# Patient Record
Sex: FEMALE | Race: BLACK OR AFRICAN AMERICAN | NOT HISPANIC OR LATINO | Employment: FULL TIME | URBAN - METROPOLITAN AREA
[De-identification: names, ages, dates, MRNs, and addresses within clinical notes are randomized per-mention and may not be internally consistent; named-entity substitution may affect disease eponyms.]

---

## 2017-09-21 ENCOUNTER — PATIENT MESSAGE (OUTPATIENT)
Dept: ADMINISTRATIVE | Facility: HOSPITAL | Age: 42
End: 2017-09-21

## 2017-10-01 DIAGNOSIS — I10 ESSENTIAL HYPERTENSION: ICD-10-CM

## 2017-10-02 RX ORDER — AMLODIPINE BESYLATE 10 MG/1
TABLET ORAL
Qty: 30 TABLET | Refills: 0 | Status: SHIPPED | OUTPATIENT
Start: 2017-10-02 | End: 2018-02-03 | Stop reason: SDUPTHER

## 2017-10-02 RX ORDER — LISINOPRIL 40 MG/1
TABLET ORAL
Qty: 30 TABLET | Refills: 0 | Status: SHIPPED | OUTPATIENT
Start: 2017-10-02 | End: 2018-02-03 | Stop reason: SDUPTHER

## 2017-10-13 DIAGNOSIS — J30.9 ALLERGIC RHINITIS: ICD-10-CM

## 2017-10-14 RX ORDER — FLUTICASONE PROPIONATE 50 MCG
SPRAY, SUSPENSION (ML) NASAL
Refills: 2 | OUTPATIENT
Start: 2017-10-14

## 2017-11-08 ENCOUNTER — PATIENT MESSAGE (OUTPATIENT)
Dept: INTERNAL MEDICINE | Facility: CLINIC | Age: 42
End: 2017-11-08

## 2017-11-08 DIAGNOSIS — I10 ESSENTIAL HYPERTENSION: ICD-10-CM

## 2017-11-08 RX ORDER — AMLODIPINE BESYLATE 10 MG/1
TABLET ORAL
Qty: 30 TABLET | Refills: 0 | OUTPATIENT
Start: 2017-11-08

## 2017-11-08 RX ORDER — LISINOPRIL 40 MG/1
TABLET ORAL
Qty: 30 TABLET | Refills: 0 | OUTPATIENT
Start: 2017-11-08

## 2018-01-24 ENCOUNTER — HOSPITAL ENCOUNTER (EMERGENCY)
Facility: HOSPITAL | Age: 43
Discharge: HOME | End: 2018-01-24
Attending: FAMILY MEDICINE
Payer: COMMERCIAL

## 2018-01-24 VITALS
SYSTOLIC BLOOD PRESSURE: 161 MMHG | RESPIRATION RATE: 18 BRPM | HEART RATE: 89 BPM | DIASTOLIC BLOOD PRESSURE: 95 MMHG | OXYGEN SATURATION: 98 % | BODY MASS INDEX: 31.39 KG/M2 | HEIGHT: 67 IN | TEMPERATURE: 99 F | WEIGHT: 200 LBS

## 2018-01-24 DIAGNOSIS — E87.6 HYPOKALEMIA: ICD-10-CM

## 2018-01-24 DIAGNOSIS — R10.84 GENERALIZED ABDOMINAL PAIN: ICD-10-CM

## 2018-01-24 DIAGNOSIS — R11.2 NON-INTRACTABLE VOMITING WITH NAUSEA, UNSPECIFIED VOMITING TYPE: Primary | ICD-10-CM

## 2018-01-24 LAB
ALBUMIN SERPL BCP-MCNC: 4.5 G/DL
ALP SERPL-CCNC: 92 U/L
ALT SERPL W/O P-5'-P-CCNC: 31 U/L
ANION GAP SERPL CALC-SCNC: 12 MMOL/L
AST SERPL-CCNC: 24 U/L
B-HCG UR QL: NEGATIVE
BACTERIA #/AREA URNS AUTO: NORMAL /HPF
BASOPHILS # BLD AUTO: 0.04 K/UL
BASOPHILS NFR BLD: 0.2 %
BILIRUB SERPL-MCNC: 0.6 MG/DL
BILIRUB UR QL STRIP: NEGATIVE
BUN SERPL-MCNC: 10 MG/DL
CALCIUM SERPL-MCNC: 11.6 MG/DL
CHLORIDE SERPL-SCNC: 98 MMOL/L
CLARITY UR REFRACT.AUTO: ABNORMAL
CO2 SERPL-SCNC: 28 MMOL/L
COLOR UR AUTO: YELLOW
CREAT SERPL-MCNC: 1 MG/DL
CTP QC/QA: YES
DIFFERENTIAL METHOD: ABNORMAL
EOSINOPHIL # BLD AUTO: 0 K/UL
EOSINOPHIL NFR BLD: 0 %
ERYTHROCYTE [DISTWIDTH] IN BLOOD BY AUTOMATED COUNT: 13.2 %
EST. GFR  (AFRICAN AMERICAN): >60 ML/MIN/1.73 M^2
EST. GFR  (NON AFRICAN AMERICAN): >60 ML/MIN/1.73 M^2
GLUCOSE SERPL-MCNC: 144 MG/DL
GLUCOSE UR QL STRIP: NEGATIVE
HCT VFR BLD AUTO: 47.1 %
HGB BLD-MCNC: 16.9 G/DL
HGB UR QL STRIP: NEGATIVE
HYALINE CASTS UR QL AUTO: 0 /LPF
IMM GRANULOCYTES # BLD AUTO: 0.13 K/UL
IMM GRANULOCYTES NFR BLD AUTO: 0.6 %
KETONES UR QL STRIP: ABNORMAL
LEUKOCYTE ESTERASE UR QL STRIP: NEGATIVE
LIPASE SERPL-CCNC: 16 U/L
LYMPHOCYTES # BLD AUTO: 1.9 K/UL
LYMPHOCYTES NFR BLD: 9.1 %
MCH RBC QN AUTO: 30.1 PG
MCHC RBC AUTO-ENTMCNC: 35.9 G/DL
MCV RBC AUTO: 84 FL
MICROSCOPIC COMMENT: NORMAL
MONOCYTES # BLD AUTO: 1.5 K/UL
MONOCYTES NFR BLD: 7.2 %
NEUTROPHILS # BLD AUTO: 17 K/UL
NEUTROPHILS NFR BLD: 82.9 %
NITRITE UR QL STRIP: NEGATIVE
NRBC BLD-RTO: 0 /100 WBC
PH UR STRIP: 7 [PH] (ref 5–8)
PLATELET # BLD AUTO: 298 K/UL
PMV BLD AUTO: 11.3 FL
POTASSIUM SERPL-SCNC: 3.1 MMOL/L
PROT SERPL-MCNC: 9.3 G/DL
PROT UR QL STRIP: ABNORMAL
RBC # BLD AUTO: 5.61 M/UL
RBC #/AREA URNS AUTO: 3 /HPF (ref 0–4)
SODIUM SERPL-SCNC: 138 MMOL/L
SP GR UR STRIP: 1.03 (ref 1–1.03)
SQUAMOUS #/AREA URNS AUTO: 16 /HPF
URN SPEC COLLECT METH UR: ABNORMAL
UROBILINOGEN UR STRIP-ACNC: NEGATIVE EU/DL
WBC # BLD AUTO: 20.5 K/UL
WBC #/AREA URNS AUTO: 2 /HPF (ref 0–5)

## 2018-01-24 PROCEDURE — 99284 EMERGENCY DEPT VISIT MOD MDM: CPT | Mod: 25

## 2018-01-24 PROCEDURE — 80053 COMPREHEN METABOLIC PANEL: CPT

## 2018-01-24 PROCEDURE — 96375 TX/PRO/DX INJ NEW DRUG ADDON: CPT

## 2018-01-24 PROCEDURE — 63600175 PHARM REV CODE 636 W HCPCS: Performed by: PHYSICIAN ASSISTANT

## 2018-01-24 PROCEDURE — 25500020 PHARM REV CODE 255: Performed by: FAMILY MEDICINE

## 2018-01-24 PROCEDURE — 96365 THER/PROPH/DIAG IV INF INIT: CPT

## 2018-01-24 PROCEDURE — 99284 EMERGENCY DEPT VISIT MOD MDM: CPT | Mod: ,,, | Performed by: PHYSICIAN ASSISTANT

## 2018-01-24 PROCEDURE — 85025 COMPLETE CBC W/AUTO DIFF WBC: CPT

## 2018-01-24 PROCEDURE — 81025 URINE PREGNANCY TEST: CPT | Performed by: FAMILY MEDICINE

## 2018-01-24 PROCEDURE — 81001 URINALYSIS AUTO W/SCOPE: CPT

## 2018-01-24 PROCEDURE — 96372 THER/PROPH/DIAG INJ SC/IM: CPT

## 2018-01-24 PROCEDURE — 96376 TX/PRO/DX INJ SAME DRUG ADON: CPT

## 2018-01-24 PROCEDURE — 96361 HYDRATE IV INFUSION ADD-ON: CPT

## 2018-01-24 PROCEDURE — 25000003 PHARM REV CODE 250: Performed by: PHYSICIAN ASSISTANT

## 2018-01-24 PROCEDURE — 83690 ASSAY OF LIPASE: CPT

## 2018-01-24 RX ORDER — ONDANSETRON 4 MG/1
4 TABLET, FILM COATED ORAL EVERY 6 HOURS
Qty: 12 TABLET | Refills: 0 | Status: SHIPPED | OUTPATIENT
Start: 2018-01-24 | End: 2018-01-26 | Stop reason: ALTCHOICE

## 2018-01-24 RX ORDER — CIPROFLOXACIN 250 MG/1
250 TABLET, FILM COATED ORAL
Status: COMPLETED | OUTPATIENT
Start: 2018-01-24 | End: 2018-01-24

## 2018-01-24 RX ORDER — ONDANSETRON 2 MG/ML
4 INJECTION INTRAMUSCULAR; INTRAVENOUS
Status: COMPLETED | OUTPATIENT
Start: 2018-01-24 | End: 2018-01-24

## 2018-01-24 RX ORDER — CIPROFLOXACIN 250 MG/1
250 TABLET, FILM COATED ORAL 2 TIMES DAILY
Qty: 5 TABLET | Refills: 0 | Status: SHIPPED | OUTPATIENT
Start: 2018-01-24 | End: 2018-01-27

## 2018-01-24 RX ORDER — DICYCLOMINE HYDROCHLORIDE 10 MG/ML
20 INJECTION INTRAMUSCULAR
Status: COMPLETED | OUTPATIENT
Start: 2018-01-24 | End: 2018-01-24

## 2018-01-24 RX ORDER — POTASSIUM CHLORIDE 20 MEQ/15ML
40 SOLUTION ORAL
Status: COMPLETED | OUTPATIENT
Start: 2018-01-24 | End: 2018-01-24

## 2018-01-24 RX ORDER — ACETAMINOPHEN 10 MG/ML
1000 INJECTION, SOLUTION INTRAVENOUS ONCE
Status: COMPLETED | OUTPATIENT
Start: 2018-01-24 | End: 2018-01-24

## 2018-01-24 RX ADMIN — IOHEXOL 100 ML: 350 INJECTION, SOLUTION INTRAVENOUS at 07:01

## 2018-01-24 RX ADMIN — CIPROFLOXACIN HYDROCHLORIDE 250 MG: 250 TABLET, FILM COATED ORAL at 08:01

## 2018-01-24 RX ADMIN — ONDANSETRON 4 MG: 2 INJECTION INTRAMUSCULAR; INTRAVENOUS at 05:01

## 2018-01-24 RX ADMIN — ONDANSETRON 4 MG: 2 INJECTION INTRAMUSCULAR; INTRAVENOUS at 06:01

## 2018-01-24 RX ADMIN — SODIUM CHLORIDE 1000 ML: 0.9 INJECTION, SOLUTION INTRAVENOUS at 05:01

## 2018-01-24 RX ADMIN — DICYCLOMINE HYDROCHLORIDE 20 MG: 20 INJECTION, SOLUTION INTRAMUSCULAR at 05:01

## 2018-01-24 RX ADMIN — ACETAMINOPHEN 1000 MG: 10 INJECTION, SOLUTION INTRAVENOUS at 08:01

## 2018-01-24 RX ADMIN — SODIUM CHLORIDE 1000 ML: 0.9 INJECTION, SOLUTION INTRAVENOUS at 06:01

## 2018-01-24 RX ADMIN — POTASSIUM CHLORIDE 40 MEQ: 20 SOLUTION ORAL at 08:01

## 2018-01-24 NOTE — ED PROVIDER NOTES
"Encounter Date: 1/24/2018       History     Chief Complaint   Patient presents with    Emesis     "can't hold anything down" since Monday.      Patient is a 42-year-old female with a history of hypertension presenting to the ER for evaluation of nausea and vomiting.  Patient states that since Monday she has not been able to keep down any food or fluids.  She is also had multiple episodes of diarrhea over the last 3 days.  Patient states that she has had multiple episodes of vomiting today.  No hematemesis.  She does have associated abdominal cramping associated with this.  She denies any blood in the stool or black tarry stool.  She denies any fevers recorded at home but does have some diffuse body aches.  Patient states that she recently came back from a cruise to [facility] on Saturday.  No other sick contacts. No congestion or uri symptoms.  She does have a remote history of diverticulitis.  No prior abdominal surgeries.      The history is provided by the patient.     Review of patient's allergies indicates:   Allergen Reactions    Ibuprofen Hives     Past Medical History:   Diagnosis Date    Diverticulosis     GERD (gastroesophageal reflux disease)     Hiatal hernia     Hypertension      Past Surgical History:   Procedure Laterality Date    CERVICAL BIOPSY  W/ LOOP ELECTRODE EXCISION  2/11/14     Family History   Problem Relation Age of Onset    Heart disease Mother 54     MI    Heart disease Maternal Grandmother 40     MI    Diabetes Other     Heart disease Brother 44     MI    Sickle cell trait Sister      Social History   Substance Use Topics    Smoking status: Current Every Day Smoker     Packs/day: 1.00     Years: 16.00     Types: Cigarettes    Smokeless tobacco: Never Used    Alcohol use Yes      Comment: social 1-2 x / month     Review of Systems   Constitutional: Negative for chills and fever.   HENT: Negative for congestion.    Respiratory: Negative for cough and shortness of breath.  "   Cardiovascular: Negative for chest pain.   Gastrointestinal: Positive for abdominal pain, diarrhea, nausea and vomiting. Negative for blood in stool.   Genitourinary: Negative for dysuria, flank pain and hematuria.   Musculoskeletal: Negative for back pain.   Skin: Negative for rash and wound.   Allergic/Immunologic: Negative for immunocompromised state.   Neurological: Positive for weakness. Negative for dizziness and headaches.   Hematological: Does not bruise/bleed easily.   Psychiatric/Behavioral: Negative for confusion.       Physical Exam     Initial Vitals [01/24/18 1444]   BP Pulse Resp Temp SpO2   (!) 176/88 82 18 98.2 °F (36.8 °C) 100 %      MAP       117.33         Physical Exam    Constitutional: She appears well-developed and well-nourished.   HENT:   Head: Normocephalic and atraumatic.   Eyes: Conjunctivae and EOM are normal.   Neck: Neck supple.   Cardiovascular: Normal rate, regular rhythm and normal heart sounds.   Pulmonary/Chest: Breath sounds normal.   Abdominal: Soft. Normal appearance and bowel sounds are normal. There is generalized tenderness. There is CVA tenderness.   Neurological: She is oriented to person, place, and time.   Skin: Skin is warm and dry.         ED Course   Procedures  Labs Reviewed   CBC W/ AUTO DIFFERENTIAL - Abnormal; Notable for the following:        Result Value    WBC 20.50 (*)     RBC 5.61 (*)     Hemoglobin 16.9 (*)     Immature Granulocytes 0.6 (*)     Gran # 17.0 (*)     Immature Grans (Abs) 0.13 (*)     Mono # 1.5 (*)     Gran% 82.9 (*)     Lymph% 9.1 (*)     All other components within normal limits   COMPREHENSIVE METABOLIC PANEL - Abnormal; Notable for the following:     Potassium 3.1 (*)     Glucose 144 (*)     Calcium 11.6 (*)     Total Protein 9.3 (*)     All other components within normal limits   URINALYSIS, REFLEX TO URINE CULTURE - Abnormal; Notable for the following:     Appearance, UA Hazy (*)     Protein, UA 2+ (*)     Ketones, UA 1+ (*)     All  other components within normal limits    Narrative:     Preferred Collection Type->Urine, Clean Catch   LIPASE   URINALYSIS MICROSCOPIC    Narrative:     Preferred Collection Type->Urine, Clean Catch   POCT URINE PREGNANCY                   APC / Resident Notes:   Patient was seen in the ER promptly upon arrival.  She is afebrile acute distress.  Mild diffuse tenderness on palpation over the abdomen.  Patient does appear to be slightly dry.  IV access established labs ordered fluids given.  Patient was given IV Tylenol and Zofran for nausea.  She states do show an elevated white count of 20.5.  Hemoglobin stable.  Potassium of 3.1, patient was given supplemental potassium in the ED.  This is likely secondary to patient's vomiting and diarrhea.  Urinalysis did show ketones, negative for infection or blood.  I did obtain CT of the abdomen and pelvis to rule out evidence of diverticulitis versus colitis or inflammatory infectious process.  CT does not show any acute findings.  There is diverticulosis without evidence of diverticulitis.  Upon reassessment patient is resting comfortably.  I am suspicious for traveler's diarrhea given her recent travel in a cruise to [place].  She was given ciprofloxacin in ED and will be covered for the next 3 days.  She will prescribed home on Zofran as well.  Did advise patient to stay hydrated with bland diet as well.  She is to follow-up with family doctor this week.  She stable for outpatient therapy at this time.    The care of this patient was overseen by attending physician who agrees with treatment, plan, and disposition.                ED Course      CT Abdomen Pelvis With Contrast   Final Result      1. No acute findings in the abdomen or pelvis.      2. Small to moderate volume intermediate density free fluid in the pelvis with low density lesion in the left adnexa, raising the question of small ruptured hemorrhagic ovarian cyst. This finding is similar in appearance to  05/20/2011.      3. Diverticulosis, without surrounding inflammation.               Electronically signed by: Vik Love   Date:     01/24/18   Time:    19:24         Clinical Impression:   The primary encounter diagnosis was Non-intractable vomiting with nausea, unspecified vomiting type. Diagnoses of Generalized abdominal pain and Hypokalemia were also pertinent to this visit.    Disposition:   Disposition: Discharged  Condition: Stable                        Padmini Caceres PA-C  01/24/18 7350

## 2018-01-24 NOTE — ED TRIAGE NOTES
Patient presents to ER with nausea, vomiting and diarrhea for several days.  States that she feels weak and is having generalized body aches.    Pt identifiers checked and correct  LOC: The patient is awake, alert, aware of environment with an appropriate affect. Oriented x3, speaking appropriately  APPEARANCE: Pt resting comfortably, in no acute distress, pt is clean and well groomed, clothing properly fastened  SKIN: Skin warm, dry and intact, normal skin turgor, moist mucus membranes  RESPIRATORY: Airway is open and patent, respirations are spontaneous, even and unlabored, normal effort and rate  MUSCULOSKELETAL: No obvious deformities.

## 2018-01-25 NOTE — ED NOTES
"Pt states "the potassium made me feel weird, like I need to throw up, my throat burns". PA< Morris made aware and would like to still d/c pt.   "

## 2018-01-26 ENCOUNTER — HOSPITAL ENCOUNTER (EMERGENCY)
Facility: HOSPITAL | Age: 43
Discharge: HOME | End: 2018-01-26
Attending: EMERGENCY MEDICINE
Payer: COMMERCIAL

## 2018-01-26 VITALS
RESPIRATION RATE: 20 BRPM | TEMPERATURE: 98 F | SYSTOLIC BLOOD PRESSURE: 181 MMHG | DIASTOLIC BLOOD PRESSURE: 110 MMHG | OXYGEN SATURATION: 98 % | HEART RATE: 83 BPM | HEIGHT: 67 IN | BODY MASS INDEX: 31.39 KG/M2 | WEIGHT: 200 LBS

## 2018-01-26 DIAGNOSIS — R10.84 ACUTE GENERALIZED ABDOMINAL PAIN: ICD-10-CM

## 2018-01-26 DIAGNOSIS — R11.2 NON-INTRACTABLE VOMITING WITH NAUSEA, UNSPECIFIED VOMITING TYPE: Primary | ICD-10-CM

## 2018-01-26 LAB
ALBUMIN SERPL BCP-MCNC: 4.6 G/DL
ALP SERPL-CCNC: 97 U/L
ALT SERPL W/O P-5'-P-CCNC: 64 U/L
AMORPH CRY URNS QL MICRO: ABNORMAL
ANION GAP SERPL CALC-SCNC: 14 MMOL/L
AST SERPL-CCNC: 56 U/L
B-HCG UR QL: NEGATIVE
BACTERIA #/AREA URNS HPF: ABNORMAL /HPF
BILIRUB SERPL-MCNC: 1.1 MG/DL
BILIRUB UR QL STRIP: ABNORMAL
BUN SERPL-MCNC: 12 MG/DL
CALCIUM SERPL-MCNC: 10.1 MG/DL
CHLORIDE SERPL-SCNC: 96 MMOL/L
CLARITY UR: ABNORMAL
CO2 SERPL-SCNC: 27 MMOL/L
COLOR UR: ABNORMAL
CREAT SERPL-MCNC: 1 MG/DL
CTP QC/QA: YES
EST. GFR  (AFRICAN AMERICAN): >60 ML/MIN/1.73 M^2
EST. GFR  (NON AFRICAN AMERICAN): >60 ML/MIN/1.73 M^2
GLUCOSE SERPL-MCNC: 125 MG/DL
GLUCOSE UR QL STRIP: NEGATIVE
HGB UR QL STRIP: ABNORMAL
HYALINE CASTS #/AREA URNS LPF: 6 /LPF
KETONES UR QL STRIP: ABNORMAL
LEUKOCYTE ESTERASE UR QL STRIP: NEGATIVE
LIPASE SERPL-CCNC: 22 U/L
MICROSCOPIC COMMENT: ABNORMAL
NITRITE UR QL STRIP: NEGATIVE
PH UR STRIP: 5 [PH] (ref 5–8)
POTASSIUM SERPL-SCNC: 3 MMOL/L
PROT SERPL-MCNC: 8.5 G/DL
PROT UR QL STRIP: ABNORMAL
RBC #/AREA URNS HPF: 1 /HPF (ref 0–4)
SODIUM SERPL-SCNC: 137 MMOL/L
SP GR UR STRIP: >1.03 (ref 1–1.03)
SQUAMOUS #/AREA URNS HPF: 15 /HPF
URN SPEC COLLECT METH UR: ABNORMAL
UROBILINOGEN UR STRIP-ACNC: ABNORMAL EU/DL
WBC #/AREA URNS HPF: 1 /HPF (ref 0–5)

## 2018-01-26 PROCEDURE — 63600175 PHARM REV CODE 636 W HCPCS: Performed by: EMERGENCY MEDICINE

## 2018-01-26 PROCEDURE — 81025 URINE PREGNANCY TEST: CPT | Performed by: EMERGENCY MEDICINE

## 2018-01-26 PROCEDURE — 83690 ASSAY OF LIPASE: CPT

## 2018-01-26 PROCEDURE — 96361 HYDRATE IV INFUSION ADD-ON: CPT

## 2018-01-26 PROCEDURE — 25000003 PHARM REV CODE 250: Performed by: EMERGENCY MEDICINE

## 2018-01-26 PROCEDURE — 96375 TX/PRO/DX INJ NEW DRUG ADDON: CPT

## 2018-01-26 PROCEDURE — 99284 EMERGENCY DEPT VISIT MOD MDM: CPT | Mod: 25

## 2018-01-26 PROCEDURE — 96374 THER/PROPH/DIAG INJ IV PUSH: CPT

## 2018-01-26 PROCEDURE — 81000 URINALYSIS NONAUTO W/SCOPE: CPT

## 2018-01-26 PROCEDURE — 80053 COMPREHEN METABOLIC PANEL: CPT

## 2018-01-26 PROCEDURE — 96372 THER/PROPH/DIAG INJ SC/IM: CPT

## 2018-01-26 RX ORDER — PANTOPRAZOLE SODIUM 40 MG/1
80 TABLET, DELAYED RELEASE ORAL
Status: COMPLETED | OUTPATIENT
Start: 2018-01-26 | End: 2018-01-26

## 2018-01-26 RX ORDER — PANTOPRAZOLE SODIUM 40 MG/1
80 TABLET, DELAYED RELEASE ORAL DAILY
Qty: 30 TABLET | Refills: 0 | Status: SHIPPED | OUTPATIENT
Start: 2018-01-26 | End: 2019-07-03

## 2018-01-26 RX ORDER — DICYCLOMINE HYDROCHLORIDE 20 MG/1
20 TABLET ORAL EVERY 6 HOURS PRN
Qty: 20 TABLET | Refills: 0 | Status: SHIPPED | OUTPATIENT
Start: 2018-01-26 | End: 2018-01-29 | Stop reason: ALTCHOICE

## 2018-01-26 RX ORDER — DIPHENHYDRAMINE HYDROCHLORIDE 50 MG/ML
50 INJECTION INTRAMUSCULAR; INTRAVENOUS
Status: COMPLETED | OUTPATIENT
Start: 2018-01-26 | End: 2018-01-26

## 2018-01-26 RX ORDER — PROCHLORPERAZINE EDISYLATE 5 MG/ML
5 INJECTION INTRAMUSCULAR; INTRAVENOUS ONCE
Status: COMPLETED | OUTPATIENT
Start: 2018-01-26 | End: 2018-01-26

## 2018-01-26 RX ORDER — ONDANSETRON 2 MG/ML
8 INJECTION INTRAMUSCULAR; INTRAVENOUS
Status: COMPLETED | OUTPATIENT
Start: 2018-01-26 | End: 2018-01-26

## 2018-01-26 RX ORDER — POTASSIUM CHLORIDE 20 MEQ/1
60 TABLET, EXTENDED RELEASE ORAL ONCE
Status: DISCONTINUED | OUTPATIENT
Start: 2018-01-26 | End: 2018-01-26

## 2018-01-26 RX ORDER — DICYCLOMINE HYDROCHLORIDE 10 MG/ML
20 INJECTION INTRAMUSCULAR
Status: COMPLETED | OUTPATIENT
Start: 2018-01-26 | End: 2018-01-26

## 2018-01-26 RX ORDER — POTASSIUM CHLORIDE 20 MEQ/1
60 TABLET, EXTENDED RELEASE ORAL
Status: COMPLETED | OUTPATIENT
Start: 2018-01-26 | End: 2018-01-26

## 2018-01-26 RX ORDER — PROMETHAZINE HYDROCHLORIDE 25 MG/1
25 SUPPOSITORY RECTAL EVERY 6 HOURS PRN
Qty: 12 SUPPOSITORY | Refills: 0 | Status: SHIPPED | OUTPATIENT
Start: 2018-01-26 | End: 2018-03-21 | Stop reason: ALTCHOICE

## 2018-01-26 RX ORDER — ONDANSETRON 4 MG/1
4 TABLET, FILM COATED ORAL EVERY 8 HOURS PRN
Qty: 12 TABLET | Refills: 0 | Status: SHIPPED | OUTPATIENT
Start: 2018-01-26 | End: 2018-03-21 | Stop reason: ALTCHOICE

## 2018-01-26 RX ADMIN — POTASSIUM CHLORIDE 60 MEQ: 1500 TABLET, EXTENDED RELEASE ORAL at 02:01

## 2018-01-26 RX ADMIN — PANTOPRAZOLE SODIUM 80 MG: 40 TABLET, DELAYED RELEASE ORAL at 12:01

## 2018-01-26 RX ADMIN — SODIUM CHLORIDE 1000 ML: 0.9 INJECTION, SOLUTION INTRAVENOUS at 12:01

## 2018-01-26 RX ADMIN — DIPHENHYDRAMINE HYDROCHLORIDE 50 MG: 50 INJECTION, SOLUTION INTRAMUSCULAR; INTRAVENOUS at 02:01

## 2018-01-26 RX ADMIN — DICYCLOMINE HYDROCHLORIDE 20 MG: 10 INJECTION INTRAMUSCULAR at 12:01

## 2018-01-26 RX ADMIN — PROCHLORPERAZINE EDISYLATE 5 MG: 5 INJECTION INTRAMUSCULAR; INTRAVENOUS at 02:01

## 2018-01-26 RX ADMIN — SODIUM CHLORIDE 1000 ML: 0.9 INJECTION, SOLUTION INTRAVENOUS at 04:01

## 2018-01-26 RX ADMIN — ONDANSETRON 8 MG: 2 INJECTION INTRAMUSCULAR; INTRAVENOUS at 12:01

## 2018-01-26 NOTE — ED TRIAGE NOTES
Patient came in PER transport with c/o nausea and emesis since Monday patient said that she went to [facility] and was given 3 bags of fluid, zofran and was given a prescription for zofran and cipro ( patient has not taken yet). Patient also c/o abdominal pain. Patient has a hx of  HTN and diverticulitis, hiatal hernia, GERD.

## 2018-01-26 NOTE — ED PROVIDER NOTES
"Encounter Date: 1/26/2018    SCRIBE #1 NOTE: I, Aimee Cortes, am scribing for, and in the presence of, Joseph Domínguez MD. Other sections scribed: HPI/ROS.       History     Chief Complaint   Patient presents with    Emesis     nausea and emesis since Monday; abdominal pain "from throwing up;" denies dysuria; sore throat; hx of HTN and diverticulitis, hiatal hernia, GERD     CC: Emesis    Pt is a 42 y.o. female smoker w/ diverticulosis, GERD, HTN, hiatal hernia presenting to the ED c/o constant nausea, vomiting, and weakness x 4 days (since Monday night, 1/22/18). Pt reports 2 episodes of "clear" diarrhea "in the beginning, but not now." Pt also reports sore throat and generalized abdominal "soreness." Pt reports about 20 episodes of emesis in the last 24 hours. Pt states she was in [place] last week, and returned on Saturday (1/20/18). Pt was seen on Wednesday (1/24/18) at the [facility] ED and was dx w/ Cipro and Zofran. Pt states she has not had a BM today.    Pt's LMP was 1/14/18. Pt denies fever, chills, HA, otalgia, dysuria, arthralgias/myalgias, or rash. Pt reports no further symptoms.        The history is provided by the patient.     Review of patient's allergies indicates:   Allergen Reactions    Ibuprofen Hives     Past Medical History:   Diagnosis Date    Diverticulosis     GERD (gastroesophageal reflux disease)     Hiatal hernia     Hypertension      Past Surgical History:   Procedure Laterality Date    CERVICAL BIOPSY  W/ LOOP ELECTRODE EXCISION  2/11/14     Family History   Problem Relation Age of Onset    Heart disease Mother 54     MI    Heart disease Maternal Grandmother 40     MI    Diabetes Other     Heart disease Brother 44     MI    Sickle cell trait Sister      Social History   Substance Use Topics    Smoking status: Current Every Day Smoker     Packs/day: 1.00     Years: 16.00     Types: Cigarettes    Smokeless tobacco: Never Used    Alcohol use Yes      Comment: social " 1-2 x / month     Review of Systems   Constitutional: Negative for chills and fever.   HENT: Positive for sore throat. Negative for ear pain.    Gastrointestinal: Positive for abdominal pain, nausea and vomiting. Negative for diarrhea.   Genitourinary: Negative for dysuria.   Musculoskeletal: Negative for arthralgias and myalgias.   Skin: Negative for rash.   Neurological: Negative for headaches.       Physical Exam     Initial Vitals [01/26/18 1101]   BP Pulse Resp Temp SpO2   (!) 158/100 100 20 98.2 °F (36.8 °C) 100 %      MAP       119.33         Physical Exam  The patient was examined specifically for the following:   General:No significant distress, Good color, Warm and dry. Head and neck:Scalp atraumatic, Neck supple. Neurological:Appropriate conversation, Gross motor deficits. Eyes:Conjugate gaze, Clear corneas. ENT: No epistaxis. Cardiac: Regular rate and rhythm, Grossly normal heart tones. Pulmonary: Wheezing, Rales. Gastrointestinal: Abdominal tenderness, Abdominal distention. Musculoskeletal: Extremity deformity, Apparent pain with range of motion of the joints. Skin: Rash.   The findings on examination were normal except for the following: The patient is mildly diffuse abdominal tenderness.  The lungs are clear.  The heart tones are normal.  There is no guarding or rebound.  ED Course   Procedures  Labs Reviewed   COMPREHENSIVE METABOLIC PANEL - Abnormal; Notable for the following:        Result Value    Potassium 3.0 (*)     Glucose 125 (*)     Total Protein 8.5 (*)     Total Bilirubin 1.1 (*)     AST 56 (*)     ALT 64 (*)     All other components within normal limits   URINALYSIS - Abnormal; Notable for the following:     Appearance, UA Cloudy (*)     Specific Gravity, UA >1.030 (*)     Protein, UA 2+ (*)     Ketones, UA 1+ (*)     Bilirubin (UA) 1+ (*)     Occult Blood UA 1+ (*)     Urobilinogen, UA 4.0-6.0 (*)     All other components within normal limits   URINALYSIS MICROSCOPIC - Abnormal; Notable  for the following:     Hyaline Casts, UA 6 (*)     All other components within normal limits   LIPASE   POCT URINE PREGNANCY          X-Rays:   Independently Interpreted Readings:   Other Readings:  CT of the abdomen fails to reveal significant abnormalities.    Medical decision making: This patient presents with continued vomiting.  The patient's been seen once already this week.  This may be a viral syndrome.  She did not respond particularly well to Zofran.  I will treat her outpatient with Phenergan and Zofran.  I will advise lots of clear liquids.  I will have her return if she gets worse or if new problems develop.  I will refer her to primary care.  There is no definite evidence of urinary tract infection.  Her potassium is low.               Scribe Attestation:   Scribe #1: I performed the above scribed service and the documentation accurately describes the services I performed. I attest to the accuracy of the note.    Attending Attestation:           Physician Attestation for Scribe:  Physician Attestation Statement for Scribe #1: I, Joseph Domínguez MD, reviewed documentation, as scribed by Aimee Cortes in my presence, and it is both accurate and complete.                 ED Course      Clinical Impression:   The primary encounter diagnosis was Non-intractable vomiting with nausea, unspecified vomiting type. A diagnosis of Acute generalized abdominal pain was also pertinent to this visit.                           Joseph Domínguez MD  01/26/18 [id number]

## 2018-01-26 NOTE — DISCHARGE INSTRUCTIONS
Lots of liquids.  Lots of clear liquids only.  Please return immediately if you get worse or if new problems develop.  Please make an appointment to see your primary care doctor this week.  Rest.

## 2018-01-26 NOTE — ED NOTES
Patient given ICE chips. MD aware. MD also notified of patient persistent nausea and pain. Will continue to monitor.

## 2018-01-29 ENCOUNTER — OFFICE VISIT (OUTPATIENT)
Dept: INTERNAL MEDICINE | Facility: CLINIC | Age: 43
End: 2018-01-29
Payer: COMMERCIAL

## 2018-01-29 ENCOUNTER — LAB VISIT (OUTPATIENT)
Dept: LAB | Facility: HOSPITAL | Age: 43
End: 2018-01-29
Attending: INTERNAL MEDICINE
Payer: COMMERCIAL

## 2018-01-29 VITALS
SYSTOLIC BLOOD PRESSURE: 128 MMHG | HEIGHT: 67 IN | HEART RATE: 100 BPM | WEIGHT: 195.31 LBS | DIASTOLIC BLOOD PRESSURE: 88 MMHG | TEMPERATURE: 98 F | BODY MASS INDEX: 30.65 KG/M2 | OXYGEN SATURATION: 99 %

## 2018-01-29 DIAGNOSIS — Z13.220 ENCOUNTER FOR LIPID SCREENING FOR CARDIOVASCULAR DISEASE: ICD-10-CM

## 2018-01-29 DIAGNOSIS — K52.9 GASTROENTERITIS, INFECTIOUS, PRESUMED: Primary | ICD-10-CM

## 2018-01-29 DIAGNOSIS — R74.01 TRANSAMINITIS: ICD-10-CM

## 2018-01-29 DIAGNOSIS — Z13.6 ENCOUNTER FOR LIPID SCREENING FOR CARDIOVASCULAR DISEASE: ICD-10-CM

## 2018-01-29 DIAGNOSIS — Z12.39 ENCOUNTER FOR BREAST CANCER SCREENING OTHER THAN MAMMOGRAM: ICD-10-CM

## 2018-01-29 DIAGNOSIS — K52.9 GASTROENTERITIS, INFECTIOUS, PRESUMED: ICD-10-CM

## 2018-01-29 DIAGNOSIS — R73.9 HYPERGLYCEMIA: ICD-10-CM

## 2018-01-29 LAB
ALBUMIN SERPL BCP-MCNC: 3.5 G/DL
ALP SERPL-CCNC: 89 U/L
ALT SERPL W/O P-5'-P-CCNC: 58 U/L
ANION GAP SERPL CALC-SCNC: 10 MMOL/L
AST SERPL-CCNC: 30 U/L
BASOPHILS # BLD AUTO: 0.05 K/UL
BASOPHILS NFR BLD: 0.5 %
BILIRUB SERPL-MCNC: 0.4 MG/DL
BUN SERPL-MCNC: 12 MG/DL
CALCIUM SERPL-MCNC: 9.3 MG/DL
CHLORIDE SERPL-SCNC: 102 MMOL/L
CHOLEST SERPL-MCNC: 160 MG/DL
CHOLEST/HDLC SERPL: 5.2 {RATIO}
CO2 SERPL-SCNC: 23 MMOL/L
CREAT SERPL-MCNC: 1.3 MG/DL
DIFFERENTIAL METHOD: NORMAL
EOSINOPHIL # BLD AUTO: 0.1 K/UL
EOSINOPHIL NFR BLD: 1.2 %
ERYTHROCYTE [DISTWIDTH] IN BLOOD BY AUTOMATED COUNT: 12.7 %
EST. GFR  (AFRICAN AMERICAN): 59.3 ML/MIN/1.73 M^2
EST. GFR  (NON AFRICAN AMERICAN): 51.4 ML/MIN/1.73 M^2
ESTIMATED AVG GLUCOSE: 100 MG/DL
GLUCOSE SERPL-MCNC: 165 MG/DL
HBA1C MFR BLD HPLC: 5.1 %
HCT VFR BLD AUTO: 45.4 %
HDLC SERPL-MCNC: 31 MG/DL
HDLC SERPL: 19.4 %
HGB BLD-MCNC: 15.4 G/DL
IMM GRANULOCYTES # BLD AUTO: 0.01 K/UL
IMM GRANULOCYTES NFR BLD AUTO: 0.1 %
LDLC SERPL CALC-MCNC: 95 MG/DL
LYMPHOCYTES # BLD AUTO: 2.7 K/UL
LYMPHOCYTES NFR BLD: 25.7 %
MCH RBC QN AUTO: 29.8 PG
MCHC RBC AUTO-ENTMCNC: 33.9 G/DL
MCV RBC AUTO: 88 FL
MONOCYTES # BLD AUTO: 0.7 K/UL
MONOCYTES NFR BLD: 7.1 %
NEUTROPHILS # BLD AUTO: 6.8 K/UL
NEUTROPHILS NFR BLD: 65.4 %
NONHDLC SERPL-MCNC: 129 MG/DL
NRBC BLD-RTO: 0 /100 WBC
PLATELET # BLD AUTO: 278 K/UL
PMV BLD AUTO: 11.3 FL
POTASSIUM SERPL-SCNC: 3.1 MMOL/L
PROT SERPL-MCNC: 7.3 G/DL
RBC # BLD AUTO: 5.16 M/UL
SODIUM SERPL-SCNC: 135 MMOL/L
TRIGL SERPL-MCNC: 170 MG/DL
WBC # BLD AUTO: 10.37 K/UL

## 2018-01-29 PROCEDURE — 99999 PR PBB SHADOW E&M-EST. PATIENT-LVL III: CPT | Mod: PBBFAC,,, | Performed by: INTERNAL MEDICINE

## 2018-01-29 PROCEDURE — 85025 COMPLETE CBC W/AUTO DIFF WBC: CPT

## 2018-01-29 PROCEDURE — 80074 ACUTE HEPATITIS PANEL: CPT

## 2018-01-29 PROCEDURE — 36415 COLL VENOUS BLD VENIPUNCTURE: CPT | Mod: PO

## 2018-01-29 PROCEDURE — 80061 LIPID PANEL: CPT

## 2018-01-29 PROCEDURE — 80053 COMPREHEN METABOLIC PANEL: CPT

## 2018-01-29 PROCEDURE — 3008F BODY MASS INDEX DOCD: CPT | Mod: S$GLB,,, | Performed by: INTERNAL MEDICINE

## 2018-01-29 PROCEDURE — 83036 HEMOGLOBIN GLYCOSYLATED A1C: CPT

## 2018-01-29 PROCEDURE — 99214 OFFICE O/P EST MOD 30 MIN: CPT | Mod: S$GLB,,, | Performed by: INTERNAL MEDICINE

## 2018-01-29 RX ORDER — ONDANSETRON 4 MG/1
1 TABLET, ORALLY DISINTEGRATING ORAL
COMMUNITY
Start: 2018-01-27 | End: 2018-01-29

## 2018-01-29 NOTE — PATIENT INSTRUCTIONS
Recommendations for today    Should you need additional medications for nausea contact the clinic for refills.    Should you have future episodes of diarrhea if should always contact a medical office if there is diarrhea with blood.  He should always contact the office if you have vomiting that is more than just one isolated event meaning that this lasts for the better part of a day.  Avoiding use or contact with marijuana can help to prevent cyclical vomiting.

## 2018-01-30 LAB
HAV IGM SERPL QL IA: NEGATIVE
HBV CORE IGM SERPL QL IA: NEGATIVE
HBV SURFACE AG SERPL QL IA: NEGATIVE
HCV AB SERPL QL IA: NEGATIVE

## 2018-02-03 ENCOUNTER — PATIENT MESSAGE (OUTPATIENT)
Dept: INTERNAL MEDICINE | Facility: CLINIC | Age: 43
End: 2018-02-03

## 2018-02-03 DIAGNOSIS — N17.9 AKI (ACUTE KIDNEY INJURY): ICD-10-CM

## 2018-02-03 DIAGNOSIS — I10 ESSENTIAL HYPERTENSION: ICD-10-CM

## 2018-02-03 DIAGNOSIS — R60.9 EDEMA, UNSPECIFIED TYPE: Primary | ICD-10-CM

## 2018-02-05 ENCOUNTER — HOSPITAL ENCOUNTER (OUTPATIENT)
Dept: RADIOLOGY | Facility: HOSPITAL | Age: 43
Discharge: HOME | End: 2018-02-05
Attending: INTERNAL MEDICINE
Payer: COMMERCIAL

## 2018-02-05 VITALS — HEIGHT: 67 IN | WEIGHT: 195 LBS | BODY MASS INDEX: 30.61 KG/M2

## 2018-02-05 DIAGNOSIS — Z12.39 BREAST CANCER SCREENING: ICD-10-CM

## 2018-02-05 DIAGNOSIS — Z12.39 ENCOUNTER FOR BREAST CANCER SCREENING OTHER THAN MAMMOGRAM: ICD-10-CM

## 2018-02-05 PROCEDURE — 77063 BREAST TOMOSYNTHESIS BI: CPT | Mod: 26,,, | Performed by: RADIOLOGY

## 2018-02-05 PROCEDURE — 77067 SCR MAMMO BI INCL CAD: CPT | Mod: TC,PO

## 2018-02-05 PROCEDURE — 77067 SCR MAMMO BI INCL CAD: CPT | Mod: 26,,, | Performed by: RADIOLOGY

## 2018-02-06 RX ORDER — LISINOPRIL 40 MG/1
40 TABLET ORAL DAILY
Qty: 90 TABLET | Refills: 1 | Status: SHIPPED | OUTPATIENT
Start: 2018-02-06 | End: 2018-09-19 | Stop reason: SDUPTHER

## 2018-02-06 RX ORDER — AMLODIPINE BESYLATE 10 MG/1
10 TABLET ORAL DAILY
Qty: 90 TABLET | Refills: 3 | Status: SHIPPED | OUTPATIENT
Start: 2018-02-06 | End: 2018-12-14

## 2018-02-07 ENCOUNTER — TELEPHONE (OUTPATIENT)
Dept: FAMILY MEDICINE | Facility: CLINIC | Age: 43
End: 2018-02-07

## 2018-02-07 NOTE — TELEPHONE ENCOUNTER
Spoke with patient and she reports edema has improved. Scheduled labs at pt request for Monday at [facility].

## 2018-02-07 NOTE — TELEPHONE ENCOUNTER
----- Message from Edi Jorge MD sent at 2/6/2018  6:57 PM CST -----  Patient concerned that she is retaining fluid significantly.  Please contact her to schedule kidney blood work as soon as possible.  We need reassurance that kidney function has recovered after dehydration.

## 2018-03-02 ENCOUNTER — TELEPHONE (OUTPATIENT)
Dept: ADMINISTRATIVE | Facility: HOSPITAL | Age: 43
End: 2018-03-02

## 2018-03-02 NOTE — TELEPHONE ENCOUNTER
Contacted pt to schedule hypertension follow up per Dr. oJrge, scheduled pt for 03/21/2018.   Pt verbalized understanding.

## 2018-03-19 ENCOUNTER — LAB VISIT (OUTPATIENT)
Dept: LAB | Facility: HOSPITAL | Age: 43
End: 2018-03-19
Attending: INTERNAL MEDICINE
Payer: COMMERCIAL

## 2018-03-19 DIAGNOSIS — R60.9 EDEMA, UNSPECIFIED TYPE: ICD-10-CM

## 2018-03-19 DIAGNOSIS — N17.9 AKI (ACUTE KIDNEY INJURY): ICD-10-CM

## 2018-03-19 LAB
ALBUMIN SERPL BCP-MCNC: 4 G/DL
ANION GAP SERPL CALC-SCNC: 8 MMOL/L
BNP SERPL-MCNC: 22 PG/ML
BUN SERPL-MCNC: 9 MG/DL
CALCIUM SERPL-MCNC: 9.5 MG/DL
CHLORIDE SERPL-SCNC: 108 MMOL/L
CO2 SERPL-SCNC: 23 MMOL/L
CREAT SERPL-MCNC: 1 MG/DL
EST. GFR  (AFRICAN AMERICAN): >60 ML/MIN/1.73 M^2
EST. GFR  (NON AFRICAN AMERICAN): >60 ML/MIN/1.73 M^2
GLUCOSE SERPL-MCNC: 107 MG/DL
PHOSPHATE SERPL-MCNC: 2.6 MG/DL
POTASSIUM SERPL-SCNC: 4.2 MMOL/L
SODIUM SERPL-SCNC: 139 MMOL/L

## 2018-03-19 PROCEDURE — 83880 ASSAY OF NATRIURETIC PEPTIDE: CPT

## 2018-03-19 PROCEDURE — 80069 RENAL FUNCTION PANEL: CPT

## 2018-03-19 PROCEDURE — 36415 COLL VENOUS BLD VENIPUNCTURE: CPT | Mod: PO

## 2018-03-21 ENCOUNTER — OFFICE VISIT (OUTPATIENT)
Dept: INTERNAL MEDICINE | Facility: CLINIC | Age: 43
End: 2018-03-21
Payer: COMMERCIAL

## 2018-03-21 VITALS
OXYGEN SATURATION: 99 % | WEIGHT: 205.69 LBS | BODY MASS INDEX: 32.28 KG/M2 | SYSTOLIC BLOOD PRESSURE: 114 MMHG | DIASTOLIC BLOOD PRESSURE: 78 MMHG | HEART RATE: 68 BPM | HEIGHT: 67 IN

## 2018-03-21 DIAGNOSIS — I10 ESSENTIAL HYPERTENSION: ICD-10-CM

## 2018-03-21 DIAGNOSIS — F17.210 SMOKES 1 PACK OF CIGARETTES PER DAY: Primary | ICD-10-CM

## 2018-03-21 DIAGNOSIS — F43.0 STRESS REACTION: ICD-10-CM

## 2018-03-21 PROCEDURE — 3074F SYST BP LT 130 MM HG: CPT | Mod: CPTII,S$GLB,, | Performed by: INTERNAL MEDICINE

## 2018-03-21 PROCEDURE — 3078F DIAST BP <80 MM HG: CPT | Mod: CPTII,S$GLB,, | Performed by: INTERNAL MEDICINE

## 2018-03-21 PROCEDURE — 99213 OFFICE O/P EST LOW 20 MIN: CPT | Mod: S$GLB,,, | Performed by: INTERNAL MEDICINE

## 2018-03-21 PROCEDURE — 99999 PR PBB SHADOW E&M-EST. PATIENT-LVL III: CPT | Mod: PBBFAC,,, | Performed by: INTERNAL MEDICINE

## 2018-03-21 NOTE — PROGRESS NOTES
Portions of this note are generated with voice recognition software. Typographical errors may exist.     SUBJECTIVE:    This is a/an 42 y.o. female here for primary care visit for  Chief Complaint   Patient presents with    Hypertension     follow up      Patient states that she routinely smokes 1 pack per day.  Barriers to quitting include concern about side effects for medication assisted smoking cessation.  She has had friends that have had significant changes in mood on medication and she is reluctant to pursue these medications.  Patient also has difficulties with depression and anxiety in the past and planning to quit smoking has caused the patient anxiety in the past resulting in ambivalence to quit.    Patient has never tried nicotine replacement therapy and because this has a limited potential for side effects the patient is more interested in this approach.    Patient states that she still has unresolved grief regarding  a failed pregnancy from 10 years ago.  Patient becomes visibly distressed just talking about the issue but minimizes the need for ongoing support and counseling regarding this difficult episode in her life.      Medications Reviewed and Updated    Past medical, family, and social histories were reviewed and updated.    Review of Systems negative unless otherwise noted in history of present illness-  ROS    General ROS: negative  Psychological ROS: negative  ENT ROS: negative  Endocrine ROS: Negative  Allergy and Immunology ROS: negative  Cardiovascular ROS: negative  Pulmonary ROS: Negative      Allergic:    Review of patient's allergies indicates:   Allergen Reactions    Ibuprofen Hives       OBJECTIVE:  BP: 114/78 Pulse: 68    Wt Readings from Last 3 Encounters:   03/21/18 93.3 kg (205 lb 11 oz)   02/05/18 88.5 kg (195 lb)   01/29/18 88.6 kg (195 lb 5.2 oz)    Body mass index is 32.22 kg/m².  Previous Blood Pressure Readings :   BP Readings from Last 3 Encounters:   03/21/18 114/78    01/29/18 128/88   01/26/18 (!) 181/110       Physical Exam    GEN: No apparent distress  HEENT: sclera non-icteric, conjunctiva clear  CV: no peripheral edema.  Frequent premature contractions.  Regular rate.  PULM: breathing non-labored  ABD: Obese, protuberant abdomen.  PSYCH: appropriate affect  MSK: able to rise from chair without assistance  SKIN: normal skin turgor    Pertinent Labs Reviewed       ASSESSMENT/PLAN:    Smokes 1 pack of cigarettes per day  -     Ambulatory referral to Smoking Cessation Program    Stress reaction    Essential hypertension          Future Appointments  Date Time Provider Department Center   5/21/2018 8:40 AM Edi Jorge MD [facility]       Edi Jorge  3/21/2018  9:14 AM

## 2018-03-21 NOTE — PATIENT INSTRUCTIONS
Recommendations were today    We highly recommend that you in the role and smoking program so that you can begin using nicotine products to curb consumption of regular cigarettes.  This is considered a safe alternative to smoking on the road to eventually quitting nicotine.  Recommend follow-up in clinic with me to address additional resources that may be necessary to help you quit smoking altogether.      Nicotine replacement therapy    Nicotine replacement therapy is a treatment to help people stop smoking. It uses products that supply low doses of nicotine. These products do not contain many of the toxins found in smoke. The goal of therapy is to cut down on cravings for nicotine and ease the symptoms of nicotine withdrawal.     Facts about using nicotine replacement therapy:    The more cigarettes you smoke, the higher the dose you may need to start.    Adding a counseling program will make you more likely to quit.    LIMIT smoking while using nicotine replacement as much as possible. Failing to do so can cause nicotine to build up to toxic levels.    Nicotine replacement helps prevent weight gain while you are using it. You may still gain weight when you stop all nicotine use.    The dose of nicotine should be slowly decreased when choosing to quit to reduce this possibility      TYPES OF NICOTINE REPLACEMENT THERAPY    Nicotine supplements come in many forms:    · Gum  · Inhalers  · Lozenges  · Nasal spray  · Skin patch    All of these work well if they are used correctly. People are more likely to use the gum and patches correctly than other forms.    Nicotine Patch    · All nicotine patches are placed and used in similar ways:  · A single patch is worn each day. It is replaced after 24 hours.  · Place the patch on different areas above the waist and below the neck each day.  · Put the patch on a hairless spot.  · People who wear the patches for 24 hours will have fewer withdrawal symptoms.  · If wearing the  "patch at night causes odd dreams, try sleeping without the patch.  · People who smoke fewer than 10 cigarettes per day should start with a lower dose patch (for example, 14 mg).    Nicotine Gum or Lozenge    You can buy nicotine gum or lozenges without a prescription. Some people prefer lozenges to the patch, because they can control the nicotine dose.    Tips for using the gum:    If you are just starting to quit, chew 1 to 2 pieces each hour. DO NOT chew more than 20 pieces a day.     Chew the gum slowly until it develops a peppery taste. Then, tuck it between the gum and cheek and store it there. This lets the nicotine be absorbed.    The goal is to stop using the gum by 6 months. Using nicotine gum longterm may be safer than smoking, but research is needed to confirm this.    Wait at least 15 minutes after drinking coffee, tea, soft drinks, and acidic beverages before chewing a piece of gum.    People who smoke more than 25 cigarettes per day have better results with the 4 mg dose than with the 2 mg dose.      Nicotine Inhaler    The nicotine inhaler looks like a plastic cigarette keating. It requires a prescription in the [place].     Insert nicotine cartridges into the inhaler and "puff" for about 20 minutes. Do this up to 16 times a day.     The inhaler is quickacting. It takes about the same time as the gum to act. It is faster than the 2 to 4 hours it takes for the patch to work.    The inhaler satisfies oral urges.     Most of the nicotine vapor does not go into the airways of the lung. Some people have mouth or throat irritation and cough with the inhaler.    It can help to use the inhaler and patch together when quitting.      Nicotine Nasal Spray    The nasal spray provides a quick dose of nicotine to satisfy a craving you are unable to ignore. Levels of nicotine peak within 5 to 10 minutes after using the spray.    It may be used along with the patch. The spray can irritate the nose, eyes, and " throat.     These side effects often go away in a few days.      SIDE EFFECTS AND RISKS    · All nicotine products may cause side effects. Symptoms are more likely when you use very high doses.   · Reducing the dose can prevent these symptoms. Side effects include:  · Headaches  · Nausea and other digestive problems  · Problems getting to sleep in the first few days, most often with the patch. This problem usually passes.      SPECIAL PRECAUTIONS    Nicotine patches are okay for use by most people with stable heart or blood circulation problems. But, the unhealthy cholesterol levels (lower HDL level) caused by smoking do not get better until the nicotine patch is stopped.    Nicotine replacement may not be completely safe in pregnant women. The unborn children of women who use the patch may have a faster heart rate.    Keep all nicotine products away from children.    The concern is greater for small children. Nicotine is a poison.    Call the doctor or a poison control center right away if a child has been exposed to a nicotine replacement product, even for a short time.

## 2018-04-17 NOTE — PROGRESS NOTES
Portions of this note are generated with voice recognition software. Typographical errors may exist.     SUBJECTIVE:    This is a/an 42 y.o. female here for primary care visit for  Chief Complaint   Patient presents with    Emesis     Patient states that nausea and anorexia have improved significantly in the last 48 hours.  Patient is using Phenergan suppositories and has discontinued Zofran in the last 48 hours.  She is able to progress to solid food.  Patient states that recent travel abroad to [place] included possible exposure to contaminated food.  Exposure to raw shellfish.  Patient traveled with cousins and sisters.  Unclear if there is any risk for exposure to STDs.  Patient states that she came home on a Saturday and gastroenteritis symptoms developed on a Monday.    Patient states that she is interested to come in for annual wellness examination.  She understands that she is overdue for follow-up.      Answers for HPI/ROS submitted by the patient on 1/27/2018   Hypertension  Chronicity: recurrent  Onset: more than 1 year ago  Progression since onset: unchanged  Condition status: resistant  anxiety: No  peripheral edema: No  sweats: No  Agents associated with hypertension: NSAIDs  CAD risks: family history, smoking/tobacco exposure  Compliance problems: diet      Medications Reviewed and Updated    Past medical, family, and social histories were reviewed and updated.    Review of Systems negative unless otherwise noted in history of present illness-  ROS    General ROS: negative  Psychological ROS: negative  Allergy and Immunology ROS: negative  Cardiovascular ROS: negative  Gastrointestinal ROS: negative  Genito-Urinary ROS: negative  Musculoskeletal ROS: negative        Allergic:    Review of patient's allergies indicates:   Allergen Reactions    Ibuprofen Hives       OBJECTIVE:  BP: 128/88 Pulse: 100 Temp: 97.9 °F (36.6 °C)  Wt Readings from Last 3 Encounters:   01/29/18 88.6 kg (195 lb 5.2 oz)    01/26/18 90.7 kg (200 lb)   01/24/18 90.7 kg (200 lb)    Body mass index is 30.59 kg/m².  Previous Blood Pressure Readings :   BP Readings from Last 3 Encounters:   01/29/18 128/88   01/26/18 (!) 181/110   01/24/18 (!) 161/95       Physical Exam    GEN: No apparent distress  HEENT: sclera non-icteric, conjunctiva clear  CV: no peripheral edema.  Regular rate and rhythm.  No murmurs.  PULM: breathing non-labored  ABD: Obese, protuberant abdomen.  Negative Burns sign.  Supple abdomen.  PSYCH: appropriate affect  MSK: able to rise from chair without assistance  SKIN: normal skin turgor    Pertinent Labs Reviewed       ASSESSMENT/PLAN:    Gastroenteritis, infectious, presumed.This is a New problem. The etiology is Likely acute bacterial or viral process.. The problem is improving. The risk of medical complications is low. Treatment recommendations are to continue with the same medical plan. The patient advised if symptoms change or intensify to seek medical care.   -     CBC auto differential; Future; Expected date: 01/29/2018    Encounter for breast cancer screening other than mammogram  -     Mammo Digital Screening Bilateral With CAD; Future; Expected date: 01/29/2018    Transaminitis.This is a New problem. The etiology is Possibly viral hepatitis.. The problem is improving. The risk of medical complications is moderate. Treatment recommendations are to modify the treatment plan as follows. The patient advised if symptoms change or intensify to seek medical care.   -     Hepatitis panel, acute; Future; Expected date: 01/29/2018  -     Comprehensive metabolic panel; Future; Expected date: 01/29/2018    Encounter for lipid screening for cardiovascular disease  -     Lipid panel; Future; Expected date: 01/29/2018          Future Appointments  Date Time Provider Department Center   1/29/2018 10:15 AM MITCH BARNETT  1/29/2018  10:05 AM     No

## 2018-06-05 ENCOUNTER — OFFICE VISIT (OUTPATIENT)
Dept: INTERNAL MEDICINE | Facility: CLINIC | Age: 43
End: 2018-06-05
Payer: COMMERCIAL

## 2018-06-05 VITALS
OXYGEN SATURATION: 99 % | DIASTOLIC BLOOD PRESSURE: 90 MMHG | BODY MASS INDEX: 31.49 KG/M2 | HEIGHT: 67 IN | WEIGHT: 200.63 LBS | SYSTOLIC BLOOD PRESSURE: 120 MMHG | HEART RATE: 64 BPM

## 2018-06-05 DIAGNOSIS — I10 ESSENTIAL HYPERTENSION: ICD-10-CM

## 2018-06-05 DIAGNOSIS — M75.81 ROTATOR CUFF TENDINITIS, RIGHT: Primary | ICD-10-CM

## 2018-06-05 DIAGNOSIS — B36.0 TINEA VERSICOLOR: ICD-10-CM

## 2018-06-05 DIAGNOSIS — M75.82 TENDINITIS OF LEFT ROTATOR CUFF: ICD-10-CM

## 2018-06-05 PROCEDURE — 3008F BODY MASS INDEX DOCD: CPT | Mod: CPTII,S$GLB,, | Performed by: INTERNAL MEDICINE

## 2018-06-05 PROCEDURE — 99999 PR PBB SHADOW E&M-EST. PATIENT-LVL III: CPT | Mod: PBBFAC,,, | Performed by: INTERNAL MEDICINE

## 2018-06-05 PROCEDURE — 99214 OFFICE O/P EST MOD 30 MIN: CPT | Mod: S$GLB,,, | Performed by: INTERNAL MEDICINE

## 2018-06-05 PROCEDURE — 3074F SYST BP LT 130 MM HG: CPT | Mod: CPTII,S$GLB,, | Performed by: INTERNAL MEDICINE

## 2018-06-05 PROCEDURE — 3080F DIAST BP >= 90 MM HG: CPT | Mod: CPTII,S$GLB,, | Performed by: INTERNAL MEDICINE

## 2018-06-05 RX ORDER — MELOXICAM 15 MG/1
15 TABLET ORAL DAILY PRN
Qty: 30 TABLET | Refills: 0 | Status: SHIPPED | OUTPATIENT
Start: 2018-06-05 | End: 2018-07-22 | Stop reason: SDUPTHER

## 2018-06-05 RX ORDER — KETOCONAZOLE 20 MG/G
CREAM TOPICAL 2 TIMES DAILY
Qty: 60 G | Refills: 12 | Status: CANCELLED | OUTPATIENT
Start: 2018-06-05 | End: 2019-06-05

## 2018-06-05 NOTE — PROGRESS NOTES
Portions of this note are generated with voice recognition software. Typographical errors may exist.     SUBJECTIVE:    This is a/an 42 y.o. female here for primary care visit for  Chief Complaint   Patient presents with    Hypertension     follow up     Shoulder Pain     x1 week      Patient states that she has been having recurring pain along the lateral and posterior aspects of both shoulders but right side worse than left side.  This has been going on for the last month on a recurring basis.  No high impact trauma associated with pain. 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  This is almost a daily occurrence and results in straining aching pain immediately after lifting car batteries and eccentric way.    The patient states that pain is 7 to 8/10 in maximum intensity.  Self care measures have been limited.  Patient has not been assigned to light duty.  She has not tried NSAID medication.  She has previously had significant problems with gastritis but states that presently symptoms are minimal and she has not needed to take PPI therapies recently.      Patient is struggling to remember blood pressure medications and believes this is why blood pressure is more elevated today than usual.    Patient continues to smoke cigarettes in is reluctant to pursue smoking cessation at this time the    Patient states that she has been having tinea versicolor rash along the neck.  Topical clotrimazole has provided modest improvement in symptoms    Medications Reviewed and Updated    Past medical, family, and social histories were reviewed and updated.    Review of Systems negative unless otherwise noted in history of present illness-  ROS    General ROS: negative  Psychological ROS: negative  Endocrine ROS: Negative  Allergy and Immunology ROS: negative  Cardiovascular ROS: negative  Pulmonary ROS: Negative  Gastrointestinal ROS: negative  Genito-Urinary ROS: negative  Musculoskeletal  ROS: negative      Allergic:    Review of patient's allergies indicates:   Allergen Reactions    Ibuprofen Hives       OBJECTIVE:  BP: (!) 120/90 Pulse: 64    Wt Readings from Last 3 Encounters:   06/05/18 91 kg (200 lb 9.9 oz)   03/21/18 93.3 kg (205 lb 11 oz)   02/05/18 88.5 kg (195 lb)    Body mass index is 31.42 kg/m².  Previous Blood Pressure Readings :   BP Readings from Last 3 Encounters:   06/05/18 (!) 120/90   03/21/18 114/78   01/29/18 128/88       Physical Exam    GEN: No apparent distress  HEENT: sclera non-icteric, conjunctiva clear  CV: no peripheral edema  PULM: breathing non-labored  ABD: Obese, protuberant abdomen.  PSYCH: appropriate affect  MSK: able to rise from chair without assistance.  Negative examination for rotator cuff instability.  Point tenderness along the posterior portion of the rotator cuff right side  SKIN: normal skin turgor    Pertinent Labs Reviewed       ASSESSMENT/PLAN:    Essential hypertension.Condition not optimally controlled. Detailed counseling on self care measures. Plan to monitor clinically in addition to plan below and on After Visit Summary.     Rotator cuff tendinitis, right.Condition not optimally controlled. Detailed counseling on self care measures. Plan to monitor clinically in addition to plan below and on After Visit Summary.   -     Ambulatory Referral to Physical/Occupational Therapy  -     meloxicam (MOBIC) 15 MG tablet; Take 1 tablet (15 mg total) by mouth daily as needed for Pain (stop use and call MD if itching/rash develop).  Dispense: 30 tablet; Refill: 0    Tendinitis of left rotator cuff.Condition not optimally controlled. Detailed counseling on self care measures. Plan to monitor clinically in addition to plan below and on After Visit Summary.   -     Ambulatory Referral to Physical/Occupational Therapy  -     meloxicam (MOBIC) 15 MG tablet; Take 1 tablet (15 mg total) by mouth daily as needed for Pain (stop use and call MD if itching/rash  develop).  Dispense: 30 tablet; Refill: 0    Tinea versicolor.Condition not optimally controlled. Detailed counseling on self care measures. Plan to monitor clinically in addition to plan below and on After Visit Summary.   -     Cancel: ketoconazole (NIZORAL) 2 % cream; Apply topically 2 (two) times daily.  Dispense: 60 g; Refill: 12          Future Appointments  Date Time Provider Department Center   7/9/2018 9:20 AM Edi Jorge MD [facility]       Edi Jorge  6/5/2018  9:35 AM

## 2018-07-22 DIAGNOSIS — M75.82 TENDINITIS OF LEFT ROTATOR CUFF: ICD-10-CM

## 2018-07-22 DIAGNOSIS — M75.81 ROTATOR CUFF TENDINITIS, RIGHT: ICD-10-CM

## 2018-07-23 RX ORDER — MELOXICAM 15 MG/1
TABLET ORAL
Qty: 30 TABLET | Refills: 0 | Status: SHIPPED | OUTPATIENT
Start: 2018-07-23 | End: 2018-12-14

## 2018-09-19 DIAGNOSIS — I10 ESSENTIAL HYPERTENSION: ICD-10-CM

## 2018-09-19 RX ORDER — LISINOPRIL 40 MG/1
40 TABLET ORAL DAILY
Qty: 90 TABLET | Refills: 1 | Status: SHIPPED | OUTPATIENT
Start: 2018-09-19 | End: 2019-06-06 | Stop reason: SDUPTHER

## 2018-12-14 ENCOUNTER — HOSPITAL ENCOUNTER (OUTPATIENT)
Facility: HOSPITAL | Age: 43
Discharge: HOME | End: 2018-12-17
Attending: EMERGENCY MEDICINE | Admitting: HOSPITALIST
Payer: COMMERCIAL

## 2018-12-14 DIAGNOSIS — R06.02 SHORTNESS OF BREATH: ICD-10-CM

## 2018-12-14 DIAGNOSIS — R11.10 VOMITING: ICD-10-CM

## 2018-12-14 DIAGNOSIS — F12.10 MARIJUANA ABUSE: ICD-10-CM

## 2018-12-14 DIAGNOSIS — R11.15 INTRACTABLE CYCLICAL VOMITING WITH NAUSEA: Primary | ICD-10-CM

## 2018-12-14 DIAGNOSIS — R94.31 PROLONGED Q-T INTERVAL ON ECG: ICD-10-CM

## 2018-12-14 LAB
ALBUMIN SERPL BCP-MCNC: 4.4 G/DL
ALP SERPL-CCNC: 102 U/L
ALT SERPL W/O P-5'-P-CCNC: 13 U/L
ANION GAP SERPL CALC-SCNC: 19 MMOL/L
AST SERPL-CCNC: 17 U/L
B-HCG UR QL: NEGATIVE
BACTERIA #/AREA URNS AUTO: ABNORMAL /HPF
BASOPHILS # BLD AUTO: 0.05 K/UL
BASOPHILS NFR BLD: 0.2 %
BILIRUB SERPL-MCNC: 0.9 MG/DL
BILIRUB UR QL STRIP: ABNORMAL
BUN SERPL-MCNC: 19 MG/DL
CALCIUM SERPL-MCNC: 10.5 MG/DL
CHLORIDE SERPL-SCNC: 90 MMOL/L
CLARITY UR REFRACT.AUTO: ABNORMAL
CO2 SERPL-SCNC: 28 MMOL/L
COLOR UR AUTO: ABNORMAL
CREAT SERPL-MCNC: 1.2 MG/DL
CTP QC/QA: YES
DIFFERENTIAL METHOD: ABNORMAL
EOSINOPHIL # BLD AUTO: 0 K/UL
EOSINOPHIL NFR BLD: 0.1 %
ERYTHROCYTE [DISTWIDTH] IN BLOOD BY AUTOMATED COUNT: 12.7 %
EST. GFR  (AFRICAN AMERICAN): >60 ML/MIN/1.73 M^2
EST. GFR  (NON AFRICAN AMERICAN): 56.3 ML/MIN/1.73 M^2
GLUCOSE SERPL-MCNC: 133 MG/DL
GLUCOSE UR QL STRIP: NEGATIVE
HCT VFR BLD AUTO: 50.2 %
HGB BLD-MCNC: 17.7 G/DL
HGB UR QL STRIP: ABNORMAL
HYALINE CASTS UR QL AUTO: 7 /LPF
IMM GRANULOCYTES # BLD AUTO: 0.14 K/UL
IMM GRANULOCYTES NFR BLD AUTO: 0.7 %
KETONES UR QL STRIP: ABNORMAL
LEUKOCYTE ESTERASE UR QL STRIP: NEGATIVE
LIPASE SERPL-CCNC: 26 U/L
LYMPHOCYTES # BLD AUTO: 2.4 K/UL
LYMPHOCYTES NFR BLD: 11.6 %
MAGNESIUM SERPL-MCNC: 2.1 MG/DL
MCH RBC QN AUTO: 29.3 PG
MCHC RBC AUTO-ENTMCNC: 35.3 G/DL
MCV RBC AUTO: 83 FL
MICROSCOPIC COMMENT: ABNORMAL
MONOCYTES # BLD AUTO: 1.6 K/UL
MONOCYTES NFR BLD: 7.6 %
NEUTROPHILS # BLD AUTO: 16.6 K/UL
NEUTROPHILS NFR BLD: 79.8 %
NITRITE UR QL STRIP: NEGATIVE
NRBC BLD-RTO: 0 /100 WBC
PH UR STRIP: 5 [PH] (ref 5–8)
PLATELET # BLD AUTO: 356 K/UL
PMV BLD AUTO: 11.1 FL
POTASSIUM SERPL-SCNC: 2.6 MMOL/L
PROT SERPL-MCNC: 9.1 G/DL
PROT UR QL STRIP: ABNORMAL
RBC # BLD AUTO: 6.05 M/UL
RBC #/AREA URNS AUTO: 8 /HPF (ref 0–4)
SODIUM SERPL-SCNC: 137 MMOL/L
SP GR UR STRIP: >=1.03 (ref 1–1.03)
SQUAMOUS #/AREA URNS AUTO: 37 /HPF
TROPONIN I SERPL DL<=0.01 NG/ML-MCNC: 0.02 NG/ML
URN SPEC COLLECT METH UR: ABNORMAL
WBC # BLD AUTO: 20.8 K/UL
WBC #/AREA URNS AUTO: 4 /HPF (ref 0–5)

## 2018-12-14 PROCEDURE — 63600175 PHARM REV CODE 636 W HCPCS: Performed by: EMERGENCY MEDICINE

## 2018-12-14 PROCEDURE — 25000003 PHARM REV CODE 250: Performed by: EMERGENCY MEDICINE

## 2018-12-14 PROCEDURE — 96365 THER/PROPH/DIAG IV INF INIT: CPT

## 2018-12-14 PROCEDURE — 81001 URINALYSIS AUTO W/SCOPE: CPT

## 2018-12-14 PROCEDURE — 63600175 PHARM REV CODE 636 W HCPCS: Performed by: HOSPITALIST

## 2018-12-14 PROCEDURE — 96376 TX/PRO/DX INJ SAME DRUG ADON: CPT

## 2018-12-14 PROCEDURE — 80053 COMPREHEN METABOLIC PANEL: CPT

## 2018-12-14 PROCEDURE — 83690 ASSAY OF LIPASE: CPT

## 2018-12-14 PROCEDURE — 83735 ASSAY OF MAGNESIUM: CPT

## 2018-12-14 PROCEDURE — 93010 ELECTROCARDIOGRAM REPORT: CPT | Mod: ,,, | Performed by: INTERNAL MEDICINE

## 2018-12-14 PROCEDURE — 99285 EMERGENCY DEPT VISIT HI MDM: CPT | Mod: 25

## 2018-12-14 PROCEDURE — G0378 HOSPITAL OBSERVATION PER HR: HCPCS

## 2018-12-14 PROCEDURE — 96361 HYDRATE IV INFUSION ADD-ON: CPT

## 2018-12-14 PROCEDURE — 84484 ASSAY OF TROPONIN QUANT: CPT

## 2018-12-14 PROCEDURE — 99220 PR INITIAL OBSERVATION CARE,LEVL III: CPT | Mod: ,,, | Performed by: HOSPITALIST

## 2018-12-14 PROCEDURE — 99285 EMERGENCY DEPT VISIT HI MDM: CPT | Mod: ,,, | Performed by: EMERGENCY MEDICINE

## 2018-12-14 PROCEDURE — 96375 TX/PRO/DX INJ NEW DRUG ADDON: CPT

## 2018-12-14 PROCEDURE — 81025 URINE PREGNANCY TEST: CPT | Performed by: EMERGENCY MEDICINE

## 2018-12-14 PROCEDURE — 93005 ELECTROCARDIOGRAM TRACING: CPT

## 2018-12-14 PROCEDURE — 85025 COMPLETE CBC W/AUTO DIFF WBC: CPT

## 2018-12-14 RX ORDER — LORAZEPAM 2 MG/ML
0.5 INJECTION INTRAMUSCULAR ONCE
Status: COMPLETED | OUTPATIENT
Start: 2018-12-14 | End: 2018-12-14

## 2018-12-14 RX ORDER — LISINOPRIL 20 MG/1
40 TABLET ORAL DAILY
Status: DISCONTINUED | OUTPATIENT
Start: 2018-12-15 | End: 2018-12-17 | Stop reason: HOSPADM

## 2018-12-14 RX ORDER — IBUPROFEN 200 MG
16 TABLET ORAL
Status: DISCONTINUED | OUTPATIENT
Start: 2018-12-14 | End: 2018-12-17 | Stop reason: HOSPADM

## 2018-12-14 RX ORDER — POTASSIUM CHLORIDE 7.45 MG/ML
10 INJECTION INTRAVENOUS
Status: COMPLETED | OUTPATIENT
Start: 2018-12-14 | End: 2018-12-14

## 2018-12-14 RX ORDER — FAMOTIDINE 10 MG/ML
20 INJECTION INTRAVENOUS 2 TIMES DAILY
Status: DISCONTINUED | OUTPATIENT
Start: 2018-12-14 | End: 2018-12-17 | Stop reason: HOSPADM

## 2018-12-14 RX ORDER — POTASSIUM CHLORIDE 7.45 MG/ML
10 INJECTION INTRAVENOUS
Status: COMPLETED | OUTPATIENT
Start: 2018-12-15 | End: 2018-12-15

## 2018-12-14 RX ORDER — SODIUM CHLORIDE 0.9 % (FLUSH) 0.9 %
5 SYRINGE (ML) INJECTION
Status: DISCONTINUED | OUTPATIENT
Start: 2018-12-14 | End: 2018-12-17 | Stop reason: HOSPADM

## 2018-12-14 RX ORDER — ACETAMINOPHEN 325 MG/1
650 TABLET ORAL EVERY 8 HOURS PRN
Status: DISCONTINUED | OUTPATIENT
Start: 2018-12-14 | End: 2018-12-17 | Stop reason: HOSPADM

## 2018-12-14 RX ORDER — SODIUM CHLORIDE, SODIUM LACTATE, POTASSIUM CHLORIDE, CALCIUM CHLORIDE 600; 310; 30; 20 MG/100ML; MG/100ML; MG/100ML; MG/100ML
INJECTION, SOLUTION INTRAVENOUS CONTINUOUS
Status: DISCONTINUED | OUTPATIENT
Start: 2018-12-14 | End: 2018-12-15

## 2018-12-14 RX ORDER — MAGNESIUM SULFATE HEPTAHYDRATE 40 MG/ML
2 INJECTION, SOLUTION INTRAVENOUS
Status: COMPLETED | OUTPATIENT
Start: 2018-12-14 | End: 2018-12-14

## 2018-12-14 RX ORDER — AMOXICILLIN 250 MG
1 CAPSULE ORAL 2 TIMES DAILY PRN
Status: DISCONTINUED | OUTPATIENT
Start: 2018-12-14 | End: 2018-12-17 | Stop reason: HOSPADM

## 2018-12-14 RX ORDER — ENOXAPARIN SODIUM 100 MG/ML
40 INJECTION SUBCUTANEOUS EVERY 24 HOURS
Status: DISCONTINUED | OUTPATIENT
Start: 2018-12-14 | End: 2018-12-17 | Stop reason: HOSPADM

## 2018-12-14 RX ORDER — IBUPROFEN 200 MG
24 TABLET ORAL
Status: DISCONTINUED | OUTPATIENT
Start: 2018-12-14 | End: 2018-12-17 | Stop reason: HOSPADM

## 2018-12-14 RX ORDER — LORAZEPAM 2 MG/ML
0.5 INJECTION INTRAMUSCULAR EVERY 4 HOURS PRN
Status: DISCONTINUED | OUTPATIENT
Start: 2018-12-14 | End: 2018-12-16

## 2018-12-14 RX ORDER — GLUCAGON 1 MG
1 KIT INJECTION
Status: DISCONTINUED | OUTPATIENT
Start: 2018-12-14 | End: 2018-12-17 | Stop reason: HOSPADM

## 2018-12-14 RX ORDER — POTASSIUM CHLORIDE 7.45 MG/ML
10 INJECTION INTRAVENOUS
Status: COMPLETED | OUTPATIENT
Start: 2018-12-14 | End: 2018-12-15

## 2018-12-14 RX ADMIN — SODIUM CHLORIDE 1000 ML: 0.9 INJECTION, SOLUTION INTRAVENOUS at 07:12

## 2018-12-14 RX ADMIN — POTASSIUM CHLORIDE 10 MEQ: 7.46 INJECTION, SOLUTION INTRAVENOUS at 10:12

## 2018-12-14 RX ADMIN — MAGNESIUM SULFATE IN WATER 2 G: 40 INJECTION, SOLUTION INTRAVENOUS at 07:12

## 2018-12-14 RX ADMIN — LORAZEPAM 0.5 MG: 2 INJECTION, SOLUTION INTRAMUSCULAR; INTRAVENOUS at 10:12

## 2018-12-14 RX ADMIN — POTASSIUM CHLORIDE 10 MEQ: 7.46 INJECTION, SOLUTION INTRAVENOUS at 09:12

## 2018-12-14 RX ADMIN — SODIUM CHLORIDE, SODIUM LACTATE, POTASSIUM CHLORIDE, AND CALCIUM CHLORIDE 1000 ML: .6; .31; .03; .02 INJECTION, SOLUTION INTRAVENOUS at 09:12

## 2018-12-15 LAB
ALBUMIN SERPL BCP-MCNC: 3.6 G/DL
ALP SERPL-CCNC: 88 U/L
ALT SERPL W/O P-5'-P-CCNC: 15 U/L
ANION GAP SERPL CALC-SCNC: 14 MMOL/L
AST SERPL-CCNC: 16 U/L
BACTERIA #/AREA URNS AUTO: NORMAL /HPF
BASOPHILS # BLD AUTO: 0.04 K/UL
BASOPHILS NFR BLD: 0.2 %
BILIRUB SERPL-MCNC: 0.9 MG/DL
BILIRUB UR QL STRIP: NEGATIVE
BUN SERPL-MCNC: 12 MG/DL
CALCIUM SERPL-MCNC: 9.2 MG/DL
CHLORIDE SERPL-SCNC: 95 MMOL/L
CLARITY UR REFRACT.AUTO: CLEAR
CO2 SERPL-SCNC: 27 MMOL/L
COLOR UR AUTO: ABNORMAL
CREAT SERPL-MCNC: 1.1 MG/DL
DIFFERENTIAL METHOD: ABNORMAL
EOSINOPHIL # BLD AUTO: 0.1 K/UL
EOSINOPHIL NFR BLD: 0.2 %
ERYTHROCYTE [DISTWIDTH] IN BLOOD BY AUTOMATED COUNT: 12.7 %
EST. GFR  (AFRICAN AMERICAN): >60 ML/MIN/1.73 M^2
EST. GFR  (NON AFRICAN AMERICAN): >60 ML/MIN/1.73 M^2
GLUCOSE SERPL-MCNC: 107 MG/DL
GLUCOSE UR QL STRIP: NEGATIVE
HCT VFR BLD AUTO: 47.4 %
HGB BLD-MCNC: 16.5 G/DL
HGB UR QL STRIP: ABNORMAL
IMM GRANULOCYTES # BLD AUTO: 0.12 K/UL
IMM GRANULOCYTES NFR BLD AUTO: 0.6 %
KETONES UR QL STRIP: NEGATIVE
LEUKOCYTE ESTERASE UR QL STRIP: NEGATIVE
LYMPHOCYTES # BLD AUTO: 3.4 K/UL
LYMPHOCYTES NFR BLD: 16.9 %
MAGNESIUM SERPL-MCNC: 2.3 MG/DL
MCH RBC QN AUTO: 29.9 PG
MCHC RBC AUTO-ENTMCNC: 34.8 G/DL
MCV RBC AUTO: 86 FL
MICROSCOPIC COMMENT: NORMAL
MONOCYTES # BLD AUTO: 1.7 K/UL
MONOCYTES NFR BLD: 8.4 %
NEUTROPHILS # BLD AUTO: 14.8 K/UL
NEUTROPHILS NFR BLD: 73.7 %
NITRITE UR QL STRIP: NEGATIVE
NRBC BLD-RTO: 0 /100 WBC
PH UR STRIP: 8 [PH] (ref 5–8)
PHOSPHATE SERPL-MCNC: 3 MG/DL
PLATELET # BLD AUTO: 319 K/UL
PMV BLD AUTO: 11.7 FL
POTASSIUM SERPL-SCNC: 3.1 MMOL/L
PROT SERPL-MCNC: 7.6 G/DL
PROT UR QL STRIP: NEGATIVE
RBC # BLD AUTO: 5.51 M/UL
RBC #/AREA URNS AUTO: 1 /HPF (ref 0–4)
SODIUM SERPL-SCNC: 136 MMOL/L
SP GR UR STRIP: 1 (ref 1–1.03)
SQUAMOUS #/AREA URNS AUTO: 5 /HPF
URN SPEC COLLECT METH UR: ABNORMAL
WBC # BLD AUTO: 20.07 K/UL
WBC #/AREA URNS AUTO: 1 /HPF (ref 0–5)

## 2018-12-15 PROCEDURE — 81001 URINALYSIS AUTO W/SCOPE: CPT

## 2018-12-15 PROCEDURE — 80053 COMPREHEN METABOLIC PANEL: CPT

## 2018-12-15 PROCEDURE — 63600175 PHARM REV CODE 636 W HCPCS: Performed by: EMERGENCY MEDICINE

## 2018-12-15 PROCEDURE — 63600175 PHARM REV CODE 636 W HCPCS: Performed by: PHYSICIAN ASSISTANT

## 2018-12-15 PROCEDURE — G0378 HOSPITAL OBSERVATION PER HR: HCPCS

## 2018-12-15 PROCEDURE — 99226 PR SUBSEQUENT OBSERVATION CARE,LEVEL III: CPT | Mod: ,,, | Performed by: PHYSICIAN ASSISTANT

## 2018-12-15 PROCEDURE — 36415 COLL VENOUS BLD VENIPUNCTURE: CPT

## 2018-12-15 PROCEDURE — 83735 ASSAY OF MAGNESIUM: CPT

## 2018-12-15 PROCEDURE — 63600175 PHARM REV CODE 636 W HCPCS: Performed by: HOSPITALIST

## 2018-12-15 PROCEDURE — 85025 COMPLETE CBC W/AUTO DIFF WBC: CPT

## 2018-12-15 PROCEDURE — 25000003 PHARM REV CODE 250: Performed by: HOSPITALIST

## 2018-12-15 PROCEDURE — 84100 ASSAY OF PHOSPHORUS: CPT

## 2018-12-15 PROCEDURE — S0028 INJECTION, FAMOTIDINE, 20 MG: HCPCS | Performed by: HOSPITALIST

## 2018-12-15 RX ORDER — POTASSIUM CHLORIDE 7.45 MG/ML
10 INJECTION INTRAVENOUS
Status: COMPLETED | OUTPATIENT
Start: 2018-12-15 | End: 2018-12-15

## 2018-12-15 RX ADMIN — FAMOTIDINE 20 MG: 10 INJECTION, SOLUTION INTRAVENOUS at 12:12

## 2018-12-15 RX ADMIN — POTASSIUM CHLORIDE 10 MEQ: 7.46 INJECTION, SOLUTION INTRAVENOUS at 03:12

## 2018-12-15 RX ADMIN — POTASSIUM CHLORIDE 10 MEQ: 7.46 INJECTION, SOLUTION INTRAVENOUS at 12:12

## 2018-12-15 RX ADMIN — POTASSIUM CHLORIDE 10 MEQ: 7.46 INJECTION, SOLUTION INTRAVENOUS at 02:12

## 2018-12-15 RX ADMIN — POTASSIUM CHLORIDE 10 MEQ: 7.46 INJECTION, SOLUTION INTRAVENOUS at 11:12

## 2018-12-15 RX ADMIN — ENOXAPARIN SODIUM 40 MG: 100 INJECTION SUBCUTANEOUS at 06:12

## 2018-12-15 RX ADMIN — SODIUM CHLORIDE, SODIUM LACTATE, POTASSIUM CHLORIDE, AND CALCIUM CHLORIDE: .6; .31; .03; .02 INJECTION, SOLUTION INTRAVENOUS at 12:12

## 2018-12-15 RX ADMIN — POTASSIUM CHLORIDE 10 MEQ: 7.46 INJECTION, SOLUTION INTRAVENOUS at 05:12

## 2018-12-15 RX ADMIN — FAMOTIDINE 20 MG: 10 INJECTION, SOLUTION INTRAVENOUS at 08:12

## 2018-12-15 RX ADMIN — FAMOTIDINE 20 MG: 10 INJECTION, SOLUTION INTRAVENOUS at 09:12

## 2018-12-15 RX ADMIN — POTASSIUM CHLORIDE 10 MEQ: 7.46 INJECTION, SOLUTION INTRAVENOUS at 01:12

## 2018-12-15 RX ADMIN — LORAZEPAM 0.5 MG: 2 INJECTION INTRAMUSCULAR; INTRAVENOUS at 07:12

## 2018-12-15 RX ADMIN — LISINOPRIL 40 MG: 20 TABLET ORAL at 09:12

## 2018-12-15 RX ADMIN — ENOXAPARIN SODIUM 40 MG: 100 INJECTION SUBCUTANEOUS at 12:12

## 2018-12-15 NOTE — NURSING
LORAINE Alfonso notified of occurrence of pvc's, couplets, and bigeminy on tele-monitoring. Stat EKG ordered. Patient has no complaints of pain, or discomfort. Vitals are WDL. Will continue to monitor until arrival of day shift.

## 2018-12-15 NOTE — ED PROVIDER NOTES
Encounter Date: 12/14/2018    SCRIBE #1 NOTE: I, Sloane Elias, am scribing for, and in the presence of,  Dr. Bedoya. I have scribed the entire note.       History     Chief Complaint   Patient presents with    Emesis     started since wed, hx hiatal hernia     Time patient was seen by the provider: 6:41 PM      The patient is a 43 y.o. female with co-morbidities including: HTN, hiatal hernia, and diverticulosis who presents to the ED with a complaint of nausea, emesis, and weakness for 3 days. The patient reports having more than 10 episodes of emesis daily. She reports history of similar episodes within the last year, stating she using to get admitted. She notes streaks of blood in her emesis yesterday. Patient reports abd pain that occurs when vomiting, otherwise has no abdominal pain. Endorses moderate paresthesias in her fingers and feet, but denies diarrhea, CP, SOB, dysuria, or vaginal discharge. She knows that she does use marijuana intermittently, last use Monday.        The history is provided by the patient and medical records.     Review of patient's allergies indicates:   Allergen Reactions    Ibuprofen Hives     Past Medical History:   Diagnosis Date    Diverticulosis     GERD (gastroesophageal reflux disease)     Hiatal hernia     Hypertension      Past Surgical History:   Procedure Laterality Date    CERVICAL BIOPSY  W/ LOOP ELECTRODE EXCISION  2/11/14    LEEP (LOOP ELECTROSURGICAL EXCISION PROCEDURE) N/A 2/11/2014    Performed by Hung Valero MD at [facility] OR     Family History   Problem Relation Age of Onset    Heart disease Mother 54        MI    Heart disease Maternal Grandmother 40        MI    Diabetes Other     Heart disease Brother 44        MI    Sickle cell trait Sister      Social History     Tobacco Use    Smoking status: Current Every Day Smoker     Packs/day: 1.00     Years: 16.00     Pack years: 16.00     Types: Cigarettes    Smokeless tobacco: Never Used   Substance Use  Topics    Alcohol use: Yes     Comment: social 1-2 x / month    Drug use: Yes     Frequency: 1.0 times per week     Types: Marijuana     Comment: social- last use 1 week ago     Review of Systems      Constitutional:  No Fever, No Chills.   Eyes: No Vision Changes  ENT/Mouth: No sore throat, No rhinorrhea  Cardiovascular:  No Chest Pain, No Palpitations  Respiratory:  No Cough, No SOB  Gastrointestinal: Nausea, Vomiting, No Diarrhea,  Genitourinary:  No  pain, No dysuria, No vaginal discharge  Musculoskeletal:  No Arthralgias, No Back Pain, No Neck Pain, No recent trauma.  Skin:  No skin Lesions  Neuro: Weakness. Paresthesias. No Dizziness, No Headache      Physical Exam     Initial Vitals [12/14/18 1826]   BP Pulse Resp Temp SpO2   (!) 145/101 88 18 98.2 °F (36.8 °C) 100 %      MAP       --         Physical Exam    Nursing note and vitals reviewed.       Physical Exam:  GENERAL APPEARANCE: Appears uncomfortable. Well developed, well nourished  HENT: Normocephalic, atraumatic    EYES: Sclerae anicteric. PERRL. EOMI. Left subconjunctival hemorrhage medially.   NECK: Supple, no thyroid enlargement  CARDIOVASCULAR: Regular rate and rhythm without any murmurs, gallops, rubs.  LUNGS: Speaking in full sentences. Breathing comfortably. Auscultation of the lungs revealed normal breath sounds b/l  ABDOMEN: Soft and nontender, no masses, no rebound or guarding   NEUROLOGIC: Alert, interacting normally. No facial droop.   MSK: Moving all four extremities.  Skin: Warm and dry. No visible rash on exposed areas of skin.    Psych: Mood and affect normal.         ED Course   Procedures  Labs Reviewed   CBC W/ AUTO DIFFERENTIAL - Abnormal; Notable for the following components:       Result Value    WBC 20.80 (*)     RBC 6.05 (*)     Hemoglobin 17.7 (*)     Hematocrit 50.2 (*)     Platelets 356 (*)     Immature Granulocytes 0.7 (*)     Gran # (ANC) 16.6 (*)     Immature Grans (Abs) 0.14 (*)     Mono # 1.6 (*)     Gran% 79.8 (*)      Lymph% 11.6 (*)     All other components within normal limits   COMPREHENSIVE METABOLIC PANEL - Abnormal; Notable for the following components:    Potassium 2.6 (*)     Chloride 90 (*)     Glucose 133 (*)     Total Protein 9.1 (*)     Anion Gap 19 (*)     eGFR if non  56.3 (*)     All other components within normal limits   URINALYSIS, REFLEX TO URINE CULTURE - Abnormal; Notable for the following components:    Appearance, UA Cloudy (*)     Specific Gravity, UA >=1.030 (*)     Protein, UA 3+ (*)     Ketones, UA 1+ (*)     Bilirubin (UA) 1+ (*)     Occult Blood UA 2+ (*)     All other components within normal limits    Narrative:     Preferred Collection Type->Urine, Clean Catch   URINALYSIS MICROSCOPIC - Abnormal; Notable for the following components:    RBC, UA 8 (*)     Bacteria, UA Moderate (*)     Hyaline Casts, UA 7 (*)     All other components within normal limits    Narrative:     Preferred Collection Type->Urine, Clean Catch   LIPASE   TROPONIN I   POCT URINE PREGNANCY     EKG Readings: (Independently Interpreted)   Rhythm: Normal Sinus Rhythm. Heart Rate: 98.   Prolonged QT at 550. ST depression inferiorly and anteriorly        Imaging Results    None          Medical Decision Making:   History:   Old Medical Records: I decided to obtain old medical records.  Initial Assessment:   43-year-old female with past medical history as noted coming in with vomiting. Abdomen is benign, nondistended and nonsurgical.  Chart review shows several ED visits for similar complaints. ?  Marijuana related, versus gastritis.   Differential Diagnosis:   Intractable vomiting from marijuana use,  Enteritis  Not consistent with obstruction at this time.  Not consistent with surgical pathology at this time  Independently Interpreted Test(s):   I have ordered and independently interpreted EKG Reading(s) - see prior notes  Clinical Tests:   Lab Tests: Ordered and Reviewed  Medical Tests: Ordered and Reviewed  ED  Management:  EKG done in noted to have prolonged QT to 550, as well as PVC.  An EKG changes with some flipped T-waves depression?.  Patient has no chest pain but will obtain cardiac screen to look for cardiac etiology although unlikely.   Troponin is negative.  Labs return with a very low potassium of 2.6, which can explain the long QT.  Not given antiemetics initially given for QT prolonging effects.  Labs noted.  Given 2 L of IV fluids, magnesium.  IV potassium repletion ordered.  Patient is still nauseous and spitting up not candidate for p.o. potassium at this time.  Repeat abdominal exam continues to be benign, this is not consistent with a surgical pathology, and at this time CT scanning of the abdomen risks of radiation outweigh benefits.      MDM Complexity Points:   Problem Points:  New problem, with additional work-up planned    Data Points:  Review or order clinical lab tests, Review or order medicine test (PFTs, EKG, cardiac echo or catheterization), Independent review of image, tracing, or specimen, Decision to obtain old records (in the EHR) and Review and summarization of old records    Risk:  High Risk              Scribe Attestation:   Scribe #1: I performed the above scribed service and the documentation accurately describes the services I performed. I attest to the accuracy of the note.               Clinical Impression:   The encounter diagnosis was Vomiting.      Disposition:   Disposition: Placed in Observation                        Speedy Bedoya MD  12/14/18 5325

## 2018-12-15 NOTE — H&P
Hospital Medicine  History and Physical      Patient Name: Cipriano Gamez  MRN:  [id number]  [facility] Medicine Team: [facility] MED E Abigail Watson MD  Date of Admission:  12/14/2018     Principal Problem:  Cyclic vomiting syndrome   Primary Care Physician: Edi Jorge MD       History of Present Illness:    Ms. Cipriano Gamez is a 43 y.o. female with hypertension and marijuana abuse who presents to the emergency department because of nausea, vomiting, and weakness.  The patient mentions that for the last 3 days (since Wednesday), she has been having nausea, vomiting, and inability to tolerate p.o. intake.  She mentions that her last meal was on Wednesday, which was also the last time that she smoked marijuana.  She attempted to smoke marijuana again today, but was unable to because of her nausea and vomiting.  She denies any fevers or chills, and only has abdominal pain from her dry heaving.  She denies any diarrhea or constipation.  She mentions that she has been taking some Zofran ODT without relief.    In the emergency department, labs were notable for a potassium of 2.6 and a magnesium of 2.1.  EKG was notable for prolonged QT at 556.  She was given IV fluids and IV magnesium with no relief.  She was not given antiemetics because of increased risk of further prolong her QT.  She was admitted to Hospital Medicine for further management.      Review of Systems:  Constitutional: Negative for chills, fatigue, fever.   HENT: Negative for sore throat, trouble swallowing.    Eyes: Negative for photophobia, visual disturbance.   Respiratory: Negative for cough, shortness of breath.    Cardiovascular: Negative for chest pain, palpitations, leg swelling.   Gastrointestinal: + abdominal pain, nausea, vomiting.   Endocrine: Negative for cold intolerance, heat intolerance.   Genitourinary: Negative for dysuria, frequency.   Musculoskeletal: Negative for arthralgias, myalgias.   Skin: Negative for rash, wound,  erythema   Neurological: + weakness  Psychiatric/Behavioral: Negative for confusion, hallucinations, anxiety  All other systems reviewed and are negative.      Past Medical History: Patient has a past medical history of Diverticulosis, GERD (gastroesophageal reflux disease), Hiatal hernia, and Hypertension.    Past Surgical History: Patient has a past surgical history that includes Cervical biopsy w/ loop electrode excision (2/11/14) and LEEP (LOOP ELECTROSURGICAL EXCISION PROCEDURE) (N/A, 2/11/2014).    Social History: Patient reports that she has been smoking cigarettes.  She has a 16.00 pack-year smoking history. she has never used smokeless tobacco. She reports that she drinks alcohol. She reports that she uses drugs. Drug: Marijuana. Frequency: 1.00 time per week.    Family History: Patient's family history includes Diabetes in her other; Heart disease (age of onset: 40) in her maternal grandmother; Heart disease (age of onset: 44) in her brother; Heart disease (age of onset: 54) in her mother; Sickle cell trait in her sister.    Medications: Scheduled Meds:   enoxaparin  40 mg Subcutaneous Daily    lactated ringers  1,000 mL Intravenous ED 1 Time    [START ON 12/15/2018] lisinopril  40 mg Oral Daily    lorazepam  0.5 mg Intravenous Once    potassium chloride in water  10 mEq Intravenous ED 1 Time    potassium chloride in water  10 mEq Intravenous ED 1 Time    potassium chloride in water  10 mEq Intravenous ED 1 Time    [START ON 12/15/2018] potassium chloride in water  10 mEq Intravenous ED 1 Time     Continuous Infusions:   lactated ringers       PRN Meds:.acetaminophen, dextrose 50%, dextrose 50%, glucagon (human recombinant), glucose, glucose, lorazepam, senna-docusate 8.6-50 mg, sodium chloride 0.9%    Allergies: Patient is allergic to ibuprofen.      Physical Exam:    Temp:  [98.2 °F (36.8 °C)]   Pulse:  [68-88]   Resp:  [16-18]   BP: (145-170)/()   SpO2:  [100 %]     Constitutional:  Appears well-developed and well-nourished.   Head: Normocephalic and atraumatic.   Mouth/Throat: Oropharynx is dry  Eyes: EOM are normal. Pupils are equal, round, and reactive to light. Injected conjunctiva.  Neck: Normal range of motion. Neck supple.   Cardiovascular: Normal heart rate.  Regular heart rhythm.  No murmur heard.  Pulmonary/Chest: Effort normal and breath sounds normal. No respiratory distress. No wheezes, rales, or rhonchi  Abdominal: Soft. Bowel sounds are normal.  No distension.  No tenderness  Musculoskeletal: Normal range of motion. No edema.   Neurological: Alert and oriented to person, place, and time.   Skin: Skin is warm and dry.   Psychiatric: Normal mood and affect. Behavior is normal.         Intake/Output Summary (Last 24 hours) at 12/14/2018 2226  Last data filed at 12/14/2018 2147  Gross per 24 hour   Intake 1050 ml   Output --   Net 1050 ml     Recent Labs   Lab 12/14/18  1859   WBC 20.80*   HGB 17.7*   HCT 50.2*   *     Recent Labs   Lab 12/14/18  1859      K 2.6*   CL 90*   CO2 28   BUN 19   CREATININE 1.2   *   CALCIUM 10.5   MG 2.1   LIPASE 26     Recent Labs   Lab 12/14/18  1859   ALKPHOS 102   ALT 13   AST 17   ALBUMIN 4.4   PROT 9.1*   BILITOT 0.9      No results for input(s): POCTGLUCOSE in the last 168 hours.  Recent Labs     12/14/18  1859   TROPONINI 0.020       No results for input(s): LACTATE in the last 72 hours.       Assessment and Plan:    Ms. Cipriano Gamez is a 43 y.o. female who presented to [facility] on 12/14/2018 with nausea and vomiting.    Nausea and Vomiting  Cyclic Vomiting Syndrome  · Reports last being able to tolerate PO intake 3 days prior to admission  · IVF for symptomatic relief  · Check UDS but likely cyclic vomiting syndrome  · Unable to use anti-emetics given increased risk of prolonging QT  · Trial of low dose Ativan 0.5mg q4h prn    Prolonged QT  · Likely 2/2 Zofran ODT at home  · EKG with   · S/p IV Mag in the ED.  Repeat  EKG in the morning  · Continue tele    Marijuana Abuse  · Check UDS  · Reports last use 3 days prior to admission  · Encourage cessation    Essential HTN  · Chronic and stable  · Continue Lisinopril 40mg PO daily    Hypokalemia  · K 2.6 on admit - likely 2/2 GI losses  · Replace IV given inability to tolerate PO  · Given delayed pharmacy administration, will continue IV K replacement and check K with morning labs - as she has gotten minimal replacement to check labs at midnight  · Continue tele    GERD  · IV Famotidine while not tolerating PO     Diet:  [place]  GI PPx:  Famotidine  DVT PPx:  Lovenox  Goals of Care:  Full    High Risk Conditions:   Patient has a condition that poses threat to life and bodily function: Prolonged QTc and Hypokalemia       Disposition:  Pending tolerating of PO intake, resolution of nausea and vomiting, and K  Discharge Needs:  TEODORO Watson MD  [facility] Medicine  Cell:  [phone]  Spectra:  [phone]  Pager:  [phone]

## 2018-12-15 NOTE — PROGRESS NOTES
Progress Note   Hospital Medicine       Team: [facility] HOSP MED E Yessenia Garcia PA-C  Admit Date: 12/14/2018  Length of Stay:  LOS: 0 days   RADU   Principal Problem:  Cyclic vomiting syndrome    Interval hx / overnight events: Patient continues to have emesis, but reports feeling a little better today. Tolerating juice, but had emesis this morning. Discussed etiologies of her N/V, concern for marijuana association.    Areas of concern/ handoff: Electrolyte abnormalities    Review of Systems   Constitutional: Negative for chills and fever.   Respiratory: Positive for shortness of breath.    Cardiovascular: Positive for palpitations. Negative for chest pain.   Gastrointestinal: Positive for abdominal pain, heartburn, nausea and vomiting.   Genitourinary: Negative for dysuria and urgency.       Temp:  [97.2 °F (36.2 °C)-98.9 °F (37.2 °C)]   Pulse:  [68-88]   Resp:  [14-18]   BP: (145-185)/()   SpO2:  [93 %-100 %]       Physical Exam   Constitutional: She is oriented to person, place, and time. She appears distressed.   Cardiovascular: Normal rate and regular rhythm.   Pulmonary/Chest: Effort normal and breath sounds normal. She has no wheezes.   Abdominal: She exhibits distension. There is tenderness. There is no rebound.   Musculoskeletal: She exhibits no edema or tenderness.   Neurological: She is alert and oriented to person, place, and time.   Psychiatric: Affect and judgment normal.     Recent Labs   Lab 12/14/18  1859 12/15/18  0433   WBC 20.80* 20.07*   HGB 17.7* 16.5*   HCT 50.2* 47.4   * 319     Recent Labs   Lab 12/14/18  1859 12/15/18  0433    136   K 2.6* 3.1*   CL 90* 95   CO2 28 27   BUN 19 12   CREATININE 1.2 1.1   * 107   CALCIUM 10.5 9.2   MG 2.1 2.3   PHOS  --  3.0     Recent Labs   Lab 12/14/18  1859 12/15/18  0433   ALKPHOS 102 88   ALT 13 15   AST 17 16   ALBUMIN 4.4 3.6   PROT 9.1* 7.6   BILITOT 0.9 0.9     No results for input(s): POCTGLUCOSE in the last 168  hours.    Other diagnostic tests      enoxaparin  40 mg Subcutaneous Daily    famotidine (PF)  20 mg Intravenous BID    lisinopril  40 mg Oral Daily    potassium chloride in water  10 mEq Intravenous Q1H       Assessment and Plan     Active Hospital Problems    Diagnosis  POA    *Cyclic vomiting syndrome [G43.A0]  Yes    Marijuana abuse [F12.10]  Yes    Prolonged Q-T interval on ECG [R94.31]  Yes    Nausea and vomiting [R11.2]  Yes    GERD (gastroesophageal reflux disease) [K21.9]  Yes    Hypertension [I10]  Yes      Resolved Hospital Problems   No resolved problems to display.       Overview:    Ms. Cipriano Gamez is a 43 y.o. female with hypertension and marijuana abuse who presents to the emergency department because of nausea, vomiting, and weakness.  The patient mentions that for the last 3 days (since Wednesday), she has been having nausea, vomiting, and inability to tolerate p.o. intake.  She mentions that her last meal was on Wednesday, which was also the last time that she smoked marijuana.  She attempted to smoke marijuana again today, but was unable to because of her nausea and vomiting.  She denies any fevers or chills, and only has abdominal pain from her dry heaving.  She denies any diarrhea or constipation.  She mentions that she has been taking some Zofran ODT without relief.     In the emergency department, labs were notable for a potassium of 2.6 and a magnesium of 2.1.  EKG was notable for prolonged QT at 556.  She was given IV fluids and IV magnesium with no relief.  She was not given antiemetics because of increased risk of further prolong her QT.  She was admitted to Hospital Medicine for further management.    Problems Addressed today:    Nausea and Vomiting  Cyclic Vomiting Syndrome  · Reports last being able to tolerate PO intake 3 days prior to admission  · IVF for symptomatic relief  · Check UDS but likely cyclic vomiting syndrome  · Unable to use anti-emetics given increased  risk of prolonging QT  · Trial of low dose Ativan 0.5mg q4h prn     Prolonged QT  · Likely 2/2 Zofran ODT at home  · EKG with   · S/p IV Mag in the ED.  Repeat EKG improved  · Continue tele     Marijuana Abuse  · UDS +  · Reports last use 3 days prior to admission  · Encourage cessation     Essential HTN  · Chronic and stable  · Continue Lisinopril 40mg PO daily     Hypokalemia  · K 2.6 on admit - likely 2/2 GI losses  · Replace IV given inability to tolerate PO  · Given delayed pharmacy administration, will continue IV K replacement and check K with morning labs - as she has gotten minimal replacement to check labs at midnight  · Continue tele     GERD  · IV Famotidine while not tolerating PO     Diet:  [place]  GI PPx:  Famotidine  DVT PPx:  Lovenox  Goals of Care:  Full     High Risk Conditions:   Patient has a condition that poses threat to life and bodily function: Prolonged QTc and Hypokalemia         Disposition:  Pending tolerating of PO intake, resolution of nausea and vomiting, and K  Discharge Needs:  TBD    Time spent in care of the patient (Greater than 1/2 spent in direct face-to-face contact) 36 minutes    IGNACIO ObregonC   [facility] Medicine

## 2018-12-16 LAB
ALBUMIN SERPL BCP-MCNC: 3.3 G/DL
ALP SERPL-CCNC: 74 U/L
ALT SERPL W/O P-5'-P-CCNC: 14 U/L
ANION GAP SERPL CALC-SCNC: 8 MMOL/L
AST SERPL-CCNC: 13 U/L
BASOPHILS # BLD AUTO: 0.07 K/UL
BASOPHILS NFR BLD: 0.6 %
BILIRUB SERPL-MCNC: 0.9 MG/DL
BUN SERPL-MCNC: 8 MG/DL
CALCIUM SERPL-MCNC: 8.6 MG/DL
CHLORIDE SERPL-SCNC: 101 MMOL/L
CO2 SERPL-SCNC: 29 MMOL/L
CREAT SERPL-MCNC: 0.9 MG/DL
DIFFERENTIAL METHOD: ABNORMAL
EOSINOPHIL # BLD AUTO: 0.2 K/UL
EOSINOPHIL NFR BLD: 1.3 %
ERYTHROCYTE [DISTWIDTH] IN BLOOD BY AUTOMATED COUNT: 12.7 %
EST. GFR  (AFRICAN AMERICAN): >60 ML/MIN/1.73 M^2
EST. GFR  (NON AFRICAN AMERICAN): >60 ML/MIN/1.73 M^2
GLUCOSE SERPL-MCNC: 89 MG/DL
HCT VFR BLD AUTO: 47.1 %
HGB BLD-MCNC: 15.7 G/DL
IMM GRANULOCYTES # BLD AUTO: 0.05 K/UL
IMM GRANULOCYTES NFR BLD AUTO: 0.4 %
LYMPHOCYTES # BLD AUTO: 3.1 K/UL
LYMPHOCYTES NFR BLD: 26.6 %
MAGNESIUM SERPL-MCNC: 2.3 MG/DL
MCH RBC QN AUTO: 30.2 PG
MCHC RBC AUTO-ENTMCNC: 33.3 G/DL
MCV RBC AUTO: 91 FL
MONOCYTES # BLD AUTO: 1.1 K/UL
MONOCYTES NFR BLD: 9 %
NEUTROPHILS # BLD AUTO: 7.3 K/UL
NEUTROPHILS NFR BLD: 62.1 %
NRBC BLD-RTO: 0 /100 WBC
PHOSPHATE SERPL-MCNC: 2.7 MG/DL
PLATELET # BLD AUTO: 263 K/UL
PMV BLD AUTO: 11.2 FL
POTASSIUM SERPL-SCNC: 3.3 MMOL/L
PROT SERPL-MCNC: 6.9 G/DL
RBC # BLD AUTO: 5.2 M/UL
SODIUM SERPL-SCNC: 138 MMOL/L
WBC # BLD AUTO: 11.75 K/UL

## 2018-12-16 PROCEDURE — 84100 ASSAY OF PHOSPHORUS: CPT

## 2018-12-16 PROCEDURE — S0028 INJECTION, FAMOTIDINE, 20 MG: HCPCS | Performed by: HOSPITALIST

## 2018-12-16 PROCEDURE — 25000003 PHARM REV CODE 250: Performed by: PHYSICIAN ASSISTANT

## 2018-12-16 PROCEDURE — 93005 ELECTROCARDIOGRAM TRACING: CPT

## 2018-12-16 PROCEDURE — 25000003 PHARM REV CODE 250: Performed by: HOSPITALIST

## 2018-12-16 PROCEDURE — 63600175 PHARM REV CODE 636 W HCPCS: Performed by: HOSPITALIST

## 2018-12-16 PROCEDURE — 93010 ELECTROCARDIOGRAM REPORT: CPT | Mod: ,,, | Performed by: INTERNAL MEDICINE

## 2018-12-16 PROCEDURE — 63600175 PHARM REV CODE 636 W HCPCS: Performed by: PHYSICIAN ASSISTANT

## 2018-12-16 PROCEDURE — 36415 COLL VENOUS BLD VENIPUNCTURE: CPT

## 2018-12-16 PROCEDURE — G0378 HOSPITAL OBSERVATION PER HR: HCPCS

## 2018-12-16 PROCEDURE — 99226 PR SUBSEQUENT OBSERVATION CARE,LEVEL III: CPT | Mod: ,,, | Performed by: PHYSICIAN ASSISTANT

## 2018-12-16 PROCEDURE — 80053 COMPREHEN METABOLIC PANEL: CPT

## 2018-12-16 PROCEDURE — 83735 ASSAY OF MAGNESIUM: CPT

## 2018-12-16 PROCEDURE — 85025 COMPLETE CBC W/AUTO DIFF WBC: CPT

## 2018-12-16 RX ORDER — LORAZEPAM 2 MG/ML
0.5 INJECTION INTRAMUSCULAR EVERY 4 HOURS PRN
Status: DISCONTINUED | OUTPATIENT
Start: 2018-12-16 | End: 2018-12-17 | Stop reason: HOSPADM

## 2018-12-16 RX ORDER — POTASSIUM CHLORIDE 20 MEQ/1
40 TABLET, EXTENDED RELEASE ORAL EVERY 4 HOURS
Status: COMPLETED | OUTPATIENT
Start: 2018-12-16 | End: 2018-12-16

## 2018-12-16 RX ORDER — ONDANSETRON 2 MG/ML
4 INJECTION INTRAMUSCULAR; INTRAVENOUS EVERY 8 HOURS PRN
Status: DISCONTINUED | OUTPATIENT
Start: 2018-12-16 | End: 2018-12-17 | Stop reason: HOSPADM

## 2018-12-16 RX ADMIN — LORAZEPAM 0.5 MG: 2 INJECTION INTRAMUSCULAR; INTRAVENOUS at 02:12

## 2018-12-16 RX ADMIN — ONDANSETRON 4 MG: 2 INJECTION INTRAMUSCULAR; INTRAVENOUS at 01:12

## 2018-12-16 RX ADMIN — POTASSIUM CHLORIDE 40 MEQ: 1500 TABLET, EXTENDED RELEASE ORAL at 05:12

## 2018-12-16 RX ADMIN — LISINOPRIL 40 MG: 20 TABLET ORAL at 08:12

## 2018-12-16 RX ADMIN — FAMOTIDINE 20 MG: 10 INJECTION, SOLUTION INTRAVENOUS at 08:12

## 2018-12-16 RX ADMIN — FAMOTIDINE 20 MG: 10 INJECTION, SOLUTION INTRAVENOUS at 09:12

## 2018-12-16 RX ADMIN — ENOXAPARIN SODIUM 40 MG: 100 INJECTION SUBCUTANEOUS at 05:12

## 2018-12-16 RX ADMIN — POTASSIUM CHLORIDE 40 MEQ: 1500 TABLET, EXTENDED RELEASE ORAL at 01:12

## 2018-12-16 NOTE — PLAN OF CARE
Problem: Adult Inpatient Plan of Care  Goal: Plan of Care Review  Outcome: Ongoing (interventions implemented as appropriate)  POC reviewed with Pt. Pt aao x 4. NSR noted on telemetry monitor. Pt c/o nausea after eating soup and jello, zofran given as ordered. Safety precautions maintained. Bed in low position wheels locked. Side rails up x 2. Call light within reach. Pt free of falls.

## 2018-12-16 NOTE — PLAN OF CARE
Problem: Adult Inpatient Plan of Care  Goal: Plan of Care Review  Outcome: Ongoing (interventions implemented as appropriate)  Plan of care reviewed with patient. Verbalized understanding. Monitor BP. Telemonitor in place, run NSR this shift. No c/o n/v/d. Dry heeves earlier this shift. NAD noted. Will continue to monitor.

## 2018-12-16 NOTE — PROGRESS NOTES
Progress Note   Hospital Medicine       Team: [facility] HOSP MED E Yessenia Garcia PA-C  Admit Date: 12/14/2018  Length of Stay:  LOS: 0 days   RADU 12/16/2018  Principal Problem:  Cyclic vomiting syndrome    Interval hx / overnight events: Patient tolerating diet, still with mild nausea. Likely discharge tomorrow.    Areas of concern/ handoff: Electrolyte abnormalities    Review of Systems   Constitutional: Negative for chills and fever.   Respiratory: Positive for shortness of breath.    Cardiovascular: Positive for palpitations. Negative for chest pain.   Gastrointestinal: Positive for abdominal pain, heartburn, nausea and vomiting.   Genitourinary: Negative for dysuria and urgency.       Temp:  [96.7 °F (35.9 °C)-99 °F (37.2 °C)]   Pulse:  [58-93]   Resp:  [14-18]   BP: (120-186)/(72-97)   SpO2:  [93 %-99 %]       Physical Exam   Constitutional: She is oriented to person, place, and time. She appears distressed.   Cardiovascular: Normal rate and regular rhythm.   Pulmonary/Chest: Effort normal and breath sounds normal. She has no wheezes.   Abdominal: She exhibits distension. There is tenderness. There is no rebound.   Musculoskeletal: She exhibits no edema or tenderness.   Neurological: She is alert and oriented to person, place, and time.   Psychiatric: Affect and judgment normal.     Recent Labs   Lab 12/14/18  1859 12/15/18  0433 12/16/18  0538   WBC 20.80* 20.07* 11.75   HGB 17.7* 16.5* 15.7   HCT 50.2* 47.4 47.1   * 319 263     Recent Labs   Lab 12/14/18  1859 12/15/18  0433 12/16/18  0538    136 138   K 2.6* 3.1* 3.3*   CL 90* 95 101   CO2 28 27 29   BUN 19 12 8   CREATININE 1.2 1.1 0.9   * 107 89   CALCIUM 10.5 9.2 8.6*   MG 2.1 2.3 2.3   PHOS  --  3.0 2.7     Recent Labs   Lab 12/14/18  1859 12/15/18  0433 12/16/18  0538   ALKPHOS 102 88 74   ALT 13 15 14   AST 17 16 13   ALBUMIN 4.4 3.6 3.3*   PROT 9.1* 7.6 6.9   BILITOT 0.9 0.9 0.9     No results for input(s): POCTGLUCOSE in the last  168 hours.    Other diagnostic tests      enoxaparin  40 mg Subcutaneous Daily    famotidine (PF)  20 mg Intravenous BID    lisinopril  40 mg Oral Daily    potassium chloride  40 mEq Oral Q4H       Assessment and Plan     Active Hospital Problems    Diagnosis  POA    *Cyclic vomiting syndrome [G43.A0]  Yes    Marijuana abuse [F12.10]  Yes    Prolonged Q-T interval on ECG [R94.31]  Yes    Nausea and vomiting [R11.2]  Yes    GERD (gastroesophageal reflux disease) [K21.9]  Yes    Hypertension [I10]  Yes      Resolved Hospital Problems   No resolved problems to display.       Overview:    Ms. Cipriano Gamez is a 43 y.o. female with hypertension and marijuana abuse who presents to the emergency department because of nausea, vomiting, and weakness.  The patient mentions that for the last 3 days (since Wednesday), she has been having nausea, vomiting, and inability to tolerate p.o. intake.  She mentions that her last meal was on Wednesday, which was also the last time that she smoked marijuana.  She attempted to smoke marijuana again today, but was unable to because of her nausea and vomiting.  She denies any fevers or chills, and only has abdominal pain from her dry heaving.  She denies any diarrhea or constipation.  She mentions that she has been taking some Zofran ODT without relief.     In the emergency department, labs were notable for a potassium of 2.6 and a magnesium of 2.1.  EKG was notable for prolonged QT at 556.  She was given IV fluids and IV magnesium with no relief.  She was not given antiemetics because of increased risk of further prolong her QT.  She was admitted to Hospital Medicine for further management.    Problems Addressed today:    Nausea and Vomiting  Cyclic Vomiting Syndrome  · Reports last being able to tolerate PO intake 3 days prior to admission  · IVF for symptomatic relief  · Check UDS but likely cyclic vomiting syndrome  · Unable to use anti-emetics given increased risk of  prolonging QT  · Trial of low dose Ativan 0.5mg q4h prn  · Resume Zofran 4mg IV given QTC now 469     Prolonged QT  · Likely 2/2 Zofran ODT at home  · EKG with , now 469  · Continue tele     Marijuana Abuse  · UDS +  · Reports last use 3 days prior to admission  · Encourage cessation     Essential HTN  · Chronic and stable  · Continue Lisinopril 40mg PO daily     Hypokalemia  · K 2.6 on admit - likely 2/2 GI losses  · Given multiple K riders with improvement to 3.3  · Start PO supplement as tolerating diet now  · Continue tele     GERD  · IV Famotidine     Diet:  [place]  GI PPx:  Famotidine  DVT PPx:  Lovenox  Goals of Care:  Full     High Risk Conditions:   Patient has a condition that poses threat to life and bodily function:  Hypokalemia         Disposition:  Likely discharge tomorrow  Discharge Needs:  TBD    Time spent in care of the patient (Greater than 1/2 spent in direct face-to-face contact) 36 minutes    IGNACIO ObregonC   [facility]

## 2018-12-17 ENCOUNTER — PATIENT MESSAGE (OUTPATIENT)
Dept: INTERNAL MEDICINE | Facility: CLINIC | Age: 43
End: 2018-12-17

## 2018-12-17 VITALS
BODY MASS INDEX: 31.49 KG/M2 | RESPIRATION RATE: 20 BRPM | SYSTOLIC BLOOD PRESSURE: 129 MMHG | OXYGEN SATURATION: 95 % | HEART RATE: 71 BPM | HEIGHT: 67 IN | WEIGHT: 200.63 LBS | TEMPERATURE: 99 F | DIASTOLIC BLOOD PRESSURE: 72 MMHG

## 2018-12-17 LAB
ALBUMIN SERPL BCP-MCNC: 3.1 G/DL
ALP SERPL-CCNC: 69 U/L
ALT SERPL W/O P-5'-P-CCNC: 16 U/L
ANION GAP SERPL CALC-SCNC: 7 MMOL/L
AST SERPL-CCNC: 13 U/L
BASOPHILS # BLD AUTO: 0.06 K/UL
BASOPHILS NFR BLD: 0.6 %
BILIRUB SERPL-MCNC: 0.4 MG/DL
BUN SERPL-MCNC: 14 MG/DL
CALCIUM SERPL-MCNC: 8.5 MG/DL
CHLORIDE SERPL-SCNC: 103 MMOL/L
CO2 SERPL-SCNC: 24 MMOL/L
CREAT SERPL-MCNC: 1 MG/DL
DIFFERENTIAL METHOD: NORMAL
EOSINOPHIL # BLD AUTO: 0.3 K/UL
EOSINOPHIL NFR BLD: 2.8 %
ERYTHROCYTE [DISTWIDTH] IN BLOOD BY AUTOMATED COUNT: 12.5 %
EST. GFR  (AFRICAN AMERICAN): >60 ML/MIN/1.73 M^2
EST. GFR  (NON AFRICAN AMERICAN): >60 ML/MIN/1.73 M^2
GLUCOSE SERPL-MCNC: 91 MG/DL
HCT VFR BLD AUTO: 42.3 %
HGB BLD-MCNC: 14.2 G/DL
IMM GRANULOCYTES # BLD AUTO: 0.03 K/UL
IMM GRANULOCYTES NFR BLD AUTO: 0.3 %
LYMPHOCYTES # BLD AUTO: 3.3 K/UL
LYMPHOCYTES NFR BLD: 31 %
MAGNESIUM SERPL-MCNC: 2.2 MG/DL
MCH RBC QN AUTO: 30.2 PG
MCHC RBC AUTO-ENTMCNC: 33.6 G/DL
MCV RBC AUTO: 90 FL
MONOCYTES # BLD AUTO: 0.9 K/UL
MONOCYTES NFR BLD: 8.3 %
NEUTROPHILS # BLD AUTO: 6 K/UL
NEUTROPHILS NFR BLD: 57 %
NRBC BLD-RTO: 0 /100 WBC
PHOSPHATE SERPL-MCNC: 3.2 MG/DL
PLATELET # BLD AUTO: 243 K/UL
PMV BLD AUTO: 10.8 FL
POTASSIUM SERPL-SCNC: 3.6 MMOL/L
PROT SERPL-MCNC: 6.2 G/DL
RBC # BLD AUTO: 4.7 M/UL
SODIUM SERPL-SCNC: 134 MMOL/L
WBC # BLD AUTO: 10.53 K/UL

## 2018-12-17 PROCEDURE — S0028 INJECTION, FAMOTIDINE, 20 MG: HCPCS | Performed by: HOSPITALIST

## 2018-12-17 PROCEDURE — 25000003 PHARM REV CODE 250: Performed by: HOSPITALIST

## 2018-12-17 PROCEDURE — 83735 ASSAY OF MAGNESIUM: CPT

## 2018-12-17 PROCEDURE — 80053 COMPREHEN METABOLIC PANEL: CPT

## 2018-12-17 PROCEDURE — 36415 COLL VENOUS BLD VENIPUNCTURE: CPT

## 2018-12-17 PROCEDURE — 99217 PR OBSERVATION CARE DISCHARGE: CPT | Mod: ,,, | Performed by: PHYSICIAN ASSISTANT

## 2018-12-17 PROCEDURE — 85025 COMPLETE CBC W/AUTO DIFF WBC: CPT

## 2018-12-17 PROCEDURE — G0378 HOSPITAL OBSERVATION PER HR: HCPCS

## 2018-12-17 PROCEDURE — 84100 ASSAY OF PHOSPHORUS: CPT

## 2018-12-17 RX ORDER — ONDANSETRON 4 MG/1
4 TABLET, ORALLY DISINTEGRATING ORAL EVERY 6 HOURS PRN
Qty: 15 TABLET | Refills: 0 | Status: SHIPPED | OUTPATIENT
Start: 2018-12-17 | End: 2019-07-03

## 2018-12-17 RX ADMIN — ACETAMINOPHEN 650 MG: 325 TABLET ORAL at 12:12

## 2018-12-17 RX ADMIN — LISINOPRIL 40 MG: 20 TABLET ORAL at 09:12

## 2018-12-17 NOTE — DISCHARGE SUMMARY
Discharge Summary  Hospital Medicine    Attending Provider on Discharge: Yessenia Garcia PA-C  [facility] Medicine Team: [facility] [place]  Date of Admission:  12/14/2018     Date of Discharge:  12/17/2018    Active Hospital Problems    Diagnosis  POA    *Cyclic vomiting syndrome [G43.A0]  Yes    Marijuana abuse [F12.10]  Yes    Prolonged Q-T interval on ECG [R94.31]  Yes    Nausea and vomiting [R11.2]  Yes    GERD (gastroesophageal reflux disease) [K21.9]  Yes    Hypertension [I10]  Yes      Resolved Hospital Problems   No resolved problems to display.       History of the Present Illness:      Ms. Cipriano Gamez is a 43 y.o. female with hypertension and marijuana abuse who presents to the emergency department because of nausea, vomiting, and weakness.  The patient mentions that for the last 3 days (since Wednesday), she has been having nausea, vomiting, and inability to tolerate p.o. intake.  She mentions that her last meal was on Wednesday, which was also the last time that she smoked marijuana.  She attempted to smoke marijuana again today, but was unable to because of her nausea and vomiting.  She denies any fevers or chills, and only has abdominal pain from her dry heaving.  She denies any diarrhea or constipation.  She mentions that she has been taking some Zofran ODT without relief.     In the emergency department, labs were notable for a potassium of 2.6 and a magnesium of 2.1.  EKG was notable for prolonged QT at 556.  She was given IV fluids and IV magnesium with no relief.  She was not given antiemetics because of increased risk of further prolong her QT.  She was admitted to Hospital Medicine for further management.      Hospital Course of Principle Problem Addressed:       Nausea and Vomiting  Cyclic Vomiting Syndrome  · Reports last being able to tolerate PO intake 3 days prior to admission  · IVF for symptomatic relief  · Check UDS but likely cyclic vomiting syndrome  · Unable to use  anti-emetics given increased risk of prolonging QT  · Trial of low dose Ativan 0.5mg q4h prn  · Resume Zofran 4mg IV given QTC now 469      Other Medical Problems Addressed in the Hospital:     Prolonged QT  · Likely 2/2 Zofran ODT at home  · EKG with , now 469  · Continue tele     Marijuana Abuse  · UDS +  · Reports last use 3 days prior to admission  · Encourage cessation     Essential HTN  · Chronic and stable  · Continue Lisinopril 40mg PO daily     Hypokalemia  · K 2.6 on admit - likely 2/2 GI losses  · Given multiple K riders with improvement to 3.3  · Start PO supplement as tolerating diet now  · Continue tele     GERD  · IV Famotidine    Significant diagnostic tests     Recent Labs   Lab 12/14/18  1859 12/15/18  0433 12/16/18  0538 12/17/18  0437   WBC 20.80* 20.07* 11.75 10.53   HGB 17.7* 16.5* 15.7 14.2   HCT 50.2* 47.4 47.1 42.3   * 319 263 243     Recent Labs   Lab 12/14/18  1859 12/15/18  0433 12/16/18  0538 12/17/18  0437    136 138 134*   K 2.6* 3.1* 3.3* 3.6   CL 90* 95 101 103   CO2 28 27 29 24   BUN 19 12 8 14   CREATININE 1.2 1.1 0.9 1.0   CALCIUM 10.5 9.2 8.6* 8.5*   MG 2.1 2.3 2.3 2.2   PHOS  --  3.0 2.7 3.2   PROT 9.1* 7.6 6.9 6.2   BILITOT 0.9 0.9 0.9 0.4   ALKPHOS 102 88 74 69   ALT 13 15 14 16   AST 17 16 13 13       Other diagnostic tests    Special Treatments/ Procedures:      none    Discharge Medications:        Discharge Medication List as of 12/17/2018 10:43 AM      START taking these medications    Details   ondansetron (ZOFRAN-ODT) 4 MG TbDL Take 1 tablet (4 mg total) by mouth every 6 (six) hours as needed., Starting Mon 12/17/2018, Normal         CONTINUE these medications which have NOT CHANGED    Details   diphenhydrAMINE (SOMINEX) 25 mg tablet Take 25 mg by mouth nightly as needed., Until Discontinued, Historical Med      lisinopril (PRINIVIL,ZESTRIL) 40 MG tablet TAKE 1 TABLET (40 MG TOTAL) BY MOUTH ONCE DAILY., Starting Wed 9/19/2018, Until Thu 9/19/2019,  Normal      pantoprazole (PROTONIX) 40 MG tablet Take 2 tablets (80 mg total) by mouth once daily., Starting Fri 1/26/2018, Until Tue 6/5/2018, Print         STOP taking these medications       amLODIPine (NORVASC) 10 MG tablet Comments:   Reason for Stopping:         meloxicam (MOBIC) 15 MG tablet Comments:   Reason for Stopping:               Discharge Diet:regular diet with Normal Fluid intake of 1500 - 2000 mL per day    Activity: activity as tolerated    Discharge Condition: Good    Disposition: Home or Self Care    Tests pending at the time of discharge: none      Time spent  on the discharge of the patient including review of hospital course with the patient. reviewing discharge medications and arranging follow-up care 35 minutes    Discharge examination Patient was seen and examined on the date of discharge and determined to be suitable for discharge.    No future appointments.    IGNACIO ObregonC   [facility] Medicine

## 2018-12-17 NOTE — PLAN OF CARE
Problem: Adult Inpatient Plan of Care  Goal: Plan of Care Review  Outcome: Outcome(s) achieved Date Met: 12/17/18  Pt order to be d/c'd awaiting pt transport. No IV upon assessment, pt refuses new IV due to d/c order, Pepcid not given. Pt is a/o x4 and follows commands, voids and is on RA unlabored with VSS as last charted. Belongings at bedside. Education provided regarding follow ups and medications pt verbalizes understanding. LORAINE Garcia to provided leave of absence for work excuse during current admission, states pt ok to resume normal activities. Pt doesn't believe she should resume activity immediately, reaffirmed with PA. Pt not currently demonstrating N/V at this time with recent K level of 3.6. Pt want to follow up with PCP. Will initiate d/c as ordered.

## 2018-12-17 NOTE — PLAN OF CARE
12/17/18 0944   Final Note   Assessment Type Final Discharge Note   Anticipated Discharge Disposition Home   What phone number can be called within the next 1-3 days to see how you are doing after discharge? [phone]   Hospital Follow Up  Appt(s) scheduled? Yes   Discharge plans and expectations educations in teach back method with documentation complete? Yes   Right Care Referral Info   Post Acute Recommendation No Care

## 2018-12-17 NOTE — PLAN OF CARE
Problem: Adult Inpatient Plan of Care  Goal: Plan of Care Review  Outcome: Ongoing (interventions implemented as appropriate)  PT has no s/s of distress, nausea, or pain at this time. Tylenol given earlier in the shift for headache. Will continue to monitor.

## 2018-12-17 NOTE — PLAN OF CARE
On Discharge planning assessment patient is in bed, resting quietly,  Introduced myself and CM role in patients care plan, patient verbalized understanding.     Pt lives in a single story home with her son and sister. Her sister will be able to pick her up at discharge.  Patient denies HD, H/H and coumadin.  Verified Pts Address, Emergency Contact, Pharmacy and PCP.     Pt denies any concerns for this visit, CM will continue to follow. CM name and number placed on patients white board and Health Packet given to the patient with a brief overview of the information provided patient verbalized understanding.        12/17/18 0905   Discharge Assessment   Assessment Type Discharge Planning Assessment   Confirmed/corrected address and phone number on facesheet? Yes   Assessment information obtained from? Patient   Expected Length of Stay (days) 3   Communicated expected length of stay with patient/caregiver yes   Prior to hospitilization cognitive status: Alert/Oriented   Prior to hospitalization functional status: Independent   Current cognitive status: Alert/Oriented   Current Functional Status: Independent   Lives With child(madisyn), dependent;sibling(s)   Able to Return to Prior Arrangements yes   Is patient able to care for self after discharge? Yes   Who are your caregiver(s) and their phone number(s)? Yudith Castaneda-[phone]   Patient's perception of discharge disposition home or selfcare   Readmission Within the Last 30 Days no previous admission in last 30 days   Patient currently being followed by outpatient case management? No   Patient currently receives any other outside agency services? No   Equipment Currently Used at Home none   Do you have any problems affording any of your prescribed medications? No   Is the patient taking medications as prescribed? yes   Does the patient have transportation home? Yes   Transportation Anticipated family or friend will provide   Does the patient receive services at  the [facility]madin Clinic? No   Discharge Plan A Home   Patient/Family in Agreement with Plan yes     Edi Jorge MD  [address] [facility] [place] [address] [phone]    Extended Emergency Contact Information  Primary Emergency Contact: Yudith Gamez  Address: [address] [place] [address] [address] [address] [facility]  Home Phone: [phone]  Relation: Sister      [id number] [place] - [address]  [address] [place] [address]  Phone: [phone] Fax: [phone]

## 2019-05-17 ENCOUNTER — HOSPITAL ENCOUNTER (INPATIENT)
Facility: HOSPITAL | Age: 44
LOS: 2 days | Discharge: HOME | DRG: 392 | End: 2019-05-20
Attending: EMERGENCY MEDICINE | Admitting: COLON & RECTAL SURGERY
Payer: COMMERCIAL

## 2019-05-17 DIAGNOSIS — K57.20 DIVERTICULITIS OF LARGE INTESTINE WITH PERFORATION WITHOUT BLEEDING: Primary | ICD-10-CM

## 2019-05-17 DIAGNOSIS — I10 ESSENTIAL HYPERTENSION: ICD-10-CM

## 2019-05-17 LAB
ALBUMIN SERPL BCP-MCNC: 4.3 G/DL (ref 3.5–5.2)
ALP SERPL-CCNC: 105 U/L (ref 55–135)
ALT SERPL W/O P-5'-P-CCNC: 13 U/L (ref 10–44)
ANION GAP SERPL CALC-SCNC: 15 MMOL/L (ref 8–16)
AST SERPL-CCNC: 23 U/L (ref 10–40)
B-HCG UR QL: NEGATIVE
BASOPHILS # BLD AUTO: 0.08 K/UL (ref 0–0.2)
BASOPHILS NFR BLD: 0.3 % (ref 0–1.9)
BILIRUB SERPL-MCNC: 0.9 MG/DL (ref 0.1–1)
BUN SERPL-MCNC: 12 MG/DL (ref 6–20)
CALCIUM SERPL-MCNC: 10.6 MG/DL (ref 8.7–10.5)
CHLORIDE SERPL-SCNC: 96 MMOL/L (ref 95–110)
CO2 SERPL-SCNC: 25 MMOL/L (ref 23–29)
CREAT SERPL-MCNC: 1 MG/DL (ref 0.5–1.4)
CTP QC/QA: YES
DIFFERENTIAL METHOD: ABNORMAL
EOSINOPHIL # BLD AUTO: 0 K/UL (ref 0–0.5)
EOSINOPHIL NFR BLD: 0 % (ref 0–8)
ERYTHROCYTE [DISTWIDTH] IN BLOOD BY AUTOMATED COUNT: 13.3 % (ref 11.5–14.5)
EST. GFR  (AFRICAN AMERICAN): >60 ML/MIN/1.73 M^2
EST. GFR  (NON AFRICAN AMERICAN): >60 ML/MIN/1.73 M^2
GLUCOSE SERPL-MCNC: 145 MG/DL (ref 70–110)
HCT VFR BLD AUTO: 49.5 % (ref 37–48.5)
HGB BLD-MCNC: 17.2 G/DL (ref 12–16)
IMM GRANULOCYTES # BLD AUTO: 0.22 K/UL (ref 0–0.04)
IMM GRANULOCYTES NFR BLD AUTO: 0.8 % (ref 0–0.5)
LIPASE SERPL-CCNC: 25 U/L (ref 4–60)
LYMPHOCYTES # BLD AUTO: 2.2 K/UL (ref 1–4.8)
LYMPHOCYTES NFR BLD: 8.2 % (ref 18–48)
MCH RBC QN AUTO: 29.7 PG (ref 27–31)
MCHC RBC AUTO-ENTMCNC: 34.7 G/DL (ref 32–36)
MCV RBC AUTO: 85 FL (ref 82–98)
MONOCYTES # BLD AUTO: 1.8 K/UL (ref 0.3–1)
MONOCYTES NFR BLD: 6.7 % (ref 4–15)
NEUTROPHILS # BLD AUTO: 22.9 K/UL (ref 1.8–7.7)
NEUTROPHILS NFR BLD: 84 % (ref 38–73)
NRBC BLD-RTO: 0 /100 WBC
PLATELET # BLD AUTO: 285 K/UL (ref 150–350)
PMV BLD AUTO: 11.6 FL (ref 9.2–12.9)
POTASSIUM SERPL-SCNC: 2.9 MMOL/L (ref 3.5–5.1)
PROT SERPL-MCNC: 9.5 G/DL (ref 6–8.4)
RBC # BLD AUTO: 5.8 M/UL (ref 4–5.4)
SODIUM SERPL-SCNC: 136 MMOL/L (ref 136–145)
WBC # BLD AUTO: 27.2 K/UL (ref 3.9–12.7)

## 2019-05-17 PROCEDURE — 25000003 PHARM REV CODE 250: Performed by: EMERGENCY MEDICINE

## 2019-05-17 PROCEDURE — 81025 URINE PREGNANCY TEST: CPT | Performed by: EMERGENCY MEDICINE

## 2019-05-17 PROCEDURE — 86140 C-REACTIVE PROTEIN: CPT

## 2019-05-17 PROCEDURE — 99285 PR EMERGENCY DEPT VISIT,LEVEL V: ICD-10-PCS | Mod: ,,, | Performed by: EMERGENCY MEDICINE

## 2019-05-17 PROCEDURE — 81001 URINALYSIS AUTO W/SCOPE: CPT

## 2019-05-17 PROCEDURE — 96365 THER/PROPH/DIAG IV INF INIT: CPT

## 2019-05-17 PROCEDURE — 83690 ASSAY OF LIPASE: CPT

## 2019-05-17 PROCEDURE — 63600175 PHARM REV CODE 636 W HCPCS: Performed by: EMERGENCY MEDICINE

## 2019-05-17 PROCEDURE — 99285 EMERGENCY DEPT VISIT HI MDM: CPT

## 2019-05-17 PROCEDURE — 99285 EMERGENCY DEPT VISIT HI MDM: CPT | Mod: ,,, | Performed by: EMERGENCY MEDICINE

## 2019-05-17 PROCEDURE — 80053 COMPREHEN METABOLIC PANEL: CPT

## 2019-05-17 PROCEDURE — 96361 HYDRATE IV INFUSION ADD-ON: CPT

## 2019-05-17 PROCEDURE — 85025 COMPLETE CBC W/AUTO DIFF WBC: CPT

## 2019-05-17 PROCEDURE — 96375 TX/PRO/DX INJ NEW DRUG ADDON: CPT

## 2019-05-17 RX ORDER — POTASSIUM CHLORIDE 7.45 MG/ML
10 INJECTION INTRAVENOUS
Status: COMPLETED | OUTPATIENT
Start: 2019-05-18 | End: 2019-05-18

## 2019-05-17 RX ORDER — PROCHLORPERAZINE EDISYLATE 5 MG/ML
10 INJECTION INTRAMUSCULAR; INTRAVENOUS
Status: COMPLETED | OUTPATIENT
Start: 2019-05-17 | End: 2019-05-17

## 2019-05-17 RX ORDER — POTASSIUM CHLORIDE 7.45 MG/ML
10 INJECTION INTRAVENOUS
Status: COMPLETED | OUTPATIENT
Start: 2019-05-17 | End: 2019-05-18

## 2019-05-17 RX ADMIN — SODIUM CHLORIDE 1000 ML: 0.9 INJECTION, SOLUTION INTRAVENOUS at 09:05

## 2019-05-17 RX ADMIN — IOHEXOL 100 ML: 350 INJECTION, SOLUTION INTRAVENOUS at 11:05

## 2019-05-17 RX ADMIN — POTASSIUM CHLORIDE 10 MEQ: 10 INJECTION, SOLUTION INTRAVENOUS at 11:05

## 2019-05-17 RX ADMIN — SODIUM CHLORIDE 1000 ML: 0.9 INJECTION, SOLUTION INTRAVENOUS at 11:05

## 2019-05-17 RX ADMIN — PROCHLORPERAZINE EDISYLATE 10 MG: 5 INJECTION INTRAMUSCULAR; INTRAVENOUS at 09:05

## 2019-05-18 PROBLEM — K57.20 DIVERTICULITIS OF LARGE INTESTINE WITH PERFORATION WITHOUT BLEEDING: Status: ACTIVE | Noted: 2019-05-18

## 2019-05-18 LAB
ALBUMIN SERPL BCP-MCNC: 3.5 G/DL (ref 3.5–5.2)
ALP SERPL-CCNC: 97 U/L (ref 55–135)
ALT SERPL W/O P-5'-P-CCNC: 12 U/L (ref 10–44)
ANION GAP SERPL CALC-SCNC: 10 MMOL/L (ref 8–16)
AST SERPL-CCNC: 18 U/L (ref 10–40)
BACTERIA #/AREA URNS AUTO: ABNORMAL /HPF
BASOPHILS # BLD AUTO: 0.05 K/UL (ref 0–0.2)
BASOPHILS NFR BLD: 0.2 % (ref 0–1.9)
BILIRUB SERPL-MCNC: 0.8 MG/DL (ref 0.1–1)
BILIRUB UR QL STRIP: NEGATIVE
BUN SERPL-MCNC: 8 MG/DL (ref 6–20)
CALCIUM SERPL-MCNC: 9.1 MG/DL (ref 8.7–10.5)
CHLORIDE SERPL-SCNC: 104 MMOL/L (ref 95–110)
CLARITY UR REFRACT.AUTO: ABNORMAL
CO2 SERPL-SCNC: 25 MMOL/L (ref 23–29)
COLOR UR AUTO: YELLOW
CREAT SERPL-MCNC: 0.8 MG/DL (ref 0.5–1.4)
CRP SERPL-MCNC: 47.5 MG/L (ref 0–8.2)
CRP SERPL-MCNC: 48.44 MG/L (ref 0–3.19)
DIFFERENTIAL METHOD: ABNORMAL
EOSINOPHIL # BLD AUTO: 0 K/UL (ref 0–0.5)
EOSINOPHIL NFR BLD: 0 % (ref 0–8)
ERYTHROCYTE [DISTWIDTH] IN BLOOD BY AUTOMATED COUNT: 13.3 % (ref 11.5–14.5)
EST. GFR  (AFRICAN AMERICAN): >60 ML/MIN/1.73 M^2
EST. GFR  (NON AFRICAN AMERICAN): >60 ML/MIN/1.73 M^2
GLUCOSE SERPL-MCNC: 136 MG/DL (ref 70–110)
GLUCOSE UR QL STRIP: NEGATIVE
HCT VFR BLD AUTO: 47.4 % (ref 37–48.5)
HGB BLD-MCNC: 16.3 G/DL (ref 12–16)
HGB UR QL STRIP: ABNORMAL
HYALINE CASTS UR QL AUTO: 0 /LPF
IMM GRANULOCYTES # BLD AUTO: 0.14 K/UL (ref 0–0.04)
IMM GRANULOCYTES NFR BLD AUTO: 0.7 % (ref 0–0.5)
KETONES UR QL STRIP: ABNORMAL
LEUKOCYTE ESTERASE UR QL STRIP: NEGATIVE
LYMPHOCYTES # BLD AUTO: 1.8 K/UL (ref 1–4.8)
LYMPHOCYTES NFR BLD: 8.3 % (ref 18–48)
MAGNESIUM SERPL-MCNC: 1.8 MG/DL (ref 1.6–2.6)
MCH RBC QN AUTO: 29.7 PG (ref 27–31)
MCHC RBC AUTO-ENTMCNC: 34.4 G/DL (ref 32–36)
MCV RBC AUTO: 86 FL (ref 82–98)
MICROSCOPIC COMMENT: ABNORMAL
MONOCYTES # BLD AUTO: 1.5 K/UL (ref 0.3–1)
MONOCYTES NFR BLD: 7.1 % (ref 4–15)
NEUTROPHILS # BLD AUTO: 17.8 K/UL (ref 1.8–7.7)
NEUTROPHILS NFR BLD: 83.7 % (ref 38–73)
NITRITE UR QL STRIP: NEGATIVE
NRBC BLD-RTO: 0 /100 WBC
PH UR STRIP: 6 [PH] (ref 5–8)
PHOSPHATE SERPL-MCNC: 1.8 MG/DL (ref 2.7–4.5)
PLATELET # BLD AUTO: 255 K/UL (ref 150–350)
PMV BLD AUTO: 11 FL (ref 9.2–12.9)
POTASSIUM SERPL-SCNC: 3.2 MMOL/L (ref 3.5–5.1)
PROT SERPL-MCNC: 7.8 G/DL (ref 6–8.4)
PROT UR QL STRIP: ABNORMAL
RBC # BLD AUTO: 5.49 M/UL (ref 4–5.4)
RBC #/AREA URNS AUTO: 3 /HPF (ref 0–4)
SODIUM SERPL-SCNC: 139 MMOL/L (ref 136–145)
SP GR UR STRIP: 1.02 (ref 1–1.03)
SQUAMOUS #/AREA URNS AUTO: 2 /HPF
URN SPEC COLLECT METH UR: ABNORMAL
WBC # BLD AUTO: 21.27 K/UL (ref 3.9–12.7)
WBC #/AREA URNS AUTO: 7 /HPF (ref 0–5)

## 2019-05-18 PROCEDURE — 63600175 PHARM REV CODE 636 W HCPCS: Performed by: STUDENT IN AN ORGANIZED HEALTH CARE EDUCATION/TRAINING PROGRAM

## 2019-05-18 PROCEDURE — 25000003 PHARM REV CODE 250: Performed by: STUDENT IN AN ORGANIZED HEALTH CARE EDUCATION/TRAINING PROGRAM

## 2019-05-18 PROCEDURE — 96368 THER/DIAG CONCURRENT INF: CPT

## 2019-05-18 PROCEDURE — 86141 C-REACTIVE PROTEIN HS: CPT

## 2019-05-18 PROCEDURE — S0030 INJECTION, METRONIDAZOLE: HCPCS | Performed by: EMERGENCY MEDICINE

## 2019-05-18 PROCEDURE — 80053 COMPREHEN METABOLIC PANEL: CPT

## 2019-05-18 PROCEDURE — 99223 1ST HOSP IP/OBS HIGH 75: CPT | Mod: ,,, | Performed by: COLON & RECTAL SURGERY

## 2019-05-18 PROCEDURE — 25500020 PHARM REV CODE 255: Performed by: EMERGENCY MEDICINE

## 2019-05-18 PROCEDURE — 85025 COMPLETE CBC W/AUTO DIFF WBC: CPT

## 2019-05-18 PROCEDURE — 20600001 HC STEP DOWN PRIVATE ROOM

## 2019-05-18 PROCEDURE — 87040 BLOOD CULTURE FOR BACTERIA: CPT

## 2019-05-18 PROCEDURE — 36415 COLL VENOUS BLD VENIPUNCTURE: CPT

## 2019-05-18 PROCEDURE — 96367 TX/PROPH/DG ADDL SEQ IV INF: CPT | Mod: 59

## 2019-05-18 PROCEDURE — 99223 PR INITIAL HOSPITAL CARE,LEVL III: ICD-10-PCS | Mod: ,,, | Performed by: COLON & RECTAL SURGERY

## 2019-05-18 PROCEDURE — 83735 ASSAY OF MAGNESIUM: CPT

## 2019-05-18 PROCEDURE — 25000003 PHARM REV CODE 250: Performed by: EMERGENCY MEDICINE

## 2019-05-18 PROCEDURE — 63600175 PHARM REV CODE 636 W HCPCS: Performed by: EMERGENCY MEDICINE

## 2019-05-18 PROCEDURE — 84100 ASSAY OF PHOSPHORUS: CPT

## 2019-05-18 RX ORDER — LIDOCAINE HYDROCHLORIDE 10 MG/ML
1 INJECTION, SOLUTION EPIDURAL; INFILTRATION; INTRACAUDAL; PERINEURAL ONCE
Status: DISCONTINUED | OUTPATIENT
Start: 2019-05-18 | End: 2019-05-20 | Stop reason: HOSPADM

## 2019-05-18 RX ORDER — LABETALOL HCL 20 MG/4 ML
5 SYRINGE (ML) INTRAVENOUS EVERY 6 HOURS PRN
Status: DISCONTINUED | OUTPATIENT
Start: 2019-05-18 | End: 2019-05-18

## 2019-05-18 RX ORDER — POTASSIUM CHLORIDE 20 MEQ/1
40 TABLET, EXTENDED RELEASE ORAL ONCE
Status: COMPLETED | OUTPATIENT
Start: 2019-05-18 | End: 2019-05-18

## 2019-05-18 RX ORDER — POTASSIUM CHLORIDE 7.45 MG/ML
10 INJECTION INTRAVENOUS
Status: DISPENSED | OUTPATIENT
Start: 2019-05-18 | End: 2019-05-18

## 2019-05-18 RX ORDER — ENOXAPARIN SODIUM 100 MG/ML
40 INJECTION SUBCUTANEOUS DAILY
Status: DISCONTINUED | OUTPATIENT
Start: 2019-05-18 | End: 2019-05-20 | Stop reason: HOSPADM

## 2019-05-18 RX ORDER — ONDANSETRON 2 MG/ML
4 INJECTION INTRAMUSCULAR; INTRAVENOUS EVERY 6 HOURS PRN
Status: DISCONTINUED | OUTPATIENT
Start: 2019-05-18 | End: 2019-05-20 | Stop reason: HOSPADM

## 2019-05-18 RX ORDER — SODIUM CHLORIDE 9 MG/ML
INJECTION, SOLUTION INTRAVENOUS CONTINUOUS
Status: DISCONTINUED | OUTPATIENT
Start: 2019-05-18 | End: 2019-05-19

## 2019-05-18 RX ORDER — SODIUM,POTASSIUM PHOSPHATES 280-250MG
2 POWDER IN PACKET (EA) ORAL
Status: COMPLETED | OUTPATIENT
Start: 2019-05-18 | End: 2019-05-19

## 2019-05-18 RX ORDER — NALOXONE HCL 0.4 MG/ML
0.02 VIAL (ML) INJECTION
Status: CANCELLED | OUTPATIENT
Start: 2019-05-18

## 2019-05-18 RX ORDER — SODIUM CHLORIDE 9 MG/ML
INJECTION, SOLUTION INTRAVENOUS CONTINUOUS
Status: CANCELLED | OUTPATIENT
Start: 2019-05-18

## 2019-05-18 RX ORDER — LABETALOL HCL 20 MG/4 ML
10 SYRINGE (ML) INTRAVENOUS EVERY 4 HOURS PRN
Status: DISCONTINUED | OUTPATIENT
Start: 2019-05-18 | End: 2019-05-19

## 2019-05-18 RX ORDER — HYDROMORPHONE HYDROCHLORIDE 1 MG/ML
0.5 INJECTION, SOLUTION INTRAMUSCULAR; INTRAVENOUS; SUBCUTANEOUS EVERY 6 HOURS PRN
Status: DISCONTINUED | OUTPATIENT
Start: 2019-05-18 | End: 2019-05-20 | Stop reason: HOSPADM

## 2019-05-18 RX ORDER — POTASSIUM CHLORIDE 7.45 MG/ML
10 INJECTION INTRAVENOUS
Status: COMPLETED | OUTPATIENT
Start: 2019-05-18 | End: 2019-05-18

## 2019-05-18 RX ORDER — SODIUM CHLORIDE 0.9 % (FLUSH) 0.9 %
10 SYRINGE (ML) INJECTION
Status: DISCONTINUED | OUTPATIENT
Start: 2019-05-18 | End: 2019-05-20 | Stop reason: HOSPADM

## 2019-05-18 RX ORDER — LABETALOL HCL 20 MG/4 ML
10 SYRINGE (ML) INTRAVENOUS EVERY 6 HOURS PRN
Status: DISCONTINUED | OUTPATIENT
Start: 2019-05-18 | End: 2019-05-18

## 2019-05-18 RX ORDER — HYDRALAZINE HYDROCHLORIDE 20 MG/ML
10 INJECTION INTRAMUSCULAR; INTRAVENOUS EVERY 4 HOURS PRN
Status: DISCONTINUED | OUTPATIENT
Start: 2019-05-18 | End: 2019-05-20 | Stop reason: HOSPADM

## 2019-05-18 RX ORDER — AMLODIPINE BESYLATE 5 MG/1
5 TABLET ORAL DAILY
Status: DISCONTINUED | OUTPATIENT
Start: 2019-05-18 | End: 2019-05-20 | Stop reason: HOSPADM

## 2019-05-18 RX ORDER — MUPIROCIN 20 MG/G
1 OINTMENT TOPICAL 2 TIMES DAILY
Status: CANCELLED | OUTPATIENT
Start: 2019-05-18 | End: 2019-05-23

## 2019-05-18 RX ORDER — HYDROMORPHONE HYDROCHLORIDE 1 MG/ML
0.5 INJECTION, SOLUTION INTRAMUSCULAR; INTRAVENOUS; SUBCUTANEOUS EVERY 4 HOURS PRN
Status: CANCELLED | OUTPATIENT
Start: 2019-05-18

## 2019-05-18 RX ORDER — LISINOPRIL 20 MG/1
40 TABLET ORAL DAILY
Status: DISCONTINUED | OUTPATIENT
Start: 2019-05-18 | End: 2019-05-20 | Stop reason: HOSPADM

## 2019-05-18 RX ORDER — HYDRALAZINE HYDROCHLORIDE 20 MG/ML
10 INJECTION INTRAMUSCULAR; INTRAVENOUS EVERY 6 HOURS PRN
Status: DISCONTINUED | OUTPATIENT
Start: 2019-05-18 | End: 2019-05-18

## 2019-05-18 RX ORDER — CIPROFLOXACIN 2 MG/ML
400 INJECTION, SOLUTION INTRAVENOUS
Status: COMPLETED | OUTPATIENT
Start: 2019-05-18 | End: 2019-05-18

## 2019-05-18 RX ORDER — ACETAMINOPHEN 325 MG/1
650 TABLET ORAL EVERY 4 HOURS PRN
Status: CANCELLED | OUTPATIENT
Start: 2019-05-18

## 2019-05-18 RX ORDER — PANTOPRAZOLE SODIUM 40 MG/1
80 TABLET, DELAYED RELEASE ORAL DAILY
Status: DISCONTINUED | OUTPATIENT
Start: 2019-05-18 | End: 2019-05-20 | Stop reason: HOSPADM

## 2019-05-18 RX ORDER — METRONIDAZOLE 500 MG/100ML
500 INJECTION, SOLUTION INTRAVENOUS
Status: COMPLETED | OUTPATIENT
Start: 2019-05-18 | End: 2019-05-18

## 2019-05-18 RX ORDER — HYDROCODONE BITARTRATE AND ACETAMINOPHEN 5; 325 MG/1; MG/1
1 TABLET ORAL EVERY 4 HOURS PRN
Status: CANCELLED | OUTPATIENT
Start: 2019-05-18

## 2019-05-18 RX ORDER — ONDANSETRON 2 MG/ML
4 INJECTION INTRAMUSCULAR; INTRAVENOUS EVERY 12 HOURS PRN
Status: CANCELLED | OUTPATIENT
Start: 2019-05-18

## 2019-05-18 RX ORDER — HYDRALAZINE HYDROCHLORIDE 20 MG/ML
10 INJECTION INTRAMUSCULAR; INTRAVENOUS
Status: COMPLETED | OUTPATIENT
Start: 2019-05-18 | End: 2019-05-18

## 2019-05-18 RX ADMIN — ENOXAPARIN SODIUM 40 MG: 100 INJECTION SUBCUTANEOUS at 08:05

## 2019-05-18 RX ADMIN — PIPERACILLIN AND TAZOBACTAM 4.5 G: 4; .5 INJECTION, POWDER, LYOPHILIZED, FOR SOLUTION INTRAVENOUS; PARENTERAL at 01:05

## 2019-05-18 RX ADMIN — POTASSIUM & SODIUM PHOSPHATES POWDER PACK 280-160-250 MG 2 PACKET: 280-160-250 PACK at 05:05

## 2019-05-18 RX ADMIN — ONDANSETRON 4 MG: 2 INJECTION INTRAMUSCULAR; INTRAVENOUS at 11:05

## 2019-05-18 RX ADMIN — POTASSIUM CHLORIDE 40 MEQ: 1500 TABLET, EXTENDED RELEASE ORAL at 05:05

## 2019-05-18 RX ADMIN — LABETALOL HCL IV SOLN PREFILLED SYRINGE 20 MG/4ML (5 MG/ML) 5 MG: 20/4 SOLUTION PREFILLED SYRINGE at 06:05

## 2019-05-18 RX ADMIN — POTASSIUM CHLORIDE 10 MEQ: 10 INJECTION, SOLUTION INTRAVENOUS at 01:05

## 2019-05-18 RX ADMIN — HYDROMORPHONE HYDROCHLORIDE 0.5 MG: 1 INJECTION, SOLUTION INTRAMUSCULAR; INTRAVENOUS; SUBCUTANEOUS at 10:05

## 2019-05-18 RX ADMIN — SODIUM CHLORIDE: 0.9 INJECTION, SOLUTION INTRAVENOUS at 09:05

## 2019-05-18 RX ADMIN — POTASSIUM CHLORIDE 10 MEQ: 10 INJECTION, SOLUTION INTRAVENOUS at 11:05

## 2019-05-18 RX ADMIN — POTASSIUM CHLORIDE 10 MEQ: 10 INJECTION, SOLUTION INTRAVENOUS at 12:05

## 2019-05-18 RX ADMIN — ONDANSETRON 4 MG: 2 INJECTION INTRAMUSCULAR; INTRAVENOUS at 09:05

## 2019-05-18 RX ADMIN — HYDRALAZINE HYDROCHLORIDE 10 MG: 20 INJECTION INTRAMUSCULAR; INTRAVENOUS at 02:05

## 2019-05-18 RX ADMIN — LISINOPRIL 40 MG: 20 TABLET ORAL at 08:05

## 2019-05-18 RX ADMIN — SODIUM CHLORIDE 1000 ML: 0.9 INJECTION, SOLUTION INTRAVENOUS at 01:05

## 2019-05-18 RX ADMIN — CIPROFLOXACIN 400 MG: 2 INJECTION, SOLUTION INTRAVENOUS at 02:05

## 2019-05-18 RX ADMIN — POTASSIUM & SODIUM PHOSPHATES POWDER PACK 280-160-250 MG 2 PACKET: 280-160-250 PACK at 09:05

## 2019-05-18 RX ADMIN — POTASSIUM CHLORIDE 10 MEQ: 10 INJECTION, SOLUTION INTRAVENOUS at 02:05

## 2019-05-18 RX ADMIN — PANTOPRAZOLE SODIUM 80 MG: 40 TABLET, DELAYED RELEASE ORAL at 08:05

## 2019-05-18 RX ADMIN — HYDRALAZINE HYDROCHLORIDE 10 MG: 20 INJECTION INTRAMUSCULAR; INTRAVENOUS at 09:05

## 2019-05-18 RX ADMIN — AMLODIPINE BESYLATE 5 MG: 5 TABLET ORAL at 01:05

## 2019-05-18 RX ADMIN — METRONIDAZOLE 500 MG: 500 SOLUTION INTRAVENOUS at 03:05

## 2019-05-18 RX ADMIN — PIPERACILLIN AND TAZOBACTAM 4.5 G: 4; .5 INJECTION, POWDER, LYOPHILIZED, FOR SOLUTION INTRAVENOUS; PARENTERAL at 06:05

## 2019-05-18 RX ADMIN — PIPERACILLIN AND TAZOBACTAM 4.5 G: 4; .5 INJECTION, POWDER, LYOPHILIZED, FOR SOLUTION INTRAVENOUS; PARENTERAL at 09:05

## 2019-05-18 RX ADMIN — POTASSIUM & SODIUM PHOSPHATES POWDER PACK 280-160-250 MG 2 PACKET: 280-160-250 PACK at 11:05

## 2019-05-18 RX ADMIN — SODIUM CHLORIDE: 0.9 INJECTION, SOLUTION INTRAVENOUS at 06:05

## 2019-05-18 NOTE — NURSING
Page on call. Sp with Dr Boothe regarding Pt's elevated BP despite administration of Hydralazine. /103 in the Left arm, 177/83 in the R arm. Also noted periods of tachycardia.  Dr reported that he would look through the chart before placing an order for an additional BP med.

## 2019-05-18 NOTE — ED NOTES
Telemetry Verification   Patient placed on Telemetry Box    Box # [id number]   Monitor Tech    Rate 95   Rhythm nsr

## 2019-05-18 NOTE — NURSING
PT arrived to  from ED, Oriented to RM, GISSU, and POC. Denies pain and nausea at this time, BP checked on arrival and 177/83 at this time, trending down after PRN meds in ER. On call paged for orders. Will cont to manage POC.

## 2019-05-18 NOTE — ED PROVIDER NOTES
Encounter Date: 5/17/2019    SCRIBE #1 NOTE: I, Leida An, am scribing for, and in the presence of,  Dr. Gutierrez. I have scribed the entire note.       History     Chief Complaint   Patient presents with    Vomiting     c/o n/v since wednesday. states can't tolerate anything PO.      Time patient was seen by the provider: 8:59 PM      The patient is a 43 y.o. female with co-morbidities including: GERD, hiatal hernia, Diverticulosis, HTN, who presents to the ED with a complaint of nausea and vomiting for 3 days. The patient reports multiple episodes of vomiting for the last 3 days. She last vomited here in the ED. Patient is unable to tolerate PO intake. Reports history of cyclical vomiting, but she is not being followed up by her PCP for this. She also complains of abdominal pain, mostly located on suprapubic area. Took Zofran and Pepcid, giving no relief. Patient admits to endorsing marijuana use, she last smoked marijuana a few days ago. No vaginal discharge, currently on menstrual cycle.     The history is provided by the patient and medical records.     Review of patient's allergies indicates:   Allergen Reactions    Ibuprofen Hives     Past Medical History:   Diagnosis Date    Diverticulosis     GERD (gastroesophageal reflux disease)     Hiatal hernia     Hypertension      Past Surgical History:   Procedure Laterality Date    CERVICAL BIOPSY  W/ LOOP ELECTRODE EXCISION  2/11/14    LEEP (LOOP ELECTROSURGICAL EXCISION PROCEDURE) N/A 2/11/2014    Performed by Hung Valero MD at [facility] OR     Family History   Problem Relation Age of Onset    Heart disease Mother 54        MI    Heart disease Maternal Grandmother 40        MI    Diabetes Other     Heart disease Brother 44        MI    Sickle cell trait Sister      Social History     Tobacco Use    Smoking status: Current Every Day Smoker     Packs/day: 1.00     Years: 16.00     Pack years: 16.00     Types: Cigarettes    Smokeless tobacco:  Never Used   Substance Use Topics    Alcohol use: Yes     Comment: social 1-2 x / month    Drug use: Yes     Frequency: 1.0 times per week     Types: Marijuana     Comment: social- last use 1 week ago     Review of Systems   Constitutional: Negative for fever.   HENT: Negative for sore throat.    Respiratory: Negative for shortness of breath.    Cardiovascular: Negative for chest pain.   Gastrointestinal: Positive for abdominal pain, nausea and vomiting.   Genitourinary: Negative for dysuria, vaginal bleeding and vaginal discharge (on menstruation ).   Musculoskeletal: Negative for back pain.   Skin: Negative for rash.   Neurological: Negative for weakness.   Hematological: Does not bruise/bleed easily.       Physical Exam     Initial Vitals [05/17/19 2004]   BP Pulse Resp Temp SpO2   (!) 187/104 80 20 98.3 °F (36.8 °C) 98 %      MAP       --         Physical Exam    Nursing note and vitals reviewed.  Constitutional: She appears well-developed and well-nourished.   HENT:   Head: Normocephalic and atraumatic.   Mouth/Throat: Mucous membranes are normal.   Eyes: Pupils are equal, round, and reactive to light.   Neck: Normal range of motion.   Cardiovascular: Normal rate and regular rhythm.   Pulmonary/Chest: Breath sounds normal.   Abdominal: Soft. There is tenderness in the suprapubic area.   Genitourinary: No vaginal discharge found.   Neurological: She is alert and oriented to person, place, and time.   Skin: Skin is warm and dry.         ED Course   Procedures  Labs Reviewed   CBC W/ AUTO DIFFERENTIAL - Abnormal; Notable for the following components:       Result Value    WBC 27.20 (*)     RBC 5.80 (*)     Hemoglobin 17.2 (*)     Hematocrit 49.5 (*)     Immature Granulocytes 0.8 (*)     Gran # (ANC) 22.9 (*)     Immature Grans (Abs) 0.22 (*)     Mono # 1.8 (*)     Gran% 84.0 (*)     Lymph% 8.2 (*)     All other components within normal limits   COMPREHENSIVE METABOLIC PANEL - Abnormal; Notable for the following  components:    Potassium 2.9 (*)     Glucose 145 (*)     Calcium 10.6 (*)     Total Protein 9.5 (*)     All other components within normal limits   URINALYSIS, REFLEX TO URINE CULTURE - Abnormal; Notable for the following components:    Appearance, UA Hazy (*)     Protein, UA 3+ (*)     Ketones, UA Trace (*)     Occult Blood UA 3+ (*)     All other components within normal limits    Narrative:     Preferred Collection Type->Urine, Clean Catch   CULTURE, BLOOD   CULTURE, BLOOD   LIPASE   URINALYSIS MICROSCOPIC   POCT URINE PREGNANCY          Imaging Results           CT Abdomen Pelvis With Contrast (Final result)  Result time 05/18/19 00:27:15    Final result by Vik Love MD (05/18/19 00:27:15)                 Impression:      Acute sigmoid diverticulitis with small associated contained perforation.  No drainable abscess.    This report was flagged in Epic as abnormal.    Electronically signed by resident: Angel Craig  Date:    05/18/2019  Time:    00:02    Electronically signed by: Vik Love MD  Date:    05/18/2019  Time:    00:27             Narrative:    EXAMINATION:  CT ABDOMEN PELVIS WITH CONTRAST    CLINICAL HISTORY:  Nausea, vomiting, diarrhea;    TECHNIQUE:  Low dose axial images, sagittal and coronal reformations were obtained from the lung bases to the pubic symphysis following the IV administration of 100 mL of Omnipaque 350 .  Oral contrast was not given. Postcontrast images were also obtained in the delayed phase.    COMPARISON:  CT abdomen pelvis 01/26/2018, 01/24/2018.    FINDINGS:  ABDOMEN:    - Heart: Normal in size without pericardial effusion.    - Lung bases: Well aerated without acute consolidation, pleural effusion, pneumothorax, or mass.    - Liver: Normal in size without focal abnormality.    - Gallbladder: No calcified gallstones.    - Bile Ducts: No evidence of intra or extra hepatic biliary ductal dilation.    - Spleen: Negative.    - Kidneys: Normal in size and location with  normal contrast enhancement and excretion patterns.  No hydroureteronephrosis or renal mass.    - Adrenals: Unremarkable.    - Pancreas: No mass or peripancreatic fat stranding.    - Retroperitoneum:  No significant adenopathy.    - Vascular: No abdominal aortic aneurysm.    - Abdominal wall:  No acute abnormalities.    PELVIS:    Uterus is unremarkable.  No pelvic mass or adenopathy.  Trace free fluid in the pelvis.    BOWEL/MESENTERY:    Widespread colonic diverticulosis with short segment of bowel wall thickening within the sigmoid colon suggestive for acute diverticulitis.  Associated small contained perforation present.  No drainable abscess.  No high-grade obstruction.  Fat deposition noted involving the walls of the colon, similar to prior, a finding which could be seen with prior episodes of colitis.    BONES:  No acute osseous abnormality and no suspicious lytic or blastic lesion.                                 Medical Decision Making:   History:   Old Medical Records: I decided to obtain old medical records.  Initial Assessment:   43 y.o female patient with history of cyclical vomiting syndrome, complains of nausea, vomiting and suprapubic pain without any urinary symptoms or vaginal complaints. She says this is her pattern for cyclical vomiting, she has not been recently followed up by her PCP. Smoker marijuana 3 days ago. Tried Zofran at home and did not work. Patient is very well appearing. Will get labs. Will analyze urine for infection. Disposition pending lab results and PO challenge.   Clinical Tests:   Lab Tests: Ordered and Reviewed  Radiological Study: Ordered and Reviewed  ED Management:  12:11 AM  Patient found to have a leukocytosis of 2700, hemoglobin of 17, hematocrit of 49, however has a potassium at 2.9. Will give potassium IV. Will give multiple IV fluids. Given elevated WBC and abdominal pain and vomiting, CT scan the abdomen/pelvis ordered, pending results.     1241am:  Patient found  to have diverticulitis with acute perforation.  Patient patent to Colorectal surgery.  Given IV antibiotics and blood cultures drawn.  Plan to consult and will admit.            Scribe Attestation:   Scribe #1: I performed the above scribed service and the documentation accurately describes the services I performed. I attest to the accuracy of the note.               Clinical Impression:       ICD-10-CM ICD-9-CM   1. Diverticulitis of large intestine with perforation without bleeding K57.20 562.11     569.83                                Kym Gutierrez MD  05/18/19 0041

## 2019-05-18 NOTE — ED TRIAGE NOTES
Patient reports to the ED with reports of a shooting lower abd pain with nausea , and vomiting no diarrhea, weakness, headache, sOB. She states her last bowel movement was Wednesday morning and she has been having issues urinating, states her flow is more of a tinkle, also states she has been having chills and sweats. AAO x4.    Patient Identifiers for Cipriano Gamez checked and correct  · LOC: The patient is awake, alert and aware of environment with an appropriate affect.  Alert to person, place, time and situation.    · APPEARANCE: Patient resting comfortably with no acute distress noted, patient is clean and well groomed, patient's clothing is properly fastened.  · SKIN: The skin is warm and dry, patient has normal skin turgor and moist mucus membranes,no rashes or lesions.  Skin is Intact , No Breakdown Noted  · Musculoskeletal:  Normal range of motion noted. Moves all extremeties well, No swelling or tenderness noted  · RESPIRATORY: Airway is open and patent, respirations are spontaneous, patient has a normal effort, rate, pulse ox 98% on RA, patient reports intermittent SOB.  · CARDIAC: Patient has a normal rate and rhythm, no periphreal edema noted, capillary refill < 3 seconds.   · ABDOMEN: Soft and tender per patient reports, non-distended.  · NEUROLOGIC: Pupils PERRL & Reactive to light.  .

## 2019-05-18 NOTE — H&P
Subjective:       Patient ID: Cipriano Gamez is a 43 y.o. female.    Chief Complaint: Vomiting (c/o n/v since wednesday. states can't tolerate anything PO. )    43yoF w/ h/o cyclical vomiting syndrome presents with 3 day history of worsening N/V and lower abd pain. Reports pain is suprapubic and LLQ in nature. Also reports constipation but still passing flatus. No fevers. States she has had diverticulitis at least 5 times in the past 10 yrs. Cscope in 2011 that showed diverticulosis. Last episodes she thinks was in Dec of 2018. States she was told before that she could have an operation but she would require an ostomy so elected not to have a resection. Do not see any notes of CRS in the chart.     Last colonoscopy - 2011  denies family hx of CRC or IBD.      Review of patient's allergies indicates:   Allergen Reactions    Ibuprofen Hives       Past Medical History:   Diagnosis Date    Diverticulosis     GERD (gastroesophageal reflux disease)     Hiatal hernia     Hypertension        Past Surgical History:   Procedure Laterality Date    CERVICAL BIOPSY  W/ LOOP ELECTRODE EXCISION  2/11/14    LEEP (LOOP ELECTROSURGICAL EXCISION PROCEDURE) N/A 2/11/2014    Performed by Hung Valero MD at [facility] OR       Current Facility-Administered Medications   Medication Dose Route Frequency Provider Last Rate Last Dose    ciprofloxacin (CIPRO)400mg/200ml D5W IVPB 400 mg  400 mg Intravenous ED 1 Time Kym Gutierrez MD        enoxaparin injection 40 mg  40 mg Subcutaneous Daily Jakob Valdovinos MD        metronidazole IVPB 500 mg  500 mg Intravenous ED 1 Time Kym Gutierrez MD        piperacillin-tazobactam 4.5 g in sodium chloride 0.9% 100 mL IVPB (ready to mix system)  4.5 g Intravenous Q8H Jakob Valdovinos MD        potassium chloride 10 mEq in 100 mL IVPB  10 mEq Intravenous ED 1 Time Kym Gutierrez MD        potassium chloride 10 mEq in 100 mL IVPB  10 mEq Intravenous ED 1 Time Kym REILLY  MD Brenda        potassium chloride 10 mEq in 100 mL IVPB  10 mEq Intravenous ED 1 Time Kym Gutierrez MD        sodium chloride 0.9% bolus 1,000 mL  1,000 mL Intravenous ED 1 Time Kym Gutierrez MD        sodium chloride 0.9% bolus 1,000 mL  1,000 mL Intravenous ED 1 Time Kym Gutierrez MD         Current Outpatient Medications   Medication Sig Dispense Refill    lisinopril (PRINIVIL,ZESTRIL) 40 MG tablet TAKE 1 TABLET (40 MG TOTAL) BY MOUTH ONCE DAILY. 90 tablet 1    ondansetron (ZOFRAN-ODT) 4 MG TbDL Take 1 tablet (4 mg total) by mouth every 6 (six) hours as needed. 15 tablet 0    diphenhydrAMINE (SOMINEX) 25 mg tablet Take 25 mg by mouth nightly as needed.      pantoprazole (PROTONIX) 40 MG tablet Take 2 tablets (80 mg total) by mouth once daily. 30 tablet 0       Family History   Problem Relation Age of Onset    Heart disease Mother 54        MI    Heart disease Maternal Grandmother 40        MI    Diabetes Other     Heart disease Brother 44        MI    Sickle cell trait Sister        Social History     Socioeconomic History    Marital status: Single     Spouse name: Not on file    Number of children: Not on file    Years of education: Not on file    Highest education level: Not on file   Occupational History     Employer: [employer]   Social Needs    Financial resource strain: Not on file    Food insecurity:     Worry: Not on file     Inability: Not on file    Transportation needs:     Medical: Not on file     Non-medical: Not on file   Tobacco Use    Smoking status: Current Every Day Smoker     Packs/day: 1.00     Years: 16.00     Pack years: 16.00     Types: Cigarettes    Smokeless tobacco: Never Used   Substance and Sexual Activity    Alcohol use: Yes     Comment: social 1-2 x / month    Drug use: Yes     Frequency: 1.0 times per week     Types: Marijuana     Comment: social- last use 1 week ago    Sexual activity: Not Currently     Partners: Male     Birth  control/protection: None   Lifestyle    Physical activity:     Days per week: Not on file     Minutes per session: Not on file    Stress: Not on file   Relationships    Social connections:     Talks on phone: Not on file     Gets together: Not on file     Attends Hindu service: Not on file     Active member of club or organization: Not on file     Attends meetings of clubs or organizations: Not on file     Relationship status: Not on file   Other Topics Concern    Not on file   Social History Narrative    Not on file       Review of Systems   Constitutional: Positive for appetite change and fatigue. Negative for fever.   HENT: Negative for congestion.    Eyes: Negative for pain.   Respiratory: Negative for cough and shortness of breath.    Cardiovascular: Negative for chest pain.   Gastrointestinal: Positive for abdominal pain, constipation, nausea and vomiting. Negative for abdominal distention, blood in stool and diarrhea.   Endocrine: Negative for polyuria.   Genitourinary: Negative for dysuria.   Musculoskeletal: Negative for back pain.   Skin: Negative for rash.   Neurological: Negative for seizures.   Hematological: Negative for adenopathy.   Psychiatric/Behavioral: Negative for agitation.       Objective:      Physical Exam   Constitutional: She is oriented to person, place, and time. No distress.   Eyes: EOM are normal.   Neck: Neck supple.   Cardiovascular: Normal rate and regular rhythm.   Pulmonary/Chest: Effort normal. No respiratory distress.   Abdominal: Soft.   TTP in LLQ, suprapubic area, no rebound   Musculoskeletal: Normal range of motion.   Neurological: She is alert and oriented to person, place, and time.   Skin: Skin is warm and dry.         Lab Results   Component Value Date    WBC 27.20 (H) 05/17/2019    HGB 17.2 (H) 05/17/2019    HCT 49.5 (H) 05/17/2019    MCV 85 05/17/2019     05/17/2019     BMP  Lab Results   Component Value Date     05/17/2019    K 2.9 (L) 05/17/2019     CL 96 05/17/2019    CO2 25 05/17/2019    BUN 12 05/17/2019    CREATININE 1.0 05/17/2019    CALCIUM 10.6 (H) 05/17/2019    ANIONGAP 15 05/17/2019    ESTGFRAFRICA >60.0 05/17/2019    EGFRNONAA >60.0 05/17/2019     CMP  Sodium   Date Value Ref Range Status   05/17/2019 136 136 - 145 mmol/L Final     Potassium   Date Value Ref Range Status   05/17/2019 2.9 (L) 3.5 - 5.1 mmol/L Final     Chloride   Date Value Ref Range Status   05/17/2019 96 95 - 110 mmol/L Final     CO2   Date Value Ref Range Status   05/17/2019 25 23 - 29 mmol/L Final     Glucose   Date Value Ref Range Status   05/17/2019 145 (H) 70 - 110 mg/dL Final     BUN, Bld   Date Value Ref Range Status   05/17/2019 12 6 - 20 mg/dL Final     Creatinine   Date Value Ref Range Status   05/17/2019 1.0 0.5 - 1.4 mg/dL Final     Calcium   Date Value Ref Range Status   05/17/2019 10.6 (H) 8.7 - 10.5 mg/dL Final     Total Protein   Date Value Ref Range Status   05/17/2019 9.5 (H) 6.0 - 8.4 g/dL Final     Albumin   Date Value Ref Range Status   05/17/2019 4.3 3.5 - 5.2 g/dL Final     Total Bilirubin   Date Value Ref Range Status   05/17/2019 0.9 0.1 - 1.0 mg/dL Final     Comment:     For infants and newborns, interpretation of results should be based  on gestational age, weight and in agreement with clinical  observations.  Premature Infant recommended reference ranges:  Up to 24 hours.............<8.0 mg/dL  Up to 48 hours............<12.0 mg/dL  3-5 days..................<15.0 mg/dL  6-29 days.................<15.0 mg/dL       Alkaline Phosphatase   Date Value Ref Range Status   05/17/2019 105 55 - 135 U/L Final     AST   Date Value Ref Range Status   05/17/2019 23 10 - 40 U/L Final     ALT   Date Value Ref Range Status   05/17/2019 13 10 - 44 U/L Final     Anion Gap   Date Value Ref Range Status   05/17/2019 15 8 - 16 mmol/L Final     eGFR if    Date Value Ref Range Status   05/17/2019 >60.0 >60 mL/min/1.73 m^2 Final     eGFR if non African American    Date Value Ref Range Status   05/17/2019 >60.0 >60 mL/min/1.73 m^2 Final     Comment:     Calculation used to obtain the estimated glomerular filtration  rate (eGFR) is the CKD-EPI equation.        No results found for: CEA        Assessment:       1. Diverticulitis with small contained abscess   Plan:       Plan to admit to CRS, Dr. Allison  NPO  IVFs  IV abx  Trend CRP, WBC  Prn nausea control

## 2019-05-19 LAB
ANION GAP SERPL CALC-SCNC: 10 MMOL/L (ref 8–16)
BASOPHILS # BLD AUTO: 0.05 K/UL (ref 0–0.2)
BASOPHILS NFR BLD: 0.3 % (ref 0–1.9)
BUN SERPL-MCNC: 7 MG/DL (ref 6–20)
CALCIUM SERPL-MCNC: 8.9 MG/DL (ref 8.7–10.5)
CHLORIDE SERPL-SCNC: 104 MMOL/L (ref 95–110)
CO2 SERPL-SCNC: 23 MMOL/L (ref 23–29)
CREAT SERPL-MCNC: 0.9 MG/DL (ref 0.5–1.4)
CRP SERPL-MCNC: 29.2 MG/L (ref 0–8.2)
DIFFERENTIAL METHOD: ABNORMAL
EOSINOPHIL # BLD AUTO: 0 K/UL (ref 0–0.5)
EOSINOPHIL NFR BLD: 0.1 % (ref 0–8)
ERYTHROCYTE [DISTWIDTH] IN BLOOD BY AUTOMATED COUNT: 13.6 % (ref 11.5–14.5)
EST. GFR  (AFRICAN AMERICAN): >60 ML/MIN/1.73 M^2
EST. GFR  (NON AFRICAN AMERICAN): >60 ML/MIN/1.73 M^2
GLUCOSE SERPL-MCNC: 167 MG/DL (ref 70–110)
HCT VFR BLD AUTO: 44.8 % (ref 37–48.5)
HGB BLD-MCNC: 15.2 G/DL (ref 12–16)
IMM GRANULOCYTES # BLD AUTO: 0.09 K/UL (ref 0–0.04)
IMM GRANULOCYTES NFR BLD AUTO: 0.6 % (ref 0–0.5)
LYMPHOCYTES # BLD AUTO: 2 K/UL (ref 1–4.8)
LYMPHOCYTES NFR BLD: 12.4 % (ref 18–48)
MAGNESIUM SERPL-MCNC: 1.9 MG/DL (ref 1.6–2.6)
MCH RBC QN AUTO: 29.7 PG (ref 27–31)
MCHC RBC AUTO-ENTMCNC: 33.9 G/DL (ref 32–36)
MCV RBC AUTO: 88 FL (ref 82–98)
MONOCYTES # BLD AUTO: 1.2 K/UL (ref 0.3–1)
MONOCYTES NFR BLD: 7.6 % (ref 4–15)
NEUTROPHILS # BLD AUTO: 12.8 K/UL (ref 1.8–7.7)
NEUTROPHILS NFR BLD: 79 % (ref 38–73)
NRBC BLD-RTO: 0 /100 WBC
PHOSPHATE SERPL-MCNC: 3 MG/DL (ref 2.7–4.5)
PLATELET # BLD AUTO: 246 K/UL (ref 150–350)
PMV BLD AUTO: 10.8 FL (ref 9.2–12.9)
POTASSIUM SERPL-SCNC: 3.1 MMOL/L (ref 3.5–5.1)
RBC # BLD AUTO: 5.11 M/UL (ref 4–5.4)
SODIUM SERPL-SCNC: 137 MMOL/L (ref 136–145)
WBC # BLD AUTO: 16.17 K/UL (ref 3.9–12.7)

## 2019-05-19 PROCEDURE — 25000003 PHARM REV CODE 250: Performed by: STUDENT IN AN ORGANIZED HEALTH CARE EDUCATION/TRAINING PROGRAM

## 2019-05-19 PROCEDURE — 63600175 PHARM REV CODE 636 W HCPCS: Performed by: STUDENT IN AN ORGANIZED HEALTH CARE EDUCATION/TRAINING PROGRAM

## 2019-05-19 PROCEDURE — 83735 ASSAY OF MAGNESIUM: CPT

## 2019-05-19 PROCEDURE — 20600001 HC STEP DOWN PRIVATE ROOM

## 2019-05-19 PROCEDURE — 80048 BASIC METABOLIC PNL TOTAL CA: CPT

## 2019-05-19 PROCEDURE — 36415 COLL VENOUS BLD VENIPUNCTURE: CPT

## 2019-05-19 PROCEDURE — 84100 ASSAY OF PHOSPHORUS: CPT

## 2019-05-19 PROCEDURE — 86140 C-REACTIVE PROTEIN: CPT

## 2019-05-19 PROCEDURE — 85025 COMPLETE CBC W/AUTO DIFF WBC: CPT

## 2019-05-19 RX ORDER — POTASSIUM CHLORIDE 7.45 MG/ML
10 INJECTION INTRAVENOUS
Status: COMPLETED | OUTPATIENT
Start: 2019-05-19 | End: 2019-05-19

## 2019-05-19 RX ORDER — POTASSIUM CHLORIDE 20 MEQ/1
40 TABLET, EXTENDED RELEASE ORAL ONCE
Status: DISCONTINUED | OUTPATIENT
Start: 2019-05-19 | End: 2019-05-20 | Stop reason: HOSPADM

## 2019-05-19 RX ADMIN — HYDRALAZINE HYDROCHLORIDE 10 MG: 20 INJECTION INTRAMUSCULAR; INTRAVENOUS at 10:05

## 2019-05-19 RX ADMIN — ONDANSETRON 4 MG: 2 INJECTION INTRAMUSCULAR; INTRAVENOUS at 01:05

## 2019-05-19 RX ADMIN — PANTOPRAZOLE SODIUM 80 MG: 40 TABLET, DELAYED RELEASE ORAL at 10:05

## 2019-05-19 RX ADMIN — PIPERACILLIN AND TAZOBACTAM 4.5 G: 4; .5 INJECTION, POWDER, LYOPHILIZED, FOR SOLUTION INTRAVENOUS; PARENTERAL at 10:05

## 2019-05-19 RX ADMIN — POTASSIUM & SODIUM PHOSPHATES POWDER PACK 280-160-250 MG 2 PACKET: 280-160-250 PACK at 07:05

## 2019-05-19 RX ADMIN — POTASSIUM CHLORIDE 10 MEQ: 10 INJECTION, SOLUTION INTRAVENOUS at 01:05

## 2019-05-19 RX ADMIN — ENOXAPARIN SODIUM 40 MG: 100 INJECTION SUBCUTANEOUS at 10:05

## 2019-05-19 RX ADMIN — POTASSIUM CHLORIDE 10 MEQ: 10 INJECTION, SOLUTION INTRAVENOUS at 02:05

## 2019-05-19 RX ADMIN — HYDROMORPHONE HYDROCHLORIDE 0.5 MG: 1 INJECTION, SOLUTION INTRAMUSCULAR; INTRAVENOUS; SUBCUTANEOUS at 04:05

## 2019-05-19 RX ADMIN — AMLODIPINE BESYLATE 5 MG: 5 TABLET ORAL at 10:05

## 2019-05-19 RX ADMIN — POTASSIUM CHLORIDE 10 MEQ: 10 INJECTION, SOLUTION INTRAVENOUS at 12:05

## 2019-05-19 RX ADMIN — SODIUM CHLORIDE: 0.9 INJECTION, SOLUTION INTRAVENOUS at 03:05

## 2019-05-19 RX ADMIN — PIPERACILLIN AND TAZOBACTAM 4.5 G: 4; .5 INJECTION, POWDER, LYOPHILIZED, FOR SOLUTION INTRAVENOUS; PARENTERAL at 06:05

## 2019-05-19 RX ADMIN — PIPERACILLIN AND TAZOBACTAM 4.5 G: 4; .5 INJECTION, POWDER, LYOPHILIZED, FOR SOLUTION INTRAVENOUS; PARENTERAL at 03:05

## 2019-05-19 RX ADMIN — HYDROMORPHONE HYDROCHLORIDE 0.5 MG: 1 INJECTION, SOLUTION INTRAMUSCULAR; INTRAVENOUS; SUBCUTANEOUS at 10:05

## 2019-05-19 RX ADMIN — POTASSIUM CHLORIDE 10 MEQ: 10 INJECTION, SOLUTION INTRAVENOUS at 03:05

## 2019-05-19 RX ADMIN — PROMETHAZINE HYDROCHLORIDE 6.25 MG: 25 INJECTION INTRAMUSCULAR; INTRAVENOUS at 03:05

## 2019-05-19 RX ADMIN — LISINOPRIL 40 MG: 20 TABLET ORAL at 10:05

## 2019-05-19 RX ADMIN — ONDANSETRON 4 MG: 2 INJECTION INTRAMUSCULAR; INTRAVENOUS at 07:05

## 2019-05-19 RX ADMIN — ONDANSETRON 4 MG: 2 INJECTION INTRAMUSCULAR; INTRAVENOUS at 10:05

## 2019-05-19 RX ADMIN — HYDRALAZINE HYDROCHLORIDE 10 MG: 20 INJECTION INTRAMUSCULAR; INTRAVENOUS at 04:05

## 2019-05-19 NOTE — PROGRESS NOTES
[facility]  General Surgery  Progress Note    Subjective:     History of Present Illness:  No notes on file    Post-Op Info:  * No surgery found *         Interval History: SBP running high. Now controlled. Tolerated diet. Pain controlled. No n/v.    Medications:  Continuous Infusions:   sodium chloride 0.9% 125 mL/hr at 05/19/19 0305     Scheduled Meds:   amLODIPine  5 mg Oral Daily    enoxaparin  40 mg Subcutaneous Daily    lidocaine (PF) 10 mg/ml (1%)  1 mL Other Once    lisinopril  40 mg Oral Daily    pantoprazole  80 mg Oral Daily    piperacillin-tazobactam (ZOSYN) IVPB  4.5 g Intravenous Q8H    potassium, sodium phosphates  2 packet Oral QID (WM & HS)     PRN Meds:hydrALAZINE, HYDROmorphone, labetalol, ondansetron, promethazine (PHENERGAN) IVPB, sodium chloride 0.9%     Review of patient's allergies indicates:   Allergen Reactions    Ibuprofen Hives     Objective:     Vital Signs (Most Recent):  Temp: 98.5 °F (36.9 °C) (05/19/19 0416)  Pulse: 109 (05/19/19 0615)  Resp: 18 (05/19/19 0416)  BP: 128/64 (05/19/19 0615)  SpO2: 99 % (05/19/19 0416) Vital Signs (24h Range):  Temp:  [98.1 °F (36.7 °C)-98.5 °F (36.9 °C)] 98.5 °F (36.9 °C)  Pulse:  [] 109  Resp:  [14-20] 18  SpO2:  [94 %-99 %] 99 %  BP: (128-192)/() 128/64     Weight: 95.2 kg (209 lb 14.4 oz)  Body mass index is 32.87 kg/m².    Intake/Output - Last 3 Shifts       05/17 0700 - 05/18 0659 05/18 0700 - 05/19 0659 05/19 0700 - 05/20 0659    P.O.  1680     I.V. (mL/kg)  2241.7 (23.5)     IV Piggyback 4300 400     Total Intake(mL/kg) 4300 (45.2) 4321.7 (45.4)     Urine (mL/kg/hr)  3100 (1.4)     Stool  0     Total Output  3100     Net +4300 +1221.7            Stool Occurrence  0 x           Physical Exam   Constitutional: She is oriented to person, place, and time. She appears well-developed. No distress.   HENT:   Head: Normocephalic and atraumatic.   Eyes: Conjunctivae and EOM are normal.   Cardiovascular:   Tachy    Pulmonary/Chest: Effort normal. No respiratory distress.   Abdominal: Soft. She exhibits no distension. There is tenderness (LLQ).   Neurological: She is alert and oriented to person, place, and time.   Skin: Skin is warm and dry.   Vitals reviewed.      Significant Labs:  CBC:   Recent Labs   Lab 05/19/19  0702   WBC 16.17*   RBC 5.11   HGB 15.2   HCT 44.8      MCV 88   MCH 29.7   MCHC 33.9     BMP:   Recent Labs   Lab 05/19/19  0702   *      K 3.1*      CO2 23   BUN 7   CREATININE 0.9   CALCIUM 8.9   MG 1.9       Significant Diagnostics:  I have reviewed all pertinent imaging results/findings within the past 24 hours.    Assessment/Plan:     No notes have been filed under this hospital service.  Service: General Surgery      Gt Boothe MD  General Surgery  [facility]

## 2019-05-19 NOTE — ASSESSMENT & PLAN NOTE
44 yo F with contained perforation of sigmoid diverticulitis. Managing nonoperatively.    Reg diet  D/c IVFs  IV zosyn  Daily labs, trend CRP WBC  Prn nausea control  Restart home BP meds, prns added  DVT ppx    Dispo: Advanced diet. Possible d/c tomorrow

## 2019-05-19 NOTE — NURSING
Plan of care discussed with pt. Pt verbalizes understanding. Pt tolerating clear liquid diet. Nausea managed with PRN zofran. Pain managed with PRN pain medications. Pt received IV and po K replacement. Pt ambulated in room. Pt positions independently. Pt remains free of falls and injury. Will continue to monitor.

## 2019-05-19 NOTE — SUBJECTIVE & OBJECTIVE
Interval History: SBP running high. Now controlled. Tolerated diet. Pain controlled. No n/v.    Medications:  Continuous Infusions:    Scheduled Meds:   amLODIPine  5 mg Oral Daily    enoxaparin  40 mg Subcutaneous Daily    lidocaine (PF) 10 mg/ml (1%)  1 mL Other Once    lisinopril  40 mg Oral Daily    pantoprazole  80 mg Oral Daily    piperacillin-tazobactam (ZOSYN) IVPB  4.5 g Intravenous Q8H     PRN Meds:hydrALAZINE, HYDROmorphone, labetalol, ondansetron, promethazine (PHENERGAN) IVPB, sodium chloride 0.9%     Review of patient's allergies indicates:   Allergen Reactions    Ibuprofen Hives     Objective:     Vital Signs (Most Recent):  Temp: 97.7 °F (36.5 °C) (05/19/19 0729)  Pulse: 95 (05/19/19 0729)  Resp: 14 (05/19/19 0729)  BP: 132/70 (05/19/19 0729)  SpO2: 97 % (05/19/19 0729) Vital Signs (24h Range):  Temp:  [97.7 °F (36.5 °C)-98.5 °F (36.9 °C)] 97.7 °F (36.5 °C)  Pulse:  [] 95  Resp:  [14-20] 14  SpO2:  [94 %-99 %] 97 %  BP: (128-192)/() 132/70     Weight: 95.2 kg (209 lb 14.4 oz)  Body mass index is 32.87 kg/m².    Intake/Output - Last 3 Shifts       05/17 0700 - 05/18 0659 05/18 0700 - 05/19 0659 05/19 0700 - 05/20 0659    P.O.  1680 240    I.V. (mL/kg)  2241.7 (23.5)     IV Piggyback 4300 400     Total Intake(mL/kg) 4300 (45.2) 4321.7 (45.4) 240 (2.5)    Urine (mL/kg/hr)  3100 (1.4) 100 (0.3)    Stool  0     Total Output  3100 100    Net +4300 +1221.7 +140           Stool Occurrence  0 x           Physical Exam   Constitutional: She is oriented to person, place, and time. She appears well-developed. No distress.   HENT:   Head: Normocephalic and atraumatic.   Eyes: Conjunctivae and EOM are normal.   Cardiovascular:   Tachy   Pulmonary/Chest: Effort normal. No respiratory distress.   Abdominal: Soft. She exhibits no distension. There is tenderness (LLQ).   Neurological: She is alert and oriented to person, place, and time.   Skin: Skin is warm and dry.   Vitals  reviewed.      Significant Labs:  CBC:   Recent Labs   Lab 05/19/19  0702   WBC 16.17*   RBC 5.11   HGB 15.2   HCT 44.8      MCV 88   MCH 29.7   MCHC 33.9     BMP:   Recent Labs   Lab 05/19/19  0702   *      K 3.1*      CO2 23   BUN 7   CREATININE 0.9   CALCIUM 8.9   MG 1.9       Significant Diagnostics:  I have reviewed all pertinent imaging results/findings within the past 24 hours.

## 2019-05-19 NOTE — PROGRESS NOTES
[facility]  Colorectal Surgery  Progress Note    Patient Name: Cipriano Gamez  MRN: [id number]  Admission Date: 5/17/2019  Hospital Length of Stay: 1 days  Attending Physician: Gt Allison MD  Interval History: SBP running high. Now controlled. Tolerated diet. Pain controlled. No n/v.    Medications:  Continuous Infusions:    Scheduled Meds:   amLODIPine  5 mg Oral Daily    enoxaparin  40 mg Subcutaneous Daily    lidocaine (PF) 10 mg/ml (1%)  1 mL Other Once    lisinopril  40 mg Oral Daily    pantoprazole  80 mg Oral Daily    piperacillin-tazobactam (ZOSYN) IVPB  4.5 g Intravenous Q8H     PRN Meds:hydrALAZINE, HYDROmorphone, labetalol, ondansetron, promethazine (PHENERGAN) IVPB, sodium chloride 0.9%     Review of patient's allergies indicates:   Allergen Reactions    Ibuprofen Hives     Objective:     Vital Signs (Most Recent):  Temp: 97.7 °F (36.5 °C) (05/19/19 0729)  Pulse: 95 (05/19/19 0729)  Resp: 14 (05/19/19 0729)  BP: 132/70 (05/19/19 0729)  SpO2: 97 % (05/19/19 0729) Vital Signs (24h Range):  Temp:  [97.7 °F (36.5 °C)-98.5 °F (36.9 °C)] 97.7 °F (36.5 °C)  Pulse:  [] 95  Resp:  [14-20] 14  SpO2:  [94 %-99 %] 97 %  BP: (128-192)/() 132/70     Weight: 95.2 kg (209 lb 14.4 oz)  Body mass index is 32.87 kg/m².    Intake/Output - Last 3 Shifts       05/17 0700 - 05/18 0659 05/18 0700 - 05/19 0659 05/19 0700 - 05/20 0659    P.O.  1680 240    I.V. (mL/kg)  2241.7 (23.5)     IV Piggyback 4300 400     Total Intake(mL/kg) 4300 (45.2) 4321.7 (45.4) 240 (2.5)    Urine (mL/kg/hr)  3100 (1.4) 100 (0.3)    Stool  0     Total Output  3100 100    Net +4300 +1221.7 +140           Stool Occurrence  0 x           Physical Exam   Constitutional: She is oriented to person, place, and time. She appears well-developed. No distress.   HENT:   Head: Normocephalic and atraumatic.   Eyes: Conjunctivae and EOM are normal.   Cardiovascular:   Tachy   Pulmonary/Chest: Effort normal. No  respiratory distress.   Abdominal: Soft. She exhibits no distension. There is tenderness (LLQ).   Neurological: She is alert and oriented to person, place, and time.   Skin: Skin is warm and dry.   Vitals reviewed.      Significant Labs:  CBC:   Recent Labs   Lab 05/19/19  0702   WBC 16.17*   RBC 5.11   HGB 15.2   HCT 44.8      MCV 88   MCH 29.7   MCHC 33.9     BMP:   Recent Labs   Lab 05/19/19  0702   *      K 3.1*      CO2 23   BUN 7   CREATININE 0.9   CALCIUM 8.9   MG 1.9       Significant Diagnostics:  I have reviewed all pertinent imaging results/findings within the past 24 hours.    Assessment/Plan:     * Diverticulitis of large intestine with perforation without bleeding  44 yo F with contained perforation of sigmoid diverticulitis. Managing nonoperatively.    Reg diet  D/c IVFs  IV zosyn  Daily labs, trend CRP WBC  Prn nausea control  Restart home BP meds, prns added  DVT ppx    Dispo: Advanced diet. Possible d/c tomorrow          Gt Boothe MD  Colorectal Surgery  [facility]

## 2019-05-19 NOTE — SUBJECTIVE & OBJECTIVE
Interval History: SBP running high. Now controlled. Tolerated diet. Pain controlled. No n/v.    Medications:  Continuous Infusions:   sodium chloride 0.9% 125 mL/hr at 05/19/19 0305     Scheduled Meds:   amLODIPine  5 mg Oral Daily    enoxaparin  40 mg Subcutaneous Daily    lidocaine (PF) 10 mg/ml (1%)  1 mL Other Once    lisinopril  40 mg Oral Daily    pantoprazole  80 mg Oral Daily    piperacillin-tazobactam (ZOSYN) IVPB  4.5 g Intravenous Q8H    potassium, sodium phosphates  2 packet Oral QID (WM & HS)     PRN Meds:hydrALAZINE, HYDROmorphone, labetalol, ondansetron, promethazine (PHENERGAN) IVPB, sodium chloride 0.9%     Review of patient's allergies indicates:   Allergen Reactions    Ibuprofen Hives     Objective:     Vital Signs (Most Recent):  Temp: 98.5 °F (36.9 °C) (05/19/19 0416)  Pulse: 109 (05/19/19 0615)  Resp: 18 (05/19/19 0416)  BP: 128/64 (05/19/19 0615)  SpO2: 99 % (05/19/19 0416) Vital Signs (24h Range):  Temp:  [98.1 °F (36.7 °C)-98.5 °F (36.9 °C)] 98.5 °F (36.9 °C)  Pulse:  [] 109  Resp:  [14-20] 18  SpO2:  [94 %-99 %] 99 %  BP: (128-192)/() 128/64     Weight: 95.2 kg (209 lb 14.4 oz)  Body mass index is 32.87 kg/m².    Intake/Output - Last 3 Shifts       05/17 0700 - 05/18 0659 05/18 0700 - 05/19 0659 05/19 0700 - 05/20 0659    P.O.  1680     I.V. (mL/kg)  2241.7 (23.5)     IV Piggyback 4300 400     Total Intake(mL/kg) 4300 (45.2) 4321.7 (45.4)     Urine (mL/kg/hr)  3100 (1.4)     Stool  0     Total Output  3100     Net +4300 +1221.7            Stool Occurrence  0 x           Physical Exam   Constitutional: She is oriented to person, place, and time. She appears well-developed. No distress.   HENT:   Head: Normocephalic and atraumatic.   Eyes: Conjunctivae and EOM are normal.   Cardiovascular:   Tachy   Pulmonary/Chest: Effort normal. No respiratory distress.   Abdominal: Soft. She exhibits no distension. There is tenderness (LLQ).   Neurological: She is alert and oriented to  person, place, and time.   Skin: Skin is warm and dry.   Vitals reviewed.      Significant Labs:  CBC:   Recent Labs   Lab 05/19/19  0702   WBC 16.17*   RBC 5.11   HGB 15.2   HCT 44.8      MCV 88   MCH 29.7   MCHC 33.9     BMP:   Recent Labs   Lab 05/19/19  0702   *      K 3.1*      CO2 23   BUN 7   CREATININE 0.9   CALCIUM 8.9   MG 1.9       Significant Diagnostics:  I have reviewed all pertinent imaging results/findings within the past 24 hours.

## 2019-05-19 NOTE — NURSING
Sp to Dr Boothe regarding Pt's cont elevated BP.  ordered 5ml of Labetalol and asked RN to report the BP and HR to him a/f administration of medication.  reported he did not feel the need for telemetry at this time.

## 2019-05-19 NOTE — NURSING
RN spto Dr Boothe regarding Pt's BP and HR after administration of Labetalol. Manual /100, and HR 84. RN reports periods of tachycardia while sedentary.    placed no new orders.  reported no need for telemetry at this time.

## 2019-05-19 NOTE — PLAN OF CARE
Problem: Fall Injury Risk  Goal: Absence of Fall and Fall-Related Injury  Outcome: Ongoing (interventions implemented as appropriate)  POC reviewed with pt, AAOx4, NDN, resp even and unlabored, ambulates IND, received PRN pain and nausea controls meds throughout the shift, BP was elevated SBP greater than 160mmHg at beginning of shift, received on dose of labetalol per AM nurse, follow up SBP was in the 180s administered hydralazine IV, recheck was in the 140s, MN bp was 130s/60s, 0400 vitals were in the 160s/100s administered another dose of hydralazine and BP recheck was 124/68.  tolerates clears well, bed low, call bell with in reach, no requests or concerns, will conitnue to monitor

## 2019-05-20 VITALS
DIASTOLIC BLOOD PRESSURE: 72 MMHG | HEIGHT: 67 IN | OXYGEN SATURATION: 97 % | BODY MASS INDEX: 32.94 KG/M2 | RESPIRATION RATE: 18 BRPM | HEART RATE: 112 BPM | WEIGHT: 209.88 LBS | SYSTOLIC BLOOD PRESSURE: 158 MMHG | TEMPERATURE: 99 F

## 2019-05-20 LAB
ANION GAP SERPL CALC-SCNC: 10 MMOL/L (ref 8–16)
BASOPHILS # BLD AUTO: 0.05 K/UL (ref 0–0.2)
BASOPHILS NFR BLD: 0.4 % (ref 0–1.9)
BUN SERPL-MCNC: 6 MG/DL (ref 6–20)
CALCIUM SERPL-MCNC: 9 MG/DL (ref 8.7–10.5)
CHLORIDE SERPL-SCNC: 104 MMOL/L (ref 95–110)
CO2 SERPL-SCNC: 24 MMOL/L (ref 23–29)
CREAT SERPL-MCNC: 0.8 MG/DL (ref 0.5–1.4)
CRP SERPL-MCNC: 12.4 MG/L (ref 0–8.2)
DIFFERENTIAL METHOD: ABNORMAL
EOSINOPHIL # BLD AUTO: 0.1 K/UL (ref 0–0.5)
EOSINOPHIL NFR BLD: 0.7 % (ref 0–8)
ERYTHROCYTE [DISTWIDTH] IN BLOOD BY AUTOMATED COUNT: 13.4 % (ref 11.5–14.5)
EST. GFR  (AFRICAN AMERICAN): >60 ML/MIN/1.73 M^2
EST. GFR  (NON AFRICAN AMERICAN): >60 ML/MIN/1.73 M^2
GLUCOSE SERPL-MCNC: 100 MG/DL (ref 70–110)
HCT VFR BLD AUTO: 46.1 % (ref 37–48.5)
HGB BLD-MCNC: 15.4 G/DL (ref 12–16)
IMM GRANULOCYTES # BLD AUTO: 0.04 K/UL (ref 0–0.04)
IMM GRANULOCYTES NFR BLD AUTO: 0.3 % (ref 0–0.5)
LYMPHOCYTES # BLD AUTO: 2.4 K/UL (ref 1–4.8)
LYMPHOCYTES NFR BLD: 17.7 % (ref 18–48)
MAGNESIUM SERPL-MCNC: 1.9 MG/DL (ref 1.6–2.6)
MCH RBC QN AUTO: 29.8 PG (ref 27–31)
MCHC RBC AUTO-ENTMCNC: 33.4 G/DL (ref 32–36)
MCV RBC AUTO: 89 FL (ref 82–98)
MONOCYTES # BLD AUTO: 1.1 K/UL (ref 0.3–1)
MONOCYTES NFR BLD: 8.5 % (ref 4–15)
NEUTROPHILS # BLD AUTO: 9.7 K/UL (ref 1.8–7.7)
NEUTROPHILS NFR BLD: 72.4 % (ref 38–73)
NRBC BLD-RTO: 0 /100 WBC
PHOSPHATE SERPL-MCNC: 2.9 MG/DL (ref 2.7–4.5)
PLATELET # BLD AUTO: 275 K/UL (ref 150–350)
PMV BLD AUTO: 10.8 FL (ref 9.2–12.9)
POTASSIUM SERPL-SCNC: 3.6 MMOL/L (ref 3.5–5.1)
RBC # BLD AUTO: 5.17 M/UL (ref 4–5.4)
SODIUM SERPL-SCNC: 138 MMOL/L (ref 136–145)
WBC # BLD AUTO: 13.41 K/UL (ref 3.9–12.7)

## 2019-05-20 PROCEDURE — 99238 HOSP IP/OBS DSCHRG MGMT 30/<: CPT | Mod: ,,, | Performed by: NURSE PRACTITIONER

## 2019-05-20 PROCEDURE — 25000003 PHARM REV CODE 250: Performed by: STUDENT IN AN ORGANIZED HEALTH CARE EDUCATION/TRAINING PROGRAM

## 2019-05-20 PROCEDURE — 83735 ASSAY OF MAGNESIUM: CPT

## 2019-05-20 PROCEDURE — 63600175 PHARM REV CODE 636 W HCPCS: Performed by: STUDENT IN AN ORGANIZED HEALTH CARE EDUCATION/TRAINING PROGRAM

## 2019-05-20 PROCEDURE — 85025 COMPLETE CBC W/AUTO DIFF WBC: CPT

## 2019-05-20 PROCEDURE — 86140 C-REACTIVE PROTEIN: CPT

## 2019-05-20 PROCEDURE — 25000003 PHARM REV CODE 250: Performed by: COLON & RECTAL SURGERY

## 2019-05-20 PROCEDURE — 84100 ASSAY OF PHOSPHORUS: CPT

## 2019-05-20 PROCEDURE — 36415 COLL VENOUS BLD VENIPUNCTURE: CPT

## 2019-05-20 PROCEDURE — 99238 PR HOSPITAL DISCHARGE DAY,<30 MIN: ICD-10-PCS | Mod: ,,, | Performed by: NURSE PRACTITIONER

## 2019-05-20 PROCEDURE — 80048 BASIC METABOLIC PNL TOTAL CA: CPT

## 2019-05-20 RX ORDER — AMOXICILLIN AND CLAVULANATE POTASSIUM 875; 125 MG/1; MG/1
1 TABLET, FILM COATED ORAL EVERY 12 HOURS
Qty: 22 TABLET | Refills: 0 | Status: SHIPPED | OUTPATIENT
Start: 2019-05-20 | End: 2019-05-31

## 2019-05-20 RX ORDER — AMOXICILLIN AND CLAVULANATE POTASSIUM 875; 125 MG/1; MG/1
1 TABLET, FILM COATED ORAL EVERY 12 HOURS
Status: DISCONTINUED | OUTPATIENT
Start: 2019-05-20 | End: 2019-05-20 | Stop reason: HOSPADM

## 2019-05-20 RX ADMIN — PIPERACILLIN AND TAZOBACTAM 4.5 G: 4; .5 INJECTION, POWDER, LYOPHILIZED, FOR SOLUTION INTRAVENOUS; PARENTERAL at 02:05

## 2019-05-20 RX ADMIN — HYDROMORPHONE HYDROCHLORIDE 0.5 MG: 1 INJECTION, SOLUTION INTRAMUSCULAR; INTRAVENOUS; SUBCUTANEOUS at 05:05

## 2019-05-20 RX ADMIN — AMOXICILLIN AND CLAVULANATE POTASSIUM 1 TABLET: 875; 125 TABLET, FILM COATED ORAL at 08:05

## 2019-05-20 RX ADMIN — LISINOPRIL 40 MG: 20 TABLET ORAL at 08:05

## 2019-05-20 RX ADMIN — AMLODIPINE BESYLATE 5 MG: 5 TABLET ORAL at 08:05

## 2019-05-20 RX ADMIN — ENOXAPARIN SODIUM 40 MG: 100 INJECTION SUBCUTANEOUS at 08:05

## 2019-05-20 RX ADMIN — PANTOPRAZOLE SODIUM 80 MG: 40 TABLET, DELAYED RELEASE ORAL at 08:05

## 2019-05-20 NOTE — PLAN OF CARE
Patient discharged home today with no needs.  Called CRS clinic to schedule follow up appointment in three weeks with Dr Allison and requested reminder letter be mailed to patient.       05/20/19 9841   Final Note   Assessment Type Final Discharge Note   Anticipated Discharge Disposition Home   Hospital Follow Up  Appt(s) scheduled? Yes   Right Care Referral Info   Post Acute Recommendation No Care

## 2019-05-20 NOTE — NURSING
Saline Lock taken out and discharge instructions discussed with patient. Prescription for antibiotic given to patient. Ambulatory to discharge home.

## 2019-05-20 NOTE — HOSPITAL COURSE
Pt was admitted from ER, keep bowel rest and ALIA.  Once pain improved diet was slowly advanced and antiiobics changed to oral.  On discharge day she is dave low fiber diet, positive for bm with flatus, abd soft with minimal pain, amb in [place] without diff, VS stable and AF, discharge home to fu 3 weeks with 11 days of augmentin, requested narcotics for pain control but explained if abd pain was that bad she should return to ER

## 2019-05-20 NOTE — HPI
43yoF w/ h/o cyclical vomiting syndrome presents with 3 day history of worsening N/V and lower abd pain. Reports pain is suprapubic and LLQ in nature. Also reports constipation but still passing flatus. No fevers. States she has had diverticulitis at least 5 times in the past 10 yrs. Cscope in 2011 that showed diverticulosis. Last episodes she thinks was in Dec of 2018. States she was told before that she could have an operation but she would require an ostomy so elected not to have a resection. Do not see any notes of CRS in the chart.      Last colonoscopy - 2011  denies family hx of CRC or IBD

## 2019-05-20 NOTE — PLAN OF CARE
Patient is a 43 year old female admitted from home through the ER with abdominal pain, N&V, Weakness 5/17/2019.  Patient discharged home today with no needs prior to admit assessment visit.    PCP  Edi Jorge MD  [address] [place] [id number] [phone]      [id number] - [place] - [address]  [address] [place] [address]  Phone: [phone] Fax: [phone]    Extended Emergency Contact Information  Primary Emergency Contact: Yudith Gamez  Address: [address] [place] [address] [address]            [place] [address] [facility]  Home Phone: [phone]  Relation: Sister       05/20/19 1245   Discharge Assessment   Assessment Type Discharge Planning Assessment   Assessment information obtained from? Medical Record   Expected Length of Stay (days) 2   Prior to hospitilization cognitive status: Alert/Oriented   Prior to hospitalization functional status: Independent   Current cognitive status: Alert/Oriented   Current Functional Status: Independent   Equipment Currently Used at Home none   Do you have any problems affording any of your prescribed medications? No   Is the patient taking medications as prescribed? yes   Does the patient have transportation home? Yes   Transportation Anticipated family or friend will provide   Discharge Plan A Home with family   DME Needed Upon Discharge  none

## 2019-05-20 NOTE — PLAN OF CARE
Problem: Fall Injury Risk  Goal: Absence of Fall and Fall-Related Injury  Outcome: Ongoing (interventions implemented as appropriate)  POC reviewed with pt, AAOx4, NDN, resp even and unlabored, ambulates IND, received PRN pain and nausea controls meds throughout the shift, BP was elevated SBP greater than 170mmHg at beginning of shift administered hydralazine IVP, recheck was in the 130s, MN bp was 137/73, 0400 vitals were 142/76.  tolerates regular diet well, initiated iv to right hand, 22 gauge, tolerated well, removed 20 gauge from left forearm due to leaking site, pressures bandage applied  bed low, call bell with in reach, no requests or concerns, will conitnue to monitor

## 2019-05-20 NOTE — DISCHARGE SUMMARY
[facility]  Colorectal Surgery  Discharge Summary      Patient Name: Cipriano Gamez  MRN: [id number]  Admission Date: 5/17/2019  Hospital Length of Stay: 2 days  Discharge Date and Time: 5/20/2019 11:45 AM  Attending Physician: No att. providers found   Discharging Provider: Kathi Johnson NP  Primary Care Provider: Edi Jorge MD     HPI:  43yoF w/ h/o cyclical vomiting syndrome presents with 3 day history of worsening N/V and lower abd pain. Reports pain is suprapubic and LLQ in nature. Also reports constipation but still passing flatus. No fevers. States she has had diverticulitis at least 5 times in the past 10 yrs. Cscope in 2011 that showed diverticulosis. Last episodes she thinks was in Dec of 2018. States she was told before that she could have an operation but she would require an ostomy so elected not to have a resection. Do not see any notes of CRS in the chart.      Last colonoscopy - 2011  denies family hx of CRC or IBD    * No surgery found *     Hospital Course:  Pt was admitted from ER, keep bowel rest and ALIA.  Once pain improved diet was slowly advanced and antiiobics changed to oral.  On discharge day she is dave low fiber diet, positive for bm with flatus, abd soft with minimal pain, amb in [place] without diff, VS stable and AF, discharge home to fu 3 weeks with 11 days of augmentin, requested narcotics for pain control but explained if abd pain was that bad she should return to ER    Consults (From admission, onward)        Status Ordering Provider     Inpatient consult to Midline team  Once     Provider:  (Not yet assigned)    Completed ANGY PARIKH          Significant Diagnostic Studies: Labs:   BMP:   Recent Labs   Lab 05/19/19  0702 05/20/19  0425   * 100    138   K 3.1* 3.6    104   CO2 23 24   BUN 7 6   CREATININE 0.9 0.8   CALCIUM 8.9 9.0   MG 1.9 1.9    and CBC   Recent Labs   Lab 05/19/19  0702 05/20/19  0425   WBC 16.17* 13.41*    HGB 15.2 15.4   HCT 44.8 46.1    275       Pending Diagnostic Studies:     None        Final Active Diagnoses:    Diagnosis Date Noted POA    PRINCIPAL PROBLEM:  Diverticulitis of large intestine with perforation without bleeding [K57.20] 05/18/2019 Yes    Hypertension [I10]  Yes      Problems Resolved During this Admission:      Discharged Condition: good    Disposition: Home or Self Care    Follow Up:  Follow-up Information     Gt Allison MD In 3 weeks.    Specialty:  Colon and Rectal Surgery  Contact information:  [address] [place] [id number]  [phone]                 Patient Instructions:      Notify your health care provider if you experience any of the following:  temperature >100.4     Notify your health care provider if you experience any of the following:  persistent nausea and vomiting or diarrhea     Notify your health care provider if you experience any of the following:  severe uncontrolled pain     Notify your health care provider if you experience any of the following:  redness, tenderness, or signs of infection (pain, swelling, redness, odor or green/yellow discharge around incision site)     Notify your health care provider if you experience any of the following:  difficulty breathing or increased cough     Notify your health care provider if you experience any of the following:  severe persistent headache     Notify your health care provider if you experience any of the following:  worsening rash     Notify your health care provider if you experience any of the following:  persistent dizziness, light-headedness, or visual disturbances     Notify your health care provider if you experience any of the following:  increased confusion or weakness     Notify your health care provider if you experience any of the following:     Activity as tolerated     Medications:  Reconciled Home Medications:      Medication List      START taking these medications     amoxicillin-clavulanate 875-125mg 875-125 mg per tablet  Commonly known as:  AUGMENTIN  Take 1 tablet by mouth every 12 (twelve) hours. for 11 days        CONTINUE taking these medications    diphenhydrAMINE 25 mg tablet  Commonly known as:  SOMINEX  Take 25 mg by mouth nightly as needed.     lisinopril 40 MG tablet  Commonly known as:  PRINIVIL,ZESTRIL  TAKE 1 TABLET (40 MG TOTAL) BY MOUTH ONCE DAILY.     ondansetron 4 MG Tbdl  Commonly known as:  ZOFRAN-ODT  Take 1 tablet (4 mg total) by mouth every 6 (six) hours as needed.     pantoprazole 40 MG tablet  Commonly known as:  PROTONIX  Take 2 tablets (80 mg total) by mouth once daily.            Kathi Johnson NP  Colorectal Surgery  [facility]

## 2019-05-20 NOTE — NURSING
Plan of care discussed with pt. Pt verbalizes understanding. Pt has little appetite d/t nausea. Pain managed with PRN pain medications. Pt ambulated in [place] once, tolerated well. Pt positions independently. VS stable. Pt remains free of falls and injury. No acute events this shift. Will continue to monitor.

## 2019-05-20 NOTE — NURSING
To Whom it may Concern:     Please excuse Franco Cota from work due to medical reasons.  She may return to work May 27, 2019

## 2019-05-22 ENCOUNTER — PATIENT OUTREACH (OUTPATIENT)
Dept: ADMINISTRATIVE | Facility: CLINIC | Age: 44
End: 2019-05-22

## 2019-05-22 ENCOUNTER — TELEPHONE (OUTPATIENT)
Dept: SURGERY | Facility: CLINIC | Age: 44
End: 2019-05-22

## 2019-05-22 NOTE — PATIENT INSTRUCTIONS
Discharge Instructions for Diverticulitis  You have been diagnosed with diverticulitis. This is a condition in which small pouches form in your colon (large intestine) and become inflamed or infected. Follow the guidelines below for home care.  As you recover  Tips for recovery include:  · Eat a low-fiber diet. Your healthcare provider may advise a liquid diet. This gives your bowel a chance to rest so that it can recover.  · Foods to include: flake cereal, mashed potatoes, pancakes, waffles, pasta, white bread, rice, applesauce, bananas, eggs, fish, poultry, tofu, and well-cooked vegetables  · Take your medicines as directed. Do not stop taking the medicines, even if you feel better.  · Monitor your temperature and report any rising temperature to your healthcare provider.  · Take antibiotics exactly as directed. Do not miss any and keep taking them even if you feel better.   · Drink 6 to 8 glasses of water every day, unless directed otherwise.  · Use a heating pad or hot water bottle to reduce abdominal cramping or pain.  Preventing diverticulitis in the future  Tips for prevention include:  · Eat a high-fiber diet. Fiber adds bulk to the stool so that it passes through the large intestine more easily.  · Keep drinking 6 to 8 glasses of water every day, unless directed otherwise.  · Begin an exercise program. Ask your healthcare provider how to get started. You can benefit from simple activities such as walking or gardening.  · Treat diarrhea with a bland diet. Start with liquids only, then slowly add fiber over time.  · Watch for changes in your bowel movements (constipation to diarrhea).  · Avoid constipation with fiber and add a stool softener if needed.   · Get plenty of rest and sleep.  Follow-up care  Make a follow-up appointment as directed by our staff.  When to call your healthcare provider  Call your healthcare provider immediately if you have any of the following:  · Fever of 100.4°F (38.0°C) or  higher, or as directed by your healthcare provider  · Chills  · Severe cramps in the belly, most commonly the lower left side  · Tenderness in the belly, most commonly the lower left side  · Nausea and vomiting  · Bleeding from your rectum   Date Last Reviewed: 7/1/2016  © 3136-9613 [employer]. [address] [place] [address], [place] [address]. All rights reserved. This information is not intended as a substitute for professional medical care. Always follow your healthcare professional's instructions.

## 2019-05-22 NOTE — TELEPHONE ENCOUNTER
Returned call. Patient already en route to ED . C/o of chills, severe abdominal pain with persistent vomiting.

## 2019-05-23 LAB
BACTERIA BLD CULT: NORMAL
BACTERIA BLD CULT: NORMAL

## 2019-06-06 DIAGNOSIS — I10 ESSENTIAL HYPERTENSION: ICD-10-CM

## 2019-06-06 RX ORDER — LISINOPRIL 40 MG/1
40 TABLET ORAL DAILY
Qty: 30 TABLET | Refills: 11 | Status: SHIPPED | OUTPATIENT
Start: 2019-06-06 | End: 2019-07-03 | Stop reason: SDUPTHER

## 2019-07-03 ENCOUNTER — OFFICE VISIT (OUTPATIENT)
Dept: INTERNAL MEDICINE | Facility: CLINIC | Age: 44
End: 2019-07-03
Payer: COMMERCIAL

## 2019-07-03 ENCOUNTER — LAB VISIT (OUTPATIENT)
Dept: LAB | Facility: HOSPITAL | Age: 44
End: 2019-07-03
Attending: INTERNAL MEDICINE
Payer: COMMERCIAL

## 2019-07-03 VITALS
HEIGHT: 67 IN | DIASTOLIC BLOOD PRESSURE: 90 MMHG | BODY MASS INDEX: 33.12 KG/M2 | WEIGHT: 211 LBS | OXYGEN SATURATION: 98 % | SYSTOLIC BLOOD PRESSURE: 136 MMHG | HEART RATE: 94 BPM

## 2019-07-03 DIAGNOSIS — Z12.4 CERVICAL CANCER SCREENING: ICD-10-CM

## 2019-07-03 DIAGNOSIS — B96.89 BACTERIAL VAGINOSIS: ICD-10-CM

## 2019-07-03 DIAGNOSIS — Z72.51 HIGH RISK SEXUAL BEHAVIOR, UNSPECIFIED TYPE: ICD-10-CM

## 2019-07-03 DIAGNOSIS — N76.0 BACTERIAL VAGINOSIS: ICD-10-CM

## 2019-07-03 DIAGNOSIS — I10 ESSENTIAL HYPERTENSION: ICD-10-CM

## 2019-07-03 DIAGNOSIS — Z00.00 ANNUAL PHYSICAL EXAM: Primary | ICD-10-CM

## 2019-07-03 DIAGNOSIS — K57.20 DIVERTICULITIS OF LARGE INTESTINE WITH PERFORATION WITHOUT BLEEDING: ICD-10-CM

## 2019-07-03 DIAGNOSIS — B37.31 CANDIDA VAGINITIS: ICD-10-CM

## 2019-07-03 DIAGNOSIS — Z11.4 ENCOUNTER FOR SCREENING FOR HIV: ICD-10-CM

## 2019-07-03 DIAGNOSIS — Z00.00 ANNUAL PHYSICAL EXAM: ICD-10-CM

## 2019-07-03 LAB
CHOLEST SERPL-MCNC: 215 MG/DL (ref 120–199)
CHOLEST/HDLC SERPL: 6 {RATIO} (ref 2–5)
HDLC SERPL-MCNC: 36 MG/DL (ref 40–75)
HDLC SERPL: 16.7 % (ref 20–50)
LDLC SERPL CALC-MCNC: 150.6 MG/DL (ref 63–159)
NONHDLC SERPL-MCNC: 179 MG/DL
TRIGL SERPL-MCNC: 142 MG/DL (ref 30–150)

## 2019-07-03 PROCEDURE — 36415 COLL VENOUS BLD VENIPUNCTURE: CPT | Mod: PO

## 2019-07-03 PROCEDURE — 99999 PR PBB SHADOW E&M-EST. PATIENT-LVL IV: CPT | Mod: PBBFAC,,, | Performed by: INTERNAL MEDICINE

## 2019-07-03 PROCEDURE — 99999 PR PBB SHADOW E&M-EST. PATIENT-LVL IV: ICD-10-PCS | Mod: PBBFAC,,, | Performed by: INTERNAL MEDICINE

## 2019-07-03 PROCEDURE — 88142 CYTOPATH C/V THIN LAYER: CPT | Performed by: PATHOLOGY

## 2019-07-03 PROCEDURE — 87340 HEPATITIS B SURFACE AG IA: CPT

## 2019-07-03 PROCEDURE — 99396 PREV VISIT EST AGE 40-64: CPT | Mod: S$GLB,,, | Performed by: INTERNAL MEDICINE

## 2019-07-03 PROCEDURE — 88141 CYTOPATH C/V INTERPRET: CPT | Mod: ,,, | Performed by: PATHOLOGY

## 2019-07-03 PROCEDURE — 87624 HPV HI-RISK TYP POOLED RSLT: CPT

## 2019-07-03 PROCEDURE — 87480 CANDIDA DNA DIR PROBE: CPT

## 2019-07-03 PROCEDURE — 88141 LIQUID-BASED PAP SMEAR, SCREENING: ICD-10-PCS | Mod: ,,, | Performed by: PATHOLOGY

## 2019-07-03 PROCEDURE — 87510 GARDNER VAG DNA DIR PROBE: CPT

## 2019-07-03 PROCEDURE — 80061 LIPID PANEL: CPT

## 2019-07-03 PROCEDURE — 99396 PR PREVENTIVE VISIT,EST,40-64: ICD-10-PCS | Mod: S$GLB,,, | Performed by: INTERNAL MEDICINE

## 2019-07-03 PROCEDURE — 3075F PR MOST RECENT SYSTOLIC BLOOD PRESS GE 130-139MM HG: ICD-10-PCS | Mod: CPTII,S$GLB,, | Performed by: INTERNAL MEDICINE

## 2019-07-03 PROCEDURE — 86803 HEPATITIS C AB TEST: CPT

## 2019-07-03 PROCEDURE — 3080F DIAST BP >= 90 MM HG: CPT | Mod: CPTII,S$GLB,, | Performed by: INTERNAL MEDICINE

## 2019-07-03 PROCEDURE — 3080F PR MOST RECENT DIASTOLIC BLOOD PRESSURE >= 90 MM HG: ICD-10-PCS | Mod: CPTII,S$GLB,, | Performed by: INTERNAL MEDICINE

## 2019-07-03 PROCEDURE — 3075F SYST BP GE 130 - 139MM HG: CPT | Mod: CPTII,S$GLB,, | Performed by: INTERNAL MEDICINE

## 2019-07-03 PROCEDURE — 86703 HIV-1/HIV-2 1 RESULT ANTBDY: CPT

## 2019-07-03 PROCEDURE — 87491 CHLMYD TRACH DNA AMP PROBE: CPT

## 2019-07-03 PROCEDURE — 86592 SYPHILIS TEST NON-TREP QUAL: CPT

## 2019-07-03 RX ORDER — LISINOPRIL 40 MG/1
40 TABLET ORAL DAILY
Qty: 90 TABLET | Refills: 0 | Status: ON HOLD | OUTPATIENT
Start: 2019-07-03 | End: 2020-01-10

## 2019-07-03 NOTE — PATIENT INSTRUCTIONS
"Recommendations for today    HIV prevention for women is much more than condoms. We recommend that you review information on HIV PrEP.  It is a safe and effective way to provide an additional layer of protection so that you do not have to place 100% trust on somebody else to help protect you from HIV. If you would like to get started on HIV PrEP please contact my office at any time to schedule a visit to start PrEP. You can also send me messages via [facility] regarding any questions or concerns regarding HIV PrEP.     Visit the Website "PrEP for Her" for more information  https://sexualbeing.org/get-involved/prep-for-her/    Even if you decide that PrEP is not right for you it is important to share this information with family and friends who you think might benefit from PrEP. You should also continue getting sexual health and wellness screening at least annually. Your partner should also do annual STD testing and share the results with you. This is the cornerstone of sexual health and maintaining trust.     "

## 2019-07-03 NOTE — PROGRESS NOTES
Portions of this note are generated with voice recognition software. Typographical errors may exist.     SUBJECTIVE:    This is a/an 43 y.o. female here for primary care visit for  Chief Complaint   Patient presents with    Annual Exam     Patient states that she has and it a 4 year relationship with a previous male partner because of infidelity.  States that she was told her previous partner was HIV negative but she has her doubts.  She states that she isn't having any unusual vaginal itching or burning.  States that she has not had any unusual genital lesions. She has about 2 weeks away from her next menstrual cycle.  She has not had a skipped cycle.  She feels that she might have a white discharge at this time.  She has not been sexually active since ending the previous relationship.  For future relationship she plans to use condoms consistently as her strategy to prevent STI.  She is knowledgeable about prep but is not interested to pursue this at this time.    Patient states she has been complying with lisinopril.  She does have a home blood pressure machine but is not actively checking her blood pressure.    Patient continues to smoke cigarettes.    Patient states that she was anxious about following up with Colorectal surgery and for this reason did not pursue the appointment.  States that she has had several episodes of diverticulitis in over the past 9 years has probably had more than 4 5 episodes.  Patient states that she did have resolution of her presenting symptoms since discharge with outpatient antibiotic.      Medications Reviewed and Updated    Past medical, family, and social histories were reviewed and updated.    Review of Systems negative unless otherwise noted in history of present illness-  ROS    General ROS: negative  Psychological ROS: negative  ENT ROS: negative  Endocrine ROS: Negative  Allergy and Immunology ROS: negative  Cardiovascular ROS: negative  Pulmonary ROS:  Negative  Gastrointestinal ROS: negative  Genito-Urinary ROS: negative  Musculoskeletal ROS: negative  Neurological ROS: negative  Dermatological ROS: negative        Allergic:    Review of patient's allergies indicates:   Allergen Reactions    Ibuprofen Hives       OBJECTIVE:  BP: (!) 136/90 Pulse: 94    Wt Readings from Last 3 Encounters:   07/03/19 95.7 kg (210 lb 15.7 oz)   05/18/19 95.2 kg (209 lb 14.4 oz)   12/15/18 91 kg (200 lb 9.9 oz)    Body mass index is 33.04 kg/m².  Previous Blood Pressure Readings :   BP Readings from Last 3 Encounters:   07/03/19 (!) 136/90   05/20/19 (!) 158/72   12/17/18 129/72       Physical Exam    GEN: No apparent distress  HEENT: sclera non-icteric, conjunctiva clear  CV: no peripheral edema regular rate and rhythm  PULM: breathing non-labored  ABD: non, protuberant abdomen.  PSYCH: appropriate affect  MSK: able to rise from chair without assistance  :  No cervical motion tenderness. Thin white discharge. No cervical lesions or erythema.  No inguinal lymphadenitis.  Vulvar exam normal.  SKIN: normal skin turgor    Pertinent Labs Reviewed       ASSESSMENT/PLAN:    Annual physical exam  -     HIV 1/2 Ag/Ab (4th Gen); Standing  -     RPR; Standing  -     Comprehensive metabolic panel; Standing  -     Lipid panel; Standing  -     Hepatitis C antibody; Standing  -     Hepatitis B surface antigen; Future; Expected date: 07/03/2019    Cervical cancer screening  -     Liquid-based pap smear, screening  -     HPV High Risk Genotypes, PCR    High risk sexual behavior, unspecified type.Condition not optimally controlled. Detailed counseling on self care measures. Plan to monitor clinically in addition to plan below or as listed on After Visit Summary.   -     C. trachomatis/N. gonorrhoeae by AMP DNA [facility]; Cervicovaginal  -     VAGINOSIS SCREEN BY DNA PROBE  -     HIV 1/2 Ag/Ab (4th Gen); Standing  -     RPR; Standing  -     Hepatitis C antibody; Standing  -     Hepatitis B surface  antigen; Future; Expected date: 07/03/2019    Essential hypertension.Condition not optimally controlled. Detailed counseling on self care measures. Plan to monitor clinically in addition to plan below or as listed on After Visit Summary.   -     lisinopril (PRINIVIL,ZESTRIL) 40 MG tablet; Take 1 tablet (40 mg total) by mouth once daily. Further refills require visit with Dr. Jorge  Dispense: 90 tablet; Refill: 0  -     Comprehensive metabolic panel; Standing  -     Lipid panel; Standing    Encounter for screening for HIV  -     HIV 1/2 Ag/Ab (4th Gen); Standing    Diverticulitis of large intestine with perforation without bleeding.Condition improving.  Counseling on self-care measures today.  Continue with current plan in addition to recommendations written on After Visit Summary.   -     Ambulatory referral to Colorectal Surgery          Future Appointments   Date Time Provider Department Center   7/8/2019  3:00 PM Sridevi Monteiro MD [facility] COLON [facility] Jorge  7/3/2019  10:42 AM    Answers for HPI/ROS submitted by the patient on 7/1/2019   activity change: No  unexpected weight change: No  rhinorrhea: Yes  trouble swallowing: No  visual disturbance: No  chest tightness: No  polyuria: No  difficulty urinating: No  menstrual problem: No  joint swelling: No  arthralgias: No  confusion: No  dysphoric mood: No

## 2019-07-04 LAB
C TRACH DNA SPEC QL NAA+PROBE: NOT DETECTED
N GONORRHOEA DNA SPEC QL NAA+PROBE: NOT DETECTED

## 2019-07-05 LAB
BACTERIAL VAGINOSIS DNA: POSITIVE
CANDIDA GLABRATA DNA: NEGATIVE
CANDIDA KRUSEI DNA: NEGATIVE
CANDIDA RRNA VAG QL PROBE: POSITIVE
HBV SURFACE AG SERPL QL IA: NEGATIVE
HCV AB SERPL QL IA: NEGATIVE
HIV 1+2 AB+HIV1 P24 AG SERPL QL IA: NEGATIVE
RPR SER QL: NORMAL
T VAGINALIS RRNA GENITAL QL PROBE: NEGATIVE

## 2019-07-06 ENCOUNTER — PATIENT MESSAGE (OUTPATIENT)
Dept: FAMILY MEDICINE | Facility: CLINIC | Age: 44
End: 2019-07-06

## 2019-07-06 RX ORDER — CLINDAMYCIN HYDROCHLORIDE 300 MG/1
300 CAPSULE ORAL 2 TIMES DAILY
Qty: 14 CAPSULE | Refills: 0 | Status: SHIPPED | OUTPATIENT
Start: 2019-07-06 | End: 2019-07-13

## 2019-07-06 RX ORDER — FLUCONAZOLE 150 MG/1
150 TABLET ORAL DAILY
Qty: 1 TABLET | Refills: 0 | Status: SHIPPED | OUTPATIENT
Start: 2019-07-06 | End: 2019-07-07

## 2019-07-09 LAB
HPV HR 12 DNA CVX QL NAA+PROBE: POSITIVE
HPV16 AG SPEC QL: NEGATIVE
HPV18 DNA SPEC QL NAA+PROBE: NEGATIVE

## 2019-07-11 ENCOUNTER — TELEPHONE (OUTPATIENT)
Dept: INTERNAL MEDICINE | Facility: CLINIC | Age: 44
End: 2019-07-11

## 2019-07-11 NOTE — TELEPHONE ENCOUNTER
----- Message from Edi Jorge MD sent at 7/11/2019 12:52 PM CDT -----  Please call patient letting her know that there is an important message on my [facility] that she has not reviewed.  She needs to review the message and follow the instructions

## 2019-07-11 NOTE — TELEPHONE ENCOUNTER
Spoke with pt to inform that Dr. Jorge sent a [facility] message for her to read. Understanding voiced.

## 2019-07-15 ENCOUNTER — TELEPHONE (OUTPATIENT)
Dept: INTERNAL MEDICINE | Facility: CLINIC | Age: 44
End: 2019-07-15

## 2019-07-15 NOTE — TELEPHONE ENCOUNTER
----- Message from Edi Jorge MD sent at 7/15/2019  8:10 AM CDT -----  Please contact patient regarding the important recommendation to see Gynecology to receive treatment regarding abnormal Pap test.  This needs to be done as soon as possible. If the patient is not able to make the appointment scheduled she should contact us as soon as possible to reschedule.

## 2019-07-15 NOTE — TELEPHONE ENCOUNTER
Left detailed message for pt to schedule with gyn as soon as possible and to call back with any questions.

## 2019-08-07 ENCOUNTER — TELEPHONE (OUTPATIENT)
Dept: INTERNAL MEDICINE | Facility: CLINIC | Age: 44
End: 2019-08-07

## 2019-08-07 DIAGNOSIS — R87.619 ABNORMAL CERVICAL PAPANICOLAOU SMEAR, UNSPECIFIED ABNORMAL PAP FINDING: Primary | ICD-10-CM

## 2019-08-07 NOTE — TELEPHONE ENCOUNTER
----- Message from Carmelita Nascimento MA sent at 8/5/2019 11:09 AM CDT -----  Please advise, pt will need a referral to obgyn due to the fact she hasn't been seen in 5 years by Dr. Valero  ----- Message -----  From: Edi Jorge MD  Sent: 8/3/2019   7:31 AM  To: Luisito GARCIA Staff    Patient needs to schedule Gyn visit for treatment of abnormal pap. This is very important to prevent development of cancer. If she has further questions she can send a message to me on my [facility].

## 2019-08-07 NOTE — TELEPHONE ENCOUNTER
----- Message from Edi Jorge MD sent at 8/7/2019  8:09 AM CDT -----  OB orders signed    ----- Message -----  From: Carmelita Nascimento MA  Sent: 8/5/2019  11:09 AM  To: Edi Jorge MD    Please advise, pt will need a referral to obgyn due to the fact she hasn't been seen in 5 years by Dr. Valero  ----- Message -----  From: Edi Jorge MD  Sent: 8/3/2019   7:31 AM  To: Luisito GARCIA Staff    Patient needs to schedule Gyn visit for treatment of abnormal pap. This is very important to prevent development of cancer. If she has further questions she can send a message to me on my [facility].

## 2019-08-08 ENCOUNTER — TELEPHONE (OUTPATIENT)
Dept: INTERNAL MEDICINE | Facility: CLINIC | Age: 44
End: 2019-08-08

## 2019-08-08 ENCOUNTER — TELEPHONE (OUTPATIENT)
Dept: ADMINISTRATIVE | Facility: OTHER | Age: 44
End: 2019-08-08

## 2020-01-07 ENCOUNTER — HOSPITAL ENCOUNTER (INPATIENT)
Facility: HOSPITAL | Age: 45
LOS: 3 days | Discharge: HOME | DRG: 872 | End: 2020-01-10
Attending: EMERGENCY MEDICINE | Admitting: EMERGENCY MEDICINE
Payer: COMMERCIAL

## 2020-01-07 DIAGNOSIS — R11.15 CYCLIC VOMITING SYNDROME: ICD-10-CM

## 2020-01-07 DIAGNOSIS — F17.210 SMOKES 1 PACK OF CIGARETTES PER DAY: ICD-10-CM

## 2020-01-07 DIAGNOSIS — K57.92 DIVERTICULITIS: ICD-10-CM

## 2020-01-07 DIAGNOSIS — N17.9 AKI (ACUTE KIDNEY INJURY): ICD-10-CM

## 2020-01-07 DIAGNOSIS — A41.9 SEPSIS WITHOUT ACUTE ORGAN DYSFUNCTION, DUE TO UNSPECIFIED ORGANISM: ICD-10-CM

## 2020-01-07 DIAGNOSIS — F12.10 MARIJUANA ABUSE: ICD-10-CM

## 2020-01-07 DIAGNOSIS — K57.32 DIVERTICULITIS OF LARGE INTESTINE WITHOUT PERFORATION OR ABSCESS WITHOUT BLEEDING: Primary | ICD-10-CM

## 2020-01-07 DIAGNOSIS — A41.50 SEPSIS DUE TO GRAM NEGATIVE BACTERIA: ICD-10-CM

## 2020-01-07 DIAGNOSIS — K92.0 HEMATEMESIS WITH NAUSEA: ICD-10-CM

## 2020-01-07 DIAGNOSIS — Z91.89 AT RISK FOR PROLONGED QT INTERVAL SYNDROME: ICD-10-CM

## 2020-01-07 DIAGNOSIS — R80.9 PROTEINURIA, UNSPECIFIED TYPE: ICD-10-CM

## 2020-01-07 DIAGNOSIS — K21.9 GASTROESOPHAGEAL REFLUX DISEASE, ESOPHAGITIS PRESENCE NOT SPECIFIED: ICD-10-CM

## 2020-01-07 DIAGNOSIS — I10 ESSENTIAL HYPERTENSION: ICD-10-CM

## 2020-01-07 DIAGNOSIS — D72.829 LEUKOCYTOSIS, UNSPECIFIED TYPE: ICD-10-CM

## 2020-01-07 LAB
ALBUMIN SERPL BCP-MCNC: 4.9 G/DL (ref 3.5–5.2)
ALP SERPL-CCNC: 113 U/L (ref 55–135)
ALT SERPL W/O P-5'-P-CCNC: 16 U/L (ref 10–44)
ANION GAP SERPL CALC-SCNC: 19 MMOL/L (ref 8–16)
AST SERPL-CCNC: 29 U/L (ref 10–40)
B-HCG UR QL: NEGATIVE
BACTERIA #/AREA URNS AUTO: ABNORMAL /HPF
BASOPHILS # BLD AUTO: 0.05 K/UL (ref 0–0.2)
BASOPHILS NFR BLD: 0.2 % (ref 0–1.9)
BILIRUB SERPL-MCNC: 1 MG/DL (ref 0.1–1)
BILIRUB UR QL STRIP: NEGATIVE
BUN SERPL-MCNC: 15 MG/DL (ref 6–20)
CALCIUM SERPL-MCNC: 11.1 MG/DL (ref 8.7–10.5)
CHLORIDE SERPL-SCNC: 95 MMOL/L (ref 95–110)
CLARITY UR REFRACT.AUTO: ABNORMAL
CO2 SERPL-SCNC: 24 MMOL/L (ref 23–29)
COLOR UR AUTO: ABNORMAL
CREAT SERPL-MCNC: 1.5 MG/DL (ref 0.5–1.4)
CTP QC/QA: YES
DIFFERENTIAL METHOD: ABNORMAL
EOSINOPHIL # BLD AUTO: 0 K/UL (ref 0–0.5)
EOSINOPHIL NFR BLD: 0 % (ref 0–8)
ERYTHROCYTE [DISTWIDTH] IN BLOOD BY AUTOMATED COUNT: 12.9 % (ref 11.5–14.5)
EST. GFR  (AFRICAN AMERICAN): 49.2 ML/MIN/1.73 M^2
EST. GFR  (NON AFRICAN AMERICAN): 42.7 ML/MIN/1.73 M^2
GLUCOSE SERPL-MCNC: 158 MG/DL (ref 70–110)
GLUCOSE UR QL STRIP: ABNORMAL
HCT VFR BLD AUTO: 54.7 % (ref 37–48.5)
HGB BLD-MCNC: 18.5 G/DL (ref 12–16)
HGB UR QL STRIP: ABNORMAL
HYALINE CASTS UR QL AUTO: 0 /LPF
IMM GRANULOCYTES # BLD AUTO: 0.23 K/UL (ref 0–0.04)
IMM GRANULOCYTES NFR BLD AUTO: 0.9 % (ref 0–0.5)
KETONES UR QL STRIP: ABNORMAL
LACTATE SERPL-SCNC: 2.4 MMOL/L (ref 0.5–2.2)
LEUKOCYTE ESTERASE UR QL STRIP: NEGATIVE
LIPASE SERPL-CCNC: 25 U/L (ref 4–60)
LYMPHOCYTES # BLD AUTO: 1.9 K/UL (ref 1–4.8)
LYMPHOCYTES NFR BLD: 6.9 % (ref 18–48)
MCH RBC QN AUTO: 29.6 PG (ref 27–31)
MCHC RBC AUTO-ENTMCNC: 33.8 G/DL (ref 32–36)
MCV RBC AUTO: 88 FL (ref 82–98)
MICROSCOPIC COMMENT: ABNORMAL
MONOCYTES # BLD AUTO: 1.9 K/UL (ref 0.3–1)
MONOCYTES NFR BLD: 7.1 % (ref 4–15)
NEUTROPHILS # BLD AUTO: 22.7 K/UL (ref 1.8–7.7)
NEUTROPHILS NFR BLD: 84.9 % (ref 38–73)
NITRITE UR QL STRIP: NEGATIVE
NRBC BLD-RTO: 0 /100 WBC
PH UR STRIP: 5 [PH] (ref 5–8)
PLATELET # BLD AUTO: 356 K/UL (ref 150–350)
PLATELET BLD QL SMEAR: ABNORMAL
PMV BLD AUTO: 11.1 FL (ref 9.2–12.9)
POTASSIUM SERPL-SCNC: 4.1 MMOL/L (ref 3.5–5.1)
PROT SERPL-MCNC: 10 G/DL (ref 6–8.4)
PROT UR QL STRIP: ABNORMAL
RBC # BLD AUTO: 6.25 M/UL (ref 4–5.4)
RBC #/AREA URNS AUTO: 8 /HPF (ref 0–4)
SODIUM SERPL-SCNC: 138 MMOL/L (ref 136–145)
SP GR UR STRIP: >=1.03 (ref 1–1.03)
SQUAMOUS #/AREA URNS AUTO: 7 /HPF
URN SPEC COLLECT METH UR: ABNORMAL
WBC # BLD AUTO: 26.68 K/UL (ref 3.9–12.7)
WBC #/AREA URNS AUTO: 5 /HPF (ref 0–5)

## 2020-01-07 PROCEDURE — 63600175 PHARM REV CODE 636 W HCPCS: Performed by: PHYSICIAN ASSISTANT

## 2020-01-07 PROCEDURE — S0030 INJECTION, METRONIDAZOLE: HCPCS | Performed by: HOSPITALIST

## 2020-01-07 PROCEDURE — 99285 PR EMERGENCY DEPT VISIT,LEVEL V: ICD-10-PCS | Mod: ,,, | Performed by: PHYSICIAN ASSISTANT

## 2020-01-07 PROCEDURE — 20600001 HC STEP DOWN PRIVATE ROOM

## 2020-01-07 PROCEDURE — 63600175 PHARM REV CODE 636 W HCPCS: Performed by: HOSPITALIST

## 2020-01-07 PROCEDURE — 81001 URINALYSIS AUTO W/SCOPE: CPT

## 2020-01-07 PROCEDURE — 25500020 PHARM REV CODE 255: Performed by: EMERGENCY MEDICINE

## 2020-01-07 PROCEDURE — 99285 EMERGENCY DEPT VISIT HI MDM: CPT | Mod: 25

## 2020-01-07 PROCEDURE — 83605 ASSAY OF LACTIC ACID: CPT

## 2020-01-07 PROCEDURE — 96374 THER/PROPH/DIAG INJ IV PUSH: CPT

## 2020-01-07 PROCEDURE — S4991 NICOTINE PATCH NONLEGEND: HCPCS | Performed by: HOSPITALIST

## 2020-01-07 PROCEDURE — 99223 PR INITIAL HOSPITAL CARE,LEVL III: ICD-10-PCS | Mod: ,,, | Performed by: HOSPITALIST

## 2020-01-07 PROCEDURE — 80053 COMPREHEN METABOLIC PANEL: CPT

## 2020-01-07 PROCEDURE — 81025 URINE PREGNANCY TEST: CPT | Performed by: PHYSICIAN ASSISTANT

## 2020-01-07 PROCEDURE — 85025 COMPLETE CBC W/AUTO DIFF WBC: CPT

## 2020-01-07 PROCEDURE — 83690 ASSAY OF LIPASE: CPT

## 2020-01-07 PROCEDURE — 96361 HYDRATE IV INFUSION ADD-ON: CPT

## 2020-01-07 PROCEDURE — 25000003 PHARM REV CODE 250: Performed by: HOSPITALIST

## 2020-01-07 PROCEDURE — 99285 EMERGENCY DEPT VISIT HI MDM: CPT | Mod: ,,, | Performed by: PHYSICIAN ASSISTANT

## 2020-01-07 PROCEDURE — 99223 1ST HOSP IP/OBS HIGH 75: CPT | Mod: ,,, | Performed by: HOSPITALIST

## 2020-01-07 PROCEDURE — 96372 THER/PROPH/DIAG INJ SC/IM: CPT | Mod: 59

## 2020-01-07 RX ORDER — DEXTROSE MONOHYDRATE 100 MG/ML
25 INJECTION, SOLUTION INTRAVENOUS
Status: DISCONTINUED | OUTPATIENT
Start: 2020-01-07 | End: 2020-01-10 | Stop reason: HOSPADM

## 2020-01-07 RX ORDER — ONDANSETRON 2 MG/ML
4 INJECTION INTRAMUSCULAR; INTRAVENOUS EVERY 6 HOURS PRN
Status: DISCONTINUED | OUTPATIENT
Start: 2020-01-07 | End: 2020-01-09

## 2020-01-07 RX ORDER — DICYCLOMINE HYDROCHLORIDE 10 MG/ML
20 INJECTION INTRAMUSCULAR
Status: COMPLETED | OUTPATIENT
Start: 2020-01-07 | End: 2020-01-07

## 2020-01-07 RX ORDER — ONDANSETRON 2 MG/ML
4 INJECTION INTRAMUSCULAR; INTRAVENOUS
Status: COMPLETED | OUTPATIENT
Start: 2020-01-07 | End: 2020-01-07

## 2020-01-07 RX ORDER — METRONIDAZOLE 500 MG/100ML
500 INJECTION, SOLUTION INTRAVENOUS
Status: DISCONTINUED | OUTPATIENT
Start: 2020-01-07 | End: 2020-01-07

## 2020-01-07 RX ORDER — SODIUM CHLORIDE 0.9 % (FLUSH) 0.9 %
10 SYRINGE (ML) INJECTION
Status: DISCONTINUED | OUTPATIENT
Start: 2020-01-07 | End: 2020-01-10

## 2020-01-07 RX ORDER — ENOXAPARIN SODIUM 100 MG/ML
40 INJECTION SUBCUTANEOUS EVERY 24 HOURS
Status: DISCONTINUED | OUTPATIENT
Start: 2020-01-07 | End: 2020-01-10 | Stop reason: HOSPADM

## 2020-01-07 RX ORDER — DICYCLOMINE HYDROCHLORIDE 10 MG/1
10 CAPSULE ORAL 3 TIMES DAILY
Status: DISCONTINUED | OUTPATIENT
Start: 2020-01-07 | End: 2020-01-07

## 2020-01-07 RX ORDER — DEXTROSE MONOHYDRATE 100 MG/ML
12.5 INJECTION, SOLUTION INTRAVENOUS
Status: DISCONTINUED | OUTPATIENT
Start: 2020-01-07 | End: 2020-01-10 | Stop reason: HOSPADM

## 2020-01-07 RX ORDER — CIPROFLOXACIN 2 MG/ML
400 INJECTION, SOLUTION INTRAVENOUS
Status: COMPLETED | OUTPATIENT
Start: 2020-01-07 | End: 2020-01-07

## 2020-01-07 RX ORDER — HYDRALAZINE HYDROCHLORIDE 25 MG/1
25 TABLET, FILM COATED ORAL ONCE
Status: COMPLETED | OUTPATIENT
Start: 2020-01-07 | End: 2020-01-07

## 2020-01-07 RX ORDER — AMLODIPINE BESYLATE 5 MG/1
5 TABLET ORAL DAILY
Status: DISCONTINUED | OUTPATIENT
Start: 2020-01-07 | End: 2020-01-10 | Stop reason: HOSPADM

## 2020-01-07 RX ORDER — CIPROFLOXACIN 2 MG/ML
400 INJECTION, SOLUTION INTRAVENOUS
Status: DISCONTINUED | OUTPATIENT
Start: 2020-01-07 | End: 2020-01-09

## 2020-01-07 RX ORDER — MORPHINE SULFATE 2 MG/ML
2 INJECTION, SOLUTION INTRAMUSCULAR; INTRAVENOUS EVERY 4 HOURS PRN
Status: DISCONTINUED | OUTPATIENT
Start: 2020-01-07 | End: 2020-01-09

## 2020-01-07 RX ORDER — POLYETHYLENE GLYCOL 3350 17 G/17G
17 POWDER, FOR SOLUTION ORAL DAILY
Status: DISCONTINUED | OUTPATIENT
Start: 2020-01-08 | End: 2020-01-10 | Stop reason: HOSPADM

## 2020-01-07 RX ORDER — POLYETHYLENE GLYCOL 3350 17 G/17G
17 POWDER, FOR SOLUTION ORAL DAILY
Status: DISCONTINUED | OUTPATIENT
Start: 2020-01-07 | End: 2020-01-07

## 2020-01-07 RX ORDER — IBUPROFEN 200 MG
1 TABLET ORAL DAILY
Status: DISCONTINUED | OUTPATIENT
Start: 2020-01-07 | End: 2020-01-10 | Stop reason: HOSPADM

## 2020-01-07 RX ORDER — AMOXICILLIN 250 MG
1 CAPSULE ORAL 2 TIMES DAILY PRN
Status: DISCONTINUED | OUTPATIENT
Start: 2020-01-07 | End: 2020-01-10 | Stop reason: HOSPADM

## 2020-01-07 RX ORDER — IBUPROFEN 200 MG
24 TABLET ORAL
Status: DISCONTINUED | OUTPATIENT
Start: 2020-01-07 | End: 2020-01-10 | Stop reason: HOSPADM

## 2020-01-07 RX ORDER — GLUCAGON 1 MG
1 KIT INJECTION
Status: DISCONTINUED | OUTPATIENT
Start: 2020-01-07 | End: 2020-01-10 | Stop reason: HOSPADM

## 2020-01-07 RX ORDER — METRONIDAZOLE 500 MG/100ML
500 INJECTION, SOLUTION INTRAVENOUS
Status: DISCONTINUED | OUTPATIENT
Start: 2020-01-07 | End: 2020-01-09

## 2020-01-07 RX ORDER — IBUPROFEN 200 MG
16 TABLET ORAL
Status: DISCONTINUED | OUTPATIENT
Start: 2020-01-07 | End: 2020-01-10 | Stop reason: HOSPADM

## 2020-01-07 RX ORDER — MORPHINE SULFATE 4 MG/ML
4 INJECTION, SOLUTION INTRAMUSCULAR; INTRAVENOUS EVERY 4 HOURS PRN
Status: DISCONTINUED | OUTPATIENT
Start: 2020-01-07 | End: 2020-01-09

## 2020-01-07 RX ADMIN — METRONIDAZOLE 500 MG: 500 INJECTION, SOLUTION INTRAVENOUS at 11:01

## 2020-01-07 RX ADMIN — DICYCLOMINE HYDROCHLORIDE 20 MG: 20 INJECTION, SOLUTION INTRAMUSCULAR at 07:01

## 2020-01-07 RX ADMIN — SODIUM CHLORIDE 1000 ML: 0.9 INJECTION, SOLUTION INTRAVENOUS at 07:01

## 2020-01-07 RX ADMIN — MORPHINE SULFATE 2 MG: 2 INJECTION, SOLUTION INTRAMUSCULAR; INTRAVENOUS at 04:01

## 2020-01-07 RX ADMIN — METRONIDAZOLE 500 MG: 500 INJECTION, SOLUTION INTRAVENOUS at 07:01

## 2020-01-07 RX ADMIN — SODIUM CHLORIDE, SODIUM LACTATE, POTASSIUM CHLORIDE, AND CALCIUM CHLORIDE 500 ML: .6; .31; .03; .02 INJECTION, SOLUTION INTRAVENOUS at 11:01

## 2020-01-07 RX ADMIN — ENOXAPARIN SODIUM 40 MG: 100 INJECTION SUBCUTANEOUS at 05:01

## 2020-01-07 RX ADMIN — IOHEXOL 75 ML: 350 INJECTION, SOLUTION INTRAVENOUS at 09:01

## 2020-01-07 RX ADMIN — NICOTINE 1 PATCH: 21 PATCH, EXTENDED RELEASE TRANSDERMAL at 11:01

## 2020-01-07 RX ADMIN — MORPHINE SULFATE 4 MG: 4 INJECTION INTRAVENOUS at 10:01

## 2020-01-07 RX ADMIN — HYDRALAZINE HYDROCHLORIDE 25 MG: 25 TABLET, FILM COATED ORAL at 02:01

## 2020-01-07 RX ADMIN — SODIUM CHLORIDE 1000 ML: 0.9 INJECTION, SOLUTION INTRAVENOUS at 09:01

## 2020-01-07 RX ADMIN — CIPROFLOXACIN 400 MG: 2 INJECTION, SOLUTION INTRAVENOUS at 10:01

## 2020-01-07 RX ADMIN — ONDANSETRON 4 MG: 2 INJECTION INTRAMUSCULAR; INTRAVENOUS at 10:01

## 2020-01-07 RX ADMIN — AMLODIPINE BESYLATE 5 MG: 5 TABLET ORAL at 03:01

## 2020-01-07 RX ADMIN — ONDANSETRON 4 MG: 2 INJECTION INTRAMUSCULAR; INTRAVENOUS at 07:01

## 2020-01-07 RX ADMIN — ONDANSETRON 4 MG: 2 INJECTION INTRAMUSCULAR; INTRAVENOUS at 04:01

## 2020-01-07 NOTE — ASSESSMENT & PLAN NOTE
- UA with 3+ protein; serum protein 10  - would benefit from more thorough workup as outpatient once outside of acute illness

## 2020-01-07 NOTE — ASSESSMENT & PLAN NOTE
- presents with sepsis due to diverticulitis  - CT a/p on admission with area of inflammation in mid descending colon suggestive of early inflammation/diverticulitis; no evidence of perforation or abscess  - fluid resuscitated in ED with 2L NS  - initiated on cipro 400mg IV and metronidazole 500mg IV in ED; will continue cipro 400mg IV q12 and flagyl 500mg IV q8 for now  - CLD for now  - pain control: morphine 2g q4 moderate and 4g q4 severe pain  - bowel regimen: senna, miralax  - anti-emetics: zofran 4mg IV q6 (1st choice) and phenergan 6.25mg IV q6 (2nd choice)  - will need outpatient colonoscopy

## 2020-01-07 NOTE — ASSESSMENT & PLAN NOTE
- continue antiemetics as above; currently not tolerating PO beyond ice chips  - may benefit from cessation of THC as outpatient, could also consider elavil

## 2020-01-07 NOTE — ASSESSMENT & PLAN NOTE
- hypertensive on admission with /117  - likely 2/2 pain/nausea given improvement with antiemetic/bentyl  - continue to monitor for now with pain/nausea control; would ascertain BPs are not checked while retching  - hold home lisinopril 40mg daily for today but can likely resume tomorrow if ORALIA improves  - may ultimately need multiple agents, but could be reassessed when stable in outpatient setting

## 2020-01-07 NOTE — ASSESSMENT & PLAN NOTE
- presents with 2/4 SIRS (WBC, tachycardia)  - given 2L NS in ED; will given additional 500cc LR bolus for total 30cc/kg resuscitation  - initiated on IV cipro/flagyl: will continue  - check lactic acid

## 2020-01-07 NOTE — ED NOTES
This RN spoke to MD Darlene concerning pt's high blood pressure, which resulted in pt's hospital bed rejection. MD stated he will order appropriate medicine to control pt's hypertension.

## 2020-01-07 NOTE — ED TRIAGE NOTES
44 year old female with history of cyclic vomiting syndrome presents to the ED c/o vomiting and weakness since Sunday

## 2020-01-07 NOTE — ASSESSMENT & PLAN NOTE
- 2/2 prerenal losses  - admission Cr 1.5, baseline is 0.8  - fluid resuscitation as above  - continue to monitor renal function with daily labs   - avoid nephrotoxins; holding lisinopril for today but could likely resume tomorrow  - renally dose all medications

## 2020-01-07 NOTE — ASSESSMENT & PLAN NOTE
- reports small streak of blood in emesis yesterday; no melena or other episodes  - Hb 18.5 on admission; suspect is hemoconcentrated; baseline is 15-16  - will continue to monitor for now with daily CBC; can increase frequency if further episodes or worsening symptoms

## 2020-01-07 NOTE — H&P
"[facility] Hospital Medicine  History & Physical    Patient Name: Cipriano Gamez  MRN: [id number]  Admission Date: 1/7/2020  Attending Physician: Alex Colon MD  Primary Care Provider: Edi Jorge MD    [facility] Medicine Team: [facility] MED A Alex Colon MD     Patient information was obtained from patient, past medical records and ER records.     Subjective:     Principal Problem:Diverticulitis of large intestine without perforation or abscess without bleeding    Chief Complaint:   Chief Complaint   Patient presents with    Weakness     reports cyclic vomitting syndrome, been vomitting since sunday which resulted in weakness.         HPI: Ms. Gamez is a 43yo woman with cyclic vomiting syndrome, HTN, GERD, and hx of diverticulitis who presents with 2d of LLQ abdominal pain, nausea, and vomiting. She states that she began experiencing sharp, intermittent bouts of severe 10/10 left lower quadrant abdominal pain two days ago. Episodes will come and go, lasting for "several minutes." No associated exacerbating or relieving factors. Episodes occur at night. She has not had a BM during this time and has only eaten ice chips. She states that nausea and vomiting began shortly after abdominal pain, which was initially non-bloody, non-bilious; yesterday she noted a streak of blood in her vomit. Reports a sore throat but denies chest pain, fevers, or diarrhea. Does note subjective shortness of breath with exertion starting yesterday but unable to clearly characterize. She felt that she had "shaking chills" as well.    She denies any past abdominal surgeries. She has had multiple episodes of diverticulitis in the past and was last admitted in 5/19 to CRS service for diverticulitis with contained sigmoid perforation which was managed conservatively. She has been offered colectomy in the past but refused. Last Cscope in 2011; she reports she had polyps at that time, but I have " been unable to verify in Epic.    In the ED, she was found to be afebrile with HR in low 100s. WBC 26.6. CT a/p with mid descending colon with diverticulitis without perforation or abscess. She was bolused 2L NS and started on cipro/flagyl.    Past Medical History:   Diagnosis Date    Diverticulosis     GERD (gastroesophageal reflux disease)     Hiatal hernia     Hypertension        Past Surgical History:   Procedure Laterality Date    CERVICAL BIOPSY  W/ LOOP ELECTRODE EXCISION  2/11/14       Review of patient's allergies indicates:   Allergen Reactions    Ibuprofen Hives       No current facility-administered medications on file prior to encounter.      Current Outpatient Medications on File Prior to Encounter   Medication Sig    diphenhydrAMINE (SOMINEX) 25 mg tablet Take 25 mg by mouth nightly as needed.    lisinopril (PRINIVIL,ZESTRIL) 40 MG tablet Take 1 tablet (40 mg total) by mouth once daily. Further refills require visit with Dr. Jorge     Family History     Problem Relation (Age of Onset)    Diabetes Other    Heart disease Mother (54), Maternal Grandmother (40), Brother (44)    Sickle cell trait Sister        Tobacco Use    Smoking status: Current Every Day Smoker     Packs/day: 1.00     Years: 16.00     Pack years: 16.00     Types: Cigarettes    Smokeless tobacco: Never Used   Substance and Sexual Activity    Alcohol use: Yes     Frequency: Monthly or less     Drinks per session: 1 or 2     Binge frequency: Never     Comment: social 1-2 x / month    Drug use: Yes     Frequency: 1.0 times per week     Types: Marijuana     Comment: social- last use 1 week ago    Sexual activity: Not Currently     Partners: Male     Birth control/protection: None     Review of Systems   Constitutional: Positive for chills. Negative for diaphoresis and fever.   HENT: Positive for sore throat. Negative for congestion.    Eyes: Negative for discharge and visual disturbance.   Respiratory: Positive for shortness  of breath. Negative for cough.    Cardiovascular: Negative for chest pain and leg swelling.   Gastrointestinal: Positive for abdominal pain (Left lower quadrant), constipation, nausea and vomiting. Negative for blood in stool and diarrhea.   Genitourinary: Negative for difficulty urinating.   Musculoskeletal: Negative for arthralgias and joint swelling.   Skin: Negative for rash and wound.   Allergic/Immunologic: Negative for immunocompromised state.   Neurological: Negative for light-headedness and headaches.   Psychiatric/Behavioral: Negative for agitation and confusion.     Objective:     Vital Signs (Most Recent):  Temp: 97.9 °F (36.6 °C) (01/07/20 1047)  Pulse: 83 (01/07/20 1047)  Resp: 18 (01/07/20 1047)  BP: (!) 165/117 (01/07/20 1028)  SpO2: 99 % (01/07/20 1047) Vital Signs (24h Range):  Temp:  [97.9 °F (36.6 °C)] 97.9 °F (36.6 °C)  Pulse:  [] 83  Resp:  [18-20] 18  SpO2:  [99 %-100 %] 99 %  BP: (165-188)/(117) 165/117     Weight: 90.7 kg (200 lb)  Body mass index is 31.32 kg/m².    Physical Exam   Constitutional: She is oriented to person, place, and time. She appears well-developed and well-nourished.   Uncomfortable appearing   HENT:   Head: Normocephalic and atraumatic.   Nose: Nose normal.   Eyes: Pupils are equal, round, and reactive to light. EOM are normal. No scleral icterus.   Neck: Normal range of motion. No JVD present. No tracheal deviation present.   Cardiovascular: Normal rate, regular rhythm and normal heart sounds.   Pulmonary/Chest: Effort normal and breath sounds normal. No respiratory distress.   Abdominal: Soft. She exhibits no distension and no mass. There is tenderness (mild LLQ). There is no rebound and no guarding.   Musculoskeletal: She exhibits no edema or deformity.   Neurological: She is alert and oriented to person, place, and time.   Skin: Skin is warm and dry. No rash noted. She is not diaphoretic.   Psychiatric: She has a normal mood and affect. Her behavior is normal.    Vitals reviewed.        CRANIAL NERVES     CN III, IV, VI   Pupils are equal, round, and reactive to light.  Extraocular motions are normal.        Significant Labs: All pertinent labs within the past 24 hours have been reviewed.    Significant Imaging: I have reviewed all pertinent imaging results/findings within the past 24 hours.    Assessment/Plan:     * Diverticulitis of large intestine without perforation or abscess without bleeding  - presents with sepsis due to diverticulitis  - CT a/p on admission with area of inflammation in mid descending colon suggestive of early inflammation/diverticulitis; no evidence of perforation or abscess  - fluid resuscitated in ED with 2L NS  - initiated on cipro 400mg IV and metronidazole 500mg IV in ED; will continue cipro 400mg IV q12 and flagyl 500mg IV q8 for now  - CLD for now  - pain control: morphine 2g q4 moderate and 4g q4 severe pain  - bowel regimen: senna, miralax  - anti-emetics: zofran 4mg IV q6 (1st choice) and phenergan 6.25mg IV q6 (2nd choice)  - will need outpatient colonoscopy      Sepsis without acute organ dysfunction  - presents with 2/4 SIRS (WBC, tachycardia)  - given 2L NS in ED; will given additional 500cc LR bolus for total 30cc/kg resuscitation  - initiated on IV cipro/flagyl: will continue  - check lactic acid      Cyclic vomiting syndrome  - continue antiemetics as above; currently not tolerating PO beyond ice chips  - may benefit from cessation of THC as outpatient, could also consider elavil      ORALIA (acute kidney injury)  - 2/2 prerenal losses  - admission Cr 1.5, baseline is 0.8  - fluid resuscitation as above  - continue to monitor renal function with daily labs   - avoid nephrotoxins; holding lisinopril for today but could likely resume tomorrow  - renally dose all medications      Essential hypertension  - hypertensive on admission with /117  - likely 2/2 pain/nausea given improvement with antiemetic/bentyl  - continue to monitor  for now with pain/nausea control; would ascertain BPs are not checked while retching  - hold home lisinopril 40mg daily for today but can likely resume tomorrow if ORALIA improves  - may ultimately need multiple agents, but could be reassessed when stable in outpatient setting    At risk for prolonged QT interval syndrome  - check EKG tomorrow am following administration of antiemetics      Hematemesis with nausea  - reports small streak of blood in emesis yesterday; no melena or other episodes  - Hb 18.5 on admission; suspect is hemoconcentrated; baseline is 15-16  - will continue to monitor for now with daily CBC; can increase frequency if further episodes or worsening symptoms      Marijuana abuse  - encourage cessation; may worsen cyclic vomiting      Smokes 1 pack of cigarettes per day  - encourage cessation  - nicotine patch      Proteinuria  - UA with 3+ protein; serum protein 10  - would benefit from more thorough workup as outpatient once outside of acute illness    GERD (gastroesophageal reflux disease)  - currently not on any meds  - stable        VTE Risk Mitigation (From admission, onward)         Ordered     enoxaparin injection 40 mg  Daily      01/07/20 1047     IP VTE HIGH RISK PATIENT  Once      01/07/20 1047                   Alex Colon MD  Department of Hospital Medicine   [facility]

## 2020-01-07 NOTE — SUBJECTIVE & OBJECTIVE
Past Medical History:   Diagnosis Date    Diverticulosis     GERD (gastroesophageal reflux disease)     Hiatal hernia     Hypertension        Past Surgical History:   Procedure Laterality Date    CERVICAL BIOPSY  W/ LOOP ELECTRODE EXCISION  2/11/14       Review of patient's allergies indicates:   Allergen Reactions    Ibuprofen Hives       No current facility-administered medications on file prior to encounter.      Current Outpatient Medications on File Prior to Encounter   Medication Sig    diphenhydrAMINE (SOMINEX) 25 mg tablet Take 25 mg by mouth nightly as needed.    lisinopril (PRINIVIL,ZESTRIL) 40 MG tablet Take 1 tablet (40 mg total) by mouth once daily. Further refills require visit with Dr. Jorge     Family History     Problem Relation (Age of Onset)    Diabetes Other    Heart disease Mother (54), Maternal Grandmother (40), Brother (44)    Sickle cell trait Sister        Tobacco Use    Smoking status: Current Every Day Smoker     Packs/day: 1.00     Years: 16.00     Pack years: 16.00     Types: Cigarettes    Smokeless tobacco: Never Used   Substance and Sexual Activity    Alcohol use: Yes     Frequency: Monthly or less     Drinks per session: 1 or 2     Binge frequency: Never     Comment: social 1-2 x / month    Drug use: Yes     Frequency: 1.0 times per week     Types: Marijuana     Comment: social- last use 1 week ago    Sexual activity: Not Currently     Partners: Male     Birth control/protection: None     Review of Systems   Constitutional: Positive for chills. Negative for diaphoresis and fever.   HENT: Positive for sore throat. Negative for congestion.    Eyes: Negative for discharge and visual disturbance.   Respiratory: Positive for shortness of breath. Negative for cough.    Cardiovascular: Negative for chest pain and leg swelling.   Gastrointestinal: Positive for abdominal pain (Left lower quadrant), constipation, nausea and vomiting. Negative for blood in stool and diarrhea.    Genitourinary: Negative for difficulty urinating.   Musculoskeletal: Negative for arthralgias and joint swelling.   Skin: Negative for rash and wound.   Allergic/Immunologic: Negative for immunocompromised state.   Neurological: Negative for light-headedness and headaches.   Psychiatric/Behavioral: Negative for agitation and confusion.     Objective:     Vital Signs (Most Recent):  Temp: 97.9 °F (36.6 °C) (01/07/20 1047)  Pulse: 83 (01/07/20 1047)  Resp: 18 (01/07/20 1047)  BP: (!) 165/117 (01/07/20 1028)  SpO2: 99 % (01/07/20 1047) Vital Signs (24h Range):  Temp:  [97.9 °F (36.6 °C)] 97.9 °F (36.6 °C)  Pulse:  [] 83  Resp:  [18-20] 18  SpO2:  [99 %-100 %] 99 %  BP: (165-188)/(117) 165/117     Weight: 90.7 kg (200 lb)  Body mass index is 31.32 kg/m².    Physical Exam   Constitutional: She is oriented to person, place, and time. She appears well-developed and well-nourished.   Uncomfortable appearing   HENT:   Head: Normocephalic and atraumatic.   Nose: Nose normal.   Eyes: Pupils are equal, round, and reactive to light. EOM are normal. No scleral icterus.   Neck: Normal range of motion. No JVD present. No tracheal deviation present.   Cardiovascular: Normal rate, regular rhythm and normal heart sounds.   Pulmonary/Chest: Effort normal and breath sounds normal. No respiratory distress.   Abdominal: Soft. She exhibits no distension and no mass. There is tenderness (mild LLQ). There is no rebound and no guarding.   Musculoskeletal: She exhibits no edema or deformity.   Neurological: She is alert and oriented to person, place, and time.   Skin: Skin is warm and dry. No rash noted. She is not diaphoretic.   Psychiatric: She has a normal mood and affect. Her behavior is normal.   Vitals reviewed.        CRANIAL NERVES     CN III, IV, VI   Pupils are equal, round, and reactive to light.  Extraocular motions are normal.        Significant Labs: All pertinent labs within the past 24 hours have been  reviewed.    Significant Imaging: I have reviewed all pertinent imaging results/findings within the past 24 hours.

## 2020-01-07 NOTE — ED PROVIDER NOTES
"Encounter Date: 1/7/2020       History     Chief Complaint   Patient presents with    Weakness     reports cyclic vomitting syndrome, been vomitting since sunday which resulted in weakness.      44-year-old  female with history of cyclical vomiting syndrome, diverticulitis presents to the ED complaining nausea, vomiting, abdominal pain since Sunday. She reports her abdominal pain is concentrated in the LLQ and is "sharp" 7-8/10. She has only been able to tolerate ice chips. She has not taken any medications at home for her symptoms. She reports associated lightheadedness, sore throat. She denies fever, chills, chest pain, diarrhea, dysuria. No abdominal trauma or PSHx of the abdomen.     The history is provided by the patient.     Review of patient's allergies indicates:   Allergen Reactions    Ibuprofen Hives     Past Medical History:   Diagnosis Date    Diverticulosis     GERD (gastroesophageal reflux disease)     Hiatal hernia     Hypertension      Past Surgical History:   Procedure Laterality Date    CERVICAL BIOPSY  W/ LOOP ELECTRODE EXCISION  2/11/14     Family History   Problem Relation Age of Onset    Heart disease Mother 54        MI    Heart disease Maternal Grandmother 40        MI    Diabetes Other     Heart disease Brother 44        MI    Sickle cell trait Sister     Crohn's disease Neg Hx     Colon cancer Neg Hx      Social History     Tobacco Use    Smoking status: Current Every Day Smoker     Packs/day: 1.00     Years: 16.00     Pack years: 16.00     Types: Cigarettes    Smokeless tobacco: Never Used   Substance Use Topics    Alcohol use: Yes     Frequency: Monthly or less     Drinks per session: 1 or 2     Binge frequency: Never     Comment: social 1-2 x / month    Drug use: Yes     Frequency: 1.0 times per week     Types: Marijuana     Comment: social- last use 1 week ago     Review of Systems   Constitutional: Negative for chills and fever.   HENT: Positive for " sore throat. Negative for congestion and rhinorrhea.    Eyes: Negative for photophobia and visual disturbance.   Respiratory: Negative for shortness of breath.    Cardiovascular: Negative for chest pain.   Gastrointestinal: Positive for abdominal pain, nausea and vomiting. Negative for constipation and diarrhea.   Genitourinary: Negative for dysuria, flank pain and hematuria.   Musculoskeletal: Negative for back pain and myalgias.   Skin: Negative for rash and wound.   Neurological: Positive for light-headedness. Negative for numbness and headaches.   Psychiatric/Behavioral: Negative for confusion.       Physical Exam     Initial Vitals [01/07/20 0644]   BP Pulse Resp Temp SpO2   (!) 188/117 105 20 97.9 °F (36.6 °C) 100 %      MAP       --         Physical Exam    Nursing note and vitals reviewed.  Constitutional: She appears well-developed and well-nourished. She is not diaphoretic. No distress.   HENT:   Head: Normocephalic and atraumatic.   Neck: Normal range of motion. Neck supple.   Cardiovascular: Regular rhythm and normal heart sounds. Tachycardia present.  Exam reveals no gallop and no friction rub.    No murmur heard.  Pulmonary/Chest: Breath sounds normal. She has no wheezes. She has no rhonchi. She has no rales.   Abdominal: Soft. Bowel sounds are normal. There is tenderness in the epigastric area and left lower quadrant. There is no rigidity, no rebound, no guarding, no CVA tenderness, no tenderness at McBurney's point and negative Burns's sign.   Musculoskeletal: Normal range of motion.   Neurological: She is alert and oriented to person, place, and time.   Skin: Skin is warm and dry. No rash noted. No erythema.   Psychiatric: She has a normal mood and affect.         ED Course   Procedures  Labs Reviewed   CBC W/ AUTO DIFFERENTIAL - Abnormal; Notable for the following components:       Result Value    WBC 26.68 (*)     RBC 6.25 (*)     Hemoglobin 18.5 (*)     Hematocrit 54.7 (*)     Platelets 356 (*)      Immature Granulocytes 0.9 (*)     Gran # (ANC) 22.7 (*)     Immature Grans (Abs) 0.23 (*)     Mono # 1.9 (*)     Gran% 84.9 (*)     Lymph% 6.9 (*)     Platelet Estimate Increased (*)     All other components within normal limits   COMPREHENSIVE METABOLIC PANEL - Abnormal; Notable for the following components:    Glucose 158 (*)     Creatinine 1.5 (*)     Calcium 11.1 (*)     Total Protein 10.0 (*)     Anion Gap 19 (*)     eGFR if  49.2 (*)     eGFR if non  42.7 (*)     All other components within normal limits   URINALYSIS, REFLEX TO URINE CULTURE - Abnormal; Notable for the following components:    Appearance, UA Cloudy (*)     Specific Gravity, UA >=1.030 (*)     Protein, UA 3+ (*)     Glucose, UA 1+ (*)     Ketones, UA 1+ (*)     Occult Blood UA 3+ (*)     All other components within normal limits    Narrative:     Preferred Collection Type->Urine, Clean Catch   URINALYSIS MICROSCOPIC - Abnormal; Notable for the following components:    RBC, UA 8 (*)     Bacteria Many (*)     All other components within normal limits    Narrative:     Preferred Collection Type->Urine, Clean Catch   LIPASE   LACTIC ACID, PLASMA   POCT URINE PREGNANCY          Imaging Results           CT Abdomen Pelvis With Contrast (Final result)  Result time 01/07/20 10:02:24    Final result by Jani Chester Jr., MD (01/07/20 10:02:24)                 Impression:      1. Subtle area of inflammation in the mid descending colon which may represent early inflammation/diverticulitis.  Recommend clinical correlation.  2. Additional incidental findings as above.  This report was flagged in Epic as abnormal.    Electronically signed by resident: Jus Avila  Date:    01/07/2020  Time:    09:31    Electronically signed by: Jani Chester MD  Date:    01/07/2020  Time:    10:02             Narrative:    EXAMINATION:  CT ABDOMEN PELVIS WITH CONTRAST    CLINICAL HISTORY:  LLQ pain, suspect  diverticulitis;    TECHNIQUE:  Low dose axial images, sagittal and coronal reformations were obtained from the lung bases to the pubic symphysis after the administration of 75 cc Omnipaque 350 intravenous contrast.  Oral contrast was not administered.    COMPARISON:  CT abdomen pelvis 05/17/2019, 01/26/2018    FINDINGS:  Heart: No cardiomegaly or pericardial effusion.    Lung Bases: Symmetrically expanded and clear, no pleural effusions or atelectasis noted..    Liver: Normal in size, subtle ill-defined hypodensity abutting falciform ligament likely focal fat, otherwise normal in attenuation without focal masses.    Gallbladder: Normal appearance without evidence for cholecystitis.    Bile Ducts: No intra or extrahepatic biliary ductal dilation.    Pancreas: No pancreatic mass lesion or peripancreatic inflammatory change.    Spleen: Normal.    Adrenals: Normal.    Genitourinary: Normal in size and location.Normal concentration and excretion of contrast.  No focal renal abnormality, phlebolith in the vicinity of the distal left ureter which does not appear to be within the ureter as no evidence of obstruction is visualized.  Bladder demonstrates smooth contours without bladder wall thickening.    Reproductive organs: Normal.    GI tract/Mesentery: Stomach is normal appearance.  Subtle area inflammation of the mid descending colon (coronal series 5, image 28 and axial series 2, image 46) which may represent early inflammation/diverticulitis.  The remaining visualized bowel is normal appearance without evidence of inflammation or obstruction.    Peritoneal Space: No abdominopelvic ascites or intraperitoneal free air.    Retroperitoneum: No significant adenopathy.    Abdominal wall: Normal.    Vasculature: Abdominal aorta is normal in caliber without significant calcific atherosclerosis.    Bones: Normal appearance without acute fracture or bony destructive process.                                 Medical Decision  Making:   History:   Old Medical Records: I decided to obtain old medical records.  Old Records Summarized: records from previous admission(s).       <> Summary of Records: Admitted 5/17-5/20 for diverticulitis with perforation  Clinical Tests:   Lab Tests: Ordered and Reviewed  Radiological Study: Ordered and Reviewed  Other:   I have discussed this case with another health care provider.       <> Summary of the Discussion: Hospital Medicine       APC / Resident Notes:   44-year-old  female with history of cyclical vomiting syndrome, diverticulitis presents to the ED complaining nausea, vomiting, abdominal pain since Sunday. Tachycardic. Regular rhythm. Lungs clear. Abdomen soft and tender in the epigastric region and LLQ. No guarding or rebound. States this feels similar to last admission for diverticulitis. DDx includes but is not limited to diverticulitis, dehydration, ORALIA, UTI, pregnancy, nephrolithiasis, cyclical vomiting. Will check labs, CT abdomen/pelvis.     UPT negative. Leukocytosis noted with WBC 26.68. ORALIA with Cr 1.5 (baseline 0.8). UA with no evidence of infection. Lipase normal.     CT abdomen/pelvis concerning for early diverticulitis. Due to leukocytosis will give IV abx. Started on cipro/flagyl.     Admitted to hospital medicine. Discussed with hospital medicine PA.                             Clinical Impression:       ICD-10-CM ICD-9-CM   1. ORALIA (acute kidney injury) N17.9 584.9   2. Diverticulitis K57.92 562.11   3. Leukocytosis, unspecified type D72.829 288.60   4. At risk for prolonged QT interval syndrome Z91.89 V15.89         Disposition:   Disposition: Admitted  Condition: Regina Huddleston PA-C  01/07/20 1252

## 2020-01-07 NOTE — HPI
"Ms. Gamez is a 43yo woman with cyclic vomiting syndrome, HTN, GERD, and hx of diverticulitis who presents with 2d of LLQ abdominal pain, nausea, and vomiting. She states that she began experiencing sharp, intermittent bouts of severe 10/10 left lower quadrant abdominal pain two days ago. Episodes will come and go, lasting for "several minutes." No associated exacerbating or relieving factors. Episodes occur at night. She has not had a BM during this time and has only eaten ice chips. She states that nausea and vomiting began shortly after abdominal pain, which was initially non-bloody, non-bilious; yesterday she noted a streak of blood in her vomit. Reports a sore throat but denies chest pain, fevers, or diarrhea. Does note subjective shortness of breath with exertion starting yesterday but unable to clearly characterize. She felt that she had "shaking chills" as well.    She denies any past abdominal surgeries. She has had multiple episodes of diverticulitis in the past and was last admitted in 5/19 to CRS service for diverticulitis with contained sigmoid perforation which was managed conservatively. She has been offered colectomy in the past but refused. Last Cscope in 2011; she reports she had polyps at that time, but I have been unable to verify in Epic.    In the ED, she was found to be afebrile with HR in low 100s. WBC 26.6. CT a/p with mid descending colon with diverticulitis without perforation or abscess. She was bolused 2L NS and started on cipro/flagyl.  "

## 2020-01-08 PROBLEM — A41.50 SEPSIS DUE TO GRAM NEGATIVE BACTERIA: Status: ACTIVE | Noted: 2020-01-08

## 2020-01-08 LAB
ANION GAP SERPL CALC-SCNC: 14 MMOL/L (ref 8–16)
BASOPHILS # BLD AUTO: 0.05 K/UL (ref 0–0.2)
BASOPHILS NFR BLD: 0.3 % (ref 0–1.9)
BUN SERPL-MCNC: 10 MG/DL (ref 6–20)
CALCIUM SERPL-MCNC: 9.7 MG/DL (ref 8.7–10.5)
CHLORIDE SERPL-SCNC: 97 MMOL/L (ref 95–110)
CO2 SERPL-SCNC: 27 MMOL/L (ref 23–29)
CREAT SERPL-MCNC: 1 MG/DL (ref 0.5–1.4)
DIFFERENTIAL METHOD: ABNORMAL
EOSINOPHIL # BLD AUTO: 0 K/UL (ref 0–0.5)
EOSINOPHIL NFR BLD: 0.1 % (ref 0–8)
ERYTHROCYTE [DISTWIDTH] IN BLOOD BY AUTOMATED COUNT: 13.1 % (ref 11.5–14.5)
EST. GFR  (AFRICAN AMERICAN): >60 ML/MIN/1.73 M^2
EST. GFR  (NON AFRICAN AMERICAN): >60 ML/MIN/1.73 M^2
GLUCOSE SERPL-MCNC: 123 MG/DL (ref 70–110)
HCT VFR BLD AUTO: 51.8 % (ref 37–48.5)
HGB BLD-MCNC: 17 G/DL (ref 12–16)
IMM GRANULOCYTES # BLD AUTO: 0.08 K/UL (ref 0–0.04)
IMM GRANULOCYTES NFR BLD AUTO: 0.4 % (ref 0–0.5)
LYMPHOCYTES # BLD AUTO: 2.8 K/UL (ref 1–4.8)
LYMPHOCYTES NFR BLD: 14.3 % (ref 18–48)
MCH RBC QN AUTO: 29.5 PG (ref 27–31)
MCHC RBC AUTO-ENTMCNC: 32.8 G/DL (ref 32–36)
MCV RBC AUTO: 90 FL (ref 82–98)
MONOCYTES # BLD AUTO: 1.3 K/UL (ref 0.3–1)
MONOCYTES NFR BLD: 6.5 % (ref 4–15)
NEUTROPHILS # BLD AUTO: 15.6 K/UL (ref 1.8–7.7)
NEUTROPHILS NFR BLD: 78.4 % (ref 38–73)
NRBC BLD-RTO: 0 /100 WBC
PLATELET # BLD AUTO: 277 K/UL (ref 150–350)
PMV BLD AUTO: 11.8 FL (ref 9.2–12.9)
POTASSIUM SERPL-SCNC: 2.8 MMOL/L (ref 3.5–5.1)
RBC # BLD AUTO: 5.77 M/UL (ref 4–5.4)
SODIUM SERPL-SCNC: 138 MMOL/L (ref 136–145)
WBC # BLD AUTO: 19.85 K/UL (ref 3.9–12.7)

## 2020-01-08 PROCEDURE — 99233 PR SUBSEQUENT HOSPITAL CARE,LEVL III: ICD-10-PCS | Mod: ,,, | Performed by: HOSPITALIST

## 2020-01-08 PROCEDURE — 93005 ELECTROCARDIOGRAM TRACING: CPT

## 2020-01-08 PROCEDURE — 25000003 PHARM REV CODE 250: Performed by: HOSPITALIST

## 2020-01-08 PROCEDURE — 63600175 PHARM REV CODE 636 W HCPCS: Performed by: HOSPITALIST

## 2020-01-08 PROCEDURE — 85025 COMPLETE CBC W/AUTO DIFF WBC: CPT

## 2020-01-08 PROCEDURE — 93010 EKG 12-LEAD: ICD-10-PCS | Mod: ,,, | Performed by: INTERNAL MEDICINE

## 2020-01-08 PROCEDURE — 93010 ELECTROCARDIOGRAM REPORT: CPT | Mod: ,,, | Performed by: INTERNAL MEDICINE

## 2020-01-08 PROCEDURE — S0030 INJECTION, METRONIDAZOLE: HCPCS | Performed by: HOSPITALIST

## 2020-01-08 PROCEDURE — 80048 BASIC METABOLIC PNL TOTAL CA: CPT

## 2020-01-08 PROCEDURE — 20600001 HC STEP DOWN PRIVATE ROOM

## 2020-01-08 PROCEDURE — S4991 NICOTINE PATCH NONLEGEND: HCPCS | Performed by: HOSPITALIST

## 2020-01-08 PROCEDURE — 36415 COLL VENOUS BLD VENIPUNCTURE: CPT

## 2020-01-08 PROCEDURE — 99233 SBSQ HOSP IP/OBS HIGH 50: CPT | Mod: ,,, | Performed by: HOSPITALIST

## 2020-01-08 PROCEDURE — 94761 N-INVAS EAR/PLS OXIMETRY MLT: CPT

## 2020-01-08 RX ORDER — POTASSIUM CHLORIDE 20 MEQ/1
40 TABLET, EXTENDED RELEASE ORAL EVERY 4 HOURS
Status: COMPLETED | OUTPATIENT
Start: 2020-01-08 | End: 2020-01-08

## 2020-01-08 RX ORDER — LISINOPRIL 20 MG/1
40 TABLET ORAL DAILY
Status: DISCONTINUED | OUTPATIENT
Start: 2020-01-08 | End: 2020-01-10 | Stop reason: HOSPADM

## 2020-01-08 RX ORDER — SODIUM CHLORIDE, SODIUM LACTATE, POTASSIUM CHLORIDE, CALCIUM CHLORIDE 600; 310; 30; 20 MG/100ML; MG/100ML; MG/100ML; MG/100ML
INJECTION, SOLUTION INTRAVENOUS CONTINUOUS
Status: ACTIVE | OUTPATIENT
Start: 2020-01-08 | End: 2020-01-08

## 2020-01-08 RX ORDER — ACETAMINOPHEN 500 MG
500 TABLET ORAL DAILY PRN
Status: ON HOLD | COMMUNITY
End: 2020-07-10 | Stop reason: HOSPADM

## 2020-01-08 RX ADMIN — METRONIDAZOLE 500 MG: 500 INJECTION, SOLUTION INTRAVENOUS at 12:01

## 2020-01-08 RX ADMIN — ENOXAPARIN SODIUM 40 MG: 100 INJECTION SUBCUTANEOUS at 05:01

## 2020-01-08 RX ADMIN — AMLODIPINE BESYLATE 5 MG: 5 TABLET ORAL at 09:01

## 2020-01-08 RX ADMIN — LISINOPRIL 40 MG: 20 TABLET ORAL at 10:01

## 2020-01-08 RX ADMIN — CIPROFLOXACIN 400 MG: 2 INJECTION, SOLUTION INTRAVENOUS at 10:01

## 2020-01-08 RX ADMIN — MORPHINE SULFATE 4 MG: 4 INJECTION INTRAVENOUS at 07:01

## 2020-01-08 RX ADMIN — NICOTINE 1 PATCH: 21 PATCH, EXTENDED RELEASE TRANSDERMAL at 09:01

## 2020-01-08 RX ADMIN — PROMETHAZINE HYDROCHLORIDE 6.25 MG: 25 INJECTION INTRAMUSCULAR; INTRAVENOUS at 06:01

## 2020-01-08 RX ADMIN — METRONIDAZOLE 500 MG: 500 INJECTION, SOLUTION INTRAVENOUS at 09:01

## 2020-01-08 RX ADMIN — SODIUM CHLORIDE, SODIUM LACTATE, POTASSIUM CHLORIDE, AND CALCIUM CHLORIDE: .6; .31; .03; .02 INJECTION, SOLUTION INTRAVENOUS at 12:01

## 2020-01-08 RX ADMIN — MORPHINE SULFATE 4 MG: 4 INJECTION INTRAVENOUS at 09:01

## 2020-01-08 RX ADMIN — PROMETHAZINE HYDROCHLORIDE 6.25 MG: 25 INJECTION INTRAMUSCULAR; INTRAVENOUS at 05:01

## 2020-01-08 RX ADMIN — POTASSIUM CHLORIDE 40 MEQ: 1500 TABLET, EXTENDED RELEASE ORAL at 10:01

## 2020-01-08 RX ADMIN — POTASSIUM CHLORIDE 40 MEQ: 1500 TABLET, EXTENDED RELEASE ORAL at 01:01

## 2020-01-08 RX ADMIN — ONDANSETRON 4 MG: 2 INJECTION INTRAMUSCULAR; INTRAVENOUS at 01:01

## 2020-01-08 RX ADMIN — METRONIDAZOLE 500 MG: 500 INJECTION, SOLUTION INTRAVENOUS at 05:01

## 2020-01-08 RX ADMIN — POLYETHYLENE GLYCOL 3350 17 G: 17 POWDER, FOR SOLUTION ORAL at 09:01

## 2020-01-08 NOTE — ED NOTES
Pt resting in bed. No distress noted. RR even and unlabored. Pt aware waiting on lab results at this time.

## 2020-01-08 NOTE — PLAN OF CARE
01/08/20 1439   Post-Acute Status   Post-Acute Authorization Other   Other Status No Post-Acute Service Needs   This SW in communication with  and medical team. SW will continue to follow for discharge needs and offer support as needed.    No SW needs identified at this time.    Mae Fagan LMSW  [facility]  [id number]

## 2020-01-08 NOTE — NURSING
POC reviewed. Pt AAO x 4 with VSS on RA. Elevated SBP in 180s with MD aware. Oral Lisinopril added to HTN management. Pt c/o frequent abdominal pain and nausea. IV Zofran and IV Phenergan administered PRN as ordered. IV Morphine available PRN. Tolerating Clear Liquid diet. Pt slept between care. LR IVFs infusing @ 125 ml/hr. Adequate UOP via toilet. No acute distress or needs at this time. WCTM

## 2020-01-08 NOTE — PROGRESS NOTES
RN notified MD Webster about patient's elevated BP in the 180's/90's. RN explained to MD Webster that patient was vomiting recently and RN did give her the prn Phenergan IV. MD ordered RN to give pain medication and check BP again after that. WCTM

## 2020-01-08 NOTE — PLAN OF CARE
Plan of care reviewed with patient. Patient verbalized understanding. Patient is oriented x4. Patient tolerated clear liquid diet for most of the shift. Patient ambulated adlib to bathroom to void in hat. Patient did not have a BM through this shift. Patient received scheduled ABX and prn pain medication per MD orders. Patient skin is intact. Patient rounding was done per floor protocol. Patient BP's were elevated throughout the shift; pain and nausea medication were given and BP did come down. MD is aware and did not order any prn BP medication at this time. Patient remained free of any falls or acute events. [facility]

## 2020-01-08 NOTE — PLAN OF CARE
Patient is a 44 year old female admitted from home through the ER with N&V, Abdominal Pain (Diverticulitis).  Patient is expected to discharge home with no needs +/- 1/10/2020.    Patient in room alone during visit.  Patient lives in a one story home with her sister, Yudith, who will obtain anything she needs after discharge.  [facility]let given to patient and/or family with understanding verbalized.  CM name and number written on white board in patient's room with request to call for any questions and concerns.  Will continue to follow for needs.    PCP  Edi Jorge MD  [address] [facility] [address] [place] [id number] [phone]      [id number] - [place] - [address]  [address] [place] [address]  Phone: [phone] Fax: [phone]      Extended Emergency Contact Information  Primary Emergency Contact: Yudith Gamez  Address: [address] [place] [address]           [place] [address] [facility]  Home Phone: [phone]  Relation: Sister       01/08/20 1457   Discharge Assessment   Assessment Type Discharge Planning Assessment   Confirmed/corrected address and phone number on facesheet? Yes   Assessment information obtained from? Patient   Expected Length of Stay (days) 3   Communicated expected length of stay with patient/caregiver yes   Prior to hospitilization cognitive status: Alert/Oriented   Prior to hospitalization functional status: Independent   Current cognitive status: Alert/Oriented   Current Functional Status: Independent   Lives With sibling(s)   Able to Return to Prior Arrangements yes   Is patient able to care for self after discharge? Yes   Who are your caregiver(s) and their phone number(s)? sister   Patient's perception of discharge disposition home or selfcare   Equipment Currently Used at Home none   Do you have any problems affording any of your prescribed medications? No   Is the patient taking medications as prescribed?  yes   Does the patient have transportation home? Yes   Transportation Anticipated family or friend will provide   Discharge Plan A Home with family   Discharge Plan B Home with family;Home Health   DME Needed Upon Discharge  none   Patient/Family in Agreement with Plan yes

## 2020-01-08 NOTE — PROGRESS NOTES
RN notified MD GUSTAVO Webster on call for IM-A about patient's elevated BP's in the 180's/80's since getting to this floor 2215 and has had elevated BP's in the ED that were higher than the one's she posted on the floor. RN explained that patient has had a bout of emesis and was given zofran as wells as 4mg of morphine for pain. Patient BP was then taken again after 20 mins and BP was still elevated. MD Webster did not give any new orders; MD requested RN take BP in an hr. WCTM

## 2020-01-09 LAB
ALBUMIN SERPL BCP-MCNC: 3.7 G/DL (ref 3.5–5.2)
ALP SERPL-CCNC: 104 U/L (ref 55–135)
ALT SERPL W/O P-5'-P-CCNC: 18 U/L (ref 10–44)
ANION GAP SERPL CALC-SCNC: 11 MMOL/L (ref 8–16)
AST SERPL-CCNC: 20 U/L (ref 10–40)
BASOPHILS # BLD AUTO: 0.06 K/UL (ref 0–0.2)
BASOPHILS NFR BLD: 0.4 % (ref 0–1.9)
BILIRUB SERPL-MCNC: 0.7 MG/DL (ref 0.1–1)
BUN SERPL-MCNC: 8 MG/DL (ref 6–20)
CALCIUM SERPL-MCNC: 9.7 MG/DL (ref 8.7–10.5)
CHLORIDE SERPL-SCNC: 99 MMOL/L (ref 95–110)
CO2 SERPL-SCNC: 25 MMOL/L (ref 23–29)
CREAT SERPL-MCNC: 0.9 MG/DL (ref 0.5–1.4)
DIFFERENTIAL METHOD: ABNORMAL
EOSINOPHIL # BLD AUTO: 0 K/UL (ref 0–0.5)
EOSINOPHIL NFR BLD: 0.2 % (ref 0–8)
ERYTHROCYTE [DISTWIDTH] IN BLOOD BY AUTOMATED COUNT: 12.8 % (ref 11.5–14.5)
EST. GFR  (AFRICAN AMERICAN): >60 ML/MIN/1.73 M^2
EST. GFR  (NON AFRICAN AMERICAN): >60 ML/MIN/1.73 M^2
GLUCOSE SERPL-MCNC: 105 MG/DL (ref 70–110)
HCT VFR BLD AUTO: 52.8 % (ref 37–48.5)
HGB BLD-MCNC: 17.2 G/DL (ref 12–16)
IMM GRANULOCYTES # BLD AUTO: 0.08 K/UL (ref 0–0.04)
IMM GRANULOCYTES NFR BLD AUTO: 0.5 % (ref 0–0.5)
LYMPHOCYTES # BLD AUTO: 3.4 K/UL (ref 1–4.8)
LYMPHOCYTES NFR BLD: 20.8 % (ref 18–48)
MCH RBC QN AUTO: 29.5 PG (ref 27–31)
MCHC RBC AUTO-ENTMCNC: 32.6 G/DL (ref 32–36)
MCV RBC AUTO: 90 FL (ref 82–98)
MONOCYTES # BLD AUTO: 1.4 K/UL (ref 0.3–1)
MONOCYTES NFR BLD: 8.7 % (ref 4–15)
NEUTROPHILS # BLD AUTO: 11.5 K/UL (ref 1.8–7.7)
NEUTROPHILS NFR BLD: 69.4 % (ref 38–73)
NRBC BLD-RTO: 0 /100 WBC
PLATELET # BLD AUTO: 294 K/UL (ref 150–350)
PMV BLD AUTO: 11.2 FL (ref 9.2–12.9)
POTASSIUM SERPL-SCNC: 3.4 MMOL/L (ref 3.5–5.1)
PROT SERPL-MCNC: 7.7 G/DL (ref 6–8.4)
RBC # BLD AUTO: 5.84 M/UL (ref 4–5.4)
SODIUM SERPL-SCNC: 135 MMOL/L (ref 136–145)
WBC # BLD AUTO: 16.51 K/UL (ref 3.9–12.7)

## 2020-01-09 PROCEDURE — 36415 COLL VENOUS BLD VENIPUNCTURE: CPT

## 2020-01-09 PROCEDURE — 80053 COMPREHEN METABOLIC PANEL: CPT

## 2020-01-09 PROCEDURE — 99232 SBSQ HOSP IP/OBS MODERATE 35: CPT | Mod: ,,, | Performed by: HOSPITALIST

## 2020-01-09 PROCEDURE — 94761 N-INVAS EAR/PLS OXIMETRY MLT: CPT

## 2020-01-09 PROCEDURE — 25000003 PHARM REV CODE 250: Performed by: HOSPITALIST

## 2020-01-09 PROCEDURE — S4991 NICOTINE PATCH NONLEGEND: HCPCS | Performed by: HOSPITALIST

## 2020-01-09 PROCEDURE — 99232 PR SUBSEQUENT HOSPITAL CARE,LEVL II: ICD-10-PCS | Mod: ,,, | Performed by: HOSPITALIST

## 2020-01-09 PROCEDURE — S0030 INJECTION, METRONIDAZOLE: HCPCS | Performed by: HOSPITALIST

## 2020-01-09 PROCEDURE — 63600175 PHARM REV CODE 636 W HCPCS: Performed by: HOSPITALIST

## 2020-01-09 PROCEDURE — 85025 COMPLETE CBC W/AUTO DIFF WBC: CPT

## 2020-01-09 PROCEDURE — 20600001 HC STEP DOWN PRIVATE ROOM

## 2020-01-09 RX ORDER — OXYCODONE HYDROCHLORIDE 10 MG/1
10 TABLET ORAL EVERY 6 HOURS PRN
Status: DISCONTINUED | OUTPATIENT
Start: 2020-01-09 | End: 2020-01-10 | Stop reason: HOSPADM

## 2020-01-09 RX ORDER — ACETAMINOPHEN 325 MG/1
325 TABLET ORAL EVERY 8 HOURS PRN
Status: DISCONTINUED | OUTPATIENT
Start: 2020-01-09 | End: 2020-01-10 | Stop reason: HOSPADM

## 2020-01-09 RX ORDER — METOCLOPRAMIDE HYDROCHLORIDE 5 MG/ML
10 INJECTION INTRAMUSCULAR; INTRAVENOUS EVERY 6 HOURS PRN
Status: DISCONTINUED | OUTPATIENT
Start: 2020-01-09 | End: 2020-01-10

## 2020-01-09 RX ORDER — DIPHENHYDRAMINE HCL 50 MG
50 CAPSULE ORAL ONCE
Status: COMPLETED | OUTPATIENT
Start: 2020-01-09 | End: 2020-01-09

## 2020-01-09 RX ORDER — AMOXICILLIN AND CLAVULANATE POTASSIUM 875; 125 MG/1; MG/1
1 TABLET, FILM COATED ORAL EVERY 12 HOURS
Status: DISCONTINUED | OUTPATIENT
Start: 2020-01-09 | End: 2020-01-10 | Stop reason: HOSPADM

## 2020-01-09 RX ORDER — MORPHINE SULFATE 2 MG/ML
2 INJECTION, SOLUTION INTRAMUSCULAR; INTRAVENOUS EVERY 6 HOURS PRN
Status: DISCONTINUED | OUTPATIENT
Start: 2020-01-09 | End: 2020-01-10 | Stop reason: HOSPADM

## 2020-01-09 RX ORDER — POTASSIUM CHLORIDE 20 MEQ/1
40 TABLET, EXTENDED RELEASE ORAL ONCE
Status: COMPLETED | OUTPATIENT
Start: 2020-01-09 | End: 2020-01-09

## 2020-01-09 RX ADMIN — MORPHINE SULFATE 2 MG: 2 INJECTION, SOLUTION INTRAMUSCULAR; INTRAVENOUS at 11:01

## 2020-01-09 RX ADMIN — AMOXICILLIN AND CLAVULANATE POTASSIUM 1 TABLET: 875; 125 TABLET, FILM COATED ORAL at 08:01

## 2020-01-09 RX ADMIN — MORPHINE SULFATE 2 MG: 2 INJECTION, SOLUTION INTRAMUSCULAR; INTRAVENOUS at 05:01

## 2020-01-09 RX ADMIN — POTASSIUM CHLORIDE 40 MEQ: 1500 TABLET, EXTENDED RELEASE ORAL at 08:01

## 2020-01-09 RX ADMIN — METOCLOPRAMIDE 10 MG: 5 INJECTION, SOLUTION INTRAMUSCULAR; INTRAVENOUS at 11:01

## 2020-01-09 RX ADMIN — ENOXAPARIN SODIUM 40 MG: 100 INJECTION SUBCUTANEOUS at 05:01

## 2020-01-09 RX ADMIN — CIPROFLOXACIN 400 MG: 2 INJECTION, SOLUTION INTRAVENOUS at 01:01

## 2020-01-09 RX ADMIN — LISINOPRIL 40 MG: 20 TABLET ORAL at 08:01

## 2020-01-09 RX ADMIN — DIPHENHYDRAMINE HYDROCHLORIDE 50 MG: 50 CAPSULE ORAL at 04:01

## 2020-01-09 RX ADMIN — CIPROFLOXACIN 400 MG: 2 INJECTION, SOLUTION INTRAVENOUS at 12:01

## 2020-01-09 RX ADMIN — METRONIDAZOLE 500 MG: 500 INJECTION, SOLUTION INTRAVENOUS at 11:01

## 2020-01-09 RX ADMIN — METOCLOPRAMIDE 10 MG: 5 INJECTION, SOLUTION INTRAMUSCULAR; INTRAVENOUS at 05:01

## 2020-01-09 RX ADMIN — POLYETHYLENE GLYCOL 3350 17 G: 17 POWDER, FOR SOLUTION ORAL at 08:01

## 2020-01-09 RX ADMIN — MORPHINE SULFATE 2 MG: 2 INJECTION, SOLUTION INTRAMUSCULAR; INTRAVENOUS at 03:01

## 2020-01-09 RX ADMIN — METRONIDAZOLE 500 MG: 500 INJECTION, SOLUTION INTRAVENOUS at 03:01

## 2020-01-09 RX ADMIN — AMLODIPINE BESYLATE 5 MG: 5 TABLET ORAL at 08:01

## 2020-01-09 RX ADMIN — NICOTINE 1 PATCH: 21 PATCH, EXTENDED RELEASE TRANSDERMAL at 08:01

## 2020-01-09 RX ADMIN — ONDANSETRON 4 MG: 2 INJECTION INTRAMUSCULAR; INTRAVENOUS at 03:01

## 2020-01-09 NOTE — PROGRESS NOTES
After hanging Cipro, patient's arm was slightly swollen and had small bumps by IV site. Notifed Dr Lemus and ordered 50mg of Benadryl. TM.

## 2020-01-09 NOTE — PROGRESS NOTES
Progress Note  Hospital Medicine  [facility]       Patient Name: Cipriano Gamez  MRN:  [id number]  Hospital Medicine Team: [facility] MED X Sara Lopez MD  Date of Admission:  1/7/2020     Length of Stay:  LOS: 1 day   Expected Discharge Date: 1/10/2020  Principal Problem:  Diverticulitis of large intestine without perforation or abscess without bleeding     Subjective:     Interval History/Overnight Events:    Patient seen on rounds today.  She continues to have abdominal pain and nausea as well as vomiting.  Attempting to tolerate clear liquid however nausea precluding this.  WBC count is decreasing from 26-19.  Continue IV antibiotics at this time.  Blood pressure medications optimized, Ryan I resolved.  More IV fluids ordered for today given hemo concentration, ongoing nausea vomiting/ ongoing GI losses, as well as sepsis     amLODIPine  5 mg Oral Daily    ciprofloxacin  400 mg Intravenous Q12H    enoxaparin  40 mg Subcutaneous Daily    lisinopril  40 mg Oral Daily    metronidazole  500 mg Intravenous Q8H    nicotine  1 patch Transdermal Daily    polyethylene glycol  17 g Oral Daily           dextrose 10 % in water (D10W), dextrose 10 % in water (D10W), glucagon (human recombinant), glucose, glucose, morphine, morphine, ondansetron, promethazine (PHENERGAN) IVPB, senna-docusate 8.6-50 mg, sodium chloride 0.9%, sodium chloride 0.9%    Review of Systems   Constitutional: Positive for chills. Negative for diaphoresis and fever.   HENT: Positive for sore throat. Negative for congestion.    Eyes: Negative for discharge and visual disturbance.   Respiratory: Positive for shortness of breath. Negative for cough.    Cardiovascular: Negative for chest pain and leg swelling.   Gastrointestinal: Positive for abdominal pain (Left lower quadrant), constipation, nausea and vomiting. Negative for blood in stool and diarrhea.   Genitourinary: Negative for difficulty urinating.    Musculoskeletal: Negative for arthralgias and joint swelling.   Skin: Negative for rash and wound.   Allergic/Immunologic: Negative for immunocompromised state.   Neurological: Negative for light-headedness and headaches.   Psychiatric/Behavioral: Negative for agitation and confusion.     Objective:     Temp:  [96.4 °F (35.8 °C)-99.2 °F (37.3 °C)]   Pulse:  []   Resp:  [14-18]   BP: (143-189)/()   SpO2:  [97 %-100 %]           Body mass index is 31.32 kg/m².       Physical Exam:  Constitutional: She is oriented to person, place, and time. She appears well-developed and well-nourished.   Uncomfortable appearing  vomiting   HENT:   Head: Normocephalic and atraumatic.   Nose: Nose normal.   Eyes: Pupils are equal, round, and reactive to light. EOM are normal. No scleral icterus.   Neck: Normal range of motion. No JVD present. No tracheal deviation present.   Cardiovascular: Normal rate, regular rhythm and normal heart sounds.   Pulmonary/Chest: Effort normal and breath sounds normal. No respiratory distress.   Abdominal: Soft. She exhibits no distension and no mass. There is tenderness (mild LLQ). There is no rebound and no guarding.   Musculoskeletal: She exhibits no edema or deformity.   Neurological: She is alert and oriented to person, place, and time.   Skin: Skin is warm and dry. No rash noted. She is not diaphoretic.   Psychiatric: She has a normal mood and affect. Her behavior is normal.     Labs:    Chemistries:   Recent Labs   Lab 01/07/20 0728 01/08/20 0528    138   K 4.1 2.8*   CL 95 97   CO2 24 27   BUN 15 10   CREATININE 1.5* 1.0   CALCIUM 11.1* 9.7   PROT 10.0*  --    BILITOT 1.0  --    ALKPHOS 113  --    ALT 16  --    AST 29  --         WBC:   Recent Labs   Lab 01/07/20 0728 01/08/20 0528   WBC 26.68* 19.85*     Bands:     CBC/Anemia Labs: Coags:    Recent Labs   Lab 01/07/20 0728 01/08/20 0528   WBC 26.68* 19.85*   HGB 18.5* 17.0*   HCT 54.7* 51.8*   * 277   MCV 88 90    RDW 12.9 13.1    No results for input(s): PT, INR, APTT in the last 168 hours.       Assessment and Plan     Hospital Course:    Ms. Cipriano Gamez was admitted to Hospital Medicine for management of  Diverticulitis of large intestine without perforation or abscess without bleeding     Active Hospital Problems    Diagnosis  POA    *Diverticulitis of large intestine without perforation or abscess without bleeding [K57.32]  Yes    ORALIA (acute kidney injury) [N17.9]  Yes    Sepsis without acute organ dysfunction [A41.9]  Yes    Hematemesis with nausea [K92.0]  Yes    At risk for prolonged QT interval syndrome [Z91.89]  Not Applicable    Cyclic vomiting syndrome [R11.15]  Yes    Marijuana abuse [F12.10]  Yes    Smokes 1 pack of cigarettes per day [F17.210]  Yes    Proteinuria [R80.9]  Yes    GERD (gastroesophageal reflux disease) [K21.9]  Yes    Essential hypertension [I10]  Yes      Resolved Hospital Problems   No resolved problems to display.       Diverticulitis of large intestine without perforation or abscess without bleeding  Sepsis due to Gram negative bacteria  - presents with sepsis due to diverticulitis. WBC 26 on admit   - CT a/p on admission with area of inflammation in mid descending colon suggestive of early inflammation/diverticulitis; no evidence of perforation or abscess  - fluid resuscitated in ED with 2L NS  - initiated on cipro 400mg IV and metronidazole 500mg IV in ED; will continue cipro 400mg IV q12 and flagyl 500mg IV q8 for now  - CLD for now  - pain control: morphine 2g q4 moderate and 4g q4 severe pain  - bowel regimen: senna, miralax  - anti-emetics: zofran 4mg IV q6 (1st choice) and phenergan 6.25mg IV q6 (2nd choice)  - will need outpatient colonoscopy   - cont IV abx 1/8, WBC decreasing from 26 to 19  - on IVF     Cyclic vomiting syndrome  - continue antiemetics as above; currently not tolerating PO beyond ice chips  - may benefit from cessation of THC as outpatient, could  also consider elavil        ORALIA (acute kidney injury)  - 2/2 prerenal losses  - admission Cr 1.5, baseline is 0.8  - fluid resuscitation as above  - continue to monitor renal function with daily labs   - avoid nephrotoxins; holding lisinopril for today but could likely resume tomorrow  - renally dose all medications   - RESOLVED on 1/8/20     Essential hypertension  - hypertensive on admission with /117  - likely 2/2 pain/nausea given improvement with antiemetic/bentyl  - continue to monitor for now with pain/nausea control; would ascertain BPs are not checked while retching  - held home lisinopril 40mg daily  On admit,  Restarted on 1/8/20     Hematemesis with nausea  - reports small streak of blood in emesis yesterday; no melena or other episodes  - Hb 18.5 on admission; suspect is hemoconcentrated; baseline is 15-16  - will continue to monitor for now with daily CBC; can increase frequency if further episodes or worsening symptoms        Marijuana abuse  - encourage cessation; may worsen cyclic vomiting        Smokes 1 pack of cigarettes per day  - encourage cessation  - nicotine patch        Proteinuria  - UA with 3+ protein; serum protein 10  - would benefit from more thorough workup as outpatient once outside of acute illness     GERD (gastroesophageal reflux disease)  - currently not on any meds  - stable       Diet: clear liquids   GI PPx:  PO PPI  DVT PPx:  lovenox  Goals of Care:  Full     High Risk Conditions:  Diverticulitis     Disposition:    tbd  Tentatively 1/10      Patient's note was created using MModal Dictation.  Any errors in syntax may not have been identified and edited on initial review prior to signing this note.    Signing Physician:     Sara Lopez MD  Department of Hospital Medicine   [facility]  Pager [phone]  Spectra [phone]  1/8/2020

## 2020-01-09 NOTE — PLAN OF CARE
POC reviewed with pt. Who verbalized understanding. AAOx 4. Remains free of falls and injuries. VSS. Unable to tolerate CL diet, complains of nausea and pain, controlled with PRN meds. Independent activity. No acute events. No distress noted. WCTM.

## 2020-01-09 NOTE — PHARMACY MED REC
"Admission Medication Reconciliation - Pharmacy Consult Note    The home medication history was taken by Amelie De La Rosa, Pharmacy Technician.    You may go to "Admission" then "Reconcile Home Medications" tabs to review and/or act upon these items.      No issues noted with the medication reconciliation.    Jessica Snider, PharmD, BCPS  [phone]                .    .          "

## 2020-01-10 VITALS
WEIGHT: 200 LBS | RESPIRATION RATE: 16 BRPM | DIASTOLIC BLOOD PRESSURE: 77 MMHG | HEIGHT: 67 IN | TEMPERATURE: 98 F | HEART RATE: 73 BPM | OXYGEN SATURATION: 100 % | BODY MASS INDEX: 31.39 KG/M2 | SYSTOLIC BLOOD PRESSURE: 124 MMHG

## 2020-01-10 LAB
ANION GAP SERPL CALC-SCNC: 12 MMOL/L (ref 8–16)
BASOPHILS # BLD AUTO: 0.06 K/UL (ref 0–0.2)
BASOPHILS NFR BLD: 0.4 % (ref 0–1.9)
BUN SERPL-MCNC: 10 MG/DL (ref 6–20)
CALCIUM SERPL-MCNC: 9.2 MG/DL (ref 8.7–10.5)
CHLORIDE SERPL-SCNC: 103 MMOL/L (ref 95–110)
CO2 SERPL-SCNC: 23 MMOL/L (ref 23–29)
CREAT SERPL-MCNC: 1 MG/DL (ref 0.5–1.4)
DIFFERENTIAL METHOD: ABNORMAL
EOSINOPHIL # BLD AUTO: 0.2 K/UL (ref 0–0.5)
EOSINOPHIL NFR BLD: 1.1 % (ref 0–8)
ERYTHROCYTE [DISTWIDTH] IN BLOOD BY AUTOMATED COUNT: 12.6 % (ref 11.5–14.5)
EST. GFR  (AFRICAN AMERICAN): >60 ML/MIN/1.73 M^2
EST. GFR  (NON AFRICAN AMERICAN): >60 ML/MIN/1.73 M^2
GLUCOSE SERPL-MCNC: 116 MG/DL (ref 70–110)
HCT VFR BLD AUTO: 50.4 % (ref 37–48.5)
HGB BLD-MCNC: 16.1 G/DL (ref 12–16)
IMM GRANULOCYTES # BLD AUTO: 0.06 K/UL (ref 0–0.04)
IMM GRANULOCYTES NFR BLD AUTO: 0.4 % (ref 0–0.5)
LYMPHOCYTES # BLD AUTO: 3.5 K/UL (ref 1–4.8)
LYMPHOCYTES NFR BLD: 24.9 % (ref 18–48)
MCH RBC QN AUTO: 29.5 PG (ref 27–31)
MCHC RBC AUTO-ENTMCNC: 31.9 G/DL (ref 32–36)
MCV RBC AUTO: 93 FL (ref 82–98)
MONOCYTES # BLD AUTO: 1 K/UL (ref 0.3–1)
MONOCYTES NFR BLD: 7.4 % (ref 4–15)
NEUTROPHILS # BLD AUTO: 9.2 K/UL (ref 1.8–7.7)
NEUTROPHILS NFR BLD: 65.8 % (ref 38–73)
NRBC BLD-RTO: 0 /100 WBC
PLATELET # BLD AUTO: 267 K/UL (ref 150–350)
PMV BLD AUTO: 11.4 FL (ref 9.2–12.9)
POTASSIUM SERPL-SCNC: 3 MMOL/L (ref 3.5–5.1)
RBC # BLD AUTO: 5.45 M/UL (ref 4–5.4)
SODIUM SERPL-SCNC: 138 MMOL/L (ref 136–145)
WBC # BLD AUTO: 13.96 K/UL (ref 3.9–12.7)

## 2020-01-10 PROCEDURE — S4991 NICOTINE PATCH NONLEGEND: HCPCS | Performed by: HOSPITALIST

## 2020-01-10 PROCEDURE — 85025 COMPLETE CBC W/AUTO DIFF WBC: CPT

## 2020-01-10 PROCEDURE — 25000003 PHARM REV CODE 250: Performed by: HOSPITALIST

## 2020-01-10 PROCEDURE — 99239 HOSP IP/OBS DSCHRG MGMT >30: CPT | Mod: ,,, | Performed by: HOSPITALIST

## 2020-01-10 PROCEDURE — 36415 COLL VENOUS BLD VENIPUNCTURE: CPT

## 2020-01-10 PROCEDURE — 80048 BASIC METABOLIC PNL TOTAL CA: CPT

## 2020-01-10 PROCEDURE — 99239 PR HOSPITAL DISCHARGE DAY,>30 MIN: ICD-10-PCS | Mod: ,,, | Performed by: HOSPITALIST

## 2020-01-10 PROCEDURE — 63600175 PHARM REV CODE 636 W HCPCS: Performed by: HOSPITALIST

## 2020-01-10 RX ORDER — AMOXICILLIN AND CLAVULANATE POTASSIUM 875; 125 MG/1; MG/1
1 TABLET, FILM COATED ORAL EVERY 12 HOURS
Qty: 7 TABLET | Refills: 0 | Status: SHIPPED | OUTPATIENT
Start: 2020-01-10 | End: 2020-01-14

## 2020-01-10 RX ORDER — METOCLOPRAMIDE 5 MG/1
10 TABLET ORAL EVERY 6 HOURS PRN
Status: DISCONTINUED | OUTPATIENT
Start: 2020-01-10 | End: 2020-01-10 | Stop reason: HOSPADM

## 2020-01-10 RX ORDER — ONDANSETRON 4 MG/1
4 TABLET, FILM COATED ORAL EVERY 8 HOURS PRN
Qty: 20 TABLET | Refills: 0 | Status: SHIPPED | OUTPATIENT
Start: 2020-01-10 | End: 2020-03-13 | Stop reason: ALTCHOICE

## 2020-01-10 RX ORDER — METOCLOPRAMIDE 10 MG/1
10 TABLET ORAL EVERY 8 HOURS PRN
Qty: 30 TABLET | Refills: 0 | Status: SHIPPED | OUTPATIENT
Start: 2020-01-10 | End: 2020-03-13 | Stop reason: ALTCHOICE

## 2020-01-10 RX ORDER — LISINOPRIL 40 MG/1
40 TABLET ORAL DAILY
Qty: 90 TABLET | Refills: 1 | Status: SHIPPED | OUTPATIENT
Start: 2020-01-10 | End: 2020-03-13 | Stop reason: ALTCHOICE

## 2020-01-10 RX ORDER — AMLODIPINE BESYLATE 5 MG/1
5 TABLET ORAL DAILY
Qty: 30 TABLET | Refills: 2 | Status: SHIPPED | OUTPATIENT
Start: 2020-01-11 | End: 2020-03-13 | Stop reason: SDUPTHER

## 2020-01-10 RX ORDER — OXYCODONE HYDROCHLORIDE 10 MG/1
10 TABLET ORAL EVERY 8 HOURS PRN
Qty: 12 TABLET | Refills: 0 | Status: SHIPPED | OUTPATIENT
Start: 2020-01-10 | End: 2020-03-13 | Stop reason: ALTCHOICE

## 2020-01-10 RX ORDER — POTASSIUM CHLORIDE 20 MEQ/1
40 TABLET, EXTENDED RELEASE ORAL EVERY 4 HOURS
Status: COMPLETED | OUTPATIENT
Start: 2020-01-10 | End: 2020-01-10

## 2020-01-10 RX ADMIN — OXYCODONE HYDROCHLORIDE 10 MG: 10 TABLET ORAL at 08:01

## 2020-01-10 RX ADMIN — METOCLOPRAMIDE 10 MG: 5 INJECTION, SOLUTION INTRAMUSCULAR; INTRAVENOUS at 08:01

## 2020-01-10 RX ADMIN — AMOXICILLIN AND CLAVULANATE POTASSIUM 1 TABLET: 875; 125 TABLET, FILM COATED ORAL at 08:01

## 2020-01-10 RX ADMIN — POTASSIUM CHLORIDE 40 MEQ: 1500 TABLET, EXTENDED RELEASE ORAL at 09:01

## 2020-01-10 RX ADMIN — NICOTINE 1 PATCH: 21 PATCH, EXTENDED RELEASE TRANSDERMAL at 08:01

## 2020-01-10 RX ADMIN — LISINOPRIL 40 MG: 20 TABLET ORAL at 08:01

## 2020-01-10 RX ADMIN — AMLODIPINE BESYLATE 5 MG: 5 TABLET ORAL at 08:01

## 2020-01-10 RX ADMIN — POTASSIUM CHLORIDE 40 MEQ: 1500 TABLET, EXTENDED RELEASE ORAL at 01:01

## 2020-01-10 NOTE — PLAN OF CARE
Patient discharged to home with follow up to GI suggested. Discharge instructions verbally given and hard copy provided to patient. Prescriptions delivered bedside. Removed LT FA 20g IV with cath tip in place. Patient tolerated IV removal well with bleeding controlled. Patient remains free of falls with no acute pain or distress noted. Patient awaiting transport.     Dr. Lopez gave ok to d/c patient.

## 2020-01-10 NOTE — PLAN OF CARE
Pt free of falls c/o of abd pain x1 on last night and nausea. Medicated with Morphine 2mg  and Odansteron. Medications effective.

## 2020-01-10 NOTE — PROGRESS NOTES
Progress Note  Hospital Medicine  [facility]       Patient Name: Cipriano Gamez  MRN:  [id number]  Hospital Medicine Team: [facility] [place] [facility] X Sara Lopez MD  Date of Admission:  1/7/2020     Length of Stay:  LOS: 2 days   Expected Discharge Date: 1/10/2020  Principal Problem:  Diverticulitis of large intestine without perforation or abscess without bleeding     Subjective:     Interval History/Overnight Events:    Patient doing very well today.  Abdominal pain improving.  Nausea improving.  Not able to tolerate clear liquids.  White count decreasing to 16.  Creatinine is normal.  Patient had a mild blister formation after her IVs ciprofloxacin infusion.  Unclear if this is IV Cipro reaction.  Antibiotics de-escalated to p.o. Augmentin.  IV pain medications de-escalated to p.o. oxycodone, and IV for breakthrough pain     amLODIPine  5 mg Oral Daily    amoxicillin-clavulanate 875-125mg  1 tablet Oral Q12H    enoxaparin  40 mg Subcutaneous Daily    lisinopril  40 mg Oral Daily    nicotine  1 patch Transdermal Daily    polyethylene glycol  17 g Oral Daily           acetaminophen, dextrose 10 % in water (D10W), dextrose 10 % in water (D10W), glucagon (human recombinant), glucose, glucose, metoclopramide HCl, morphine, oxyCODONE, promethazine (PHENERGAN) IVPB, senna-docusate 8.6-50 mg, sodium chloride 0.9%, sodium chloride 0.9%    Review of Systems   Constitutional: Positive for chills. Negative for diaphoresis and fever.   HENT: Positive for sore throat. Negative for congestion.    Eyes: Negative for discharge and visual disturbance.   Respiratory: Positive for shortness of breath. Negative for cough.    Cardiovascular: Negative for chest pain and leg swelling.   Gastrointestinal: Positive for abdominal pain (Left lower quadrant), constipation, nausea and vomiting. Negative for blood in stool and diarrhea.   Genitourinary: Negative for difficulty urinating.   Musculoskeletal:  Negative for arthralgias and joint swelling.   Skin: Negative for rash and wound.   Allergic/Immunologic: Negative for immunocompromised state.   Neurological: Negative for light-headedness and headaches.   Psychiatric/Behavioral: Negative for agitation and confusion.     Objective:     Temp:  [96.9 °F (36.1 °C)-98.9 °F (37.2 °C)]   Pulse:  [83-94]   Resp:  [12-18]   BP: (137-176)/()   SpO2:  [98 %-100 %]           Body mass index is 31.32 kg/m².       Physical Exam:  Constitutional: She is oriented to person, place, and time. She appears well-developed and well-nourished.  NAD  HENT:   Head: Normocephalic and atraumatic.   Nose: Nose normal.   Eyes: Pupils are equal, round, and reactive to light. EOM are normal. No scleral icterus.   Neck: Normal range of motion. No JVD present. No tracheal deviation present.   Cardiovascular: Normal rate, regular rhythm and normal heart sounds.   Pulmonary/Chest: Effort normal and breath sounds normal. No respiratory distress.   Abdominal: Soft. She exhibits no distension and no mass. There is tenderness (mild LLQ)- improved . There is no rebound and no guarding.   Musculoskeletal: She exhibits no edema or deformity.   Neurological: She is alert and oriented to person, place, and time.   Skin: Skin is warm and dry. No rash noted. She is not diaphoretic.   Psychiatric: She has a normal mood and affect. Her behavior is normal.     Labs:    Chemistries:   Recent Labs   Lab 01/07/20 0728 01/08/20 0528 01/09/20  0323    138 135*   K 4.1 2.8* 3.4*   CL 95 97 99   CO2 24 27 25   BUN 15 10 8   CREATININE 1.5* 1.0 0.9   CALCIUM 11.1* 9.7 9.7   PROT 10.0*  --  7.7   BILITOT 1.0  --  0.7   ALKPHOS 113  --  104   ALT 16  --  18   AST 29  --  20        WBC:   Recent Labs   Lab 01/07/20 0728 01/08/20 0528 01/09/20  0323   WBC 26.68* 19.85* 16.51*     Bands:     CBC/Anemia Labs: Coags:    Recent Labs   Lab 01/07/20  0728 01/08/20  0528 01/09/20  0323   WBC 26.68* 19.85* 16.51*    HGB 18.5* 17.0* 17.2*   HCT 54.7* 51.8* 52.8*   * 277 294   MCV 88 90 90   RDW 12.9 13.1 12.8    No results for input(s): PT, INR, APTT in the last 168 hours.       Assessment and Plan     Hospital Course:    Ms. Cipriano Gamez was admitted to Hospital Medicine for management of  Diverticulitis of large intestine without perforation or abscess without bleeding     Active Hospital Problems    Diagnosis  POA    *Diverticulitis of large intestine without perforation or abscess without bleeding [K57.32]  Yes    Sepsis due to Gram negative bacteria [A41.50]  Yes    ORALIA (acute kidney injury) [N17.9]  Yes    Sepsis without acute organ dysfunction [A41.9]  Yes    Hematemesis with nausea [K92.0]  Yes    At risk for prolonged QT interval syndrome [Z91.89]  Not Applicable    Cyclic vomiting syndrome [R11.15]  Yes    Marijuana abuse [F12.10]  Yes    Smokes 1 pack of cigarettes per day [F17.210]  Yes    Proteinuria [R80.9]  Yes    GERD (gastroesophageal reflux disease) [K21.9]  Yes    Essential hypertension [I10]  Yes      Resolved Hospital Problems   No resolved problems to display.       Diverticulitis of large intestine without perforation or abscess without bleeding  Sepsis due to Gram negative bacteria  - presents with sepsis due to diverticulitis. WBC 26 on admit   - CT a/p on admission with area of inflammation in mid descending colon suggestive of early inflammation/diverticulitis; no evidence of perforation or abscess  - fluid resuscitated in ED with 2L NS  - initiated on cipro 400mg IV and metronidazole 500mg IV in ED; will continue cipro 400mg IV q12 and flagyl 500mg IV q8 for now  - CLD for now  - pain control: morphine 2g q4 moderate and 4g q4 severe pain  - bowel regimen: senna, miralax  - anti-emetics: zofran 4mg IV q6 (1st choice) and phenergan 6.25mg IV q6 (2nd choice)  - will need outpatient colonoscopy   - cont IV abx 1/8, WBC decreasing from 26 to 19  - antibiotics de-escalated to p.o.  Augmentin from IV Cipro and Flagyl on 01/09/2020.  Pain meds de-escalated to p.o. tolerating clear liquid diet     Cyclic vomiting syndrome  - continue antiemetics as above; currently not tolerating PO beyond ice chips  - may benefit from cessation of THC as outpatient, could also consider elavil        ORALIA (acute kidney injury)  - 2/2 prerenal losses  - admission Cr 1.5, baseline is 0.8  - fluid resuscitation as above  - continue to monitor renal function with daily labs   - avoid nephrotoxins; holding lisinopril for today but could likely resume tomorrow  - renally dose all medications   - RESOLVED on 1/8/20     Essential hypertension  - hypertensive on admission with /117  - likely 2/2 pain/nausea given improvement with antiemetic/bentyl  - continue to monitor for now with pain/nausea control; would ascertain BPs are not checked while retching  - held home lisinopril 40mg daily  On admit,  Restarted on 1/8/20     Hematemesis with nausea  - reports small streak of blood in emesis yesterday; no melena or other episodes  - Hb 18.5 on admission; suspect is hemoconcentrated; baseline is 15-16  - will continue to monitor for now with daily CBC; can increase frequency if further episodes or worsening symptoms        Marijuana abuse  - encourage cessation; may worsen cyclic vomiting        Smokes 1 pack of cigarettes per day  - encourage cessation  - nicotine patch        Proteinuria  - UA with 3+ protein; serum protein 10  - would benefit from more thorough workup as outpatient once outside of acute illness     GERD (gastroesophageal reflux disease)  - currently not on any meds  - stable       Diet: clear liquids - full liquids   GI PPx:  PO PPI  DVT PPx:  lovenox  Goals of Care:  Full     High Risk Conditions:  Diverticulitis     Disposition:    tbd  Tentatively 1/10 -1/11      Patient's note was created using MModal Dictation.  Any errors in syntax may not have been identified and edited on initial review prior  to signing this note.    Signing Physician:     Sara Lopez MD  Department of Hospital Medicine   [facility]  Pager [phone] [place] [phone]  1/9/2020

## 2020-01-10 NOTE — PLAN OF CARE
01/10/20 1637   Final Note   Assessment Type Final Discharge Note   Anticipated Discharge Disposition Home   What phone number can be called within the next 1-3 days to see how you are doing after discharge? [phone]   Hospital Follow Up  Appt(s) scheduled? Yes   Discharge plans and expectations educations in teach back method with documentation complete? Yes     Future Appointments   Date Time Provider Department Center   1/23/2020  1:40 PM Edi Jorge MD [facility] [place]

## 2020-01-10 NOTE — NURSING
VSS. SBP elevated 160s with oral medications added today. On RA. Abdominal pain improving. IV Morphine d/c'd with oral Oxy available. Nausea improving. Tolerating clear liquid diet 100 % with one episode of nausea managed well with IV Reglan. Patient had a small  blister to her anterior FA above IV site possibly from IV ciprofloxacin infusion. Infusion stopped and MD notified. No acute distress or needs at this time. WCTM

## 2020-01-11 ENCOUNTER — PATIENT MESSAGE (OUTPATIENT)
Dept: INTERNAL MEDICINE | Facility: CLINIC | Age: 45
End: 2020-01-11

## 2020-01-13 ENCOUNTER — PATIENT MESSAGE (OUTPATIENT)
Dept: INTERNAL MEDICINE | Facility: CLINIC | Age: 45
End: 2020-01-13

## 2020-01-13 RX ORDER — MELOXICAM 15 MG/1
15 TABLET ORAL DAILY PRN
Qty: 30 TABLET | Refills: 0 | Status: SHIPPED | OUTPATIENT
Start: 2020-01-13 | End: 2020-01-23

## 2020-01-13 RX ORDER — OMEPRAZOLE 20 MG/1
20 CAPSULE, DELAYED RELEASE ORAL DAILY PRN
Qty: 30 CAPSULE | Refills: 0 | Status: SHIPPED | OUTPATIENT
Start: 2020-01-13 | End: 2020-01-23

## 2020-01-13 NOTE — DISCHARGE SUMMARY
"DISCHARGE SUMMARY  Hospital Medicine    Team: [facility]    Patient Name: Cipriano Gamez  YOB: 1975    Admit Date: 1/7/2020    Discharge Date: 01/10/2020    Discharge Attending Physician: ISABELLA Lopez MD    Chief Complaint: Diverticulitis of large intestine without perforation or abscess without bleeding    Princilpal Diagnoses:  Active Hospital Problems    Diagnosis  POA    *Diverticulitis of large intestine without perforation or abscess without bleeding [K57.32]  Yes    Sepsis due to Gram negative bacteria [A41.50]  Yes    ORALIA (acute kidney injury) [N17.9]  Yes    Sepsis without acute organ dysfunction [A41.9]  Yes    Hematemesis with nausea [K92.0]  Yes    At risk for prolonged QT interval syndrome [Z91.89]  Not Applicable    Cyclic vomiting syndrome [R11.15]  Yes    Marijuana abuse [F12.10]  Yes    Smokes 1 pack of cigarettes per day [F17.210]  Yes    Proteinuria [R80.9]  Yes    GERD (gastroesophageal reflux disease) [K21.9]  Yes    Essential hypertension [I10]  Yes      Resolved Hospital Problems   No resolved problems to display.       Discharged Condition: Admit problems have stabilized       HOSPITAL COURSE:      Initial Presentation:    As per admitting provider,     45yo woman with cyclic vomiting syndrome, HTN, GERD, and hx of diverticulitis who presents with 2d of LLQ abdominal pain, nausea, and vomiting. She states that she began experiencing sharp, intermittent bouts of severe 10/10 left lower quadrant abdominal pain two days ago. Episodes will come and go, lasting for "several minutes." No associated exacerbating or relieving factors. Episodes occur at night. She has not had a BM during this time and has only eaten ice chips. She states that nausea and vomiting began shortly after abdominal pain, which was initially non-bloody, non-bilious; yesterday she noted a streak of blood in her vomit. Reports a sore throat but denies chest pain, fevers, or diarrhea. Does note " "subjective shortness of breath with exertion starting yesterday but unable to clearly characterize. She felt that she had "shaking chills" as well.     She denies any past abdominal surgeries. She has had multiple episodes of diverticulitis in the past and was last admitted in 5/19 to CRS service for diverticulitis with contained sigmoid perforation which was managed conservatively. She has been offered colectomy in the past but refused. Last Cscope in 2011; she reports she had polyps at that time, but I have been unable to verify in Epic.     In the ED, she was found to be afebrile with HR in low 100s. WBC 26.6. CT a/p with mid descending colon with diverticulitis without perforation or abscess. She was bolused 2L NS and started on cipro/flagyl.    Course of Principle Problem for Admission:    Diverticulitis of large intestine without perforation or abscess without bleeding  Sepsis due to Gram negative bacteria  - presents with sepsis due to diverticulitis. WBC 26 on admit   - CT a/p on admission with area of inflammation in mid descending colon suggestive of early inflammation/diverticulitis; no evidence of perforation or abscess  - fluid resuscitated in ED with 2L NS  - initiated on cipro 400mg IV and metronidazole 500mg IV in ED; will continue cipro 400mg IV q12 and flagyl 500mg IV q8 for now  - CLD for now  - pain control: morphine 2g q4 moderate and 4g q4 severe pain  - bowel regimen: senna, miralax  - anti-emetics: zofran 4mg IV q6 (1st choice) and phenergan 6.25mg IV q6 (2nd choice)  - will need outpatient colonoscopy   - cont IV abx 1/8, WBC decreasing from 26 to 19  - antibiotics de-escalated to p.o. Augmentin from IV Cipro and Flagyl on 01/09/2020.  Pain meds de-escalated to p.o. tolerating clear liquid diet     Cyclic vomiting syndrome  - continue antiemetics as above; currently not tolerating PO beyond ice chips  - may benefit from cessation of THC as outpatient, could also consider elavil       On the " day of d/c, patient was doing well with no acute issues. Tolerated bland diet with no n/v.  D/c with PO abx and pain meds GI follow in clinic           Physical Exam:  Constitutional: She is oriented to person, place, and time. She appears well-developed and well-nourished.  NAD  HENT:   Head: Normocephalic and atraumatic.   Nose: Nose normal.   Eyes: Pupils are equal, round, and reactive to light. EOM are normal. No scleral icterus.   Neck: Normal range of motion. No JVD present. No tracheal deviation present.   Cardiovascular: Normal rate, regular rhythm and normal heart sounds.   Pulmonary/Chest: Effort normal and breath sounds normal. No respiratory distress.   Abdominal: Soft. She exhibits no distension and no mass. There is tenderness (mild LLQ)- improved . There is no rebound and no guarding.   Musculoskeletal: She exhibits no edema or deformity.   Neurological: She is alert and oriented to person, place, and time.   Skin: Skin is warm and dry. No rash noted. She is not diaphoretic.   Psychiatric: She has a normal mood and affect. Her behavior is normal.       Other Medical Problems Addressed in the Hospital:    ORALIA (acute kidney injury)  - 2/2 prerenal losses  - admission Cr 1.5, baseline is 0.8  - fluid resuscitation as above  - continue to monitor renal function with daily labs   - avoid nephrotoxins; holding lisinopril for today but could likely resume tomorrow  - renally dose all medications   - RESOLVED on 1/8/20     Essential hypertension  - hypertensive on admission with /117  - likely 2/2 pain/nausea given improvement with antiemetic/bentyl  - continue to monitor for now with pain/nausea control; would ascertain BPs are not checked while retching  - held home lisinopril 40mg daily  On admit,  Restarted on 1/8/20     Hematemesis with nausea  - reports small streak of blood in emesis yesterday; no melena or other episodes  - Hb 18.5 on admission; suspect is hemoconcentrated; baseline is 15-16  -  will continue to monitor for now with daily CBC; can increase frequency if further episodes or worsening symptoms        Marijuana abuse  - encourage cessation; may worsen cyclic vomiting    CONSULTS: none    PROCEDURES: none    Labs:    Chemistries:   Recent Labs   Lab 01/07/20  0728 01/08/20  0528 01/09/20  0323 01/10/20  0306    138 135* 138   K 4.1 2.8* 3.4* 3.0*   CL 95 97 99 103   CO2 24 27 25 23   BUN 15 10 8 10   CREATININE 1.5* 1.0 0.9 1.0   CALCIUM 11.1* 9.7 9.7 9.2   PROT 10.0*  --  7.7  --    BILITOT 1.0  --  0.7  --    ALKPHOS 113  --  104  --    ALT 16  --  18  --    AST 29  --  20  --         WBC:   Recent Labs   Lab 01/07/20  0728 01/08/20  0528 01/09/20  0323 01/10/20  0306   WBC 26.68* 19.85* 16.51* 13.96*     Bands:     CBC/Anemia Labs: Coags:    Recent Labs   Lab 01/08/20  0528 01/09/20  0323 01/10/20  0306   WBC 19.85* 16.51* 13.96*   HGB 17.0* 17.2* 16.1*   HCT 51.8* 52.8* 50.4*    294 267   MCV 90 90 93   RDW 13.1 12.8 12.6    No results for input(s): PT, INR, APTT in the last 168 hours.         Disposition:  Home       Future Scheduled Appointments:  Future Appointments   Date Time Provider Department Center   1/23/2020  1:40 PM Edi Jorge MD [facility]       Follow-up Plans from This Hospitalization:  PCP   GI    Discharge Medication List:       Cipriano Gamez   Home Medication Instructions GENARO:[id number]    Printed on:01/12/20 1811   Medication Information                      acetaminophen (TYLENOL EXTRA STRENGTH) 500 MG tablet  Take 500 mg by mouth daily as needed for Pain.             amLODIPine (NORVASC) 5 MG tablet  Take 1 tablet (5 mg total) by mouth once daily.             amoxicillin-clavulanate 875-125mg (AUGMENTIN) 875-125 mg per tablet  Take 1 tablet by mouth every 12 (twelve) hours. for 7 doses             diphenhydrAMINE (SOMINEX) 25 mg tablet  Take 25 mg by mouth nightly as needed for Insomnia.              lisinopril (PRINIVIL,ZESTRIL) 40  MG tablet  Take 1 tablet (40 mg total) by mouth once daily. Further refills require visit with Dr. Jorge             metoclopramide HCl (REGLAN) 10 MG tablet  Take 1 tablet (10 mg total) by mouth every 8 (eight) hours as needed (nausea).             ondansetron (ZOFRAN) 4 MG tablet  Take 1 tablet (4 mg total) by mouth every 8 (eight) hours as needed for Nausea.             oxyCODONE (ROXICODONE) 10 mg Tab immediate release tablet  Take 1 tablet (10 mg total) by mouth every 8 (eight) hours as needed (SEVERE PAIN).                   At the time of discharge patient was told to take all medications as prescribed, to keep all followup appointments, and to call their primary care physician or return to the emergency room if they have any worsening or concerning symptoms.    Time spent on the discharge of the patient including review of hospital course with the patient. reviewing discharge medications and arranging follow-up care 45 minutes.  Patient was seen and examined on the date of discharge and determined to be suitable for discharge.        Signing Physician:  Sara Lopez MD

## 2020-01-14 ENCOUNTER — TELEPHONE (OUTPATIENT)
Dept: INTERNAL MEDICINE | Facility: CLINIC | Age: 45
End: 2020-01-14

## 2020-01-14 ENCOUNTER — PATIENT MESSAGE (OUTPATIENT)
Dept: INTERNAL MEDICINE | Facility: CLINIC | Age: 45
End: 2020-01-14

## 2020-01-14 DIAGNOSIS — M54.41 CHRONIC RIGHT-SIDED LOW BACK PAIN WITH RIGHT-SIDED SCIATICA: ICD-10-CM

## 2020-01-14 DIAGNOSIS — G89.29 CHRONIC RIGHT-SIDED LOW BACK PAIN WITH RIGHT-SIDED SCIATICA: ICD-10-CM

## 2020-01-14 DIAGNOSIS — K57.92 DIVERTICULITIS: Primary | ICD-10-CM

## 2020-01-14 NOTE — TELEPHONE ENCOUNTER
----- Message from Edi Jorge MD sent at 1/14/2020  3:16 PM CST -----  Please contact patient to schedule the following post hospital blood work and X-ray.  To be done as soon as possible     CMP, CBC, XR-lumbar

## 2020-01-14 NOTE — TELEPHONE ENCOUNTER
Multiple attempts to contact the patient with primary contact number.  Phone number is no longer in service.

## 2020-01-14 NOTE — TELEPHONE ENCOUNTER
Patient states that the reason she has intolerable pain is unrelated to her abdominal symptoms from diverticulitis.  She states that this is getting better.  States that they issue for pain is been relapsing and remitting for the past several months.  She describes a pain that starts in the right gluteal region and radiates down the posterior and lateral aspect of her right lower extremity.  Prior to this episode she has not had similar symptoms.  No obvious traumatic inciting factor.  Patient finds that the pain is intolerable at night and she cannot sleep adequately because of recurrences in pain due to certain sleep positions which are unavoidable.    Patient states that oxycodone was attempted once on coming home.  She had itching.  She did not repeat the medication.  Patient has been on morphine while in the hospital and did not have problems with itching.  There is discussion in her hospital discharge summary that she was transition to oral medication before she left the hospital.  No itching on oral medication in the hospital.  Recommend patient try again the oxycodone and premedicate with Claritin.  If this does not adequately help she is to contact the clinic for further support

## 2020-01-16 ENCOUNTER — HOSPITAL ENCOUNTER (OUTPATIENT)
Dept: RADIOLOGY | Facility: HOSPITAL | Age: 45
Discharge: HOME | End: 2020-01-16
Attending: INTERNAL MEDICINE
Payer: COMMERCIAL

## 2020-01-16 DIAGNOSIS — G89.29 CHRONIC RIGHT-SIDED LOW BACK PAIN WITH RIGHT-SIDED SCIATICA: ICD-10-CM

## 2020-01-16 DIAGNOSIS — M54.41 CHRONIC RIGHT-SIDED LOW BACK PAIN WITH RIGHT-SIDED SCIATICA: ICD-10-CM

## 2020-01-16 PROCEDURE — 72100 X-RAY EXAM L-S SPINE 2/3 VWS: CPT | Mod: TC,FY,PO

## 2020-01-16 PROCEDURE — 72100 XR LUMBAR SPINE AP AND LATERAL: ICD-10-PCS | Mod: 26,,, | Performed by: RADIOLOGY

## 2020-01-16 PROCEDURE — 72100 X-RAY EXAM L-S SPINE 2/3 VWS: CPT | Mod: 26,,, | Performed by: RADIOLOGY

## 2020-01-23 ENCOUNTER — OFFICE VISIT (OUTPATIENT)
Dept: INTERNAL MEDICINE | Facility: CLINIC | Age: 45
End: 2020-01-23
Payer: COMMERCIAL

## 2020-01-23 VITALS
SYSTOLIC BLOOD PRESSURE: 134 MMHG | HEART RATE: 92 BPM | BODY MASS INDEX: 33.15 KG/M2 | DIASTOLIC BLOOD PRESSURE: 92 MMHG | WEIGHT: 211.19 LBS | OXYGEN SATURATION: 98 % | HEIGHT: 67 IN

## 2020-01-23 DIAGNOSIS — K52.3 INDETERMINATE COLITIS: ICD-10-CM

## 2020-01-23 DIAGNOSIS — G89.29 CHRONIC RIGHT-SIDED LOW BACK PAIN WITH RIGHT-SIDED SCIATICA: Primary | ICD-10-CM

## 2020-01-23 DIAGNOSIS — R87.613 HSIL (HIGH GRADE SQUAMOUS INTRAEPITHELIAL LESION) ON PAP SMEAR OF CERVIX: ICD-10-CM

## 2020-01-23 DIAGNOSIS — M54.41 CHRONIC RIGHT-SIDED LOW BACK PAIN WITH RIGHT-SIDED SCIATICA: Primary | ICD-10-CM

## 2020-01-23 PROCEDURE — 3008F BODY MASS INDEX DOCD: CPT | Mod: CPTII,S$GLB,, | Performed by: INTERNAL MEDICINE

## 2020-01-23 PROCEDURE — 3075F SYST BP GE 130 - 139MM HG: CPT | Mod: CPTII,S$GLB,, | Performed by: INTERNAL MEDICINE

## 2020-01-23 PROCEDURE — 3075F PR MOST RECENT SYSTOLIC BLOOD PRESS GE 130-139MM HG: ICD-10-PCS | Mod: CPTII,S$GLB,, | Performed by: INTERNAL MEDICINE

## 2020-01-23 PROCEDURE — 3080F PR MOST RECENT DIASTOLIC BLOOD PRESSURE >= 90 MM HG: ICD-10-PCS | Mod: CPTII,S$GLB,, | Performed by: INTERNAL MEDICINE

## 2020-01-23 PROCEDURE — 3080F DIAST BP >= 90 MM HG: CPT | Mod: CPTII,S$GLB,, | Performed by: INTERNAL MEDICINE

## 2020-01-23 PROCEDURE — 99999 PR PBB SHADOW E&M-EST. PATIENT-LVL V: ICD-10-PCS | Mod: PBBFAC,,, | Performed by: INTERNAL MEDICINE

## 2020-01-23 PROCEDURE — 3008F PR BODY MASS INDEX (BMI) DOCUMENTED: ICD-10-PCS | Mod: CPTII,S$GLB,, | Performed by: INTERNAL MEDICINE

## 2020-01-23 PROCEDURE — 99214 OFFICE O/P EST MOD 30 MIN: CPT | Mod: S$GLB,,, | Performed by: INTERNAL MEDICINE

## 2020-01-23 PROCEDURE — 99999 PR PBB SHADOW E&M-EST. PATIENT-LVL V: CPT | Mod: PBBFAC,,, | Performed by: INTERNAL MEDICINE

## 2020-01-23 PROCEDURE — 99214 PR OFFICE/OUTPT VISIT, EST, LEVL IV, 30-39 MIN: ICD-10-PCS | Mod: S$GLB,,, | Performed by: INTERNAL MEDICINE

## 2020-01-23 RX ORDER — CYCLOBENZAPRINE HCL 5 MG
TABLET ORAL
Qty: 30 TABLET | Refills: 0 | Status: SHIPPED | OUTPATIENT
Start: 2020-01-23 | End: 2020-02-05

## 2020-01-23 NOTE — PROGRESS NOTES
Portions of this note are generated with voice recognition software. Typographical errors may exist.     SUBJECTIVE:    This is a/an 44 y.o. female here for primary care visit for  Chief Complaint   Patient presents with    Hip Pain     Patient states for the better part of the past month she has been having nightly cramping sensation starting in the right posterior gluteal region and going down to the shin.  Patient did have significant hypokalemia in relation to gastroenteritis with diarrhea and nausea and vomiting.  Even after recovering from this patient still has having cramping.  Patient has been taking opiate medication from discharge for abdominal pain from diverticulitis but states that the diverticulitis pain has improved and resolved very quickly.  She continues to take and has completed the oxycodone for nightly cramping pain described above.  She has not tried an alternative medication.    Patient states that she neglected to review messages regarding abnormal Pap test.  States that she is very interested to reestablish with gynecology care.  She has a history of high-grade lesions in the past treated with LEEP.       Medications Reviewed and Updated    Past medical, family, and social histories were reviewed and updated.    Review of Systems negative unless otherwise noted in history of present illness-  ROS    General ROS: negative  Psychological ROS: negative  ENT ROS: negative  Endocrine ROS: Negative  Allergy and Immunology ROS: negative  Cardiovascular ROS: negative  Pulmonary ROS: Negative  Gastrointestinal ROS: negative  Genito-Urinary ROS: negative  Musculoskeletal ROS: negative  Neurological ROS: negative  Dermatological ROS: negative        Allergic:    Review of patient's allergies indicates:   Allergen Reactions    Ciprofloxacin Blisters and Swelling    Ibuprofen Hives    Meloxicam      rash       OBJECTIVE:  BP: (!) 134/92 Pulse: 92    Wt Readings from Last 3 Encounters:   01/23/20  95.8 kg (211 lb 3.2 oz)   01/07/20 90.7 kg (200 lb)   07/03/19 95.7 kg (210 lb 15.7 oz)    Body mass index is 33.08 kg/m².  Previous Blood Pressure Readings :   BP Readings from Last 3 Encounters:   01/23/20 (!) 134/92   01/10/20 124/77   07/03/19 (!) 136/90       Physical Exam    GEN: No apparent distress  HEENT: sclera non-icteric, conjunctiva clear  CV: no peripheral edema  PULM: breathing non-labored  ABD: non, protuberant abdomen.  PSYCH: appropriate affect  MSK: able to rise from chair without assistance  - no focal tenderness lumbosacral spine.  - internal and external rotation right hip normal without pain.  - negative straight leg raise test.  SKIN: normal skin turgor    Pertinent Labs Reviewed       ASSESSMENT/PLAN:    Chronic right-sided low back pain with right-sided sciatica.Condition not optimally controlled. Detailed counseling on self care measures. Plan to monitor clinically in addition to plan below or as listed on After Visit Summary.   -     cyclobenzaprine (FLEXERIL) 5 MG tablet; Take 1-2 tablets as necessary at nighttime for muscle cramps  Dispense: 30 tablet; Refill: 0  -     Ambulatory Referral to Physical/Occupational Therapy    HSIL (high grade squamous intraepithelial lesion) on Pap smear of cervix.Condition not optimally controlled. Detailed counseling on self care measures. Plan to monitor clinically in addition to plan below or as listed on After Visit Summary.   -     Ambulatory Referral to Obstetrics / Gynecology    Indeterminate colitis.Etiology unclear. Not controlled. Further evaluation warranted.  Recommendations as below or as written on After Visit Summary.   - colonoscopy warranted to rule out diagnosis such as inflammatory bowel disease   -     Ambulatory Referral to Gastroenterology        Future Appointments   Date Time Provider Department Center   2/3/2020 10:00 AM Alex Waller MD [facility]   3/23/2020  1:40 PM Edi Jorge MD [facility]    4/23/2020  9:00 AM Tisha Wright MD [facility]       Edi Jorge  1/24/2020  2:10 PM

## 2020-01-27 ENCOUNTER — PATIENT MESSAGE (OUTPATIENT)
Dept: INTERNAL MEDICINE | Facility: CLINIC | Age: 45
End: 2020-01-27

## 2020-01-30 ENCOUNTER — PATIENT MESSAGE (OUTPATIENT)
Dept: INTERNAL MEDICINE | Facility: CLINIC | Age: 45
End: 2020-01-30

## 2020-02-03 ENCOUNTER — TELEPHONE (OUTPATIENT)
Dept: INTERNAL MEDICINE | Facility: CLINIC | Age: 45
End: 2020-02-03

## 2020-02-03 ENCOUNTER — PATIENT MESSAGE (OUTPATIENT)
Dept: INTERNAL MEDICINE | Facility: CLINIC | Age: 45
End: 2020-02-03

## 2020-02-03 DIAGNOSIS — G89.29 CHRONIC RIGHT-SIDED LOW BACK PAIN WITH RIGHT-SIDED SCIATICA: Primary | ICD-10-CM

## 2020-02-03 DIAGNOSIS — M54.41 CHRONIC RIGHT-SIDED LOW BACK PAIN WITH RIGHT-SIDED SCIATICA: Primary | ICD-10-CM

## 2020-02-03 RX ORDER — GABAPENTIN 100 MG/1
CAPSULE ORAL
Qty: 42 CAPSULE | Refills: 0 | Status: SHIPPED | OUTPATIENT
Start: 2020-02-03 | End: 2020-03-13

## 2020-02-06 ENCOUNTER — TELEPHONE (OUTPATIENT)
Dept: INTERNAL MEDICINE | Facility: CLINIC | Age: 45
End: 2020-02-06

## 2020-02-06 ENCOUNTER — PATIENT MESSAGE (OUTPATIENT)
Dept: INTERNAL MEDICINE | Facility: CLINIC | Age: 45
End: 2020-02-06

## 2020-02-07 ENCOUNTER — PATIENT OUTREACH (OUTPATIENT)
Dept: ADMINISTRATIVE | Facility: OTHER | Age: 45
End: 2020-02-07

## 2020-02-07 DIAGNOSIS — Z12.39 BREAST CANCER SCREENING: Primary | ICD-10-CM

## 2020-02-07 NOTE — PROGRESS NOTES
Chart reviewed.   Requested updates from Care Everywhere.  Immunizations reconciled.    updated.    Mammogram orders placed.

## 2020-02-13 ENCOUNTER — TELEPHONE (OUTPATIENT)
Dept: SURGERY | Facility: CLINIC | Age: 45
End: 2020-02-13

## 2020-02-13 NOTE — TELEPHONE ENCOUNTER
Reached out to pt due to appt change with  pt did not answer. Pt will be mailed an appt letter to address on file.

## 2020-02-18 ENCOUNTER — OFFICE VISIT (OUTPATIENT)
Dept: OBSTETRICS AND GYNECOLOGY | Facility: CLINIC | Age: 45
End: 2020-02-18
Payer: COMMERCIAL

## 2020-02-18 VITALS
WEIGHT: 209 LBS | BODY MASS INDEX: 32.8 KG/M2 | DIASTOLIC BLOOD PRESSURE: 110 MMHG | SYSTOLIC BLOOD PRESSURE: 175 MMHG | HEIGHT: 67 IN

## 2020-02-18 DIAGNOSIS — Z98.890 S/P LEEP: ICD-10-CM

## 2020-02-18 DIAGNOSIS — R87.613 HGSIL ON CYTOLOGIC SMEAR OF CERVIX: Primary | ICD-10-CM

## 2020-02-18 DIAGNOSIS — Z87.410 HISTORY OF CERVICAL DYSPLASIA: ICD-10-CM

## 2020-02-18 PROCEDURE — 3077F PR MOST RECENT SYSTOLIC BLOOD PRESSURE >= 140 MM HG: ICD-10-PCS | Mod: CPTII,S$GLB,, | Performed by: OBSTETRICS & GYNECOLOGY

## 2020-02-18 PROCEDURE — 99999 PR PBB SHADOW E&M-EST. PATIENT-LVL III: CPT | Mod: PBBFAC,,, | Performed by: OBSTETRICS & GYNECOLOGY

## 2020-02-18 PROCEDURE — 99203 PR OFFICE/OUTPT VISIT, NEW, LEVL III, 30-44 MIN: ICD-10-PCS | Mod: S$GLB,,, | Performed by: OBSTETRICS & GYNECOLOGY

## 2020-02-18 PROCEDURE — 99203 OFFICE O/P NEW LOW 30 MIN: CPT | Mod: S$GLB,,, | Performed by: OBSTETRICS & GYNECOLOGY

## 2020-02-18 PROCEDURE — 3008F BODY MASS INDEX DOCD: CPT | Mod: CPTII,S$GLB,, | Performed by: OBSTETRICS & GYNECOLOGY

## 2020-02-18 PROCEDURE — 3080F PR MOST RECENT DIASTOLIC BLOOD PRESSURE >= 90 MM HG: ICD-10-PCS | Mod: CPTII,S$GLB,, | Performed by: OBSTETRICS & GYNECOLOGY

## 2020-02-18 PROCEDURE — 3008F PR BODY MASS INDEX (BMI) DOCUMENTED: ICD-10-PCS | Mod: CPTII,S$GLB,, | Performed by: OBSTETRICS & GYNECOLOGY

## 2020-02-18 PROCEDURE — 99999 PR PBB SHADOW E&M-EST. PATIENT-LVL III: ICD-10-PCS | Mod: PBBFAC,,, | Performed by: OBSTETRICS & GYNECOLOGY

## 2020-02-18 PROCEDURE — 3080F DIAST BP >= 90 MM HG: CPT | Mod: CPTII,S$GLB,, | Performed by: OBSTETRICS & GYNECOLOGY

## 2020-02-18 PROCEDURE — 3077F SYST BP >= 140 MM HG: CPT | Mod: CPTII,S$GLB,, | Performed by: OBSTETRICS & GYNECOLOGY

## 2020-02-18 RX ORDER — LISINOPRIL 40 MG/1
TABLET ORAL
COMMUNITY
Start: 2020-02-03 | End: 2020-03-13 | Stop reason: ALTCHOICE

## 2020-02-18 NOTE — LETTER
February 18, 2020      Edi Jorge MD  [address] [facility] [address]           [facility]  [address]  [place] [place] [address]  Phone: [phone]          Patient: Cipriano Gamez   MR Number: [id number]   YOB: 1975   Date of Visit: 2/18/2020       Dear Dr. Edi Jorge:    Thank you for referring Cipriano Gamez to me for evaluation. Attached you will find relevant portions of my assessment and plan of care.    If you have questions, please do not hesitate to call me. I look forward to following Cipriano Gamez along with you.    Sincerely,    Alex Waller MD    Enclosure  CC:  No Recipients    If you would like to receive this communication electronically, please contact [email or web address] or [phone] to request more information on [employer] Link access.    For providers and/or their staff who would like to refer a patient to [facility], please contact us through our one-stop-shop provider referral line, Clarisa Walls, at [phone].    If you feel you have received this communication in error or would no longer like to receive these types of communications, please e-mail [email or web address]

## 2020-02-18 NOTE — PROGRESS NOTES
"Cc:  HGSIL on Pap 2019    HPI:  44 yr  who had Pap 2019 with result of HGSIL presents to discuss colposcopy.  Pap was done by PCP.  Pt has had prior LEEP [facility] for SIMONE III on 2014.  Pt did not have adequate f/u.  Pt does not desire future fertility.    Vitals:    20 1124   BP: (!) 175/110   Weight: 94.8 kg (209 lb)   Height: 5' 7" (1.702 m)     Physical Exam   Constitutional: She is oriented to person, place, and time. She appears well-developed and well-nourished. No distress.   Neck: Normal range of motion.   Cardiovascular: Normal rate.   No murmur heard.  Pulmonary/Chest: Effort normal. No respiratory distress.   Abdominal: Soft. She exhibits no distension.   Musculoskeletal: Normal range of motion.   Neurological: She is alert and oriented to person, place, and time. No cranial nerve deficit. Coordination normal.   Skin: She is not diaphoretic.   Psychiatric: She has a normal mood and affect. Her behavior is normal. Judgment and thought content normal.   Vitals reviewed.    30 minute discussion  Discussed need for colp.  Pt also admits she is a smoker and I stressed need for smoking cessation because smoking can influence HPV recurrennce.  Discussed probable need for hysterectomy for treatment since pt no longer interested in future fertility.  Pt states she is reluctant at this time to have hysterectomy.    Assessment/Plan  1. HGSIL on cytologic smear of cervix  Colposcopy W/BIOPSY AND ECC- Future   2. History of cervical dysplasia  Colposcopy W/BIOPSY AND ECC- Future   3. S/P LEEP       Stressed smoking cessation, pt to call when available to schedule colposcopy.  Info on hysterectomy given.  "

## 2020-02-18 NOTE — PATIENT INSTRUCTIONS
Please call [phone] and speak to Homa Goldstein to schedule your colpscopy    Laparoscopic Hysterectomy: Your Experience  Talk to your healthcare provider about how to get ready for your surgery. Your healthcare providers will talk with you about what to expect as surgery draws near. Keep in mind that your experience may differ from that of other women you know.  Before the day of surgery  Your instructions may include the following:  · Stop taking certain medicines (including aspirin) for as many days before surgery as directed.  · If you smoke, stop as long as possible before surgery.  · Do not eat or drink anything after midnight the night before surgery. This includes chewing gum and mints.  · Arrange ahead of time for a ride home from the hospital or surgery center.  · If it is prescribed, take medicine to clean out your bowels the day before surgery. Your healthcare provider can give you more details about this.  On the day of surgery  Youll change into a gown. Youll then be prepped for your procedure:  · The anesthesiologist or nurse anesthetist will discuss anesthesia with you and answer any questions you have.  · Some pubic hair may be shaved.  · An IV (intravenous) line will be put into your arm or hand. This line supplies you with medicines and fluids before, during, and after surgery.  · You may be given medicine that helps you relax. You will then be given general anesthesia to make you sleep and keep you free from pain during surgery.  Risks and complications of laparoscopic hysterectomy  Once you understand these risks, you will be asked to sign a consent form. Risks and possible complications include:  · Side effects from anesthesia  · Infection  · Bleeding, with a possible need for a transfusion  · Blood clots  · Damage to the bladder, bowel, ureters, or nearby nerves or blood vessels  · Formation of scar tissue that may cause pain or bowel obstruction in the future (more common with abdominal  approach)  · Need for a second surgery   Date Last Reviewed: 3/1/2017  © 1018-3482 [employer]. [address] [place] [address], [place] [address]. All rights reserved. This information is not intended as a substitute for professional medical care. Always follow your healthcare professional's instructions.        Problems Laparoscopic Hysterectomy Can Treat  Some health problems can cause pain or bleeding in the uterus. These problems can sometimes be helped by medicine. Or surgery may be done that keeps the uterus intact. But sometimes, the best way to relieve pain and bleeding is to remove the uterus using laparoscopic hysterectomy. Its done using small incisions.       The following are conditions hysterectomy can treat:  · Fibroids. A fibroid is a lump of muscle tissue. It grows in or on the wall of the uterus. It is not cancer. A fibroid can cause pain and heavy bleeding. It may press on the bladder or rectum. This can lead to frequent urination, constipation, and other problems.  · Endometriosis. Endometriosis is the growth of the uterine lining outside of the uterus. This tissue can lead to scarring (adhesions). This scarring occurs inside the pelvic cavity. This can cause severe pain.  · Gynecological cancer. Ovarian, fallopian tube, uterine and cervical cancers can be treated. Many times additional tissues are removed when cancer is involved.  · Endometrial hyperplasia. This is when the endometrium has abnormal cell changes that can precede cancer of the endometrium.  · Cervical dysplasia. This is a change in the cervix that precedes certical cancer.  · Uterine prolapse. This is when the uterus drops from the normal location  Other Problems  Abnormal bleeding may be due to other causes. These include:  · Adenomyosis. This is the growth of the endometrium into the uterine muscle.  · Uterine polyps. These are fleshy growths of tissue from the uterine wall.  Date Last Reviewed: 3/1/2017  © 6319-4885 [employer]  [employer]. [address] [place] [address]. All rights reserved. This information is not intended as a substitute for professional medical care. Always follow your healthcare professional's instructions.      Understanding Laparoscopic Hysterectomy  Having your uterus (womb) removed is called a hysterectomy. Using a technique called laparoscopy has many benefits. You may spend less time in the hospital and recover faster.  What is hysterectomy?  Hysterectomy removes the uterus. Part or all of the uterus may be taken out. Certain other organs may be removed at the same time. Having your uterus removed means that you wont be able to get pregnant in the future.  What is laparoscopy?  Laparoscopy is a type of surgery. A long, lighted tube with a camera is used. This is called a laparoscope. The scope sends pictures of the inside of the body to a video screen. For the surgery, a few small incisions are made in the abdomen. The scope is inserted through one of the small incisions. Surgical tools are inserted through the other incisions to complete the procedure.  Benefits of laparoscopy  This procedure lets you avoid open surgery. Open surgery requires a larger incision in the abdomen. Compared to open surgery laparoscopy may:  · Require less time in the hospital or surgery center  · Offer a faster recovery  · Cause less internal scarring and smaller visible scars  · Cause less pain after surgery  · Have a lower risk of complications  Risks and possible complications of laparoscopic hysterectomy  · Side effects from anesthesia  · Infection  · Bleeding, with a possible need for a transfusion  · Blood clots  · Damage to the bladder, bowel, ureters, or nearby nerves  · Hernia  · Formation of scar tissue that can cause pain or bowel obstruction often times years later  · Need for a second surgery  Date Last Reviewed: 3/1/2017  © 0594-0490 [employer]. [address] [place] [address]  [address]. All rights reserved. This information is not intended as a substitute for professional medical care. Always follow your healthcare professional's instructions.

## 2020-02-21 ENCOUNTER — CLINICAL SUPPORT (OUTPATIENT)
Dept: REHABILITATION | Facility: HOSPITAL | Age: 45
End: 2020-02-21
Attending: INTERNAL MEDICINE
Payer: COMMERCIAL

## 2020-02-21 DIAGNOSIS — M54.41 CHRONIC RIGHT-SIDED LOW BACK PAIN WITH RIGHT-SIDED SCIATICA: ICD-10-CM

## 2020-02-21 DIAGNOSIS — R53.1 DECREASED STRENGTH: ICD-10-CM

## 2020-02-21 DIAGNOSIS — G89.29 CHRONIC RIGHT-SIDED LOW BACK PAIN WITH RIGHT-SIDED SCIATICA: ICD-10-CM

## 2020-02-21 DIAGNOSIS — R29.898 POOR BODY MECHANICS: ICD-10-CM

## 2020-02-21 PROCEDURE — 97110 THERAPEUTIC EXERCISES: CPT | Mod: PN

## 2020-02-21 PROCEDURE — 97161 PT EVAL LOW COMPLEX 20 MIN: CPT | Mod: PN

## 2020-02-21 NOTE — PLAN OF CARE
[facility]  Physical Therapy Initial Evaluation    Name: Cipriano Gamez  [facility] Number: [id number]    Therapy Diagnosis:   Encounter Diagnoses   Name Primary?    Chronic right-sided low back pain with right-sided sciatica     Decreased strength     Poor body mechanics      Physician: Edi Jorge*    Physician Orders: PT Eval and Treat   Medical Diagnosis from Referral: Chronic right-sided low back pain with right-sided sciatica  Evaluation Date: 2/21/2020  Authorization Period Expiration: 12/31/2020  Plan of Care Expiration: 5/21/2020  Visit # / Visits authorized: 1/ 25    Time In: 12:55p  Time Out: 1:50a  Total Billable Time: 55 minutes    Precautions: Standard    Subjective   Date of onset: November   History of current condition - Cipriano reports: she is having pain going down from her R side of her back to the thigh and to the shin on the outside area. Occasionally she will have the shin tingling on the L side but more so on the R side. This started in November and there was no initial injury or change in activity that made this start. She has never had this pain before. The pain is very intense and keeps her up at night and it is a sharp shooting pain. She already has insomnia due to another condition and she states that when she falls asleep the pain wakes her up. She is unable to tell what increases the pain, she can just be sitting there and it will increase. She has tried tylenol PM, muscle relaxer, gabapentin, oxycodone - the tylenol and oxy work but neither of the other two. Not taking the oxycodone anymore because it was for another condition. She would like to take some meds for the pain but unsure what type. She wants to be able to exercise again like she used to which involved walking and stretching.      Medical History:   Past Medical History:   Diagnosis Date    Abnormal Pap smear of cervix     Diverticulosis     GERD (gastroesophageal reflux disease)      Hiatal hernia     Hypertension      Surgical History:   Cipriano Gamez  has a past surgical history that includes Cervical biopsy w/ loop electrode excision (2/11/14).    Medications:   Cipriano has a current medication list which includes the following prescription(s): acetaminophen, amlodipine, diphenhydramine, gabapentin, lisinopril, lisinopril, metoclopramide hcl, ondansetron, and oxycodone.    Allergies:   Review of patient's allergies indicates:   Allergen Reactions    Ciprofloxacin Blisters and Swelling    Ibuprofen Hives    Meloxicam      rash    Cyclobenzaprine Rash     rasg      Imaging, x-ray: 1/14/2020: Minimal scoliosis with convexity to the right can be seen.  Vertebral bodies are intact.  Minimal narrowing of the L4-L5 disc space is seen.  No bony collapse or destruction is noted.    Red Flags:   B&B Changes: no   Sleep dysfunction: yes   Cough&sneezing pain: no   No pain change with positional movements: no   Prior Therapy: no   Social History: no stairs; lives with sister   Occupation: [employer]  - involves some lifting (<30 lbs), a lot of standing, up/down ladders - doesn't necessarily increase in pain at work   Prior Level of Function: independent   Current Level of Function: independent     Pain:  Current 6-7/10, worst 10/10, best 3/10   Location: right back , buttocks  and lateral side of upper thigh and medial/anterior portion of shin    Description: Sharp  Aggravating Factors: Sitting, Standing, Laying and Walking  Easing Factors: pain medication and tylenol     Pts goals: want the pain to decrease with typical activities     Objective     Observation: pt presents with normal disposition     Posture:  Increased lumbar lordosis     Lumbar Range of Motion:    Degrees Pain   Flexion 40   none        Extension 5   Increased motion in thoracic spine; lumbar lordosis in resting         Left Side Bending 20 none        Right Side Bending 20 none        Left  rotation   75% limited  none        Right Rotation   WFL none           Hip Range of Motion:  WFL in all directions     Lower Extremity Strength  Right LE  Left LE    Knee extension: 4/5 Knee extension: 4+/5   Knee flexion: 5/5 Knee flexion: 5/5   Hip flexion: 4+/5 Hip flexion: 4+/5   Hip extension:  4/5 Hip extension: 4/5   Hip abduction: 4-/5 (with reproduction of pain) Hip abduction: 4/5   Hip ER 4-/5 Hip ER 4-/5   Hip IR 4-/5 Hip IR 4-/5   Ankle dorsiflexion: 5/5 Ankle dorsiflexion: 5/5   Ankle plantarflexion: 4+/5 Ankle plantarflexion: 4+/5     Special Tests:  -Repeated Flexion: mild increase in hip pain   -Repeated Ext: no change in pain   -Piriformis Test: + for pain   -PAKO: -   -Bridge Test: + - increased pain over lateral thigh on R   -Squat: narrow CONSTANCE, increased lumbar flexion and decreased knee flexion and hip hinge   -Slump Test: -    Functional Tests:   -SLS: 5 seconds bilaterally; instability noted on L SI   -Bed mobility: independent   -Sit to stand: independent     DTR:   Right Left   Patellar (L3-4) 2+ 1+   Achilles (S1) 2+ 1+     Neuro Dynamic Testing:    Sciatic nerve:      SLR: R = common fibular nerve +     L = -       Femoral Nerve:    Femoral nerve test: -    Joint Mobility: no tenderness with joint mobility noted over lumbar/thoracic spinous processes     Palpation: increased L paraspinal tightness and restriction; ttp over R glute med and piriformis     Sensation: intact to light touch     Flexibility: decreased glute flexibility and piriformis flexibility R>L; decreased hamstring flexibility     CMS Impairment/Limitation/Restriction for FOTO Lumbar Survey    Therapist reviewed FOTO scores for Cipriano Gaemz on 2/21/2020.   FOTO documents entered into [employer] - see Media section.    Limitation Score: 58%  Category: Mobility    Current : CK = at least 40% but < 60% impaired, limited or restricted  Goal: CK = at least 40% but < 60% impaired, limited or restricted       TREATMENT   Treatment  Time In: 1:35p  Treatment Time Out: 1:50p  Total Treatment time separate from Evaluation: 15 minutes    Cipriano received therapeutic exercises to develop strength, endurance, ROM, flexibility, posture and core stabilization for 15 minutes including:    Glute stretch 3x30 sec   Piriformis stretch [id number]   Open books x10 ea   Child's pose 3 way x20 sec ea    Home Exercises and Patient Education Provided    Education provided:   - body mechanics for squatting   - HEP compliance   - Role of PT     Written Home Exercises Provided: yes.  Exercises were reviewed and Cipriano was able to demonstrate them prior to the end of the session.  Cipriano demonstrated good  understanding of the education provided.     See EMR under Patient Instructions for exercises provided 2/21/2020.    Assessment   Cipriano is a 44 y.o. female referred to outpatient Physical Therapy with a medical diagnosis of Chronic right-sided low back pain with right-sided sciatica. Pt presents with signs and symptoms consistent with medical diagnosis. She presents today with a 3 month history of R sided low back pain and lateral thigh and medial shin pain. She demonstrates decreased BLE strength, trunk strength, decreased lumbar ROM, decreased balance, soft tissue restrictions, and decreased tolerance to functional mobility. Pt demonstrates positive special tests including: piriformis test, repeated flexion test, and bridge test. She had poor body mechanics with squatting and lifting light object from ground. She had good response to all exercises provided today. Pt is appropriate for physical therapy and would benefit from improving lumbar mobility, reducing pain, increasing hip/trunk strength, improving body mechanics, and increasing tolerance to functional activities at work and home.      Pt prognosis is Good.   Pt will benefit from skilled outpatient Physical Therapy to address the deficits stated above and in the chart below, provide  pt/family education, and to maximize pt's level of independence.     Plan of care discussed with patient: Yes  Pt's spiritual, cultural and educational needs considered and patient is agreeable to the plan of care and goals as stated below:     Anticipated Barriers for therapy: none     Medical Necessity is demonstrated by the following  History  Co-morbidities and personal factors that may impact the plan of care Co-morbidities:   diverticulosis, GERD, HTN, hiatal hernia    Personal Factors:   lifestyle     low   Examination  Body Structures and Functions, activity limitations and participation restrictions that may impact the plan of care Body Regions:   back  lower extremities  trunk    Body Systems:    ROM  strength  gross coordinated movement  balance  gait  transfers  transitions  motor control  motor learning    Participation Restrictions:   Minimal to moderate     Activity limitations:   Learning and applying knowledge  no deficits    General Tasks and Commands  no deficits    Communication  no deficits    Mobility  walking    Self care  no deficits    Domestic Life  shopping  cooking  doing house work (cleaning house, washing dishes, laundry)    Interactions/Relationships  no deficits    Life Areas  employment    Community and Social Life  community life  recreation and leisure         moderate   Clinical Presentation stable and uncomplicated low   Decision Making/ Complexity Score: low     Goals:  Short Term Goals: 4 weeks      1. Pt will be independent with HEP in order to demonstrate self management.   2. Pt will report a decrease in pain at its worse to 7/10 in order to improve quality of life.   3. Pt will increase lumbar L rotation ROM by 25% degrees to decrease pain and increase functional mobility.   4. Pt will demonstrate independence with body mechanics during squatting and lifting activities to improve daily work tasks.   5. Pt will be able to walk one block with no difficulty in order to improve  community mobility and return to PLOF.      Long Term Goals: 8 weeks      1. Pt will be independent with updated HEP in order to demonstrate self management.   2. Pt will report a decrease in pain at its worse to 5/10 in order to improve quality of life.   3. Pt will increase lumbar L rotation ROM by 50% to improve gait and functional mobility.   4. Pt will increase BLE MMT to 4+/5 or greater to improve strength and endurance to perform daily activities.   5. Pt will have a FOTO limitation score of < or = to 40% to demonstrate improved functional mobility.   6. Pt will be able to perform her typical house work activities with no difficulty in order to improve independence and quality of life     Plan   Plan of care Certification: 2/21/2020 to 5/21/2020.    Outpatient Physical Therapy 1 times weekly for 12 weeks to include the following interventions: Cervical/Lumbar Traction, Manual Therapy, Moist Heat/ Ice, Neuromuscular Re-ed, Patient Education, Therapeutic Activites and Therapeutic Exercise.     Zayra Pagan, PT  02/21/2020    Thank you for your referral!     I have seen the patient, reviewed the therapist's plan of care, and I agree with the plan of care.      I certify the need for these services furnished under this plan of treatment and while under my care.      ___________________ ________ Physician/Referring Practitioner             ___________________________ Date of Signature

## 2020-02-27 ENCOUNTER — PATIENT MESSAGE (OUTPATIENT)
Dept: REHABILITATION | Facility: HOSPITAL | Age: 45
End: 2020-02-27

## 2020-03-05 ENCOUNTER — PATIENT MESSAGE (OUTPATIENT)
Dept: FAMILY MEDICINE | Facility: CLINIC | Age: 45
End: 2020-03-05

## 2020-03-06 ENCOUNTER — PATIENT MESSAGE (OUTPATIENT)
Dept: FAMILY MEDICINE | Facility: CLINIC | Age: 45
End: 2020-03-06

## 2020-03-06 RX ORDER — LABETALOL 100 MG/1
100 TABLET, FILM COATED ORAL 2 TIMES DAILY
Qty: 60 TABLET | Refills: 0 | Status: SHIPPED | OUTPATIENT
Start: 2020-03-06 | End: 2020-03-13 | Stop reason: SDUPTHER

## 2020-03-06 NOTE — TELEPHONE ENCOUNTER
Please advise   Pt BIBEMS with etoh intox, no trauma, no medical complaints, limited history due to intoxicated state. clinically sober with steady gait clear speech, tolerated PO. No medical complaints, stable for dc home.

## 2020-03-09 ENCOUNTER — PATIENT MESSAGE (OUTPATIENT)
Dept: REHABILITATION | Facility: HOSPITAL | Age: 45
End: 2020-03-09

## 2020-03-12 ENCOUNTER — TELEPHONE (OUTPATIENT)
Dept: FAMILY MEDICINE | Facility: CLINIC | Age: 45
End: 2020-03-12

## 2020-03-13 ENCOUNTER — TELEPHONE (OUTPATIENT)
Dept: FAMILY MEDICINE | Facility: CLINIC | Age: 45
End: 2020-03-13

## 2020-03-13 ENCOUNTER — OFFICE VISIT (OUTPATIENT)
Dept: FAMILY MEDICINE | Facility: CLINIC | Age: 45
End: 2020-03-13
Payer: COMMERCIAL

## 2020-03-13 VITALS
HEIGHT: 67 IN | OXYGEN SATURATION: 98 % | BODY MASS INDEX: 33.43 KG/M2 | DIASTOLIC BLOOD PRESSURE: 102 MMHG | WEIGHT: 213 LBS | TEMPERATURE: 99 F | SYSTOLIC BLOOD PRESSURE: 148 MMHG | HEART RATE: 83 BPM

## 2020-03-13 DIAGNOSIS — Z34.90 PREGNANCY, UNSPECIFIED GESTATIONAL AGE: Primary | ICD-10-CM

## 2020-03-13 DIAGNOSIS — N93.9 VAGINAL SPOTTING: ICD-10-CM

## 2020-03-13 DIAGNOSIS — K57.90 DIVERTICULOSIS: ICD-10-CM

## 2020-03-13 DIAGNOSIS — I10 ESSENTIAL HYPERTENSION: ICD-10-CM

## 2020-03-13 DIAGNOSIS — Z23 NEEDS FLU SHOT: ICD-10-CM

## 2020-03-13 PROBLEM — A41.9 SEPSIS WITHOUT ACUTE ORGAN DYSFUNCTION: Status: RESOLVED | Noted: 2020-01-07 | Resolved: 2020-03-13

## 2020-03-13 PROBLEM — A41.50 SEPSIS DUE TO GRAM NEGATIVE BACTERIA: Status: RESOLVED | Noted: 2020-01-08 | Resolved: 2020-03-13

## 2020-03-13 PROBLEM — N17.9 AKI (ACUTE KIDNEY INJURY): Status: RESOLVED | Noted: 2020-01-07 | Resolved: 2020-03-13

## 2020-03-13 PROBLEM — Z87.19 HISTORY OF DIVERTICULITIS: Status: ACTIVE | Noted: 2019-05-18

## 2020-03-13 LAB
B-HCG UR QL: POSITIVE
CTP QC/QA: YES

## 2020-03-13 PROCEDURE — 99999 PR PBB SHADOW E&M-EST. PATIENT-LVL III: CPT | Mod: PBBFAC,,, | Performed by: INTERNAL MEDICINE

## 2020-03-13 PROCEDURE — 99214 OFFICE O/P EST MOD 30 MIN: CPT | Mod: 25,S$GLB,, | Performed by: INTERNAL MEDICINE

## 2020-03-13 PROCEDURE — 3008F PR BODY MASS INDEX (BMI) DOCUMENTED: ICD-10-PCS | Mod: CPTII,S$GLB,, | Performed by: INTERNAL MEDICINE

## 2020-03-13 PROCEDURE — 90686 FLU VACCINE (QUAD) GREATER THAN OR EQUAL TO 3YO PRESERVATIVE FREE IM: ICD-10-PCS | Mod: S$GLB,,, | Performed by: INTERNAL MEDICINE

## 2020-03-13 PROCEDURE — 99999 PR PBB SHADOW E&M-EST. PATIENT-LVL III: ICD-10-PCS | Mod: PBBFAC,,, | Performed by: INTERNAL MEDICINE

## 2020-03-13 PROCEDURE — 90471 IMMUNIZATION ADMIN: CPT | Mod: S$GLB,,, | Performed by: INTERNAL MEDICINE

## 2020-03-13 PROCEDURE — 99214 PR OFFICE/OUTPT VISIT, EST, LEVL IV, 30-39 MIN: ICD-10-PCS | Mod: 25,S$GLB,, | Performed by: INTERNAL MEDICINE

## 2020-03-13 PROCEDURE — 81025 URINE PREGNANCY TEST: CPT | Mod: S$GLB,,, | Performed by: INTERNAL MEDICINE

## 2020-03-13 PROCEDURE — 90471 FLU VACCINE (QUAD) GREATER THAN OR EQUAL TO 3YO PRESERVATIVE FREE IM: ICD-10-PCS | Mod: S$GLB,,, | Performed by: INTERNAL MEDICINE

## 2020-03-13 PROCEDURE — 3008F BODY MASS INDEX DOCD: CPT | Mod: CPTII,S$GLB,, | Performed by: INTERNAL MEDICINE

## 2020-03-13 PROCEDURE — 81025 POCT URINE PREGNANCY: ICD-10-PCS | Mod: S$GLB,,, | Performed by: INTERNAL MEDICINE

## 2020-03-13 PROCEDURE — 90686 IIV4 VACC NO PRSV 0.5 ML IM: CPT | Mod: S$GLB,,, | Performed by: INTERNAL MEDICINE

## 2020-03-13 RX ORDER — LABETALOL 100 MG/1
100 TABLET, FILM COATED ORAL 2 TIMES DAILY
Qty: 60 TABLET | Refills: 5 | Status: SHIPPED | OUTPATIENT
Start: 2020-03-13 | End: 2020-06-23 | Stop reason: CLARIF

## 2020-03-13 RX ORDER — AMLODIPINE BESYLATE 5 MG/1
5 TABLET ORAL DAILY
Qty: 30 TABLET | Refills: 5 | Status: SHIPPED | OUTPATIENT
Start: 2020-03-13 | End: 2020-04-15

## 2020-03-13 NOTE — PROGRESS NOTES
This note was created by combination of typed  and M-Modal dictation.  Transcription errors may be present.  If there are any questions, please contact me.    Assessment / Plan:   Pregnancy, unspecified gestational age  Vaginal spotting  -UPT positive.  Her previous doctor had changed her blood pressure medicines and stop the ACE-inhibitor.  She has not been taking her amlodipine.  Stay on amlodipine and stay on labetalol.  Nurse visit 2 weeks for BP check.  For the vaginal spotting, she had seen gynecology back in February and I am going to refer her back to them for follow-up.  -     Influenza - Quadrivalent (6 months+) (PF)  -     Cancel: POCT urinalysis, dipstick or tablet reag  -     POCT Urine Pregnancy    Diverticulosis with multiple hospitalization for diverticulitis  -she has an upcoming follow-up with GI.  History of colonoscopy 2011 but multiple bouts of diverticulitis.    Needs flu shot  -     Influenza - Quadrivalent (6 months+) (PF)    Essential hypertension  -restart amlodipine.  Stay on labetalol.  Follow-up nurse visit 2 weeks.  -     amLODIPine (NORVASC) 5 MG tablet; Take 1 tablet (5 mg total) by mouth once daily.  Dispense: 30 tablet; Refill: 5  -     labetaloL (NORMODYNE) 100 MG tablet; Take 1 tablet (100 mg total) by mouth 2 (two) times daily.  Dispense: 60 tablet; Refill: 5    Sciatica symptoms, suspicious for L4 dermatome.  Pregnant.  Nothing stronger than Tylenol.  Hold on any imaging.  Physical therapy.    Medications Discontinued During This Encounter   Medication Reason    gabapentin (NEURONTIN) 100 MG capsule     lisinopril (PRINIVIL,ZESTRIL) 40 MG tablet Therapy completed    oxyCODONE (ROXICODONE) 10 mg Tab immediate release tablet Therapy completed    metoclopramide HCl (REGLAN) 10 MG tablet Therapy completed    ondansetron (ZOFRAN) 4 MG tablet Therapy completed    lisinopril (PRINIVIL,ZESTRIL) 40 MG tablet Alternate therapy    amLODIPine (NORVASC) 5 MG tablet Reorder     labetalol (NORMODYNE) 100 MG tablet Reorder       meds sent this encounter:  Medications Ordered This Encounter   Medications    amLODIPine (NORVASC) 5 MG tablet     Sig: Take 1 tablet (5 mg total) by mouth once daily.     Dispense:  30 tablet     Refill:  5    labetaloL (NORMODYNE) 100 MG tablet     Sig: Take 1 tablet (100 mg total) by mouth 2 (two) times daily.     Dispense:  60 tablet     Refill:  5     Discontinue Lisinopril       Follow Up: No follow-ups on file.  Nurse visit 2 weeks for BP check.  Follow-up with Obstetrics.      Subjective:     Chief Complaint   Patient presents with    [facility]       [facility]  Cipriano is a 44 y.o. female, last appointment with this clinic was Visit date not found.    No LMP recorded.    New to me  Hypertension  Abnormal Pap smear followed by gynecology.  1/2020 hospitalization from diverticulitis with subseqauent gram negative sepsis.  2011 colonoscopy diverticulosis.  Right buttock pain 1/2020, mostly nocturnal. Had SE of other treatments including muscle relaxer. Gabapentin with psych SE  Tobacco use  THC use    She had called c/o HTN and positive UPT. Her previous PCP messaged her and changed her lisinopril to labetalol. On amlodipine as well.    She wants to confirm her home urine pregnancy test.  Repeat point of care urine testing today is positive for pregnancy.    Hypertension.  Not taking amlodipine x weeks. 2 weeks.  She had stopped it when she had a positive home pregnancy test.  But she continue to take the lisinopril.  Is taking the labetalol. X 3/6  Took bp med today not the amlodipine however.  bp high.     Right sciatica.  PT. Not helping. X 11/2019. Possibly from heavy lifting.  No hx of sciatica. Down the lateral thigh and the anterior lower leg not into the foot.     Smoking. Hx of quitting. Planning to quit cold turkey. Smoking 1 PPD. Has started cutting down; currently 1/2 PPD.  We talked about nicotine replacement therapy but ideally she stops  this without medication.  She is committed to trying.    She did note some mild vaginal spotting today.  No abdominal pain no abdominal cramping.    Patient Care Team:  Brooks Bergeron MD as PCP - General (Internal Medicine)    Patient Active Problem List    Diagnosis Date Noted    Chronic right-sided low back pain with right-sided sciatica 02/21/2020    Decreased strength 02/21/2020    Poor body mechanics 02/21/2020    Hematemesis with nausea 01/07/2020    At risk for prolonged QT interval syndrome 01/07/2020    History of diverticulitis 1/2020 with gram neg sepsis 05/18/2019    Cyclic vomiting syndrome 12/14/2018    Marijuana abuse 12/14/2018    Prolonged Q-T interval on ECG 12/14/2018    Smokes 1 pack of cigarettes per day 03/21/2018    Proteinuria 05/04/2015    S/P LEEP 03/25/2014    ASCUS with positive high risk HPV 11/21/2013    General counseling and advice on female contraception 11/21/2013    Diverticulosis with multiple hospitalization for diverticulitis      2011 colonoscopy diverticulosis.      GERD (gastroesophageal reflux disease)     Essential hypertension        PAST MEDICAL HISTORY:  Past Medical History:   Diagnosis Date    Abnormal Pap smear of cervix     Diverticulosis     GERD (gastroesophageal reflux disease)     Hiatal hernia     Hypertension        PAST SURGICAL HISTORY:  Past Surgical History:   Procedure Laterality Date    CERVICAL BIOPSY  W/ LOOP ELECTRODE EXCISION  2/11/14       SOCIAL HISTORY:  Social History     Socioeconomic History    Marital status: Single     Spouse name: Not on file    Number of children: Not on file    Years of education: Not on file    Highest education level: Not on file   Occupational History    Occupation: [employer]     Employer: [employer]   Social Needs    Financial resource strain: Very hard    Food insecurity:     Worry: Often true     Inability: Often true    Transportation needs:     Medical: Yes     Non-medical:  Yes   Tobacco Use    Smoking status: Current Every Day Smoker     Packs/day: 1.00     Years: 16.00     Pack years: 16.00     Types: Cigarettes    Smokeless tobacco: Never Used   Substance and Sexual Activity    Alcohol use: Yes     Frequency: Monthly or less     Drinks per session: 1 or 2     Binge frequency: Never     Comment: social 1-2 x / month    Drug use: Yes     Frequency: 1.0 times per week     Types: Marijuana     Comment: social- last use 1 week ago    Sexual activity: Not Currently     Partners: Male     Birth control/protection: None   Lifestyle    Physical activity:     Days per week: 0 days     Minutes per session: 0 min    Stress: Not at all   Relationships    Social connections:     Talks on phone: More than three times a week     Gets together: Twice a week     Attends Congregational service: Not on file     Active member of club or organization: No     Attends meetings of clubs or organizations: Never     Relationship status: Not on file   Other Topics Concern    Not on file   Social History Narrative    Not on file        ALLERGIES AND MEDICATIONS: updated and reviewed.  Review of patient's allergies indicates:   Allergen Reactions    Ciprofloxacin Blisters and Swelling    Ibuprofen Hives    Meloxicam      rash    Cyclobenzaprine Rash     rasg     Current Outpatient Medications   Medication Sig Dispense Refill    acetaminophen (TYLENOL EXTRA STRENGTH) 500 MG tablet Take 500 mg by mouth daily as needed for Pain.      amLODIPine (NORVASC) 5 MG tablet Take 1 tablet (5 mg total) by mouth once daily. 30 tablet 2    diphenhydrAMINE (SOMINEX) 25 mg tablet Take 25 mg by mouth nightly as needed for Insomnia.       gabapentin (NEURONTIN) 100 MG capsule Take 1 capsule in the daytime and 2 capsules at nightime for 14 days. Call MD for refills (Patient not taking: Reported on 2/18/2020) 42 capsule 0    labetalol (NORMODYNE) 100 MG tablet Take 1 tablet (100 mg total) by mouth 2 (two) times daily.  ".Further refills require visit with new PCP 60 tablet 0    lisinopril (PRINIVIL,ZESTRIL) 40 MG tablet Take 1 tablet (40 mg total) by mouth once daily. Further refills require visit with Dr. Jorge 90 tablet 1    lisinopril (PRINIVIL,ZESTRIL) 40 MG tablet       metoclopramide HCl (REGLAN) 10 MG tablet Take 1 tablet (10 mg total) by mouth every 8 (eight) hours as needed (nausea). 30 tablet 0    ondansetron (ZOFRAN) 4 MG tablet Take 1 tablet (4 mg total) by mouth every 8 (eight) hours as needed for Nausea. 20 tablet 0    oxyCODONE (ROXICODONE) 10 mg Tab immediate release tablet Take 1 tablet (10 mg total) by mouth every 8 (eight) hours as needed (SEVERE PAIN). (Patient not taking: Reported on 2/18/2020) 12 tablet 0     No current facility-administered medications for this visit.        Review of Systems   All other systems reviewed and are negative.      Objective:   Physical Exam   Vitals:    03/13/20 1544   BP: (!) 148/102   BP Location: Right arm   Patient Position: Sitting   BP Method: Medium (Manual)   Pulse: 83   Temp: 98.6 °F (37 °C)   TempSrc: Oral   SpO2: 98%   Weight: 96.6 kg (213 lb)   Height: 5' 7" (1.702 m)    Body mass index is 33.36 kg/m².            Physical Exam   Constitutional: She is oriented to person, place, and time. She appears well-developed and well-nourished. No distress.   Eyes: EOM are normal.   Cardiovascular: Normal rate, regular rhythm and normal heart sounds.   No murmur heard.  Pulmonary/Chest: Effort normal and breath sounds normal.   Musculoskeletal: Normal range of motion.   Neurological: She is alert and oriented to person, place, and time. Coordination normal.   Skin: Skin is warm and dry.   Psychiatric: She has a normal mood and affect. Her behavior is normal. Thought content normal.     Point of care urine pregnancy test negative.  "

## 2020-03-26 PROBLEM — M54.41 CHRONIC RIGHT-SIDED LOW BACK PAIN WITH RIGHT-SIDED SCIATICA: Status: RESOLVED | Noted: 2020-02-21 | Resolved: 2020-03-26

## 2020-03-26 PROBLEM — G89.29 CHRONIC RIGHT-SIDED LOW BACK PAIN WITH RIGHT-SIDED SCIATICA: Status: RESOLVED | Noted: 2020-02-21 | Resolved: 2020-03-26

## 2020-03-26 PROBLEM — R53.1 DECREASED STRENGTH: Status: RESOLVED | Noted: 2020-02-21 | Resolved: 2020-03-26

## 2020-03-26 PROBLEM — R29.898 POOR BODY MECHANICS: Status: RESOLVED | Noted: 2020-02-21 | Resolved: 2020-03-26

## 2020-03-27 ENCOUNTER — LAB VISIT (OUTPATIENT)
Dept: LAB | Facility: HOSPITAL | Age: 45
End: 2020-03-27
Attending: OBSTETRICS & GYNECOLOGY
Payer: COMMERCIAL

## 2020-03-27 ENCOUNTER — INITIAL PRENATAL (OUTPATIENT)
Dept: OBSTETRICS AND GYNECOLOGY | Facility: CLINIC | Age: 45
End: 2020-03-27
Payer: COMMERCIAL

## 2020-03-27 VITALS — BODY MASS INDEX: 33.36 KG/M2 | WEIGHT: 213 LBS

## 2020-03-27 DIAGNOSIS — O34.41: ICD-10-CM

## 2020-03-27 DIAGNOSIS — O99.211 OBESITY AFFECTING PREGNANCY IN FIRST TRIMESTER: ICD-10-CM

## 2020-03-27 DIAGNOSIS — O99.321 DRUG DEPENDENCE AFFECTING PREGNANCY IN FIRST TRIMESTER: ICD-10-CM

## 2020-03-27 DIAGNOSIS — O09.521 AMA (ADVANCED MATERNAL AGE) MULTIGRAVIDA 35+, FIRST TRIMESTER: ICD-10-CM

## 2020-03-27 DIAGNOSIS — R87.619: ICD-10-CM

## 2020-03-27 DIAGNOSIS — F19.20 DRUG DEPENDENCE AFFECTING PREGNANCY IN FIRST TRIMESTER: ICD-10-CM

## 2020-03-27 DIAGNOSIS — Z3A.08 PREGNANCY WITH 8 COMPLETED WEEKS GESTATION: Primary | ICD-10-CM

## 2020-03-27 DIAGNOSIS — Z3A.08 PREGNANCY WITH 8 COMPLETED WEEKS GESTATION: ICD-10-CM

## 2020-03-27 DIAGNOSIS — O99.331 SMOKING (TOBACCO) COMPLICATING PREGNANCY, FIRST TRIMESTER: ICD-10-CM

## 2020-03-27 DIAGNOSIS — O10.919 CHRONIC HYPERTENSION AFFECTING PREGNANCY: ICD-10-CM

## 2020-03-27 LAB
ABO + RH BLD: NORMAL
ALBUMIN SERPL BCP-MCNC: 3.9 G/DL (ref 3.5–5.2)
ALP SERPL-CCNC: 73 U/L (ref 55–135)
ALT SERPL W/O P-5'-P-CCNC: 12 U/L (ref 10–44)
AMPHET+METHAMPHET UR QL: NEGATIVE
ANION GAP SERPL CALC-SCNC: 9 MMOL/L (ref 8–16)
AST SERPL-CCNC: 14 U/L (ref 10–40)
BARBITURATES UR QL SCN>200 NG/ML: NEGATIVE
BASOPHILS # BLD AUTO: 0.04 K/UL (ref 0–0.2)
BASOPHILS NFR BLD: 0.5 % (ref 0–1.9)
BENZODIAZ UR QL SCN>200 NG/ML: NEGATIVE
BILIRUB SERPL-MCNC: 0.3 MG/DL (ref 0.1–1)
BLD GP AB SCN CELLS X3 SERPL QL: NORMAL
BUN SERPL-MCNC: 11 MG/DL (ref 6–20)
BZE UR QL SCN: NEGATIVE
CALCIUM SERPL-MCNC: 8.8 MG/DL (ref 8.7–10.5)
CANNABINOIDS UR QL SCN: NORMAL
CHLORIDE SERPL-SCNC: 110 MMOL/L (ref 95–110)
CO2 SERPL-SCNC: 22 MMOL/L (ref 23–29)
CREAT SERPL-MCNC: 0.9 MG/DL (ref 0.5–1.4)
CREAT UR-MCNC: 193.7 MG/DL (ref 15–325)
CREAT UR-MCNC: 193.7 MG/DL (ref 15–325)
DIFFERENTIAL METHOD: NORMAL
EOSINOPHIL # BLD AUTO: 0.2 K/UL (ref 0–0.5)
EOSINOPHIL NFR BLD: 2.8 % (ref 0–8)
ERYTHROCYTE [DISTWIDTH] IN BLOOD BY AUTOMATED COUNT: 13.9 % (ref 11.5–14.5)
EST. GFR  (AFRICAN AMERICAN): >60 ML/MIN/1.73 M^2
EST. GFR  (NON AFRICAN AMERICAN): >60 ML/MIN/1.73 M^2
GLUCOSE SERPL-MCNC: 94 MG/DL (ref 70–110)
HCT VFR BLD AUTO: 41.7 % (ref 37–48.5)
HGB BLD-MCNC: 13.6 G/DL (ref 12–16)
HGB S BLD QL SOLY: NEGATIVE
IMM GRANULOCYTES # BLD AUTO: 0.02 K/UL (ref 0–0.04)
IMM GRANULOCYTES NFR BLD AUTO: 0.2 % (ref 0–0.5)
LYMPHOCYTES # BLD AUTO: 2.7 K/UL (ref 1–4.8)
LYMPHOCYTES NFR BLD: 31.2 % (ref 18–48)
MCH RBC QN AUTO: 29.9 PG (ref 27–31)
MCHC RBC AUTO-ENTMCNC: 32.6 G/DL (ref 32–36)
MCV RBC AUTO: 92 FL (ref 82–98)
METHADONE UR QL SCN>300 NG/ML: NEGATIVE
MONOCYTES # BLD AUTO: 0.6 K/UL (ref 0.3–1)
MONOCYTES NFR BLD: 6.9 % (ref 4–15)
NEUTROPHILS # BLD AUTO: 5.1 K/UL (ref 1.8–7.7)
NEUTROPHILS NFR BLD: 58.4 % (ref 38–73)
NRBC BLD-RTO: 0 /100 WBC
OPIATES UR QL SCN: NEGATIVE
PCP UR QL SCN>25 NG/ML: NEGATIVE
PLATELET # BLD AUTO: 238 K/UL (ref 150–350)
PMV BLD AUTO: 10.3 FL (ref 9.2–12.9)
POTASSIUM SERPL-SCNC: 3.5 MMOL/L (ref 3.5–5.1)
PROT SERPL-MCNC: 7.3 G/DL (ref 6–8.4)
PROT UR-MCNC: 10 MG/DL
PROT/CREAT UR: 0.05 MG/G{CREAT} (ref 0–0.2)
RBC # BLD AUTO: 4.55 M/UL (ref 4–5.4)
SODIUM SERPL-SCNC: 141 MMOL/L (ref 136–145)
TOXICOLOGY INFORMATION: NORMAL
WBC # BLD AUTO: 8.65 K/UL (ref 3.9–12.7)

## 2020-03-27 PROCEDURE — 87086 URINE CULTURE/COLONY COUNT: CPT | Mod: 59

## 2020-03-27 PROCEDURE — 84156 ASSAY OF PROTEIN URINE: CPT

## 2020-03-27 PROCEDURE — 87491 CHLMYD TRACH DNA AMP PROBE: CPT

## 2020-03-27 PROCEDURE — 36415 COLL VENOUS BLD VENIPUNCTURE: CPT

## 2020-03-27 PROCEDURE — 99999 PR PBB SHADOW E&M-EST. PATIENT-LVL III: ICD-10-PCS | Mod: PBBFAC,,, | Performed by: OBSTETRICS & GYNECOLOGY

## 2020-03-27 PROCEDURE — 86592 SYPHILIS TEST NON-TREP QUAL: CPT

## 2020-03-27 PROCEDURE — 83036 HEMOGLOBIN GLYCOSYLATED A1C: CPT

## 2020-03-27 PROCEDURE — 80053 COMPREHEN METABOLIC PANEL: CPT

## 2020-03-27 PROCEDURE — 0500F INITIAL PRENATAL CARE VISIT: CPT | Mod: S$GLB,,, | Performed by: OBSTETRICS & GYNECOLOGY

## 2020-03-27 PROCEDURE — 86762 RUBELLA ANTIBODY: CPT

## 2020-03-27 PROCEDURE — 86850 RBC ANTIBODY SCREEN: CPT

## 2020-03-27 PROCEDURE — 85025 COMPLETE CBC W/AUTO DIFF WBC: CPT

## 2020-03-27 PROCEDURE — 80307 DRUG TEST PRSMV CHEM ANLYZR: CPT

## 2020-03-27 PROCEDURE — 0500F PR INITIAL PRENATAL CARE VISIT: ICD-10-PCS | Mod: S$GLB,,, | Performed by: OBSTETRICS & GYNECOLOGY

## 2020-03-27 PROCEDURE — 85660 RBC SICKLE CELL TEST: CPT

## 2020-03-27 PROCEDURE — 86703 HIV-1/HIV-2 1 RESULT ANTBDY: CPT

## 2020-03-27 PROCEDURE — 99999 PR PBB SHADOW E&M-EST. PATIENT-LVL III: CPT | Mod: PBBFAC,,, | Performed by: OBSTETRICS & GYNECOLOGY

## 2020-03-27 PROCEDURE — 87340 HEPATITIS B SURFACE AG IA: CPT

## 2020-03-27 NOTE — PROGRESS NOTES
Pt denies any N/V, feels well, discussed continuing current BP meds.  Discussed need for dating sono and MFM consult.  Discussed PN and CHTN labs.    Pt denies any other health probs.

## 2020-03-28 LAB
ESTIMATED AVG GLUCOSE: 105 MG/DL (ref 68–131)
HBA1C MFR BLD HPLC: 5.3 % (ref 4–5.6)

## 2020-03-29 LAB — BACTERIA UR CULT: NORMAL

## 2020-03-30 LAB
C TRACH DNA SPEC QL NAA+PROBE: NOT DETECTED
HBV SURFACE AG SERPL QL IA: NEGATIVE
HIV 1+2 AB+HIV1 P24 AG SERPL QL IA: NEGATIVE
N GONORRHOEA DNA SPEC QL NAA+PROBE: NOT DETECTED
RPR SER QL: NORMAL
RUBV IGG SER-ACNC: 366 IU/ML
RUBV IGG SER-IMP: REACTIVE

## 2020-04-02 ENCOUNTER — PATIENT MESSAGE (OUTPATIENT)
Dept: OBSTETRICS AND GYNECOLOGY | Facility: CLINIC | Age: 45
End: 2020-04-02

## 2020-04-02 ENCOUNTER — LAB VISIT (OUTPATIENT)
Dept: LAB | Facility: HOSPITAL | Age: 45
End: 2020-04-02
Attending: OBSTETRICS & GYNECOLOGY
Payer: COMMERCIAL

## 2020-04-02 DIAGNOSIS — O09.521 AMA (ADVANCED MATERNAL AGE) MULTIGRAVIDA 35+, FIRST TRIMESTER: ICD-10-CM

## 2020-04-02 DIAGNOSIS — R87.619: ICD-10-CM

## 2020-04-02 DIAGNOSIS — O34.41: ICD-10-CM

## 2020-04-02 DIAGNOSIS — O99.331 SMOKING (TOBACCO) COMPLICATING PREGNANCY, FIRST TRIMESTER: ICD-10-CM

## 2020-04-02 DIAGNOSIS — O99.211 OBESITY AFFECTING PREGNANCY IN FIRST TRIMESTER: ICD-10-CM

## 2020-04-02 DIAGNOSIS — O10.919 CHRONIC HYPERTENSION AFFECTING PREGNANCY: ICD-10-CM

## 2020-04-02 DIAGNOSIS — F19.20 DRUG DEPENDENCE AFFECTING PREGNANCY IN FIRST TRIMESTER: ICD-10-CM

## 2020-04-02 DIAGNOSIS — O99.321 DRUG DEPENDENCE AFFECTING PREGNANCY IN FIRST TRIMESTER: ICD-10-CM

## 2020-04-02 DIAGNOSIS — Z3A.08 PREGNANCY WITH 8 COMPLETED WEEKS GESTATION: ICD-10-CM

## 2020-04-02 LAB
CREAT 24H UR-MRATE: 44.5 MG/HR (ref 40–75)
CREAT UR-MCNC: 267 MG/DL (ref 15–325)
CREATININE, URINE (MG/SPEC): 1068 MG/SPEC
PROT 24H UR-MRATE: 32 MG/SPEC (ref 0–100)
PROT UR-MCNC: 8 MG/DL (ref 0–15)
URINE COLLECTION DURATION: 24 HR
URINE COLLECTION DURATION: 24 HR
URINE VOLUME: 400 ML
URINE VOLUME: 400 ML

## 2020-04-02 PROCEDURE — 84156 ASSAY OF PROTEIN URINE: CPT

## 2020-04-02 PROCEDURE — 82570 ASSAY OF URINE CREATININE: CPT

## 2020-04-08 ENCOUNTER — PATIENT MESSAGE (OUTPATIENT)
Dept: OBSTETRICS AND GYNECOLOGY | Facility: CLINIC | Age: 45
End: 2020-04-08

## 2020-04-15 ENCOUNTER — PROCEDURE VISIT (OUTPATIENT)
Dept: MATERNAL FETAL MEDICINE | Facility: CLINIC | Age: 45
End: 2020-04-15
Payer: COMMERCIAL

## 2020-04-15 ENCOUNTER — INITIAL CONSULT (OUTPATIENT)
Dept: MATERNAL FETAL MEDICINE | Facility: CLINIC | Age: 45
End: 2020-04-15
Payer: COMMERCIAL

## 2020-04-15 DIAGNOSIS — O34.41: ICD-10-CM

## 2020-04-15 DIAGNOSIS — Z3A.08 PREGNANCY WITH 8 COMPLETED WEEKS GESTATION: ICD-10-CM

## 2020-04-15 DIAGNOSIS — O99.331 SMOKING (TOBACCO) COMPLICATING PREGNANCY, FIRST TRIMESTER: ICD-10-CM

## 2020-04-15 DIAGNOSIS — O09.521 AMA (ADVANCED MATERNAL AGE) MULTIGRAVIDA 35+, FIRST TRIMESTER: ICD-10-CM

## 2020-04-15 DIAGNOSIS — I10 ESSENTIAL HYPERTENSION: ICD-10-CM

## 2020-04-15 DIAGNOSIS — R87.619: ICD-10-CM

## 2020-04-15 DIAGNOSIS — O99.211 OBESITY AFFECTING PREGNANCY IN FIRST TRIMESTER: ICD-10-CM

## 2020-04-15 DIAGNOSIS — F19.20 DRUG DEPENDENCE AFFECTING PREGNANCY IN FIRST TRIMESTER: ICD-10-CM

## 2020-04-15 DIAGNOSIS — O99.321 DRUG DEPENDENCE AFFECTING PREGNANCY IN FIRST TRIMESTER: ICD-10-CM

## 2020-04-15 DIAGNOSIS — O10.919 CHRONIC HYPERTENSION AFFECTING PREGNANCY: ICD-10-CM

## 2020-04-15 PROCEDURE — 99999 PR PBB SHADOW E&M-EST. PATIENT-LVL I: CPT | Mod: PBBFAC,,, | Performed by: PEDIATRICS

## 2020-04-15 PROCEDURE — 76815 OB US LIMITED FETUS(S): CPT | Mod: S$GLB,,, | Performed by: PEDIATRICS

## 2020-04-15 PROCEDURE — 76817 TRANSVAGINAL US OBSTETRIC: CPT | Mod: S$GLB,,, | Performed by: PEDIATRICS

## 2020-04-15 PROCEDURE — 99214 OFFICE O/P EST MOD 30 MIN: CPT | Mod: 25,S$GLB,, | Performed by: PEDIATRICS

## 2020-04-15 PROCEDURE — 99214 PR OFFICE/OUTPT VISIT, EST, LEVL IV, 30-39 MIN: ICD-10-PCS | Mod: 25,S$GLB,, | Performed by: PEDIATRICS

## 2020-04-15 PROCEDURE — 99999 PR PBB SHADOW E&M-EST. PATIENT-LVL I: ICD-10-PCS | Mod: PBBFAC,,, | Performed by: PEDIATRICS

## 2020-04-15 PROCEDURE — 76815 PR  US,PREGNANT UTERUS,LIMITED, 1/> FETUSES: ICD-10-PCS | Mod: S$GLB,,, | Performed by: PEDIATRICS

## 2020-04-15 PROCEDURE — 76817 PR US, OB, TRANSVAG APPROACH: ICD-10-PCS | Mod: S$GLB,,, | Performed by: PEDIATRICS

## 2020-04-15 RX ORDER — AMLODIPINE BESYLATE 5 MG/1
10 TABLET ORAL DAILY
Qty: 180 TABLET | Refills: 5 | Status: SHIPPED | OUTPATIENT
Start: 2020-04-15 | End: 2020-06-23 | Stop reason: CLARIF

## 2020-04-15 NOTE — PROGRESS NOTES
See US Report in           CONSULT:      Ms. Gamez, a 45 yo  female at expected 11 weeks 3 days' gestation, presents for consultation and dating.   On US today, there is an empty uterus.  She has been bleeding for a few days 'passing large clots'.      Blood pressure today was initially 182/115.   Repeat was 154/86.      Patient was informed of US results.  This was an unexpected pregnancy.  She does not desire any further pregnancies.  We discussed her variable BP.  Prior to pregnancy, she was on Amlodipine 5 mg po daily and Lisinopril (dose unknown), which was changed to Labetalol 100 mg po bid.  I recommended and ordered an increase in the amlodipine to 10 mg po daily.  Her new Family Medicine physician is Dr. Brian Bergeron.  She will contact him for office visit and follow up of BP.  She will contact Dr. Waller for OB follow up.  I messaged Dr. Waller with information.            Medical History     Diagnosis Date Comment Source   Abnormal Pap smear of cervix   Provider   Diverticulosis   Provider   GERD (gastroesophageal reflux disease)   Provider   Hiatal hernia   Provider   Hypertension   Provider     Surgical History     Procedure Laterality Date Comment Source   CERVICAL BIOPSY  W/ LOOP ELECTRODE EXCISION  16  Provider     Family History     Relation Problem Comments   Maternal Grandmother Heart disease (Age:40) MI      Father (Alive)    Mother () Heart disease (Age:54) MI      Brother Heart disease (Age:44) MI      Sister Sickle cell trait       Other Diabetes       Neg Hx        Social History:     Tobacco History     Smoking Status  Current Every Day Smoker Smoking Frequency  1 pack/day for 16 years (16 pk yrs) Smoking Tobacco Type  Cigarettes   Smokeless Tobacco Use  Never Used     Alcohol History     Alcohol Use Status  Yes Comment  social 1-2 x / month     Drug Use     Drug Use Status  Yes Types  Marijuana Frequency   1 time/week Comment  social- last use 1 week ago     OB  History                    3 Current                 2 Term 07/24/01 40w0d  3.43 kg (7 lb 9 oz) M Vag-Spont Epidural  Living        1 SAB 1998                         ASSESSMENT:    1. AMA   2. SAB  3. Chronic Hypertension  4. GERD/Hiatal Hernia          RECOMMENDATIONS:    1. Increase Norvasc to 10 mg po daily  2. Follow up with Dr. Jerry for medical continuity of care  3. Follow up with Dr. Waller for OB care (has been messaged).  4. Consider BC (discussion with Dr. Waller encouraged)        I spent 30 minutes in patient care management and consultation with >50% face to face.

## 2020-04-15 NOTE — LETTER
April 15, 2020      Alex Waller MD  [address] [facility] [address]  [address]  [place] [address]           MATERNAL AND FETAL MEDICINE  [address],   [facility] [address]  Phone: [phone]  Fax: [phone]          Patient: Cipriano Gamez   MR Number: [id number]   YOB: 1975   Date of Visit: 4/15/2020       Dear Dr. Alex Waller:    Thank you for referring Cipriano Gamez to me for evaluation. Attached you will find relevant portions of my assessment and plan of care.    If you have questions, please do not hesitate to call me. I look forward to following Cipriano Gamez along with you.    Sincerely,    Dary Lares MD    Enclosure  CC:  No Recipients    If you would like to receive this communication electronically, please contact [email or web address] or [phone] to request more information on [facility] [employer] [facility] access.    For providers and/or their staff who would like to refer a patient to [facility], please contact us through our one-stop-shop provider referral line, [facility], at [phone].    If you feel you have received this communication in error or would no longer like to receive these types of communications, please e-mail [email or web address]

## 2020-04-23 ENCOUNTER — PATIENT OUTREACH (OUTPATIENT)
Dept: ADMINISTRATIVE | Facility: OTHER | Age: 45
End: 2020-04-23

## 2020-04-24 ENCOUNTER — ROUTINE PRENATAL (OUTPATIENT)
Dept: OBSTETRICS AND GYNECOLOGY | Facility: CLINIC | Age: 45
End: 2020-04-24
Payer: COMMERCIAL

## 2020-04-24 VITALS
BODY MASS INDEX: 34.08 KG/M2 | DIASTOLIC BLOOD PRESSURE: 72 MMHG | SYSTOLIC BLOOD PRESSURE: 120 MMHG | WEIGHT: 217.63 LBS

## 2020-04-24 DIAGNOSIS — Z32.02 URINE PREGNANCY TEST NEGATIVE: Primary | ICD-10-CM

## 2020-04-24 DIAGNOSIS — O03.9 COMPLETE ABORTION WITHOUT COMPLICATION: ICD-10-CM

## 2020-04-24 PROCEDURE — 3008F PR BODY MASS INDEX (BMI) DOCUMENTED: ICD-10-PCS | Mod: CPTII,S$GLB,, | Performed by: OBSTETRICS & GYNECOLOGY

## 2020-04-24 PROCEDURE — 99213 PR OFFICE/OUTPT VISIT, EST, LEVL III, 20-29 MIN: ICD-10-PCS | Mod: S$GLB,,, | Performed by: OBSTETRICS & GYNECOLOGY

## 2020-04-24 PROCEDURE — 99999 PR PBB SHADOW E&M-EST. PATIENT-LVL II: ICD-10-PCS | Mod: PBBFAC,,, | Performed by: OBSTETRICS & GYNECOLOGY

## 2020-04-24 PROCEDURE — 3008F BODY MASS INDEX DOCD: CPT | Mod: CPTII,S$GLB,, | Performed by: OBSTETRICS & GYNECOLOGY

## 2020-04-24 PROCEDURE — 99213 OFFICE O/P EST LOW 20 MIN: CPT | Mod: S$GLB,,, | Performed by: OBSTETRICS & GYNECOLOGY

## 2020-04-24 PROCEDURE — 99999 PR PBB SHADOW E&M-EST. PATIENT-LVL II: CPT | Mod: PBBFAC,,, | Performed by: OBSTETRICS & GYNECOLOGY

## 2020-04-25 NOTE — PROGRESS NOTES
Pt with complete spontaneous ab as seen on pelvic sono on 4/15/20.  Pt is doing well emotionally.  Pt is no longer bleeding or having pain.  Pt has been sexually active and desires preg test.  UPT is neg today.  Pt states she does not desire future fertility and deisres tubal.  Pt declines hormonal contraception at this time and will use condoms.  Stressed permanence of procedure with pt which she understands.    Assessment/Plan  Complete Ab without complication - pt will be contacted when we are allowed to schedule elective surgery.      15 minutes spent with pt in counseling

## 2020-05-01 ENCOUNTER — TELEPHONE (OUTPATIENT)
Dept: OBSTETRICS AND GYNECOLOGY | Facility: CLINIC | Age: 45
End: 2020-05-01

## 2020-05-01 DIAGNOSIS — Z30.2 ENCOUNTER FOR FEMALE STERILIZATION PROCEDURE: Primary | ICD-10-CM

## 2020-05-29 ENCOUNTER — PATIENT MESSAGE (OUTPATIENT)
Dept: ADMINISTRATIVE | Facility: HOSPITAL | Age: 45
End: 2020-05-29

## 2020-06-22 ENCOUNTER — PATIENT OUTREACH (OUTPATIENT)
Dept: ADMINISTRATIVE | Facility: OTHER | Age: 45
End: 2020-06-22

## 2020-06-23 ENCOUNTER — OFFICE VISIT (OUTPATIENT)
Dept: OBSTETRICS AND GYNECOLOGY | Facility: CLINIC | Age: 45
End: 2020-06-23
Payer: COMMERCIAL

## 2020-06-23 ENCOUNTER — HOSPITAL ENCOUNTER (OUTPATIENT)
Dept: PREADMISSION TESTING | Facility: HOSPITAL | Age: 45
Discharge: HOME | End: 2020-06-23
Attending: INTERNAL MEDICINE
Payer: COMMERCIAL

## 2020-06-23 VITALS
SYSTOLIC BLOOD PRESSURE: 179 MMHG | BODY MASS INDEX: 34.05 KG/M2 | DIASTOLIC BLOOD PRESSURE: 111 MMHG | HEIGHT: 67 IN | WEIGHT: 216.94 LBS

## 2020-06-23 VITALS
WEIGHT: 217 LBS | SYSTOLIC BLOOD PRESSURE: 188 MMHG | HEIGHT: 67 IN | DIASTOLIC BLOOD PRESSURE: 99 MMHG | BODY MASS INDEX: 34.06 KG/M2

## 2020-06-23 DIAGNOSIS — Z01.818 PRE-OP EXAMINATION: ICD-10-CM

## 2020-06-23 DIAGNOSIS — Z30.2 ENCOUNTER FOR FEMALE STERILIZATION PROCEDURE: Primary | ICD-10-CM

## 2020-06-23 DIAGNOSIS — Z30.2 ENCOUNTER FOR FEMALE STERILIZATION PROCEDURE: ICD-10-CM

## 2020-06-23 LAB
ALBUMIN SERPL BCP-MCNC: 3.8 G/DL (ref 3.5–5.2)
ALP SERPL-CCNC: 71 U/L (ref 55–135)
ALT SERPL W/O P-5'-P-CCNC: 11 U/L (ref 10–44)
ANION GAP SERPL CALC-SCNC: 6 MMOL/L (ref 8–16)
AST SERPL-CCNC: 12 U/L (ref 10–40)
BASOPHILS # BLD AUTO: 0.05 K/UL (ref 0–0.2)
BASOPHILS NFR BLD: 0.6 % (ref 0–1.9)
BILIRUB SERPL-MCNC: 0.2 MG/DL (ref 0.1–1)
BUN SERPL-MCNC: 11 MG/DL (ref 6–20)
CALCIUM SERPL-MCNC: 9.1 MG/DL (ref 8.7–10.5)
CHLORIDE SERPL-SCNC: 109 MMOL/L (ref 95–110)
CO2 SERPL-SCNC: 24 MMOL/L (ref 23–29)
CREAT SERPL-MCNC: 0.9 MG/DL (ref 0.5–1.4)
DIFFERENTIAL METHOD: NORMAL
EOSINOPHIL # BLD AUTO: 0.2 K/UL (ref 0–0.5)
EOSINOPHIL NFR BLD: 2.1 % (ref 0–8)
ERYTHROCYTE [DISTWIDTH] IN BLOOD BY AUTOMATED COUNT: 13.6 % (ref 11.5–14.5)
EST. GFR  (AFRICAN AMERICAN): >60 ML/MIN/1.73 M^2
EST. GFR  (NON AFRICAN AMERICAN): >60 ML/MIN/1.73 M^2
GLUCOSE SERPL-MCNC: 101 MG/DL (ref 70–110)
HCT VFR BLD AUTO: 44.3 % (ref 37–48.5)
HGB BLD-MCNC: 14.6 G/DL (ref 12–16)
IMM GRANULOCYTES # BLD AUTO: 0.02 K/UL (ref 0–0.04)
IMM GRANULOCYTES NFR BLD AUTO: 0.2 % (ref 0–0.5)
LYMPHOCYTES # BLD AUTO: 2.5 K/UL (ref 1–4.8)
LYMPHOCYTES NFR BLD: 29 % (ref 18–48)
MCH RBC QN AUTO: 29.6 PG (ref 27–31)
MCHC RBC AUTO-ENTMCNC: 33 G/DL (ref 32–36)
MCV RBC AUTO: 90 FL (ref 82–98)
MONOCYTES # BLD AUTO: 0.5 K/UL (ref 0.3–1)
MONOCYTES NFR BLD: 6.2 % (ref 4–15)
NEUTROPHILS # BLD AUTO: 5.4 K/UL (ref 1.8–7.7)
NEUTROPHILS NFR BLD: 61.9 % (ref 38–73)
NRBC BLD-RTO: 0 /100 WBC
PLATELET # BLD AUTO: 240 K/UL (ref 150–350)
PMV BLD AUTO: 10.8 FL (ref 9.2–12.9)
POTASSIUM SERPL-SCNC: 3.6 MMOL/L (ref 3.5–5.1)
PROT SERPL-MCNC: 7 G/DL (ref 6–8.4)
RBC # BLD AUTO: 4.93 M/UL (ref 4–5.4)
SODIUM SERPL-SCNC: 139 MMOL/L (ref 136–145)
WBC # BLD AUTO: 8.72 K/UL (ref 3.9–12.7)

## 2020-06-23 PROCEDURE — 99999 PR PBB SHADOW E&M-EST. PATIENT-LVL III: CPT | Mod: PBBFAC,,, | Performed by: OBSTETRICS & GYNECOLOGY

## 2020-06-23 PROCEDURE — 80053 COMPREHEN METABOLIC PANEL: CPT

## 2020-06-23 PROCEDURE — 85025 COMPLETE CBC W/AUTO DIFF WBC: CPT

## 2020-06-23 PROCEDURE — 99499 NO LOS: ICD-10-PCS | Mod: S$GLB,,, | Performed by: OBSTETRICS & GYNECOLOGY

## 2020-06-23 PROCEDURE — 93005 ELECTROCARDIOGRAM TRACING: CPT

## 2020-06-23 PROCEDURE — 83036 HEMOGLOBIN GLYCOSYLATED A1C: CPT

## 2020-06-23 PROCEDURE — 99499 UNLISTED E&M SERVICE: CPT | Mod: S$GLB,,, | Performed by: OBSTETRICS & GYNECOLOGY

## 2020-06-23 PROCEDURE — 93010 ELECTROCARDIOGRAM REPORT: CPT | Mod: ,,, | Performed by: INTERNAL MEDICINE

## 2020-06-23 PROCEDURE — 99999 PR PBB SHADOW E&M-EST. PATIENT-LVL III: ICD-10-PCS | Mod: PBBFAC,,, | Performed by: OBSTETRICS & GYNECOLOGY

## 2020-06-23 PROCEDURE — 93010 EKG 12-LEAD: ICD-10-PCS | Mod: ,,, | Performed by: INTERNAL MEDICINE

## 2020-06-23 PROCEDURE — 36415 COLL VENOUS BLD VENIPUNCTURE: CPT

## 2020-06-23 RX ORDER — SODIUM CHLORIDE, SODIUM LACTATE, POTASSIUM CHLORIDE, CALCIUM CHLORIDE 600; 310; 30; 20 MG/100ML; MG/100ML; MG/100ML; MG/100ML
INJECTION, SOLUTION INTRAVENOUS CONTINUOUS
Status: CANCELLED | OUTPATIENT
Start: 2020-06-23

## 2020-06-23 RX ORDER — AMLODIPINE BESYLATE 10 MG/1
10 TABLET ORAL DAILY
COMMUNITY
End: 2020-07-27 | Stop reason: SDUPTHER

## 2020-06-23 RX ORDER — MUPIROCIN 20 MG/G
OINTMENT TOPICAL
Status: CANCELLED | OUTPATIENT
Start: 2020-06-23

## 2020-06-23 NOTE — PRE-PROCEDURE INSTRUCTIONS
EKG reviewed by Dr Wu.  Patient instructed to take her BP at home and to make sure she takes her BP medications as ordered.  Instructed that if her BP is not maintained in normal range to see her PCP for assistance before surgery.

## 2020-06-23 NOTE — DISCHARGE INSTRUCTIONS
Your procedure  is scheduled for _7/10/2020_________.    Call [phone] between 2pm and 5pm on __7/9/2020_____to find out your arrival time for the day of surgery.    Report to Same Day Surgery Unit at ____ AM on the 2nd floor of the hospital.   Enter through the Emergency Room.    Important instructions:   Do not eat or drink after 12 midnight, including water.  It is okay to brush your teeth.  Do not have gum, candy or mints.     Take only these medications with a small swallow of water on the morning of your surgery _amlodipine___        Stop taking Aspirin, Ibuprofen, Motrin and Aleve , Fish oil, and Vitamin E for at least 7 days before your surgery. You may use Tylenol unless otherwise instructed by your doctor.          Return to the hospital lab on _7/8/2020 at 9:30 am for Covid test additional blood test.   Lab open Monday -Friday   7am-7pm                   Saturday and Sunday  8am to 12 noon                   Closed on holidays.       Please shower the night before and the morning of your surgery.        Use Hibiclens soap as instructed by your pre op nurse.   Please place clean linens on your bed the night before surgery. Please wear fresh clean clothing after each shower.     No shaving of procedural area at least 4-5 days before surgery due to increased risk of skin irritation and/or possible infection.      Female patients may be asked for a urine specimen on the morning of the surgery.  Please check with your nurse before using the restroom.     Do not wear make- up, including mascara.     You may wear deodorant only.      Do not wear powder, body lotion or perfume/cologne.     Do not wear any jewelry or have any metal on your body.     You will be asked to remove any dentures or partials for the procedure.     Please bring any documents given to you by your doctor.     If you are going home on the same day of surgery, you must arrange for a family member or a friend to drive you home.   Public transportation is prohibited.  You will not be able to drive home if you were given anesthesia or sedation.     Wear loose fitting clothes allowing for bandages.     Please leave money and valuables home.       You may bring your cell phone.     Call the doctor if fever or illness should occur before your surgery.    Call [phone] to contact us here if needed.

## 2020-06-23 NOTE — H&P (VIEW-ONLY)
Cc:  Pre-op for tubal sterilization    HPI:  44 yro  presents for pre-op exam for permanent sterilization.  Pt is adamant about lack of desire for future fertility and desires permanent sterilization.    Past Medical History:   Diagnosis Date    Abnormal Pap smear of cervix     Diverticulosis     GERD (gastroesophageal reflux disease)     Hiatal hernia     Hypertension      Past Surgical History:   Procedure Laterality Date    CERVICAL BIOPSY  W/ LOOP ELECTRODE EXCISION  14     Patient's Medications   New Prescriptions    No medications on file   Previous Medications    ACETAMINOPHEN (TYLENOL EXTRA STRENGTH) 500 MG TABLET    Take 500 mg by mouth daily as needed for Pain.    AMLODIPINE (NORVASC) 5 MG TABLET    Take 2 tablets (10 mg total) by mouth once daily.    DIPHENHYDRAMINE (SOMINEX) 25 MG TABLET    Take 25 mg by mouth nightly as needed for Insomnia.     LABETALOL (NORMODYNE) 100 MG TABLET    Take 1 tablet (100 mg total) by mouth 2 (two) times daily.    LISINOPRIL (PRINIVIL,ZESTRIL) 40 MG TABLET    TAKE 1 TABLET (40 MG TOTAL) BY MOUTH ONCE DAILY.   Modified Medications    No medications on file   Discontinued Medications    No medications on file     Review of patient's allergies indicates:   Allergen Reactions    Ciprofloxacin Blisters and Swelling    Ibuprofen Hives    Meloxicam      rash    Cyclobenzaprine Rash     rasg     Family History   Problem Relation Age of Onset    Heart disease Mother 54        MI    Heart disease Maternal Grandmother 40        MI    Diabetes Other     Heart disease Brother 44        MI    Sickle cell trait Sister     Crohn's disease Neg Hx     Colon cancer Neg Hx      Social History     Tobacco Use    Smoking status: Current Every Day Smoker     Packs/day: 1.00     Years: 16.00     Pack years: 16.00     Types: Cigarettes    Smokeless tobacco: Never Used   Substance Use Topics    Alcohol use: Yes     Frequency: Monthly or less     Drinks per session: 1  or 2     Binge frequency: Never     Comment: social 1-2 x / month    Drug use: Yes     Frequency: 1.0 times per week     Types: Marijuana     Comment: social- last use 1 week ago     Review of Systems   Constitutional: Negative.    HENT: Negative.    Eyes: Negative.    Respiratory: Negative.    Cardiovascular: Negative.    Gastrointestinal: Negative.    Genitourinary: Negative.    Musculoskeletal: Negative.    Neurological: Negative.    Psychiatric/Behavioral: Negative.      Physical Exam  Vitals signs reviewed.   Constitutional:       Appearance: Normal appearance. She is normal weight.   HENT:      Head: Normocephalic.   Neck:      Musculoskeletal: Normal range of motion.   Cardiovascular:      Rate and Rhythm: Normal rate.   Pulmonary:      Effort: Pulmonary effort is normal. No respiratory distress.   Abdominal:      General: Abdomen is flat. There is no distension.      Palpations: Abdomen is soft. There is no mass.      Tenderness: There is no abdominal tenderness. There is no guarding or rebound.      Hernia: No hernia is present.   Musculoskeletal: Normal range of motion.         General: No swelling, tenderness, deformity or signs of injury.   Skin:     General: Skin is warm.   Neurological:      General: No focal deficit present.      Mental Status: She is alert and oriented to person, place, and time.   Psychiatric:         Mood and Affect: Mood normal.         Behavior: Behavior normal.         Thought Content: Thought content normal.         Judgment: Judgment normal.     Assessment/Plan  1. Encounter for female sterilization procedure  Full code    Insert peripheral IV    Chlorhexidine (CHG) 2% Wipes    Notify Physician/Vital Signs Parameters Urine output less than 0.5mL/kg/hr (with indwelling catheter) or 30 mL/hr (without indwelling catheter) or blood glucose greater than 200 mg/dL    Notify physician     Notify Physician - Potential Need of Opioid Reversal    Diet NPO    IP VTE LOW RISK PATIENT     ceFAZolin (ANCEF) 2 g in dextrose 5 % 50 mL IVPB    Chlorohexidine Gluconate Bath    mupirocin 2 % ointment    Comprehensive metabolic panel    Pregnancy, urine rapid    Type And Screen Preop    Place in Outpatient    Bed rest with bathroom privileges    lactated ringers infusion    Place sequential compression device    Hemoglobin A1c    CBC auto differential    EKG 12-lead     Pt consented for laparoscopic tubal sterilization, all questions answered, consents signed

## 2020-06-24 LAB
ESTIMATED AVG GLUCOSE: 97 MG/DL (ref 68–131)
HBA1C MFR BLD HPLC: 5 % (ref 4–5.6)

## 2020-07-08 ENCOUNTER — HOSPITAL ENCOUNTER (OUTPATIENT)
Dept: PREADMISSION TESTING | Facility: HOSPITAL | Age: 45
Discharge: HOME | End: 2020-07-08
Attending: OBSTETRICS & GYNECOLOGY
Payer: COMMERCIAL

## 2020-07-08 DIAGNOSIS — Z01.818 PREOP TESTING: ICD-10-CM

## 2020-07-08 LAB
ABO + RH BLD: NORMAL
BLD GP AB SCN CELLS X3 SERPL QL: NORMAL
SARS-COV-2 RDRP RESP QL NAA+PROBE: NEGATIVE

## 2020-07-08 PROCEDURE — U0002 COVID-19 LAB TEST NON-CDC: HCPCS

## 2020-07-08 PROCEDURE — 86850 RBC ANTIBODY SCREEN: CPT

## 2020-07-08 PROCEDURE — 36415 COLL VENOUS BLD VENIPUNCTURE: CPT

## 2020-07-10 ENCOUNTER — ANESTHESIA (OUTPATIENT)
Dept: SURGERY | Facility: HOSPITAL | Age: 45
End: 2020-07-10
Payer: COMMERCIAL

## 2020-07-10 ENCOUNTER — ANESTHESIA EVENT (OUTPATIENT)
Dept: SURGERY | Facility: HOSPITAL | Age: 45
End: 2020-07-10
Payer: COMMERCIAL

## 2020-07-10 ENCOUNTER — HOSPITAL ENCOUNTER (OUTPATIENT)
Facility: HOSPITAL | Age: 45
Discharge: HOME | End: 2020-07-10
Attending: OBSTETRICS & GYNECOLOGY | Admitting: OBSTETRICS & GYNECOLOGY
Payer: COMMERCIAL

## 2020-07-10 VITALS
HEART RATE: 60 BPM | HEIGHT: 67 IN | DIASTOLIC BLOOD PRESSURE: 88 MMHG | WEIGHT: 216 LBS | OXYGEN SATURATION: 98 % | TEMPERATURE: 98 F | BODY MASS INDEX: 33.9 KG/M2 | SYSTOLIC BLOOD PRESSURE: 139 MMHG | RESPIRATION RATE: 20 BRPM

## 2020-07-10 DIAGNOSIS — Z01.818 PREOP TESTING: ICD-10-CM

## 2020-07-10 DIAGNOSIS — Z30.2 ENCOUNTER FOR FEMALE STERILIZATION PROCEDURE: Primary | ICD-10-CM

## 2020-07-10 LAB — B-HCG UR QL: NEGATIVE

## 2020-07-10 PROCEDURE — 63600175 PHARM REV CODE 636 W HCPCS: Performed by: ANESTHESIOLOGY

## 2020-07-10 PROCEDURE — 25000003 PHARM REV CODE 250: Performed by: REGISTERED NURSE

## 2020-07-10 PROCEDURE — 37000009 HC ANESTHESIA EA ADD 15 MINS: Performed by: OBSTETRICS & GYNECOLOGY

## 2020-07-10 PROCEDURE — 58671 LAPAROSCOPY TUBAL BLOCK: CPT | Mod: ,,, | Performed by: OBSTETRICS & GYNECOLOGY

## 2020-07-10 PROCEDURE — 63600175 PHARM REV CODE 636 W HCPCS: Performed by: REGISTERED NURSE

## 2020-07-10 PROCEDURE — 63600175 PHARM REV CODE 636 W HCPCS: Performed by: OBSTETRICS & GYNECOLOGY

## 2020-07-10 PROCEDURE — 71000039 HC RECOVERY, EACH ADD'L HOUR: Performed by: OBSTETRICS & GYNECOLOGY

## 2020-07-10 PROCEDURE — 71000033 HC RECOVERY, INTIAL HOUR: Performed by: OBSTETRICS & GYNECOLOGY

## 2020-07-10 PROCEDURE — 58671 PR LAP,TUBAL BLOCK BY DEVICE: ICD-10-PCS | Mod: ,,, | Performed by: OBSTETRICS & GYNECOLOGY

## 2020-07-10 PROCEDURE — 36000708 HC OR TIME LEV III 1ST 15 MIN: Performed by: OBSTETRICS & GYNECOLOGY

## 2020-07-10 PROCEDURE — D9220A PRA ANESTHESIA: ICD-10-PCS | Mod: CRNA,,, | Performed by: REGISTERED NURSE

## 2020-07-10 PROCEDURE — 27800903 OPTIME MED/SURG SUP & DEVICES OTHER IMPLANTS: Performed by: OBSTETRICS & GYNECOLOGY

## 2020-07-10 PROCEDURE — D9220A PRA ANESTHESIA: Mod: ANES,,, | Performed by: ANESTHESIOLOGY

## 2020-07-10 PROCEDURE — D9220A PRA ANESTHESIA: Mod: CRNA,,, | Performed by: REGISTERED NURSE

## 2020-07-10 PROCEDURE — 81025 URINE PREGNANCY TEST: CPT

## 2020-07-10 PROCEDURE — 36000709 HC OR TIME LEV III EA ADD 15 MIN: Performed by: OBSTETRICS & GYNECOLOGY

## 2020-07-10 PROCEDURE — 25000003 PHARM REV CODE 250: Performed by: OBSTETRICS & GYNECOLOGY

## 2020-07-10 PROCEDURE — 71000015 HC POSTOP RECOV 1ST HR: Performed by: OBSTETRICS & GYNECOLOGY

## 2020-07-10 PROCEDURE — D9220A PRA ANESTHESIA: ICD-10-PCS | Mod: ANES,,, | Performed by: ANESTHESIOLOGY

## 2020-07-10 PROCEDURE — 37000008 HC ANESTHESIA 1ST 15 MINUTES: Performed by: OBSTETRICS & GYNECOLOGY

## 2020-07-10 PROCEDURE — 71000016 HC POSTOP RECOV ADDL HR: Performed by: OBSTETRICS & GYNECOLOGY

## 2020-07-10 DEVICE — CLIP FILSHIE: Type: IMPLANTABLE DEVICE | Site: FALLOPIAN TUBE | Status: FUNCTIONAL

## 2020-07-10 RX ORDER — MUPIROCIN 20 MG/G
OINTMENT TOPICAL
Status: DISCONTINUED | OUTPATIENT
Start: 2020-07-10 | End: 2020-07-10 | Stop reason: CLARIF

## 2020-07-10 RX ORDER — DIPHENHYDRAMINE HYDROCHLORIDE 50 MG/ML
25 INJECTION INTRAMUSCULAR; INTRAVENOUS EVERY 4 HOURS PRN
Status: DISCONTINUED | OUTPATIENT
Start: 2020-07-10 | End: 2020-07-10 | Stop reason: HOSPADM

## 2020-07-10 RX ORDER — LIDOCAINE HYDROCHLORIDE 20 MG/ML
INJECTION INTRAVENOUS
Status: DISCONTINUED | OUTPATIENT
Start: 2020-07-10 | End: 2020-07-10

## 2020-07-10 RX ORDER — GLYCOPYRROLATE 0.2 MG/ML
INJECTION INTRAMUSCULAR; INTRAVENOUS
Status: DISCONTINUED | OUTPATIENT
Start: 2020-07-10 | End: 2020-07-10

## 2020-07-10 RX ORDER — TRAMADOL HYDROCHLORIDE 50 MG/1
50 TABLET ORAL EVERY 6 HOURS PRN
Qty: 20 TABLET | Refills: 0 | Status: SHIPPED | OUTPATIENT
Start: 2020-07-10 | End: 2020-08-04 | Stop reason: ALTCHOICE

## 2020-07-10 RX ORDER — NEOSTIGMINE METHYLSULFATE 1 MG/ML
INJECTION, SOLUTION INTRAVENOUS
Status: DISCONTINUED | OUTPATIENT
Start: 2020-07-10 | End: 2020-07-10

## 2020-07-10 RX ORDER — ROCURONIUM BROMIDE 10 MG/ML
INJECTION, SOLUTION INTRAVENOUS
Status: DISCONTINUED | OUTPATIENT
Start: 2020-07-10 | End: 2020-07-10

## 2020-07-10 RX ORDER — HYDROCODONE BITARTRATE AND ACETAMINOPHEN 5; 325 MG/1; MG/1
1 TABLET ORAL EVERY 4 HOURS PRN
Status: DISCONTINUED | OUTPATIENT
Start: 2020-07-10 | End: 2020-07-10 | Stop reason: HOSPADM

## 2020-07-10 RX ORDER — SODIUM CHLORIDE 0.9 % (FLUSH) 0.9 %
10 SYRINGE (ML) INJECTION
Status: DISCONTINUED | OUTPATIENT
Start: 2020-07-10 | End: 2020-07-10 | Stop reason: HOSPADM

## 2020-07-10 RX ORDER — ONDANSETRON 2 MG/ML
INJECTION INTRAMUSCULAR; INTRAVENOUS
Status: DISCONTINUED | OUTPATIENT
Start: 2020-07-10 | End: 2020-07-10

## 2020-07-10 RX ORDER — PROPOFOL 10 MG/ML
VIAL (ML) INTRAVENOUS
Status: DISCONTINUED | OUTPATIENT
Start: 2020-07-10 | End: 2020-07-10

## 2020-07-10 RX ORDER — ONDANSETRON 4 MG/1
8 TABLET, ORALLY DISINTEGRATING ORAL EVERY 8 HOURS PRN
Status: DISCONTINUED | OUTPATIENT
Start: 2020-07-10 | End: 2020-07-10 | Stop reason: HOSPADM

## 2020-07-10 RX ORDER — DIPHENHYDRAMINE HCL 25 MG
25 CAPSULE ORAL EVERY 4 HOURS PRN
Status: DISCONTINUED | OUTPATIENT
Start: 2020-07-10 | End: 2020-07-10 | Stop reason: HOSPADM

## 2020-07-10 RX ORDER — SODIUM CHLORIDE, SODIUM LACTATE, POTASSIUM CHLORIDE, CALCIUM CHLORIDE 600; 310; 30; 20 MG/100ML; MG/100ML; MG/100ML; MG/100ML
INJECTION, SOLUTION INTRAVENOUS CONTINUOUS
Status: DISCONTINUED | OUTPATIENT
Start: 2020-07-10 | End: 2020-07-10 | Stop reason: HOSPADM

## 2020-07-10 RX ORDER — SUCCINYLCHOLINE CHLORIDE 20 MG/ML
INJECTION INTRAMUSCULAR; INTRAVENOUS
Status: DISCONTINUED | OUTPATIENT
Start: 2020-07-10 | End: 2020-07-10

## 2020-07-10 RX ORDER — HYDROCODONE BITARTRATE AND ACETAMINOPHEN 5; 325 MG/1; MG/1
1 TABLET ORAL EVERY 4 HOURS PRN
Qty: 14 TABLET | Refills: 0 | Status: SHIPPED | OUTPATIENT
Start: 2020-07-10 | End: 2020-08-04 | Stop reason: ALTCHOICE

## 2020-07-10 RX ORDER — MIDAZOLAM HYDROCHLORIDE 1 MG/ML
INJECTION, SOLUTION INTRAMUSCULAR; INTRAVENOUS
Status: DISCONTINUED | OUTPATIENT
Start: 2020-07-10 | End: 2020-07-10

## 2020-07-10 RX ORDER — METOPROLOL TARTRATE 1 MG/ML
INJECTION, SOLUTION INTRAVENOUS
Status: DISCONTINUED | OUTPATIENT
Start: 2020-07-10 | End: 2020-07-10

## 2020-07-10 RX ORDER — FENTANYL CITRATE 50 UG/ML
INJECTION, SOLUTION INTRAMUSCULAR; INTRAVENOUS
Status: DISCONTINUED | OUTPATIENT
Start: 2020-07-10 | End: 2020-07-10

## 2020-07-10 RX ORDER — MUPIROCIN 20 MG/G
OINTMENT TOPICAL ONCE
Status: COMPLETED | OUTPATIENT
Start: 2020-07-10 | End: 2020-07-10

## 2020-07-10 RX ORDER — HYDROMORPHONE HYDROCHLORIDE 2 MG/ML
0.2 INJECTION, SOLUTION INTRAMUSCULAR; INTRAVENOUS; SUBCUTANEOUS EVERY 5 MIN PRN
Status: DISCONTINUED | OUTPATIENT
Start: 2020-07-10 | End: 2020-07-10 | Stop reason: HOSPADM

## 2020-07-10 RX ORDER — TRAMADOL HYDROCHLORIDE 50 MG/1
50 TABLET ORAL EVERY 4 HOURS PRN
Status: DISCONTINUED | OUTPATIENT
Start: 2020-07-10 | End: 2020-07-10 | Stop reason: HOSPADM

## 2020-07-10 RX ORDER — MUPIROCIN 20 MG/G
OINTMENT TOPICAL
Status: DISCONTINUED | OUTPATIENT
Start: 2020-07-10 | End: 2020-07-10 | Stop reason: DRUGHIGH

## 2020-07-10 RX ORDER — ACETAMINOPHEN 10 MG/ML
INJECTION, SOLUTION INTRAVENOUS
Status: DISPENSED
Start: 2020-07-10 | End: 2020-07-10

## 2020-07-10 RX ORDER — PHENYLEPHRINE HYDROCHLORIDE 10 MG/ML
INJECTION INTRAVENOUS
Status: DISCONTINUED | OUTPATIENT
Start: 2020-07-10 | End: 2020-07-10

## 2020-07-10 RX ORDER — SODIUM CHLORIDE, SODIUM LACTATE, POTASSIUM CHLORIDE, CALCIUM CHLORIDE 600; 310; 30; 20 MG/100ML; MG/100ML; MG/100ML; MG/100ML
INJECTION, SOLUTION INTRAVENOUS CONTINUOUS PRN
Status: DISCONTINUED | OUTPATIENT
Start: 2020-07-10 | End: 2020-07-10

## 2020-07-10 RX ORDER — ACETAMINOPHEN 10 MG/ML
1000 INJECTION, SOLUTION INTRAVENOUS ONCE
Status: COMPLETED | OUTPATIENT
Start: 2020-07-10 | End: 2020-07-10

## 2020-07-10 RX ORDER — CEFAZOLIN SODIUM 2 G/50ML
2 SOLUTION INTRAVENOUS
Status: DISCONTINUED | OUTPATIENT
Start: 2020-07-10 | End: 2020-07-10 | Stop reason: HOSPADM

## 2020-07-10 RX ADMIN — PHENYLEPHRINE HYDROCHLORIDE 200 MCG: 10 INJECTION INTRAVENOUS at 07:07

## 2020-07-10 RX ADMIN — MIDAZOLAM HYDROCHLORIDE 2 MG: 1 INJECTION, SOLUTION INTRAMUSCULAR; INTRAVENOUS at 07:07

## 2020-07-10 RX ADMIN — SODIUM CHLORIDE, SODIUM LACTATE, POTASSIUM CHLORIDE, AND CALCIUM CHLORIDE: .6; .31; .03; .02 INJECTION, SOLUTION INTRAVENOUS at 11:07

## 2020-07-10 RX ADMIN — METOPROLOL TARTRATE 1 MG: 1 INJECTION, SOLUTION INTRAVENOUS at 07:07

## 2020-07-10 RX ADMIN — NEOSTIGMINE METHYLSULFATE 4 MG: 1 INJECTION INTRAVENOUS at 07:07

## 2020-07-10 RX ADMIN — PROPOFOL 180 MG: 10 INJECTION, EMULSION INTRAVENOUS at 07:07

## 2020-07-10 RX ADMIN — ROCURONIUM BROMIDE 20 MG: 10 INJECTION, SOLUTION INTRAVENOUS at 07:07

## 2020-07-10 RX ADMIN — HYDROMORPHONE HYDROCHLORIDE 0.2 MG: 2 INJECTION, SOLUTION INTRAMUSCULAR; INTRAVENOUS; SUBCUTANEOUS at 08:07

## 2020-07-10 RX ADMIN — HYDROMORPHONE HYDROCHLORIDE 0.2 MG: 2 INJECTION, SOLUTION INTRAMUSCULAR; INTRAVENOUS; SUBCUTANEOUS at 09:07

## 2020-07-10 RX ADMIN — ONDANSETRON 8 MG: 4 TABLET, ORALLY DISINTEGRATING ORAL at 11:07

## 2020-07-10 RX ADMIN — CEFAZOLIN SODIUM 2 G: 2 SOLUTION INTRAVENOUS at 07:07

## 2020-07-10 RX ADMIN — GLYCOPYRROLATE 0.6 MG: 0.2 INJECTION, SOLUTION INTRAMUSCULAR; INTRAVENOUS at 07:07

## 2020-07-10 RX ADMIN — PHENYLEPHRINE HYDROCHLORIDE 100 MCG: 10 INJECTION INTRAVENOUS at 07:07

## 2020-07-10 RX ADMIN — FENTANYL CITRATE 50 MCG: 50 INJECTION INTRAMUSCULAR; INTRAVENOUS at 07:07

## 2020-07-10 RX ADMIN — SODIUM CHLORIDE, SODIUM LACTATE, POTASSIUM CHLORIDE, AND CALCIUM CHLORIDE: .6; .31; .03; .02 INJECTION, SOLUTION INTRAVENOUS at 06:07

## 2020-07-10 RX ADMIN — HYDROCODONE BITARTRATE AND ACETAMINOPHEN 1 TABLET: 5; 325 TABLET ORAL at 12:07

## 2020-07-10 RX ADMIN — ACETAMINOPHEN 1000 MG: 10 INJECTION, SOLUTION INTRAVENOUS at 08:07

## 2020-07-10 RX ADMIN — SUCCINYLCHOLINE CHLORIDE 140 MG: 20 INJECTION, SOLUTION INTRAMUSCULAR; INTRAVENOUS at 07:07

## 2020-07-10 RX ADMIN — Medication 100 MG: at 07:07

## 2020-07-10 RX ADMIN — ONDANSETRON 4 MG: 2 INJECTION, SOLUTION INTRAMUSCULAR; INTRAVENOUS at 07:07

## 2020-07-10 RX ADMIN — MUPIROCIN: 20 OINTMENT TOPICAL at 06:07

## 2020-07-10 NOTE — PLAN OF CARE
Pt is s/p fallopian tube occlusion. She easily arouses to voice and her vitals are WNL. Abd dressings are c/d/i. Pain is controlled and she denies nausea at this time. Recovery care is complete.

## 2020-07-10 NOTE — HOSPITAL COURSE
Pt admitted for scheduled laparoscopic tubal sterilization which was performed without difficulty.

## 2020-07-10 NOTE — TRANSFER OF CARE
"Anesthesia Transfer of Care Note    Patient: Cipriano Gamez    Procedure(s) Performed: Procedure(s) (LRB):  OCCLUSION, FALLOPIAN TUBE, LAPAROSCOPIC, USING DEVICE (Bilateral)    Patient location: PACU    Anesthesia Type: general    Transport from OR: Transported from OR on room air with adequate spontaneous ventilation    Post pain: adequate analgesia    Post assessment: tolerated procedure well and no apparent anesthetic complications    Post vital signs: stable    Level of consciousness: awake, alert and oriented    Nausea/Vomiting: no nausea/vomiting    Complications: none    Transfer of care protocol was followed      Last vitals:   Visit Vitals  /80 (BP Location: Left arm, Patient Position: Lying)   Pulse 65   Temp 36.6 °C (97.8 °F) (Oral)   Resp 20   Ht 5' 7" (1.702 m)   Wt 98 kg (216 lb)   LMP 07/02/2020   SpO2 100%   Breastfeeding No   BMI 33.83 kg/m²     "

## 2020-07-10 NOTE — INTERVAL H&P NOTE
The patient has been examined and the H&P has been reviewed:    I concur with the findings and no changes have occurred since H&P was written.    Anesthesia/Surgery risks, benefits and alternative options discussed and understood by patient/family.          Active Hospital Problems    Diagnosis  POA    Encounter for female sterilization procedure [Z30.2]  Not Applicable      Resolved Hospital Problems   No resolved problems to display.

## 2020-07-10 NOTE — BRIEF OP NOTE
[facility]  Brief Operative Note    Surgery Date: 7/10/2020     Surgeon(s) and Role:     * Alex Waller MD - Primary    Assisting Surgeon: None    Pre-op Diagnosis:  Encounter for female sterilization procedure [Z30.2]    Post-op Diagnosis:  Post-Op Diagnosis Codes:     * Encounter for female sterilization procedure [Z30.2]    Procedure(s) (LRB):  OCCLUSION, FALLOPIAN TUBE, LAPAROSCOPIC, USING DEVICE (Bilateral) - Filshie clip    Anesthesia: General    Description of the findings of the procedure(s): normal appearing female pelvis    Estimated Blood Loss: < 10 cc.         Specimens:   Specimen (12h ago, onward)    None            Discharge Note    OUTCOME: Patient tolerated treatment/procedure well without complication and is now ready for discharge.    DISPOSITION: Home or Self Care    FINAL DIAGNOSIS:  Encounter for female sterilization procedure    FOLLOWUP: None, not need unless having problems or due for annual gyn exam    DISCHARGE INSTRUCTIONS:    Discharge Procedure Orders   Diet general     Lifting restrictions     Call MD for:  temperature >100.4     Call MD for:  persistent nausea and vomiting     Call MD for:  severe uncontrolled pain     Call MD for:  difficulty breathing, headache or visual disturbances     Call MD for:  redness, tenderness, or signs of infection (pain, swelling, redness, odor or green/yellow discharge around incision site)     Call MD for:  persistent dizziness or light-headedness     Call MD for:  extreme fatigue     No dressing needed     Weight bearing restrictions (specify)

## 2020-07-10 NOTE — OP NOTE
Dictation #1  MRN:[id number]  [facility]:[id number]    Date of surgery July 10th year 2020    Preoperative diagnosis:  Undesired fertility    Postoperative diagnosis:  Same    Procedure is a laparoscopic Filshie clip tubal sterilization    Surgeon:  Alex donaldson MD    EBL:  Less than 10 cc    Complications:  None noted    Pathology:  No specimens    Procedure in detail  After informed consent was obtained, patient was taken to the operating room and administered general endotracheal anesthesia without difficulty.  Patient was prepped and draped in normal sterile fashion in dorsal lithotomy position.  Patient's bladder was emptied with a catheter in the heavy weighted speculum placed in the posterior fornix of the vagina.  The anterior lip of the cervix was grasped with an Allis clamp and a uterine sound placed through the cervical os into the uterine cavity.  This was taped to the Allis clamp in together uses uterine manipulator.  Attention was then turned to the abdomen, in which a infraumbilical skin incision was made approximately 5 mm in diameter.  The Veress needle was inserted through the same incision and in the abdomen insufflated 1st with low-flow then with high-flow CO2 gas.  Upon adequate insufflation, the Veress needle was removed and a 5 mm trocar placed using a laparoscopic for assistance.  Upon survey of the abdomen, a normal appearing female pelvis was noted.  A suprapubic incision was made in the midline approximately 8 mm in diameter.  An 8 mm trocar was placed through the same incision under direct visualization.  The uterus is and mobilized the right fallopian tube was then grasped with the Filshie clip applicator, and Filshie clip applied.  This was repeated on the left side.  All instruments were then removed from the abdomen in the abdomen deflated.  The suprapubic trocar site was closed with 2 simple interrupted 4-0 Rapide Vicryl sutures.  The infraumbilical skin incision was closed with a single simple  interrupted 4-0 Rapide Vicryl suture.  Sponge, lap, needle count were correct x2.  There were no noted intraoperative complications.  The patient was taken to the PACU for recovery.

## 2020-07-10 NOTE — ANESTHESIA PREPROCEDURE EVALUATION
"                                                                                                             07/10/2020  Cipriano Gamez is a 44 y.o., female.  Pre-operative evaluation for Procedure(s) (LRB):  OCCLUSION, FALLOPIAN TUBE, LAPAROSCOPIC, USING DEVICE (Bilateral)    NPO >8h  METS >4    Vitals:    06/23/20 1441 07/09/20 0736 07/10/20 0603   BP:   124/82   BP Location:   Right arm   Patient Position:   Lying   Pulse:   76   Temp:   36.6 °C (97.8 °F)   TempSrc:   Oral   SpO2:   99%   Weight: 98.4 kg (216 lb 14.9 oz) 98.4 kg (216 lb 14.9 oz) 98 kg (216 lb)   Height:   5' 7" (1.702 m)       Patient Active Problem List   Diagnosis    Diverticulosis with multiple hospitalization for diverticulitis    GERD (gastroesophageal reflux disease)    Essential hypertension    ASCUS with positive high risk HPV    General counseling and advice on female contraception    S/P LEEP    Proteinuria    Smokes 1 pack of cigarettes per day    Cyclic vomiting syndrome    Marijuana abuse    Prolonged Q-T interval on ECG    History of diverticulitis 1/2020 with gram neg sepsis    Hematemesis with nausea    At risk for prolonged QT interval syndrome    Smoking (tobacco) complicating pregnancy, first trimester    Chronic hypertension affecting pregnancy    Antepartum abnormal cervical Papanicolaou smear complicating pregnancy, first trimester    Obesity affecting pregnancy in first trimester    Drug dependence affecting pregnancy in first trimester    AMA (advanced maternal age) multigravida 35+, first trimester    Encounter for female sterilization procedure       Review of patient's allergies indicates:   Allergen Reactions    Ciprofloxacin Swelling and Blisters    Ibuprofen Hives    Meloxicam      rash    Cyclobenzaprine Rash     rasg       No current facility-administered medications on file prior to encounter.      Current Outpatient Medications on File Prior to Encounter   Medication Sig Dispense Refill "    acetaminophen (TYLENOL EXTRA STRENGTH) 500 MG tablet Take 500 mg by mouth daily as needed for Pain.      lisinopriL (PRINIVIL,ZESTRIL) 40 MG tablet TAKE 1 TABLET (40 MG TOTAL) BY MOUTH ONCE DAILY. 90 tablet 0    diphenhydrAMINE (SOMINEX) 25 mg tablet Take 25 mg by mouth nightly as needed for Insomnia.          Past Surgical History:   Procedure Laterality Date    CERVICAL BIOPSY  W/ LOOP ELECTRODE EXCISION  2/11/14       Social History     Socioeconomic History    Marital status: Single     Spouse name: Not on file    Number of children: Not on file    Years of education: Not on file    Highest education level: Not on file   Occupational History    Occupation: [employer] in [place]     Employer: [employer]   Social Needs    Financial resource strain: Very hard    Food insecurity     Worry: Often true     Inability: Often true    Transportation needs     Medical: Yes     Non-medical: Yes   Tobacco Use    Smoking status: Current Every Day Smoker     Packs/day: 1.00     Years: 16.00     Pack years: 16.00     Types: Cigarettes    Smokeless tobacco: Never Used   Substance and Sexual Activity    Alcohol use: Yes     Frequency: Monthly or less     Drinks per session: 1 or 2     Binge frequency: Never     Comment: social 1-2 x / month    Drug use: Yes     Frequency: 1.0 times per week     Types: Marijuana     Comment: social- last use 1 week ago    Sexual activity: Not Currently     Partners: Male     Birth control/protection: None   Lifestyle    Physical activity     Days per week: 0 days     Minutes per session: 0 min    Stress: Not at all   Relationships    Social connections     Talks on phone: More than three times a week     Gets together: Twice a week     Attends Congregational service: Not on file     Active member of club or organization: No     Attends meetings of clubs or organizations: Never     Relationship status: Not on file   Other Topics Concern    Not on file   Social History Narrative     Not on file         BMP  Lab Results   Component Value Date     06/23/2020    K 3.6 06/23/2020     06/23/2020    CO2 24 06/23/2020    BUN 11 06/23/2020    CREATININE 0.9 06/23/2020    CALCIUM 9.1 06/23/2020    ANIONGAP 6 (L) 06/23/2020    ESTGFRAFRICA >60 06/23/2020    EGFRNONAA >60 06/23/2020     Lab Results   Component Value Date    WBC 8.72 06/23/2020    HGB 14.6 06/23/2020    HCT 44.3 06/23/2020    MCV 90 06/23/2020     06/23/2020           Diagnostic Studies:  2013 stress test  CONCLUSIONS   No evidence of stress induced myocardial ischemia.     negative stress echo  EF 65%    EKG:  Vent. Rate : 067 BPM     Atrial Rate : 067 BPM      P-R Int : 140 ms          QRS Dur : 078 ms       QT Int : 388 ms       P-R-T Axes : 035 029 006 degrees      QTc Int : 409 ms     Normal sinus rhythm with sinus arrhythmia   Cannot rule out Anterior infarct ,age undetermined   Abnormal ECG   When compared with ECG of 08-JAN-2020 07:22,   Significant changes have occurred   Confirmed by Eddi Carbajal MD ([id number]) on 6/24/2020 10:33:10 AM    2D Echo:  No results found for this or any previous visit.      Anesthesia Evaluation    I have reviewed the Patient Summary Reports.   I have reviewed the NPO Status.   I have reviewed the Medications.     Review of Systems  Anesthesia Hx:  No problems with previous Anesthesia  History of prior surgery of interest to airway management or planning:  Denies Personal Hx of Anesthesia complications.   Social:  Smoker, Social Alcohol Use THC use   Cardiovascular:   Exercise tolerance: good Hypertension    Hepatic/GI:   Hiatal Hernia, GERD        Physical Exam  General:  Obesity    Airway/Jaw/Neck:  Airway Findings: Mouth Opening: Normal General Airway Assessment: Adult  Mallampati: III  TM Distance: 4 - 6 cm        Eyes/Ears/Nose:  EYES/EARS/NOSE FINDINGS: Normal   Dental:  Dental Findings: In tact    Chest/Lungs:  Chest/Lungs Clear    Heart/Vascular:  Heart Findings: Normal        Mental Status:  Mental Status Findings: Normal        Anesthesia Plan  Type of Anesthesia, risks & benefits discussed:  Anesthesia Type:  general  Patient's Preference:   Intra-op Monitoring Plan: standard ASA monitors  Intra-op Monitoring Plan Comments:   Post Op Pain Control Plan: multimodal analgesia  Post Op Pain Control Plan Comments:   Induction:   IV and rapid sequence  Beta Blocker:  Patient is not currently on a Beta-Blocker (No further documentation required).       Informed Consent: Patient understands risks and agrees with Anesthesia plan.  Questions answered. Anesthesia consent signed with patient.  ASA Score: 2     Day of Surgery Review of History & Physical:  There are no significant changes.          Ready For Surgery From Anesthesia Perspective.

## 2020-07-10 NOTE — ANESTHESIA POSTPROCEDURE EVALUATION
Anesthesia Post Evaluation    Patient: Cipriano Gamez    Procedure(s) Performed: Procedure(s) (LRB):  OCCLUSION, FALLOPIAN TUBE, LAPAROSCOPIC, USING DEVICE (Bilateral)    Final Anesthesia Type: general    Patient location during evaluation: PACU  Patient participation: Yes- Able to Participate  Level of consciousness: awake and alert  Post-procedure vital signs: reviewed and stable  Pain management: adequate  Airway patency: patent  TARA mitigation strategies: Multimodal analgesia  PONV status at discharge: No PONV  Anesthetic complications: no      Cardiovascular status: blood pressure returned to baseline  Respiratory status: unassisted and spontaneous ventilation  Hydration status: euvolemic  Follow-up not needed.          Vitals Value Taken Time   /77 07/10/20 0957   Temp 36.5 °C (97.7 °F) 07/10/20 0957   Pulse 63 07/10/20 0957   Resp 20 07/10/20 0957   SpO2 97 % 07/10/20 0957         Event Time   Out of Recovery 09:56:00         Pain/Mason Score: Pain Rating Prior to Med Admin: 6 (7/10/2020  9:15 AM)  Pain Rating Post Med Admin: 6 (7/10/2020  9:15 AM)  Mason Score: 10 (7/10/2020  9:57 AM)

## 2020-07-10 NOTE — DISCHARGE INSTRUCTIONS
BATHING:                   You may shower after your dressing is removed, but no tub baths, hot tubs, saunas or swimming until you see the doctor.    DRESSING:  ? Remove your bandage 24 hours_. If there are skin tapes over the incision, leave them in place. They will start to come off in 5-7 days.    ACTIVITY LEVEL: If you have received sedation or an anesthetic, you may feel sleepy for   several hours. Rest until you are more awake. Gradually resume your normal activities  ? No heavy lifting or straining, nothing over 10 lbs., like a gallon of milk.  ? Pelvic rest- no sex, tampons or douching until follow up or instructed by doctor.  DIET:  You may resume your home diet. If nausea is present, increase your diet gradually with fluids and bland foods.    Medications:  Pain medication should be taken only if needed and as directed. If antibiotics are prescribed, the medication should be taken until completed. You will be given an updated list of you medications.  Last dose of Hydrocodone 12:57 pm  ? No driving, alcoholic beverages or signing legal documents for next 24 hours or while taking pain medication    CALL THE DOCTOR:    For any obvious bleeding (some dried blood over the incision is normal).      Redness, swelling, foul smell around incision or fever over 101.   Shortness of breath, Coughing up Bloody Sputum or Pains or Swelling in your calves.   Persistent pain or nausea not relieved by medication.   If vaginal bleeding is in excess of a normal period.   Problems urinating    If any unusual problems or difficulties occur contact your doctor. If you cannot contact your doctor but feel your signs and symptoms warrant a physicians attention return to the emergency room.  Fall Prevention  Millions of people fall every year and injure themselves. You may have had anesthesia or sedation which may increase your risk of falling. You may have health issues that put you at an increased risk of  falling.     Here are ways to reduce your risk of falling.  ·   · Make your home safe by keeping walkways clear of objects you may trip over.  · Use non-slip pads under rugs. Do not use area rugs or small throw rugs.  · Use non-slip mats in bathtubs and showers.  · Install handrails and lights on staircases.  · Do not walk in poorly lit areas.  · Do not stand on chairs or wobbly ladders.  · Use caution when reaching overhead or looking upward. This position can cause a loss of balance.  · Be sure your shoes fit properly, have non-slip bottoms and are in good condition.   · Wear shoes both inside and out. Avoid going barefoot or wearing slippers.  · Be cautious when going up and down stairs, curbs, and when walking on uneven sidewalks.  · If your balance is poor, consider using a cane or walker.  · If your fall was related to alcohol use, stop or limit alcohol intake.   · If your fall was related to use of sleeping medicines, talk to your doctor about this. You may need to reduce your dosage at bedtime if you awaken during the night to go to the bathroom.    · To reduce the need for nighttime bathroom trips:  ¨ Avoid drinking fluids for several hours before going to bed  ¨ Empty your bladder before going to bed  ¨ Men can keep a urinal at the bedside  · Stay as active as you can. Balance, flexibility, strength, and endurance all come from exercise. They all play a role in preventing falls. Ask your healthcare provider which types of activity are right for you.  · Get your vision checked on a regular basis.  · If you have pets, know where they are before you stand up or walk so you don't trip over them.  · Use night lights.

## 2020-07-11 ENCOUNTER — HOSPITAL ENCOUNTER (EMERGENCY)
Facility: HOSPITAL | Age: 45
Discharge: HOME | End: 2020-07-11
Attending: EMERGENCY MEDICINE
Payer: COMMERCIAL

## 2020-07-11 VITALS
OXYGEN SATURATION: 99 % | HEIGHT: 67 IN | DIASTOLIC BLOOD PRESSURE: 82 MMHG | WEIGHT: 215 LBS | HEART RATE: 66 BPM | TEMPERATURE: 98 F | SYSTOLIC BLOOD PRESSURE: 137 MMHG | BODY MASS INDEX: 33.74 KG/M2 | RESPIRATION RATE: 17 BRPM

## 2020-07-11 DIAGNOSIS — K57.92 DIVERTICULITIS: ICD-10-CM

## 2020-07-11 DIAGNOSIS — R11.2 NON-INTRACTABLE VOMITING WITH NAUSEA, UNSPECIFIED VOMITING TYPE: Primary | ICD-10-CM

## 2020-07-11 LAB
ALBUMIN SERPL BCP-MCNC: 4.9 G/DL (ref 3.5–5.2)
ALP SERPL-CCNC: 90 U/L (ref 55–135)
ALT SERPL W/O P-5'-P-CCNC: 10 U/L (ref 10–44)
ANION GAP SERPL CALC-SCNC: 14 MMOL/L (ref 8–16)
AST SERPL-CCNC: 17 U/L (ref 10–40)
BACTERIA #/AREA URNS HPF: ABNORMAL /HPF
BASOPHILS # BLD AUTO: 0.04 K/UL (ref 0–0.2)
BASOPHILS NFR BLD: 0.3 % (ref 0–1.9)
BILIRUB SERPL-MCNC: 0.6 MG/DL (ref 0.1–1)
BILIRUB UR QL STRIP: NEGATIVE
BUN SERPL-MCNC: 11 MG/DL (ref 6–20)
CALCIUM SERPL-MCNC: 10.3 MG/DL (ref 8.7–10.5)
CHLORIDE SERPL-SCNC: 102 MMOL/L (ref 95–110)
CLARITY UR: CLEAR
CO2 SERPL-SCNC: 21 MMOL/L (ref 23–29)
COLOR UR: ABNORMAL
CREAT SERPL-MCNC: 1 MG/DL (ref 0.5–1.4)
DIFFERENTIAL METHOD: ABNORMAL
EOSINOPHIL # BLD AUTO: 0 K/UL (ref 0–0.5)
EOSINOPHIL NFR BLD: 0 % (ref 0–8)
ERYTHROCYTE [DISTWIDTH] IN BLOOD BY AUTOMATED COUNT: 13.2 % (ref 11.5–14.5)
EST. GFR  (AFRICAN AMERICAN): >60 ML/MIN/1.73 M^2
EST. GFR  (NON AFRICAN AMERICAN): >60 ML/MIN/1.73 M^2
GLUCOSE SERPL-MCNC: 178 MG/DL (ref 70–110)
GLUCOSE UR QL STRIP: ABNORMAL
HCT VFR BLD AUTO: 48.1 % (ref 37–48.5)
HGB BLD-MCNC: 16.3 G/DL (ref 12–16)
HGB UR QL STRIP: ABNORMAL
HYALINE CASTS #/AREA URNS LPF: 0 /LPF
IMM GRANULOCYTES # BLD AUTO: 0.05 K/UL (ref 0–0.04)
IMM GRANULOCYTES NFR BLD AUTO: 0.3 % (ref 0–0.5)
KETONES UR QL STRIP: ABNORMAL
LEUKOCYTE ESTERASE UR QL STRIP: NEGATIVE
LIPASE SERPL-CCNC: 28 U/L (ref 4–60)
LYMPHOCYTES # BLD AUTO: 1.1 K/UL (ref 1–4.8)
LYMPHOCYTES NFR BLD: 7.1 % (ref 18–48)
MCH RBC QN AUTO: 29.8 PG (ref 27–31)
MCHC RBC AUTO-ENTMCNC: 33.9 G/DL (ref 32–36)
MCV RBC AUTO: 88 FL (ref 82–98)
MICROSCOPIC COMMENT: ABNORMAL
MONOCYTES # BLD AUTO: 0.3 K/UL (ref 0.3–1)
MONOCYTES NFR BLD: 2.1 % (ref 4–15)
NEUTROPHILS # BLD AUTO: 13.4 K/UL (ref 1.8–7.7)
NEUTROPHILS NFR BLD: 90.2 % (ref 38–73)
NITRITE UR QL STRIP: NEGATIVE
NRBC BLD-RTO: 0 /100 WBC
PH UR STRIP: 7 [PH] (ref 5–8)
PLATELET # BLD AUTO: 299 K/UL (ref 150–350)
PMV BLD AUTO: 11 FL (ref 9.2–12.9)
POTASSIUM SERPL-SCNC: 3.6 MMOL/L (ref 3.5–5.1)
PROT SERPL-MCNC: 8.9 G/DL (ref 6–8.4)
PROT UR QL STRIP: ABNORMAL
RBC # BLD AUTO: 5.47 M/UL (ref 4–5.4)
RBC #/AREA URNS HPF: 5 /HPF (ref 0–4)
SODIUM SERPL-SCNC: 137 MMOL/L (ref 136–145)
SP GR UR STRIP: 1.01 (ref 1–1.03)
SQUAMOUS #/AREA URNS HPF: 3 /HPF
URN SPEC COLLECT METH UR: ABNORMAL
UROBILINOGEN UR STRIP-ACNC: NEGATIVE EU/DL
WBC # BLD AUTO: 14.89 K/UL (ref 3.9–12.7)
WBC #/AREA URNS HPF: 2 /HPF (ref 0–5)

## 2020-07-11 PROCEDURE — 96365 THER/PROPH/DIAG IV INF INIT: CPT | Mod: 59

## 2020-07-11 PROCEDURE — 96375 TX/PRO/DX INJ NEW DRUG ADDON: CPT

## 2020-07-11 PROCEDURE — 81000 URINALYSIS NONAUTO W/SCOPE: CPT

## 2020-07-11 PROCEDURE — 80053 COMPREHEN METABOLIC PANEL: CPT

## 2020-07-11 PROCEDURE — 99285 EMERGENCY DEPT VISIT HI MDM: CPT | Mod: 25

## 2020-07-11 PROCEDURE — 25000003 PHARM REV CODE 250: Performed by: EMERGENCY MEDICINE

## 2020-07-11 PROCEDURE — 96367 TX/PROPH/DG ADDL SEQ IV INF: CPT

## 2020-07-11 PROCEDURE — 85025 COMPLETE CBC W/AUTO DIFF WBC: CPT

## 2020-07-11 PROCEDURE — 83690 ASSAY OF LIPASE: CPT

## 2020-07-11 PROCEDURE — 25500020 PHARM REV CODE 255: Performed by: EMERGENCY MEDICINE

## 2020-07-11 PROCEDURE — 63600175 PHARM REV CODE 636 W HCPCS: Performed by: EMERGENCY MEDICINE

## 2020-07-11 PROCEDURE — 96361 HYDRATE IV INFUSION ADD-ON: CPT

## 2020-07-11 PROCEDURE — C9113 INJ PANTOPRAZOLE SODIUM, VIA: HCPCS | Performed by: EMERGENCY MEDICINE

## 2020-07-11 RX ORDER — PROMETHAZINE HYDROCHLORIDE 25 MG/1
25 TABLET ORAL EVERY 6 HOURS PRN
Qty: 15 TABLET | Refills: 0 | Status: ON HOLD | OUTPATIENT
Start: 2020-07-11 | End: 2020-07-16 | Stop reason: HOSPADM

## 2020-07-11 RX ORDER — ONDANSETRON 2 MG/ML
4 INJECTION INTRAMUSCULAR; INTRAVENOUS
Status: COMPLETED | OUTPATIENT
Start: 2020-07-11 | End: 2020-07-11

## 2020-07-11 RX ORDER — PANTOPRAZOLE SODIUM 40 MG/10ML
40 INJECTION, POWDER, LYOPHILIZED, FOR SOLUTION INTRAVENOUS
Status: COMPLETED | OUTPATIENT
Start: 2020-07-11 | End: 2020-07-11

## 2020-07-11 RX ORDER — ONDANSETRON 4 MG/1
8 TABLET, FILM COATED ORAL 2 TIMES DAILY
Qty: 15 TABLET | Refills: 0 | Status: ON HOLD | OUTPATIENT
Start: 2020-07-11 | End: 2020-08-23 | Stop reason: SDUPTHER

## 2020-07-11 RX ORDER — ONDANSETRON 4 MG/1
8 TABLET, FILM COATED ORAL 2 TIMES DAILY
COMMUNITY
End: 2020-07-11 | Stop reason: SDUPTHER

## 2020-07-11 RX ORDER — PROMETHAZINE HYDROCHLORIDE 25 MG/1
25 SUPPOSITORY RECTAL EVERY 6 HOURS PRN
Qty: 10 SUPPOSITORY | Refills: 0 | Status: ON HOLD | OUTPATIENT
Start: 2020-07-11 | End: 2020-08-23 | Stop reason: SDUPTHER

## 2020-07-11 RX ORDER — AMOXICILLIN AND CLAVULANATE POTASSIUM 875; 125 MG/1; MG/1
1 TABLET, FILM COATED ORAL EVERY 12 HOURS
Qty: 20 TABLET | Refills: 0 | Status: ON HOLD | OUTPATIENT
Start: 2020-07-11 | End: 2020-07-16 | Stop reason: HOSPADM

## 2020-07-11 RX ORDER — HYDROMORPHONE HYDROCHLORIDE 2 MG/ML
1 INJECTION, SOLUTION INTRAMUSCULAR; INTRAVENOUS; SUBCUTANEOUS
Status: COMPLETED | OUTPATIENT
Start: 2020-07-11 | End: 2020-07-11

## 2020-07-11 RX ADMIN — ONDANSETRON 4 MG: 2 INJECTION INTRAMUSCULAR; INTRAVENOUS at 01:07

## 2020-07-11 RX ADMIN — SODIUM CHLORIDE 1000 ML: 0.9 INJECTION, SOLUTION INTRAVENOUS at 01:07

## 2020-07-11 RX ADMIN — IOHEXOL 85 ML: 350 INJECTION, SOLUTION INTRAVENOUS at 03:07

## 2020-07-11 RX ADMIN — PANTOPRAZOLE SODIUM 40 MG: 40 INJECTION, POWDER, FOR SOLUTION INTRAVENOUS at 01:07

## 2020-07-11 RX ADMIN — HYDROMORPHONE HYDROCHLORIDE 1 MG: 2 INJECTION, SOLUTION INTRAMUSCULAR; INTRAVENOUS; SUBCUTANEOUS at 02:07

## 2020-07-11 RX ADMIN — PROMETHAZINE HYDROCHLORIDE 12.5 MG: 25 INJECTION INTRAMUSCULAR; INTRAVENOUS at 01:07

## 2020-07-11 RX ADMIN — PIPERACILLIN AND TAZOBACTAM 4.5 G: 4; .5 INJECTION, POWDER, FOR SOLUTION INTRAVENOUS at 04:07

## 2020-07-11 NOTE — ED PROVIDER NOTES
Encounter Date: 7/11/2020       History     Chief Complaint   Patient presents with    Abdominal Pain     pt c/o nausea, vomiting, generalized abdominal cramping, & generalized weakness; pt had bilateral tubaligation done today, was discharged at noon, and reports that she started feeling bad once she got home; pt actively vomiting in triage    Vomiting     Patient presents for evaluation persistent nausea and vomiting that started approximately 12 hr ago.  Patient has history of cyclic vomiting syndrome.  Last episode was in January.  She has been admitted for vomiting and diverticulitis with associated Gram-negative sepsis in the past.  Patient had a tubal ligation this morning.  She states that the surgery went well.  She felt well postoperatively.  Her nausea started after she arrived home.  Not relieved by Zofran.  She vomited multiple times before she developed diffuse cramping abdominal pain.  She denies diarrhea.  No rectal bleeding.  No hematemesis.  No urinary symptoms.  No vaginal bleeding.  No fever.  No back pain.  No headache.  No focal numbness or weakness.  Patient took her amlodipine this morning but she has not taken her lisinopril tonight.  Blood pressure was noted to be markedly elevated in triage.  She denies chest pain.  Admits to mild short of breath.  No cough or fever.        Review of patient's allergies indicates:   Allergen Reactions    Ciprofloxacin Swelling and Blisters    Ibuprofen Hives    Meloxicam      rash    Cyclobenzaprine Rash     rasg     Past Medical History:   Diagnosis Date    Abnormal Pap smear of cervix     Diverticulosis     GERD (gastroesophageal reflux disease)     Hiatal hernia     Hypertension      Past Surgical History:   Procedure Laterality Date    CERVICAL BIOPSY  W/ LOOP ELECTRODE EXCISION  2/11/14     Family History   Problem Relation Age of Onset    Heart disease Mother 54        MI    Heart disease Maternal Grandmother 40        MI    Diabetes  Other     Heart disease Brother 44        MI    Sickle cell trait Sister     Crohn's disease Neg Hx     Colon cancer Neg Hx      Social History     Tobacco Use    Smoking status: Current Every Day Smoker     Packs/day: 1.00     Years: 16.00     Pack years: 16.00     Types: Cigarettes    Smokeless tobacco: Never Used   Substance Use Topics    Alcohol use: Yes     Frequency: Monthly or less     Drinks per session: 1 or 2     Binge frequency: Never     Comment: social 1-2 x / month    Drug use: Yes     Frequency: 1.0 times per week     Types: Marijuana     Comment: social- last use 1 week ago     Review of Systems   Constitutional: Negative for chills, diaphoresis and fever.   HENT: Negative for congestion and sore throat.    Eyes: Negative for visual disturbance.   Respiratory: Positive for shortness of breath. Negative for cough.    Cardiovascular: Negative for chest pain.   Gastrointestinal: Positive for abdominal pain, nausea and vomiting. Negative for blood in stool and diarrhea.        No melena.   Genitourinary: Negative for dysuria, flank pain, hematuria and vaginal bleeding.   Musculoskeletal: Negative for back pain.   Neurological: Positive for weakness and light-headedness. Negative for dizziness, syncope, speech difficulty, numbness and headaches.   Psychiatric/Behavioral: Negative for confusion.   All other systems reviewed and are negative.      Physical Exam     Initial Vitals [07/11/20 0041]   BP Pulse Resp Temp SpO2   (!) 204/109 88 20 97.8 °F (36.6 °C) 100 %      MAP       --         Physical Exam    Nursing note and vitals reviewed.  Constitutional: She appears well-developed and well-nourished. She is not diaphoretic. No distress.   HENT:   Head: Normocephalic and atraumatic.   Right Ear: External ear normal.   Left Ear: External ear normal.   Nose: Nose normal.   Mouth/Throat: Oropharynx is clear and moist.   Eyes: Conjunctivae and EOM are normal. Pupils are equal, round, and reactive to  light. Right eye exhibits no discharge. Left eye exhibits no discharge. No scleral icterus.   Neck: Normal range of motion. Neck supple. No tracheal deviation present.   Cardiovascular: Normal rate, regular rhythm and normal heart sounds.   No murmur heard.  Pulmonary/Chest: Breath sounds normal. No stridor. No respiratory distress. She has no wheezes. She has no rhonchi. She has no rales.   Abdominal: Soft. She exhibits no distension. There is abdominal tenderness (Mild tenderness epigastric area.  Mild tenderness left lower quadrant.). There is no rebound and no guarding.   Surgical incision present over the periumbilical area.  No active bleeding.  Sutures intact.   Musculoskeletal: Normal range of motion. No tenderness or edema.   Neurological: She is alert and oriented to person, place, and time. She has normal strength. No cranial nerve deficit. GCS score is 15. GCS eye subscore is 4. GCS verbal subscore is 5. GCS motor subscore is 6.   Skin: Skin is warm and dry. No rash noted. No erythema.   Psychiatric: Her behavior is normal. Judgment and thought content normal.   Patient is anxious.         ED Course   Procedures  Labs Reviewed   CBC W/ AUTO DIFFERENTIAL   COMPREHENSIVE METABOLIC PANEL   LIPASE   URINALYSIS, REFLEX TO URINE CULTURE          Imaging Results    None          Medical Decision Making:   Initial Assessment:   Patient presents for evaluation of persistent vomiting and abdominal pain.  Patient has history of cyclic vomiting and diverticulitis.  Patient is postop day 0 from an uncomplicated tubal ligation.  Unable to control symptoms Zofran at home.  Will provide antiemetics and fluids.  Will check lab work and reassess.  Peritoneal signs on abdominal exam.  Patient is mildly tender left lower quadrant.  May require CT to rule out diverticulitis.  Differential Diagnosis:   Cyclic vomiting, diverticulitis, ileus, bowel injury, bowel obstruction  Clinical Tests:   Lab Tests: Ordered and  Reviewed  The following lab test(s) were unremarkable: CBC, CMP, Lipase, Urinalysis and UPT  Radiological Study: Ordered and Reviewed  ED Management:  0400:  Mild elevation white blood cell count.  Liver panel on renal panel unremarkable.  Lipase not elevated.  Urinalysis unremarkable.  CT of the abdomen pelvis shows possible early diverticulitis sigmoid colon.  No evidence complication from recent tubal ligation.  No evidence of obstruction or perforation.  Symptoms improved after medication.  Blood pressure improved after analgesics.  Patient stable for discharge.  Will treat as an outpatient for early diverticulitis.  Provide antiemetics for cyclic vomiting.                                 Clinical Impression:       ICD-10-CM ICD-9-CM   1. Non-intractable vomiting with nausea, unspecified vomiting type  R11.2 787.01   2. Diverticulitis  K57.92 562.11         Disposition:   Disposition: Discharged  Condition: Stable                        Preston Jarrell MD  07/11/20 0402

## 2020-07-11 NOTE — ED NOTES
Pt ambulated well to wheelchair, in no acute distress, verbalized understanding of d/c instructions.

## 2020-07-11 NOTE — ED TRIAGE NOTES
Pt presents to ED via personal transportation c/o abd pain with cramping , N/V, and gen weakness.  Pt stated she had a bilateral tubaligation done today. Pt stated she been vomiting since noon, tried taking Zofran with no relief. Denies CP, dizziness, SOB  Pain 8/10

## 2020-07-14 ENCOUNTER — HOSPITAL ENCOUNTER (INPATIENT)
Facility: HOSPITAL | Age: 45
LOS: 2 days | Discharge: HOME | DRG: 378 | End: 2020-07-16
Attending: EMERGENCY MEDICINE | Admitting: INTERNAL MEDICINE
Payer: COMMERCIAL

## 2020-07-14 DIAGNOSIS — K57.92 DIVERTICULITIS: Primary | ICD-10-CM

## 2020-07-14 DIAGNOSIS — R11.2 INTRACTABLE VOMITING WITH NAUSEA, UNSPECIFIED VOMITING TYPE: ICD-10-CM

## 2020-07-14 DIAGNOSIS — Z98.51 S/P TUBAL LIGATION: ICD-10-CM

## 2020-07-14 DIAGNOSIS — I10 HYPERTENSION, UNSPECIFIED TYPE: ICD-10-CM

## 2020-07-14 DIAGNOSIS — Z01.818 PREOP TESTING: ICD-10-CM

## 2020-07-14 DIAGNOSIS — R10.9 ABDOMINAL PAIN: ICD-10-CM

## 2020-07-14 PROBLEM — E86.0 DEHYDRATION: Status: ACTIVE | Noted: 2020-07-14

## 2020-07-14 PROBLEM — R73.9 HYPERGLYCEMIA: Status: ACTIVE | Noted: 2020-07-14

## 2020-07-14 PROBLEM — E87.6 HYPOKALEMIA: Status: ACTIVE | Noted: 2020-07-14

## 2020-07-14 LAB
ALBUMIN SERPL BCP-MCNC: 4.5 G/DL (ref 3.5–5.2)
ALP SERPL-CCNC: 81 U/L (ref 55–135)
ALT SERPL W/O P-5'-P-CCNC: 18 U/L (ref 10–44)
ANION GAP SERPL CALC-SCNC: 14 MMOL/L (ref 8–16)
AST SERPL-CCNC: 23 U/L (ref 10–40)
B-HCG UR QL: NEGATIVE
BACTERIA #/AREA URNS HPF: ABNORMAL /HPF
BASOPHILS # BLD AUTO: 0.06 K/UL (ref 0–0.2)
BASOPHILS NFR BLD: 0.4 % (ref 0–1.9)
BILIRUB SERPL-MCNC: 0.9 MG/DL (ref 0.1–1)
BILIRUB UR QL STRIP: ABNORMAL
BUN SERPL-MCNC: 22 MG/DL (ref 6–20)
CALCIUM SERPL-MCNC: 10.2 MG/DL (ref 8.7–10.5)
CHLORIDE SERPL-SCNC: 94 MMOL/L (ref 95–110)
CLARITY UR: ABNORMAL
CO2 SERPL-SCNC: 25 MMOL/L (ref 23–29)
COLOR UR: ABNORMAL
CREAT SERPL-MCNC: 1.2 MG/DL (ref 0.5–1.4)
CTP QC/QA: YES
DIFFERENTIAL METHOD: ABNORMAL
EOSINOPHIL # BLD AUTO: 0 K/UL (ref 0–0.5)
EOSINOPHIL NFR BLD: 0.1 % (ref 0–8)
ERYTHROCYTE [DISTWIDTH] IN BLOOD BY AUTOMATED COUNT: 12.8 % (ref 11.5–14.5)
EST. GFR  (AFRICAN AMERICAN): >60 ML/MIN/1.73 M^2
EST. GFR  (NON AFRICAN AMERICAN): 56 ML/MIN/1.73 M^2
GLUCOSE SERPL-MCNC: 126 MG/DL (ref 70–110)
GLUCOSE UR QL STRIP: NEGATIVE
HCT VFR BLD AUTO: 51.7 % (ref 37–48.5)
HGB BLD-MCNC: 17.4 G/DL (ref 12–16)
HGB UR QL STRIP: ABNORMAL
HYALINE CASTS #/AREA URNS LPF: 8 /LPF
IMM GRANULOCYTES # BLD AUTO: 0.08 K/UL (ref 0–0.04)
IMM GRANULOCYTES NFR BLD AUTO: 0.5 % (ref 0–0.5)
KETONES UR QL STRIP: ABNORMAL
LACTATE SERPL-SCNC: 0.9 MMOL/L (ref 0.5–2.2)
LEUKOCYTE ESTERASE UR QL STRIP: NEGATIVE
LIPASE SERPL-CCNC: 39 U/L (ref 4–60)
LYMPHOCYTES # BLD AUTO: 2.4 K/UL (ref 1–4.8)
LYMPHOCYTES NFR BLD: 14.4 % (ref 18–48)
MCH RBC QN AUTO: 29.3 PG (ref 27–31)
MCHC RBC AUTO-ENTMCNC: 33.7 G/DL (ref 32–36)
MCV RBC AUTO: 87 FL (ref 82–98)
MICROSCOPIC COMMENT: ABNORMAL
MONOCYTES # BLD AUTO: 1.5 K/UL (ref 0.3–1)
MONOCYTES NFR BLD: 8.8 % (ref 4–15)
NEUTROPHILS # BLD AUTO: 12.5 K/UL (ref 1.8–7.7)
NEUTROPHILS NFR BLD: 75.8 % (ref 38–73)
NITRITE UR QL STRIP: NEGATIVE
NRBC BLD-RTO: 0 /100 WBC
PH UR STRIP: 5 [PH] (ref 5–8)
PLATELET # BLD AUTO: 313 K/UL (ref 150–350)
PMV BLD AUTO: 11 FL (ref 9.2–12.9)
POTASSIUM SERPL-SCNC: 3.4 MMOL/L (ref 3.5–5.1)
PROT SERPL-MCNC: 8.7 G/DL (ref 6–8.4)
PROT UR QL STRIP: ABNORMAL
RBC # BLD AUTO: 5.93 M/UL (ref 4–5.4)
RBC #/AREA URNS HPF: 1 /HPF (ref 0–4)
SARS-COV-2 RDRP RESP QL NAA+PROBE: NEGATIVE
SODIUM SERPL-SCNC: 133 MMOL/L (ref 136–145)
SP GR UR STRIP: >1.03 (ref 1–1.03)
SQUAMOUS #/AREA URNS HPF: 15 /HPF
URN SPEC COLLECT METH UR: ABNORMAL
UROBILINOGEN UR STRIP-ACNC: ABNORMAL EU/DL
WBC # BLD AUTO: 16.52 K/UL (ref 3.9–12.7)
WBC #/AREA URNS HPF: 5 /HPF (ref 0–5)

## 2020-07-14 PROCEDURE — 25000003 PHARM REV CODE 250: Performed by: NURSE PRACTITIONER

## 2020-07-14 PROCEDURE — 83605 ASSAY OF LACTIC ACID: CPT

## 2020-07-14 PROCEDURE — 81025 URINE PREGNANCY TEST: CPT | Performed by: NURSE PRACTITIONER

## 2020-07-14 PROCEDURE — 21400001 HC TELEMETRY ROOM

## 2020-07-14 PROCEDURE — 96375 TX/PRO/DX INJ NEW DRUG ADDON: CPT

## 2020-07-14 PROCEDURE — 83690 ASSAY OF LIPASE: CPT

## 2020-07-14 PROCEDURE — 63600175 PHARM REV CODE 636 W HCPCS: Performed by: HOSPITALIST

## 2020-07-14 PROCEDURE — U0002 COVID-19 LAB TEST NON-CDC: HCPCS

## 2020-07-14 PROCEDURE — 81000 URINALYSIS NONAUTO W/SCOPE: CPT

## 2020-07-14 PROCEDURE — 80053 COMPREHEN METABOLIC PANEL: CPT

## 2020-07-14 PROCEDURE — 63600175 PHARM REV CODE 636 W HCPCS: Performed by: NURSE PRACTITIONER

## 2020-07-14 PROCEDURE — 87040 BLOOD CULTURE FOR BACTERIA: CPT | Mod: 59

## 2020-07-14 PROCEDURE — 85025 COMPLETE CBC W/AUTO DIFF WBC: CPT

## 2020-07-14 PROCEDURE — S0030 INJECTION, METRONIDAZOLE: HCPCS | Performed by: NURSE PRACTITIONER

## 2020-07-14 PROCEDURE — 96365 THER/PROPH/DIAG IV INF INIT: CPT

## 2020-07-14 PROCEDURE — 96374 THER/PROPH/DIAG INJ IV PUSH: CPT | Mod: 59

## 2020-07-14 PROCEDURE — 96361 HYDRATE IV INFUSION ADD-ON: CPT

## 2020-07-14 PROCEDURE — 99285 EMERGENCY DEPT VISIT HI MDM: CPT | Mod: 25

## 2020-07-14 RX ORDER — HYDRALAZINE HYDROCHLORIDE 20 MG/ML
10 INJECTION INTRAMUSCULAR; INTRAVENOUS
Status: COMPLETED | OUTPATIENT
Start: 2020-07-14 | End: 2020-07-14

## 2020-07-14 RX ORDER — AMOXICILLIN 250 MG
1 CAPSULE ORAL 2 TIMES DAILY
Status: DISCONTINUED | OUTPATIENT
Start: 2020-07-14 | End: 2020-07-16 | Stop reason: HOSPADM

## 2020-07-14 RX ORDER — ACETAMINOPHEN 325 MG/1
650 TABLET ORAL EVERY 8 HOURS PRN
Status: DISCONTINUED | OUTPATIENT
Start: 2020-07-14 | End: 2020-07-16 | Stop reason: HOSPADM

## 2020-07-14 RX ORDER — PANTOPRAZOLE SODIUM 40 MG/10ML
40 INJECTION, POWDER, LYOPHILIZED, FOR SOLUTION INTRAVENOUS DAILY
Status: DISCONTINUED | OUTPATIENT
Start: 2020-07-15 | End: 2020-07-16 | Stop reason: HOSPADM

## 2020-07-14 RX ORDER — HYDROMORPHONE HYDROCHLORIDE 2 MG/ML
0.5 INJECTION, SOLUTION INTRAMUSCULAR; INTRAVENOUS; SUBCUTANEOUS
Status: COMPLETED | OUTPATIENT
Start: 2020-07-14 | End: 2020-07-14

## 2020-07-14 RX ORDER — ONDANSETRON 2 MG/ML
4 INJECTION INTRAMUSCULAR; INTRAVENOUS EVERY 8 HOURS PRN
Status: DISCONTINUED | OUTPATIENT
Start: 2020-07-14 | End: 2020-07-16 | Stop reason: HOSPADM

## 2020-07-14 RX ORDER — LISINOPRIL 5 MG/1
10 TABLET ORAL
Status: COMPLETED | OUTPATIENT
Start: 2020-07-14 | End: 2020-07-14

## 2020-07-14 RX ORDER — SODIUM CHLORIDE 0.9 % (FLUSH) 0.9 %
10 SYRINGE (ML) INJECTION
Status: DISCONTINUED | OUTPATIENT
Start: 2020-07-14 | End: 2020-07-16 | Stop reason: HOSPADM

## 2020-07-14 RX ORDER — MORPHINE SULFATE 10 MG/ML
2 INJECTION INTRAMUSCULAR; INTRAVENOUS; SUBCUTANEOUS EVERY 4 HOURS PRN
Status: DISCONTINUED | OUTPATIENT
Start: 2020-07-14 | End: 2020-07-16 | Stop reason: HOSPADM

## 2020-07-14 RX ORDER — ONDANSETRON 2 MG/ML
4 INJECTION INTRAMUSCULAR; INTRAVENOUS
Status: COMPLETED | OUTPATIENT
Start: 2020-07-14 | End: 2020-07-14

## 2020-07-14 RX ORDER — SODIUM CHLORIDE 9 MG/ML
INJECTION, SOLUTION INTRAVENOUS CONTINUOUS
Status: DISCONTINUED | OUTPATIENT
Start: 2020-07-14 | End: 2020-07-16 | Stop reason: HOSPADM

## 2020-07-14 RX ORDER — METRONIDAZOLE 500 MG/100ML
500 INJECTION, SOLUTION INTRAVENOUS EVERY 8 HOURS
Status: DISCONTINUED | OUTPATIENT
Start: 2020-07-14 | End: 2020-07-16 | Stop reason: HOSPADM

## 2020-07-14 RX ADMIN — SODIUM CHLORIDE: 0.9 INJECTION, SOLUTION INTRAVENOUS at 09:07

## 2020-07-14 RX ADMIN — PIPERACILLIN AND TAZOBACTAM 4.5 G: 4; .5 INJECTION, POWDER, LYOPHILIZED, FOR SOLUTION INTRAVENOUS; PARENTERAL at 09:07

## 2020-07-14 RX ADMIN — LISINOPRIL 10 MG: 5 TABLET ORAL at 10:07

## 2020-07-14 RX ADMIN — HYDRALAZINE HYDROCHLORIDE 10 MG: 20 INJECTION INTRAMUSCULAR; INTRAVENOUS at 08:07

## 2020-07-14 RX ADMIN — SODIUM CHLORIDE 1000 ML: 0.9 INJECTION, SOLUTION INTRAVENOUS at 09:07

## 2020-07-14 RX ADMIN — MORPHINE SULFATE 2 MG: 10 INJECTION INTRAVENOUS at 06:07

## 2020-07-14 RX ADMIN — ONDANSETRON HYDROCHLORIDE 4 MG: 2 SOLUTION INTRAMUSCULAR; INTRAVENOUS at 07:07

## 2020-07-14 RX ADMIN — ONDANSETRON HYDROCHLORIDE 4 MG: 2 SOLUTION INTRAMUSCULAR; INTRAVENOUS at 09:07

## 2020-07-14 RX ADMIN — MORPHINE SULFATE 2 MG: 10 INJECTION INTRAVENOUS at 10:07

## 2020-07-14 RX ADMIN — DOCUSATE SODIUM 50 MG AND SENNOSIDES 8.6 MG 1 TABLET: 8.6; 5 TABLET, FILM COATED ORAL at 08:07

## 2020-07-14 RX ADMIN — HYDROMORPHONE HYDROCHLORIDE 0.5 MG: 2 INJECTION, SOLUTION INTRAMUSCULAR; INTRAVENOUS; SUBCUTANEOUS at 07:07

## 2020-07-14 RX ADMIN — SODIUM CHLORIDE 1000 ML: 0.9 INJECTION, SOLUTION INTRAVENOUS at 07:07

## 2020-07-14 RX ADMIN — METRONIDAZOLE 500 MG: 500 INJECTION, SOLUTION INTRAVENOUS at 08:07

## 2020-07-14 RX ADMIN — PROMETHAZINE HYDROCHLORIDE 12.5 MG: 25 INJECTION INTRAMUSCULAR; INTRAVENOUS at 11:07

## 2020-07-14 RX ADMIN — PIPERACILLIN AND TAZOBACTAM 4.5 G: 4; .5 INJECTION, POWDER, FOR SOLUTION INTRAVENOUS at 08:07

## 2020-07-14 NOTE — H&P
"[facility] Hospital Medicine  History & Physical    Patient Name: Cipriano Gamez  MRN: [id number]  Admission Date: 7/14/2020  Attending Physician: Speedy Berry MD   Primary Care Provider: Brooks Bergeron MD         Patient information was obtained from patient and ER records.     Subjective:     Principal Problem:Diverticulitis    Chief Complaint:   Chief Complaint   Patient presents with    Emesis     "I feel dehydrated". Reports vomiting since her procedure (tubal ligation 7/9/20). States has been unable to keep anything down. Reports left abdominal pain    Abdominal Pain        HPI: Mrs. Gamez is a 43 yo F who presents with a CC of abdominal pain. The patient is s/p tubal ligation on 7/9/20. She returned to the ED on 7/11 with a CC of L sided abdominal pain. The patient had a CT scan of abdomen at that time that showed some free air (though to be from surgery) as well as diverticulosis. She was started on meds for diverticulitis- as patient has had this in the past and it feels felt the same. She returns today with N/V abdominal pain and unable to keep down antibiotics.  X-ray of abdomen was benign. She was admitted to the hospital and started on IV fluids/IV Abx.     Past Medical History:   Diagnosis Date    Abnormal Pap smear of cervix     Diverticulosis     GERD (gastroesophageal reflux disease)     Hiatal hernia     Hypertension        Past Surgical History:   Procedure Laterality Date    CERVICAL BIOPSY  W/ LOOP ELECTRODE EXCISION  2/11/14    LAPAROSCOPIC OCCLUSION OF OVIDUCTS BY DEVICE Bilateral 7/10/2020    Procedure: OCCLUSION, FALLOPIAN TUBE, LAPAROSCOPIC, USING DEVICE;  Surgeon: Alex Waller MD;  Location: [facility];  Service: OB/GYN;  Laterality: Bilateral;  RN PREOP 6/23/2020   T/S--COVID NEGATIVE---UPT  CONSENTS INCOMPLETE       Review of patient's allergies indicates:   Allergen Reactions    Ciprofloxacin Swelling and Blisters    Ibuprofen Hives    Meloxicam     "  rash    Cyclobenzaprine Rash     rasg       No current facility-administered medications on file prior to encounter.      Current Outpatient Medications on File Prior to Encounter   Medication Sig    amLODIPine (NORVASC) 10 MG tablet Take 10 mg by mouth once daily.    diphenhydrAMINE (SOMINEX) 25 mg tablet Take 25 mg by mouth nightly as needed for Insomnia.     lisinopriL (PRINIVIL,ZESTRIL) 40 MG tablet TAKE 1 TABLET (40 MG TOTAL) BY MOUTH ONCE DAILY.    ondansetron (ZOFRAN) 4 MG tablet Take 2 tablets (8 mg total) by mouth 2 (two) times daily.    promethazine (PHENERGAN) 25 MG suppository Place 1 suppository (25 mg total) rectally every 6 (six) hours as needed for Nausea.    promethazine (PHENERGAN) 25 MG tablet Take 1 tablet (25 mg total) by mouth every 6 (six) hours as needed for Nausea.    traMADoL (ULTRAM) 50 mg tablet Take 1 tablet (50 mg total) by mouth every 6 (six) hours as needed for Pain.    amoxicillin-clavulanate 875-125mg (AUGMENTIN) 875-125 mg per tablet Take 1 tablet by mouth every 12 (twelve) hours. for 10 days    HYDROcodone-acetaminophen (NORCO) 5-325 mg per tablet Take 1 tablet by mouth every 4 (four) hours as needed for Pain.     Family History     Problem Relation (Age of Onset)    Diabetes Other    Heart disease Mother (54), Maternal Grandmother (40), Brother (44)    Sickle cell trait Sister        Tobacco Use    Smoking status: Current Every Day Smoker     Packs/day: 1.00     Years: 16.00     Pack years: 16.00     Types: Cigarettes    Smokeless tobacco: Never Used   Substance and Sexual Activity    Alcohol use: Yes     Frequency: Monthly or less     Drinks per session: 1 or 2     Binge frequency: Never     Comment: social 1-2 x / month    Drug use: Yes     Frequency: 1.0 times per week     Types: Marijuana     Comment: social- last use 1 week ago    Sexual activity: Not Currently     Partners: Male     Birth control/protection: None     Review of Systems   Constitutional:  Negative for activity change, appetite change, chills, diaphoresis and fever.   HENT: Negative for congestion and dental problem.    Respiratory: Negative for apnea, cough, choking, chest tightness, shortness of breath, wheezing and stridor.    Cardiovascular: Negative for chest pain, palpitations and leg swelling.   Gastrointestinal: Positive for abdominal pain, nausea and vomiting. Negative for abdominal distention, constipation and diarrhea.   Genitourinary: Negative for difficulty urinating, dyspareunia and dysuria.   Musculoskeletal: Negative for arthralgias.   Skin: Negative for color change.   Neurological: Negative for dizziness.   Psychiatric/Behavioral: Negative for agitation and behavioral problems.     Objective:     Vital Signs (Most Recent):  Temp: 97.5 °F (36.4 °C) (07/14/20 0413)  Pulse: 80 (07/14/20 1032)  Resp: 18 (07/14/20 0744)  BP: (!) 177/94 (07/14/20 1032)  SpO2: 100 % (07/14/20 1032) Vital Signs (24h Range):  Temp:  [97.5 °F (36.4 °C)] 97.5 °F (36.4 °C)  Pulse:  [] 80  Resp:  [18] 18  SpO2:  [99 %-100 %] 100 %  BP: (135-177)/() 177/94     Weight: 97.5 kg (215 lb)  Body mass index is 33.67 kg/m².    Physical Exam  Vitals signs and nursing note reviewed.   Constitutional:       General: She is not in acute distress.     Appearance: Normal appearance. She is obese. She is ill-appearing. She is not toxic-appearing or diaphoretic.   HENT:      Head: Normocephalic and atraumatic.   Cardiovascular:      Rate and Rhythm: Normal rate and regular rhythm.   Pulmonary:      Effort: Pulmonary effort is normal.      Breath sounds: Normal breath sounds. No wheezing.   Abdominal:      General: There is no distension.      Tenderness: There is abdominal tenderness. There is guarding. There is no rebound.   Skin:     General: Skin is warm and dry.   Neurological:      Mental Status: She is oriented to person, place, and time.   Psychiatric:         Mood and Affect: Mood normal.         Behavior:  Behavior normal.             Significant Labs:   BMP:   Recent Labs   Lab 07/14/20  0722   *   *   K 3.4*   CL 94*   CO2 25   BUN 22*   CREATININE 1.2   CALCIUM 10.2     CBC:   Recent Labs   Lab 07/14/20  0722   WBC 16.52*   HGB 17.4*   HCT 51.7*          Significant Imaging:    Assessment/Plan:     * Diverticulitis  Failed out patient treatment. X-ray no free air. Clinical diagnosis as patient states this feels just like her previous diverticulitis.  IV fluids with IV Zosyn/Flagyl. (cipro allergy). No sepsis.        Dehydration  IV fluids. As evidenced by hemoconcentration       Hyperglycemia  Will check an A1c      Hypokalemia  Will replace.       Drug dependence affecting pregnancy in first trimester  Will check urine drug screen      Obesity affecting pregnancy in first trimester  Body mass index is 33.67 kg/m².  Weight loss as out patient       Hematemesis with nausea  From #1.      Smokes 1 pack of cigarettes per day  Smoking cessation education > 3 minutes       Essential hypertension  Will resume home meds       GERD (gastroesophageal reflux disease)  Resume home meds         VTE Risk Mitigation (From admission, onward)    None        Clinical diagnosis of diverticulitis. With neg. X-ray.  If any worsening in clinical situation- low threshold to re-scan.       Gilmer Carranza MD  Department of Hospital Medicine   [facility]

## 2020-07-14 NOTE — PLAN OF CARE
Discharge planning assessment completed with patient's assistance.  Patient from home with sister (Yudith Gamez) and son (Prince Castano).  Patient was independent and has no DME or OP services.  Patient has been in hospital for Outpatient Surgery but no inpatient overnight stays.  Patient's son, Prince, will be available to help at home if needed.  Patient prefers morning appointments and her preferred pharmacy is [facility] [address] in [place].  No discharge needs identified.  Case managment will continue to assess and assist with discharge planning.       07/14/20 1232   Discharge Assessment   Assessment Type Discharge Planning Assessment   Confirmed/corrected address and phone number on facesheet? Yes   Assessment information obtained from? Patient   Expected Length of Stay (days) 2   Communicated expected length of stay with patient/caregiver no   Prior to hospitilization cognitive status: Alert/Oriented   Prior to hospitalization functional status: Independent   Current cognitive status: Alert/Oriented   Current Functional Status: Independent   Facility Arrived From: home   Lives With child(madisyn), adult;sibling(s)  (Son: Prince Castano; [phone] and sister; Yudith Gamez; [phone])   Able to Return to Prior Arrangements yes   Is patient able to care for self after discharge? Yes   Who are your caregiver(s) and their phone number(s)? Yudith Gamez; [phone] (sister)   Patient's perception of discharge disposition home or selfcare   Readmission Within the Last 30 Days no previous admission in last 30 days   Patient currently being followed by outpatient case management? No   Patient currently receives any other outside agency services? No   Equipment Currently Used at Home none   Part D Coverage n/a   Do you have any problems affording any of your prescribed medications? No   Is the patient taking medications as prescribed? yes   Does the patient have transportation home? Yes   Transportation Anticipated family  or friend will provide   Dialysis Name and Scheduled days n/a   Does the patient receive services at the [facility]mad[facility] Clinic? No   Discharge Plan A Home   Discharge Plan B Home   DME Needed Upon Discharge  none   Patient/Family in Agreement with Plan yes   Caridad Pham LMSW, CCM  7/14/20

## 2020-07-14 NOTE — HPI
Mrs. Gamez is a 45 yo F who presents with a CC of abdominal pain. The patient is s/p tubal ligation on 7/9/20. She returned to the ED on 7/11 with a CC of L sided abdominal pain. The patient had a CT scan of abdomen at that time that showed some free air (though to be from surgery) as well as diverticulosis. She was started on meds for diverticulitis- as patient has had this in the past and it feels felt the same. She returns today with N/V abdominal pain and unable to keep down antibiotics.  X-ray of abdomen was benign. She was admitted to the hospital and started on IV fluids/IV Abx.

## 2020-07-14 NOTE — ED PROVIDER NOTES
"Encounter Date: 7/14/2020    SCRIBE #1 NOTE: I, Jani Pan, am scribing for, and in the presence of,  Keily Wallace NP. I have scribed the entire note. Other sections scribed: HPI, ROS, PE.       History     Chief Complaint   Patient presents with    Emesis     "I feel dehydrated". Reports vomiting since her procedure (tubal ligation 7/9/20). States has been unable to keep anything down. Reports left abdominal pain    Abdominal Pain     This is a 44 year old female with history of a diverticulosis, GERD, hypertension, and recent tubal ligation who presents to the ED with complaints of emesis and right-sided abdominal pain for one week.  The patient reports having a tubal ligation one week ago (7/10/2020).  She claims to "not be able to keep anything down" and immediately vomits anything she ingests since the tubal ligation. Patient presented to the ED earlier this week for the same complaint but her pain has not improved.  Her pain is described as cramping on the right side, as if she was dehydrated. Denies any hematemesis.  States she has not had a bowel movement since the surgery. She has taken a promethazine suppository approximately nine hours ago for her nausea and vomiting without relief. The patient has a social history of occasional alcohol and marijuana use. Last alcohol and marijuana use was last month.     The history is provided by the patient.     Review of patient's allergies indicates:   Allergen Reactions    Ciprofloxacin Swelling and Blisters    Ibuprofen Hives    Meloxicam      rash    Cyclobenzaprine Rash     rasg     Past Medical History:   Diagnosis Date    Abnormal Pap smear of cervix     Diverticulosis     GERD (gastroesophageal reflux disease)     Hiatal hernia     Hypertension      Past Surgical History:   Procedure Laterality Date    CERVICAL BIOPSY  W/ LOOP ELECTRODE EXCISION  2/11/14    LAPAROSCOPIC OCCLUSION OF OVIDUCTS BY DEVICE Bilateral 7/10/2020    Procedure: " OCCLUSION, FALLOPIAN TUBE, LAPAROSCOPIC, USING DEVICE;  Surgeon: Alex Waller MD;  Location: [facility] OR;  Service: OB/GYN;  Laterality: Bilateral;  RN PREOP 6/23/2020   T/S--COVID NEGATIVE---UPT  CONSENTS INCOMPLETE     Family History   Problem Relation Age of Onset    Heart disease Mother 54        MI    Heart disease Maternal Grandmother 40        MI    Diabetes Other     Heart disease Brother 44        MI    Sickle cell trait Sister     Crohn's disease Neg Hx     Colon cancer Neg Hx      Social History     Tobacco Use    Smoking status: Current Every Day Smoker     Packs/day: 1.00     Years: 16.00     Pack years: 16.00     Types: Cigarettes    Smokeless tobacco: Never Used   Substance Use Topics    Alcohol use: Yes     Frequency: Monthly or less     Drinks per session: 1 or 2     Binge frequency: Never     Comment: social 1-2 x / month    Drug use: Yes     Frequency: 1.0 times per week     Types: Marijuana     Comment: social- last use 1 week ago     Review of Systems   Constitutional: Negative for fever.   HENT: Negative for sore throat.    Respiratory: Negative for shortness of breath.    Cardiovascular: Negative for chest pain.   Gastrointestinal: Positive for abdominal pain (Right-sided) and vomiting.   Genitourinary: Negative for dysuria.        Negative for hematemesis.    Musculoskeletal: Negative for back pain.   Skin: Negative for rash.   Neurological: Negative for weakness.   Hematological: Does not bruise/bleed easily.       Physical Exam     Initial Vitals [07/14/20 0413]   BP Pulse Resp Temp SpO2   (!) 151/87 93 18 97.5 °F (36.4 °C) 99 %      MAP       --         Physical Exam    Nursing note and vitals reviewed.  Constitutional: She appears well-developed and well-nourished. She is not diaphoretic. No distress.   HENT:   Head: Normocephalic and atraumatic.   Eyes: EOM are normal. Pupils are equal, round, and reactive to light.   Neck: Normal range of motion. Neck supple. No thyromegaly  present. No JVD present.   Cardiovascular: Normal rate, regular rhythm, normal heart sounds and intact distal pulses. Exam reveals no gallop and no friction rub.    No murmur heard.  Pulmonary/Chest: Breath sounds normal. No respiratory distress.   Abdominal: Soft. Bowel sounds are normal. There is abdominal tenderness in the left lower quadrant. There is no rigidity, no guarding and no CVA tenderness.   Umbilical laparoscopic incision clean and intact without any evidence of infection.  No drainage or discharge.  Abdomen is soft and nontender.   Genitourinary:    Rectum normal.   Rectum:      No rectal mass, anal fissure or tenderness.      Genitourinary Comments: GALDINO performed without any abnormality.  No stool palpated.  Chaperoned by RN.     Musculoskeletal: Normal range of motion. No tenderness or edema.   Neurological: She is alert and oriented to person, place, and time. She has normal strength. GCS score is 15. GCS eye subscore is 4. GCS verbal subscore is 5. GCS motor subscore is 6.   Skin: Skin is warm and dry. Capillary refill takes less than 2 seconds.   Psychiatric: She has a normal mood and affect. Her behavior is normal.         ED Course   Procedures  Labs Reviewed   CBC W/ AUTO DIFFERENTIAL - Abnormal; Notable for the following components:       Result Value    WBC 16.52 (*)     RBC 5.93 (*)     Hemoglobin 17.4 (*)     Hematocrit 51.7 (*)     Gran # (ANC) 12.5 (*)     Immature Grans (Abs) 0.08 (*)     Mono # 1.5 (*)     Gran% 75.8 (*)     Lymph% 14.4 (*)     All other components within normal limits   COMPREHENSIVE METABOLIC PANEL - Abnormal; Notable for the following components:    Sodium 133 (*)     Potassium 3.4 (*)     Chloride 94 (*)     Glucose 126 (*)     BUN, Bld 22 (*)     Total Protein 8.7 (*)     eGFR if non  56 (*)     All other components within normal limits   URINALYSIS, REFLEX TO URINE CULTURE - Abnormal; Notable for the following components:    Appearance, UA Hazy  (*)     Specific Gravity, UA >1.030 (*)     Protein, UA 3+ (*)     Ketones, UA 1+ (*)     Bilirubin (UA) 1+ (*)     Occult Blood UA 1+ (*)     Urobilinogen, UA 2.0-3.0 (*)     All other components within normal limits    Narrative:     Specimen Source->Urine   URINALYSIS MICROSCOPIC - Abnormal; Notable for the following components:    Bacteria Few (*)     Hyaline Casts, UA 8 (*)     All other components within normal limits    Narrative:     Specimen Source->Urine   CULTURE, BLOOD   CULTURE, BLOOD   LIPASE   LACTIC ACID, PLASMA   POCT URINE PREGNANCY          Imaging Results          X-Ray Abdomen Flat And Erect (Final result)  Result time 07/14/20 09:42:40    Final result by Bret Armendariz MD (07/14/20 09:42:40)                 Impression:      No evidence of bowel obstruction or perforation.      Electronically signed by: Bret Armendariz MD  Date:    07/14/2020  Time:    09:42             Narrative:    EXAMINATION:  XR ABDOMEN FLAT AND ERECT    CLINICAL HISTORY:  Unspecified abdominal pain    TECHNIQUE:  Flat and erect AP views of the abdomen were preformed.    COMPARISON:  None    FINDINGS:  Clips within the pelvis.The bowel gas pattern is non-obstructive.  No findings to suggest free air.No abnormal soft tissue masses noted.    No airspace consolidation at the lung bases.    No acute bony abnormality.                                 Medical Decision Making:   Clinical Tests:   Lab Tests: Ordered and Reviewed  ED Management:  44-year-old female with history of diverticulitis presenting to the ED for abdominal pain with constipation and nausea and vomiting.  Status post laparoscopic tubal ligation 7/10.  Patient was evaluated in the ED on 7/11 for similar symptoms where she had a CT scan which showed scattered small volume of free intraperitoneal air likely secondary to recent laparoscopic procedure.  Extensive colonic diverticulosis.  Questionable area of change which may be related to early acute  diverticulitis.  She also had a white blood cell count of 14.89.  Symptoms improved and she was discharge.  Today, patient is afebrile.  Abdomen is soft.  There is tenderness in the left lower quadrant.  Bowel sounds are active.  No stool impaction on GALDINO.  Leukocytosis has slightly worsened, white blood cell count 16.52.  However, lactic is 0.9 and she is afebrile.  Blood cultures are pending.  Patient was started on IV Zosyn for acute possible intra-abdominal process.  KUB with no evidence of bowel obstruction or perforation.  Patient has history of diverticulitis and reports symptoms are similar to this episode.  I doubt postoperative complication from tubal ligation as a cause of her symptoms.  However, case was discussed with OBGYN Dr. Waller. She is not tolerating oral intake and is vomiting the antibiotic she was prescribed on last ED visit.  Will admit the patient to Hospital Medicine for IV antibiotics (Zosyn and Flagyl), pain management, antiemetics.  Patient is agreeable to the plan of care.    This case was discussed my attending physician who examined the patient and agrees with my plan of care.            Scribe Attestation:   Scribe #1: I performed the above scribed service and the documentation accurately describes the services I performed. I attest to the accuracy of the note.                          Clinical Impression:       ICD-10-CM ICD-9-CM   1. Diverticulitis  K57.92 562.11   2. Abdominal pain  R10.9 789.00   3. Intractable vomiting with nausea, unspecified vomiting type  R11.2 536.2   4. Hypertension, unspecified type  I10 401.9   5. S/P tubal ligation  Z98.51 V26.51         Disposition:   Disposition: Admitted  Condition: Stable     ED Disposition Condition    Admit            I, M> Rodrigo, personally performed the services described in this documentation. All medical record entries made by the scribe were at my direction and in my presence. I have reviewed the chart and agree that the  record reflects my personal performance and is accurate and complete.                 Keily Wallace, MARIANNE  07/14/20 3054

## 2020-07-14 NOTE — SUBJECTIVE & OBJECTIVE
Past Medical History:   Diagnosis Date    Abnormal Pap smear of cervix     Diverticulosis     GERD (gastroesophageal reflux disease)     Hiatal hernia     Hypertension        Past Surgical History:   Procedure Laterality Date    CERVICAL BIOPSY  W/ LOOP ELECTRODE EXCISION  2/11/14    LAPAROSCOPIC OCCLUSION OF OVIDUCTS BY DEVICE Bilateral 7/10/2020    Procedure: OCCLUSION, FALLOPIAN TUBE, LAPAROSCOPIC, USING DEVICE;  Surgeon: Alex Waller MD;  Location: [facility] OR;  Service: OB/GYN;  Laterality: Bilateral;  RN PREOP 6/23/2020   T/S--COVID NEGATIVE---UPT  CONSENTS INCOMPLETE       Review of patient's allergies indicates:   Allergen Reactions    Ciprofloxacin Swelling and Blisters    Ibuprofen Hives    Meloxicam      rash    Cyclobenzaprine Rash     rasg       No current facility-administered medications on file prior to encounter.      Current Outpatient Medications on File Prior to Encounter   Medication Sig    amLODIPine (NORVASC) 10 MG tablet Take 10 mg by mouth once daily.    diphenhydrAMINE (SOMINEX) 25 mg tablet Take 25 mg by mouth nightly as needed for Insomnia.     lisinopriL (PRINIVIL,ZESTRIL) 40 MG tablet TAKE 1 TABLET (40 MG TOTAL) BY MOUTH ONCE DAILY.    ondansetron (ZOFRAN) 4 MG tablet Take 2 tablets (8 mg total) by mouth 2 (two) times daily.    promethazine (PHENERGAN) 25 MG suppository Place 1 suppository (25 mg total) rectally every 6 (six) hours as needed for Nausea.    promethazine (PHENERGAN) 25 MG tablet Take 1 tablet (25 mg total) by mouth every 6 (six) hours as needed for Nausea.    traMADoL (ULTRAM) 50 mg tablet Take 1 tablet (50 mg total) by mouth every 6 (six) hours as needed for Pain.    amoxicillin-clavulanate 875-125mg (AUGMENTIN) 875-125 mg per tablet Take 1 tablet by mouth every 12 (twelve) hours. for 10 days    HYDROcodone-acetaminophen (NORCO) 5-325 mg per tablet Take 1 tablet by mouth every 4 (four) hours as needed for Pain.     Family History     Problem Relation  (Age of Onset)    Diabetes Other    Heart disease Mother (54), Maternal Grandmother (40), Brother (44)    Sickle cell trait Sister        Tobacco Use    Smoking status: Current Every Day Smoker     Packs/day: 1.00     Years: 16.00     Pack years: 16.00     Types: Cigarettes    Smokeless tobacco: Never Used   Substance and Sexual Activity    Alcohol use: Yes     Frequency: Monthly or less     Drinks per session: 1 or 2     Binge frequency: Never     Comment: social 1-2 x / month    Drug use: Yes     Frequency: 1.0 times per week     Types: Marijuana     Comment: social- last use 1 week ago    Sexual activity: Not Currently     Partners: Male     Birth control/protection: None     Review of Systems   Constitutional: Negative for activity change, appetite change, chills, diaphoresis and fever.   HENT: Negative for congestion and dental problem.    Respiratory: Negative for apnea, cough, choking, chest tightness, shortness of breath, wheezing and stridor.    Cardiovascular: Negative for chest pain, palpitations and leg swelling.   Gastrointestinal: Positive for abdominal pain, nausea and vomiting. Negative for abdominal distention, constipation and diarrhea.   Genitourinary: Negative for difficulty urinating, dyspareunia and dysuria.   Musculoskeletal: Negative for arthralgias.   Skin: Negative for color change.   Neurological: Negative for dizziness.   Psychiatric/Behavioral: Negative for agitation and behavioral problems.     Objective:     Vital Signs (Most Recent):  Temp: 97.5 °F (36.4 °C) (07/14/20 0413)  Pulse: 80 (07/14/20 1032)  Resp: 18 (07/14/20 0744)  BP: (!) 177/94 (07/14/20 1032)  SpO2: 100 % (07/14/20 1032) Vital Signs (24h Range):  Temp:  [97.5 °F (36.4 °C)] 97.5 °F (36.4 °C)  Pulse:  [] 80  Resp:  [18] 18  SpO2:  [99 %-100 %] 100 %  BP: (135-177)/() 177/94     Weight: 97.5 kg (215 lb)  Body mass index is 33.67 kg/m².    Physical Exam  Vitals signs and nursing note reviewed.    Constitutional:       General: She is not in acute distress.     Appearance: Normal appearance. She is obese. She is ill-appearing. She is not toxic-appearing or diaphoretic.   HENT:      Head: Normocephalic and atraumatic.   Cardiovascular:      Rate and Rhythm: Normal rate and regular rhythm.   Pulmonary:      Effort: Pulmonary effort is normal.      Breath sounds: Normal breath sounds. No wheezing.   Abdominal:      General: There is no distension.      Tenderness: There is abdominal tenderness. There is guarding. There is no rebound.   Skin:     General: Skin is warm and dry.   Neurological:      Mental Status: She is oriented to person, place, and time.   Psychiatric:         Mood and Affect: Mood normal.         Behavior: Behavior normal.             Significant Labs:   BMP:   Recent Labs   Lab 07/14/20  0722   *   *   K 3.4*   CL 94*   CO2 25   BUN 22*   CREATININE 1.2   CALCIUM 10.2     CBC:   Recent Labs   Lab 07/14/20  0722   WBC 16.52*   HGB 17.4*   HCT 51.7*          Significant Imaging:

## 2020-07-14 NOTE — HOSPITAL COURSE
Mrs. Gamez is a 45 yo F who presents with a CC of abdominal pain. The patient is s/p tubal ligation on 7/9/20. She returned to the ED on 7/11 with a CC of L sided abdominal pain. The patient had a CT scan of abdomen at that time that showed some free air (though to be from surgery) as well as diverticulosis. She was started on meds for diverticulitis- as patient has had this in the past and it feels felt the same. She returns today with N/V abdominal pain and unable to keep down antibiotics.  X-ray of abdomen was benign. She was admitted to the hospital and started on IV fluids/IV Abx.  The patient clinically improved over 48 hours. On 7/16, she was requesting discharge to home. All symptoms resolved. Will send home on 12 days of Bactrim (cipro allergy)/Flagyl. Follow up with PCP in one week. Activity as tolerated. Low NA diet

## 2020-07-14 NOTE — ASSESSMENT & PLAN NOTE
Failed out patient treatment. X-ray no free air. Clinical diagnosis as patient states this feels just like her previous diverticulitis.  IV fluids with IV Zosyn/Flagyl. (cipro allergy). No sepsis.

## 2020-07-15 LAB
ALBUMIN SERPL BCP-MCNC: 3.6 G/DL (ref 3.5–5.2)
ALP SERPL-CCNC: 65 U/L (ref 55–135)
ALT SERPL W/O P-5'-P-CCNC: 20 U/L (ref 10–44)
ANION GAP SERPL CALC-SCNC: 10 MMOL/L (ref 8–16)
AST SERPL-CCNC: 22 U/L (ref 10–40)
BASOPHILS # BLD AUTO: 0.07 K/UL (ref 0–0.2)
BASOPHILS NFR BLD: 0.6 % (ref 0–1.9)
BILIRUB SERPL-MCNC: 0.9 MG/DL (ref 0.1–1)
BUN SERPL-MCNC: 12 MG/DL (ref 6–20)
CALCIUM SERPL-MCNC: 8.4 MG/DL (ref 8.7–10.5)
CHLORIDE SERPL-SCNC: 102 MMOL/L (ref 95–110)
CO2 SERPL-SCNC: 24 MMOL/L (ref 23–29)
CREAT SERPL-MCNC: 1 MG/DL (ref 0.5–1.4)
DIFFERENTIAL METHOD: ABNORMAL
EOSINOPHIL # BLD AUTO: 0.1 K/UL (ref 0–0.5)
EOSINOPHIL NFR BLD: 0.5 % (ref 0–8)
ERYTHROCYTE [DISTWIDTH] IN BLOOD BY AUTOMATED COUNT: 12.7 % (ref 11.5–14.5)
EST. GFR  (AFRICAN AMERICAN): >60 ML/MIN/1.73 M^2
EST. GFR  (NON AFRICAN AMERICAN): >60 ML/MIN/1.73 M^2
GLUCOSE SERPL-MCNC: 100 MG/DL (ref 70–110)
HCT VFR BLD AUTO: 46.5 % (ref 37–48.5)
HGB BLD-MCNC: 15.9 G/DL (ref 12–16)
IMM GRANULOCYTES # BLD AUTO: 0.04 K/UL (ref 0–0.04)
IMM GRANULOCYTES NFR BLD AUTO: 0.3 % (ref 0–0.5)
LYMPHOCYTES # BLD AUTO: 2.3 K/UL (ref 1–4.8)
LYMPHOCYTES NFR BLD: 18.9 % (ref 18–48)
MCH RBC QN AUTO: 30.6 PG (ref 27–31)
MCHC RBC AUTO-ENTMCNC: 34.2 G/DL (ref 32–36)
MCV RBC AUTO: 90 FL (ref 82–98)
MONOCYTES # BLD AUTO: 1.1 K/UL (ref 0.3–1)
MONOCYTES NFR BLD: 9.3 % (ref 4–15)
NEUTROPHILS # BLD AUTO: 8.4 K/UL (ref 1.8–7.7)
NEUTROPHILS NFR BLD: 70.4 % (ref 38–73)
NRBC BLD-RTO: 0 /100 WBC
PLATELET # BLD AUTO: 265 K/UL (ref 150–350)
PMV BLD AUTO: 10.9 FL (ref 9.2–12.9)
POTASSIUM SERPL-SCNC: 3 MMOL/L (ref 3.5–5.1)
PROT SERPL-MCNC: 6.8 G/DL (ref 6–8.4)
RBC # BLD AUTO: 5.19 M/UL (ref 4–5.4)
SODIUM SERPL-SCNC: 136 MMOL/L (ref 136–145)
WBC # BLD AUTO: 11.98 K/UL (ref 3.9–12.7)

## 2020-07-15 PROCEDURE — C9113 INJ PANTOPRAZOLE SODIUM, VIA: HCPCS | Performed by: NURSE PRACTITIONER

## 2020-07-15 PROCEDURE — 85025 COMPLETE CBC W/AUTO DIFF WBC: CPT

## 2020-07-15 PROCEDURE — 36415 COLL VENOUS BLD VENIPUNCTURE: CPT

## 2020-07-15 PROCEDURE — 63600175 PHARM REV CODE 636 W HCPCS: Performed by: INTERNAL MEDICINE

## 2020-07-15 PROCEDURE — 21400001 HC TELEMETRY ROOM

## 2020-07-15 PROCEDURE — 63600175 PHARM REV CODE 636 W HCPCS: Performed by: NURSE PRACTITIONER

## 2020-07-15 PROCEDURE — 80053 COMPREHEN METABOLIC PANEL: CPT

## 2020-07-15 PROCEDURE — 25000003 PHARM REV CODE 250: Performed by: INTERNAL MEDICINE

## 2020-07-15 PROCEDURE — S0030 INJECTION, METRONIDAZOLE: HCPCS | Performed by: NURSE PRACTITIONER

## 2020-07-15 PROCEDURE — 83036 HEMOGLOBIN GLYCOSYLATED A1C: CPT

## 2020-07-15 PROCEDURE — 25000003 PHARM REV CODE 250: Performed by: NURSE PRACTITIONER

## 2020-07-15 RX ORDER — POTASSIUM CHLORIDE 7.45 MG/ML
10 INJECTION INTRAVENOUS
Status: DISPENSED | OUTPATIENT
Start: 2020-07-15 | End: 2020-07-15

## 2020-07-15 RX ORDER — POTASSIUM CHLORIDE 20 MEQ/1
60 TABLET, EXTENDED RELEASE ORAL ONCE
Status: COMPLETED | OUTPATIENT
Start: 2020-07-15 | End: 2020-07-15

## 2020-07-15 RX ADMIN — METRONIDAZOLE 500 MG: 500 INJECTION, SOLUTION INTRAVENOUS at 09:07

## 2020-07-15 RX ADMIN — POTASSIUM CHLORIDE 60 MEQ: 1500 TABLET, EXTENDED RELEASE ORAL at 03:07

## 2020-07-15 RX ADMIN — MORPHINE SULFATE 2 MG: 10 INJECTION INTRAVENOUS at 06:07

## 2020-07-15 RX ADMIN — MORPHINE SULFATE 2 MG: 10 INJECTION INTRAVENOUS at 01:07

## 2020-07-15 RX ADMIN — METRONIDAZOLE 500 MG: 500 INJECTION, SOLUTION INTRAVENOUS at 05:07

## 2020-07-15 RX ADMIN — METRONIDAZOLE 500 MG: 500 INJECTION, SOLUTION INTRAVENOUS at 03:07

## 2020-07-15 RX ADMIN — ONDANSETRON HYDROCHLORIDE 4 MG: 2 SOLUTION INTRAMUSCULAR; INTRAVENOUS at 08:07

## 2020-07-15 RX ADMIN — DOCUSATE SODIUM 50 MG AND SENNOSIDES 8.6 MG 1 TABLET: 8.6; 5 TABLET, FILM COATED ORAL at 10:07

## 2020-07-15 RX ADMIN — PIPERACILLIN AND TAZOBACTAM 4.5 G: 4; .5 INJECTION, POWDER, FOR SOLUTION INTRAVENOUS at 06:07

## 2020-07-15 RX ADMIN — SODIUM CHLORIDE: 0.9 INJECTION, SOLUTION INTRAVENOUS at 05:07

## 2020-07-15 RX ADMIN — POTASSIUM CHLORIDE 10 MEQ: 7.46 INJECTION, SOLUTION INTRAVENOUS at 10:07

## 2020-07-15 RX ADMIN — MORPHINE SULFATE 2 MG: 10 INJECTION INTRAVENOUS at 10:07

## 2020-07-15 RX ADMIN — DOCUSATE SODIUM 50 MG AND SENNOSIDES 8.6 MG 1 TABLET: 8.6; 5 TABLET, FILM COATED ORAL at 09:07

## 2020-07-15 RX ADMIN — SODIUM CHLORIDE: 0.9 INJECTION, SOLUTION INTRAVENOUS at 09:07

## 2020-07-15 RX ADMIN — POTASSIUM CHLORIDE 10 MEQ: 7.46 INJECTION, SOLUTION INTRAVENOUS at 01:07

## 2020-07-15 RX ADMIN — PIPERACILLIN AND TAZOBACTAM 4.5 G: 4; .5 INJECTION, POWDER, FOR SOLUTION INTRAVENOUS at 11:07

## 2020-07-15 RX ADMIN — MORPHINE SULFATE 2 MG: 10 INJECTION INTRAVENOUS at 08:07

## 2020-07-15 RX ADMIN — MORPHINE SULFATE 2 MG: 10 INJECTION INTRAVENOUS at 02:07

## 2020-07-15 RX ADMIN — PANTOPRAZOLE SODIUM 40 MG: 40 INJECTION, POWDER, FOR SOLUTION INTRAVENOUS at 10:07

## 2020-07-15 RX ADMIN — PIPERACILLIN AND TAZOBACTAM 4.5 G: 4; .5 INJECTION, POWDER, FOR SOLUTION INTRAVENOUS at 02:07

## 2020-07-15 NOTE — ASSESSMENT & PLAN NOTE
Failed out patient treatment. X-ray no free air. Clinical diagnosis as patient states this feels just like her previous diverticulitis.  IV fluids with IV Zosyn/Flagyl. (cipro allergy). No sepsis.      WBC count resolved. Clinically improved. Blood cultures are NG

## 2020-07-15 NOTE — SUBJECTIVE & OBJECTIVE
Interval History: feels better.       Review of Systems   Constitutional: Negative for activity change, appetite change, chills, diaphoresis and fever.   HENT: Negative for congestion and dental problem.    Respiratory: Negative for apnea, cough, choking, chest tightness, shortness of breath, wheezing and stridor.    Cardiovascular: Negative for chest pain, palpitations and leg swelling.   Gastrointestinal: Positive for abdominal pain. Negative for abdominal distention, constipation, diarrhea, nausea and vomiting.   Genitourinary: Negative for difficulty urinating, dyspareunia and dysuria.   Musculoskeletal: Negative for arthralgias.   Skin: Negative for color change.   Neurological: Negative for dizziness.   Psychiatric/Behavioral: Negative for agitation and behavioral problems.     Objective:     Vital Signs (Most Recent):  Temp: 98.3 °F (36.8 °C) (07/15/20 0341)  Pulse: 80 (07/15/20 0341)  Resp: 16 (07/15/20 0341)  BP: (!) 143/76 (07/15/20 0341)  SpO2: 100 % (07/15/20 0341) Vital Signs (24h Range):  Temp:  [98.3 °F (36.8 °C)-99 °F (37.2 °C)] 98.3 °F (36.8 °C)  Pulse:  [] 80  Resp:  [16-18] 16  SpO2:  [98 %-100 %] 100 %  BP: (130-197)/() 143/76     Weight: 93.9 kg (207 lb 0.2 oz)  Body mass index is 32.42 kg/m².    Intake/Output Summary (Last 24 hours) at 7/15/2020 0659  Last data filed at 7/15/2020 0600  Gross per 24 hour   Intake 3589.58 ml   Output --   Net 3589.58 ml      Physical Exam  Vitals signs and nursing note reviewed.   Constitutional:       General: She is not in acute distress.     Appearance: Normal appearance. She is obese. She is ill-appearing. She is not toxic-appearing or diaphoretic.   HENT:      Head: Normocephalic and atraumatic.   Cardiovascular:      Rate and Rhythm: Normal rate and regular rhythm.   Pulmonary:      Effort: Pulmonary effort is normal.      Breath sounds: Normal breath sounds. No wheezing.   Abdominal:      General: There is no distension.      Tenderness: There  is abdominal tenderness. There is guarding. There is no rebound.   Skin:     General: Skin is warm and dry.   Neurological:      Mental Status: She is oriented to person, place, and time.   Psychiatric:         Mood and Affect: Mood normal.         Behavior: Behavior normal.         Significant Labs:   BMP:   Recent Labs   Lab 07/14/20  0722   *   *   K 3.4*   CL 94*   CO2 25   BUN 22*   CREATININE 1.2   CALCIUM 10.2     CBC:   Recent Labs   Lab 07/14/20  0722 07/15/20  0528   WBC 16.52* 11.98   HGB 17.4* 15.9   HCT 51.7* 46.5    265       Significant Imaging:

## 2020-07-15 NOTE — PLAN OF CARE
Problem: Adult Inpatient Plan of Care  Goal: Plan of Care Review  Outcome: Ongoing, Progressing     Problem: Fall Injury Risk  Goal: Absence of Fall and Fall-Related Injury  Outcome: Ongoing, Progressing     Problem: Nausea and Vomiting  Goal: Fluid and Electrolyte Balance  Outcome: Ongoing, Progressing    Pt remained free of falls during current shift. Plan of care and fall precautions reviewed with patient.   Verbalized understanding. Bed locked and in lowest position. SR up x 2, call light in reach. Pain managed with IV morphine. N/V managed with IV zofran. IV fluids infusing, patient NPO.

## 2020-07-15 NOTE — PROGRESS NOTES
[facility] Hospital Medicine  Progress Note    Patient Name: Cipriano Gamez  MRN: [id number]  Patient Class: IP- Inpatient   Admission Date: 7/14/2020  Length of Stay: 1 days  Attending Physician: Gilmer Bullock MD  Primary Care Provider: Brooks Bergeron MD        Subjective:     Principal Problem:Diverticulitis        HPI:  Mrs. Gamez is a 45 yo F who presents with a CC of abdominal pain. The patient is s/p tubal ligation on 7/9/20. She returned to the ED on 7/11 with a CC of L sided abdominal pain. The patient had a CT scan of abdomen at that time that showed some free air (though to be from surgery) as well as diverticulosis. She was started on meds for diverticulitis- as patient has had this in the past and it feels felt the same. She returns today with N/V abdominal pain and unable to keep down antibiotics.  X-ray of abdomen was benign. She was admitted to the hospital and started on IV fluids/IV Abx.     Overview/Hospital Course:  Mrs. Gamez is a 45 yo F who presents with a CC of abdominal pain. The patient is s/p tubal ligation on 7/9/20. She returned to the ED on 7/11 with a CC of L sided abdominal pain. The patient had a CT scan of abdomen at that time that showed some free air (though to be from surgery) as well as diverticulosis. She was started on meds for diverticulitis- as patient has had this in the past and it feels felt the same. She returns today with N/V abdominal pain and unable to keep down antibiotics.  X-ray of abdomen was benign. She was admitted to the hospital and started on IV fluids/IV Abx.     Interval History: feels better.       Review of Systems   Constitutional: Negative for activity change, appetite change, chills, diaphoresis and fever.   HENT: Negative for congestion and dental problem.    Respiratory: Negative for apnea, cough, choking, chest tightness, shortness of breath, wheezing and stridor.    Cardiovascular: Negative for chest pain, palpitations and leg  swelling.   Gastrointestinal: Positive for abdominal pain. Negative for abdominal distention, constipation, diarrhea, nausea and vomiting.   Genitourinary: Negative for difficulty urinating, dyspareunia and dysuria.   Musculoskeletal: Negative for arthralgias.   Skin: Negative for color change.   Neurological: Negative for dizziness.   Psychiatric/Behavioral: Negative for agitation and behavioral problems.     Objective:     Vital Signs (Most Recent):  Temp: 98.3 °F (36.8 °C) (07/15/20 0341)  Pulse: 80 (07/15/20 0341)  Resp: 16 (07/15/20 0341)  BP: (!) 143/76 (07/15/20 0341)  SpO2: 100 % (07/15/20 0341) Vital Signs (24h Range):  Temp:  [98.3 °F (36.8 °C)-99 °F (37.2 °C)] 98.3 °F (36.8 °C)  Pulse:  [] 80  Resp:  [16-18] 16  SpO2:  [98 %-100 %] 100 %  BP: (130-197)/() 143/76     Weight: 93.9 kg (207 lb 0.2 oz)  Body mass index is 32.42 kg/m².    Intake/Output Summary (Last 24 hours) at 7/15/2020 0659  Last data filed at 7/15/2020 0600  Gross per 24 hour   Intake 3589.58 ml   Output --   Net 3589.58 ml      Physical Exam  Vitals signs and nursing note reviewed.   Constitutional:       General: She is not in acute distress.     Appearance: Normal appearance. She is obese. She is ill-appearing. She is not toxic-appearing or diaphoretic.   HENT:      Head: Normocephalic and atraumatic.   Cardiovascular:      Rate and Rhythm: Normal rate and regular rhythm.   Pulmonary:      Effort: Pulmonary effort is normal.      Breath sounds: Normal breath sounds. No wheezing.   Abdominal:      General: There is no distension.      Tenderness: There is abdominal tenderness. There is guarding. There is no rebound.   Skin:     General: Skin is warm and dry.   Neurological:      Mental Status: She is oriented to person, place, and time.   Psychiatric:         Mood and Affect: Mood normal.         Behavior: Behavior normal.         Significant Labs:   BMP:   Recent Labs   Lab 07/14/20  0722   *   *   K 3.4*   CL  94*   CO2 25   BUN 22*   CREATININE 1.2   CALCIUM 10.2     CBC:   Recent Labs   Lab 07/14/20  0722 07/15/20  0528   WBC 16.52* 11.98   HGB 17.4* 15.9   HCT 51.7* 46.5    265       Significant Imaging:      Assessment/Plan:      * Diverticulitis  Failed out patient treatment. X-ray no free air. Clinical diagnosis as patient states this feels just like her previous diverticulitis.  IV fluids with IV Zosyn/Flagyl. (cipro allergy). No sepsis.      WBC count resolved. Clinically improved. Blood cultures are NG       Dehydration  IV fluids. As evidenced by hemoconcentration       Hyperglycemia  Will check an A1c      Hypokalemia  Will replace.       Drug dependence affecting pregnancy in first trimester  Will check urine drug screen      Obesity affecting pregnancy in first trimester  Body mass index is 33.67 kg/m².  Weight loss as out patient       Hematemesis with nausea  From #1.      Smokes 1 pack of cigarettes per day  Smoking cessation education > 3 minutes       Essential hypertension  Will resume home meds       GERD (gastroesophageal reflux disease)  Resume home meds       Hypokalemia- will replace.   VTE Risk Mitigation (From admission, onward)         Ordered     IP VTE HIGH RISK PATIENT  Once      07/14/20 2123     Place LOLITA hose  Until discontinued      07/14/20 2123              Continue Abx/supportive care. Stable patient here for a couple more days.          Gilmer Carranza MD  Department of Hospital Medicine   [facility]

## 2020-07-16 VITALS
TEMPERATURE: 98 F | HEIGHT: 67 IN | OXYGEN SATURATION: 98 % | RESPIRATION RATE: 18 BRPM | BODY MASS INDEX: 33.39 KG/M2 | WEIGHT: 212.75 LBS | SYSTOLIC BLOOD PRESSURE: 113 MMHG | HEART RATE: 78 BPM | DIASTOLIC BLOOD PRESSURE: 61 MMHG

## 2020-07-16 PROBLEM — K92.0 HEMATEMESIS WITH NAUSEA: Status: RESOLVED | Noted: 2020-01-07 | Resolved: 2020-07-16

## 2020-07-16 PROBLEM — E86.0 DEHYDRATION: Status: RESOLVED | Noted: 2020-07-14 | Resolved: 2020-07-16

## 2020-07-16 PROBLEM — K57.92 DIVERTICULITIS: Status: RESOLVED | Noted: 2020-07-14 | Resolved: 2020-07-16

## 2020-07-16 PROBLEM — R73.9 HYPERGLYCEMIA: Status: RESOLVED | Noted: 2020-07-14 | Resolved: 2020-07-16

## 2020-07-16 PROBLEM — E87.6 HYPOKALEMIA: Status: RESOLVED | Noted: 2020-07-14 | Resolved: 2020-07-16

## 2020-07-16 LAB
ALBUMIN SERPL BCP-MCNC: 3.1 G/DL (ref 3.5–5.2)
ALP SERPL-CCNC: 61 U/L (ref 55–135)
ALT SERPL W/O P-5'-P-CCNC: 16 U/L (ref 10–44)
ANION GAP SERPL CALC-SCNC: 4 MMOL/L (ref 8–16)
AST SERPL-CCNC: 17 U/L (ref 10–40)
BASOPHILS # BLD AUTO: 0.05 K/UL (ref 0–0.2)
BASOPHILS NFR BLD: 0.6 % (ref 0–1.9)
BILIRUB SERPL-MCNC: 0.4 MG/DL (ref 0.1–1)
BUN SERPL-MCNC: 11 MG/DL (ref 6–20)
CALCIUM SERPL-MCNC: 8.2 MG/DL (ref 8.7–10.5)
CHLORIDE SERPL-SCNC: 107 MMOL/L (ref 95–110)
CO2 SERPL-SCNC: 26 MMOL/L (ref 23–29)
CREAT SERPL-MCNC: 1 MG/DL (ref 0.5–1.4)
DIFFERENTIAL METHOD: NORMAL
EOSINOPHIL # BLD AUTO: 0.2 K/UL (ref 0–0.5)
EOSINOPHIL NFR BLD: 2.1 % (ref 0–8)
ERYTHROCYTE [DISTWIDTH] IN BLOOD BY AUTOMATED COUNT: 12.7 % (ref 11.5–14.5)
EST. GFR  (AFRICAN AMERICAN): >60 ML/MIN/1.73 M^2
EST. GFR  (NON AFRICAN AMERICAN): >60 ML/MIN/1.73 M^2
ESTIMATED AVG GLUCOSE: 103 MG/DL (ref 68–131)
GLUCOSE SERPL-MCNC: 106 MG/DL (ref 70–110)
HBA1C MFR BLD HPLC: 5.2 % (ref 4–5.6)
HCT VFR BLD AUTO: 41.4 % (ref 37–48.5)
HGB BLD-MCNC: 13.4 G/DL (ref 12–16)
IMM GRANULOCYTES # BLD AUTO: 0.03 K/UL (ref 0–0.04)
IMM GRANULOCYTES NFR BLD AUTO: 0.3 % (ref 0–0.5)
LYMPHOCYTES # BLD AUTO: 3 K/UL (ref 1–4.8)
LYMPHOCYTES NFR BLD: 33.1 % (ref 18–48)
MCH RBC QN AUTO: 29.3 PG (ref 27–31)
MCHC RBC AUTO-ENTMCNC: 32.4 G/DL (ref 32–36)
MCV RBC AUTO: 91 FL (ref 82–98)
MONOCYTES # BLD AUTO: 0.9 K/UL (ref 0.3–1)
MONOCYTES NFR BLD: 9.7 % (ref 4–15)
NEUTROPHILS # BLD AUTO: 4.9 K/UL (ref 1.8–7.7)
NEUTROPHILS NFR BLD: 54.2 % (ref 38–73)
NRBC BLD-RTO: 0 /100 WBC
PLATELET # BLD AUTO: 222 K/UL (ref 150–350)
PMV BLD AUTO: 11 FL (ref 9.2–12.9)
POTASSIUM SERPL-SCNC: 3.5 MMOL/L (ref 3.5–5.1)
PROT SERPL-MCNC: 5.8 G/DL (ref 6–8.4)
RBC # BLD AUTO: 4.57 M/UL (ref 4–5.4)
SODIUM SERPL-SCNC: 137 MMOL/L (ref 136–145)
WBC # BLD AUTO: 8.95 K/UL (ref 3.9–12.7)

## 2020-07-16 PROCEDURE — 25000003 PHARM REV CODE 250: Performed by: NURSE PRACTITIONER

## 2020-07-16 PROCEDURE — 36415 COLL VENOUS BLD VENIPUNCTURE: CPT

## 2020-07-16 PROCEDURE — 85025 COMPLETE CBC W/AUTO DIFF WBC: CPT

## 2020-07-16 PROCEDURE — 63600175 PHARM REV CODE 636 W HCPCS: Performed by: NURSE PRACTITIONER

## 2020-07-16 PROCEDURE — C9113 INJ PANTOPRAZOLE SODIUM, VIA: HCPCS | Performed by: NURSE PRACTITIONER

## 2020-07-16 PROCEDURE — S0030 INJECTION, METRONIDAZOLE: HCPCS | Performed by: NURSE PRACTITIONER

## 2020-07-16 PROCEDURE — 80053 COMPREHEN METABOLIC PANEL: CPT

## 2020-07-16 RX ORDER — SULFAMETHOXAZOLE AND TRIMETHOPRIM 800; 160 MG/1; MG/1
1 TABLET ORAL 2 TIMES DAILY
Qty: 24 TABLET | Refills: 0 | Status: SHIPPED | OUTPATIENT
Start: 2020-07-16 | End: 2020-07-28

## 2020-07-16 RX ORDER — METRONIDAZOLE 500 MG/1
500 TABLET ORAL EVERY 8 HOURS
Qty: 36 TABLET | Refills: 0 | Status: SHIPPED | OUTPATIENT
Start: 2020-07-16 | End: 2020-07-28

## 2020-07-16 RX ADMIN — MORPHINE SULFATE 2 MG: 10 INJECTION INTRAVENOUS at 02:07

## 2020-07-16 RX ADMIN — MORPHINE SULFATE 2 MG: 10 INJECTION INTRAVENOUS at 07:07

## 2020-07-16 RX ADMIN — PIPERACILLIN AND TAZOBACTAM 4.5 G: 4; .5 INJECTION, POWDER, FOR SOLUTION INTRAVENOUS at 09:07

## 2020-07-16 RX ADMIN — METRONIDAZOLE 500 MG: 500 INJECTION, SOLUTION INTRAVENOUS at 06:07

## 2020-07-16 RX ADMIN — PANTOPRAZOLE SODIUM 40 MG: 40 INJECTION, POWDER, FOR SOLUTION INTRAVENOUS at 09:07

## 2020-07-16 RX ADMIN — PIPERACILLIN AND TAZOBACTAM 4.5 G: 4; .5 INJECTION, POWDER, FOR SOLUTION INTRAVENOUS at 02:07

## 2020-07-16 RX ADMIN — SODIUM CHLORIDE: 0.9 INJECTION, SOLUTION INTRAVENOUS at 06:07

## 2020-07-16 NOTE — DISCHARGE INSTRUCTIONS
WRITTEN HEALTHCARE DISCHARGE INFORMATION      Things that YOU are responsible for to Manage Your Care At Home:  1. Getting your prescriptions filled.  2. Taking you medications as directed. DO NOT MISS ANY DOSES!  3. Going to your follow-up doctor appointments. This is important because it allows the doctor to monitor your progress and to determine if any changes need to be made to your treatment plan.     If you are unable to make your follow up appointments, please call the number listed and reschedule this appointment.      After discharge, if you need assistance, you can call [facility] for 24/7 assistance at [phone]     If you are experience any signs or symptoms, Call your doctor or Call [phone] and come to your nearest Emergency Room.     Thank you for choosing [facility] and allowing us to care for you.        You should receive a call from [facility] within 48-72 hours to help manage your care after discharge. Please try to make sure that you answer your phone for this important phone call.

## 2020-07-16 NOTE — PLAN OF CARE
Problem: Adult Inpatient Plan of Care  Goal: Plan of Care Review  Outcome: Ongoing, Progressing     Problem: Adult Inpatient Plan of Care  Goal: Patient-Specific Goal (Individualization)  Outcome: Ongoing, Progressing     Problem: Adult Inpatient Plan of Care  Goal: Optimal Comfort and Wellbeing  Outcome: Ongoing, Progressing     Problem: Adult Inpatient Plan of Care  Goal: Readiness for Transition of Care  Outcome: Ongoing, Progressing

## 2020-07-16 NOTE — PLAN OF CARE
EDUCATION:  Patient discharge instructions updated to include educational information on Diverticulosis/Diverticulitis that will be printed on patient's AVS to review upon discharge; Information reviewed with patient and include  signs and symptoms to look for and call the doctor if experiencing, and symptoms that may indicate a medical emergency: CALL 911.    All questions answered.  Teach back method used.        Reviewed with patient things that she can do to better manage his care at home to include taking all medications as precscribed and make sure patient attend all follow up visits;     F/U appointments with PCP were reviewed;  verbalized understanding     NurseBetzaida notified that all discharge planning needs have been addressed and patient can be discharged from  standpoint         07/16/20 1045   Final Note   Assessment Type Final Discharge Note   Anticipated Discharge Disposition Home   What phone number can be called within the next 1-3 days to see how you are doing after discharge? [phone]   Hospital Follow Up  Appt(s) scheduled? Yes   Discharge plans and expectations educations in teach back method with documentation complete? Yes   Right Care Referral Info   Post Acute Recommendation No Care   Post-Acute Status   Post-Acute Authorization Other   Other Status No Post-Acute Service Needs   Discharge Delays None known at this time

## 2020-07-16 NOTE — PROGRESS NOTES
[facility] Hospital Medicine  Progress Note    Patient Name: Cipriano Gamez  MRN: [id number]  Patient Class: IP- Inpatient   Admission Date: 7/14/2020  Length of Stay: 2 days  Attending Physician: Gilmer Bullock MD  Primary Care Provider: Brooks Bergeron MD        Subjective:     Principal Problem:Diverticulitis        HPI:  Mrs. Gamez is a 43 yo F who presents with a CC of abdominal pain. The patient is s/p tubal ligation on 7/9/20. She returned to the ED on 7/11 with a CC of L sided abdominal pain. The patient had a CT scan of abdomen at that time that showed some free air (though to be from surgery) as well as diverticulosis. She was started on meds for diverticulitis- as patient has had this in the past and it feels felt the same. She returns today with N/V abdominal pain and unable to keep down antibiotics.  X-ray of abdomen was benign. She was admitted to the hospital and started on IV fluids/IV Abx.     Overview/Hospital Course:  Mrs. Gamez is a 43 yo F who presents with a CC of abdominal pain. The patient is s/p tubal ligation on 7/9/20. She returned to the ED on 7/11 with a CC of L sided abdominal pain. The patient had a CT scan of abdomen at that time that showed some free air (though to be from surgery) as well as diverticulosis. She was started on meds for diverticulitis- as patient has had this in the past and it feels felt the same. She returns today with N/V abdominal pain and unable to keep down antibiotics.  X-ray of abdomen was benign. She was admitted to the hospital and started on IV fluids/IV Abx.  The patient clinically improved over 48 hours. On 7/16, she was requesting discharge to home. All symptoms resolved. Will send home on 12 days of Cipro/Flagyl. Follow up with PCP in one week. Activity as tolerated. Regular diet.       Interval History: doing great. Wants to go home.       Review of Systems   Constitutional: Negative for activity change, appetite change, chills,  diaphoresis and fever.   HENT: Negative for congestion and dental problem.    Respiratory: Negative for apnea, cough, choking, chest tightness, shortness of breath, wheezing and stridor.    Cardiovascular: Negative for chest pain, palpitations and leg swelling.   Gastrointestinal: Negative for abdominal distention, abdominal pain, constipation, diarrhea, nausea and vomiting.   Genitourinary: Negative for difficulty urinating, dyspareunia and dysuria.   Musculoskeletal: Negative for arthralgias.   Skin: Negative for color change.   Neurological: Negative for dizziness.   Psychiatric/Behavioral: Negative for agitation and behavioral problems.     Objective:     Vital Signs (Most Recent):  Temp: 98.1 °F (36.7 °C) (07/16/20 0743)  Pulse: 69 (07/16/20 0743)  Resp: 18 (07/16/20 0743)  BP: 132/80 (07/16/20 0743)  SpO2: 99 % (07/16/20 0743) Vital Signs (24h Range):  Temp:  [97.9 °F (36.6 °C)-99.1 °F (37.3 °C)] 98.1 °F (36.7 °C)  Pulse:  [64-96] 69  Resp:  [16-18] 18  SpO2:  [97 %-99 %] 99 %  BP: (118-148)/(67-98) 132/80     Weight: 96.5 kg (212 lb 11.9 oz)  Body mass index is 33.32 kg/m².    Intake/Output Summary (Last 24 hours) at 7/16/2020 0904  Last data filed at 7/16/2020 0500  Gross per 24 hour   Intake 2295 ml   Output --   Net 2295 ml      Physical Exam  Vitals signs and nursing note reviewed.   Constitutional:       General: She is not in acute distress.     Appearance: Normal appearance. She is obese. She is ill-appearing. She is not toxic-appearing or diaphoretic.   HENT:      Head: Normocephalic and atraumatic.   Cardiovascular:      Rate and Rhythm: Normal rate and regular rhythm.   Pulmonary:      Effort: Pulmonary effort is normal.      Breath sounds: Normal breath sounds. No wheezing.   Abdominal:      General: There is no distension.      Tenderness: There is no abdominal tenderness. There is no guarding or rebound.   Skin:     General: Skin is warm and dry.   Neurological:      Mental Status: She is  oriented to person, place, and time.   Psychiatric:         Mood and Affect: Mood normal.         Behavior: Behavior normal.         Significant Labs:   BMP:   Recent Labs   Lab 07/16/20  0434         K 3.5      CO2 26   BUN 11   CREATININE 1.0   CALCIUM 8.2*     CBC:   Recent Labs   Lab 07/15/20  0528 07/16/20  0434   WBC 11.98 8.95   HGB 15.9 13.4   HCT 46.5 41.4    222       Significant Imaging:      Assessment/Plan:      * Diverticulitis  Failed out patient treatment. X-ray no free air. Clinical diagnosis as patient states this feels just like her previous diverticulitis.  IV fluids with IV Zosyn/Flagyl. (cipro allergy). No sepsis.      WBC count resolved. Clinically improved. Blood cultures are NG     Resolved. Cipro/flagyl for 12 days       Dehydration  IV fluids. As evidenced by hemoconcentration       Hyperglycemia  Will check an A1c      Hypokalemia  Will replace.       Drug dependence affecting pregnancy in first trimester  Will check urine drug screen      Obesity affecting pregnancy in first trimester  Body mass index is 33.67 kg/m².  Weight loss as out patient       Hematemesis with nausea  From #1.      Smokes 1 pack of cigarettes per day  Smoking cessation education > 3 minutes       Essential hypertension  Will resume home meds       GERD (gastroesophageal reflux disease)  Resume home meds         VTE Risk Mitigation (From admission, onward)         Ordered     IP VTE HIGH RISK PATIENT  Once      07/14/20 2123     Place LOLITA hose  Until discontinued      07/14/20 2123                D/c to home       Gilmer Carranza MD  Department of [facility] Medicine   [facility]

## 2020-07-16 NOTE — SUBJECTIVE & OBJECTIVE
Interval History: doing great. Wants to go home.       Review of Systems   Constitutional: Negative for activity change, appetite change, chills, diaphoresis and fever.   HENT: Negative for congestion and dental problem.    Respiratory: Negative for apnea, cough, choking, chest tightness, shortness of breath, wheezing and stridor.    Cardiovascular: Negative for chest pain, palpitations and leg swelling.   Gastrointestinal: Negative for abdominal distention, abdominal pain, constipation, diarrhea, nausea and vomiting.   Genitourinary: Negative for difficulty urinating, dyspareunia and dysuria.   Musculoskeletal: Negative for arthralgias.   Skin: Negative for color change.   Neurological: Negative for dizziness.   Psychiatric/Behavioral: Negative for agitation and behavioral problems.     Objective:     Vital Signs (Most Recent):  Temp: 98.1 °F (36.7 °C) (07/16/20 0743)  Pulse: 69 (07/16/20 0743)  Resp: 18 (07/16/20 0743)  BP: 132/80 (07/16/20 0743)  SpO2: 99 % (07/16/20 0743) Vital Signs (24h Range):  Temp:  [97.9 °F (36.6 °C)-99.1 °F (37.3 °C)] 98.1 °F (36.7 °C)  Pulse:  [64-96] 69  Resp:  [16-18] 18  SpO2:  [97 %-99 %] 99 %  BP: (118-148)/(67-98) 132/80     Weight: 96.5 kg (212 lb 11.9 oz)  Body mass index is 33.32 kg/m².    Intake/Output Summary (Last 24 hours) at 7/16/2020 0904  Last data filed at 7/16/2020 0500  Gross per 24 hour   Intake 2295 ml   Output --   Net 2295 ml      Physical Exam  Vitals signs and nursing note reviewed.   Constitutional:       General: She is not in acute distress.     Appearance: Normal appearance. She is obese. She is ill-appearing. She is not toxic-appearing or diaphoretic.   HENT:      Head: Normocephalic and atraumatic.   Cardiovascular:      Rate and Rhythm: Normal rate and regular rhythm.   Pulmonary:      Effort: Pulmonary effort is normal.      Breath sounds: Normal breath sounds. No wheezing.   Abdominal:      General: There is no distension.      Tenderness: There is no  abdominal tenderness. There is no guarding or rebound.   Skin:     General: Skin is warm and dry.   Neurological:      Mental Status: She is oriented to person, place, and time.   Psychiatric:         Mood and Affect: Mood normal.         Behavior: Behavior normal.         Significant Labs:   BMP:   Recent Labs   Lab 07/16/20  0434         K 3.5      CO2 26   BUN 11   CREATININE 1.0   CALCIUM 8.2*     CBC:   Recent Labs   Lab 07/15/20  0528 07/16/20  0434   WBC 11.98 8.95   HGB 15.9 13.4   HCT 46.5 41.4    222       Significant Imaging:

## 2020-07-16 NOTE — DISCHARGE SUMMARY
[facility] Hospital Medicine  Discharge Summary      Patient Name: Cipriano Gamez  MRN: [id number]  Admission Date: 7/14/2020  Hospital Length of Stay: 2 days  Discharge Date and Time:  07/16/2020 9:09 AM  Attending Physician: Gilmer Bullock MD   Discharging Provider: Gilmer Bullock MD  Primary Care Provider: Brooks Bergeron MD      HPI:   Mrs. Gamez is a 43 yo F who presents with a CC of abdominal pain. The patient is s/p tubal ligation on 7/9/20. She returned to the ED on 7/11 with a CC of L sided abdominal pain. The patient had a CT scan of abdomen at that time that showed some free air (though to be from surgery) as well as diverticulosis. She was started on meds for diverticulitis- as patient has had this in the past and it feels felt the same. She returns today with N/V abdominal pain and unable to keep down antibiotics.  X-ray of abdomen was benign. She was admitted to the hospital and started on IV fluids/IV Abx.     * No surgery found *      Hospital Course:   Mrs. Gamez is a 43 yo F who presents with a CC of abdominal pain. The patient is s/p tubal ligation on 7/9/20. She returned to the ED on 7/11 with a CC of L sided abdominal pain. The patient had a CT scan of abdomen at that time that showed some free air (though to be from surgery) as well as diverticulosis. She was started on meds for diverticulitis- as patient has had this in the past and it feels felt the same. She returns today with N/V abdominal pain and unable to keep down antibiotics.  X-ray of abdomen was benign. She was admitted to the hospital and started on IV fluids/IV Abx.  The patient clinically improved over 48 hours. On 7/16, she was requesting discharge to home. All symptoms resolved. Will send home on 12 days of Bactrim (cipro allergy)/Flagyl. Follow up with PCP in one week. Activity as tolerated. Low NA diet        Consults:     No new Assessment & Plan notes have been filed under this hospital service  since the last note was generated.  Service: Hospital Medicine    Final Active Diagnoses:    Diagnosis Date Noted POA    Drug dependence affecting pregnancy in first trimester [O99.321] 03/27/2020 Yes    Obesity affecting pregnancy in first trimester [O99.211] 03/27/2020 Yes    Smokes 1 pack of cigarettes per day [F17.210] 03/21/2018 Yes    Essential hypertension [I10]  Yes    GERD (gastroesophageal reflux disease) [K21.9]  Yes      Problems Resolved During this Admission:    Diagnosis Date Noted Date Resolved POA    PRINCIPAL PROBLEM:  Diverticulitis [K57.92] 07/14/2020 07/16/2020 Yes    Hypokalemia [E87.6] 07/14/2020 07/16/2020 Yes    Hyperglycemia [R73.9] 07/14/2020 07/16/2020 Yes    Dehydration [E86.0] 07/14/2020 07/16/2020 Yes    Hematemesis with nausea [K92.0] 01/07/2020 07/16/2020 Yes       Discharged Condition: good    Disposition: Home or Self Care    Follow Up:  Follow-up Information     Brooks Bergeron MD.    Specialty: Internal Medicine  Why: Hospital discharge follow up appointment scheduled August 4th @ 0900; office may call if sooner appointment is available  Contact information:  [address] [facility] [place] [address] [phone]             Brooks Bergeron MD In 1 week.    Specialty: Internal Medicine  Contact information:  [address] [place] [address] [phone]                 Patient Instructions:   No discharge procedures on file.    Significant Diagnostic Studies:     Pending Diagnostic Studies:     None         Medications:  Reconciled Home Medications:      Medication List      START taking these medications    metroNIDAZOLE 500 MG tablet  Commonly known as: FLAGYL  Take 1 tablet (500 mg total) by mouth every 8 (eight) hours. for 12 days     sulfamethoxazole-trimethoprim 800-160mg 800-160 mg Tab  Commonly known as: BACTRIM DS  Take 1 tablet by mouth 2 (two) times daily. for 12 days        CHANGE how you take these medications    promethazine 25 MG suppository  Commonly known  as: PHENERGAN  Place 1 suppository (25 mg total) rectally every 6 (six) hours as needed for Nausea.  What changed: Another medication with the same name was removed. Continue taking this medication, and follow the directions you see here.        CONTINUE taking these medications    amLODIPine 10 MG tablet  Commonly known as: NORVASC  Take 10 mg by mouth once daily.     diphenhydrAMINE 25 mg tablet  Commonly known as: SOMINEX  Take 25 mg by mouth nightly as needed for Insomnia.     HYDROcodone-acetaminophen 5-325 mg per tablet  Commonly known as: NORCO  Take 1 tablet by mouth every 4 (four) hours as needed for Pain.     lisinopriL 40 MG tablet  Commonly known as: PRINIVIL,ZESTRIL  TAKE 1 TABLET (40 MG TOTAL) BY MOUTH ONCE DAILY.     ondansetron 4 MG tablet  Commonly known as: ZOFRAN  Take 2 tablets (8 mg total) by mouth 2 (two) times daily.     traMADoL 50 mg tablet  Commonly known as: ULTRAM  Take 1 tablet (50 mg total) by mouth every 6 (six) hours as needed for Pain.        STOP taking these medications    amoxicillin-clavulanate 875-125mg 875-125 mg per tablet  Commonly known as: AUGMENTIN            Indwelling Lines/Drains at time of discharge:   Lines/Drains/Airways     None                 Time spent on the discharge of patient:  < 30 minutes  Patient was seen and examined on the date of discharge and determined to be suitable for discharge.         Gilmer Carranza MD  Department of Hospital Medicine  [facility]

## 2020-07-16 NOTE — PROGRESS NOTES
TN taught Symptoms and Problems for DIVERTICULITIS home care with pt and   with teach back:  1. BLEEDING FROM THE RECTUM, 2.NAUSE AND VOMITING, 3. BELLY PAIN. TN placed education sheet in naaya Packet..     Help at home will be from SISTER, LAYA, assisting in pt's recovery.    Preference for [facility] in [place]     TN taught patient about things SHE is responsible for when discharged TO HELP WITH HIS RECOVERY:  Particularly on how to Manage HER Care At Home:  1. Getting her prescriptions filled.  2. Taking her medications as directed. DO NOT MISS ANY DOSES!  3. Going to her follow-up doctor appointments.   .  Keily Garcia RN, BSN, STN CCM

## 2020-07-16 NOTE — NURSING
Discharge instructions and script given to patient prior to discharge. Patient states understanding of information provided. IV and tele monitor discontinued. Tolerated well. All patient belongings with her at the time of discharge.

## 2020-07-16 NOTE — PROGRESS NOTES
WRITTEN HEALTHCARE DISCHARGE INFORMATION      Things that YOU are responsible for to Manage Your Care At Home:  1. Getting your prescriptions filled.  2. Taking you medications as directed. DO NOT MISS ANY DOSES!  3. Going to your follow-up doctor appointments. This is important because it allows the doctor to monitor your progress and to determine if any changes need to be made to your treatment plan.     If you are unable to make your follow up appointments, please call the number listed and reschedule this appointment.      After discharge, if you need assistance, you can call [facility] On Call Nurse [facility] Line for 24/7 assistance at [phone]     If you are experience any signs or symptoms, Call your doctor or Call 911 and come to your nearest Emergency Room.     Thank you for choosing [facility] and allowing us to care for you.        You should receive a call from [facility] Discharge Department within 48-72 hours to help manage your care after discharge. Please try to make sure that you answer your phone for this important phone call.     Follow-up Information     Brooks Bergeron MD.    Specialty: Internal Medicine  Why: Hospital discharge follow up appointment scheduled August 4th @ 0900; office may call if sooner appointment is available  Contact information:  [address] [place] [address] [phone]

## 2020-07-17 ENCOUNTER — PATIENT OUTREACH (OUTPATIENT)
Dept: ADMINISTRATIVE | Facility: CLINIC | Age: 45
End: 2020-07-17

## 2020-07-17 NOTE — PHYSICIAN QUERY
PT Name: Cipriano Gamez  MR #: [id number]     Documentation Clarification      CDS: Gaston LEE,RN        Contact information:[email or web address]    This form is a permanent document in the medical record.     Query Date: July 17, 2020    By submitting this query, we are merely seeking further clarification of documentation. Please utilize your independent clinical judgment when addressing the question(s) below.    The Medical Record reflects the following:    Supporting Clinical Findings Location in Medical Record   44-year-old female with history of diverticulitis presenting to the ED for abdominal pain with constipation and nausea and vomiting.  Status post laparoscopic tubal ligation 7/10.  Patient was evaluated in the ED on 7/11 for similar symptoms where she had a CT scan which showed scattered small volume of free intraperitoneal air likely secondary to recent laparoscopic procedure.  Extensive colonic diverticulosis.            The patient had a CT scan of abdomen at that time that showed some free air (though to be from surgery) as well as diverticulosis. She was started on meds for diverticulitis- as patient has had this in the past and it feels felt the same.  Diverticulitis  Failed out patient treatment. X-ray no free air. Clinical diagnosis as patient states this feels just like her previous diverticulitis.                7/14/20 ED Provider Notes                            7/14/20  Hospital Medicine H&P                                                                                                 Doctor, please further specify the location of the diverticulitis associated with above clinical findings.    [   ]             Diverticulitis of small intestine without perforation or abscess without bleeding   [ x  ] Diverticulitis of large intestine without perforation or abscess without bleeding   [   ] Other (please specify): ____________   [  ] Clinically undetermined

## 2020-07-17 NOTE — PATIENT INSTRUCTIONS
Discharge Instructions for Diverticulitis  You have been diagnosed with diverticulitis. This is a condition in which small pouches form in your colon (large intestine) and become inflamed or infected. Follow the guidelines below for home care.  As you recover  Tips for recovery include:  · Eat a low-fiber diet. Your healthcare provider may advise a liquid diet. This gives your bowel a chance to rest so that it can recover.  · Foods to include: flake cereal, mashed potatoes, pancakes, waffles, pasta, white bread, rice, applesauce, bananas, eggs, fish, poultry, tofu, and well-cooked vegetables  · Take your medicines as directed. Do not stop taking the medicines, even if you feel better.  · Monitor your temperature and report any rising temperature to your healthcare provider.  · Take antibiotics exactly as directed. Do not miss any and keep taking them even if you feel better.   · Drink 6 to 8 glasses of water every day, unless directed otherwise.  · Use a heating pad or hot water bottle to reduce abdominal cramping or pain.  Preventing diverticulitis in the future  Tips for prevention include:  · Eat a high-fiber diet. Fiber adds bulk to the stool so that it passes through the large intestine more easily.  · Keep drinking 6 to 8 glasses of water every day, unless directed otherwise.  · Begin an exercise program. Ask your healthcare provider how to get started. You can benefit from simple activities such as walking or gardening.  · Treat diarrhea with a bland diet. Start with liquids only, then slowly add fiber over time.  · Watch for changes in your bowel movements (constipation to diarrhea).  · Avoid constipation with fiber and add a stool softener if needed.   · Get plenty of rest and sleep.  Follow-up care  Make a follow-up appointment as directed by our staff.  When to call your healthcare provider  Call your healthcare provider immediately if you have any of the following:  · Fever of 100.4°F (38.0°C) or  higher, or as directed by your healthcare provider  · Chills  · Severe cramps in the belly, most commonly the lower left side  · Tenderness in the belly, most commonly the lower left side  · Nausea and vomiting  · Bleeding from your rectum   Date Last Reviewed: 7/1/2016  © 8341-7364 [employer]. [address] [place] [address], [place] [address]. All rights reserved. This information is not intended as a substitute for professional medical care. Always follow your healthcare professional's instructions.

## 2020-07-19 LAB
BACTERIA BLD CULT: NORMAL
BACTERIA BLD CULT: NORMAL

## 2020-07-24 DIAGNOSIS — I10 ESSENTIAL HYPERTENSION: Primary | ICD-10-CM

## 2020-07-24 RX ORDER — LISINOPRIL 40 MG/1
TABLET ORAL
Qty: 90 TABLET | Refills: 0 | Status: SHIPPED | OUTPATIENT
Start: 2020-07-24 | End: 2020-12-22 | Stop reason: SDUPTHER

## 2020-07-27 DIAGNOSIS — I10 ESSENTIAL HYPERTENSION: Primary | ICD-10-CM

## 2020-07-27 RX ORDER — AMLODIPINE BESYLATE 10 MG/1
10 TABLET ORAL DAILY
Qty: 90 TABLET | Refills: 0 | Status: SHIPPED | OUTPATIENT
Start: 2020-07-27 | End: 2020-10-26

## 2020-07-27 RX ORDER — AMLODIPINE BESYLATE 10 MG/1
10 TABLET ORAL DAILY
Qty: 30 TABLET | Refills: 11 | OUTPATIENT
Start: 2020-07-27 | End: 2021-07-27

## 2020-08-03 NOTE — PROGRESS NOTES
This note was created by combination of typed  and M-Modal dictation.  Transcription errors may be present.  If there are any questions, please contact me.    Assessment and Plan:   Diverticulosis with multiple hospitalization for diverticulitis  History of diverticulitis 1/2020 with gram neg sepsis; 7/11/20 CT suspicious for diverticulitis  -has had repeated episodes of diverticulitis.  She recalls having discuss with surgery about possible resection and scenarios seem to involve probable need for colostomy.  Last colonoscopy has been almost 10 years  Referral back to GI for evaluations and recommendations, likely will need repeat colonoscopy  In the interim, talked about need for high fiber intake, continue fruit and vegetable intake, and smoking cessation  Some residual pain.  Found oxycodone to be the most helpful.  Hydrocodone had side effects.  Tramadol not as effective.  Small prescription for oxycodone for p.r.n. use.  Advised regarding high risk nature of this medication.  -     Ambulatory referral/consult to Gastroenterology; Future; Expected date: 08/11/2020  -     oxyCODONE (ROXICODONE) 10 mg Tab immediate release tablet; Take 1 tablet (10 mg total) by mouth every 24 hours as needed for Pain.  Dispense: 7 tablet; Refill: 0    Encounter for female sterilization procedure  -she has residual pain from her tubal ligation.  Oxycodone as above.    Smokes 1 pack of cigarettes per day  -had previously tried nicotine replacement but at 2 lobe strength.  After discussion she is agreeable for referral to smoking cessation.  Because of her work schedule she would prefer telephonic visit if possible.  -     Ambulatory referral/consult to Smoking Cessation Program; Future; Expected date: 08/11/2020    Essential hypertension  -not quite to goal.  Still having some residual discomfort after procedure.  For now no change, on max dose amlodipine and lisinopril.  Follow-up 3 months.  If still elevated may need  additional medication.  Remote history of HCTZ which was stopped because of volume depletion with hypokalemia.  But may rechallenge.  -     Comprehensive metabolic panel; Future; Expected date: 11/04/2020  -     Lipid Panel; Future; Expected date: 11/04/2020    Vaginal candidiasis  -after antibiotics for diverticulitis.  Diflucan x1  -     fluconazole (DIFLUCAN) 150 MG Tab; Take 1 tablet (150 mg total) by mouth once. for 1 dose  Dispense: 1 tablet; Refill: 0    Encouraged to stop THC use completely.  She notes episodic nausea and we discussed possibility that THC could be contributor to this.    Medications Discontinued During This Encounter   Medication Reason    HYDROcodone-acetaminophen (NORCO) 5-325 mg per tablet Therapy completed    traMADoL (ULTRAM) 50 mg tablet Therapy completed       meds sent this encounter:  Medications Ordered This Encounter   Medications    fluconazole (DIFLUCAN) 150 MG Tab     Sig: Take 1 tablet (150 mg total) by mouth once. for 1 dose     Dispense:  1 tablet     Refill:  0    oxyCODONE (ROXICODONE) 10 mg Tab immediate release tablet     Sig: Take 1 tablet (10 mg total) by mouth every 24 hours as needed for Pain.     Dispense:  7 tablet     Refill:  0     rx < 7 days       Follow Up: No follow-ups on file.  Follow-up in about 6 months, recall entered pre visit labs ordered      Subjective:     Chief Complaint   Patient presents with    Hospital Follow Up     abdominal pain       AMIE Cota is a 44 y.o. female, last appointment with this clinic was 3/13/2020.    Patient's last menstrual period was 07/03/2020 (approximate).    Family and/or Caretaker present at visit?  No.  Diagnostic tests reviewed/disposition: No diagnosic tests pending after this hospitalization.  Disease/illness education: none  Home health/community services discussion/referrals: Patient does not have home health established from hospital visit.  They do not need home health.  If needed, we will set up home  health for the patient.   Establishment or re-establishment of referral orders for community resources: No other necessary community resources.   Discussion with other health care providers: No discussion with other health care providers necessary.     History of colonoscopy  but multiple bouts of diverticulitis.  HTN restarted on amlodipine; on labetalol.    Hospital Course:   Mrs. Gamez is a 43 yo F who presents with a CC of abdominal pain. The patient is s/p tubal ligation on 20. She returned to the ED on  with a CC of L sided abdominal pain. The patient had a CT scan of abdomen at that time that showed some free air (though to be from surgery) as well as diverticulosis. She was started on meds for diverticulitis- as patient has had this in the past and it feels felt the same. She returns today with N/V abdominal pain and unable to keep down antibiotics.  X-ray of abdomen was benign. She was admitted to the hospital and started on IV fluids/IV Abx.  The patient clinically improved over 48 hours. On , she was requesting discharge to home. All symptoms resolved. Will send home on 12 days of Bactrim (cipro allergy)/Flagyl. Follow up with PCP in one week. Activity as tolerated. Low NA diet     After her visit with me - spontaneous .   Had BTL  And then thereafter had diverticulitis  Has been ongoing x .  Has been increasing in frequency. Usually same spot though in January was a different location.  Previous CTs question whether there might be colitis.    Used to see [facility] for GI, but not for years.     Diet - adequate fruit and vegetables.  Needs more fiber.    Still smoking. 1 PPD. Has tried nicotine patches. Still with craving. Thinks it was the lowest dose at the time.  OTC. Never participated in smoking cessation classes.    Work schedule is hectic. That might be a barrier to smoking cessation classes. I believe they are also conducted by telephone which might be more convenient to  the patient.    Is still having pain.  Requesting RF on medication. Tramadol was better than hydrocodone; hydrocodone difficulty with sleeping. Oxycodone worked the best for her.  rx'd during her episode 1/2020.    THC 1-2 times a week  Notes nausea can be periodic though she thinks unrelated in timing to THC intake. But did discuss possibility that this could cause or worsen nausea in some people.     Still with some suprapubic area pain she attributes to residual pain from BTL.    She finished the antibiotic.  Thinks she is getting a yeast infection from antibiotic.    Patient Care Team:  Brooks Bergeron MD as PCP - General (Internal Medicine)    Patient Active Problem List    Diagnosis Date Noted    Encounter for female sterilization procedure 07/10/2020    Preop testing 07/10/2020    Smoking (tobacco) complicating pregnancy, first trimester 03/27/2020    Chronic hypertension affecting pregnancy 03/27/2020    Antepartum abnormal cervical Papanicolaou smear complicating pregnancy, first trimester 03/27/2020    Obesity affecting pregnancy in first trimester 03/27/2020    Drug dependence affecting pregnancy in first trimester 03/27/2020     THC      AMA (advanced maternal age) multigravida 35+, first trimester 03/27/2020    At risk for prolonged QT interval syndrome 01/07/2020    History of diverticulitis 1/2020 with gram neg sepsis; 7/11/20 CT suspicious for diverticulitis 05/18/2019    Cyclic vomiting syndrome 12/14/2018    Marijuana abuse 12/14/2018    Prolonged Q-T interval on ECG 12/14/2018    Smokes 1 pack of cigarettes per day 03/21/2018    Proteinuria 05/04/2015    S/P LEEP 03/25/2014    ASCUS with positive high risk HPV 11/21/2013    General counseling and advice on female contraception 11/21/2013    Diverticulosis with multiple hospitalization for diverticulitis      2011 colonoscopy diverticulosis.      GERD (gastroesophageal reflux disease)     Essential hypertension        PAST  MEDICAL HISTORY:  Past Medical History:   Diagnosis Date    Abnormal Pap smear of cervix     Diverticulosis     GERD (gastroesophageal reflux disease)     Hiatal hernia     Hypertension        PAST SURGICAL HISTORY:  Past Surgical History:   Procedure Laterality Date    CERVICAL BIOPSY  W/ LOOP ELECTRODE EXCISION  2/11/14    LAPAROSCOPIC OCCLUSION OF OVIDUCTS BY DEVICE Bilateral 7/10/2020    Procedure: OCCLUSION, FALLOPIAN TUBE, LAPAROSCOPIC, USING DEVICE;  Surgeon: Alex Waller MD;  Location: [facility] OR;  Service: OB/GYN;  Laterality: Bilateral;  RN PREOP 6/23/2020   T/S--COVID NEGATIVE---UPT  CONSENTS INCOMPLETE       SOCIAL HISTORY:  Social History     Socioeconomic History    Marital status: Single     Spouse name: Not on file    Number of children: Not on file    Years of education: Not on file    Highest education level: Not on file   Occupational History    Occupation: [employer] in [place]     Employer: [employer]   Social Needs    Financial resource strain: Very hard    Food insecurity     Worry: Often true     Inability: Often true    Transportation needs     Medical: Yes     Non-medical: Yes   Tobacco Use    Smoking status: Current Every Day Smoker     Packs/day: 1.00     Years: 16.00     Pack years: 16.00     Types: Cigarettes    Smokeless tobacco: Never Used   Substance and Sexual Activity    Alcohol use: Yes     Frequency: Monthly or less     Drinks per session: 1 or 2     Binge frequency: Never     Comment: social 1-2 x / month    Drug use: Yes     Frequency: 1.0 times per week     Types: Marijuana     Comment: social- last use 1 week ago    Sexual activity: Not Currently     Partners: Male     Birth control/protection: None   Lifestyle    Physical activity     Days per week: 0 days     Minutes per session: 0 min    Stress: Not at all   Relationships    Social connections     Talks on phone: More than three times a week     Gets together: Twice a week     Attends Mu-ism service:  "Not on file     Active member of club or organization: No     Attends meetings of clubs or organizations: Never     Relationship status: Not on file   Other Topics Concern    Not on file   Social History Narrative    Not on file        ALLERGIES AND MEDICATIONS: updated and reviewed.  Review of patient's allergies indicates:   Allergen Reactions    Ciprofloxacin Swelling and Blisters    Ibuprofen Hives    Meloxicam      rash    Cyclobenzaprine Rash     rasg       Medication List with Changes/Refills   Current Medications    AMLODIPINE (NORVASC) 10 MG TABLET    Take 1 tablet (10 mg total) by mouth once daily.    DIPHENHYDRAMINE (SOMINEX) 25 MG TABLET    Take 25 mg by mouth nightly as needed for Insomnia.     HYDROCODONE-ACETAMINOPHEN (NORCO) 5-325 MG PER TABLET    Take 1 tablet by mouth every 4 (four) hours as needed for Pain.    LISINOPRIL (PRINIVIL,ZESTRIL) 40 MG TABLET    TAKE 1 TABLET BY MOUTH EVERY DAY    ONDANSETRON (ZOFRAN) 4 MG TABLET    Take 2 tablets (8 mg total) by mouth 2 (two) times daily.    PROMETHAZINE (PHENERGAN) 25 MG SUPPOSITORY    Place 1 suppository (25 mg total) rectally every 6 (six) hours as needed for Nausea.    TRAMADOL (ULTRAM) 50 MG TABLET    Take 1 tablet (50 mg total) by mouth every 6 (six) hours as needed for Pain.       Review of Systems   Constitutional: Negative for chills and fever.   Respiratory: Negative for cough.    Cardiovascular: Negative for chest pain and palpitations.   Gastrointestinal: Positive for abdominal pain. Negative for blood in stool.   Genitourinary: Negative for dysuria.       Objective:   Physical Exam   Vitals:    08/04/20 0900   BP: (!) 130/90   BP Location: Right arm   Patient Position: Sitting   BP Method: Large (Manual)   Pulse: 82   Temp: 97.3 °F (36.3 °C)   TempSrc: Temporal   SpO2: 97%   Weight: 96.3 kg (212 lb 6.6 oz)   Height: 5' 7" (1.702 m)    Body mass index is 33.27 kg/m².  Weight: 96.3 kg (212 lb 6.6 oz)   Height: 5' 7" (170.2 cm) "     Physical Exam  Constitutional:       General: She is not in acute distress.     Appearance: She is well-developed.   Cardiovascular:      Rate and Rhythm: Normal rate and regular rhythm.      Heart sounds: Normal heart sounds. No murmur.   Pulmonary:      Effort: Pulmonary effort is normal.      Breath sounds: Normal breath sounds.   Musculoskeletal: Normal range of motion.   Skin:     General: Skin is warm and dry.   Neurological:      Mental Status: She is alert and oriented to person, place, and time.      Coordination: Coordination normal.   Psychiatric:         Behavior: Behavior normal.         Thought Content: Thought content normal.

## 2020-08-04 ENCOUNTER — OFFICE VISIT (OUTPATIENT)
Dept: FAMILY MEDICINE | Facility: CLINIC | Age: 45
End: 2020-08-04
Payer: COMMERCIAL

## 2020-08-04 VITALS
DIASTOLIC BLOOD PRESSURE: 90 MMHG | HEART RATE: 82 BPM | WEIGHT: 212.44 LBS | TEMPERATURE: 97 F | HEIGHT: 67 IN | BODY MASS INDEX: 33.34 KG/M2 | OXYGEN SATURATION: 97 % | SYSTOLIC BLOOD PRESSURE: 130 MMHG

## 2020-08-04 DIAGNOSIS — Z30.2 ENCOUNTER FOR FEMALE STERILIZATION PROCEDURE: ICD-10-CM

## 2020-08-04 DIAGNOSIS — K57.90 DIVERTICULOSIS: Primary | ICD-10-CM

## 2020-08-04 DIAGNOSIS — F17.210 SMOKES 1 PACK OF CIGARETTES PER DAY: ICD-10-CM

## 2020-08-04 DIAGNOSIS — Z87.19 HISTORY OF DIVERTICULITIS: ICD-10-CM

## 2020-08-04 DIAGNOSIS — B37.31 VAGINAL CANDIDIASIS: ICD-10-CM

## 2020-08-04 DIAGNOSIS — I10 ESSENTIAL HYPERTENSION: ICD-10-CM

## 2020-08-04 PROCEDURE — 99999 PR PBB SHADOW E&M-EST. PATIENT-LVL V: CPT | Mod: PBBFAC,,, | Performed by: INTERNAL MEDICINE

## 2020-08-04 PROCEDURE — 99214 PR OFFICE/OUTPT VISIT, EST, LEVL IV, 30-39 MIN: ICD-10-PCS | Mod: S$GLB,,, | Performed by: INTERNAL MEDICINE

## 2020-08-04 PROCEDURE — 99999 PR PBB SHADOW E&M-EST. PATIENT-LVL V: ICD-10-PCS | Mod: PBBFAC,,, | Performed by: INTERNAL MEDICINE

## 2020-08-04 PROCEDURE — 99214 OFFICE O/P EST MOD 30 MIN: CPT | Mod: S$GLB,,, | Performed by: INTERNAL MEDICINE

## 2020-08-04 RX ORDER — HYDROCODONE BITARTRATE AND ACETAMINOPHEN 5; 325 MG/1; MG/1
1 TABLET ORAL EVERY 4 HOURS PRN
Qty: 14 TABLET | Refills: 0 | Status: CANCELLED | OUTPATIENT
Start: 2020-08-04

## 2020-08-04 RX ORDER — OXYCODONE HYDROCHLORIDE 10 MG/1
10 TABLET ORAL
Qty: 7 TABLET | Refills: 0 | Status: SHIPPED | OUTPATIENT
Start: 2020-08-04 | End: 2020-08-11

## 2020-08-04 RX ORDER — TRAMADOL HYDROCHLORIDE 50 MG/1
50 TABLET ORAL EVERY 6 HOURS PRN
Qty: 20 TABLET | Refills: 0 | Status: CANCELLED | OUTPATIENT
Start: 2020-08-04

## 2020-08-04 RX ORDER — FLUCONAZOLE 150 MG/1
150 TABLET ORAL ONCE
Qty: 1 TABLET | Refills: 0 | Status: SHIPPED | OUTPATIENT
Start: 2020-08-04 | End: 2020-08-04

## 2020-08-05 ENCOUNTER — TELEPHONE (OUTPATIENT)
Dept: FAMILY MEDICINE | Facility: CLINIC | Age: 45
End: 2020-08-05

## 2020-08-07 ENCOUNTER — TELEPHONE (OUTPATIENT)
Dept: FAMILY MEDICINE | Facility: CLINIC | Age: 45
End: 2020-08-07

## 2020-08-19 ENCOUNTER — HOSPITAL ENCOUNTER (INPATIENT)
Facility: HOSPITAL | Age: 45
LOS: 3 days | Discharge: HOME | DRG: 872 | End: 2020-08-23
Attending: EMERGENCY MEDICINE | Admitting: EMERGENCY MEDICINE
Payer: COMMERCIAL

## 2020-08-19 DIAGNOSIS — K57.92 DIVERTICULITIS: ICD-10-CM

## 2020-08-19 DIAGNOSIS — R11.10 VOMITING: ICD-10-CM

## 2020-08-19 DIAGNOSIS — E87.6 HYPOKALEMIA: ICD-10-CM

## 2020-08-19 DIAGNOSIS — K57.92 ACUTE DIVERTICULITIS: Primary | ICD-10-CM

## 2020-08-19 DIAGNOSIS — N17.9 AKI (ACUTE KIDNEY INJURY): ICD-10-CM

## 2020-08-19 LAB
B-HCG UR QL: NEGATIVE
BACTERIA #/AREA URNS HPF: NORMAL /HPF
BASOPHILS # BLD AUTO: 0.05 K/UL (ref 0–0.2)
BASOPHILS NFR BLD: 0.2 % (ref 0–1.9)
BILIRUB UR QL STRIP: ABNORMAL
CLARITY UR: ABNORMAL
COLOR UR: ABNORMAL
CTP QC/QA: YES
DIFFERENTIAL METHOD: ABNORMAL
EOSINOPHIL # BLD AUTO: 0 K/UL (ref 0–0.5)
EOSINOPHIL NFR BLD: 0.2 % (ref 0–8)
ERYTHROCYTE [DISTWIDTH] IN BLOOD BY AUTOMATED COUNT: 13 % (ref 11.5–14.5)
GLUCOSE UR QL STRIP: ABNORMAL
HCT VFR BLD AUTO: 51.4 % (ref 37–48.5)
HGB BLD-MCNC: 17.8 G/DL (ref 12–16)
HGB UR QL STRIP: ABNORMAL
HYALINE CASTS #/AREA URNS LPF: 0 /LPF
IMM GRANULOCYTES # BLD AUTO: 0.16 K/UL (ref 0–0.04)
IMM GRANULOCYTES NFR BLD AUTO: 0.6 % (ref 0–0.5)
KETONES UR QL STRIP: ABNORMAL
LEUKOCYTE ESTERASE UR QL STRIP: NEGATIVE
LYMPHOCYTES # BLD AUTO: 1.4 K/UL (ref 1–4.8)
LYMPHOCYTES NFR BLD: 5.5 % (ref 18–48)
MCH RBC QN AUTO: 29.3 PG (ref 27–31)
MCHC RBC AUTO-ENTMCNC: 34.6 G/DL (ref 32–36)
MCV RBC AUTO: 85 FL (ref 82–98)
MICROSCOPIC COMMENT: NORMAL
MONOCYTES # BLD AUTO: 1.7 K/UL (ref 0.3–1)
MONOCYTES NFR BLD: 6.6 % (ref 4–15)
NEUTROPHILS # BLD AUTO: 22.2 K/UL (ref 1.8–7.7)
NEUTROPHILS NFR BLD: 86.9 % (ref 38–73)
NITRITE UR QL STRIP: NEGATIVE
NRBC BLD-RTO: 0 /100 WBC
PH UR STRIP: 5 [PH] (ref 5–8)
PLATELET # BLD AUTO: 375 K/UL (ref 150–350)
PMV BLD AUTO: 11.1 FL (ref 9.2–12.9)
PROT UR QL STRIP: ABNORMAL
RBC # BLD AUTO: 6.08 M/UL (ref 4–5.4)
RBC #/AREA URNS HPF: 3 /HPF (ref 0–4)
SP GR UR STRIP: >1.03 (ref 1–1.03)
SQUAMOUS #/AREA URNS HPF: 10 /HPF
URN SPEC COLLECT METH UR: ABNORMAL
UROBILINOGEN UR STRIP-ACNC: ABNORMAL EU/DL
WBC # BLD AUTO: 25.57 K/UL (ref 3.9–12.7)
WBC #/AREA URNS HPF: 3 /HPF (ref 0–5)

## 2020-08-19 PROCEDURE — 83735 ASSAY OF MAGNESIUM: CPT

## 2020-08-19 PROCEDURE — 25000003 PHARM REV CODE 250: Performed by: PHYSICIAN ASSISTANT

## 2020-08-19 PROCEDURE — 85025 COMPLETE CBC W/AUTO DIFF WBC: CPT

## 2020-08-19 PROCEDURE — 80053 COMPREHEN METABOLIC PANEL: CPT

## 2020-08-19 PROCEDURE — 81025 URINE PREGNANCY TEST: CPT | Performed by: PHYSICIAN ASSISTANT

## 2020-08-19 PROCEDURE — 83690 ASSAY OF LIPASE: CPT

## 2020-08-19 PROCEDURE — 63600175 PHARM REV CODE 636 W HCPCS: Performed by: EMERGENCY MEDICINE

## 2020-08-19 PROCEDURE — 96375 TX/PRO/DX INJ NEW DRUG ADDON: CPT

## 2020-08-19 PROCEDURE — 81000 URINALYSIS NONAUTO W/SCOPE: CPT

## 2020-08-19 PROCEDURE — 96361 HYDRATE IV INFUSION ADD-ON: CPT

## 2020-08-19 PROCEDURE — 99285 EMERGENCY DEPT VISIT HI MDM: CPT | Mod: 25

## 2020-08-19 PROCEDURE — 96374 THER/PROPH/DIAG INJ IV PUSH: CPT

## 2020-08-19 PROCEDURE — 63600175 PHARM REV CODE 636 W HCPCS: Performed by: STUDENT IN AN ORGANIZED HEALTH CARE EDUCATION/TRAINING PROGRAM

## 2020-08-19 RX ORDER — MORPHINE SULFATE 10 MG/ML
4 INJECTION INTRAMUSCULAR; INTRAVENOUS; SUBCUTANEOUS
Status: COMPLETED | OUTPATIENT
Start: 2020-08-19 | End: 2020-08-19

## 2020-08-19 RX ORDER — ONDANSETRON 2 MG/ML
8 INJECTION INTRAMUSCULAR; INTRAVENOUS
Status: COMPLETED | OUTPATIENT
Start: 2020-08-19 | End: 2020-08-19

## 2020-08-19 RX ORDER — ONDANSETRON 2 MG/ML
4 INJECTION INTRAMUSCULAR; INTRAVENOUS
Status: DISCONTINUED | OUTPATIENT
Start: 2020-08-19 | End: 2020-08-19

## 2020-08-19 RX ADMIN — SODIUM CHLORIDE 1000 ML: 0.9 INJECTION, SOLUTION INTRAVENOUS at 10:08

## 2020-08-19 RX ADMIN — ONDANSETRON 8 MG: 2 INJECTION INTRAMUSCULAR; INTRAVENOUS at 10:08

## 2020-08-19 RX ADMIN — MORPHINE SULFATE 4 MG: 10 INJECTION, SOLUTION INTRAMUSCULAR; INTRAVENOUS at 11:08

## 2020-08-20 PROBLEM — D72.828 NEUTROPHILIC LEUKOCYTOSIS: Status: ACTIVE | Noted: 2020-08-20

## 2020-08-20 PROBLEM — D72.9 NEUTROPHILIC LEUKOCYTOSIS: Status: ACTIVE | Noted: 2020-08-20

## 2020-08-20 PROBLEM — K57.92 DIVERTICULITIS: Status: ACTIVE | Noted: 2020-08-20

## 2020-08-20 PROBLEM — R11.2 INTRACTABLE NAUSEA AND VOMITING: Status: ACTIVE | Noted: 2020-08-20

## 2020-08-20 PROBLEM — F17.200 TOBACCO USE DISORDER: Status: ACTIVE | Noted: 2018-03-21

## 2020-08-20 PROBLEM — R65.20 SEVERE SEPSIS: Status: ACTIVE | Noted: 2020-01-07

## 2020-08-20 LAB
ALBUMIN SERPL BCP-MCNC: 4.8 G/DL (ref 3.5–5.2)
ALP SERPL-CCNC: 99 U/L (ref 55–135)
ALT SERPL W/O P-5'-P-CCNC: 14 U/L (ref 10–44)
ANION GAP SERPL CALC-SCNC: 10 MMOL/L (ref 8–16)
ANION GAP SERPL CALC-SCNC: 19 MMOL/L (ref 8–16)
AST SERPL-CCNC: 18 U/L (ref 10–40)
BASOPHILS # BLD AUTO: 0.04 K/UL (ref 0–0.2)
BASOPHILS NFR BLD: 0.2 % (ref 0–1.9)
BILIRUB SERPL-MCNC: 0.7 MG/DL (ref 0.1–1)
BUN SERPL-MCNC: 17 MG/DL (ref 6–20)
BUN SERPL-MCNC: 20 MG/DL (ref 6–20)
CALCIUM SERPL-MCNC: 10.3 MG/DL (ref 8.7–10.5)
CALCIUM SERPL-MCNC: 8.9 MG/DL (ref 8.7–10.5)
CHLORIDE SERPL-SCNC: 90 MMOL/L (ref 95–110)
CHLORIDE SERPL-SCNC: 96 MMOL/L (ref 95–110)
CO2 SERPL-SCNC: 31 MMOL/L (ref 23–29)
CO2 SERPL-SCNC: 35 MMOL/L (ref 23–29)
CREAT SERPL-MCNC: 1.4 MG/DL (ref 0.5–1.4)
CREAT SERPL-MCNC: 1.7 MG/DL (ref 0.5–1.4)
DIFFERENTIAL METHOD: ABNORMAL
EOSINOPHIL # BLD AUTO: 0 K/UL (ref 0–0.5)
EOSINOPHIL NFR BLD: 0 % (ref 0–8)
ERYTHROCYTE [DISTWIDTH] IN BLOOD BY AUTOMATED COUNT: 13.3 % (ref 11.5–14.5)
EST. GFR  (AFRICAN AMERICAN): 42 ML/MIN/1.73 M^2
EST. GFR  (AFRICAN AMERICAN): 53 ML/MIN/1.73 M^2
EST. GFR  (NON AFRICAN AMERICAN): 37 ML/MIN/1.73 M^2
EST. GFR  (NON AFRICAN AMERICAN): 46 ML/MIN/1.73 M^2
GLUCOSE SERPL-MCNC: 134 MG/DL (ref 70–110)
GLUCOSE SERPL-MCNC: 165 MG/DL (ref 70–110)
HCT VFR BLD AUTO: 47.5 % (ref 37–48.5)
HGB BLD-MCNC: 15.7 G/DL (ref 12–16)
IMM GRANULOCYTES # BLD AUTO: 0.1 K/UL (ref 0–0.04)
IMM GRANULOCYTES NFR BLD AUTO: 0.4 % (ref 0–0.5)
LIPASE SERPL-CCNC: 30 U/L (ref 4–60)
LYMPHOCYTES # BLD AUTO: 2.6 K/UL (ref 1–4.8)
LYMPHOCYTES NFR BLD: 11.5 % (ref 18–48)
MAGNESIUM SERPL-MCNC: 1.9 MG/DL (ref 1.6–2.6)
MAGNESIUM SERPL-MCNC: 2 MG/DL (ref 1.6–2.6)
MCH RBC QN AUTO: 29.3 PG (ref 27–31)
MCHC RBC AUTO-ENTMCNC: 33.1 G/DL (ref 32–36)
MCV RBC AUTO: 89 FL (ref 82–98)
MONOCYTES # BLD AUTO: 1.9 K/UL (ref 0.3–1)
MONOCYTES NFR BLD: 8.4 % (ref 4–15)
NEUTROPHILS # BLD AUTO: 17.8 K/UL (ref 1.8–7.7)
NEUTROPHILS NFR BLD: 79.5 % (ref 38–73)
NRBC BLD-RTO: 0 /100 WBC
PHOSPHATE SERPL-MCNC: 3.6 MG/DL (ref 2.7–4.5)
PLATELET # BLD AUTO: 309 K/UL (ref 150–350)
PMV BLD AUTO: 11.8 FL (ref 9.2–12.9)
POTASSIUM SERPL-SCNC: 2.7 MMOL/L (ref 3.5–5.1)
POTASSIUM SERPL-SCNC: 4.1 MMOL/L (ref 3.5–5.1)
PROT SERPL-MCNC: 9.4 G/DL (ref 6–8.4)
RBC # BLD AUTO: 5.36 M/UL (ref 4–5.4)
SARS-COV-2 RDRP RESP QL NAA+PROBE: NEGATIVE
SODIUM SERPL-SCNC: 140 MMOL/L (ref 136–145)
SODIUM SERPL-SCNC: 141 MMOL/L (ref 136–145)
WBC # BLD AUTO: 22.4 K/UL (ref 3.9–12.7)

## 2020-08-20 PROCEDURE — 96375 TX/PRO/DX INJ NEW DRUG ADDON: CPT

## 2020-08-20 PROCEDURE — 25000003 PHARM REV CODE 250: Performed by: HOSPITALIST

## 2020-08-20 PROCEDURE — 83735 ASSAY OF MAGNESIUM: CPT

## 2020-08-20 PROCEDURE — 80048 BASIC METABOLIC PNL TOTAL CA: CPT

## 2020-08-20 PROCEDURE — 63600175 PHARM REV CODE 636 W HCPCS: Performed by: STUDENT IN AN ORGANIZED HEALTH CARE EDUCATION/TRAINING PROGRAM

## 2020-08-20 PROCEDURE — 63600175 PHARM REV CODE 636 W HCPCS: Performed by: HOSPITALIST

## 2020-08-20 PROCEDURE — 36415 COLL VENOUS BLD VENIPUNCTURE: CPT

## 2020-08-20 PROCEDURE — U0002 COVID-19 LAB TEST NON-CDC: HCPCS

## 2020-08-20 PROCEDURE — 96361 HYDRATE IV INFUSION ADD-ON: CPT

## 2020-08-20 PROCEDURE — 85025 COMPLETE CBC W/AUTO DIFF WBC: CPT

## 2020-08-20 PROCEDURE — 96376 TX/PRO/DX INJ SAME DRUG ADON: CPT

## 2020-08-20 PROCEDURE — 25500020 PHARM REV CODE 255: Performed by: EMERGENCY MEDICINE

## 2020-08-20 PROCEDURE — 25000003 PHARM REV CODE 250: Performed by: STUDENT IN AN ORGANIZED HEALTH CARE EDUCATION/TRAINING PROGRAM

## 2020-08-20 PROCEDURE — S0030 INJECTION, METRONIDAZOLE: HCPCS | Performed by: HOSPITALIST

## 2020-08-20 PROCEDURE — 63600175 PHARM REV CODE 636 W HCPCS: Performed by: PHYSICIAN ASSISTANT

## 2020-08-20 PROCEDURE — 84100 ASSAY OF PHOSPHORUS: CPT

## 2020-08-20 PROCEDURE — 21400001 HC TELEMETRY ROOM

## 2020-08-20 PROCEDURE — S0030 INJECTION, METRONIDAZOLE: HCPCS | Performed by: STUDENT IN AN ORGANIZED HEALTH CARE EDUCATION/TRAINING PROGRAM

## 2020-08-20 PROCEDURE — A4216 STERILE WATER/SALINE, 10 ML: HCPCS | Performed by: HOSPITALIST

## 2020-08-20 RX ORDER — MORPHINE SULFATE 10 MG/ML
2 INJECTION INTRAMUSCULAR; INTRAVENOUS; SUBCUTANEOUS EVERY 4 HOURS PRN
Status: DISCONTINUED | OUTPATIENT
Start: 2020-08-20 | End: 2020-08-21

## 2020-08-20 RX ORDER — ACETAMINOPHEN 325 MG/1
650 TABLET ORAL EVERY 8 HOURS PRN
Status: DISCONTINUED | OUTPATIENT
Start: 2020-08-20 | End: 2020-08-23 | Stop reason: HOSPADM

## 2020-08-20 RX ORDER — POTASSIUM CHLORIDE 1.5 G/1.58G
40 POWDER, FOR SOLUTION ORAL ONCE
Status: COMPLETED | OUTPATIENT
Start: 2020-08-20 | End: 2020-08-20

## 2020-08-20 RX ORDER — SODIUM CHLORIDE, SODIUM LACTATE, POTASSIUM CHLORIDE, CALCIUM CHLORIDE 600; 310; 30; 20 MG/100ML; MG/100ML; MG/100ML; MG/100ML
INJECTION, SOLUTION INTRAVENOUS CONTINUOUS
Status: DISCONTINUED | OUTPATIENT
Start: 2020-08-20 | End: 2020-08-21

## 2020-08-20 RX ORDER — METRONIDAZOLE 500 MG/100ML
500 INJECTION, SOLUTION INTRAVENOUS
Status: DISCONTINUED | OUTPATIENT
Start: 2020-08-20 | End: 2020-08-23 | Stop reason: HOSPADM

## 2020-08-20 RX ORDER — POTASSIUM CHLORIDE 20 MEQ/1
40 TABLET, EXTENDED RELEASE ORAL
Status: COMPLETED | OUTPATIENT
Start: 2020-08-20 | End: 2020-08-20

## 2020-08-20 RX ORDER — METRONIDAZOLE 500 MG/100ML
500 INJECTION, SOLUTION INTRAVENOUS
Status: COMPLETED | OUTPATIENT
Start: 2020-08-20 | End: 2020-08-20

## 2020-08-20 RX ORDER — AMOXICILLIN 250 MG
1 CAPSULE ORAL DAILY
Status: DISCONTINUED | OUTPATIENT
Start: 2020-08-20 | End: 2020-08-20

## 2020-08-20 RX ORDER — PROMETHAZINE HYDROCHLORIDE 25 MG/1
25 SUPPOSITORY RECTAL EVERY 6 HOURS PRN
Status: DISCONTINUED | OUTPATIENT
Start: 2020-08-20 | End: 2020-08-23 | Stop reason: HOSPADM

## 2020-08-20 RX ORDER — SODIUM CHLORIDE 9 MG/ML
1000 INJECTION, SOLUTION INTRAVENOUS
Status: COMPLETED | OUTPATIENT
Start: 2020-08-20 | End: 2020-08-20

## 2020-08-20 RX ORDER — AMLODIPINE BESYLATE 5 MG/1
10 TABLET ORAL DAILY
Status: DISCONTINUED | OUTPATIENT
Start: 2020-08-20 | End: 2020-08-23 | Stop reason: HOSPADM

## 2020-08-20 RX ORDER — POLYETHYLENE GLYCOL 3350 17 G/17G
17 POWDER, FOR SOLUTION ORAL DAILY PRN
Status: DISCONTINUED | OUTPATIENT
Start: 2020-08-20 | End: 2020-08-23 | Stop reason: HOSPADM

## 2020-08-20 RX ORDER — MORPHINE SULFATE 10 MG/ML
4 INJECTION INTRAMUSCULAR; INTRAVENOUS; SUBCUTANEOUS
Status: COMPLETED | OUTPATIENT
Start: 2020-08-20 | End: 2020-08-20

## 2020-08-20 RX ORDER — MORPHINE SULFATE 10 MG/ML
5 INJECTION INTRAMUSCULAR; INTRAVENOUS; SUBCUTANEOUS EVERY 4 HOURS PRN
Status: DISCONTINUED | OUTPATIENT
Start: 2020-08-20 | End: 2020-08-20

## 2020-08-20 RX ORDER — SODIUM CHLORIDE 0.9 % (FLUSH) 0.9 %
3 SYRINGE (ML) INJECTION EVERY 8 HOURS
Status: DISCONTINUED | OUTPATIENT
Start: 2020-08-20 | End: 2020-08-23 | Stop reason: HOSPADM

## 2020-08-20 RX ORDER — TALC
9 POWDER (GRAM) TOPICAL NIGHTLY PRN
Status: DISCONTINUED | OUTPATIENT
Start: 2020-08-20 | End: 2020-08-23 | Stop reason: HOSPADM

## 2020-08-20 RX ORDER — ONDANSETRON 2 MG/ML
8 INJECTION INTRAMUSCULAR; INTRAVENOUS EVERY 8 HOURS PRN
Status: DISCONTINUED | OUTPATIENT
Start: 2020-08-20 | End: 2020-08-21

## 2020-08-20 RX ORDER — HYDROCODONE BITARTRATE AND ACETAMINOPHEN 5; 325 MG/1; MG/1
1 TABLET ORAL EVERY 4 HOURS PRN
Status: DISCONTINUED | OUTPATIENT
Start: 2020-08-20 | End: 2020-08-23 | Stop reason: HOSPADM

## 2020-08-20 RX ORDER — SODIUM CHLORIDE 9 MG/ML
1000 INJECTION, SOLUTION INTRAVENOUS CONTINUOUS
Status: DISCONTINUED | OUTPATIENT
Start: 2020-08-20 | End: 2020-08-20

## 2020-08-20 RX ORDER — DEXTROMETHORPHAN POLISTIREX 30 MG/5 ML
1 SUSPENSION, EXTENDED RELEASE 12 HR ORAL DAILY PRN
Status: DISCONTINUED | OUTPATIENT
Start: 2020-08-20 | End: 2020-08-23 | Stop reason: HOSPADM

## 2020-08-20 RX ORDER — BISACODYL 10 MG
10 SUPPOSITORY, RECTAL RECTAL DAILY PRN
Status: DISCONTINUED | OUTPATIENT
Start: 2020-08-20 | End: 2020-08-23 | Stop reason: HOSPADM

## 2020-08-20 RX ORDER — SODIUM CHLORIDE 0.9 % (FLUSH) 0.9 %
10 SYRINGE (ML) INJECTION
Status: DISCONTINUED | OUTPATIENT
Start: 2020-08-20 | End: 2020-08-23 | Stop reason: HOSPADM

## 2020-08-20 RX ORDER — HYDRALAZINE HYDROCHLORIDE 20 MG/ML
10 INJECTION INTRAMUSCULAR; INTRAVENOUS EVERY 8 HOURS PRN
Status: DISCONTINUED | OUTPATIENT
Start: 2020-08-20 | End: 2020-08-23 | Stop reason: HOSPADM

## 2020-08-20 RX ADMIN — MORPHINE SULFATE 2 MG: 10 INJECTION, SOLUTION INTRAMUSCULAR; INTRAVENOUS at 07:08

## 2020-08-20 RX ADMIN — MORPHINE SULFATE 2 MG: 10 INJECTION, SOLUTION INTRAMUSCULAR; INTRAVENOUS at 02:08

## 2020-08-20 RX ADMIN — MORPHINE SULFATE 2 MG: 10 INJECTION, SOLUTION INTRAMUSCULAR; INTRAVENOUS at 09:08

## 2020-08-20 RX ADMIN — ONDANSETRON 8 MG: 2 INJECTION INTRAMUSCULAR; INTRAVENOUS at 07:08

## 2020-08-20 RX ADMIN — METRONIDAZOLE 500 MG: 500 INJECTION, SOLUTION INTRAVENOUS at 08:08

## 2020-08-20 RX ADMIN — MORPHINE SULFATE 4 MG: 10 INJECTION, SOLUTION INTRAMUSCULAR; INTRAVENOUS at 01:08

## 2020-08-20 RX ADMIN — ONDANSETRON 8 MG: 2 INJECTION INTRAMUSCULAR; INTRAVENOUS at 09:08

## 2020-08-20 RX ADMIN — SODIUM CHLORIDE 1000 ML: 0.9 INJECTION, SOLUTION INTRAVENOUS at 01:08

## 2020-08-20 RX ADMIN — POTASSIUM CHLORIDE 40 MEQ: 1.5 POWDER, FOR SOLUTION ORAL at 05:08

## 2020-08-20 RX ADMIN — HYDRALAZINE HYDROCHLORIDE 10 MG: 20 INJECTION INTRAMUSCULAR; INTRAVENOUS at 09:08

## 2020-08-20 RX ADMIN — SODIUM CHLORIDE 1000 ML: 0.9 INJECTION, SOLUTION INTRAVENOUS at 04:08

## 2020-08-20 RX ADMIN — Medication 3 ML: at 03:08

## 2020-08-20 RX ADMIN — SODIUM CHLORIDE, SODIUM LACTATE, POTASSIUM CHLORIDE, AND CALCIUM CHLORIDE: .6; .31; .03; .02 INJECTION, SOLUTION INTRAVENOUS at 05:08

## 2020-08-20 RX ADMIN — METRONIDAZOLE 500 MG: 500 INJECTION, SOLUTION INTRAVENOUS at 05:08

## 2020-08-20 RX ADMIN — IOHEXOL 75 ML: 350 INJECTION, SOLUTION INTRAVENOUS at 12:08

## 2020-08-20 RX ADMIN — PROMETHAZINE HYDROCHLORIDE 25 MG: 25 SUPPOSITORY RECTAL at 01:08

## 2020-08-20 RX ADMIN — POTASSIUM CHLORIDE 40 MEQ: 1500 TABLET, EXTENDED RELEASE ORAL at 12:08

## 2020-08-20 RX ADMIN — CEFTRIAXONE 1 G: 1 INJECTION, SOLUTION INTRAVENOUS at 01:08

## 2020-08-20 RX ADMIN — METRONIDAZOLE 500 MG: 500 INJECTION, SOLUTION INTRAVENOUS at 01:08

## 2020-08-20 NOTE — ED PROVIDER NOTES
Encounter Date: 8/19/2020       History     Chief Complaint   Patient presents with    Emesis     Pt complains of vomiting and feeling weakness that started yesterday. Pt states that she was at work when it started and has taken OTC meds to help but had no relief. Pt reports vomiting appx 8xs since yesterday.      Ms. Gamez is a 44 y.o. female with hx of GERD, hiatal hernia, HTN, and diverticulitis who presents with 1-day hx of vomiting. The vomiting has occurred 10 times, non-bloody, non-bilious, persistent, and associated with nausea, diffuse abdominal pain, loss of appetite, and weakness. Patient denies chest pain, SOB, dizziness, headache, diarrhea, constipation, dysuria, hematuria, and blood in stool. Pt states she was recently hospitalized one month ago with similar symptoms during which they gave her IV fluids and said her presentation was likely due to diverticulitis. She was dicharged with bactrim and flagyl. Reports tubal ligation in 07/2020, no other surgery.     The history is provided by the patient.     Review of patient's allergies indicates:   Allergen Reactions    Ciprofloxacin Swelling and Blisters    Ibuprofen Hives    Meloxicam      rash    Cyclobenzaprine Rash     rasg     Past Medical History:   Diagnosis Date    Abnormal Pap smear of cervix     Diverticulosis     GERD (gastroesophageal reflux disease)     Hiatal hernia     Hypertension      Past Surgical History:   Procedure Laterality Date    CERVICAL BIOPSY  W/ LOOP ELECTRODE EXCISION  2/11/14    LAPAROSCOPIC OCCLUSION OF OVIDUCTS BY DEVICE Bilateral 7/10/2020    Procedure: OCCLUSION, FALLOPIAN TUBE, LAPAROSCOPIC, USING DEVICE;  Surgeon: Alex Waller MD;  Location: [facility] OR;  Service: OB/GYN;  Laterality: Bilateral;  RN PREOP 6/23/2020   T/S--COVID NEGATIVE---UPT  CONSENTS INCOMPLETE     Family History   Problem Relation Age of Onset    Heart disease Mother 54        MI    Heart disease Maternal Grandmother 40        MI     Diabetes Other     Heart disease Brother 44        MI    Sickle cell trait Sister     Crohn's disease Neg Hx     Colon cancer Neg Hx      Social History     Tobacco Use    Smoking status: Current Every Day Smoker     Packs/day: 1.00     Years: 16.00     Pack years: 16.00     Types: Cigarettes    Smokeless tobacco: Never Used   Substance Use Topics    Alcohol use: Yes     Frequency: Monthly or less     Drinks per session: 1 or 2     Binge frequency: Never     Comment: social 1-2 x / month    Drug use: Yes     Frequency: 1.0 times per week     Types: Marijuana     Comment: social- last use 1 week ago     Review of Systems   Constitutional: Negative for chills and fever.   HENT: Negative for congestion and sore throat.    Eyes: Negative for pain and visual disturbance.   Respiratory: Negative for cough and shortness of breath.    Cardiovascular: Negative for chest pain and palpitations.   Gastrointestinal: Positive for abdominal pain, nausea and vomiting. Negative for blood in stool, constipation and diarrhea.   Endocrine: Negative for polydipsia and polyuria.   Genitourinary: Negative for dysuria and hematuria.   Musculoskeletal: Negative for arthralgias and back pain.   Skin: Negative for color change and rash.   Neurological: Positive for weakness. Negative for dizziness, syncope and headaches.       Physical Exam     Initial Vitals [08/19/20 2103]   BP Pulse Resp Temp SpO2   (!) 153/103 (!) 116 18 97.3 °F (36.3 °C) 99 %      MAP       --         Physical Exam    Vitals reviewed.  Constitutional: She appears well-developed and well-nourished. She is not diaphoretic.   Appears uncomfortable, wretches a few times during interview with production of mucous   HENT:   Head: Normocephalic and atraumatic.   Lips appear dry   Eyes: EOM are normal. Pupils are equal, round, and reactive to light.   Neck: Normal range of motion. Neck supple.   Cardiovascular: Normal heart sounds.   No murmur heard.  Tachycardic, distal  pulses intact bilaterally in upper and lower extremities   Pulmonary/Chest: Breath sounds normal. No respiratory distress. She has no wheezes.   Abdominal: Soft. She exhibits no distension. There is abdominal tenderness.   No tenderness to light palpation. Slight diffuse tenderness to deep palpation   Musculoskeletal: No tenderness or edema.   Neurological: She is alert and oriented to person, place, and time.   Skin: Skin is warm and dry. There is pallor.         ED Course   Procedures  Labs Reviewed   CBC W/ AUTO DIFFERENTIAL - Abnormal; Notable for the following components:       Result Value    WBC 25.57 (*)     RBC 6.08 (*)     Hemoglobin 17.8 (*)     Hematocrit 51.4 (*)     Platelets 375 (*)     Immature Granulocytes 0.6 (*)     Gran # (ANC) 22.2 (*)     Immature Grans (Abs) 0.16 (*)     Mono # 1.7 (*)     Gran% 86.9 (*)     Lymph% 5.5 (*)     All other components within normal limits   COMPREHENSIVE METABOLIC PANEL - Abnormal; Notable for the following components:    Potassium 2.7 (*)     Chloride 90 (*)     CO2 31 (*)     Glucose 165 (*)     Creatinine 1.7 (*)     Total Protein 9.4 (*)     Anion Gap 19 (*)     eGFR if  42 (*)     eGFR if non  37 (*)     All other components within normal limits    Narrative:     POTASSIUM  critical result(s) called and verbal readback obtained   from RUDDY ENCISO by [facility] 08/20/2020 00:09  Recoll. [id number] by LMMark at 08/19/2020 22:44, reason:   HEMOLYZED/DISCARDED/AARON   URINALYSIS, REFLEX TO URINE CULTURE - Abnormal; Notable for the following components:    Appearance, UA Cloudy (*)     Specific Gravity, UA >1.030 (*)     Protein, UA 3+ (*)     Glucose, UA 1+ (*)     Ketones, UA Trace (*)     Bilirubin (UA) 2+ (*)     Occult Blood UA 1+ (*)     Urobilinogen, UA 2.0-3.0 (*)     All other components within normal limits    Narrative:     Specimen Source->Urine   POCT URINE PREGNANCY - Normal   LIPASE    Narrative:     Recoll. [id number] by  [email or web address] at 08/19/2020 22:44, reason:   HEMOLYZED/DISCARDED/AARON   URINALYSIS MICROSCOPIC    Narrative:     Specimen Source->Urine   SARS-COV-2 RNA AMPLIFICATION, QUAL   MAGNESIUM   MAGNESIUM    Narrative:     Recoll. [id number] by [email or web address] at 08/19/2020 22:44, reason:   HEMOLYZED/DISCARDED/AARON          Imaging Results           CT Abdomen Pelvis With Contrast (Final result)  Result time 08/20/20 01:07:47    Final result by Kei Dyson MD (08/20/20 01:07:47)                 Impression:      1.  Extensive colonic diverticulosis with focal segment of sigmoid/descending colon demonstrating abnormal wall thickening and mild pericolonic inflammatory change suggestive of acute uncomplicated diverticulitis.  Following appropriate therapy and resolution of symptoms, endoscopic follow-up is advised to exclude underlying mass.    2.  Bilateral tubal ligation clips.  Small 2.2 cm left adnexal cyst.    3.  Additional findings as above.    This report was flagged in Epic as abnormal.      Electronically signed by: Kei Dyson MD  Date:    08/20/2020  Time:    01:07             Narrative:    EXAMINATION:  CT ABDOMEN PELVIS WITH CONTRAST    CLINICAL HISTORY:  LLQ abdominal pain, diverticulitis suspected;    TECHNIQUE:  Low dose axial images, sagittal and coronal reformations were obtained from the lung bases to the pubic symphysis following the IV administration of 75 mL of Omnipaque 350 .  Oral contrast was not given.    COMPARISON:  CT 07/11/2020, 01/07/2020    FINDINGS:  The lung bases are unremarkable.  There is no pleural fluid present.  The visualized portions of the heart appear normal.    The liver is normal in size and attenuation with no focal hepatic abnormality.  No radiopaque calculi appreciated within the gallbladder lumen.  There is no intra-or extrahepatic biliary ductal dilatation.    The stomach, spleen, pancreas, and adrenal glands are unremarkable.    The kidneys are normal in size and location and enhance  symmetrically.  There is no evidence of hydronephrosis. The urinary bladder is unremarkable. The uterus is unremarkable.  There are bilateral tubal ligation clips present.  There is a small 2.2 cm cyst within the left adnexa.  No significant free fluid within the pelvis.    The abdominal aorta is normal in course and caliber without significant atherosclerotic calcifications.    There is no evidence of small-bowel obstruction or inflammation.  There are numerous colonic diverticula present.  There is a short segment of sigmoid/descending colon demonstrating mild wall thickening and pericolonic inflammatory change concerning for acute uncomplicated diverticulitis.  No evidence of free intraperitoneal air or discrete drainable fluid collection.  There is stable appearing colonic wall fat deposition.  The appendix appears within normal limits.  There is no portal venous gas or free intraperitoneal air.    Osseous structures demonstrate stable patchy sclerosis of the bilateral sacroiliac joints.  No acute osseous injury identified.  The extraperitoneal soft tissues are unremarkable.                                 Medical Decision Making:   History:   Old Medical Records: I decided to obtain old medical records.  Old Records Summarized: records from previous admission(s).       <> Summary of Records: Mrs. Gamez is a 43 yo F who presents with a CC of abdominal pain. The patient is s/p tubal ligation on 7/9/20. She returned to the ED on 7/11 with a CC of L sided abdominal pain. The patient had a CT scan of abdomen at that time that showed some free air (though to be from surgery) as well as diverticulosis. She was started on meds for diverticulitis- as patient has had this in the past and it feels felt the same. She returns today with N/V abdominal pain and unable to keep down antibiotics.  X-ray of abdomen was benign. She was admitted to the hospital and started on IV fluids/IV Abx.     Hospital Course:   Mrs. Gamez is a  45 yo F who presents with a CC of abdominal pain. The patient is s/p tubal ligation on 7/9/20. She returned to the ED on 7/11 with a CC of L sided abdominal pain. The patient had a CT scan of abdomen at that time that showed some free air (though to be from surgery) as well as diverticulosis. She was started on meds for diverticulitis- as patient has had this in the past and it feels felt the same. She returns today with N/V abdominal pain and unable to keep down antibiotics.  X-ray of abdomen was benign. She was admitted to the hospital and started on IV fluids/IV Abx.  The patient clinically improved over 48 hours. On 7/16, she was requesting discharge to home. All symptoms resolved. Will send home on 12 days of Bactrim (cipro allergy)/Flagyl. Follow up with PCP in one week. Activity as tolerated. Low NA diet      Initial Assessment:   44 y.o. female presents with 1-day hx of vomiting x10, nausea, abdominal pain. Tubal ligation last month, recent admission for similar symptoms treated with IV fluids and abx for diverticulitis.   Differential Diagnosis:   Diverticulitis, cyclic vomiting syndrome, cholecystitis, colitis, gastroenteritis  Clinical Tests:   Lab Tests: Ordered  Radiological Study: Ordered  ED Management:  Patient started on 1L NS and given zofran 8mg. Due to elevated WBC of 12, we ordered a CT Abdomen/Pelvis for suspected diverticulitis recurrence. CMP showed K of 2.7, CO2 31, Cr 1.7, and GFR 42. WBC elevated to 25. Patient given KCl PO. CT showed acute uncomplicated diverticulitis. Zofran improved patient's vomiting but not nausea. Patient started on maintenance fluids at 125ml/hr NS, given additional morphine 4mg for intractable abdominal pain, and started on flagyl and rocephin (allergic to cipro). Patient will be admitted to inpatient for SIRS criteria.               Attending Attestation:   Physician Attestation Statement for Resident:  As the supervising MD   Physician Attestation Statement: I  "have personally seen and examined this patient.   -: 43 yo female with history of diverticulitis presents with abdominal pain, nausea, and vomiting.     Ddx includes recurrent diverticulitis, diverticulitis with perforation, chronic abdominal pain, dehydration, ORALIA, other.    Vitals: Tachycardic. Afebrile. Not hypotensive.     On my exam, this is an uncomfortable-appearing adult female who is awake/alert.  Abdomen diffusely TTP but not peritoneal.    Labs: UPT negative. WBC 25. K 2.7. Hgb 17.8 (hemoconcentrated).     CT: "Extensive colonic diverticulosis with focal segment of sigmoid/descending colon demonstrating abnormal wall thickening and mild pericolonic inflammatory change suggestive of acute uncomplicated diverticulitis."    Patient requires admit due to diverticulitis with SIRS criteria (tachycardic, elevated WBCs). She had IV fluids, nausea medication, pain control, and PO potassium repletion in ED. She had rocephin and flagyl IV for abx coverage.    Resident Dr. Dhruv Jackson discussed patient with hospitalist Dr. Eddi Nicole for admission and wrote courtesy orders.     Genny Oliveira I have reviewed the following: old records at this facility.                                  Clinical Impression:       ICD-10-CM ICD-9-CM   1. Diverticulitis  K57.92 562.11   2. Vomiting  R11.10 787.03   3. ORALIA (acute kidney injury)  N17.9 584.9   4. Hypokalemia  E87.6 276.8             ED Disposition Condition    Admit                           Dhruv Jackson MD  Resident  08/20/20 0152       Dhruv Jackson MD  Resident  08/20/20 0411       Genny Oliveira MD  08/20/20 0605    "

## 2020-08-20 NOTE — CONSULTS
.[facility]  Gastroenterology  Consult Note    Patient Name: Cipriano Gamez  MRN: [id number]  Admission Date: 8/19/2020  Hospital Length of Stay: 0 days  Code Status: Full Code   Primary Care Physician: Brooks Bergeron MD  Principal Problem:Acute diverticulitis    Consults  Subjective:     Chief complaint: Abdominal pain.    HPI: The patient is a 44 year old female with a history of HTN and recurrent diverticulitis presenting with acute diverticulitis.  She reports acute, constant, mild-moderate, diffuse abdominal pain (worse in LLQ) that began one week ago.  She also reports associated nausea and vomiting with pain.  No obvious aggravating or alleviating factors.  She denies fever or chills.  No recent sick contacts.  She was recently admitted for uncomplicated diverticulitis last month and was discharged on flagyl and bactrim (has allergy to cipro).  She reports at least three episodes of diverticulitis within the last year but has had issues with these for 10 years.  Her last colonoscopy was reportedly 7 years ago and showed diverticulosis.  She has not seen a gastroenterologist since that time.  She denies changes in her bowel pattern, melena or hematochezia.  No weight loss.  She denies a family history of colorectal cancer.    Past medical history:  Hypertension.  Diverticulitis.    Past surgical history:  Tubal ligation.    Social history:  Tobacco use: 1 ppd.  Alcohol use: denies.  Illicit drug use: denies.    Family history:  No family history of liver or colon cancer.    Medications:  Medications Prior to Admission   Medication Sig Dispense Refill Last Dose    amLODIPine (NORVASC) 10 MG tablet Take 1 tablet (10 mg total) by mouth once daily. 90 tablet 0     diphenhydrAMINE (SOMINEX) 25 mg tablet Take 25 mg by mouth nightly as needed for Insomnia.        lisinopriL (PRINIVIL,ZESTRIL) 40 MG tablet TAKE 1 TABLET BY MOUTH EVERY DAY 90 tablet 0     ondansetron (ZOFRAN) 4 MG tablet Take 2  tablets (8 mg total) by mouth 2 (two) times daily. 15 tablet 0     promethazine (PHENERGAN) 25 MG suppository Place 1 suppository (25 mg total) rectally every 6 (six) hours as needed for Nausea. 10 suppository 0        Allergies:  Ciprofloxacin.  Ibuprofen.  Meloxicam.  Cyclobenzaprine.    Review of systems:  CONSTITUTIONAL: Negative for fever, chills, weakness, weight loss, weight gain.  HEENT: Negative for blurred vision, hearing loss, nasal congestion, dry mouth, sore throat.  CARDIOVASCULAR: Negative for chest pain or palpitations.  RESPIRATORY: Negative for SOB or cough.  GASTROINTESTINAL: See HPI  GENITOURINARY: Negative for dysuria or hematuria.  MUSCULOSKELETAL: Negative for osteoarthritis or muscle pain.  SKIN: Negative for rashes/lesions.  NEUROLOGIC: Negative for headaches, numbness/tingling.  ENDOCRINE: Negative for diabetes or thyroid abnormalities.  HEMATOLOGIC: Negative for anemia or blood dyscrasias.    Objective:     Vital Signs (Most Recent):  Temp: 98.7 °F (37.1 °C) (08/20/20 0752)  Pulse: 91 (08/20/20 0752)  Resp: 17 (08/20/20 0752)  BP: 131/78 (08/20/20 0752)  SpO2: 99 % (08/20/20 0752) Vital Signs (24h Range):  Temp:  [97.3 °F (36.3 °C)-98.7 °F (37.1 °C)] 98.7 °F (37.1 °C)  Pulse:  [] 91  Resp:  [17-24] 17  SpO2:  [95 %-100 %] 99 %  BP: (131-172)/() 131/78     Physical examination:  General: Obese AAF in no acute distress.  HENT: NCAT, atraumatic, hearing grossly intact, no visible or palpable thyroid mass  Eyes: PERRL, EOMI, anicteric sclera  Cardiovascular: Regular rate and rhythm. No peripheral edema.   Lungs: Non-labored respirations. Breath sounds equal.   Abdomen: soft, mild LLQ TTP, nondistended, normoactive BS  Extremities: No C/C, 2+ dorsalis pedis pulses bilaterally  Neuro: AA&O x 3, no asterixes or tremors  Psych: Appropriate mood and affect. No SI.  Skin: No jaundice, rashes or lesions  Musculoskeletal: 5/5 strength bilaterally    CBC:   Recent Labs   Lab 08/19/20  1553  08/20/20  0647   WBC 25.57* 22.40*   HGB 17.8* 15.7   HCT 51.4* 47.5   * 309     CMP:   Recent Labs   Lab 08/19/20  2305 08/20/20  0647   * 134*   CALCIUM 10.3 8.9   ALBUMIN 4.8  --    PROT 9.4*  --     141   K 2.7* 4.1   CO2 31* 35*   CL 90* 96   BUN 20 17   CREATININE 1.7* 1.4   ALKPHOS 99  --    ALT 14  --    AST 18  --    BILITOT 0.7  --        Imaging:  CT abd/pelvis with contrast (8/19/20):  Impression:  1.  Extensive colonic diverticulosis with focal segment of sigmoid/descending colon demonstrating abnormal wall thickening and mild pericolonic inflammatory change suggestive of acute uncomplicated diverticulitis.  Following appropriate therapy and resolution of symptoms, endoscopic follow-up is advised to exclude underlying mass.  2.  Bilateral tubal ligation clips.  Small 2.2 cm left adnexal cyst.  3.  Additional findings as above.    Assessment:   44 year old female with a history of HTN and recurrent diverticulitis presenting with acute uncomplicated diverticulitis and acute kidney injury.  No abscess or perforation on CT, though fairly significant leukocytosis.  No fever.  AGAP nl and exam benign.    Plan:   1.  IVF, IV abx, analgesics / anti-emetics prn.  2.  Clear liquids only for now.  3.  Colonoscopy in 6 weeks.  4.  Will consult Surgery as she would likely benefit from elective sigmoidectomy after colonoscopy.    Thank you for your consult.     Sabi Watts PA-C  Gastroenterology  [facility]

## 2020-08-20 NOTE — ASSESSMENT & PLAN NOTE
Cr 1.7 on admit from baseline 1  Prerenal due to nausea, vomiting  Improving with IVF- continue IVF

## 2020-08-20 NOTE — H&P
[facility] Medicine  History & Physical    Patient Name: Cipriano Gamez  MRN: [id number]  Admission Date: 08/20/2020  Attending Physician: Eddi Nicole MD, MPH      PCP:     Brooks Bergeron MD    CC:     Chief Complaint   Patient presents with    Emesis     Pt complains of vomiting and feeling weakness that started yesterday. Pt states that she was at work when it started and has taken OTC meds to help but had no relief. Pt reports vomiting appx 8xs since yesterday.        HISTORY OF PRESENT ILLNESS:     Cipriano Gamez is a 44 y.o. female that (in part)  has a past medical history of Abnormal Pap smear of cervix, Diverticulosis, GERD (gastroesophageal reflux disease), Hiatal hernia, and Hypertension.  has a past surgical history that includes Cervical biopsy w/ loop electrode excision (2/11/14) and Laparoscopic occlusion of oviducts by device (Bilateral, 7/10/2020). Presents to [facility] Emergency Department complaining of intractable nausea vomiting.  Ten episodes of vomiting since yesterday.  Diffuse abdominal pain.  Recently admitted and treated for diverticulitis.  This is been a recurrent problem for her. s/p tubal ligation on 7/9/20.  Was admitted after that for diverticulitis and discharged with Bactrim and Flagyl.  Patient is allergic to Cipro.  Is a diffuse abdominal pain.  Denies bloody stool.  No hematemesis.  Decreased urine output.  Denies hematuria, dysuria, stones, or incontinence.    In the emergency department routine laboratory studies, urinalysis, and CT abdomen pelvis was performed.  Showed evidence of diverticulitis with resolution of free air seen on previous CT thought to be secondary to surgery for tubal ligation.  Intravenous antibiotics given.  Patient was hypokalemic and potassium replacement was ordered.  IV fluids given.    Hospital medicine has been asked to admit to inpatient for further evaluation and treatment.  GI is  consulted.      REVIEW OF SYSTEMS:     -- Constitutional: No fever or chills.  -- Eyes: No visual changes, diplopia, pain, tearing, blind spots, or discharge.   -- Ears, nose, mouth, throat, and face: No congestion, sore throat, epistaxis, d/c, bleeding gums, neck stiffness masses, or dental issues.  -- Respiratory: No cough, shortness of breath, hemoptysis, stridor, wheezing, or night sweats.  -- Cardiovascular: No chest pain, LAN, syncope, PND, edema, cyanosis, or palpitations.   -- Gastrointestinal:  As above in HPI.  -- Genitourinary: No hematuria, dysuria, frequency, urgency, nocturia, polyuria, stones, or incontinence.  -- Integument/breast: No rash, pruritis, pigmentation changes, dryness, or changes in hair.  -- Hematologic/lymphatic: No easy bruising or lymphadenopathy.   -- Musculoskeletal: No acute arthralgias, acute myalgias, joint swelling, acute limitations of ROM, or acute muscular weakness.  -- Neurological: No seizures, headaches, incoordination, paraesthesias, ataxia, vertigo, or tremors.  -- Behavioral/Psych: No auditory or visual hallucinations, depression, or suicidal/homicidal ideations.  -- Endocrine: No heat or cold intolerance, polydipsia, or unintentional weight gain / loss.  -- Allergy/Immunologic:  Recurrent diverticulitis.  Otherwise denies adverse reaction to food, insects, or difficulty breathing.      PAST MEDICAL / SURGICAL HISTORY:     Past Medical History:   Diagnosis Date    Abnormal Pap smear of cervix     Diverticulosis     GERD (gastroesophageal reflux disease)     Hiatal hernia     Hypertension      Past Surgical History:   Procedure Laterality Date    CERVICAL BIOPSY  W/ LOOP ELECTRODE EXCISION  2/11/14    LAPAROSCOPIC OCCLUSION OF OVIDUCTS BY DEVICE Bilateral 7/10/2020    Procedure: OCCLUSION, FALLOPIAN TUBE, LAPAROSCOPIC, USING DEVICE;  Surgeon: Alex Waller MD;  Location: [facility] OR;  Service: OB/GYN;  Laterality: Bilateral;  RN PREOP 6/23/2020   T/S--COVID  NEGATIVE---UPT  CONSENTS INCOMPLETE         FAMILY HISTORY:     Family History   Problem Relation Age of Onset    Heart disease Mother 54        MI    Heart disease Maternal Grandmother 40        MI    Diabetes Other     Heart disease Brother 44        MI    Sickle cell trait Sister     Crohn's disease Neg Hx     Colon cancer Neg Hx          SOCIAL HISTORY:     Social History     Socioeconomic History    Marital status: Single     Spouse name: Not on file    Number of children: Not on file    Years of education: Not on file    Highest education level: Not on file   Occupational History    Occupation: [employer] in [employer]     Employer: [employer]   Social Needs    Financial resource strain: Very hard    Food insecurity     Worry: Often true     Inability: Often true    Transportation needs     Medical: Yes     Non-medical: Yes   Tobacco Use    Smoking status: Current Every Day Smoker     Packs/day: 1.00     Years: 16.00     Pack years: 16.00     Types: Cigarettes    Smokeless tobacco: Never Used   Substance and Sexual Activity    Alcohol use: Yes     Frequency: Monthly or less     Drinks per session: 1 or 2     Binge frequency: Never     Comment: social 1-2 x / month    Drug use: Yes     Frequency: 1.0 times per week     Types: Marijuana     Comment: social- last use 1 week ago    Sexual activity: Not Currently     Partners: Male     Birth control/protection: None   Lifestyle    Physical activity     Days per week: 0 days     Minutes per session: 0 min    Stress: Not at all   Relationships    Social connections     Talks on phone: More than three times a week     Gets together: Twice a week     Attends Denominational service: Not on file     Active member of club or organization: No     Attends meetings of clubs or organizations: Never     Relationship status: Not on file   Other Topics Concern    Not on file   Social History Narrative    Not on file         ALLERGIES:       Review of patient's  allergies indicates:   Allergen Reactions    Ciprofloxacin Swelling and Blisters    Ibuprofen Hives    Meloxicam      rash    Cyclobenzaprine Rash     rasg           HOME MEDICATIONS:     Prior to Admission medications    Medication Sig Start Date End Date Taking? Authorizing Provider   amLODIPine (NORVASC) 10 MG tablet Take 1 tablet (10 mg total) by mouth once daily. 7/27/20   Brooks Bergeron MD   diphenhydrAMINE (SOMINEX) 25 mg tablet Take 25 mg by mouth nightly as needed for Insomnia.     Historical Provider, MD   lisinopriL (PRINIVIL,ZESTRIL) 40 MG tablet TAKE 1 TABLET BY MOUTH EVERY DAY 7/24/20   Brooks Bergeron MD   ondansetron (ZOFRAN) 4 MG tablet Take 2 tablets (8 mg total) by mouth 2 (two) times daily. 7/11/20   Preston Jarrell MD   promethazine (PHENERGAN) 25 MG suppository Place 1 suppository (25 mg total) rectally every 6 (six) hours as needed for Nausea. 7/11/20   Preston Jarrell MD          HOSPITAL MEDICATIONS:     Scheduled Meds:    cefTRIAXone (ROCEPHIN) IVPB  1 g Intravenous Q24H    metronidazole  500 mg Intravenous Q8H    potassium chloride 10%  40 mEq Oral Once     Continuous Infusions:    sodium chloride 0.9%       PRN Meds: ondansetron, promethazine, sodium chloride 0.9%      PHYSICAL EXAM:     Wt Readings from Last 1 Encounters:   08/19/20 2103 95.3 kg (210 lb)     Body mass index is 32.89 kg/m².  Vitals:    08/20/20 0116 08/20/20 0134 08/20/20 0300 08/20/20 0353   BP: 138/89  (!) 158/72 (!) 148/78   Pulse: (!) 111  98 87   Resp: (!) 24 18 18 18   Temp:   98.1 °F (36.7 °C) 98.1 °F (36.7 °C)   TempSrc:   Oral Oral   SpO2: 100%  100% 100%   Weight:       Height:              -- General appearance: well developed. appears stated age   -- Head: normocephalic, atraumatic   -- Eyes: conjunctivae clear. Extraocular muscles intact  -- Nose: Nares normal. Septum midline.   -- Mouth/Throat:  Dry oral mucous membranes. no throat erythema.   -- Neck: NoJVD,  Supple, symmetrical, trachea  midline, thyroid not grossly enlarged, appears symmetric  -- Lungs: clear to auscultation bilaterally. normal respiratory effort. No use of accessory muscles.   -- Chest wall: no tenderness. equal bilateral chest rise   -- Heart:  Rapid in the rate and regular rhythm. S1, S2 normal.  no click, rub or gallop   -- Abdomen:  Diffuse abdominal tenderness with mild rebound and voluntary guarding.  Nondistended and onto manic.  Slightly decreased bowel sounds heard.  Healed abdominal surgical scar.    -- Extremities: no cyanosis, clubbing or edema.   -- Pulses: 2+ and symmetric   -- Skin:  Turgor normal. Color normal. Texture normal. No rashes or lesions.   -- Neurologic: Normal strength and tone. No focal numbness or weakness. CNII-XII intact. [place] coma scale: eyes open spontaneously-4, oriented & converses-5, obeys commands-6.      LABORATORY STUDIES:     Recent Results (from the past 36 hour(s))   CBC auto differential    Collection Time: 08/19/20  9:58 PM   Result Value Ref Range    WBC 25.57 (H) 3.90 - 12.70 K/uL    RBC 6.08 (H) 4.00 - 5.40 M/uL    Hemoglobin 17.8 (H) 12.0 - 16.0 g/dL    Hematocrit 51.4 (H) 37.0 - 48.5 %    Mean Corpuscular Volume 85 82 - 98 fL    Mean Corpuscular Hemoglobin 29.3 27.0 - 31.0 pg    Mean Corpuscular Hemoglobin Conc 34.6 32.0 - 36.0 g/dL    RDW 13.0 11.5 - 14.5 %    Platelets 375 (H) 150 - 350 K/uL    MPV 11.1 9.2 - 12.9 fL    Immature Granulocytes 0.6 (H) 0.0 - 0.5 %    Gran # (ANC) 22.2 (H) 1.8 - 7.7 K/uL    Immature Grans (Abs) 0.16 (H) 0.00 - 0.04 K/uL    Lymph # 1.4 1.0 - 4.8 K/uL    Mono # 1.7 (H) 0.3 - 1.0 K/uL    Eos # 0.0 0.0 - 0.5 K/uL    Baso # 0.05 0.00 - 0.20 K/uL    nRBC 0 0 /100 WBC    Gran% 86.9 (H) 38.0 - 73.0 %    Lymph% 5.5 (L) 18.0 - 48.0 %    Mono% 6.6 4.0 - 15.0 %    Eosinophil% 0.2 0.0 - 8.0 %    Basophil% 0.2 0.0 - 1.9 %    Differential Method Automated    Urinalysis, Reflex to Urine Culture Urine, Clean Catch    Collection Time: 08/19/20 10:59 PM    Specimen:  Urine   Result Value Ref Range    Specimen UA Urine, Clean Catch     Color, UA Nora Yellow, Straw, Nora    Appearance, UA Cloudy (A) Clear    pH, UA 5.0 5.0 - 8.0    Specific Gravity, UA >1.030 (A) 1.005 - 1.030    Protein, UA 3+ (A) Negative    Glucose, UA 1+ (A) Negative    Ketones, UA Trace (A) Negative    Bilirubin (UA) 2+ (A) Negative    Occult Blood UA 1+ (A) Negative    Nitrite, UA Negative Negative    Urobilinogen, UA 2.0-3.0 (A) <2.0 EU/dL    Leukocytes, UA Negative Negative   Urinalysis Microscopic    Collection Time: 08/19/20 10:59 PM   Result Value Ref Range    RBC, UA 3 0 - 4 /hpf    WBC, UA 3 0 - 5 /hpf    Bacteria None None-Occ /hpf    Squam Epithel, UA 10 /hpf    Hyaline Casts, UA 0 0-1/lpf /lpf    Microscopic Comment SEE COMMENT    POCT urine pregnancy    Collection Time: 08/19/20 11:03 PM   Result Value Ref Range    POC Preg Test, Ur Negative Negative     Acceptable Yes    Comprehensive metabolic panel    Collection Time: 08/19/20 11:05 PM   Result Value Ref Range    Sodium 140 136 - 145 mmol/L    Potassium 2.7 (LL) 3.5 - 5.1 mmol/L    Chloride 90 (L) 95 - 110 mmol/L    CO2 31 (H) 23 - 29 mmol/L    Glucose 165 (H) 70 - 110 mg/dL    BUN, Bld 20 6 - 20 mg/dL    Creatinine 1.7 (H) 0.5 - 1.4 mg/dL    Calcium 10.3 8.7 - 10.5 mg/dL    Total Protein 9.4 (H) 6.0 - 8.4 g/dL    Albumin 4.8 3.5 - 5.2 g/dL    Total Bilirubin 0.7 0.1 - 1.0 mg/dL    Alkaline Phosphatase 99 55 - 135 U/L    AST 18 10 - 40 U/L    ALT 14 10 - 44 U/L    Anion Gap 19 (H) 8 - 16 mmol/L    eGFR if African American 42 (A) >60 mL/min/1.73 m^2    eGFR if non African American 37 (A) >60 mL/min/1.73 m^2   Lipase    Collection Time: 08/19/20 11:05 PM   Result Value Ref Range    Lipase 30 4 - 60 U/L   Magnesium    Collection Time: 08/19/20 11:05 PM   Result Value Ref Range    Magnesium 1.9 1.6 - 2.6 mg/dL   COVID-19 Rapid Screening    Collection Time: 08/20/20  1:21 AM   Result Value Ref Range    SARS-CoV-2 RNA, Amplification,  Qual Negative Negative       No results found for: INR, PROTIME  Lab Results   Component Value Date    HGBA1C 5.2 07/15/2020     No results for input(s): POCTGLUCOSE in the last 72 hours.        IMAGING:     Imaging Results           CT Abdomen Pelvis With Contrast (Final result)  Result time 08/20/20 01:07:47    Final result by Kei Dyson MD (08/20/20 01:07:47)                 Impression:      1.  Extensive colonic diverticulosis with focal segment of sigmoid/descending colon demonstrating abnormal wall thickening and mild pericolonic inflammatory change suggestive of acute uncomplicated diverticulitis.  Following appropriate therapy and resolution of symptoms, endoscopic follow-up is advised to exclude underlying mass.    2.  Bilateral tubal ligation clips.  Small 2.2 cm left adnexal cyst.    3.  Additional findings as above.    This report was flagged in Epic as abnormal.      Electronically signed by: Kei Dyson MD  Date:    08/20/2020  Time:    01:07             Narrative:    EXAMINATION:  CT ABDOMEN PELVIS WITH CONTRAST    CLINICAL HISTORY:  LLQ abdominal pain, diverticulitis suspected;    TECHNIQUE:  Low dose axial images, sagittal and coronal reformations were obtained from the lung bases to the pubic symphysis following the IV administration of 75 mL of Omnipaque 350 .  Oral contrast was not given.    COMPARISON:  CT 07/11/2020, 01/07/2020    FINDINGS:  The lung bases are unremarkable.  There is no pleural fluid present.  The visualized portions of the heart appear normal.    The liver is normal in size and attenuation with no focal hepatic abnormality.  No radiopaque calculi appreciated within the gallbladder lumen.  There is no intra-or extrahepatic biliary ductal dilatation.    The stomach, spleen, pancreas, and adrenal glands are unremarkable.    The kidneys are normal in size and location and enhance symmetrically.  There is no evidence of hydronephrosis. The urinary bladder is  unremarkable. The uterus is unremarkable.  There are bilateral tubal ligation clips present.  There is a small 2.2 cm cyst within the left adnexa.  No significant free fluid within the pelvis.    The abdominal aorta is normal in course and caliber without significant atherosclerotic calcifications.    There is no evidence of small-bowel obstruction or inflammation.  There are numerous colonic diverticula present.  There is a short segment of sigmoid/descending colon demonstrating mild wall thickening and pericolonic inflammatory change concerning for acute uncomplicated diverticulitis.  No evidence of free intraperitoneal air or discrete drainable fluid collection.  There is stable appearing colonic wall fat deposition.  The appendix appears within normal limits.  There is no portal venous gas or free intraperitoneal air.    Osseous structures demonstrate stable patchy sclerosis of the bilateral sacroiliac joints.  No acute osseous injury identified.  The extraperitoneal soft tissues are unremarkable.                                  CONSULTS:     IP CONSULT TO GI       ASSESSMENT & PLAN:     Primary Diagnosis:  Acute diverticulitis    Active Hospital Problems    Diagnosis  POA    *Acute diverticulitis [K57.92]  Yes     Priority: 1 - High    ORALIA (acute kidney injury) [N17.9]  Yes     Priority: 2     Neutrophilic leukocytosis [D72.9]  Yes     Priority: 3     Hypokalemia [E87.6]  Yes     Priority: 3     Intractable nausea and vomiting [R11.2]  Yes    Marijuana abuse [F12.10]  Yes    Smokes 1 pack of cigarettes per day [F17.210]  Yes    Diverticulosis with multiple hospitalization for diverticulitis [K57.90]  Yes     2011 colonoscopy diverticulosis.        Resolved Hospital Problems   No resolved problems to display.       Acute on chronic Diverticulitis of colon  · Intractable nausea vomiting, neutrophilic leukocytosis, and diffuse abdominal pain.  · As evidence by history, physical exam, and CT imaging  "("Extensive colonic diverticulosis with focal segment of sigmoid/descending colon demonstrating abnormal wall thickening and mild pericolonic inflammatory change suggestive of acute uncomplicated diverticulitis.  Following appropriate therapy and resolution of symptoms, endoscopic follow-up is advised to exclude underlying mass.")  · Counseling given regarding high fiber diet and good bowel regimen  · N.p.o. for bowel rest now and advance to clear fluids as tolerated  · IV fluids  · Allergic to Cipro.  Ceftriaxone an and Flagyl IV initiated.  Would not recommend Bactrim due to ORALIA  · GI consult for further evaluation and recommendations    Acute nontraumatic kidney injury  · As evidenced by decrease in GFR.  Baseline creatinine = 1.0  · Likely secondary to dehydration associated with intractable nausea vomiting due to acute diverticulitis as noted above.  Also recent course of Bactrim  · Monitor with serial Cr / GFR levels closely with serial labs  · Avoid nephrotoxic medications such as NSAIDs, IV contrast, or RAAS blockade  · Nephrology consult if GFR does not continue to improve.    Hypokalemia  · Due to GI losses or poor oral intake  · Check magnesium  · Replace potassium orally if possible, if not then IV  · Monitor with serial labs    Tobacco and marijuana abuse  · Chronic tobacco use  · Tobacco cessation counseling  · Nicotine patch offered; consider Wellbutrin or Chantix through PCP as an outpatient (will require closer monitoring)  · I discussed with the patient regarding the hazardous effects of smoking on increasing risk of heart attack and stroke, worsening lung functions, and increasing cancer risk.   Patient was urged to stop smoking now.  I also offered nicotine taper (such as nicotine patch and gum) to help ease the craving to smoke.        VTE Risk Mitigation (From admission, onward)         Ordered     IP VTE HIGH RISK PATIENT  Once      08/20/20 0149     Place sequential compression device  Until " discontinued      08/20/20 0149                  Adult PRN medications available   DVT prophylaxis given       DISPOSITION:     Will admit to the Hospital Medicine service for further evaluation and treatment.    Chart reviewed and updated where applicable.    High Risk Conditions:  Patient has a condition that poses threat to life and bodily function:  Acute diverticulitis, acute kidney injury, dehydration, intractable nausea vomiting      ===============================================================    Eddi Nicole MD, MPH  Department of Hospital Medicine   [facility]  [phone] pg  (7pm - 6am)          This note is dictated using "[employer] voice recognition software.  There are word recognition mistakes that are occasionally missed on review.

## 2020-08-20 NOTE — PROGRESS NOTES
[facility] Hospital Medicine  Progress Note    Patient Name: Cipriano Gamez  MRN: [id number]  Patient Class: IP- Inpatient   Admission Date: 8/19/2020  Length of Stay: 0 days  Attending Physician: Cintia Segovia MD  Primary Care Provider: Brooks Bergeron MD        Subjective:     Principal Problem:Acute diverticulitis        HPI:  Cipriano Gamez is a 44 y.o. female with diverticulosis and HTN who presented to [facility] Emergency Department complaining of intractable nausea vomiting.  Ten episodes of vomiting since yesterday.  Diffuse abdominal pain.  Recently admitted and treated for diverticulitis.  This is been a recurrent problem for her. s/p tubal ligation on 7/9/20.  Was admitted after that for diverticulitis and discharged with Bactrim and Flagyl.  Patient is allergic to Cipro.  Is a diffuse abdominal pain.  Denies bloody stool.  No hematemesis.  Decreased urine output.  Denies hematuria, dysuria, stones, or incontinence.    In the emergency department routine laboratory studies, urinalysis, and CT abdomen pelvis was performed.  Showed evidence of diverticulitis with resolution of free air seen on previous CT thought to be secondary to surgery for tubal ligation.  Intravenous antibiotics given.  Patient was hypokalemic and potassium replacement was ordered.  IV fluids given.    Hospital medicine has been asked to admit to inpatient for further evaluation and treatment.  GI is consulted.    Overview/Hospital Course:  Admitted with severe sepsis due to diverticulitis. Also with hypokalemia and ORALIA due to nausea and vomiting. Started IVF, CTX + flagyl, KCl replacement. Abdominal pain, nausea, vomiting improving. Tolerating liquids. ORALIA improving.    Interval History: Still has abdominal pain. Nausea, vomiting improving. Tolerated liquids.     Review of Systems   Constitutional: Negative for chills and fever.   Respiratory: Negative for cough and shortness of breath.     Cardiovascular: Negative for chest pain, palpitations and leg swelling.   Gastrointestinal: Positive for abdominal pain, constipation (last BM 2 days ago) and nausea. Negative for diarrhea and vomiting.   Genitourinary: Negative for difficulty urinating.   Neurological: Negative for headaches.     Objective:     Vital Signs (Most Recent):  Temp: 97.8 °F (36.6 °C) (08/20/20 1133)  Pulse: 98 (08/20/20 1134)  Resp: 17 (08/20/20 1133)  BP: (!) 140/90 (08/20/20 1200)  SpO2: 100 % (08/20/20 1134) Vital Signs (24h Range):  Temp:  [97.3 °F (36.3 °C)-98.7 °F (37.1 °C)] 97.8 °F (36.6 °C)  Pulse:  [] 98  Resp:  [17-24] 17  SpO2:  [95 %-100 %] 100 %  BP: (131-172)/() 140/90     Weight: 86.5 kg (190 lb 11.2 oz)  Body mass index is 29.87 kg/m².    Intake/Output Summary (Last 24 hours) at 8/20/2020 1223  Last data filed at 8/20/2020 1134  Gross per 24 hour   Intake --   Output 200 ml   Net -200 ml      Physical Exam  Vitals signs and nursing note reviewed.   Constitutional:       General: She is not in acute distress.     Appearance: She is normal weight. She is ill-appearing. She is not toxic-appearing or diaphoretic.   HENT:      Head: Normocephalic and atraumatic.   Cardiovascular:      Rate and Rhythm: Regular rhythm. Tachycardia present.      Pulses: Normal pulses.      Heart sounds: Normal heart sounds.   Pulmonary:      Effort: Pulmonary effort is normal. No respiratory distress.      Breath sounds: Normal breath sounds. No wheezing or rales.      Comments: Room air  Abdominal:      General: Bowel sounds are normal. There is no distension.      Palpations: Abdomen is soft. There is no mass.      Tenderness: There is abdominal tenderness (diffusely). There is no guarding or rebound.   Musculoskeletal:         General: No swelling.   Skin:     General: Skin is warm and dry.   Neurological:      General: No focal deficit present.      Mental Status: She is alert. Mental status is at baseline.         Significant  Labs: All pertinent labs within the past 24 hours have been reviewed.    Significant Imaging: I have reviewed and interpreted all pertinent imaging results/findings within the past 24 hours.      Assessment/Plan:      * Acute diverticulitis  CT showing descending colon/sigmoid colon diverticulitis  On CTX + flagyl  Abdominal exam improving  Continue IVF  Continue liquids  PRN antiemetics and pain Rx      Intractable nausea and vomiting  Due to diverticulitis  Improving       Neutrophilic leukocytosis  Due to diverticulitis      Hypokalemia  Replace and monitor        Severe sepsis  WBC 25 + HR 110s on admit with source diverticulitis and ORALIA  Improving with IVF and antibiotics- continue        ORALIA (acute kidney injury)  Cr 1.7 on admit from baseline 1  Prerenal due to nausea, vomiting  Improving with IVF- continue IVF      Marijuana abuse  Encourage cessation      Tobacco use disorder  Will discuss cessation with patient       Diverticulosis with multiple hospitalization for diverticulitis  3 prior hospitalizations this year  CT with descending colon/sigmoid diverticulitis  On IVF, CTX + flagyl  Abdominal exam improving- monitor  GI and Gen Surg following         VTE Risk Mitigation (From admission, onward)         Ordered     IP VTE HIGH RISK PATIENT  Once      08/20/20 0543     Place LOLITA hose  Until discontinued      08/20/20 0543     Place sequential compression device  Until discontinued      08/20/20 0543                Discharge Planning   RADU:      Code Status: Full Code   Is the patient medically ready for discharge?:     Reason for patient still in hospital (select all that apply): Patient trending condition                     Cintia Segovia MD  Department of Hospital Medicine   [facility]

## 2020-08-20 NOTE — NURSING
Received report from COLLIN Ang. Patient lying in  Bed resting, NAD noted. Safety precautions maintained, Will monitor.

## 2020-08-20 NOTE — FIRST PROVIDER EVALUATION
Emergency Department TeleTRIAGE Encounter Note      CHIEF COMPLAINT    Chief Complaint   Patient presents with    Emesis     Pt complains of vomiting and feeling weakness that started yesterday. Pt states that she was at work when it started and has taken OTC meds to help but had no relief. Pt reports vomiting appx 8xs since yesterday.        VITAL SIGNS   Initial Vitals [08/19/20 2103]   BP Pulse Resp Temp SpO2   (!) 153/103 (!) 116 18 97.3 °F (36.3 °C) 99 %      MAP       --            ALLERGIES    Review of patient's allergies indicates:   Allergen Reactions    Ciprofloxacin Swelling and Blisters    Ibuprofen Hives    Meloxicam      rash    Cyclobenzaprine Rash     rasg       PROVIDER TRIAGE NOTE  Patient is a 44 y.o. female presenting to the ED for abdominal pain, nausea, vomiting. Reports estimated 10 episodes of emesis. No diarrhea. No fever. Labs ordered.       ORDERS  Labs Reviewed   CBC W/ AUTO DIFFERENTIAL   COMPREHENSIVE METABOLIC PANEL   LIPASE   URINALYSIS, REFLEX TO URINE CULTURE   POCT URINE PREGNANCY       ED Orders (720h ago, onward)    Start Ordered     Status Ordering Provider    08/19/20 2115 08/19/20 2109  ondansetron injection 4 mg  ED 1 Time      Ordered FREDDY SMITH    08/19/20 2115 08/19/20 2109  sodium chloride 0.9% bolus 1,000 mL  ED 1 Time      Ordered FREDDY SMITH    08/19/20 2110 08/19/20 2109  CBC auto differential  STAT  Collect    Ordered FREDDY SMITH    08/19/20 2110 08/19/20 2109  Comprehensive metabolic panel  STAT  Collect    Ordered DIPAK SMITHOLE    08/19/20 2110 08/19/20 2109  Lipase  STAT  Collect    Ordered DIPAK SMITHOLE    08/19/20 2110 08/19/20 2109  Urinalysis, Reflex to Urine Culture Urine, Clean Catch  STAT      Ordered DIPAK SMITHOLE    08/19/20 2110 08/19/20 2109  POCT urine pregnancy  Once      Ordered FREDDY SMITH    08/19/20 2110 08/19/20 2109  Saline lock IV  Once      Ordered FREDDY SMITH            Virtual Visit Note: The provider triage portion  of this emergency department evaluation and documentation was performed via [employer], a HIPAA-compliant telemedicine application, in concert with a tele-presenter in the room. A face to face patient evaluation with one of my colleagues will occur once the patient is placed in an emergency department room.      DISCLAIMER: This note was prepared with [employer] voice recognition transcription software. Garbled syntax, mangled pronouns, and other bizarre constructions may be attributed to that software system.

## 2020-08-20 NOTE — SUBJECTIVE & OBJECTIVE
Interval History: Still has abdominal pain. Nausea, vomiting improving. Tolerated liquids.     Review of Systems   Constitutional: Negative for chills and fever.   Respiratory: Negative for cough and shortness of breath.    Cardiovascular: Negative for chest pain, palpitations and leg swelling.   Gastrointestinal: Positive for abdominal pain, constipation (last BM 2 days ago) and nausea. Negative for diarrhea and vomiting.   Genitourinary: Negative for difficulty urinating.   Neurological: Negative for headaches.     Objective:     Vital Signs (Most Recent):  Temp: 97.8 °F (36.6 °C) (08/20/20 1133)  Pulse: 98 (08/20/20 1134)  Resp: 17 (08/20/20 1133)  BP: (!) 140/90 (08/20/20 1200)  SpO2: 100 % (08/20/20 1134) Vital Signs (24h Range):  Temp:  [97.3 °F (36.3 °C)-98.7 °F (37.1 °C)] 97.8 °F (36.6 °C)  Pulse:  [] 98  Resp:  [17-24] 17  SpO2:  [95 %-100 %] 100 %  BP: (131-172)/() 140/90     Weight: 86.5 kg (190 lb 11.2 oz)  Body mass index is 29.87 kg/m².    Intake/Output Summary (Last 24 hours) at 8/20/2020 1223  Last data filed at 8/20/2020 1134  Gross per 24 hour   Intake --   Output 200 ml   Net -200 ml      Physical Exam  Vitals signs and nursing note reviewed.   Constitutional:       General: She is not in acute distress.     Appearance: She is normal weight. She is ill-appearing. She is not toxic-appearing or diaphoretic.   HENT:      Head: Normocephalic and atraumatic.   Cardiovascular:      Rate and Rhythm: Regular rhythm. Tachycardia present.      Pulses: Normal pulses.      Heart sounds: Normal heart sounds.   Pulmonary:      Effort: Pulmonary effort is normal. No respiratory distress.      Breath sounds: Normal breath sounds. No wheezing or rales.      Comments: Room air  Abdominal:      General: Bowel sounds are normal. There is no distension.      Palpations: Abdomen is soft. There is no mass.      Tenderness: There is abdominal tenderness (diffusely). There is no guarding or rebound.    Musculoskeletal:         General: No swelling.   Skin:     General: Skin is warm and dry.   Neurological:      General: No focal deficit present.      Mental Status: She is alert. Mental status is at baseline.         Significant Labs: All pertinent labs within the past 24 hours have been reviewed.    Significant Imaging: I have reviewed and interpreted all pertinent imaging results/findings within the past 24 hours.

## 2020-08-20 NOTE — ED NOTES
In POV. A/O X4. No S/S of distress. In to room with wheelchair. Able to ambulate.     C/O abd pain with N/V for 2 days. Denies constipation/diarhea. Last episode similar to this was 1 month ago. Pain worse with ambulation/palpation.     Hooked to monitor. VSS with HTn noted. Will continue to closely monitor.

## 2020-08-20 NOTE — PLAN OF CARE
"SW met with patient to complete discharge planning assessment. Prior to beginning the discharge planning assessment, patient verified name and date of birth. SW educated patient on the discharge process and explained that discharge planning begins at admission. SW reviewed contents of the "[facility] Packet" emphasizing, "Help at home", "Managing your health", and "Your preferences". Patient stated that she lives with her sister Yudith and 19 year old son, Carlo. ESTEPHANIE wrote name and phone number on white communication board.    Brooks Bergeron MD      [id number] - [place] - [address]  [address] [facility] [address] [facility] [address]  Phone: [phone] Fax: [phone]    [id number] - [place] - [address]  [address] [place] [address]  Phone: [phone] Fax: [phone]    Extended Emergency Contact Information  Primary Emergency Contact: Yudith Gamez  Address: [address] [place] [address] [address]            [place] [address] [address] [facility]  Home Phone: [phone]  Relation: Sister  Secondary Emergency Contact: Prince Castano  Mobile Phone: [phone]  Relation: Son       08/20/20 1428   Discharge Assessment   Assessment Type Discharge Planning Assessment   Confirmed/corrected address and phone number on facesheet? Yes   Assessment information obtained from? Patient   Communicated expected length of stay with patient/caregiver yes   Prior to hospitilization cognitive status: Alert/Oriented   Prior to hospitalization functional status: Independent   Current cognitive status: Alert/Oriented   Current Functional Status: Independent   Facility Arrived From: Home   Lives With sibling(s);child(madisyn), adult   Able to Return to Prior Arrangements no   Is patient able to care for self after discharge? Yes   Patient's perception of discharge disposition home or selfcare   Readmission Within the Last 30 Days no previous admission in last 30 days   Patient currently being " followed by outpatient case management? No   Patient currently receives any other outside agency services? No   Equipment Currently Used at Home none   Do you have any problems affording any of your prescribed medications? No   Is the patient taking medications as prescribed? yes   Does the patient have transportation home? Yes   Transportation Anticipated family or friend will provide   Does the patient receive services at the [facility]? No   Discharge Plan A Home   Discharge Plan B Home   DME Needed Upon Discharge  none   Patient/Family in Agreement with Plan yes

## 2020-08-20 NOTE — CONSULTS
gen surg consult    44-year-old female treated for diverticulitis of the descending colon last month which occurred after a laparoscopic procedure.  She had resolution of the symptoms with antibiotics.  Starting yesterday she had the acute onset of nausea and left-sided abdominal pain similar to the symptoms.  She has not had a bowel movement in the last 2 days and normally has a brown when each day.  She denies hematochezia.  She denies fever or chills.  She denies fecal urea or pneumaturia.  She states she has had 3 episodes of diverticulitis this year and has been having repeated episodes over the last 10 years originally occurring every 3 years but more frequently every year most of which requiring hospitalization for intravenous antibiotics.  Upon review of the imaging she has had areas of diverticulitis involving different areas of the sigmoid and distal descending colon.  Past medical history hypertension  Past surgical history tubal ligation  Social history she smokes about 1 pack of cigarettes per day and does not consume alcoholic beverages  Allergies ciprofloxacin ibuprofen Mobic cyclobenzaprine  Home medications Norvasc Sominex Prinivil Zofran Phenergan  Physical exam  Blood pressure 131/78, respiratory rate 17, pulse 91, temperature 98.7°  General alert and ordered x4  HEENT no cervical or supraclavicular masses  Lungs equal breath sounds bilaterally  Cardiovascular regular rate and rhythm  Axilla no masses bilaterally  Extremities palpable radial pulse bilaterally   no inguinal hernia bilaterally  Abdomen obese, soft, positive bowel sounds, nondistended, no masses, no hernia, mild to moderate left lateral and left lower quadrant abdominal tenderness with minimal guarding, no peritoneal signs  White blood cell count today is 22 which is down from 25 yesterday, hematocrit 47, platelet count 309, BUN and creatinine normal  CT scan of the abdomen and pelvis revealed extensive colonic diverticulosis and a  segment of sigmoid/descending colon diverticulitis    Assessment/plan recurrent left-sided diverticulitis-agree with admission, bowel rest and intravenous antibiotics-she is on Rocephin and Flagyl, if fails to continue to improve would try Zosyn or Invanz, ultimately if fails to resolve this hospital stay could end up requiring operative intervention with likely temporary ostomy, hopefully and likely can convert to elective resection in the near future after colonoscopy ideally, situation discussed with patient at length, she is aware that without surgical intervention that these episodes will likely to continue to recur; thank you for consult, will follow

## 2020-08-20 NOTE — ED NOTES
Spoke with Austin Waggoner. Rosaline will update pt bed assignment. Pt will be going to 3rd floor instead.

## 2020-08-20 NOTE — ASSESSMENT & PLAN NOTE
WBC 25 + HR 110s on admit with source diverticulitis and ORALIA  Improving with IVF and antibiotics- continue

## 2020-08-20 NOTE — ASSESSMENT & PLAN NOTE
3 prior hospitalizations this year  CT with descending colon/sigmoid diverticulitis  On IVF, CTX + flagyl  Abdominal exam improving- monitor  GI and Gen Surg following

## 2020-08-20 NOTE — ASSESSMENT & PLAN NOTE
CT showing descending colon/sigmoid colon diverticulitis  On CTX + flagyl  Abdominal exam improving  Continue IVF  Continue liquids  PRN antiemetics and pain Rx

## 2020-08-20 NOTE — HPI
Cipriano Gamez is a 44 y.o. female with diverticulosis and HTN who presented to [facility] Emergency Department complaining of intractable nausea vomiting.  Ten episodes of vomiting since yesterday.  Diffuse abdominal pain.  Recently admitted and treated for diverticulitis.  This is been a recurrent problem for her. s/p tubal ligation on 7/9/20.  Was admitted after that for diverticulitis and discharged with Bactrim and Flagyl.  Patient is allergic to Cipro.  Is a diffuse abdominal pain.  Denies bloody stool.  No hematemesis.  Decreased urine output.  Denies hematuria, dysuria, stones, or incontinence.    In the emergency department routine laboratory studies, urinalysis, and CT abdomen pelvis was performed.  Showed evidence of diverticulitis with resolution of free air seen on previous CT thought to be secondary to surgery for tubal ligation.  Intravenous antibiotics given.  Patient was hypokalemic and potassium replacement was ordered.  IV fluids given.    Hospital medicine has been asked to admit to inpatient for further evaluation and treatment.  GI is consulted.

## 2020-08-20 NOTE — HOSPITAL COURSE
Admitted with severe sepsis due to diverticulitis. Also with hypokalemia and ORALIA due to nausea and vomiting. Started IVF, CTX + flagyl, KCl replacement. Abdominal pain, nausea, vomiting improving. Tolerating regular diet. ORALIA resolved. Abdominal pain now rated at 4/10. She says she feels well and is ready for discharge. Discharged to home with augmentin to complete 10 day course for acute diverticulitis (due to cipro allergy). PRN zofran and phenergan prescribed. She says she often gets yeast infections with antibiotics- PRN fluconazole prescribed. Follow up with GI for colonoscopy and Surgery for discussion about resection given her recurrent bouts of diverticulitis (this is hospitalization #4 this year).

## 2020-08-21 LAB
ALBUMIN SERPL BCP-MCNC: 3.9 G/DL (ref 3.5–5.2)
ALP SERPL-CCNC: 89 U/L (ref 55–135)
ALT SERPL W/O P-5'-P-CCNC: 11 U/L (ref 10–44)
ANION GAP SERPL CALC-SCNC: 14 MMOL/L (ref 8–16)
AST SERPL-CCNC: 16 U/L (ref 10–40)
BASOPHILS # BLD AUTO: 0.03 K/UL (ref 0–0.2)
BASOPHILS NFR BLD: 0.2 % (ref 0–1.9)
BILIRUB SERPL-MCNC: 0.4 MG/DL (ref 0.1–1)
BUN SERPL-MCNC: 8 MG/DL (ref 6–20)
CALCIUM SERPL-MCNC: 8.9 MG/DL (ref 8.7–10.5)
CHLORIDE SERPL-SCNC: 96 MMOL/L (ref 95–110)
CO2 SERPL-SCNC: 26 MMOL/L (ref 23–29)
CREAT SERPL-MCNC: 0.8 MG/DL (ref 0.5–1.4)
DIFFERENTIAL METHOD: ABNORMAL
EOSINOPHIL # BLD AUTO: 0 K/UL (ref 0–0.5)
EOSINOPHIL NFR BLD: 0.2 % (ref 0–8)
ERYTHROCYTE [DISTWIDTH] IN BLOOD BY AUTOMATED COUNT: 12.7 % (ref 11.5–14.5)
EST. GFR  (AFRICAN AMERICAN): >60 ML/MIN/1.73 M^2
EST. GFR  (NON AFRICAN AMERICAN): >60 ML/MIN/1.73 M^2
GLUCOSE SERPL-MCNC: 98 MG/DL (ref 70–110)
HCT VFR BLD AUTO: 47.3 % (ref 37–48.5)
HGB BLD-MCNC: 15.6 G/DL (ref 12–16)
IMM GRANULOCYTES # BLD AUTO: 0.05 K/UL (ref 0–0.04)
IMM GRANULOCYTES NFR BLD AUTO: 0.4 % (ref 0–0.5)
LYMPHOCYTES # BLD AUTO: 2.4 K/UL (ref 1–4.8)
LYMPHOCYTES NFR BLD: 18.1 % (ref 18–48)
MAGNESIUM SERPL-MCNC: 1.8 MG/DL (ref 1.6–2.6)
MCH RBC QN AUTO: 29.2 PG (ref 27–31)
MCHC RBC AUTO-ENTMCNC: 33 G/DL (ref 32–36)
MCV RBC AUTO: 89 FL (ref 82–98)
MONOCYTES # BLD AUTO: 1.2 K/UL (ref 0.3–1)
MONOCYTES NFR BLD: 8.6 % (ref 4–15)
NEUTROPHILS # BLD AUTO: 9.7 K/UL (ref 1.8–7.7)
NEUTROPHILS NFR BLD: 72.5 % (ref 38–73)
NRBC BLD-RTO: 0 /100 WBC
PHOSPHATE SERPL-MCNC: 2.8 MG/DL (ref 2.7–4.5)
PLATELET # BLD AUTO: 281 K/UL (ref 150–350)
PMV BLD AUTO: 11.2 FL (ref 9.2–12.9)
POTASSIUM SERPL-SCNC: 3 MMOL/L (ref 3.5–5.1)
PROT SERPL-MCNC: 7.4 G/DL (ref 6–8.4)
RBC # BLD AUTO: 5.34 M/UL (ref 4–5.4)
SODIUM SERPL-SCNC: 136 MMOL/L (ref 136–145)
WBC # BLD AUTO: 13.42 K/UL (ref 3.9–12.7)

## 2020-08-21 PROCEDURE — 63600175 PHARM REV CODE 636 W HCPCS: Performed by: HOSPITALIST

## 2020-08-21 PROCEDURE — 25000003 PHARM REV CODE 250: Performed by: HOSPITALIST

## 2020-08-21 PROCEDURE — 85025 COMPLETE CBC W/AUTO DIFF WBC: CPT

## 2020-08-21 PROCEDURE — 63600175 PHARM REV CODE 636 W HCPCS: Performed by: PHYSICIAN ASSISTANT

## 2020-08-21 PROCEDURE — 94761 N-INVAS EAR/PLS OXIMETRY MLT: CPT

## 2020-08-21 PROCEDURE — 21400001 HC TELEMETRY ROOM

## 2020-08-21 PROCEDURE — S0030 INJECTION, METRONIDAZOLE: HCPCS | Performed by: HOSPITALIST

## 2020-08-21 PROCEDURE — 36415 COLL VENOUS BLD VENIPUNCTURE: CPT

## 2020-08-21 PROCEDURE — A4216 STERILE WATER/SALINE, 10 ML: HCPCS | Performed by: HOSPITALIST

## 2020-08-21 PROCEDURE — 80053 COMPREHEN METABOLIC PANEL: CPT

## 2020-08-21 PROCEDURE — 84100 ASSAY OF PHOSPHORUS: CPT

## 2020-08-21 PROCEDURE — 83735 ASSAY OF MAGNESIUM: CPT

## 2020-08-21 RX ORDER — POTASSIUM CHLORIDE 7.45 MG/ML
10 INJECTION INTRAVENOUS
Status: DISCONTINUED | OUTPATIENT
Start: 2020-08-21 | End: 2020-08-21

## 2020-08-21 RX ORDER — HYDROCODONE BITARTRATE AND ACETAMINOPHEN 500; 5 MG/1; MG/1
TABLET ORAL
Status: CANCELLED | OUTPATIENT
Start: 2020-08-21

## 2020-08-21 RX ORDER — LISINOPRIL 20 MG/1
40 TABLET ORAL DAILY
Status: DISCONTINUED | OUTPATIENT
Start: 2020-08-21 | End: 2020-08-23 | Stop reason: HOSPADM

## 2020-08-21 RX ORDER — ONDANSETRON 2 MG/ML
8 INJECTION INTRAMUSCULAR; INTRAVENOUS EVERY 8 HOURS
Status: DISCONTINUED | OUTPATIENT
Start: 2020-08-21 | End: 2020-08-23

## 2020-08-21 RX ORDER — AMOXICILLIN 250 MG
1 CAPSULE ORAL 2 TIMES DAILY
Status: DISCONTINUED | OUTPATIENT
Start: 2020-08-21 | End: 2020-08-22

## 2020-08-21 RX ORDER — POTASSIUM CHLORIDE 1.5 G/1.58G
40 POWDER, FOR SOLUTION ORAL EVERY 4 HOURS
Status: COMPLETED | OUTPATIENT
Start: 2020-08-21 | End: 2020-08-21

## 2020-08-21 RX ORDER — MORPHINE SULFATE 10 MG/ML
2 INJECTION INTRAMUSCULAR; INTRAVENOUS; SUBCUTANEOUS EVERY 6 HOURS PRN
Status: DISCONTINUED | OUTPATIENT
Start: 2020-08-21 | End: 2020-08-22

## 2020-08-21 RX ADMIN — METRONIDAZOLE 500 MG: 500 INJECTION, SOLUTION INTRAVENOUS at 01:08

## 2020-08-21 RX ADMIN — POTASSIUM CHLORIDE 40 MEQ: 1.5 POWDER, FOR SOLUTION ORAL at 02:08

## 2020-08-21 RX ADMIN — PROMETHAZINE HYDROCHLORIDE 25 MG: 25 SUPPOSITORY RECTAL at 07:08

## 2020-08-21 RX ADMIN — HYDROCODONE BITARTRATE AND ACETAMINOPHEN 1 TABLET: 5; 325 TABLET ORAL at 03:08

## 2020-08-21 RX ADMIN — METRONIDAZOLE 500 MG: 500 INJECTION, SOLUTION INTRAVENOUS at 06:08

## 2020-08-21 RX ADMIN — Medication 3 ML: at 03:08

## 2020-08-21 RX ADMIN — CEFTRIAXONE 1 G: 1 INJECTION, SOLUTION INTRAVENOUS at 01:08

## 2020-08-21 RX ADMIN — Medication 3 ML: at 12:08

## 2020-08-21 RX ADMIN — AMLODIPINE BESYLATE 10 MG: 5 TABLET ORAL at 09:08

## 2020-08-21 RX ADMIN — ONDANSETRON 8 MG: 2 INJECTION INTRAMUSCULAR; INTRAVENOUS at 02:08

## 2020-08-21 RX ADMIN — PROMETHAZINE HYDROCHLORIDE 25 MG: 25 SUPPOSITORY RECTAL at 12:08

## 2020-08-21 RX ADMIN — MORPHINE SULFATE 2 MG: 10 INJECTION, SOLUTION INTRAMUSCULAR; INTRAVENOUS at 08:08

## 2020-08-21 RX ADMIN — ONDANSETRON 8 MG: 2 INJECTION INTRAMUSCULAR; INTRAVENOUS at 05:08

## 2020-08-21 RX ADMIN — METRONIDAZOLE 500 MG: 500 INJECTION, SOLUTION INTRAVENOUS at 09:08

## 2020-08-21 RX ADMIN — HYDRALAZINE HYDROCHLORIDE 10 MG: 20 INJECTION INTRAMUSCULAR; INTRAVENOUS at 12:08

## 2020-08-21 RX ADMIN — MORPHINE SULFATE 2 MG: 10 INJECTION, SOLUTION INTRAMUSCULAR; INTRAVENOUS at 12:08

## 2020-08-21 RX ADMIN — ONDANSETRON 8 MG: 2 INJECTION INTRAMUSCULAR; INTRAVENOUS at 09:08

## 2020-08-21 RX ADMIN — SODIUM CHLORIDE, SODIUM LACTATE, POTASSIUM CHLORIDE, AND CALCIUM CHLORIDE: .6; .31; .03; .02 INJECTION, SOLUTION INTRAVENOUS at 05:08

## 2020-08-21 RX ADMIN — DOCUSATE SODIUM - SENNOSIDES 1 TABLET: 50; 8.6 TABLET, FILM COATED ORAL at 09:08

## 2020-08-21 RX ADMIN — Medication 3 ML: at 05:08

## 2020-08-21 RX ADMIN — LISINOPRIL 40 MG: 20 TABLET ORAL at 02:08

## 2020-08-21 RX ADMIN — MORPHINE SULFATE 2 MG: 10 INJECTION, SOLUTION INTRAMUSCULAR; INTRAVENOUS at 10:08

## 2020-08-21 RX ADMIN — POTASSIUM CHLORIDE 40 MEQ: 1.5 POWDER, FOR SOLUTION ORAL at 10:08

## 2020-08-21 NOTE — NURSING
Reported off to oncoming nurse, patient resting in bed, aaox4. Patient can make needs known to staff, minimal assist required for adls and transfers, no acute distress noted, safety precautions maintained.       Chart check completed.

## 2020-08-21 NOTE — PROGRESS NOTES
[facility] Hospital Medicine  Progress Note    Patient Name: Cipriano Gamez  MRN: [id number]  Patient Class: IP- Inpatient   Admission Date: 8/19/2020  Length of Stay: 1 days  Attending Physician: Cintia Segovia MD  Primary Care Provider: Brooks Bergeron MD        Subjective:     Principal Problem:Acute diverticulitis        HPI:  Cipriano Gamez is a 44 y.o. female with diverticulosis and HTN who presented to [facility] Emergency Department complaining of intractable nausea vomiting.  Ten episodes of vomiting since yesterday.  Diffuse abdominal pain.  Recently admitted and treated for diverticulitis.  This is been a recurrent problem for her. s/p tubal ligation on 7/9/20.  Was admitted after that for diverticulitis and discharged with Bactrim and Flagyl.  Patient is allergic to Cipro.  Is a diffuse abdominal pain.  Denies bloody stool.  No hematemesis.  Decreased urine output.  Denies hematuria, dysuria, stones, or incontinence.    In the emergency department routine laboratory studies, urinalysis, and CT abdomen pelvis was performed.  Showed evidence of diverticulitis with resolution of free air seen on previous CT thought to be secondary to surgery for tubal ligation.  Intravenous antibiotics given.  Patient was hypokalemic and potassium replacement was ordered.  IV fluids given.    Hospital medicine has been asked to admit to inpatient for further evaluation and treatment.  GI is consulted.    Overview/Hospital Course:  Admitted with severe sepsis due to diverticulitis. Also with hypokalemia and ORALIA due to nausea and vomiting. Started IVF, CTX + flagyl, KCl replacement. Abdominal pain, nausea, vomiting improving. Tolerating liquids but still has nausea and intermittent vomiting. ORALIA resolved.    Interval History: Abdominal pain improving. Still has nausea and vomited earlier today    Review of Systems   Constitutional: Negative for chills and fever.   Respiratory:  Negative for cough and shortness of breath.    Cardiovascular: Negative for chest pain, palpitations and leg swelling.   Gastrointestinal: Positive for abdominal pain, constipation and nausea. Negative for diarrhea and vomiting.   Genitourinary: Negative for difficulty urinating.   Neurological: Negative for headaches.     Objective:     Vital Signs (Most Recent):  Temp: 98.6 °F (37 °C) (08/21/20 0849)  Pulse: 84 (08/21/20 0849)  Resp: 16 (08/21/20 0849)  BP: (!) 177/110 (08/21/20 0849)  SpO2: 98 % (08/21/20 0907) Vital Signs (24h Range):  Temp:  [97.7 °F (36.5 °C)-99.1 °F (37.3 °C)] 98.6 °F (37 °C)  Pulse:  [84-99] 84  Resp:  [16-18] 16  SpO2:  [94 %-100 %] 98 %  BP: (140-193)/() 177/110     Weight: 86.5 kg (190 lb 11.2 oz)  Body mass index is 29.87 kg/m².    Intake/Output Summary (Last 24 hours) at 8/21/2020 0934  Last data filed at 8/21/2020 0332  Gross per 24 hour   Intake --   Output 2200 ml   Net -2200 ml      Physical Exam  Vitals signs and nursing note reviewed.   Constitutional:       General: She is not in acute distress.     Appearance: She is normal weight. She is ill-appearing. She is not toxic-appearing or diaphoretic.   HENT:      Head: Normocephalic and atraumatic.   Cardiovascular:      Rate and Rhythm: Regular rhythm. Tachycardia present.      Pulses: Normal pulses.      Heart sounds: Normal heart sounds.   Pulmonary:      Effort: Pulmonary effort is normal. No respiratory distress.      Breath sounds: Normal breath sounds. No wheezing or rales.      Comments: Room air  Abdominal:      General: Bowel sounds are normal. There is no distension.      Palpations: Abdomen is soft. There is no mass.      Tenderness: There is abdominal tenderness (LLQ). There is no guarding or rebound.   Musculoskeletal:         General: No swelling.   Skin:     General: Skin is warm and dry.   Neurological:      General: No focal deficit present.      Mental Status: She is alert. Mental status is at baseline.          Significant Labs: All pertinent labs within the past 24 hours have been reviewed.    Significant Imaging: I have reviewed and interpreted all pertinent imaging results/findings within the past 24 hours.      Assessment/Plan:      * Acute diverticulitis  CT showing descending colon/sigmoid colon diverticulitis  On CTX + flagyl  Abdominal exam improving but still has nausea/vomiting  Continue IVF  Continue liquids  PRN antiemetics and pain Rx      Intractable nausea and vomiting  Due to diverticulitis  Improving       Neutrophilic leukocytosis  Due to diverticulitis      Hypokalemia  Replace and monitor        Severe sepsis  WBC 25 + HR 110s on admit with source diverticulitis and ORALIA  Improving with IVF and antibiotics- continue        ORALIA (acute kidney injury)  Cr 1.7 on admit from baseline 1  Prerenal due to nausea, vomiting  Resolved on 8/21  Continue IVF      Marijuana abuse  Encourage cessation      Tobacco use disorder  Encouraged cessation    Diverticulosis with multiple hospitalization for diverticulitis  3 prior hospitalizations this year  CT with descending colon/sigmoid diverticulitis  On IVF, CTX + flagyl  Abdominal exam improving- monitor  GI and Gen Surg following         VTE Risk Mitigation (From admission, onward)         Ordered     IP VTE HIGH RISK PATIENT  Once      08/20/20 0543     Place LOLITA hose  Until discontinued      08/20/20 0543     Place sequential compression device  Until discontinued      08/20/20 0543                Discharge Planning   RADU:      Code Status: Full Code   Is the patient medically ready for discharge?:     Reason for patient still in hospital (select all that apply): Patient trending condition  Discharge Plan A: Home                  Cintia Segovia MD  Department of Hospital Medicine   [facility]

## 2020-08-21 NOTE — ASSESSMENT & PLAN NOTE
CT showing descending colon/sigmoid colon diverticulitis  On CTX + flagyl  Abdominal exam improving but still has nausea/vomiting  Continue IVF  Continue liquids  PRN antiemetics and pain Rx

## 2020-08-21 NOTE — ASSESSMENT & PLAN NOTE
Cr 1.7 on admit from baseline 1  Prerenal due to nausea, vomiting  Resolved on 8/21  Continue IVF

## 2020-08-21 NOTE — PROGRESS NOTES
"gen surg   Less L sided abd pain, still w nausea  Blood pressure (!) 164/107, pulse 96, temperature 98.3 °F (36.8 °C), temperature source Oral, resp. rate 18, height 5' 7" (1.702 m), weight 86.5 kg (190 lb 11.2 oz), SpO2 98 %, not currently breastfeeding.  abd soft, bs present, nd, no masses, mild L sided trnderness  A/p recurrent L diverticulitis- improving pain, still w nausea, consider change to zosyn of invanz, hopefully convert to elective resection    "

## 2020-08-21 NOTE — PROGRESS NOTES
[facility]  Gastroenterology  Progress Note    Patient Name: Cipriano Gamez  MRN: [id number]  Admission Date: 8/19/2020  Hospital Length of Stay: 1 days  Code Status: Full Code   Attending Provider: Cintia Segovia MD  Primary Care Physician: Brooks Bergeron MD  Principal Problem: Acute diverticulitis    Subjective:     CC= abd pain    Interval History:     No acute events overnight.  Abd pain stable; reports 4 out of 5.  Still nauseated but tolerated some liquids.  No flatus or BMs yet.    Review of systems:  General: Negative for fevers, chills.  Cardiovascular:  Negative for chest pain, shortness of breath     Objective:     Vital Signs (Most Recent):  Temp: 98.6 °F (37 °C) (08/21/20 0849)  Pulse: 84 (08/21/20 0849)  Resp: 16 (08/21/20 0849)  BP: (!) 177/110 (08/21/20 0849)  SpO2: 98 % (08/21/20 0849) Vital Signs (24h Range):  Temp:  [97.7 °F (36.5 °C)-99.1 °F (37.3 °C)] 98.6 °F (37 °C)  Pulse:  [84-99] 84  Resp:  [16-18] 16  SpO2:  [94 %-100 %] 98 %  BP: (140-193)/() 177/110     Physical examination:  GEN: Well developed AAF in no acute distress.  HENT: Normocephalic, anicteric sclera   Cardiovascular: Regular rate and rhythm. No murmurs appreciated.   Chest: Non-labored respirations. Breath sounds equal   Abdomen: Soft, mild LLQ TTP, nondistended, normoactive BS  Psych: Appropriate mood and affect.   Extermities: No C/C/E. 2+ dorsalis pedis pulses bilaterally  Skin: No new visible or palpable lesions.     CBC:   Recent Labs   Lab 08/19/20  2158 08/20/20  0647 08/21/20  0450   WBC 25.57* 22.40* 13.42*   HGB 17.8* 15.7 15.6   HCT 51.4* 47.5 47.3   * 309 281       Imaging:  CT abd/pelvis with contrast (8/19/20):  Impression:  1.  Extensive colonic diverticulosis with focal segment of sigmoid/descending colon demonstrating abnormal wall thickening and mild pericolonic inflammatory change suggestive of acute uncomplicated diverticulitis.  Following appropriate therapy and resolution of  symptoms, endoscopic follow-up is advised to exclude underlying mass.  2.  Bilateral tubal ligation clips.  Small 2.2 cm left adnexal cyst.  3.  Additional findings as above.    Assessment:   44 year old female with a history of HTN and recurrent diverticulitis presenting with acute uncomplicated diverticulitis and acute kidney injury.  No abscess or perforation on CT, though fairly significant leukocytosis.  No fever.  AGAP nl and exam benign.    Plan:   1.  Continue IVF, IV abx (consider switching to zosyn), analgesics prn.  2.  Will switch to scheduled zofran given persistent nausea.  3.  Clear liquids only for now.  4.  Colonoscopy in 6 weeks if continues to improve.  5.  Surgery on board in case medical mgmt fails, though hopefully can proceed with elective resection.      Sabi Watts PA-C  Gastroenterology  [facility]

## 2020-08-21 NOTE — SUBJECTIVE & OBJECTIVE
Interval History: Abdominal pain improving. Still has nausea and vomited earlier today    Review of Systems   Constitutional: Negative for chills and fever.   Respiratory: Negative for cough and shortness of breath.    Cardiovascular: Negative for chest pain, palpitations and leg swelling.   Gastrointestinal: Positive for abdominal pain, constipation and nausea. Negative for diarrhea and vomiting.   Genitourinary: Negative for difficulty urinating.   Neurological: Negative for headaches.     Objective:     Vital Signs (Most Recent):  Temp: 98.6 °F (37 °C) (08/21/20 0849)  Pulse: 84 (08/21/20 0849)  Resp: 16 (08/21/20 0849)  BP: (!) 177/110 (08/21/20 0849)  SpO2: 98 % (08/21/20 0907) Vital Signs (24h Range):  Temp:  [97.7 °F (36.5 °C)-99.1 °F (37.3 °C)] 98.6 °F (37 °C)  Pulse:  [84-99] 84  Resp:  [16-18] 16  SpO2:  [94 %-100 %] 98 %  BP: (140-193)/() 177/110     Weight: 86.5 kg (190 lb 11.2 oz)  Body mass index is 29.87 kg/m².    Intake/Output Summary (Last 24 hours) at 8/21/2020 0934  Last data filed at 8/21/2020 0332  Gross per 24 hour   Intake --   Output 2200 ml   Net -2200 ml      Physical Exam  Vitals signs and nursing note reviewed.   Constitutional:       General: She is not in acute distress.     Appearance: She is normal weight. She is ill-appearing. She is not toxic-appearing or diaphoretic.   HENT:      Head: Normocephalic and atraumatic.   Cardiovascular:      Rate and Rhythm: Regular rhythm. Tachycardia present.      Pulses: Normal pulses.      Heart sounds: Normal heart sounds.   Pulmonary:      Effort: Pulmonary effort is normal. No respiratory distress.      Breath sounds: Normal breath sounds. No wheezing or rales.      Comments: Room air  Abdominal:      General: Bowel sounds are normal. There is no distension.      Palpations: Abdomen is soft. There is no mass.      Tenderness: There is abdominal tenderness (LLQ). There is no guarding or rebound.   Musculoskeletal:         General: No  swelling.   Skin:     General: Skin is warm and dry.   Neurological:      General: No focal deficit present.      Mental Status: She is alert. Mental status is at baseline.         Significant Labs: All pertinent labs within the past 24 hours have been reviewed.    Significant Imaging: I have reviewed and interpreted all pertinent imaging results/findings within the past 24 hours.

## 2020-08-22 LAB
ANION GAP SERPL CALC-SCNC: 11 MMOL/L (ref 8–16)
BASOPHILS # BLD AUTO: 0.06 K/UL (ref 0–0.2)
BASOPHILS NFR BLD: 0.5 % (ref 0–1.9)
BUN SERPL-MCNC: 7 MG/DL (ref 6–20)
CALCIUM SERPL-MCNC: 9.5 MG/DL (ref 8.7–10.5)
CHLORIDE SERPL-SCNC: 96 MMOL/L (ref 95–110)
CO2 SERPL-SCNC: 26 MMOL/L (ref 23–29)
CREAT SERPL-MCNC: 1.1 MG/DL (ref 0.5–1.4)
DIFFERENTIAL METHOD: ABNORMAL
EOSINOPHIL # BLD AUTO: 0 K/UL (ref 0–0.5)
EOSINOPHIL NFR BLD: 0.3 % (ref 0–8)
ERYTHROCYTE [DISTWIDTH] IN BLOOD BY AUTOMATED COUNT: 12.6 % (ref 11.5–14.5)
EST. GFR  (AFRICAN AMERICAN): >60 ML/MIN/1.73 M^2
EST. GFR  (NON AFRICAN AMERICAN): >60 ML/MIN/1.73 M^2
GLUCOSE SERPL-MCNC: 154 MG/DL (ref 70–110)
HCT VFR BLD AUTO: 47 % (ref 37–48.5)
HGB BLD-MCNC: 15.6 G/DL (ref 12–16)
IMM GRANULOCYTES # BLD AUTO: 0.04 K/UL (ref 0–0.04)
IMM GRANULOCYTES NFR BLD AUTO: 0.3 % (ref 0–0.5)
LYMPHOCYTES # BLD AUTO: 2.1 K/UL (ref 1–4.8)
LYMPHOCYTES NFR BLD: 17.4 % (ref 18–48)
MAGNESIUM SERPL-MCNC: 2.1 MG/DL (ref 1.6–2.6)
MCH RBC QN AUTO: 29.8 PG (ref 27–31)
MCHC RBC AUTO-ENTMCNC: 33.2 G/DL (ref 32–36)
MCV RBC AUTO: 90 FL (ref 82–98)
MONOCYTES # BLD AUTO: 1 K/UL (ref 0.3–1)
MONOCYTES NFR BLD: 8.2 % (ref 4–15)
NEUTROPHILS # BLD AUTO: 8.8 K/UL (ref 1.8–7.7)
NEUTROPHILS NFR BLD: 73.3 % (ref 38–73)
NRBC BLD-RTO: 0 /100 WBC
PHOSPHATE SERPL-MCNC: 2.9 MG/DL (ref 2.7–4.5)
PLATELET # BLD AUTO: 261 K/UL (ref 150–350)
PMV BLD AUTO: 10.8 FL (ref 9.2–12.9)
POTASSIUM SERPL-SCNC: 3.4 MMOL/L (ref 3.5–5.1)
RBC # BLD AUTO: 5.23 M/UL (ref 4–5.4)
SODIUM SERPL-SCNC: 133 MMOL/L (ref 136–145)
WBC # BLD AUTO: 11.98 K/UL (ref 3.9–12.7)

## 2020-08-22 PROCEDURE — A4216 STERILE WATER/SALINE, 10 ML: HCPCS | Performed by: HOSPITALIST

## 2020-08-22 PROCEDURE — 25000003 PHARM REV CODE 250: Performed by: HOSPITALIST

## 2020-08-22 PROCEDURE — 94761 N-INVAS EAR/PLS OXIMETRY MLT: CPT

## 2020-08-22 PROCEDURE — S0030 INJECTION, METRONIDAZOLE: HCPCS | Performed by: HOSPITALIST

## 2020-08-22 PROCEDURE — 36415 COLL VENOUS BLD VENIPUNCTURE: CPT

## 2020-08-22 PROCEDURE — 63600175 PHARM REV CODE 636 W HCPCS: Performed by: PHYSICIAN ASSISTANT

## 2020-08-22 PROCEDURE — 63600175 PHARM REV CODE 636 W HCPCS: Performed by: HOSPITALIST

## 2020-08-22 PROCEDURE — 85025 COMPLETE CBC W/AUTO DIFF WBC: CPT

## 2020-08-22 PROCEDURE — 84100 ASSAY OF PHOSPHORUS: CPT

## 2020-08-22 PROCEDURE — 83735 ASSAY OF MAGNESIUM: CPT

## 2020-08-22 PROCEDURE — 21400001 HC TELEMETRY ROOM

## 2020-08-22 PROCEDURE — 80048 BASIC METABOLIC PNL TOTAL CA: CPT

## 2020-08-22 RX ORDER — POTASSIUM CHLORIDE 1.5 G/1.58G
40 POWDER, FOR SOLUTION ORAL ONCE
Status: COMPLETED | OUTPATIENT
Start: 2020-08-22 | End: 2020-08-22

## 2020-08-22 RX ORDER — AMOXICILLIN 250 MG
2 CAPSULE ORAL 2 TIMES DAILY
Status: DISCONTINUED | OUTPATIENT
Start: 2020-08-22 | End: 2020-08-23 | Stop reason: HOSPADM

## 2020-08-22 RX ORDER — ENOXAPARIN SODIUM 100 MG/ML
40 INJECTION SUBCUTANEOUS EVERY 24 HOURS
Status: DISCONTINUED | OUTPATIENT
Start: 2020-08-22 | End: 2020-08-23 | Stop reason: HOSPADM

## 2020-08-22 RX ADMIN — ONDANSETRON 8 MG: 2 INJECTION INTRAMUSCULAR; INTRAVENOUS at 02:08

## 2020-08-22 RX ADMIN — METRONIDAZOLE 500 MG: 500 INJECTION, SOLUTION INTRAVENOUS at 05:08

## 2020-08-22 RX ADMIN — MORPHINE SULFATE 2 MG: 10 INJECTION, SOLUTION INTRAMUSCULAR; INTRAVENOUS at 09:08

## 2020-08-22 RX ADMIN — ONDANSETRON 8 MG: 2 INJECTION INTRAMUSCULAR; INTRAVENOUS at 05:08

## 2020-08-22 RX ADMIN — DOCUSATE SODIUM - SENNOSIDES 1 TABLET: 50; 8.6 TABLET, FILM COATED ORAL at 09:08

## 2020-08-22 RX ADMIN — POTASSIUM CHLORIDE 40 MEQ: 1.5 POWDER, FOR SOLUTION ORAL at 09:08

## 2020-08-22 RX ADMIN — HYDROCODONE BITARTRATE AND ACETAMINOPHEN 1 TABLET: 5; 325 TABLET ORAL at 10:08

## 2020-08-22 RX ADMIN — ONDANSETRON 8 MG: 2 INJECTION INTRAMUSCULAR; INTRAVENOUS at 09:08

## 2020-08-22 RX ADMIN — MORPHINE SULFATE 2 MG: 10 INJECTION, SOLUTION INTRAMUSCULAR; INTRAVENOUS at 02:08

## 2020-08-22 RX ADMIN — CEFTRIAXONE 1 G: 1 INJECTION, SOLUTION INTRAVENOUS at 01:08

## 2020-08-22 RX ADMIN — Medication 3 ML: at 05:08

## 2020-08-22 RX ADMIN — Medication 2.5 ML: at 01:08

## 2020-08-22 RX ADMIN — METRONIDAZOLE 500 MG: 500 INJECTION, SOLUTION INTRAVENOUS at 09:08

## 2020-08-22 RX ADMIN — ENOXAPARIN SODIUM 40 MG: 40 INJECTION SUBCUTANEOUS at 05:08

## 2020-08-22 RX ADMIN — HYDROCODONE BITARTRATE AND ACETAMINOPHEN 1 TABLET: 5; 325 TABLET ORAL at 05:08

## 2020-08-22 RX ADMIN — METRONIDAZOLE 500 MG: 500 INJECTION, SOLUTION INTRAVENOUS at 01:08

## 2020-08-22 RX ADMIN — Medication 3 ML: at 09:08

## 2020-08-22 RX ADMIN — LISINOPRIL 40 MG: 20 TABLET ORAL at 09:08

## 2020-08-22 RX ADMIN — Medication 3 ML: at 02:08

## 2020-08-22 RX ADMIN — AMLODIPINE BESYLATE 10 MG: 5 TABLET ORAL at 09:08

## 2020-08-22 NOTE — SUBJECTIVE & OBJECTIVE
Interval History: Abdominal pain improving- now 5/10 in LLQ. Still has nausea, no more vomiting. Passing flatus, no BM yet.     Review of Systems   Constitutional: Negative for chills and fever.   Respiratory: Negative for cough and shortness of breath.    Cardiovascular: Negative for chest pain, palpitations and leg swelling.   Gastrointestinal: Positive for abdominal pain, constipation and nausea. Negative for diarrhea and vomiting.   Genitourinary: Negative for difficulty urinating.   Neurological: Negative for headaches.     Objective:     Vital Signs (Most Recent):  Temp: 97.6 °F (36.4 °C) (08/22/20 1123)  Pulse: 92 (08/22/20 1123)  Resp: 18 (08/22/20 1123)  BP: (!) 142/92 (08/22/20 1123)  SpO2: 99 % (08/22/20 1123) Vital Signs (24h Range):  Temp:  [97.3 °F (36.3 °C)-98.9 °F (37.2 °C)] 97.6 °F (36.4 °C)  Pulse:  [] 92  Resp:  [18-22] 18  SpO2:  [97 %-100 %] 99 %  BP: (109-142)/() 142/92     Weight: 84.5 kg (186 lb 4.6 oz)  Body mass index is 29.18 kg/m².    Intake/Output Summary (Last 24 hours) at 8/22/2020 1355  Last data filed at 8/22/2020 0600  Gross per 24 hour   Intake 580 ml   Output 354 ml   Net 226 ml      Physical Exam  Vitals signs and nursing note reviewed.   Constitutional:       General: She is not in acute distress.     Appearance: She is normal weight. She is not ill-appearing, toxic-appearing or diaphoretic.   HENT:      Head: Normocephalic and atraumatic.   Cardiovascular:      Rate and Rhythm: Normal rate and regular rhythm.      Pulses: Normal pulses.      Heart sounds: Normal heart sounds.   Pulmonary:      Effort: Pulmonary effort is normal. No respiratory distress.      Breath sounds: Normal breath sounds. No wheezing or rales.      Comments: Room air  Abdominal:      General: Bowel sounds are normal. There is no distension.      Palpations: Abdomen is soft. There is no mass.      Tenderness: There is abdominal tenderness (LLQ with deep palpation). There is no guarding or  rebound.   Musculoskeletal:         General: No swelling.   Skin:     General: Skin is warm and dry.   Neurological:      General: No focal deficit present.      Mental Status: She is alert. Mental status is at baseline.         Significant Labs: All pertinent labs within the past 24 hours have been reviewed.    Significant Imaging: I have reviewed and interpreted all pertinent imaging results/findings within the past 24 hours.

## 2020-08-22 NOTE — PLAN OF CARE
Problem: Adult Inpatient Plan of Care  Goal: Plan of Care Review  Outcome: Ongoing, Progressing     Problem: Electrolyte Imbalance (Acute Kidney Injury/Impairment)  Goal: Serum Electrolyte Balance  Outcome: Ongoing, Progressing     Problem: Infection (Sepsis/Septic Shock)  Goal: Absence of Infection Signs/Symptoms  Outcome: Ongoing, Progressing     Problem: Fall Injury Risk  Goal: Absence of Fall and Fall-Related Injury  Outcome: Ongoing, Progressing

## 2020-08-22 NOTE — ASSESSMENT & PLAN NOTE
3 prior hospitalizations this year  CT with descending colon/sigmoid diverticulitis  Previously received IVF. Now tolerating PO  Continue CTX + flagyl  Abdominal exam improving- monitor. DC IV pain meds today  GI and Gen Surg following - outpatient follow up needed

## 2020-08-22 NOTE — ASSESSMENT & PLAN NOTE
CT showing descending colon/sigmoid colon diverticulitis  On CTX + flagyl- continue  Abdominal exam improving but still has nausea  Continue diet  PRN antiemetics and pain Rx. DC IV pain meds today  GI and Surgery following-- needs outpatient follow up

## 2020-08-22 NOTE — PROGRESS NOTES
[facility] Hospital Medicine  Progress Note    Patient Name: Cipriano Gamze  MRN: [id number]  Patient Class: IP- Inpatient   Admission Date: 8/19/2020  Length of Stay: 2 days  Attending Physician: Cintia Segovia MD  Primary Care Provider: Brooks Bergeron MD        Subjective:     Principal Problem:Acute diverticulitis        HPI:  Cipriano Gamez is a 44 y.o. female with diverticulosis and HTN who presented to [facility] Emergency Department complaining of intractable nausea vomiting.  Ten episodes of vomiting since yesterday.  Diffuse abdominal pain.  Recently admitted and treated for diverticulitis.  This is been a recurrent problem for her. s/p tubal ligation on 7/9/20.  Was admitted after that for diverticulitis and discharged with Bactrim and Flagyl.  Patient is allergic to Cipro.  Is a diffuse abdominal pain.  Denies bloody stool.  No hematemesis.  Decreased urine output.  Denies hematuria, dysuria, stones, or incontinence.    In the emergency department routine laboratory studies, urinalysis, and CT abdomen pelvis was performed.  Showed evidence of diverticulitis with resolution of free air seen on previous CT thought to be secondary to surgery for tubal ligation.  Intravenous antibiotics given.  Patient was hypokalemic and potassium replacement was ordered.  IV fluids given.    Hospital medicine has been asked to admit to inpatient for further evaluation and treatment.  GI is consulted.    Overview/Hospital Course:  Admitted with severe sepsis due to diverticulitis. Also with hypokalemia and ORALIA due to nausea and vomiting. Started IVF, CTX + flagyl, KCl replacement. Abdominal pain, nausea, vomiting improving. Tolerating liquids. ORALIA resolved.    Interval History: Abdominal pain improving- now 5/10 in LLQ. Still has nausea, no more vomiting. Passing flatus, no BM yet.     Review of Systems   Constitutional: Negative for chills and fever.   Respiratory: Negative for  cough and shortness of breath.    Cardiovascular: Negative for chest pain, palpitations and leg swelling.   Gastrointestinal: Positive for abdominal pain, constipation and nausea. Negative for diarrhea and vomiting.   Genitourinary: Negative for difficulty urinating.   Neurological: Negative for headaches.     Objective:     Vital Signs (Most Recent):  Temp: 97.6 °F (36.4 °C) (08/22/20 1123)  Pulse: 92 (08/22/20 1123)  Resp: 18 (08/22/20 1123)  BP: (!) 142/92 (08/22/20 1123)  SpO2: 99 % (08/22/20 1123) Vital Signs (24h Range):  Temp:  [97.3 °F (36.3 °C)-98.9 °F (37.2 °C)] 97.6 °F (36.4 °C)  Pulse:  [] 92  Resp:  [18-22] 18  SpO2:  [97 %-100 %] 99 %  BP: (109-142)/() 142/92     Weight: 84.5 kg (186 lb 4.6 oz)  Body mass index is 29.18 kg/m².    Intake/Output Summary (Last 24 hours) at 8/22/2020 1355  Last data filed at 8/22/2020 0600  Gross per 24 hour   Intake 580 ml   Output 354 ml   Net 226 ml      Physical Exam  Vitals signs and nursing note reviewed.   Constitutional:       General: She is not in acute distress.     Appearance: She is normal weight. She is not ill-appearing, toxic-appearing or diaphoretic.   HENT:      Head: Normocephalic and atraumatic.   Cardiovascular:      Rate and Rhythm: Normal rate and regular rhythm.      Pulses: Normal pulses.      Heart sounds: Normal heart sounds.   Pulmonary:      Effort: Pulmonary effort is normal. No respiratory distress.      Breath sounds: Normal breath sounds. No wheezing or rales.      Comments: Room air  Abdominal:      General: Bowel sounds are normal. There is no distension.      Palpations: Abdomen is soft. There is no mass.      Tenderness: There is abdominal tenderness (LLQ with deep palpation). There is no guarding or rebound.   Musculoskeletal:         General: No swelling.   Skin:     General: Skin is warm and dry.   Neurological:      General: No focal deficit present.      Mental Status: She is alert. Mental status is at baseline.          Significant Labs: All pertinent labs within the past 24 hours have been reviewed.    Significant Imaging: I have reviewed and interpreted all pertinent imaging results/findings within the past 24 hours.      Assessment/Plan:      * Acute diverticulitis  CT showing descending colon/sigmoid colon diverticulitis  On CTX + flagyl- continue  Abdominal exam improving but still has nausea  Continue diet  PRN antiemetics and pain Rx. DC IV pain meds today  GI and Surgery following-- needs outpatient follow up    Intractable nausea and vomiting  Due to diverticulitis  Resolved.       Neutrophilic leukocytosis  Due to diverticulitis  Resolved.     Hypokalemia  Replace and monitor        Severe sepsis  WBC 25 + HR 110s on admit with source diverticulitis and ORALIA  Improving with IVF and antibiotics- continue antibiotics        ORALIA (acute kidney injury)  Cr 1.7 on admit from baseline 1  Prerenal due to nausea, vomiting  Resolved on 8/21      Marijuana abuse  Encourage cessation      Tobacco use disorder  Encouraged cessation    Diverticulosis with multiple hospitalization for diverticulitis  3 prior hospitalizations this year  CT with descending colon/sigmoid diverticulitis  Previously received IVF. Now tolerating PO  Continue CTX + flagyl  Abdominal exam improving- monitor. DC IV pain meds today  GI and Gen Surg following - outpatient follow up needed        VTE Risk Mitigation (From admission, onward)         Ordered     IP VTE HIGH RISK PATIENT  Once      08/20/20 0543     Place LOLITA hose  Until discontinued      08/20/20 0543     Place sequential compression device  Until discontinued      08/20/20 0543                Discharge Planning   RADU:      Code Status: Full Code   Is the patient medically ready for discharge?:     Reason for patient still in hospital (select all that apply): Patient trending condition  Discharge Plan A: Home                  Cintia Segovia MD  Department of Hospital Medicine   [facility]

## 2020-08-22 NOTE — ASSESSMENT & PLAN NOTE
WBC 25 + HR 110s on admit with source diverticulitis and ORALIA  Improving with IVF and antibiotics- continue antibiotics

## 2020-08-22 NOTE — PROGRESS NOTES
Surgery Progress Note    Primary Problem: Acute diverticulitis    Other problems:   Active Hospital Problems    Diagnosis  POA    *Acute diverticulitis [K57.92]  Yes    Neutrophilic leukocytosis [D72.9]  Yes    Intractable nausea and vomiting [R11.2]  Yes    Hypokalemia [E87.6]  Yes    ORALIA (acute kidney injury) [N17.9]  Yes    Severe sepsis [A41.9, R65.20]  Yes    Marijuana abuse [F12.10]  Yes    Tobacco use disorder [F17.200]  Yes    Diverticulosis with multiple hospitalization for diverticulitis [K57.90]  Yes     2011 colonoscopy diverticulosis.        Resolved Hospital Problems   No resolved problems to display.       HD #  LOS: 2 days   POD #     Overnight events:  No acute events overnight tolerating clears without any issues.  Wishes to advance to regular diet  Pain continues to improve     ROS:  No chest pain  No shortness of breath    Diet - Diet full liquid     I/O last 3 completed shifts:  In: 1460 [P.O.:840; Other:420; IV Piggyback:200]  Out: 1454 [Urine:1454]    Intake/Output Summary (Last 24 hours) at 8/22/2020 1237  Last data filed at 8/22/2020 0600  Gross per 24 hour   Intake 580 ml   Output 354 ml   Net 226 ml       Temp:  [97.3 °F (36.3 °C)-98.9 °F (37.2 °C)] 97.6 °F (36.4 °C)  Pulse:  [] 92  Resp:  [18-22] 18  SpO2:  [97 %-100 %] 99 %  BP: (109-142)/() 142/92    Gen: alert, well appearing, and in no distress,    Chest: Normal respiratory effort    CVS exam: normal rate and regular rhythm.    Abdomen:  Soft mild tender to palpation left lower quadrant.  No rebound or guarding    Recent Labs   Lab 08/19/20  2158 08/19/20  2305 08/20/20  0647 08/21/20  0450 08/22/20  0511   WBC 25.57*  --  22.40* 13.42* 11.98   HGB 17.8*  --  15.7 15.6 15.6   HCT 51.4*  --  47.5 47.3 47.0   *  --  309 281 261   NA  --  140 141 136 133*   K  --  2.7* 4.1 3.0* 3.4*   CL  --  90* 96 96 96   BUN  --  20 17 8 7   GLU  --  165* 134* 98 154*   PROT  --  9.4*  --  7.4  --    ALBUMIN  --  4.8  --   3.9  --    BILITOT  --  0.7  --  0.4  --    AST  --  18  --  16  --    ALKPHOS  --  99  --  89  --    ALT  --  14  --  11  --         Assessment:  Diverticulitis    Plan:  Advance diet  Continue antibiotics.  WBC continues to improve  Hopefully will be able to compare to elective procedure    Gt De La Vega MD

## 2020-08-22 NOTE — NURSING
Report received from COLLIN Soto. Visualized patient and assessed patient's overall condition and appearance. No acute distress noted. Will continue to monitor

## 2020-08-23 ENCOUNTER — NURSE TRIAGE (OUTPATIENT)
Dept: ADMINISTRATIVE | Facility: CLINIC | Age: 45
End: 2020-08-23

## 2020-08-23 VITALS
RESPIRATION RATE: 16 BRPM | SYSTOLIC BLOOD PRESSURE: 136 MMHG | OXYGEN SATURATION: 98 % | BODY MASS INDEX: 29.24 KG/M2 | HEART RATE: 77 BPM | TEMPERATURE: 98 F | WEIGHT: 186.31 LBS | DIASTOLIC BLOOD PRESSURE: 91 MMHG | HEIGHT: 67 IN

## 2020-08-23 LAB
ANION GAP SERPL CALC-SCNC: 11 MMOL/L (ref 8–16)
BUN SERPL-MCNC: 10 MG/DL (ref 6–20)
CALCIUM SERPL-MCNC: 9 MG/DL (ref 8.7–10.5)
CHLORIDE SERPL-SCNC: 98 MMOL/L (ref 95–110)
CO2 SERPL-SCNC: 26 MMOL/L (ref 23–29)
CREAT SERPL-MCNC: 1.2 MG/DL (ref 0.5–1.4)
EST. GFR  (AFRICAN AMERICAN): >60 ML/MIN/1.73 M^2
EST. GFR  (NON AFRICAN AMERICAN): 56 ML/MIN/1.73 M^2
GLUCOSE SERPL-MCNC: 104 MG/DL (ref 70–110)
POTASSIUM SERPL-SCNC: 3.6 MMOL/L (ref 3.5–5.1)
SODIUM SERPL-SCNC: 135 MMOL/L (ref 136–145)

## 2020-08-23 PROCEDURE — 25000003 PHARM REV CODE 250: Performed by: HOSPITALIST

## 2020-08-23 PROCEDURE — 36415 COLL VENOUS BLD VENIPUNCTURE: CPT

## 2020-08-23 PROCEDURE — 63600175 PHARM REV CODE 636 W HCPCS: Performed by: HOSPITALIST

## 2020-08-23 PROCEDURE — 94761 N-INVAS EAR/PLS OXIMETRY MLT: CPT

## 2020-08-23 PROCEDURE — 80048 BASIC METABOLIC PNL TOTAL CA: CPT

## 2020-08-23 PROCEDURE — S0030 INJECTION, METRONIDAZOLE: HCPCS | Performed by: HOSPITALIST

## 2020-08-23 PROCEDURE — A4216 STERILE WATER/SALINE, 10 ML: HCPCS | Performed by: HOSPITALIST

## 2020-08-23 PROCEDURE — 63600175 PHARM REV CODE 636 W HCPCS: Performed by: PHYSICIAN ASSISTANT

## 2020-08-23 RX ORDER — FLUCONAZOLE 150 MG/1
150 TABLET ORAL ONCE AS NEEDED
Qty: 1 TABLET | Refills: 0 | Status: SHIPPED | OUTPATIENT
Start: 2020-08-23 | End: 2020-08-23

## 2020-08-23 RX ORDER — AMOXICILLIN AND CLAVULANATE POTASSIUM 875; 125 MG/1; MG/1
1 TABLET, FILM COATED ORAL EVERY 12 HOURS
Qty: 12 TABLET | Refills: 0 | Status: SHIPPED | OUTPATIENT
Start: 2020-08-23 | End: 2020-08-29

## 2020-08-23 RX ORDER — HYDROCODONE BITARTRATE AND ACETAMINOPHEN 5; 325 MG/1; MG/1
1 TABLET ORAL EVERY 8 HOURS PRN
Qty: 5 TABLET | Refills: 0 | Status: ON HOLD | OUTPATIENT
Start: 2020-08-23 | End: 2021-07-06 | Stop reason: SDUPTHER

## 2020-08-23 RX ORDER — ONDANSETRON HYDROCHLORIDE 8 MG/1
8 TABLET, FILM COATED ORAL EVERY 6 HOURS PRN
Qty: 30 TABLET | Refills: 0 | Status: SHIPPED | OUTPATIENT
Start: 2020-08-23 | End: 2020-09-22

## 2020-08-23 RX ORDER — PROMETHAZINE HYDROCHLORIDE 25 MG/1
25 SUPPOSITORY RECTAL EVERY 6 HOURS PRN
Qty: 10 SUPPOSITORY | Refills: 0 | Status: ON HOLD | OUTPATIENT
Start: 2020-08-23 | End: 2021-07-06 | Stop reason: HOSPADM

## 2020-08-23 RX ORDER — POTASSIUM CHLORIDE 1.5 G/1.58G
40 POWDER, FOR SOLUTION ORAL ONCE
Status: COMPLETED | OUTPATIENT
Start: 2020-08-23 | End: 2020-08-23

## 2020-08-23 RX ADMIN — METRONIDAZOLE 500 MG: 500 INJECTION, SOLUTION INTRAVENOUS at 08:08

## 2020-08-23 RX ADMIN — Medication 3 ML: at 05:08

## 2020-08-23 RX ADMIN — LISINOPRIL 40 MG: 20 TABLET ORAL at 08:08

## 2020-08-23 RX ADMIN — POTASSIUM CHLORIDE 40 MEQ: 1.5 POWDER, FOR SOLUTION ORAL at 11:08

## 2020-08-23 RX ADMIN — CEFTRIAXONE 1 G: 1 INJECTION, SOLUTION INTRAVENOUS at 01:08

## 2020-08-23 RX ADMIN — DOCUSATE SODIUM - SENNOSIDES 2 TABLET: 50; 8.6 TABLET, FILM COATED ORAL at 08:08

## 2020-08-23 RX ADMIN — METRONIDAZOLE 500 MG: 500 INJECTION, SOLUTION INTRAVENOUS at 02:08

## 2020-08-23 RX ADMIN — ONDANSETRON 8 MG: 2 INJECTION INTRAMUSCULAR; INTRAVENOUS at 05:08

## 2020-08-23 RX ADMIN — AMLODIPINE BESYLATE 10 MG: 5 TABLET ORAL at 08:08

## 2020-08-23 RX ADMIN — HYDROCODONE BITARTRATE AND ACETAMINOPHEN 1 TABLET: 5; 325 TABLET ORAL at 05:08

## 2020-08-23 NOTE — PLAN OF CARE
WRITTEN HEALTHCARE DISCHARGE INFORMATION      Things that YOU are responsible for to Manage Your Care At Home:  1. Getting your prescriptions filled.  2. Taking you medications as directed. DO NOT MISS ANY DOSES!  3. Going to your follow-up doctor appointments. This is important because it allows the doctor to monitor your progress and to determine if any changes need to be made to your treatment plan.     If you are unable to make your follow up appointments, please call the number listed and reschedule this appointment.      After discharge, if you need assistance, you can call [facility] Line for 24/7 assistance at [phone]     If you are experience any signs or symptoms, Call your doctor or Call 911 and come to your nearest Emergency Room.     Thank you for choosing [facility] and allowing us to care for you.        You should receive a call from [facility] within 48-72 hours to help manage your care after discharge. Please try to make sure that you answer your phone for this important phone call.   Follow up appointments:  It is critical that you make your follow-up appointment(s). If you are discharged on the weekend or after business hours, or if we are unable to schedule these appointments for you for any reason, you or a family member need to call during the next business day to schedule your appointment(s).    Follow-up Information     Brooks Bergeron MD On 9/15/2020.    Specialty: Internal Medicine  Why: Hospital discharge follow up, appointment scheduled September 15th @ 0900  Contact information:  [address] [facility]  [place] [address]  [phone]             Silas Apodaca MD.    Specialty: Gastroenterology  Why: Please contact office on Monday to arrange a hospital discharge follow up appoinntment to discuss having a colonoscopy  Contact information:  [address] [place] [address]  [address]  [facility]  [place] [address]  [phone]              Heri Chua MD.    Specialty: General Surgery  Why: please call office on Monday to arrange a hospital discharge follow up visit   Contact information:  [address] [place] [address]  [id number]  [facility] [id number] [phone]                   Note from MD: Follow up with GI for colonoscopy and Surgery for discussion about resection given her recurrent bouts of diverticulitis (this is hospitalization #4 this year).

## 2020-08-23 NOTE — PLAN OF CARE
Problem: Adult Inpatient Plan of Care  Goal: Plan of Care Review  Outcome: Ongoing, Progressing  Goal: Patient-Specific Goal (Individualization)  Outcome: Ongoing, Progressing  Goal: Absence of Hospital-Acquired Illness or Injury  Outcome: Ongoing, Progressing  Goal: Optimal Comfort and Wellbeing  Outcome: Ongoing, Progressing  Goal: Readiness for Transition of Care  Outcome: Ongoing, Progressing  Goal: Rounds/Family Conference  Outcome: Ongoing, Progressing     Problem: Electrolyte Imbalance (Acute Kidney Injury/Impairment)  Goal: Serum Electrolyte Balance  Outcome: Ongoing, Progressing     Problem: Fluid Imbalance (Acute Kidney Injury/Impairment)  Goal: Optimal Fluid Balance  Outcome: Ongoing, Progressing     Problem: Hematologic Alteration (Acute Kidney Injury/Impairment)  Goal: Hemoglobin, Hematocrit and Platelets Within Normal Range  Outcome: Ongoing, Progressing     Problem: Oral Intake Inadequate (Acute Kidney Injury/Impairment)  Goal: Optimal Nutrition Intake  Outcome: Ongoing, Progressing     Problem: Renal Function Impairment (Acute Kidney Injury/Impairment)  Goal: Effective Renal Function  Outcome: Ongoing, Progressing     Problem: Adjustment to Illness (Sepsis/Septic Shock)  Goal: Optimal Coping  Outcome: Ongoing, Progressing     Problem: Bleeding (Sepsis/Septic Shock)  Goal: Absence of Bleeding  Outcome: Ongoing, Progressing     Problem: Glycemic Control Impaired (Sepsis/Septic Shock)  Goal: Blood Glucose Level Within Desired Range  Outcome: Ongoing, Progressing     Problem: Hemodynamic Instability (Sepsis/Septic Shock)  Goal: Effective Tissue Perfusion  Outcome: Ongoing, Progressing     Problem: Infection (Sepsis/Septic Shock)  Goal: Absence of Infection Signs/Symptoms  Outcome: Ongoing, Progressing     Problem: Nutrition Impaired (Sepsis/Septic Shock)  Goal: Optimal Nutrition Intake  Outcome: Ongoing, Progressing     Problem: Respiratory Compromise (Sepsis/Septic Shock)  Goal: Effective Oxygenation  and Ventilation  Outcome: Ongoing, Progressing     Problem: Fall Injury Risk  Goal: Absence of Fall and Fall-Related Injury  Outcome: Ongoing, Progressing

## 2020-08-23 NOTE — NURSING
Pt discharged home.  Pt teaching and dc instructions given.  Pt verbalized understanding.  IV removed tip intact.  Pt tolerated well.  Pt awaiting transport off of unit.  Tele previously removed.

## 2020-08-23 NOTE — PLAN OF CARE
EDUCATION:  Patient discharge instructions updated to include educational information on Diverticulitis/ORALIA that will be printed on patient's AVS to review upon discharge; Information reviewed with patient and include  signs and symptoms to look for and call the doctor if experiencing, and symptoms that may indicate a medical emergency: CALL 911.    All questions answered.  Teach back method used.    Repeated that she will call MD for blood in stools and N/V    Reviewed with patient things that she can do to better manage her care at home to include taking all medications as precscribed and make sure patient attend all follow up visits;     F/U appointments with PCP/Surgeon/Gastroenterology were reviewed;  verbalized understanding     NurseSania notified that all discharge planning needs have been addressed and patient can be discharged from  standpoint        08/23/20 1152   Final Note   Assessment Type Final Discharge Note   Anticipated Discharge Disposition Home   What phone number can be called within the next 1-3 days to see how you are doing after discharge? [phone]   Hospital Follow Up  Appt(s) scheduled? Yes   Discharge plans and expectations educations in teach back method with documentation complete? Yes   Right Care Referral Info   Post Acute Recommendation No Care   Post-Acute Status   Post-Acute Authorization Other   Other Status No Post-Acute Service Needs   Discharge Delays None known at this time

## 2020-08-23 NOTE — NURSING
Bedside Report given to COLLIN Alanis. Walking rounds completed. Visualized and assessed patient NAD noted. Safety precautions maintained and call light within reach.     Chart check completed.

## 2020-08-23 NOTE — TELEPHONE ENCOUNTER
"  Reason for Disposition   [1] Caller has URGENT medication question about med that PCP or specialist prescribed AND [2] triager unable to answer question    Additional Information   Negative: Drug overdose and triager unable to answer question   Negative: Caller requesting information unrelated to medicine   Negative: Caller requesting a prescription for Strep throat and has a positive culture result   Negative: Rash while taking a medication or within 3 days of stopping it   Negative: Immunization reaction suspected   Negative: [1] Asthma and [2] having symptoms of asthma (cough, wheezing, etc.)   Negative: [1] Influenza symptoms AND [2] anti-viral med prescription request, such as Tamiflu   Negative: [1] Symptom of illness (e.g., headache, abdominal pain, earache, vomiting) AND [2] more than mild   Negative: MORE THAN A DOUBLE DOSE of a prescription or over-the-counter (OTC) drug   Negative: [1] DOUBLE DOSE (an extra dose or lesser amount) of over-the-counter (OTC) drug AND [2] any symptoms (e.g., dizziness, nausea, pain, sleepiness)   Negative: [1] DOUBLE DOSE (an extra dose or lesser amount) of prescription drug AND [2] any symptoms (e.g., dizziness, nausea, pain, sleepiness)   Negative: Took another person's prescription drug   Negative: [1] Pharmacy calling with prescription questions AND [2] triager unable to answer question   Negative: [1] Prescription not at pharmacy AND [2] was prescribed by PCP recently   Negative: [1] Request for URGENT new prescription or refill of "essential" medication (i.e., likelihood of harm to patient if not taken) AND [2] triager unable to fill per unit policy   Negative: Diabetes drug error or overdose (e.g., took wrong type of insulin or took extra dose)   Negative: [1] DOUBLE DOSE (an extra dose or lesser amount) of prescription drug AND [2] NO symptoms (Exception: a double dose of antibiotics)    Protocols [facility]  Pt called re " d/cd today. meds sent to [facility] near home. Pain med never called in.  D/C'd from room 339 form [facility]. Spoke with Sania. rx for anders RN still has it in folder. Jasper send rx. Pt transferred to speak with nurse.

## 2020-08-23 NOTE — DISCHARGE SUMMARY
[facility] Hospital Medicine  Discharge Summary      Patient Name: Cipriano Gamez  MRN: [id number]  Admission Date: 8/19/2020  Hospital Length of Stay: 3 days  Discharge Date and Time:  08/23/2020 9:44 AM  Attending Physician: Cintia Segovia MD   Discharging Provider: Cintia Segovia MD  Primary Care Provider: Brooks Bergeron MD      HPI:   Cipriano Gamez is a 44 y.o. female with diverticulosis and HTN who presented to [facility] Emergency Department complaining of intractable nausea vomiting.  Ten episodes of vomiting since yesterday.  Diffuse abdominal pain.  Recently admitted and treated for diverticulitis.  This is been a recurrent problem for her. s/p tubal ligation on 7/9/20.  Was admitted after that for diverticulitis and discharged with Bactrim and Flagyl.  Patient is allergic to Cipro.  Is a diffuse abdominal pain.  Denies bloody stool.  No hematemesis.  Decreased urine output.  Denies hematuria, dysuria, stones, or incontinence.    In the emergency department routine laboratory studies, urinalysis, and CT abdomen pelvis was performed.  Showed evidence of diverticulitis with resolution of free air seen on previous CT thought to be secondary to surgery for tubal ligation.  Intravenous antibiotics given.  Patient was hypokalemic and potassium replacement was ordered.  IV fluids given.    Hospital medicine has been asked to admit to inpatient for further evaluation and treatment.  GI is consulted.    * No surgery found *      Hospital Course:   Admitted with severe sepsis due to diverticulitis. Also with hypokalemia and ORALIA due to nausea and vomiting. Started IVF, CTX + flagyl, KCl replacement. Abdominal pain, nausea, vomiting improving. Tolerating regular diet. ORALIA resolved. Abdominal pain now rated at 4/10. She says she feels well and is ready for discharge. Discharged to home with augmentin to complete 10 day course for acute diverticulitis (due to cipro  allergy). PRN zofran and phenergan prescribed. She says she often gets yeast infections with antibiotics- PRN fluconazole prescribed. Follow up with GI for colonoscopy and Surgery for discussion about resection given her recurrent bouts of diverticulitis (this is hospitalization #4 this year).      Consults:   Consults (From admission, onward)        Status Ordering Provider     Inpatient consult to Gastroenterology  Once     Provider:  Silas Apodaca MD    Completed GOSIA LOPEZ     Inpatient consult to General Surgery  Once     Provider:  Heri Chua MD    Completed JADE AGUIRRE          No new Assessment & Plan notes have been filed under this hospital service since the last note was generated.  Service: Hospital Medicine    Final Active Diagnoses:    Diagnosis Date Noted POA    PRINCIPAL PROBLEM:  Acute diverticulitis [K57.92] 08/20/2020 Yes    Neutrophilic leukocytosis [D72.9] 08/20/2020 Yes    Intractable nausea and vomiting [R11.2] 08/20/2020 Yes    Hypokalemia [E87.6] 07/14/2020 Yes    ORALIA (acute kidney injury) [N17.9] 01/07/2020 Yes    Severe sepsis [A41.9, R65.20] 01/07/2020 Yes    Marijuana abuse [F12.10] 12/14/2018 Yes    Tobacco use disorder [F17.200] 03/21/2018 Yes    Diverticulosis with multiple hospitalization for diverticulitis [K57.90]  Yes      Problems Resolved During this Admission:       Discharged Condition: good    Disposition: Home or Self Care    Follow Up: GI and Gen Surgery    Patient Instructions:      Diet Adult Regular     Notify your health care provider if you experience any of the following:  temperature >100.4     Notify your health care provider if you experience any of the following:  persistent nausea and vomiting or diarrhea     Notify your health care provider if you experience any of the following:  severe uncontrolled pain     Notify your health care provider if you experience any of the following:  redness, tenderness, or signs of infection  (pain, swelling, redness, odor or green/yellow discharge around incision site)     Notify your health care provider if you experience any of the following:  difficulty breathing or increased cough     Notify your health care provider if you experience any of the following:  severe persistent headache     Notify your health care provider if you experience any of the following:  worsening rash     Notify your health care provider if you experience any of the following:  persistent dizziness, light-headedness, or visual disturbances     Notify your health care provider if you experience any of the following:  increased confusion or weakness     Activity as tolerated       Significant Diagnostic Studies: Labs: All labs within the past 24 hours have been reviewed    Pending Diagnostic Studies:     None         Medications:  Reconciled Home Medications:      Medication List      START taking these medications    amoxicillin-clavulanate 875-125mg 875-125 mg per tablet  Commonly known as: AUGMENTIN  Take 1 tablet by mouth every 12 (twelve) hours. for 6 days     fluconazole 150 MG Tab  Commonly known as: DIFLUCAN  Take 1 tablet (150 mg total) by mouth once as needed (yeast infection).     HYDROcodone-acetaminophen 5-325 mg per tablet  Commonly known as: NORCO  Take 1 tablet by mouth every 8 (eight) hours as needed for Pain.        CHANGE how you take these medications    ondansetron 8 MG tablet  Commonly known as: ZOFRAN  Take 1 tablet (8 mg total) by mouth every 6 (six) hours as needed for Nausea.  What changed:   · medication strength  · when to take this  · reasons to take this        CONTINUE taking these medications    amLODIPine 10 MG tablet  Commonly known as: NORVASC  Take 1 tablet (10 mg total) by mouth once daily.     diphenhydrAMINE 25 mg tablet  Commonly known as: SOMINEX  Take 25 mg by mouth nightly as needed for Insomnia.     lisinopriL 40 MG tablet  Commonly known as: PRINIVIL,ZESTRIL  TAKE 1 TABLET BY MOUTH  EVERY DAY     promethazine 25 MG suppository  Commonly known as: PHENERGAN  Place 1 suppository (25 mg total) rectally every 6 (six) hours as needed for Nausea.            Indwelling Lines/Drains at time of discharge:   Lines/Drains/Airways     None                 Time spent on the discharge of patient: 35 minutes  Patient was seen and examined on the date of discharge and determined to be suitable for discharge.         Cintia Segovia MD  Department of Hospital Medicine  [facility]

## 2020-08-23 NOTE — DISCHARGE INSTRUCTIONS
WRITTEN HEALTHCARE DISCHARGE INFORMATION      Things that YOU are responsible for to Manage Your Care At Home:  1. Getting your prescriptions filled.  2. Taking you medications as directed. DO NOT MISS ANY DOSES!  3. Going to your follow-up doctor appointments. This is important because it allows the doctor to monitor your progress and to determine if any changes need to be made to your treatment plan.     If you are unable to make your follow up appointments, please call the number listed and reschedule this appointment.      After discharge, if you need assistance, you can call [facility] for 24/7 assistance at [phone]     If you are experience any signs or symptoms, Call your doctor or Call 911 and come to your nearest Emergency Room.     Thank you for choosing [facility] and allowing us to care for you.        You should receive a call from [facility] within 48-72 hours to help manage your care after discharge. Please try to make sure that you answer your phone for this important phone call.     Follow up appointments:  It is critical that you make your follow-up appointment(s). If you are discharged on the weekend or after business hours, or if we are unable to schedule these appointments for you for any reason, you or a family member need to call during the next business day to schedule your appointment(s).

## 2020-08-31 ENCOUNTER — TELEPHONE (OUTPATIENT)
Dept: SURGERY | Facility: CLINIC | Age: 45
End: 2020-08-31

## 2020-08-31 ENCOUNTER — PATIENT OUTREACH (OUTPATIENT)
Dept: ADMINISTRATIVE | Facility: OTHER | Age: 45
End: 2020-08-31

## 2020-08-31 NOTE — TELEPHONE ENCOUNTER
Left message for patient to return call regarding her appointment with Dr. Smith on Tuesday 9/1 @ 11:00.  Dr. Smith is presently unable to do virtual visits.  It will have to be an office visit if she would still like to come in.

## 2020-08-31 NOTE — PROGRESS NOTES
LINKS immunization registry updated  Care Everywhere updated  Health Maintenance updated  DIS/Chart reviewed for overdue Proactive [facility] Encounters ([facility]) health maintenance testing (CRS, Breast Ca, Diabetic Eye Exam)   Orders entered:N/A  Portal message sent to patient with scheduling link for mammogram

## 2020-09-28 DIAGNOSIS — I10 ESSENTIAL HYPERTENSION: ICD-10-CM

## 2020-09-29 RX ORDER — LABETALOL 100 MG/1
TABLET, FILM COATED ORAL
Qty: 180 TABLET | Refills: 1 | OUTPATIENT
Start: 2020-09-29

## 2020-10-05 ENCOUNTER — PATIENT MESSAGE (OUTPATIENT)
Dept: ADMINISTRATIVE | Facility: HOSPITAL | Age: 45
End: 2020-10-05

## 2020-12-22 DIAGNOSIS — I10 ESSENTIAL HYPERTENSION: ICD-10-CM

## 2020-12-23 RX ORDER — LISINOPRIL 40 MG/1
40 TABLET ORAL DAILY
Qty: 90 TABLET | Refills: 1 | Status: SHIPPED | OUTPATIENT
Start: 2020-12-23 | End: 2021-06-27

## 2020-12-23 RX ORDER — AMLODIPINE BESYLATE 10 MG/1
10 TABLET ORAL DAILY
Qty: 90 TABLET | Refills: 1 | Status: ON HOLD | OUTPATIENT
Start: 2020-12-23 | End: 2021-07-28 | Stop reason: HOSPADM

## 2021-01-04 ENCOUNTER — PATIENT MESSAGE (OUTPATIENT)
Dept: ADMINISTRATIVE | Facility: HOSPITAL | Age: 46
End: 2021-01-04

## 2021-04-06 ENCOUNTER — PATIENT MESSAGE (OUTPATIENT)
Dept: ADMINISTRATIVE | Facility: HOSPITAL | Age: 46
End: 2021-04-06

## 2021-04-07 DIAGNOSIS — Z12.31 OTHER SCREENING MAMMOGRAM: ICD-10-CM

## 2021-04-12 ENCOUNTER — PATIENT MESSAGE (OUTPATIENT)
Dept: RESEARCH | Facility: HOSPITAL | Age: 46
End: 2021-04-12

## 2021-04-16 ENCOUNTER — PATIENT MESSAGE (OUTPATIENT)
Dept: RESEARCH | Facility: HOSPITAL | Age: 46
End: 2021-04-16

## 2021-07-03 ENCOUNTER — HOSPITAL ENCOUNTER (INPATIENT)
Facility: HOSPITAL | Age: 46
LOS: 3 days | Discharge: HOME | DRG: 392 | End: 2021-07-06
Attending: EMERGENCY MEDICINE | Admitting: INTERNAL MEDICINE
Payer: COMMERCIAL

## 2021-07-03 DIAGNOSIS — K57.92 DIVERTICULITIS: ICD-10-CM

## 2021-07-03 DIAGNOSIS — R94.31 QT PROLONGATION: ICD-10-CM

## 2021-07-03 PROBLEM — O99.211 OBESITY AFFECTING PREGNANCY IN FIRST TRIMESTER: Status: RESOLVED | Noted: 2020-03-27 | Resolved: 2021-07-03

## 2021-07-03 PROBLEM — A41.9 SEVERE SEPSIS: Status: RESOLVED | Noted: 2020-01-07 | Resolved: 2021-07-03

## 2021-07-03 PROBLEM — O99.321 DRUG DEPENDENCE AFFECTING PREGNANCY IN FIRST TRIMESTER: Status: RESOLVED | Noted: 2020-03-27 | Resolved: 2021-07-03

## 2021-07-03 PROBLEM — F19.20 DRUG DEPENDENCE AFFECTING PREGNANCY IN FIRST TRIMESTER: Status: RESOLVED | Noted: 2020-03-27 | Resolved: 2021-07-03

## 2021-07-03 PROBLEM — Z01.818 PREOP TESTING: Status: RESOLVED | Noted: 2020-07-10 | Resolved: 2021-07-03

## 2021-07-03 PROBLEM — D72.9 NEUTROPHILIC LEUKOCYTOSIS: Status: RESOLVED | Noted: 2020-08-20 | Resolved: 2021-07-03

## 2021-07-03 PROBLEM — Z30.2 ENCOUNTER FOR FEMALE STERILIZATION PROCEDURE: Status: RESOLVED | Noted: 2020-07-10 | Resolved: 2021-07-03

## 2021-07-03 PROBLEM — D72.828 NEUTROPHILIC LEUKOCYTOSIS: Status: RESOLVED | Noted: 2020-08-20 | Resolved: 2021-07-03

## 2021-07-03 PROBLEM — R65.20 SEVERE SEPSIS: Status: RESOLVED | Noted: 2020-01-07 | Resolved: 2021-07-03

## 2021-07-03 PROBLEM — Z91.89 AT RISK FOR PROLONGED QT INTERVAL SYNDROME: Status: RESOLVED | Noted: 2020-01-07 | Resolved: 2021-07-03

## 2021-07-03 PROBLEM — N17.9 AKI (ACUTE KIDNEY INJURY): Status: RESOLVED | Noted: 2020-01-07 | Resolved: 2021-07-03

## 2021-07-03 LAB
ALBUMIN SERPL BCP-MCNC: 4.6 G/DL (ref 3.5–5.2)
ALP SERPL-CCNC: 105 U/L (ref 55–135)
ALT SERPL W/O P-5'-P-CCNC: 15 U/L (ref 10–44)
ANION GAP SERPL CALC-SCNC: 13 MMOL/L (ref 8–16)
ANION GAP SERPL CALC-SCNC: 23 MMOL/L (ref 8–16)
AST SERPL-CCNC: 23 U/L (ref 10–40)
B-HCG UR QL: NEGATIVE
BACTERIA #/AREA URNS HPF: ABNORMAL /HPF
BASOPHILS # BLD AUTO: 0.09 K/UL (ref 0–0.2)
BASOPHILS NFR BLD: 0.4 % (ref 0–1.9)
BILIRUB SERPL-MCNC: 1 MG/DL (ref 0.1–1)
BILIRUB UR QL STRIP: ABNORMAL
BUN SERPL-MCNC: 16 MG/DL (ref 6–20)
BUN SERPL-MCNC: 20 MG/DL (ref 6–30)
CALCIUM SERPL-MCNC: 10.4 MG/DL (ref 8.7–10.5)
CHLORIDE SERPL-SCNC: 90 MMOL/L (ref 95–110)
CHLORIDE SERPL-SCNC: 94 MMOL/L (ref 95–110)
CLARITY UR: ABNORMAL
CO2 SERPL-SCNC: 29 MMOL/L (ref 23–29)
COLOR UR: YELLOW
CREAT SERPL-MCNC: 1.1 MG/DL (ref 0.5–1.4)
CREAT SERPL-MCNC: 1.2 MG/DL (ref 0.5–1.4)
CTP QC/QA: YES
CTP QC/QA: YES
DIFFERENTIAL METHOD: ABNORMAL
EOSINOPHIL # BLD AUTO: 0 K/UL (ref 0–0.5)
EOSINOPHIL NFR BLD: 0 % (ref 0–8)
ERYTHROCYTE [DISTWIDTH] IN BLOOD BY AUTOMATED COUNT: 13.6 % (ref 11.5–14.5)
EST. GFR  (AFRICAN AMERICAN): >60 ML/MIN/1.73 M^2
EST. GFR  (NON AFRICAN AMERICAN): 55 ML/MIN/1.73 M^2
GLUCOSE SERPL-MCNC: 136 MG/DL (ref 70–110)
GLUCOSE SERPL-MCNC: 147 MG/DL (ref 70–110)
GLUCOSE UR QL STRIP: ABNORMAL
HCT VFR BLD AUTO: 51.8 % (ref 37–48.5)
HCT VFR BLD CALC: 58 %PCV (ref 36–54)
HGB BLD-MCNC: 18.3 G/DL (ref 12–16)
HGB UR QL STRIP: ABNORMAL
HYALINE CASTS #/AREA URNS LPF: 0 /LPF
IMM GRANULOCYTES # BLD AUTO: 0.12 K/UL (ref 0–0.04)
IMM GRANULOCYTES NFR BLD AUTO: 0.6 % (ref 0–0.5)
KETONES UR QL STRIP: ABNORMAL
LACTATE SERPL-SCNC: 1.3 MMOL/L (ref 0.5–2.2)
LEUKOCYTE ESTERASE UR QL STRIP: ABNORMAL
LIPASE SERPL-CCNC: 26 U/L (ref 4–60)
LYMPHOCYTES # BLD AUTO: 2 K/UL (ref 1–4.8)
LYMPHOCYTES NFR BLD: 9.6 % (ref 18–48)
MAGNESIUM SERPL-MCNC: 1.9 MG/DL (ref 1.6–2.6)
MCH RBC QN AUTO: 30.1 PG (ref 27–31)
MCHC RBC AUTO-ENTMCNC: 35.3 G/DL (ref 32–36)
MCV RBC AUTO: 85 FL (ref 82–98)
MICROSCOPIC COMMENT: ABNORMAL
MONOCYTES # BLD AUTO: 1.2 K/UL (ref 0.3–1)
MONOCYTES NFR BLD: 5.9 % (ref 4–15)
NEUTROPHILS # BLD AUTO: 17.2 K/UL (ref 1.8–7.7)
NEUTROPHILS NFR BLD: 83.5 % (ref 38–73)
NITRITE UR QL STRIP: NEGATIVE
NRBC BLD-RTO: 0 /100 WBC
PH UR STRIP: 6 [PH] (ref 5–8)
PLATELET # BLD AUTO: 268 K/UL (ref 150–450)
PMV BLD AUTO: 11.1 FL (ref 9.2–12.9)
POC IONIZED CALCIUM: 1.18 MMOL/L (ref 1.06–1.42)
POC TCO2 (MEASURED): 29 MMOL/L (ref 23–29)
POCT GLUCOSE: 124 MG/DL (ref 70–110)
POTASSIUM BLD-SCNC: 2.8 MMOL/L (ref 3.5–5.1)
POTASSIUM SERPL-SCNC: 3.1 MMOL/L (ref 3.5–5.1)
PROT SERPL-MCNC: 9.4 G/DL (ref 6–8.4)
PROT UR QL STRIP: ABNORMAL
RBC # BLD AUTO: 6.07 M/UL (ref 4–5.4)
RBC #/AREA URNS HPF: 19 /HPF (ref 0–4)
SAMPLE: ABNORMAL
SARS-COV-2 RDRP RESP QL NAA+PROBE: NEGATIVE
SODIUM BLD-SCNC: 138 MMOL/L (ref 136–145)
SODIUM SERPL-SCNC: 136 MMOL/L (ref 136–145)
SP GR UR STRIP: >1.03 (ref 1–1.03)
SQUAMOUS #/AREA URNS HPF: 10 /HPF
UNIDENT CRYS URNS QL MICRO: ABNORMAL
URN SPEC COLLECT METH UR: ABNORMAL
UROBILINOGEN UR STRIP-ACNC: ABNORMAL EU/DL
WBC # BLD AUTO: 20.64 K/UL (ref 3.9–12.7)
WBC #/AREA URNS HPF: 24 /HPF (ref 0–5)
YEAST URNS QL MICRO: ABNORMAL

## 2021-07-03 PROCEDURE — 84132 ASSAY OF SERUM POTASSIUM: CPT

## 2021-07-03 PROCEDURE — 87040 BLOOD CULTURE FOR BACTERIA: CPT | Performed by: INTERNAL MEDICINE

## 2021-07-03 PROCEDURE — 83690 ASSAY OF LIPASE: CPT | Performed by: EMERGENCY MEDICINE

## 2021-07-03 PROCEDURE — 63600175 PHARM REV CODE 636 W HCPCS: Performed by: INTERNAL MEDICINE

## 2021-07-03 PROCEDURE — 81000 URINALYSIS NONAUTO W/SCOPE: CPT | Performed by: EMERGENCY MEDICINE

## 2021-07-03 PROCEDURE — 85025 COMPLETE CBC W/AUTO DIFF WBC: CPT | Performed by: EMERGENCY MEDICINE

## 2021-07-03 PROCEDURE — 99285 EMERGENCY DEPT VISIT HI MDM: CPT | Mod: 25

## 2021-07-03 PROCEDURE — 83735 ASSAY OF MAGNESIUM: CPT | Performed by: EMERGENCY MEDICINE

## 2021-07-03 PROCEDURE — U0002 COVID-19 LAB TEST NON-CDC: HCPCS | Performed by: EMERGENCY MEDICINE

## 2021-07-03 PROCEDURE — 80053 COMPREHEN METABOLIC PANEL: CPT | Performed by: EMERGENCY MEDICINE

## 2021-07-03 PROCEDURE — 63600175 PHARM REV CODE 636 W HCPCS: Performed by: EMERGENCY MEDICINE

## 2021-07-03 PROCEDURE — 82330 ASSAY OF CALCIUM: CPT

## 2021-07-03 PROCEDURE — 82962 GLUCOSE BLOOD TEST: CPT

## 2021-07-03 PROCEDURE — 25000003 PHARM REV CODE 250: Performed by: EMERGENCY MEDICINE

## 2021-07-03 PROCEDURE — 82565 ASSAY OF CREATININE: CPT

## 2021-07-03 PROCEDURE — 25000003 PHARM REV CODE 250: Performed by: INTERNAL MEDICINE

## 2021-07-03 PROCEDURE — 87088 URINE BACTERIA CULTURE: CPT | Performed by: EMERGENCY MEDICINE

## 2021-07-03 PROCEDURE — 85014 HEMATOCRIT: CPT

## 2021-07-03 PROCEDURE — 25500020 PHARM REV CODE 255: Performed by: EMERGENCY MEDICINE

## 2021-07-03 PROCEDURE — 83605 ASSAY OF LACTIC ACID: CPT | Performed by: EMERGENCY MEDICINE

## 2021-07-03 PROCEDURE — 99900035 HC TECH TIME PER 15 MIN (STAT)

## 2021-07-03 PROCEDURE — 96375 TX/PRO/DX INJ NEW DRUG ADDON: CPT

## 2021-07-03 PROCEDURE — 81025 URINE PREGNANCY TEST: CPT | Performed by: EMERGENCY MEDICINE

## 2021-07-03 PROCEDURE — 87077 CULTURE AEROBIC IDENTIFY: CPT | Performed by: EMERGENCY MEDICINE

## 2021-07-03 PROCEDURE — 87086 URINE CULTURE/COLONY COUNT: CPT | Performed by: EMERGENCY MEDICINE

## 2021-07-03 PROCEDURE — 11000001 HC ACUTE MED/SURG PRIVATE ROOM

## 2021-07-03 PROCEDURE — 96366 THER/PROPH/DIAG IV INF ADDON: CPT

## 2021-07-03 PROCEDURE — 96365 THER/PROPH/DIAG IV INF INIT: CPT

## 2021-07-03 PROCEDURE — 84295 ASSAY OF SERUM SODIUM: CPT

## 2021-07-03 PROCEDURE — 96361 HYDRATE IV INFUSION ADD-ON: CPT

## 2021-07-03 PROCEDURE — 87186 SC STD MICRODIL/AGAR DIL: CPT | Performed by: EMERGENCY MEDICINE

## 2021-07-03 RX ORDER — MORPHINE SULFATE 4 MG/ML
4 INJECTION, SOLUTION INTRAMUSCULAR; INTRAVENOUS EVERY 4 HOURS PRN
Status: DISCONTINUED | OUTPATIENT
Start: 2021-07-04 | End: 2021-07-04

## 2021-07-03 RX ORDER — ONDANSETRON HYDROCHLORIDE 4 MG/5ML
4 SOLUTION ORAL EVERY 6 HOURS PRN
Status: DISCONTINUED | OUTPATIENT
Start: 2021-07-04 | End: 2021-07-06 | Stop reason: HOSPADM

## 2021-07-03 RX ORDER — ENOXAPARIN SODIUM 100 MG/ML
40 INJECTION SUBCUTANEOUS EVERY 24 HOURS
Status: DISCONTINUED | OUTPATIENT
Start: 2021-07-03 | End: 2021-07-06 | Stop reason: HOSPADM

## 2021-07-03 RX ORDER — ONDANSETRON 2 MG/ML
4 INJECTION INTRAMUSCULAR; INTRAVENOUS
Status: COMPLETED | OUTPATIENT
Start: 2021-07-03 | End: 2021-07-03

## 2021-07-03 RX ORDER — ONDANSETRON 2 MG/ML
4 INJECTION INTRAMUSCULAR; INTRAVENOUS EVERY 6 HOURS
Status: DISCONTINUED | OUTPATIENT
Start: 2021-07-03 | End: 2021-07-06 | Stop reason: HOSPADM

## 2021-07-03 RX ORDER — SODIUM CHLORIDE 0.9 % (FLUSH) 0.9 %
10 SYRINGE (ML) INJECTION
Status: DISCONTINUED | OUTPATIENT
Start: 2021-07-03 | End: 2021-07-06 | Stop reason: HOSPADM

## 2021-07-03 RX ORDER — LISINOPRIL 20 MG/1
40 TABLET ORAL DAILY
Status: DISCONTINUED | OUTPATIENT
Start: 2021-07-03 | End: 2021-07-06 | Stop reason: HOSPADM

## 2021-07-03 RX ORDER — ACETAMINOPHEN 325 MG/1
650 TABLET ORAL EVERY 4 HOURS PRN
Status: DISCONTINUED | OUTPATIENT
Start: 2021-07-03 | End: 2021-07-06 | Stop reason: HOSPADM

## 2021-07-03 RX ORDER — TALC
6 POWDER (GRAM) TOPICAL NIGHTLY PRN
Status: DISCONTINUED | OUTPATIENT
Start: 2021-07-04 | End: 2021-07-06 | Stop reason: HOSPADM

## 2021-07-03 RX ORDER — IBUPROFEN 200 MG
24 TABLET ORAL
Status: DISCONTINUED | OUTPATIENT
Start: 2021-07-03 | End: 2021-07-06 | Stop reason: HOSPADM

## 2021-07-03 RX ORDER — PROCHLORPERAZINE EDISYLATE 5 MG/ML
2.5 INJECTION INTRAMUSCULAR; INTRAVENOUS EVERY 6 HOURS PRN
Status: DISCONTINUED | OUTPATIENT
Start: 2021-07-04 | End: 2021-07-06 | Stop reason: HOSPADM

## 2021-07-03 RX ORDER — GLUCAGON 1 MG
1 KIT INJECTION
Status: DISCONTINUED | OUTPATIENT
Start: 2021-07-03 | End: 2021-07-06 | Stop reason: HOSPADM

## 2021-07-03 RX ORDER — AMLODIPINE BESYLATE 5 MG/1
10 TABLET ORAL DAILY
Status: DISCONTINUED | OUTPATIENT
Start: 2021-07-04 | End: 2021-07-06 | Stop reason: HOSPADM

## 2021-07-03 RX ORDER — IBUPROFEN 200 MG
16 TABLET ORAL
Status: DISCONTINUED | OUTPATIENT
Start: 2021-07-03 | End: 2021-07-06 | Stop reason: HOSPADM

## 2021-07-03 RX ORDER — LISINOPRIL 20 MG/1
40 TABLET ORAL DAILY
Status: DISCONTINUED | OUTPATIENT
Start: 2021-07-04 | End: 2021-07-03

## 2021-07-03 RX ORDER — POTASSIUM CHLORIDE 7.45 MG/ML
10 INJECTION INTRAVENOUS ONCE
Status: DISCONTINUED | OUTPATIENT
Start: 2021-07-03 | End: 2021-07-03

## 2021-07-03 RX ORDER — MORPHINE SULFATE 4 MG/ML
4 INJECTION, SOLUTION INTRAMUSCULAR; INTRAVENOUS
Status: COMPLETED | OUTPATIENT
Start: 2021-07-03 | End: 2021-07-03

## 2021-07-03 RX ADMIN — ONDANSETRON 4 MG: 2 INJECTION INTRAMUSCULAR; INTRAVENOUS at 08:07

## 2021-07-03 RX ADMIN — POTASSIUM CHLORIDE: 2 INJECTION, SOLUTION, CONCENTRATE INTRAVENOUS at 09:07

## 2021-07-03 RX ADMIN — PIPERACILLIN AND TAZOBACTAM 4.5 G: 4; .5 INJECTION, POWDER, LYOPHILIZED, FOR SOLUTION INTRAVENOUS; PARENTERAL at 03:07

## 2021-07-03 RX ADMIN — ENOXAPARIN SODIUM 40 MG: 40 INJECTION SUBCUTANEOUS at 08:07

## 2021-07-03 RX ADMIN — LISINOPRIL 40 MG: 20 TABLET ORAL at 08:07

## 2021-07-03 RX ADMIN — PIPERACILLIN AND TAZOBACTAM 4.5 G: 4; .5 INJECTION, POWDER, LYOPHILIZED, FOR SOLUTION INTRAVENOUS; PARENTERAL at 11:07

## 2021-07-03 RX ADMIN — IOHEXOL 100 ML: 350 INJECTION, SOLUTION INTRAVENOUS at 04:07

## 2021-07-03 RX ADMIN — MORPHINE SULFATE 4 MG: 4 INJECTION INTRAVENOUS at 03:07

## 2021-07-03 RX ADMIN — SODIUM CHLORIDE 1000 ML: 0.9 INJECTION, SOLUTION INTRAVENOUS at 03:07

## 2021-07-03 RX ADMIN — ONDANSETRON 4 MG: 2 INJECTION INTRAMUSCULAR; INTRAVENOUS at 03:07

## 2021-07-04 PROBLEM — E66.01 SEVERE OBESITY (BMI >= 40): Status: ACTIVE | Noted: 2021-07-04

## 2021-07-04 PROBLEM — E66.811 CLASS 1 OBESITY IN ADULT: Status: ACTIVE | Noted: 2021-07-04

## 2021-07-04 PROBLEM — E66.9 CLASS 1 OBESITY IN ADULT: Status: ACTIVE | Noted: 2021-07-04

## 2021-07-04 LAB
ALBUMIN SERPL BCP-MCNC: 3.7 G/DL (ref 3.5–5.2)
ALP SERPL-CCNC: 94 U/L (ref 55–135)
ALT SERPL W/O P-5'-P-CCNC: 13 U/L (ref 10–44)
ANION GAP SERPL CALC-SCNC: 11 MMOL/L (ref 8–16)
AST SERPL-CCNC: 19 U/L (ref 10–40)
BASOPHILS # BLD AUTO: 0.05 K/UL (ref 0–0.2)
BASOPHILS NFR BLD: 0.3 % (ref 0–1.9)
BILIRUB SERPL-MCNC: 0.9 MG/DL (ref 0.1–1)
BUN SERPL-MCNC: 16 MG/DL (ref 6–20)
CALCIUM SERPL-MCNC: 8.9 MG/DL (ref 8.7–10.5)
CHLORIDE SERPL-SCNC: 102 MMOL/L (ref 95–110)
CO2 SERPL-SCNC: 25 MMOL/L (ref 23–29)
CREAT SERPL-MCNC: 1.2 MG/DL (ref 0.5–1.4)
DIFFERENTIAL METHOD: ABNORMAL
EOSINOPHIL # BLD AUTO: 0 K/UL (ref 0–0.5)
EOSINOPHIL NFR BLD: 0.2 % (ref 0–8)
ERYTHROCYTE [DISTWIDTH] IN BLOOD BY AUTOMATED COUNT: 13.7 % (ref 11.5–14.5)
EST. GFR  (AFRICAN AMERICAN): >60 ML/MIN/1.73 M^2
EST. GFR  (NON AFRICAN AMERICAN): 55 ML/MIN/1.73 M^2
GLUCOSE SERPL-MCNC: 99 MG/DL (ref 70–110)
HCT VFR BLD AUTO: 49.1 % (ref 37–48.5)
HGB BLD-MCNC: 16.8 G/DL (ref 12–16)
IMM GRANULOCYTES # BLD AUTO: 0.06 K/UL (ref 0–0.04)
IMM GRANULOCYTES NFR BLD AUTO: 0.4 % (ref 0–0.5)
LYMPHOCYTES # BLD AUTO: 3 K/UL (ref 1–4.8)
LYMPHOCYTES NFR BLD: 18.4 % (ref 18–48)
MAGNESIUM SERPL-MCNC: 2 MG/DL (ref 1.6–2.6)
MCH RBC QN AUTO: 29.9 PG (ref 27–31)
MCHC RBC AUTO-ENTMCNC: 34.2 G/DL (ref 32–36)
MCV RBC AUTO: 87 FL (ref 82–98)
MONOCYTES # BLD AUTO: 1.4 K/UL (ref 0.3–1)
MONOCYTES NFR BLD: 8.6 % (ref 4–15)
NEUTROPHILS # BLD AUTO: 11.7 K/UL (ref 1.8–7.7)
NEUTROPHILS NFR BLD: 72.1 % (ref 38–73)
NRBC BLD-RTO: 0 /100 WBC
PHOSPHATE SERPL-MCNC: 3.7 MG/DL (ref 2.7–4.5)
PLATELET # BLD AUTO: 245 K/UL (ref 150–450)
PMV BLD AUTO: 11.6 FL (ref 9.2–12.9)
POTASSIUM SERPL-SCNC: 3.2 MMOL/L (ref 3.5–5.1)
PROT SERPL-MCNC: 7.4 G/DL (ref 6–8.4)
RBC # BLD AUTO: 5.62 M/UL (ref 4–5.4)
SODIUM SERPL-SCNC: 138 MMOL/L (ref 136–145)
WBC # BLD AUTO: 16.22 K/UL (ref 3.9–12.7)

## 2021-07-04 PROCEDURE — 63600175 PHARM REV CODE 636 W HCPCS: Performed by: EMERGENCY MEDICINE

## 2021-07-04 PROCEDURE — 25000003 PHARM REV CODE 250: Performed by: EMERGENCY MEDICINE

## 2021-07-04 PROCEDURE — 36415 COLL VENOUS BLD VENIPUNCTURE: CPT | Performed by: INTERNAL MEDICINE

## 2021-07-04 PROCEDURE — 83735 ASSAY OF MAGNESIUM: CPT | Performed by: INTERNAL MEDICINE

## 2021-07-04 PROCEDURE — 63600175 PHARM REV CODE 636 W HCPCS: Performed by: INTERNAL MEDICINE

## 2021-07-04 PROCEDURE — 25000003 PHARM REV CODE 250: Performed by: INTERNAL MEDICINE

## 2021-07-04 PROCEDURE — S4991 NICOTINE PATCH NONLEGEND: HCPCS | Performed by: HOSPITALIST

## 2021-07-04 PROCEDURE — 84100 ASSAY OF PHOSPHORUS: CPT | Performed by: INTERNAL MEDICINE

## 2021-07-04 PROCEDURE — 80053 COMPREHEN METABOLIC PANEL: CPT | Performed by: INTERNAL MEDICINE

## 2021-07-04 PROCEDURE — 85025 COMPLETE CBC W/AUTO DIFF WBC: CPT | Performed by: INTERNAL MEDICINE

## 2021-07-04 PROCEDURE — 25000003 PHARM REV CODE 250: Performed by: HOSPITALIST

## 2021-07-04 PROCEDURE — 11000001 HC ACUTE MED/SURG PRIVATE ROOM

## 2021-07-04 PROCEDURE — 63600175 PHARM REV CODE 636 W HCPCS: Performed by: HOSPITALIST

## 2021-07-04 RX ORDER — MORPHINE SULFATE 4 MG/ML
1 INJECTION, SOLUTION INTRAMUSCULAR; INTRAVENOUS EVERY 4 HOURS PRN
Status: DISCONTINUED | OUTPATIENT
Start: 2021-07-04 | End: 2021-07-05

## 2021-07-04 RX ORDER — NICOTINE 7MG/24HR
1 PATCH, TRANSDERMAL 24 HOURS TRANSDERMAL DAILY
Status: DISCONTINUED | OUTPATIENT
Start: 2021-07-04 | End: 2021-07-06 | Stop reason: HOSPADM

## 2021-07-04 RX ORDER — MORPHINE SULFATE 4 MG/ML
2 INJECTION, SOLUTION INTRAMUSCULAR; INTRAVENOUS EVERY 4 HOURS PRN
Status: DISCONTINUED | OUTPATIENT
Start: 2021-07-04 | End: 2021-07-04

## 2021-07-04 RX ORDER — POTASSIUM CHLORIDE 20 MEQ/1
40 TABLET, EXTENDED RELEASE ORAL ONCE
Status: COMPLETED | OUTPATIENT
Start: 2021-07-04 | End: 2021-07-04

## 2021-07-04 RX ORDER — HYDRALAZINE HYDROCHLORIDE 20 MG/ML
15 INJECTION INTRAMUSCULAR; INTRAVENOUS EVERY 4 HOURS PRN
Status: DISCONTINUED | OUTPATIENT
Start: 2021-07-04 | End: 2021-07-06 | Stop reason: HOSPADM

## 2021-07-04 RX ADMIN — MORPHINE SULFATE 4 MG: 4 INJECTION INTRAVENOUS at 08:07

## 2021-07-04 RX ADMIN — AMLODIPINE BESYLATE 10 MG: 5 TABLET ORAL at 08:07

## 2021-07-04 RX ADMIN — PROCHLORPERAZINE EDISYLATE 2.5 MG: 5 INJECTION INTRAMUSCULAR; INTRAVENOUS at 12:07

## 2021-07-04 RX ADMIN — LISINOPRIL 40 MG: 20 TABLET ORAL at 08:07

## 2021-07-04 RX ADMIN — POTASSIUM CHLORIDE 40 MEQ: 1500 TABLET, EXTENDED RELEASE ORAL at 09:07

## 2021-07-04 RX ADMIN — MORPHINE SULFATE 1 MG: 4 INJECTION INTRAVENOUS at 04:07

## 2021-07-04 RX ADMIN — MORPHINE SULFATE 1 MG: 4 INJECTION INTRAVENOUS at 10:07

## 2021-07-04 RX ADMIN — ONDANSETRON 4 MG: 2 INJECTION INTRAMUSCULAR; INTRAVENOUS at 12:07

## 2021-07-04 RX ADMIN — MORPHINE SULFATE 4 MG: 4 INJECTION INTRAVENOUS at 12:07

## 2021-07-04 RX ADMIN — NICOTINE 1 PATCH: 7 PATCH TRANSDERMAL at 09:07

## 2021-07-04 RX ADMIN — ONDANSETRON 4 MG: 2 INJECTION INTRAMUSCULAR; INTRAVENOUS at 05:07

## 2021-07-04 RX ADMIN — PIPERACILLIN AND TAZOBACTAM 4.5 G: 4; .5 INJECTION, POWDER, LYOPHILIZED, FOR SOLUTION INTRAVENOUS; PARENTERAL at 08:07

## 2021-07-04 RX ADMIN — ENOXAPARIN SODIUM 40 MG: 40 INJECTION SUBCUTANEOUS at 06:07

## 2021-07-04 RX ADMIN — ONDANSETRON 4 MG: 2 INJECTION INTRAMUSCULAR; INTRAVENOUS at 11:07

## 2021-07-04 RX ADMIN — ONDANSETRON 4 MG: 2 INJECTION INTRAMUSCULAR; INTRAVENOUS at 06:07

## 2021-07-04 RX ADMIN — PIPERACILLIN AND TAZOBACTAM 4.5 G: 4; .5 INJECTION, POWDER, LYOPHILIZED, FOR SOLUTION INTRAVENOUS; PARENTERAL at 05:07

## 2021-07-05 LAB
ANION GAP SERPL CALC-SCNC: 9 MMOL/L (ref 8–16)
BACTERIA UR CULT: ABNORMAL
BASOPHILS # BLD AUTO: 0.07 K/UL (ref 0–0.2)
BASOPHILS NFR BLD: 0.5 % (ref 0–1.9)
BUN SERPL-MCNC: 12 MG/DL (ref 6–20)
CALCIUM SERPL-MCNC: 8.7 MG/DL (ref 8.7–10.5)
CHLORIDE SERPL-SCNC: 102 MMOL/L (ref 95–110)
CO2 SERPL-SCNC: 25 MMOL/L (ref 23–29)
CREAT SERPL-MCNC: 1.2 MG/DL (ref 0.5–1.4)
DIFFERENTIAL METHOD: ABNORMAL
EOSINOPHIL # BLD AUTO: 0.2 K/UL (ref 0–0.5)
EOSINOPHIL NFR BLD: 1.2 % (ref 0–8)
ERYTHROCYTE [DISTWIDTH] IN BLOOD BY AUTOMATED COUNT: 13.3 % (ref 11.5–14.5)
EST. GFR  (AFRICAN AMERICAN): >60 ML/MIN/1.73 M^2
EST. GFR  (NON AFRICAN AMERICAN): 55 ML/MIN/1.73 M^2
GLUCOSE SERPL-MCNC: 97 MG/DL (ref 70–110)
HCT VFR BLD AUTO: 47 % (ref 37–48.5)
HGB BLD-MCNC: 15.4 G/DL (ref 12–16)
IMM GRANULOCYTES # BLD AUTO: 0.06 K/UL (ref 0–0.04)
IMM GRANULOCYTES NFR BLD AUTO: 0.4 % (ref 0–0.5)
LYMPHOCYTES # BLD AUTO: 4.3 K/UL (ref 1–4.8)
LYMPHOCYTES NFR BLD: 31.6 % (ref 18–48)
MAGNESIUM SERPL-MCNC: 2 MG/DL (ref 1.6–2.6)
MCH RBC QN AUTO: 29.6 PG (ref 27–31)
MCHC RBC AUTO-ENTMCNC: 32.8 G/DL (ref 32–36)
MCV RBC AUTO: 90 FL (ref 82–98)
MONOCYTES # BLD AUTO: 1 K/UL (ref 0.3–1)
MONOCYTES NFR BLD: 7.2 % (ref 4–15)
NEUTROPHILS # BLD AUTO: 8.1 K/UL (ref 1.8–7.7)
NEUTROPHILS NFR BLD: 59.1 % (ref 38–73)
NRBC BLD-RTO: 0 /100 WBC
PLATELET # BLD AUTO: 226 K/UL (ref 150–450)
PMV BLD AUTO: 11.7 FL (ref 9.2–12.9)
POTASSIUM SERPL-SCNC: 3.3 MMOL/L (ref 3.5–5.1)
RBC # BLD AUTO: 5.2 M/UL (ref 4–5.4)
SODIUM SERPL-SCNC: 136 MMOL/L (ref 136–145)
WBC # BLD AUTO: 13.69 K/UL (ref 3.9–12.7)

## 2021-07-05 PROCEDURE — 63600175 PHARM REV CODE 636 W HCPCS: Performed by: EMERGENCY MEDICINE

## 2021-07-05 PROCEDURE — 11000001 HC ACUTE MED/SURG PRIVATE ROOM

## 2021-07-05 PROCEDURE — S4991 NICOTINE PATCH NONLEGEND: HCPCS | Performed by: HOSPITALIST

## 2021-07-05 PROCEDURE — 25000003 PHARM REV CODE 250: Performed by: INTERNAL MEDICINE

## 2021-07-05 PROCEDURE — 80048 BASIC METABOLIC PNL TOTAL CA: CPT | Performed by: HOSPITALIST

## 2021-07-05 PROCEDURE — 25000003 PHARM REV CODE 250: Performed by: HOSPITALIST

## 2021-07-05 PROCEDURE — 83735 ASSAY OF MAGNESIUM: CPT | Performed by: INTERNAL MEDICINE

## 2021-07-05 PROCEDURE — 63600175 PHARM REV CODE 636 W HCPCS: Performed by: HOSPITALIST

## 2021-07-05 PROCEDURE — 25000003 PHARM REV CODE 250: Performed by: EMERGENCY MEDICINE

## 2021-07-05 PROCEDURE — 85025 COMPLETE CBC W/AUTO DIFF WBC: CPT | Performed by: HOSPITALIST

## 2021-07-05 PROCEDURE — 36415 COLL VENOUS BLD VENIPUNCTURE: CPT | Performed by: INTERNAL MEDICINE

## 2021-07-05 PROCEDURE — 63600175 PHARM REV CODE 636 W HCPCS: Performed by: INTERNAL MEDICINE

## 2021-07-05 RX ORDER — MORPHINE SULFATE 4 MG/ML
1 INJECTION, SOLUTION INTRAMUSCULAR; INTRAVENOUS EVERY 6 HOURS PRN
Status: DISCONTINUED | OUTPATIENT
Start: 2021-07-05 | End: 2021-07-06 | Stop reason: HOSPADM

## 2021-07-05 RX ORDER — POTASSIUM CHLORIDE 20 MEQ/1
40 TABLET, EXTENDED RELEASE ORAL ONCE
Status: COMPLETED | OUTPATIENT
Start: 2021-07-05 | End: 2021-07-05

## 2021-07-05 RX ORDER — OXYCODONE HYDROCHLORIDE 5 MG/1
5 TABLET ORAL EVERY 4 HOURS PRN
Status: DISCONTINUED | OUTPATIENT
Start: 2021-07-05 | End: 2021-07-06 | Stop reason: HOSPADM

## 2021-07-05 RX ADMIN — AMLODIPINE BESYLATE 10 MG: 5 TABLET ORAL at 08:07

## 2021-07-05 RX ADMIN — OXYCODONE HYDROCHLORIDE 5 MG: 5 TABLET ORAL at 05:07

## 2021-07-05 RX ADMIN — OXYCODONE HYDROCHLORIDE 5 MG: 5 TABLET ORAL at 09:07

## 2021-07-05 RX ADMIN — PIPERACILLIN AND TAZOBACTAM 4.5 G: 4; .5 INJECTION, POWDER, LYOPHILIZED, FOR SOLUTION INTRAVENOUS; PARENTERAL at 12:07

## 2021-07-05 RX ADMIN — PIPERACILLIN AND TAZOBACTAM 4.5 G: 4; .5 INJECTION, POWDER, LYOPHILIZED, FOR SOLUTION INTRAVENOUS; PARENTERAL at 04:07

## 2021-07-05 RX ADMIN — MORPHINE SULFATE 1 MG: 4 INJECTION INTRAVENOUS at 06:07

## 2021-07-05 RX ADMIN — NICOTINE 1 PATCH: 7 PATCH TRANSDERMAL at 08:07

## 2021-07-05 RX ADMIN — PIPERACILLIN AND TAZOBACTAM 4.5 G: 4; .5 INJECTION, POWDER, LYOPHILIZED, FOR SOLUTION INTRAVENOUS; PARENTERAL at 08:07

## 2021-07-05 RX ADMIN — ONDANSETRON 4 MG: 2 INJECTION INTRAMUSCULAR; INTRAVENOUS at 05:07

## 2021-07-05 RX ADMIN — LISINOPRIL 40 MG: 20 TABLET ORAL at 08:07

## 2021-07-05 RX ADMIN — ONDANSETRON 4 MG: 2 INJECTION INTRAMUSCULAR; INTRAVENOUS at 01:07

## 2021-07-05 RX ADMIN — MORPHINE SULFATE 1 MG: 4 INJECTION INTRAVENOUS at 01:07

## 2021-07-05 RX ADMIN — POTASSIUM CHLORIDE 40 MEQ: 1500 TABLET, EXTENDED RELEASE ORAL at 08:07

## 2021-07-05 RX ADMIN — ENOXAPARIN SODIUM 40 MG: 40 INJECTION SUBCUTANEOUS at 04:07

## 2021-07-06 ENCOUNTER — PATIENT MESSAGE (OUTPATIENT)
Dept: FAMILY MEDICINE | Facility: CLINIC | Age: 46
End: 2021-07-06

## 2021-07-06 ENCOUNTER — PATIENT MESSAGE (OUTPATIENT)
Dept: ADMINISTRATIVE | Facility: HOSPITAL | Age: 46
End: 2021-07-06

## 2021-07-06 VITALS
WEIGHT: 207.69 LBS | HEIGHT: 68 IN | RESPIRATION RATE: 19 BRPM | TEMPERATURE: 98 F | OXYGEN SATURATION: 96 % | BODY MASS INDEX: 31.48 KG/M2 | SYSTOLIC BLOOD PRESSURE: 117 MMHG | HEART RATE: 63 BPM | DIASTOLIC BLOOD PRESSURE: 85 MMHG

## 2021-07-06 PROBLEM — R11.2 INTRACTABLE NAUSEA AND VOMITING: Status: RESOLVED | Noted: 2020-08-20 | Resolved: 2021-07-06

## 2021-07-06 PROBLEM — E87.6 HYPOKALEMIA: Status: RESOLVED | Noted: 2020-07-14 | Resolved: 2021-07-06

## 2021-07-06 LAB
ANION GAP SERPL CALC-SCNC: 10 MMOL/L (ref 8–16)
BASOPHILS # BLD AUTO: 0.08 K/UL (ref 0–0.2)
BASOPHILS NFR BLD: 0.7 % (ref 0–1.9)
BUN SERPL-MCNC: 11 MG/DL (ref 6–20)
CALCIUM SERPL-MCNC: 8.6 MG/DL (ref 8.7–10.5)
CHLORIDE SERPL-SCNC: 104 MMOL/L (ref 95–110)
CO2 SERPL-SCNC: 24 MMOL/L (ref 23–29)
CREAT SERPL-MCNC: 1.3 MG/DL (ref 0.5–1.4)
DIFFERENTIAL METHOD: NORMAL
EOSINOPHIL # BLD AUTO: 0.3 K/UL (ref 0–0.5)
EOSINOPHIL NFR BLD: 2.1 % (ref 0–8)
ERYTHROCYTE [DISTWIDTH] IN BLOOD BY AUTOMATED COUNT: 13 % (ref 11.5–14.5)
EST. GFR  (AFRICAN AMERICAN): 58 ML/MIN/1.73 M^2
EST. GFR  (NON AFRICAN AMERICAN): 50 ML/MIN/1.73 M^2
GLUCOSE SERPL-MCNC: 105 MG/DL (ref 70–110)
HCT VFR BLD AUTO: 43.1 % (ref 37–48.5)
HGB BLD-MCNC: 14.4 G/DL (ref 12–16)
IMM GRANULOCYTES # BLD AUTO: 0.04 K/UL (ref 0–0.04)
IMM GRANULOCYTES NFR BLD AUTO: 0.3 % (ref 0–0.5)
LYMPHOCYTES # BLD AUTO: 3.5 K/UL (ref 1–4.8)
LYMPHOCYTES NFR BLD: 29.8 % (ref 18–48)
MAGNESIUM SERPL-MCNC: 2.4 MG/DL (ref 1.6–2.6)
MCH RBC QN AUTO: 29.9 PG (ref 27–31)
MCHC RBC AUTO-ENTMCNC: 33.4 G/DL (ref 32–36)
MCV RBC AUTO: 89 FL (ref 82–98)
MONOCYTES # BLD AUTO: 0.9 K/UL (ref 0.3–1)
MONOCYTES NFR BLD: 7.5 % (ref 4–15)
NEUTROPHILS # BLD AUTO: 6.9 K/UL (ref 1.8–7.7)
NEUTROPHILS NFR BLD: 59.6 % (ref 38–73)
NRBC BLD-RTO: 0 /100 WBC
PLATELET # BLD AUTO: 194 K/UL (ref 150–450)
PMV BLD AUTO: 11.6 FL (ref 9.2–12.9)
POTASSIUM SERPL-SCNC: 3.4 MMOL/L (ref 3.5–5.1)
RBC # BLD AUTO: 4.82 M/UL (ref 4–5.4)
SODIUM SERPL-SCNC: 138 MMOL/L (ref 136–145)
WBC # BLD AUTO: 11.64 K/UL (ref 3.9–12.7)

## 2021-07-06 PROCEDURE — S4991 NICOTINE PATCH NONLEGEND: HCPCS | Performed by: HOSPITALIST

## 2021-07-06 PROCEDURE — 25000003 PHARM REV CODE 250: Performed by: EMERGENCY MEDICINE

## 2021-07-06 PROCEDURE — 85025 COMPLETE CBC W/AUTO DIFF WBC: CPT | Performed by: HOSPITALIST

## 2021-07-06 PROCEDURE — 25000003 PHARM REV CODE 250: Performed by: HOSPITALIST

## 2021-07-06 PROCEDURE — 80048 BASIC METABOLIC PNL TOTAL CA: CPT | Performed by: HOSPITALIST

## 2021-07-06 PROCEDURE — 36415 COLL VENOUS BLD VENIPUNCTURE: CPT | Performed by: INTERNAL MEDICINE

## 2021-07-06 PROCEDURE — 63600175 PHARM REV CODE 636 W HCPCS: Performed by: INTERNAL MEDICINE

## 2021-07-06 PROCEDURE — 25000003 PHARM REV CODE 250: Performed by: INTERNAL MEDICINE

## 2021-07-06 PROCEDURE — 63600175 PHARM REV CODE 636 W HCPCS: Performed by: EMERGENCY MEDICINE

## 2021-07-06 PROCEDURE — 83735 ASSAY OF MAGNESIUM: CPT | Performed by: INTERNAL MEDICINE

## 2021-07-06 RX ORDER — ONDANSETRON 8 MG/1
8 TABLET, ORALLY DISINTEGRATING ORAL EVERY 6 HOURS PRN
Qty: 20 TABLET | Refills: 0 | Status: ON HOLD | OUTPATIENT
Start: 2021-07-06 | End: 2021-07-15 | Stop reason: HOSPADM

## 2021-07-06 RX ORDER — HYDROCODONE BITARTRATE AND ACETAMINOPHEN 5; 325 MG/1; MG/1
1 TABLET ORAL EVERY 8 HOURS PRN
Qty: 15 TABLET | Refills: 0 | Status: ON HOLD | OUTPATIENT
Start: 2021-07-06 | End: 2021-07-15 | Stop reason: HOSPADM

## 2021-07-06 RX ORDER — AMOXICILLIN AND CLAVULANATE POTASSIUM 875; 125 MG/1; MG/1
1 TABLET, FILM COATED ORAL EVERY 12 HOURS
Qty: 14 TABLET | Refills: 0 | Status: SHIPPED | OUTPATIENT
Start: 2021-07-06 | End: 2021-07-12

## 2021-07-06 RX ADMIN — OXYCODONE HYDROCHLORIDE 5 MG: 5 TABLET ORAL at 09:07

## 2021-07-06 RX ADMIN — ONDANSETRON 4 MG: 4 SOLUTION ORAL at 12:07

## 2021-07-06 RX ADMIN — NICOTINE 1 PATCH: 7 PATCH TRANSDERMAL at 08:07

## 2021-07-06 RX ADMIN — OXYCODONE HYDROCHLORIDE 5 MG: 5 TABLET ORAL at 01:07

## 2021-07-06 RX ADMIN — PIPERACILLIN AND TAZOBACTAM 4.5 G: 4; .5 INJECTION, POWDER, LYOPHILIZED, FOR SOLUTION INTRAVENOUS; PARENTERAL at 08:07

## 2021-07-06 RX ADMIN — OXYCODONE HYDROCHLORIDE 5 MG: 5 TABLET ORAL at 05:07

## 2021-07-06 RX ADMIN — ONDANSETRON 4 MG: 4 SOLUTION ORAL at 08:07

## 2021-07-06 RX ADMIN — PIPERACILLIN AND TAZOBACTAM 4.5 G: 4; .5 INJECTION, POWDER, LYOPHILIZED, FOR SOLUTION INTRAVENOUS; PARENTERAL at 12:07

## 2021-07-06 RX ADMIN — ONDANSETRON 4 MG: 2 INJECTION INTRAMUSCULAR; INTRAVENOUS at 11:07

## 2021-07-06 RX ADMIN — AMLODIPINE BESYLATE 10 MG: 5 TABLET ORAL at 08:07

## 2021-07-06 RX ADMIN — LISINOPRIL 40 MG: 20 TABLET ORAL at 08:07

## 2021-07-07 ENCOUNTER — PATIENT MESSAGE (OUTPATIENT)
Dept: FAMILY MEDICINE | Facility: CLINIC | Age: 46
End: 2021-07-07

## 2021-07-08 ENCOUNTER — PATIENT OUTREACH (OUTPATIENT)
Dept: ADMINISTRATIVE | Facility: CLINIC | Age: 46
End: 2021-07-08

## 2021-07-08 LAB
BACTERIA BLD CULT: NORMAL
BACTERIA BLD CULT: NORMAL

## 2021-07-12 ENCOUNTER — HOSPITAL ENCOUNTER (INPATIENT)
Facility: HOSPITAL | Age: 46
LOS: 3 days | Discharge: HOME | DRG: 872 | End: 2021-07-15
Attending: EMERGENCY MEDICINE | Admitting: EMERGENCY MEDICINE
Payer: COMMERCIAL

## 2021-07-12 DIAGNOSIS — R07.9 CHEST PAIN: ICD-10-CM

## 2021-07-12 DIAGNOSIS — K57.92 DIVERTICULITIS: Primary | ICD-10-CM

## 2021-07-12 DIAGNOSIS — I10 HTN (HYPERTENSION): ICD-10-CM

## 2021-07-12 LAB
ALBUMIN SERPL BCP-MCNC: 4 G/DL (ref 3.5–5.2)
ALP SERPL-CCNC: 112 U/L (ref 55–135)
ALT SERPL W/O P-5'-P-CCNC: 20 U/L (ref 10–44)
ANION GAP SERPL CALC-SCNC: 9 MMOL/L (ref 8–16)
APTT BLDCRRT: 27.8 SEC (ref 21–32)
AST SERPL-CCNC: 28 U/L (ref 10–40)
B-HCG UR QL: NEGATIVE
BACTERIA #/AREA URNS HPF: NORMAL /HPF
BASOPHILS # BLD AUTO: 0.04 K/UL (ref 0–0.2)
BASOPHILS NFR BLD: 0.3 % (ref 0–1.9)
BILIRUB SERPL-MCNC: 0.4 MG/DL (ref 0.1–1)
BILIRUB UR QL STRIP: NEGATIVE
BUN SERPL-MCNC: 5 MG/DL (ref 6–20)
CALCIUM SERPL-MCNC: 10.1 MG/DL (ref 8.7–10.5)
CHLORIDE SERPL-SCNC: 105 MMOL/L (ref 95–110)
CLARITY UR: CLEAR
CO2 SERPL-SCNC: 24 MMOL/L (ref 23–29)
COLOR UR: YELLOW
CREAT SERPL-MCNC: 1 MG/DL (ref 0.5–1.4)
CTP QC/QA: YES
CTP QC/QA: YES
DIFFERENTIAL METHOD: ABNORMAL
EOSINOPHIL # BLD AUTO: 0.7 K/UL (ref 0–0.5)
EOSINOPHIL NFR BLD: 4.4 % (ref 0–8)
ERYTHROCYTE [DISTWIDTH] IN BLOOD BY AUTOMATED COUNT: 13.4 % (ref 11.5–14.5)
EST. GFR  (AFRICAN AMERICAN): >60 ML/MIN/1.73 M^2
EST. GFR  (NON AFRICAN AMERICAN): >60 ML/MIN/1.73 M^2
GLUCOSE SERPL-MCNC: 131 MG/DL (ref 70–110)
GLUCOSE UR QL STRIP: NEGATIVE
HCT VFR BLD AUTO: 44.3 % (ref 37–48.5)
HGB BLD-MCNC: 14.9 G/DL (ref 12–16)
HGB UR QL STRIP: ABNORMAL
HYALINE CASTS #/AREA URNS LPF: 0 /LPF
IMM GRANULOCYTES # BLD AUTO: 0.07 K/UL (ref 0–0.04)
IMM GRANULOCYTES NFR BLD AUTO: 0.5 % (ref 0–0.5)
INR PPP: 1 (ref 0.8–1.2)
KETONES UR QL STRIP: ABNORMAL
LACTATE SERPL-SCNC: 1 MMOL/L (ref 0.5–2.2)
LEUKOCYTE ESTERASE UR QL STRIP: NEGATIVE
LIPASE SERPL-CCNC: 31 U/L (ref 4–60)
LYMPHOCYTES # BLD AUTO: 3.1 K/UL (ref 1–4.8)
LYMPHOCYTES NFR BLD: 21.1 % (ref 18–48)
MCH RBC QN AUTO: 29.9 PG (ref 27–31)
MCHC RBC AUTO-ENTMCNC: 33.6 G/DL (ref 32–36)
MCV RBC AUTO: 89 FL (ref 82–98)
MICROSCOPIC COMMENT: NORMAL
MONOCYTES # BLD AUTO: 0.9 K/UL (ref 0.3–1)
MONOCYTES NFR BLD: 6 % (ref 4–15)
NEUTROPHILS # BLD AUTO: 10.1 K/UL (ref 1.8–7.7)
NEUTROPHILS NFR BLD: 67.7 % (ref 38–73)
NITRITE UR QL STRIP: NEGATIVE
NRBC BLD-RTO: 0 /100 WBC
PH UR STRIP: 6 [PH] (ref 5–8)
PLATELET # BLD AUTO: 284 K/UL (ref 150–450)
PMV BLD AUTO: 11 FL (ref 9.2–12.9)
POTASSIUM SERPL-SCNC: 4 MMOL/L (ref 3.5–5.1)
PROT SERPL-MCNC: 8.5 G/DL (ref 6–8.4)
PROT UR QL STRIP: ABNORMAL
PROTHROMBIN TIME: 10.7 SEC (ref 9–12.5)
RBC # BLD AUTO: 4.98 M/UL (ref 4–5.4)
RBC #/AREA URNS HPF: 3 /HPF (ref 0–4)
SARS-COV-2 RDRP RESP QL NAA+PROBE: NEGATIVE
SODIUM SERPL-SCNC: 138 MMOL/L (ref 136–145)
SP GR UR STRIP: 1.02 (ref 1–1.03)
SQUAMOUS #/AREA URNS HPF: 10 /HPF
URN SPEC COLLECT METH UR: ABNORMAL
UROBILINOGEN UR STRIP-ACNC: ABNORMAL EU/DL
WBC # BLD AUTO: 14.9 K/UL (ref 3.9–12.7)
WBC #/AREA URNS HPF: 3 /HPF (ref 0–5)

## 2021-07-12 PROCEDURE — 63600175 PHARM REV CODE 636 W HCPCS: Performed by: EMERGENCY MEDICINE

## 2021-07-12 PROCEDURE — 81000 URINALYSIS NONAUTO W/SCOPE: CPT | Performed by: EMERGENCY MEDICINE

## 2021-07-12 PROCEDURE — 87040 BLOOD CULTURE FOR BACTERIA: CPT | Mod: 59 | Performed by: EMERGENCY MEDICINE

## 2021-07-12 PROCEDURE — 93010 EKG 12-LEAD: ICD-10-PCS | Mod: ,,, | Performed by: INTERNAL MEDICINE

## 2021-07-12 PROCEDURE — 63600175 PHARM REV CODE 636 W HCPCS: Performed by: INTERNAL MEDICINE

## 2021-07-12 PROCEDURE — 83690 ASSAY OF LIPASE: CPT | Performed by: EMERGENCY MEDICINE

## 2021-07-12 PROCEDURE — 80053 COMPREHEN METABOLIC PANEL: CPT | Performed by: EMERGENCY MEDICINE

## 2021-07-12 PROCEDURE — 81025 URINE PREGNANCY TEST: CPT | Performed by: EMERGENCY MEDICINE

## 2021-07-12 PROCEDURE — 85610 PROTHROMBIN TIME: CPT | Performed by: EMERGENCY MEDICINE

## 2021-07-12 PROCEDURE — 96374 THER/PROPH/DIAG INJ IV PUSH: CPT

## 2021-07-12 PROCEDURE — 63600175 PHARM REV CODE 636 W HCPCS: Performed by: HOSPITALIST

## 2021-07-12 PROCEDURE — 25000003 PHARM REV CODE 250: Performed by: EMERGENCY MEDICINE

## 2021-07-12 PROCEDURE — 93005 ELECTROCARDIOGRAM TRACING: CPT

## 2021-07-12 PROCEDURE — 85025 COMPLETE CBC W/AUTO DIFF WBC: CPT | Performed by: EMERGENCY MEDICINE

## 2021-07-12 PROCEDURE — 96375 TX/PRO/DX INJ NEW DRUG ADDON: CPT

## 2021-07-12 PROCEDURE — 25000003 PHARM REV CODE 250: Performed by: HOSPITALIST

## 2021-07-12 PROCEDURE — U0002 COVID-19 LAB TEST NON-CDC: HCPCS | Performed by: EMERGENCY MEDICINE

## 2021-07-12 PROCEDURE — 85730 THROMBOPLASTIN TIME PARTIAL: CPT | Performed by: EMERGENCY MEDICINE

## 2021-07-12 PROCEDURE — 99285 EMERGENCY DEPT VISIT HI MDM: CPT | Mod: 25

## 2021-07-12 PROCEDURE — 93010 ELECTROCARDIOGRAM REPORT: CPT | Mod: ,,, | Performed by: INTERNAL MEDICINE

## 2021-07-12 PROCEDURE — 83605 ASSAY OF LACTIC ACID: CPT | Performed by: EMERGENCY MEDICINE

## 2021-07-12 PROCEDURE — 25500020 PHARM REV CODE 255: Performed by: EMERGENCY MEDICINE

## 2021-07-12 PROCEDURE — 11000001 HC ACUTE MED/SURG PRIVATE ROOM

## 2021-07-12 RX ORDER — ONDANSETRON 2 MG/ML
8 INJECTION INTRAMUSCULAR; INTRAVENOUS EVERY 6 HOURS PRN
Status: DISCONTINUED | OUTPATIENT
Start: 2021-07-12 | End: 2021-07-15 | Stop reason: HOSPADM

## 2021-07-12 RX ORDER — TALC
6 POWDER (GRAM) TOPICAL NIGHTLY PRN
Status: DISCONTINUED | OUTPATIENT
Start: 2021-07-12 | End: 2021-07-15 | Stop reason: HOSPADM

## 2021-07-12 RX ORDER — LISINOPRIL 20 MG/1
40 TABLET ORAL
Status: COMPLETED | OUTPATIENT
Start: 2021-07-12 | End: 2021-07-12

## 2021-07-12 RX ORDER — IBUPROFEN 200 MG
24 TABLET ORAL
Status: DISCONTINUED | OUTPATIENT
Start: 2021-07-12 | End: 2021-07-15 | Stop reason: HOSPADM

## 2021-07-12 RX ORDER — AMLODIPINE BESYLATE 5 MG/1
10 TABLET ORAL DAILY
Status: DISCONTINUED | OUTPATIENT
Start: 2021-07-13 | End: 2021-07-15 | Stop reason: HOSPADM

## 2021-07-12 RX ORDER — HYDROCODONE BITARTRATE AND ACETAMINOPHEN 10; 325 MG/1; MG/1
1 TABLET ORAL EVERY 6 HOURS PRN
Status: DISCONTINUED | OUTPATIENT
Start: 2021-07-12 | End: 2021-07-14

## 2021-07-12 RX ORDER — LANOLIN ALCOHOL/MO/W.PET/CERES
800 CREAM (GRAM) TOPICAL
Status: DISCONTINUED | OUTPATIENT
Start: 2021-07-12 | End: 2021-07-15 | Stop reason: HOSPADM

## 2021-07-12 RX ORDER — ONDANSETRON 2 MG/ML
4 INJECTION INTRAMUSCULAR; INTRAVENOUS
Status: COMPLETED | OUTPATIENT
Start: 2021-07-12 | End: 2021-07-12

## 2021-07-12 RX ORDER — SODIUM CHLORIDE 0.9 % (FLUSH) 0.9 %
10 SYRINGE (ML) INJECTION
Status: DISCONTINUED | OUTPATIENT
Start: 2021-07-12 | End: 2021-07-15 | Stop reason: HOSPADM

## 2021-07-12 RX ORDER — LISINOPRIL 20 MG/1
40 TABLET ORAL DAILY
Status: DISCONTINUED | OUTPATIENT
Start: 2021-07-13 | End: 2021-07-15 | Stop reason: HOSPADM

## 2021-07-12 RX ORDER — MORPHINE SULFATE 4 MG/ML
4 INJECTION, SOLUTION INTRAMUSCULAR; INTRAVENOUS
Status: COMPLETED | OUTPATIENT
Start: 2021-07-12 | End: 2021-07-12

## 2021-07-12 RX ORDER — SODIUM CHLORIDE 0.9 % (FLUSH) 0.9 %
3 SYRINGE (ML) INJECTION
Status: DISCONTINUED | OUTPATIENT
Start: 2021-07-12 | End: 2021-07-15 | Stop reason: HOSPADM

## 2021-07-12 RX ORDER — ENOXAPARIN SODIUM 100 MG/ML
40 INJECTION SUBCUTANEOUS EVERY 24 HOURS
Status: DISCONTINUED | OUTPATIENT
Start: 2021-07-12 | End: 2021-07-15 | Stop reason: HOSPADM

## 2021-07-12 RX ORDER — SODIUM CHLORIDE 9 MG/ML
INJECTION, SOLUTION INTRAVENOUS
Status: DISCONTINUED | OUTPATIENT
Start: 2021-07-12 | End: 2021-07-15 | Stop reason: HOSPADM

## 2021-07-12 RX ORDER — ACETAMINOPHEN 325 MG/1
650 TABLET ORAL EVERY 4 HOURS PRN
Status: DISCONTINUED | OUTPATIENT
Start: 2021-07-12 | End: 2021-07-15 | Stop reason: HOSPADM

## 2021-07-12 RX ORDER — SODIUM CHLORIDE 9 MG/ML
INJECTION, SOLUTION INTRAVENOUS
Status: COMPLETED | OUTPATIENT
Start: 2021-07-12 | End: 2021-07-12

## 2021-07-12 RX ORDER — MORPHINE SULFATE 4 MG/ML
4 INJECTION, SOLUTION INTRAMUSCULAR; INTRAVENOUS EVERY 6 HOURS PRN
Status: DISCONTINUED | OUTPATIENT
Start: 2021-07-12 | End: 2021-07-14

## 2021-07-12 RX ORDER — HYDROCODONE BITARTRATE AND ACETAMINOPHEN 5; 325 MG/1; MG/1
1 TABLET ORAL EVERY 6 HOURS PRN
Status: DISCONTINUED | OUTPATIENT
Start: 2021-07-12 | End: 2021-07-14

## 2021-07-12 RX ORDER — AMLODIPINE BESYLATE 5 MG/1
10 TABLET ORAL
Status: COMPLETED | OUTPATIENT
Start: 2021-07-12 | End: 2021-07-12

## 2021-07-12 RX ORDER — SODIUM CHLORIDE 0.9 % (FLUSH) 0.9 %
10 SYRINGE (ML) INJECTION EVERY 8 HOURS
Status: DISCONTINUED | OUTPATIENT
Start: 2021-07-12 | End: 2021-07-12

## 2021-07-12 RX ORDER — IBUPROFEN 200 MG
16 TABLET ORAL
Status: DISCONTINUED | OUTPATIENT
Start: 2021-07-12 | End: 2021-07-15 | Stop reason: HOSPADM

## 2021-07-12 RX ORDER — GLUCAGON 1 MG
1 KIT INJECTION
Status: DISCONTINUED | OUTPATIENT
Start: 2021-07-12 | End: 2021-07-15 | Stop reason: HOSPADM

## 2021-07-12 RX ORDER — PROCHLORPERAZINE EDISYLATE 5 MG/ML
5 INJECTION INTRAMUSCULAR; INTRAVENOUS EVERY 6 HOURS PRN
Status: DISCONTINUED | OUTPATIENT
Start: 2021-07-12 | End: 2021-07-15 | Stop reason: HOSPADM

## 2021-07-12 RX ADMIN — ACETAMINOPHEN 650 MG: 325 TABLET ORAL at 09:07

## 2021-07-12 RX ADMIN — AMLODIPINE BESYLATE 10 MG: 5 TABLET ORAL at 12:07

## 2021-07-12 RX ADMIN — SODIUM CHLORIDE: 0.9 INJECTION, SOLUTION INTRAVENOUS at 05:07

## 2021-07-12 RX ADMIN — LISINOPRIL 40 MG: 20 TABLET ORAL at 11:07

## 2021-07-12 RX ADMIN — IOHEXOL 100 ML: 350 INJECTION, SOLUTION INTRAVENOUS at 12:07

## 2021-07-12 RX ADMIN — ONDANSETRON 4 MG: 2 INJECTION INTRAMUSCULAR; INTRAVENOUS at 11:07

## 2021-07-12 RX ADMIN — HYDROCODONE BITARTRATE AND ACETAMINOPHEN 1 TABLET: 5; 325 TABLET ORAL at 06:07

## 2021-07-12 RX ADMIN — MORPHINE SULFATE 4 MG: 4 INJECTION INTRAVENOUS at 10:07

## 2021-07-12 RX ADMIN — MORPHINE SULFATE 4 MG: 4 INJECTION INTRAVENOUS at 12:07

## 2021-07-12 RX ADMIN — PIPERACILLIN AND TAZOBACTAM 4.5 G: 4; .5 INJECTION, POWDER, LYOPHILIZED, FOR SOLUTION INTRAVENOUS; PARENTERAL at 02:07

## 2021-07-12 RX ADMIN — PIPERACILLIN SODIUM AND TAZOBACTAM SODIUM 4.5 G: 4; .5 INJECTION, POWDER, FOR SOLUTION INTRAVENOUS at 09:07

## 2021-07-12 RX ADMIN — SODIUM CHLORIDE: 0.9 INJECTION, SOLUTION INTRAVENOUS at 02:07

## 2021-07-13 ENCOUNTER — TELEPHONE (OUTPATIENT)
Dept: SURGERY | Facility: CLINIC | Age: 46
End: 2021-07-13

## 2021-07-13 LAB
ALBUMIN SERPL BCP-MCNC: 3.3 G/DL (ref 3.5–5.2)
ALP SERPL-CCNC: 73 U/L (ref 55–135)
ALT SERPL W/O P-5'-P-CCNC: 27 U/L (ref 10–44)
ANION GAP SERPL CALC-SCNC: 8 MMOL/L (ref 8–16)
AST SERPL-CCNC: 28 U/L (ref 10–40)
BASOPHILS # BLD AUTO: 0.05 K/UL (ref 0–0.2)
BASOPHILS NFR BLD: 0.4 % (ref 0–1.9)
BILIRUB SERPL-MCNC: 0.3 MG/DL (ref 0.1–1)
BUN SERPL-MCNC: 5 MG/DL (ref 6–20)
CALCIUM SERPL-MCNC: 9.1 MG/DL (ref 8.7–10.5)
CHLORIDE SERPL-SCNC: 104 MMOL/L (ref 95–110)
CO2 SERPL-SCNC: 25 MMOL/L (ref 23–29)
CREAT SERPL-MCNC: 1 MG/DL (ref 0.5–1.4)
DIFFERENTIAL METHOD: ABNORMAL
EOSINOPHIL # BLD AUTO: 0.6 K/UL (ref 0–0.5)
EOSINOPHIL NFR BLD: 5.1 % (ref 0–8)
ERYTHROCYTE [DISTWIDTH] IN BLOOD BY AUTOMATED COUNT: 13.5 % (ref 11.5–14.5)
EST. GFR  (AFRICAN AMERICAN): >60 ML/MIN/1.73 M^2
EST. GFR  (NON AFRICAN AMERICAN): >60 ML/MIN/1.73 M^2
GLUCOSE SERPL-MCNC: 112 MG/DL (ref 70–110)
HCT VFR BLD AUTO: 41.3 % (ref 37–48.5)
HGB BLD-MCNC: 13.8 G/DL (ref 12–16)
IMM GRANULOCYTES # BLD AUTO: 0.04 K/UL (ref 0–0.04)
IMM GRANULOCYTES NFR BLD AUTO: 0.3 % (ref 0–0.5)
LYMPHOCYTES # BLD AUTO: 3.4 K/UL (ref 1–4.8)
LYMPHOCYTES NFR BLD: 27.8 % (ref 18–48)
MAGNESIUM SERPL-MCNC: 1.8 MG/DL (ref 1.6–2.6)
MCH RBC QN AUTO: 29.9 PG (ref 27–31)
MCHC RBC AUTO-ENTMCNC: 33.4 G/DL (ref 32–36)
MCV RBC AUTO: 90 FL (ref 82–98)
MONOCYTES # BLD AUTO: 0.8 K/UL (ref 0.3–1)
MONOCYTES NFR BLD: 7 % (ref 4–15)
NEUTROPHILS # BLD AUTO: 7.2 K/UL (ref 1.8–7.7)
NEUTROPHILS NFR BLD: 59.4 % (ref 38–73)
NRBC BLD-RTO: 0 /100 WBC
PHOSPHATE SERPL-MCNC: 3.4 MG/DL (ref 2.7–4.5)
PLATELET # BLD AUTO: 259 K/UL (ref 150–450)
PMV BLD AUTO: 10.7 FL (ref 9.2–12.9)
POTASSIUM SERPL-SCNC: 3.8 MMOL/L (ref 3.5–5.1)
PROT SERPL-MCNC: 7 G/DL (ref 6–8.4)
RBC # BLD AUTO: 4.61 M/UL (ref 4–5.4)
SODIUM SERPL-SCNC: 137 MMOL/L (ref 136–145)
WBC # BLD AUTO: 12.06 K/UL (ref 3.9–12.7)

## 2021-07-13 PROCEDURE — 25000003 PHARM REV CODE 250: Performed by: HOSPITALIST

## 2021-07-13 PROCEDURE — 63600175 PHARM REV CODE 636 W HCPCS: Performed by: INTERNAL MEDICINE

## 2021-07-13 PROCEDURE — 84100 ASSAY OF PHOSPHORUS: CPT | Performed by: HOSPITALIST

## 2021-07-13 PROCEDURE — 63600175 PHARM REV CODE 636 W HCPCS: Performed by: HOSPITALIST

## 2021-07-13 PROCEDURE — 80053 COMPREHEN METABOLIC PANEL: CPT | Performed by: HOSPITALIST

## 2021-07-13 PROCEDURE — 83735 ASSAY OF MAGNESIUM: CPT | Performed by: HOSPITALIST

## 2021-07-13 PROCEDURE — 85025 COMPLETE CBC W/AUTO DIFF WBC: CPT | Performed by: HOSPITALIST

## 2021-07-13 PROCEDURE — 11000001 HC ACUTE MED/SURG PRIVATE ROOM

## 2021-07-13 PROCEDURE — 36415 COLL VENOUS BLD VENIPUNCTURE: CPT | Performed by: HOSPITALIST

## 2021-07-13 RX ORDER — NICOTINE 7MG/24HR
1 PATCH, TRANSDERMAL 24 HOURS TRANSDERMAL DAILY
Status: DISCONTINUED | OUTPATIENT
Start: 2021-07-14 | End: 2021-07-15 | Stop reason: HOSPADM

## 2021-07-13 RX ADMIN — PIPERACILLIN SODIUM AND TAZOBACTAM SODIUM 4.5 G: 4; .5 INJECTION, POWDER, FOR SOLUTION INTRAVENOUS at 05:07

## 2021-07-13 RX ADMIN — MORPHINE SULFATE 4 MG: 4 INJECTION INTRAVENOUS at 11:07

## 2021-07-13 RX ADMIN — MORPHINE SULFATE 4 MG: 4 INJECTION INTRAVENOUS at 05:07

## 2021-07-13 RX ADMIN — ENOXAPARIN SODIUM 40 MG: 40 INJECTION SUBCUTANEOUS at 04:07

## 2021-07-13 RX ADMIN — AMLODIPINE BESYLATE 10 MG: 5 TABLET ORAL at 09:07

## 2021-07-13 RX ADMIN — SODIUM CHLORIDE: 0.9 INJECTION, SOLUTION INTRAVENOUS at 05:07

## 2021-07-13 RX ADMIN — ONDANSETRON 8 MG: 2 INJECTION INTRAMUSCULAR; INTRAVENOUS at 05:07

## 2021-07-13 RX ADMIN — PIPERACILLIN SODIUM AND TAZOBACTAM SODIUM 4.5 G: 4; .5 INJECTION, POWDER, FOR SOLUTION INTRAVENOUS at 09:07

## 2021-07-13 RX ADMIN — LISINOPRIL 40 MG: 20 TABLET ORAL at 09:07

## 2021-07-13 RX ADMIN — PIPERACILLIN SODIUM AND TAZOBACTAM SODIUM 4.5 G: 4; .5 INJECTION, POWDER, FOR SOLUTION INTRAVENOUS at 02:07

## 2021-07-14 LAB
ALBUMIN SERPL BCP-MCNC: 3.2 G/DL (ref 3.5–5.2)
ALP SERPL-CCNC: 69 U/L (ref 55–135)
ALT SERPL W/O P-5'-P-CCNC: 21 U/L (ref 10–44)
ANION GAP SERPL CALC-SCNC: 6 MMOL/L (ref 8–16)
AST SERPL-CCNC: 16 U/L (ref 10–40)
BASOPHILS # BLD AUTO: 0.04 K/UL (ref 0–0.2)
BASOPHILS NFR BLD: 0.4 % (ref 0–1.9)
BILIRUB SERPL-MCNC: 0.4 MG/DL (ref 0.1–1)
BUN SERPL-MCNC: 6 MG/DL (ref 6–20)
CALCIUM SERPL-MCNC: 8.8 MG/DL (ref 8.7–10.5)
CHLORIDE SERPL-SCNC: 107 MMOL/L (ref 95–110)
CO2 SERPL-SCNC: 23 MMOL/L (ref 23–29)
CREAT SERPL-MCNC: 0.9 MG/DL (ref 0.5–1.4)
DIFFERENTIAL METHOD: NORMAL
EOSINOPHIL # BLD AUTO: 0.5 K/UL (ref 0–0.5)
EOSINOPHIL NFR BLD: 4.5 % (ref 0–8)
ERYTHROCYTE [DISTWIDTH] IN BLOOD BY AUTOMATED COUNT: 13.2 % (ref 11.5–14.5)
EST. GFR  (AFRICAN AMERICAN): >60 ML/MIN/1.73 M^2
EST. GFR  (NON AFRICAN AMERICAN): >60 ML/MIN/1.73 M^2
GLUCOSE SERPL-MCNC: 122 MG/DL (ref 70–110)
HCT VFR BLD AUTO: 39.5 % (ref 37–48.5)
HGB BLD-MCNC: 13.2 G/DL (ref 12–16)
IMM GRANULOCYTES # BLD AUTO: 0.04 K/UL (ref 0–0.04)
IMM GRANULOCYTES NFR BLD AUTO: 0.4 % (ref 0–0.5)
LYMPHOCYTES # BLD AUTO: 3.4 K/UL (ref 1–4.8)
LYMPHOCYTES NFR BLD: 30.7 % (ref 18–48)
MAGNESIUM SERPL-MCNC: 1.9 MG/DL (ref 1.6–2.6)
MCH RBC QN AUTO: 29.8 PG (ref 27–31)
MCHC RBC AUTO-ENTMCNC: 33.4 G/DL (ref 32–36)
MCV RBC AUTO: 89 FL (ref 82–98)
MONOCYTES # BLD AUTO: 0.8 K/UL (ref 0.3–1)
MONOCYTES NFR BLD: 7 % (ref 4–15)
NEUTROPHILS # BLD AUTO: 6.3 K/UL (ref 1.8–7.7)
NEUTROPHILS NFR BLD: 57 % (ref 38–73)
NRBC BLD-RTO: 0 /100 WBC
PHOSPHATE SERPL-MCNC: 3 MG/DL (ref 2.7–4.5)
PLATELET # BLD AUTO: 260 K/UL (ref 150–450)
PMV BLD AUTO: 10.8 FL (ref 9.2–12.9)
POTASSIUM SERPL-SCNC: 3.6 MMOL/L (ref 3.5–5.1)
PROT SERPL-MCNC: 6.8 G/DL (ref 6–8.4)
RBC # BLD AUTO: 4.43 M/UL (ref 4–5.4)
SODIUM SERPL-SCNC: 136 MMOL/L (ref 136–145)
WBC # BLD AUTO: 11.02 K/UL (ref 3.9–12.7)

## 2021-07-14 PROCEDURE — 36415 COLL VENOUS BLD VENIPUNCTURE: CPT | Performed by: HOSPITALIST

## 2021-07-14 PROCEDURE — 63700000 PHARM REV CODE 250 ALT 637 W/O HCPCS: Performed by: HOSPITALIST

## 2021-07-14 PROCEDURE — S4991 NICOTINE PATCH NONLEGEND: HCPCS | Performed by: HOSPITALIST

## 2021-07-14 PROCEDURE — 85025 COMPLETE CBC W/AUTO DIFF WBC: CPT | Performed by: HOSPITALIST

## 2021-07-14 PROCEDURE — 80053 COMPREHEN METABOLIC PANEL: CPT | Performed by: HOSPITALIST

## 2021-07-14 PROCEDURE — 83735 ASSAY OF MAGNESIUM: CPT | Performed by: HOSPITALIST

## 2021-07-14 PROCEDURE — 11000001 HC ACUTE MED/SURG PRIVATE ROOM

## 2021-07-14 PROCEDURE — 84100 ASSAY OF PHOSPHORUS: CPT | Performed by: HOSPITALIST

## 2021-07-14 PROCEDURE — 63600175 PHARM REV CODE 636 W HCPCS: Performed by: HOSPITALIST

## 2021-07-14 PROCEDURE — 25000003 PHARM REV CODE 250: Performed by: HOSPITALIST

## 2021-07-14 PROCEDURE — 63600175 PHARM REV CODE 636 W HCPCS: Performed by: INTERNAL MEDICINE

## 2021-07-14 RX ORDER — OXYCODONE HYDROCHLORIDE 5 MG/1
10 TABLET ORAL EVERY 4 HOURS PRN
Status: DISCONTINUED | OUTPATIENT
Start: 2021-07-14 | End: 2021-07-15 | Stop reason: HOSPADM

## 2021-07-14 RX ORDER — AMOXICILLIN 250 MG
2 CAPSULE ORAL 2 TIMES DAILY
Status: DISCONTINUED | OUTPATIENT
Start: 2021-07-14 | End: 2021-07-15 | Stop reason: HOSPADM

## 2021-07-14 RX ORDER — HYDROMORPHONE HYDROCHLORIDE 2 MG/ML
1 INJECTION, SOLUTION INTRAMUSCULAR; INTRAVENOUS; SUBCUTANEOUS EVERY 4 HOURS PRN
Status: DISCONTINUED | OUTPATIENT
Start: 2021-07-14 | End: 2021-07-15 | Stop reason: HOSPADM

## 2021-07-14 RX ORDER — SYRING-NEEDL,DISP,INSUL,0.3 ML 29 G X1/2"
296 SYRINGE, EMPTY DISPOSABLE MISCELLANEOUS ONCE
Status: COMPLETED | OUTPATIENT
Start: 2021-07-14 | End: 2021-07-14

## 2021-07-14 RX ORDER — FLUCONAZOLE 100 MG/1
200 TABLET ORAL DAILY
Status: COMPLETED | OUTPATIENT
Start: 2021-07-14 | End: 2021-07-15

## 2021-07-14 RX ADMIN — ENOXAPARIN SODIUM 40 MG: 40 INJECTION SUBCUTANEOUS at 05:07

## 2021-07-14 RX ADMIN — MORPHINE SULFATE 4 MG: 4 INJECTION INTRAVENOUS at 06:07

## 2021-07-14 RX ADMIN — PIPERACILLIN SODIUM AND TAZOBACTAM SODIUM 4.5 G: 4; .5 INJECTION, POWDER, FOR SOLUTION INTRAVENOUS at 01:07

## 2021-07-14 RX ADMIN — DOCUSATE SODIUM 50 MG AND SENNOSIDES 8.6 MG 2 TABLET: 8.6; 5 TABLET, FILM COATED ORAL at 09:07

## 2021-07-14 RX ADMIN — PIPERACILLIN SODIUM AND TAZOBACTAM SODIUM 4.5 G: 4; .5 INJECTION, POWDER, FOR SOLUTION INTRAVENOUS at 06:07

## 2021-07-14 RX ADMIN — SODIUM CHLORIDE: 0.9 INJECTION, SOLUTION INTRAVENOUS at 12:07

## 2021-07-14 RX ADMIN — SODIUM CHLORIDE: 0.9 INJECTION, SOLUTION INTRAVENOUS at 06:07

## 2021-07-14 RX ADMIN — AMLODIPINE BESYLATE 10 MG: 5 TABLET ORAL at 08:07

## 2021-07-14 RX ADMIN — MAGNESIUM CITRATE 296 ML: 1.75 LIQUID ORAL at 01:07

## 2021-07-14 RX ADMIN — DOCUSATE SODIUM 50 MG AND SENNOSIDES 8.6 MG 2 TABLET: 8.6; 5 TABLET, FILM COATED ORAL at 01:07

## 2021-07-14 RX ADMIN — ONDANSETRON 8 MG: 2 INJECTION INTRAMUSCULAR; INTRAVENOUS at 04:07

## 2021-07-14 RX ADMIN — NICOTINE 1 PATCH: 7 PATCH TRANSDERMAL at 08:07

## 2021-07-14 RX ADMIN — LISINOPRIL 40 MG: 20 TABLET ORAL at 08:07

## 2021-07-14 RX ADMIN — PIPERACILLIN SODIUM AND TAZOBACTAM SODIUM 4.5 G: 4; .5 INJECTION, POWDER, FOR SOLUTION INTRAVENOUS at 09:07

## 2021-07-14 RX ADMIN — FLUCONAZOLE 200 MG: 100 TABLET ORAL at 01:07

## 2021-07-14 RX ADMIN — OXYCODONE HYDROCHLORIDE 10 MG: 5 TABLET ORAL at 05:07

## 2021-07-14 RX ADMIN — MORPHINE SULFATE 4 MG: 4 INJECTION INTRAVENOUS at 12:07

## 2021-07-14 RX ADMIN — OXYCODONE HYDROCHLORIDE 10 MG: 5 TABLET ORAL at 10:07

## 2021-07-15 LAB
ALBUMIN SERPL BCP-MCNC: 3.3 G/DL (ref 3.5–5.2)
ALP SERPL-CCNC: 69 U/L (ref 55–135)
ALT SERPL W/O P-5'-P-CCNC: 18 U/L (ref 10–44)
ANION GAP SERPL CALC-SCNC: 8 MMOL/L (ref 8–16)
AST SERPL-CCNC: 15 U/L (ref 10–40)
BASOPHILS # BLD AUTO: 0.06 K/UL (ref 0–0.2)
BASOPHILS NFR BLD: 0.6 % (ref 0–1.9)
BILIRUB SERPL-MCNC: 0.4 MG/DL (ref 0.1–1)
BUN SERPL-MCNC: 5 MG/DL (ref 6–20)
CALCIUM SERPL-MCNC: 9.1 MG/DL (ref 8.7–10.5)
CHLORIDE SERPL-SCNC: 109 MMOL/L (ref 95–110)
CO2 SERPL-SCNC: 20 MMOL/L (ref 23–29)
CREAT SERPL-MCNC: 1 MG/DL (ref 0.5–1.4)
DIFFERENTIAL METHOD: NORMAL
EOSINOPHIL # BLD AUTO: 0.4 K/UL (ref 0–0.5)
EOSINOPHIL NFR BLD: 3.7 % (ref 0–8)
ERYTHROCYTE [DISTWIDTH] IN BLOOD BY AUTOMATED COUNT: 13.2 % (ref 11.5–14.5)
EST. GFR  (AFRICAN AMERICAN): >60 ML/MIN/1.73 M^2
EST. GFR  (NON AFRICAN AMERICAN): >60 ML/MIN/1.73 M^2
GLUCOSE SERPL-MCNC: 92 MG/DL (ref 70–110)
HCT VFR BLD AUTO: 42.4 % (ref 37–48.5)
HGB BLD-MCNC: 13.9 G/DL (ref 12–16)
IMM GRANULOCYTES # BLD AUTO: 0.02 K/UL (ref 0–0.04)
IMM GRANULOCYTES NFR BLD AUTO: 0.2 % (ref 0–0.5)
LYMPHOCYTES # BLD AUTO: 3.3 K/UL (ref 1–4.8)
LYMPHOCYTES NFR BLD: 31.9 % (ref 18–48)
MAGNESIUM SERPL-MCNC: 2 MG/DL (ref 1.6–2.6)
MCH RBC QN AUTO: 30.2 PG (ref 27–31)
MCHC RBC AUTO-ENTMCNC: 32.8 G/DL (ref 32–36)
MCV RBC AUTO: 92 FL (ref 82–98)
MONOCYTES # BLD AUTO: 0.8 K/UL (ref 0.3–1)
MONOCYTES NFR BLD: 7.6 % (ref 4–15)
NEUTROPHILS # BLD AUTO: 5.8 K/UL (ref 1.8–7.7)
NEUTROPHILS NFR BLD: 56 % (ref 38–73)
NRBC BLD-RTO: 0 /100 WBC
PHOSPHATE SERPL-MCNC: 3.1 MG/DL (ref 2.7–4.5)
PLATELET # BLD AUTO: NORMAL K/UL (ref 150–450)
PMV BLD AUTO: NORMAL FL (ref 9.2–12.9)
POTASSIUM SERPL-SCNC: 4.4 MMOL/L (ref 3.5–5.1)
PROT SERPL-MCNC: 7 G/DL (ref 6–8.4)
RBC # BLD AUTO: 4.61 M/UL (ref 4–5.4)
SODIUM SERPL-SCNC: 137 MMOL/L (ref 136–145)
WBC # BLD AUTO: 10.37 K/UL (ref 3.9–12.7)

## 2021-07-15 PROCEDURE — 63600175 PHARM REV CODE 636 W HCPCS: Performed by: HOSPITALIST

## 2021-07-15 PROCEDURE — S4991 NICOTINE PATCH NONLEGEND: HCPCS | Performed by: HOSPITALIST

## 2021-07-15 PROCEDURE — 25000003 PHARM REV CODE 250: Performed by: HOSPITALIST

## 2021-07-15 PROCEDURE — 83735 ASSAY OF MAGNESIUM: CPT | Performed by: HOSPITALIST

## 2021-07-15 PROCEDURE — 63700000 PHARM REV CODE 250 ALT 637 W/O HCPCS: Performed by: HOSPITALIST

## 2021-07-15 PROCEDURE — 36415 COLL VENOUS BLD VENIPUNCTURE: CPT | Performed by: HOSPITALIST

## 2021-07-15 PROCEDURE — 85025 COMPLETE CBC W/AUTO DIFF WBC: CPT | Performed by: HOSPITALIST

## 2021-07-15 PROCEDURE — 84100 ASSAY OF PHOSPHORUS: CPT | Performed by: HOSPITALIST

## 2021-07-15 PROCEDURE — 80053 COMPREHEN METABOLIC PANEL: CPT | Performed by: HOSPITALIST

## 2021-07-15 RX ORDER — OXYCODONE HYDROCHLORIDE 5 MG/1
5 CAPSULE ORAL EVERY 4 HOURS PRN
Qty: 20 CAPSULE | Refills: 0 | Status: SHIPPED | OUTPATIENT
Start: 2021-07-15 | End: 2021-07-15 | Stop reason: HOSPADM

## 2021-07-15 RX ORDER — AMOXICILLIN AND CLAVULANATE POTASSIUM 875; 125 MG/1; MG/1
1 TABLET, FILM COATED ORAL EVERY 12 HOURS
Qty: 22 TABLET | Refills: 0 | Status: ON HOLD | OUTPATIENT
Start: 2021-07-15 | End: 2021-07-28 | Stop reason: HOSPADM

## 2021-07-15 RX ORDER — FLUCONAZOLE 150 MG/1
150 TABLET ORAL DAILY
Qty: 1 TABLET | Refills: 0 | Status: SHIPPED | OUTPATIENT
Start: 2021-07-15 | End: 2021-07-16

## 2021-07-15 RX ORDER — OXYCODONE HYDROCHLORIDE 5 MG/1
5 TABLET ORAL EVERY 6 HOURS PRN
Qty: 20 TABLET | Refills: 0 | Status: ON HOLD | OUTPATIENT
Start: 2021-07-15 | End: 2021-07-28 | Stop reason: HOSPADM

## 2021-07-15 RX ORDER — ONDANSETRON 4 MG/1
4 TABLET, FILM COATED ORAL EVERY 6 HOURS PRN
Qty: 30 TABLET | Refills: 0 | Status: ON HOLD | OUTPATIENT
Start: 2021-07-15 | End: 2021-07-28 | Stop reason: HOSPADM

## 2021-07-15 RX ADMIN — PROCHLORPERAZINE EDISYLATE 5 MG: 5 INJECTION INTRAMUSCULAR; INTRAVENOUS at 01:07

## 2021-07-15 RX ADMIN — DOCUSATE SODIUM 50 MG AND SENNOSIDES 8.6 MG 2 TABLET: 8.6; 5 TABLET, FILM COATED ORAL at 08:07

## 2021-07-15 RX ADMIN — AMLODIPINE BESYLATE 10 MG: 5 TABLET ORAL at 08:07

## 2021-07-15 RX ADMIN — OXYCODONE HYDROCHLORIDE 10 MG: 5 TABLET ORAL at 05:07

## 2021-07-15 RX ADMIN — SODIUM CHLORIDE: 0.9 INJECTION, SOLUTION INTRAVENOUS at 05:07

## 2021-07-15 RX ADMIN — FLUCONAZOLE 200 MG: 100 TABLET ORAL at 08:07

## 2021-07-15 RX ADMIN — PIPERACILLIN SODIUM AND TAZOBACTAM SODIUM 4.5 G: 4; .5 INJECTION, POWDER, FOR SOLUTION INTRAVENOUS at 05:07

## 2021-07-15 RX ADMIN — NICOTINE 1 PATCH: 7 PATCH TRANSDERMAL at 08:07

## 2021-07-15 RX ADMIN — LISINOPRIL 40 MG: 20 TABLET ORAL at 08:07

## 2021-07-16 PROBLEM — K57.92 ACUTE DIVERTICULITIS: Status: RESOLVED | Noted: 2020-08-20 | Resolved: 2021-07-16

## 2021-07-16 PROBLEM — K57.92 DIVERTICULITIS: Status: RESOLVED | Noted: 2021-07-12 | Resolved: 2021-07-16

## 2021-07-17 LAB
BACTERIA BLD CULT: NORMAL
BACTERIA BLD CULT: NORMAL

## 2021-07-22 ENCOUNTER — PATIENT OUTREACH (OUTPATIENT)
Dept: ADMINISTRATIVE | Facility: HOSPITAL | Age: 46
End: 2021-07-22

## 2021-07-23 ENCOUNTER — HOSPITAL ENCOUNTER (INPATIENT)
Facility: HOSPITAL | Age: 46
LOS: 4 days | Discharge: HOME HEALTH | DRG: 392 | End: 2021-07-28
Attending: STUDENT IN AN ORGANIZED HEALTH CARE EDUCATION/TRAINING PROGRAM | Admitting: HOSPITALIST
Payer: COMMERCIAL

## 2021-07-23 DIAGNOSIS — K57.32 DIVERTICULITIS OF SIGMOID COLON: ICD-10-CM

## 2021-07-23 DIAGNOSIS — K57.92 DIVERTICULITIS: ICD-10-CM

## 2021-07-23 DIAGNOSIS — R53.1 WEAKNESS: ICD-10-CM

## 2021-07-23 LAB
ALBUMIN SERPL BCP-MCNC: 4.3 G/DL (ref 3.5–5.2)
ALP SERPL-CCNC: 97 U/L (ref 55–135)
ALT SERPL W/O P-5'-P-CCNC: 7 U/L (ref 10–44)
ANION GAP SERPL CALC-SCNC: 15 MMOL/L (ref 8–16)
AST SERPL-CCNC: 12 U/L (ref 10–40)
B-HCG UR QL: NEGATIVE
BACTERIA #/AREA URNS HPF: ABNORMAL /HPF
BASOPHILS NFR BLD: 0 % (ref 0–1.9)
BILIRUB SERPL-MCNC: 0.4 MG/DL (ref 0.1–1)
BILIRUB UR QL STRIP: NEGATIVE
BUN SERPL-MCNC: 6 MG/DL (ref 6–20)
CALCIUM SERPL-MCNC: 10.5 MG/DL (ref 8.7–10.5)
CHLORIDE SERPL-SCNC: 109 MMOL/L (ref 95–110)
CLARITY UR: CLEAR
CO2 SERPL-SCNC: 22 MMOL/L (ref 23–29)
COLOR UR: YELLOW
CREAT SERPL-MCNC: 1 MG/DL (ref 0.5–1.4)
CTP QC/QA: YES
CTP QC/QA: YES
DIFFERENTIAL METHOD: ABNORMAL
EOSINOPHIL NFR BLD: 0 % (ref 0–8)
ERYTHROCYTE [DISTWIDTH] IN BLOOD BY AUTOMATED COUNT: 12.7 % (ref 11.5–14.5)
EST. GFR  (AFRICAN AMERICAN): >60 ML/MIN/1.73 M^2
EST. GFR  (NON AFRICAN AMERICAN): >60 ML/MIN/1.73 M^2
GLUCOSE SERPL-MCNC: 151 MG/DL (ref 70–110)
GLUCOSE UR QL STRIP: ABNORMAL
HCT VFR BLD AUTO: 47.1 % (ref 37–48.5)
HGB BLD-MCNC: 15.9 G/DL (ref 12–16)
HGB UR QL STRIP: ABNORMAL
HYALINE CASTS #/AREA URNS LPF: 0 /LPF
IMM GRANULOCYTES # BLD AUTO: ABNORMAL K/UL (ref 0–0.04)
IMM GRANULOCYTES NFR BLD AUTO: ABNORMAL % (ref 0–0.5)
KETONES UR QL STRIP: ABNORMAL
LEUKOCYTE ESTERASE UR QL STRIP: ABNORMAL
LYMPHOCYTES NFR BLD: 9 % (ref 18–48)
MAGNESIUM SERPL-MCNC: 1.5 MG/DL (ref 1.6–2.6)
MCH RBC QN AUTO: 30.2 PG (ref 27–31)
MCHC RBC AUTO-ENTMCNC: 33.8 G/DL (ref 32–36)
MCV RBC AUTO: 89 FL (ref 82–98)
MICROSCOPIC COMMENT: ABNORMAL
MONOCYTES NFR BLD: 4 % (ref 4–15)
NEUTROPHILS NFR BLD: 87 % (ref 38–73)
NITRITE UR QL STRIP: NEGATIVE
NRBC BLD-RTO: 0 /100 WBC
PH UR STRIP: 7 [PH] (ref 5–8)
PHOSPHATE SERPL-MCNC: 2.5 MG/DL (ref 2.7–4.5)
PLATELET # BLD AUTO: 348 K/UL (ref 150–450)
PLATELET BLD QL SMEAR: ABNORMAL
PMV BLD AUTO: 11 FL (ref 9.2–12.9)
POTASSIUM SERPL-SCNC: 3.6 MMOL/L (ref 3.5–5.1)
PROT SERPL-MCNC: 8.9 G/DL (ref 6–8.4)
PROT UR QL STRIP: ABNORMAL
RBC # BLD AUTO: 5.27 M/UL (ref 4–5.4)
RBC #/AREA URNS HPF: >100 /HPF (ref 0–4)
SARS-COV-2 RDRP RESP QL NAA+PROBE: NEGATIVE
SODIUM SERPL-SCNC: 146 MMOL/L (ref 136–145)
SP GR UR STRIP: 1.01 (ref 1–1.03)
SQUAMOUS #/AREA URNS HPF: 2 /HPF
TROPONIN I SERPL DL<=0.01 NG/ML-MCNC: <0.006 NG/ML (ref 0–0.03)
URN SPEC COLLECT METH UR: ABNORMAL
UROBILINOGEN UR STRIP-ACNC: NEGATIVE EU/DL
WBC # BLD AUTO: 13.67 K/UL (ref 3.9–12.7)
WBC #/AREA URNS HPF: 6 /HPF (ref 0–5)
WBC CLUMPS URNS QL MICRO: ABNORMAL

## 2021-07-23 PROCEDURE — 85007 BL SMEAR W/DIFF WBC COUNT: CPT | Performed by: STUDENT IN AN ORGANIZED HEALTH CARE EDUCATION/TRAINING PROGRAM

## 2021-07-23 PROCEDURE — 85027 COMPLETE CBC AUTOMATED: CPT | Performed by: STUDENT IN AN ORGANIZED HEALTH CARE EDUCATION/TRAINING PROGRAM

## 2021-07-23 PROCEDURE — 84484 ASSAY OF TROPONIN QUANT: CPT | Performed by: STUDENT IN AN ORGANIZED HEALTH CARE EDUCATION/TRAINING PROGRAM

## 2021-07-23 PROCEDURE — 80053 COMPREHEN METABOLIC PANEL: CPT | Performed by: STUDENT IN AN ORGANIZED HEALTH CARE EDUCATION/TRAINING PROGRAM

## 2021-07-23 PROCEDURE — 99285 EMERGENCY DEPT VISIT HI MDM: CPT | Mod: 25

## 2021-07-23 PROCEDURE — 93010 EKG 12-LEAD: ICD-10-PCS | Mod: ,,, | Performed by: INTERNAL MEDICINE

## 2021-07-23 PROCEDURE — 93005 ELECTROCARDIOGRAM TRACING: CPT

## 2021-07-23 PROCEDURE — 96361 HYDRATE IV INFUSION ADD-ON: CPT

## 2021-07-23 PROCEDURE — 84100 ASSAY OF PHOSPHORUS: CPT | Performed by: STUDENT IN AN ORGANIZED HEALTH CARE EDUCATION/TRAINING PROGRAM

## 2021-07-23 PROCEDURE — 83735 ASSAY OF MAGNESIUM: CPT | Performed by: STUDENT IN AN ORGANIZED HEALTH CARE EDUCATION/TRAINING PROGRAM

## 2021-07-23 PROCEDURE — 81025 URINE PREGNANCY TEST: CPT | Performed by: STUDENT IN AN ORGANIZED HEALTH CARE EDUCATION/TRAINING PROGRAM

## 2021-07-23 PROCEDURE — 96375 TX/PRO/DX INJ NEW DRUG ADDON: CPT

## 2021-07-23 PROCEDURE — 93010 ELECTROCARDIOGRAM REPORT: CPT | Mod: ,,, | Performed by: INTERNAL MEDICINE

## 2021-07-23 PROCEDURE — U0002 COVID-19 LAB TEST NON-CDC: HCPCS | Performed by: STUDENT IN AN ORGANIZED HEALTH CARE EDUCATION/TRAINING PROGRAM

## 2021-07-23 PROCEDURE — 25000003 PHARM REV CODE 250: Performed by: STUDENT IN AN ORGANIZED HEALTH CARE EDUCATION/TRAINING PROGRAM

## 2021-07-23 PROCEDURE — 63600175 PHARM REV CODE 636 W HCPCS: Performed by: STUDENT IN AN ORGANIZED HEALTH CARE EDUCATION/TRAINING PROGRAM

## 2021-07-23 PROCEDURE — 81000 URINALYSIS NONAUTO W/SCOPE: CPT | Performed by: STUDENT IN AN ORGANIZED HEALTH CARE EDUCATION/TRAINING PROGRAM

## 2021-07-23 RX ORDER — HALOPERIDOL 5 MG/ML
5 INJECTION INTRAMUSCULAR
Status: COMPLETED | OUTPATIENT
Start: 2021-07-23 | End: 2021-07-23

## 2021-07-23 RX ORDER — ONDANSETRON 2 MG/ML
4 INJECTION INTRAMUSCULAR; INTRAVENOUS
Status: COMPLETED | OUTPATIENT
Start: 2021-07-23 | End: 2021-07-23

## 2021-07-23 RX ADMIN — HALOPERIDOL LACTATE 5 MG: 5 INJECTION, SOLUTION INTRAMUSCULAR at 10:07

## 2021-07-23 RX ADMIN — IOHEXOL 100 ML: 350 INJECTION, SOLUTION INTRAVENOUS at 11:07

## 2021-07-23 RX ADMIN — SODIUM CHLORIDE 1000 ML: 9 INJECTION, SOLUTION INTRAVENOUS at 09:07

## 2021-07-23 RX ADMIN — ONDANSETRON 4 MG: 2 INJECTION INTRAMUSCULAR; INTRAVENOUS at 10:07

## 2021-07-24 PROBLEM — R93.5 ABNORMAL CT OF THE ABDOMEN: Status: ACTIVE | Noted: 2021-07-24

## 2021-07-24 PROBLEM — K57.32 DIVERTICULITIS OF SIGMOID COLON: Status: ACTIVE | Noted: 2021-07-24

## 2021-07-24 PROBLEM — K57.92 DIVERTICULITIS: Status: ACTIVE | Noted: 2021-07-24

## 2021-07-24 PROBLEM — R10.84 GENERALIZED ABDOMINAL PAIN: Status: ACTIVE | Noted: 2021-07-24

## 2021-07-24 LAB
ABO + RH BLD: NORMAL
ALBUMIN SERPL BCP-MCNC: 4.2 G/DL (ref 3.5–5.2)
ALP SERPL-CCNC: 90 U/L (ref 55–135)
ALT SERPL W/O P-5'-P-CCNC: 9 U/L (ref 10–44)
AMPHET+METHAMPHET UR QL: NEGATIVE
ANION GAP SERPL CALC-SCNC: 11 MMOL/L (ref 8–16)
AST SERPL-CCNC: 10 U/L (ref 10–40)
BARBITURATES UR QL SCN>200 NG/ML: NEGATIVE
BENZODIAZ UR QL SCN>200 NG/ML: NEGATIVE
BILIRUB SERPL-MCNC: 0.4 MG/DL (ref 0.1–1)
BLD GP AB SCN CELLS X3 SERPL QL: NORMAL
BUN SERPL-MCNC: 5 MG/DL (ref 6–20)
BZE UR QL SCN: NEGATIVE
CALCIUM SERPL-MCNC: 10.1 MG/DL (ref 8.7–10.5)
CANNABINOIDS UR QL SCN: ABNORMAL
CHLORIDE SERPL-SCNC: 104 MMOL/L (ref 95–110)
CO2 SERPL-SCNC: 24 MMOL/L (ref 23–29)
CREAT SERPL-MCNC: 0.9 MG/DL (ref 0.5–1.4)
CREAT UR-MCNC: 72.9 MG/DL (ref 15–325)
EST. GFR  (AFRICAN AMERICAN): >60 ML/MIN/1.73 M^2
EST. GFR  (NON AFRICAN AMERICAN): >60 ML/MIN/1.73 M^2
GLUCOSE SERPL-MCNC: 157 MG/DL (ref 70–110)
INR PPP: 1 (ref 0.8–1.2)
LACTATE SERPL-SCNC: 1.4 MMOL/L (ref 0.5–2.2)
METHADONE UR QL SCN>300 NG/ML: NEGATIVE
OPIATES UR QL SCN: NEGATIVE
PCP UR QL SCN>25 NG/ML: NEGATIVE
POTASSIUM SERPL-SCNC: 3.4 MMOL/L (ref 3.5–5.1)
PROT SERPL-MCNC: 8.9 G/DL (ref 6–8.4)
PROTHROMBIN TIME: 10.9 SEC (ref 9–12.5)
SODIUM SERPL-SCNC: 139 MMOL/L (ref 136–145)
TOXICOLOGY INFORMATION: ABNORMAL

## 2021-07-24 PROCEDURE — 86900 BLOOD TYPING SEROLOGIC ABO: CPT | Performed by: NURSE PRACTITIONER

## 2021-07-24 PROCEDURE — 99222 PR INITIAL HOSPITAL CARE,LEVL II: ICD-10-PCS | Mod: ,,, | Performed by: INTERNAL MEDICINE

## 2021-07-24 PROCEDURE — 85610 PROTHROMBIN TIME: CPT | Performed by: NURSE PRACTITIONER

## 2021-07-24 PROCEDURE — 83605 ASSAY OF LACTIC ACID: CPT | Performed by: STUDENT IN AN ORGANIZED HEALTH CARE EDUCATION/TRAINING PROGRAM

## 2021-07-24 PROCEDURE — C9113 INJ PANTOPRAZOLE SODIUM, VIA: HCPCS | Performed by: NURSE PRACTITIONER

## 2021-07-24 PROCEDURE — 96365 THER/PROPH/DIAG IV INF INIT: CPT

## 2021-07-24 PROCEDURE — 87040 BLOOD CULTURE FOR BACTERIA: CPT | Performed by: STUDENT IN AN ORGANIZED HEALTH CARE EDUCATION/TRAINING PROGRAM

## 2021-07-24 PROCEDURE — 25000003 PHARM REV CODE 250: Performed by: NURSE PRACTITIONER

## 2021-07-24 PROCEDURE — 99222 1ST HOSP IP/OBS MODERATE 55: CPT | Mod: ,,, | Performed by: INTERNAL MEDICINE

## 2021-07-24 PROCEDURE — 25000003 PHARM REV CODE 250: Performed by: HOSPITALIST

## 2021-07-24 PROCEDURE — 63600175 PHARM REV CODE 636 W HCPCS: Performed by: NURSE PRACTITIONER

## 2021-07-24 PROCEDURE — 63600175 PHARM REV CODE 636 W HCPCS: Performed by: HOSPITALIST

## 2021-07-24 PROCEDURE — 63600175 PHARM REV CODE 636 W HCPCS: Performed by: STUDENT IN AN ORGANIZED HEALTH CARE EDUCATION/TRAINING PROGRAM

## 2021-07-24 PROCEDURE — 80307 DRUG TEST PRSMV CHEM ANLYZR: CPT | Performed by: NURSE PRACTITIONER

## 2021-07-24 PROCEDURE — 11000001 HC ACUTE MED/SURG PRIVATE ROOM

## 2021-07-24 PROCEDURE — 63600175 PHARM REV CODE 636 W HCPCS: Performed by: PHYSICIAN ASSISTANT

## 2021-07-24 PROCEDURE — 25000003 PHARM REV CODE 250: Performed by: STUDENT IN AN ORGANIZED HEALTH CARE EDUCATION/TRAINING PROGRAM

## 2021-07-24 PROCEDURE — 80053 COMPREHEN METABOLIC PANEL: CPT | Performed by: NURSE PRACTITIONER

## 2021-07-24 PROCEDURE — 25500020 PHARM REV CODE 255: Performed by: STUDENT IN AN ORGANIZED HEALTH CARE EDUCATION/TRAINING PROGRAM

## 2021-07-24 RX ORDER — ENOXAPARIN SODIUM 100 MG/ML
40 INJECTION SUBCUTANEOUS EVERY 24 HOURS
Status: DISCONTINUED | OUTPATIENT
Start: 2021-07-24 | End: 2021-07-28 | Stop reason: HOSPADM

## 2021-07-24 RX ORDER — PANTOPRAZOLE SODIUM 40 MG/10ML
40 INJECTION, POWDER, LYOPHILIZED, FOR SOLUTION INTRAVENOUS 2 TIMES DAILY
Status: DISCONTINUED | OUTPATIENT
Start: 2021-07-24 | End: 2021-07-28 | Stop reason: HOSPADM

## 2021-07-24 RX ORDER — AMLODIPINE BESYLATE 5 MG/1
10 TABLET ORAL DAILY
Status: DISCONTINUED | OUTPATIENT
Start: 2021-07-24 | End: 2021-07-28

## 2021-07-24 RX ORDER — HYDRALAZINE HYDROCHLORIDE 20 MG/ML
10 INJECTION INTRAMUSCULAR; INTRAVENOUS
Status: COMPLETED | OUTPATIENT
Start: 2021-07-24 | End: 2021-07-24

## 2021-07-24 RX ORDER — HYDRALAZINE HYDROCHLORIDE 20 MG/ML
20 INJECTION INTRAMUSCULAR; INTRAVENOUS EVERY 4 HOURS PRN
Status: DISCONTINUED | OUTPATIENT
Start: 2021-07-24 | End: 2021-07-28 | Stop reason: HOSPADM

## 2021-07-24 RX ORDER — MORPHINE SULFATE 4 MG/ML
6 INJECTION, SOLUTION INTRAMUSCULAR; INTRAVENOUS
Status: COMPLETED | OUTPATIENT
Start: 2021-07-24 | End: 2021-07-24

## 2021-07-24 RX ORDER — LISINOPRIL 20 MG/1
40 TABLET ORAL DAILY
Status: DISCONTINUED | OUTPATIENT
Start: 2021-07-24 | End: 2021-07-28 | Stop reason: HOSPADM

## 2021-07-24 RX ORDER — MORPHINE SULFATE 4 MG/ML
2 INJECTION, SOLUTION INTRAMUSCULAR; INTRAVENOUS ONCE
Status: COMPLETED | OUTPATIENT
Start: 2021-07-24 | End: 2021-07-24

## 2021-07-24 RX ADMIN — PIPERACILLIN AND TAZOBACTAM 4.5 G: 4; .5 INJECTION, POWDER, LYOPHILIZED, FOR SOLUTION INTRAVENOUS; PARENTERAL at 08:07

## 2021-07-24 RX ADMIN — ENOXAPARIN SODIUM 40 MG: 40 INJECTION SUBCUTANEOUS at 05:07

## 2021-07-24 RX ADMIN — PANTOPRAZOLE SODIUM 40 MG: 40 INJECTION, POWDER, FOR SOLUTION INTRAVENOUS at 05:07

## 2021-07-24 RX ADMIN — MORPHINE SULFATE 2 MG: 4 INJECTION, SOLUTION INTRAMUSCULAR; INTRAVENOUS at 09:07

## 2021-07-24 RX ADMIN — MORPHINE SULFATE 6 MG: 4 INJECTION, SOLUTION INTRAMUSCULAR; INTRAVENOUS at 03:07

## 2021-07-24 RX ADMIN — PIPERACILLIN AND TAZOBACTAM 4.5 G: 4; .5 INJECTION, POWDER, LYOPHILIZED, FOR SOLUTION INTRAVENOUS; PARENTERAL at 12:07

## 2021-07-24 RX ADMIN — HYDRALAZINE HYDROCHLORIDE 10 MG: 20 INJECTION, SOLUTION INTRAMUSCULAR; INTRAVENOUS at 03:07

## 2021-07-24 RX ADMIN — PANTOPRAZOLE SODIUM 40 MG: 40 INJECTION, POWDER, FOR SOLUTION INTRAVENOUS at 08:07

## 2021-07-24 RX ADMIN — PIPERACILLIN AND TAZOBACTAM 4.5 G: 4; .5 INJECTION, POWDER, LYOPHILIZED, FOR SOLUTION INTRAVENOUS; PARENTERAL at 03:07

## 2021-07-24 RX ADMIN — PROMETHAZINE HYDROCHLORIDE 12.5 MG: 25 INJECTION INTRAMUSCULAR; INTRAVENOUS at 08:07

## 2021-07-24 RX ADMIN — HYDRALAZINE HYDROCHLORIDE 20 MG: 20 INJECTION, SOLUTION INTRAMUSCULAR; INTRAVENOUS at 12:07

## 2021-07-24 RX ADMIN — LISINOPRIL 40 MG: 20 TABLET ORAL at 09:07

## 2021-07-24 RX ADMIN — AMLODIPINE BESYLATE 10 MG: 5 TABLET ORAL at 05:07

## 2021-07-24 RX ADMIN — PROMETHAZINE HYDROCHLORIDE 12.5 MG: 25 INJECTION INTRAMUSCULAR; INTRAVENOUS at 12:07

## 2021-07-24 RX ADMIN — PROMETHAZINE HYDROCHLORIDE 12.5 MG: 25 INJECTION INTRAMUSCULAR; INTRAVENOUS at 07:07

## 2021-07-25 LAB
ALBUMIN SERPL BCP-MCNC: 4.1 G/DL (ref 3.5–5.2)
ALP SERPL-CCNC: 95 U/L (ref 55–135)
ALT SERPL W/O P-5'-P-CCNC: 7 U/L (ref 10–44)
ANION GAP SERPL CALC-SCNC: 17 MMOL/L (ref 8–16)
AST SERPL-CCNC: 10 U/L (ref 10–40)
BASOPHILS # BLD AUTO: 0.05 K/UL (ref 0–0.2)
BASOPHILS NFR BLD: 0.3 % (ref 0–1.9)
BILIRUB SERPL-MCNC: 0.5 MG/DL (ref 0.1–1)
BUN SERPL-MCNC: 11 MG/DL (ref 6–20)
CALCIUM SERPL-MCNC: 10 MG/DL (ref 8.7–10.5)
CHLORIDE SERPL-SCNC: 99 MMOL/L (ref 95–110)
CO2 SERPL-SCNC: 22 MMOL/L (ref 23–29)
CREAT SERPL-MCNC: 1.1 MG/DL (ref 0.5–1.4)
CRP SERPL-MCNC: 3.1 MG/L (ref 0–8.2)
DIFFERENTIAL METHOD: ABNORMAL
EOSINOPHIL # BLD AUTO: 0 K/UL (ref 0–0.5)
EOSINOPHIL NFR BLD: 0.2 % (ref 0–8)
ERYTHROCYTE [DISTWIDTH] IN BLOOD BY AUTOMATED COUNT: 13.7 % (ref 11.5–14.5)
EST. GFR  (AFRICAN AMERICAN): >60 ML/MIN/1.73 M^2
EST. GFR  (NON AFRICAN AMERICAN): >60 ML/MIN/1.73 M^2
GLUCOSE SERPL-MCNC: 159 MG/DL (ref 70–110)
HCT VFR BLD AUTO: 46.6 % (ref 37–48.5)
HGB BLD-MCNC: 15.9 G/DL (ref 12–16)
IMM GRANULOCYTES # BLD AUTO: 0.06 K/UL (ref 0–0.04)
IMM GRANULOCYTES NFR BLD AUTO: 0.4 % (ref 0–0.5)
LYMPHOCYTES # BLD AUTO: 1.8 K/UL (ref 1–4.8)
LYMPHOCYTES NFR BLD: 12.2 % (ref 18–48)
MCH RBC QN AUTO: 29.7 PG (ref 27–31)
MCHC RBC AUTO-ENTMCNC: 34.1 G/DL (ref 32–36)
MCV RBC AUTO: 87 FL (ref 82–98)
MONOCYTES # BLD AUTO: 1.1 K/UL (ref 0.3–1)
MONOCYTES NFR BLD: 7.8 % (ref 4–15)
NEUTROPHILS # BLD AUTO: 11.6 K/UL (ref 1.8–7.7)
NEUTROPHILS NFR BLD: 79.1 % (ref 38–73)
NRBC BLD-RTO: 0 /100 WBC
PLATELET # BLD AUTO: 396 K/UL (ref 150–450)
PMV BLD AUTO: 11.4 FL (ref 9.2–12.9)
POTASSIUM SERPL-SCNC: 2.8 MMOL/L (ref 3.5–5.1)
PROT SERPL-MCNC: 8.5 G/DL (ref 6–8.4)
RBC # BLD AUTO: 5.35 M/UL (ref 4–5.4)
SODIUM SERPL-SCNC: 138 MMOL/L (ref 136–145)
WBC # BLD AUTO: 14.69 K/UL (ref 3.9–12.7)

## 2021-07-25 PROCEDURE — C9113 INJ PANTOPRAZOLE SODIUM, VIA: HCPCS | Performed by: NURSE PRACTITIONER

## 2021-07-25 PROCEDURE — 25000003 PHARM REV CODE 250: Performed by: NURSE PRACTITIONER

## 2021-07-25 PROCEDURE — 63600175 PHARM REV CODE 636 W HCPCS: Performed by: STUDENT IN AN ORGANIZED HEALTH CARE EDUCATION/TRAINING PROGRAM

## 2021-07-25 PROCEDURE — 36415 COLL VENOUS BLD VENIPUNCTURE: CPT | Performed by: HOSPITALIST

## 2021-07-25 PROCEDURE — 99232 PR SUBSEQUENT HOSPITAL CARE,LEVL II: ICD-10-PCS | Mod: 95,,, | Performed by: INTERNAL MEDICINE

## 2021-07-25 PROCEDURE — 63600175 PHARM REV CODE 636 W HCPCS: Performed by: HOSPITALIST

## 2021-07-25 PROCEDURE — 80053 COMPREHEN METABOLIC PANEL: CPT | Performed by: HOSPITALIST

## 2021-07-25 PROCEDURE — 25000003 PHARM REV CODE 250: Performed by: HOSPITALIST

## 2021-07-25 PROCEDURE — 86140 C-REACTIVE PROTEIN: CPT | Performed by: HOSPITALIST

## 2021-07-25 PROCEDURE — 25000003 PHARM REV CODE 250: Performed by: STUDENT IN AN ORGANIZED HEALTH CARE EDUCATION/TRAINING PROGRAM

## 2021-07-25 PROCEDURE — 99232 SBSQ HOSP IP/OBS MODERATE 35: CPT | Mod: 95,,, | Performed by: INTERNAL MEDICINE

## 2021-07-25 PROCEDURE — 11000001 HC ACUTE MED/SURG PRIVATE ROOM

## 2021-07-25 PROCEDURE — 85025 COMPLETE CBC W/AUTO DIFF WBC: CPT | Performed by: HOSPITALIST

## 2021-07-25 PROCEDURE — 63600175 PHARM REV CODE 636 W HCPCS: Performed by: NURSE PRACTITIONER

## 2021-07-25 RX ORDER — HYDROCODONE BITARTRATE AND ACETAMINOPHEN 5; 325 MG/1; MG/1
1 TABLET ORAL EVERY 6 HOURS PRN
Status: DISCONTINUED | OUTPATIENT
Start: 2021-07-25 | End: 2021-07-26

## 2021-07-25 RX ORDER — ACETAMINOPHEN 325 MG/1
650 TABLET ORAL EVERY 4 HOURS PRN
Status: DISCONTINUED | OUTPATIENT
Start: 2021-07-25 | End: 2021-07-28 | Stop reason: HOSPADM

## 2021-07-25 RX ORDER — DICYCLOMINE HYDROCHLORIDE 10 MG/1
10 CAPSULE ORAL 4 TIMES DAILY PRN
Status: DISCONTINUED | OUTPATIENT
Start: 2021-07-25 | End: 2021-07-28 | Stop reason: HOSPADM

## 2021-07-25 RX ORDER — METOPROLOL TARTRATE 50 MG/1
100 TABLET ORAL 2 TIMES DAILY
Status: DISCONTINUED | OUTPATIENT
Start: 2021-07-25 | End: 2021-07-28 | Stop reason: HOSPADM

## 2021-07-25 RX ADMIN — ENOXAPARIN SODIUM 40 MG: 40 INJECTION SUBCUTANEOUS at 05:07

## 2021-07-25 RX ADMIN — HYDROCODONE BITARTRATE AND ACETAMINOPHEN 1 TABLET: 5; 325 TABLET ORAL at 05:07

## 2021-07-25 RX ADMIN — PANTOPRAZOLE SODIUM 40 MG: 40 INJECTION, POWDER, FOR SOLUTION INTRAVENOUS at 08:07

## 2021-07-25 RX ADMIN — PIPERACILLIN AND TAZOBACTAM 4.5 G: 4; .5 INJECTION, POWDER, LYOPHILIZED, FOR SOLUTION INTRAVENOUS; PARENTERAL at 03:07

## 2021-07-25 RX ADMIN — PROMETHAZINE HYDROCHLORIDE 12.5 MG: 25 INJECTION INTRAMUSCULAR; INTRAVENOUS at 05:07

## 2021-07-25 RX ADMIN — PIPERACILLIN AND TAZOBACTAM 4.5 G: 4; .5 INJECTION, POWDER, LYOPHILIZED, FOR SOLUTION INTRAVENOUS; PARENTERAL at 09:07

## 2021-07-25 RX ADMIN — HYDROCODONE BITARTRATE AND ACETAMINOPHEN 1 TABLET: 5; 325 TABLET ORAL at 11:07

## 2021-07-25 RX ADMIN — HYDRALAZINE HYDROCHLORIDE 20 MG: 20 INJECTION, SOLUTION INTRAMUSCULAR; INTRAVENOUS at 11:07

## 2021-07-25 RX ADMIN — PROMETHAZINE HYDROCHLORIDE 12.5 MG: 25 INJECTION INTRAMUSCULAR; INTRAVENOUS at 04:07

## 2021-07-25 RX ADMIN — AMLODIPINE BESYLATE 10 MG: 5 TABLET ORAL at 08:07

## 2021-07-25 RX ADMIN — PANTOPRAZOLE SODIUM 40 MG: 40 INJECTION, POWDER, FOR SOLUTION INTRAVENOUS at 09:07

## 2021-07-25 RX ADMIN — LISINOPRIL 40 MG: 20 TABLET ORAL at 08:07

## 2021-07-25 RX ADMIN — METOPROLOL TARTRATE 100 MG: 50 TABLET, FILM COATED ORAL at 02:07

## 2021-07-25 RX ADMIN — HYDROCODONE BITARTRATE AND ACETAMINOPHEN 1 TABLET: 5; 325 TABLET ORAL at 04:07

## 2021-07-25 RX ADMIN — PIPERACILLIN AND TAZOBACTAM 4.5 G: 4; .5 INJECTION, POWDER, LYOPHILIZED, FOR SOLUTION INTRAVENOUS; PARENTERAL at 11:07

## 2021-07-26 LAB
ALBUMIN SERPL BCP-MCNC: 4 G/DL (ref 3.5–5.2)
ALP SERPL-CCNC: 86 U/L (ref 55–135)
ALT SERPL W/O P-5'-P-CCNC: 8 U/L (ref 10–44)
ANION GAP SERPL CALC-SCNC: 14 MMOL/L (ref 8–16)
AST SERPL-CCNC: 14 U/L (ref 10–40)
BILIRUB SERPL-MCNC: 0.6 MG/DL (ref 0.1–1)
BUN SERPL-MCNC: 11 MG/DL (ref 6–20)
CALCIUM SERPL-MCNC: 9.8 MG/DL (ref 8.7–10.5)
CHLORIDE SERPL-SCNC: 98 MMOL/L (ref 95–110)
CO2 SERPL-SCNC: 22 MMOL/L (ref 23–29)
CREAT SERPL-MCNC: 0.9 MG/DL (ref 0.5–1.4)
EST. GFR  (AFRICAN AMERICAN): >60 ML/MIN/1.73 M^2
EST. GFR  (NON AFRICAN AMERICAN): >60 ML/MIN/1.73 M^2
GLUCOSE SERPL-MCNC: 106 MG/DL (ref 70–110)
POTASSIUM SERPL-SCNC: 2.7 MMOL/L (ref 3.5–5.1)
PROT SERPL-MCNC: 8.3 G/DL (ref 6–8.4)
SODIUM SERPL-SCNC: 134 MMOL/L (ref 136–145)

## 2021-07-26 PROCEDURE — 63600175 PHARM REV CODE 636 W HCPCS: Performed by: NURSE PRACTITIONER

## 2021-07-26 PROCEDURE — 99232 PR SUBSEQUENT HOSPITAL CARE,LEVL II: ICD-10-PCS | Mod: ,,, | Performed by: NURSE PRACTITIONER

## 2021-07-26 PROCEDURE — 99232 SBSQ HOSP IP/OBS MODERATE 35: CPT | Mod: ,,, | Performed by: NURSE PRACTITIONER

## 2021-07-26 PROCEDURE — 11000001 HC ACUTE MED/SURG PRIVATE ROOM

## 2021-07-26 PROCEDURE — 36415 COLL VENOUS BLD VENIPUNCTURE: CPT | Performed by: HOSPITALIST

## 2021-07-26 PROCEDURE — 25000003 PHARM REV CODE 250: Performed by: NURSE PRACTITIONER

## 2021-07-26 PROCEDURE — 80053 COMPREHEN METABOLIC PANEL: CPT | Performed by: HOSPITALIST

## 2021-07-26 PROCEDURE — 25000003 PHARM REV CODE 250: Performed by: HOSPITALIST

## 2021-07-26 PROCEDURE — 63600175 PHARM REV CODE 636 W HCPCS: Performed by: HOSPITALIST

## 2021-07-26 PROCEDURE — C9113 INJ PANTOPRAZOLE SODIUM, VIA: HCPCS | Performed by: NURSE PRACTITIONER

## 2021-07-26 PROCEDURE — 25000003 PHARM REV CODE 250: Performed by: STUDENT IN AN ORGANIZED HEALTH CARE EDUCATION/TRAINING PROGRAM

## 2021-07-26 PROCEDURE — 63600175 PHARM REV CODE 636 W HCPCS: Performed by: STUDENT IN AN ORGANIZED HEALTH CARE EDUCATION/TRAINING PROGRAM

## 2021-07-26 RX ORDER — POLYETHYLENE GLYCOL 3350 17 G/17G
17 POWDER, FOR SOLUTION ORAL DAILY
Status: DISCONTINUED | OUTPATIENT
Start: 2021-07-26 | End: 2021-07-28 | Stop reason: HOSPADM

## 2021-07-26 RX ORDER — ONDANSETRON 2 MG/ML
4 INJECTION INTRAMUSCULAR; INTRAVENOUS EVERY 8 HOURS PRN
Status: DISCONTINUED | OUTPATIENT
Start: 2021-07-26 | End: 2021-07-28 | Stop reason: HOSPADM

## 2021-07-26 RX ORDER — SENNOSIDES 8.6 MG/1
8.6 TABLET ORAL 2 TIMES DAILY
Status: DISCONTINUED | OUTPATIENT
Start: 2021-07-26 | End: 2021-07-28 | Stop reason: HOSPADM

## 2021-07-26 RX ORDER — OXYCODONE HYDROCHLORIDE 5 MG/1
10 TABLET ORAL EVERY 4 HOURS PRN
Status: DISCONTINUED | OUTPATIENT
Start: 2021-07-26 | End: 2021-07-28 | Stop reason: HOSPADM

## 2021-07-26 RX ADMIN — AMLODIPINE BESYLATE 10 MG: 5 TABLET ORAL at 10:07

## 2021-07-26 RX ADMIN — ONDANSETRON 4 MG: 2 INJECTION INTRAMUSCULAR; INTRAVENOUS at 01:07

## 2021-07-26 RX ADMIN — LISINOPRIL 40 MG: 20 TABLET ORAL at 10:07

## 2021-07-26 RX ADMIN — PROMETHAZINE HYDROCHLORIDE 12.5 MG: 25 INJECTION INTRAMUSCULAR; INTRAVENOUS at 02:07

## 2021-07-26 RX ADMIN — STANDARDIZED SENNA CONCENTRATE 8.6 MG: 8.6 TABLET ORAL at 09:07

## 2021-07-26 RX ADMIN — PANTOPRAZOLE SODIUM 40 MG: 40 INJECTION, POWDER, FOR SOLUTION INTRAVENOUS at 10:07

## 2021-07-26 RX ADMIN — POTASSIUM BICARBONATE 50 MEQ: 978 TABLET, EFFERVESCENT ORAL at 05:07

## 2021-07-26 RX ADMIN — ONDANSETRON 4 MG: 2 INJECTION INTRAMUSCULAR; INTRAVENOUS at 10:07

## 2021-07-26 RX ADMIN — POLYETHYLENE GLYCOL 3350 17 G: 17 POWDER, FOR SOLUTION ORAL at 02:07

## 2021-07-26 RX ADMIN — OXYCODONE 10 MG: 5 TABLET ORAL at 10:07

## 2021-07-26 RX ADMIN — PROMETHAZINE HYDROCHLORIDE 12.5 MG: 25 INJECTION INTRAMUSCULAR; INTRAVENOUS at 06:07

## 2021-07-26 RX ADMIN — POTASSIUM BICARBONATE 50 MEQ: 978 TABLET, EFFERVESCENT ORAL at 02:07

## 2021-07-26 RX ADMIN — HYDROCODONE BITARTRATE AND ACETAMINOPHEN 1 TABLET: 5; 325 TABLET ORAL at 01:07

## 2021-07-26 RX ADMIN — PIPERACILLIN AND TAZOBACTAM 4.5 G: 4; .5 INJECTION, POWDER, LYOPHILIZED, FOR SOLUTION INTRAVENOUS; PARENTERAL at 09:07

## 2021-07-26 RX ADMIN — HYDROCODONE BITARTRATE AND ACETAMINOPHEN 1 TABLET: 5; 325 TABLET ORAL at 10:07

## 2021-07-26 RX ADMIN — OXYCODONE 10 MG: 5 TABLET ORAL at 02:07

## 2021-07-26 RX ADMIN — PANTOPRAZOLE SODIUM 40 MG: 40 INJECTION, POWDER, FOR SOLUTION INTRAVENOUS at 09:07

## 2021-07-26 RX ADMIN — STANDARDIZED SENNA CONCENTRATE 8.6 MG: 8.6 TABLET ORAL at 02:07

## 2021-07-26 RX ADMIN — PIPERACILLIN AND TAZOBACTAM 4.5 G: 4; .5 INJECTION, POWDER, LYOPHILIZED, FOR SOLUTION INTRAVENOUS; PARENTERAL at 06:07

## 2021-07-26 RX ADMIN — ENOXAPARIN SODIUM 40 MG: 40 INJECTION SUBCUTANEOUS at 05:07

## 2021-07-26 RX ADMIN — METOPROLOL TARTRATE 100 MG: 50 TABLET, FILM COATED ORAL at 09:07

## 2021-07-26 RX ADMIN — METOPROLOL TARTRATE 100 MG: 50 TABLET, FILM COATED ORAL at 06:07

## 2021-07-27 LAB
ALBUMIN SERPL BCP-MCNC: 3.7 G/DL (ref 3.5–5.2)
ALP SERPL-CCNC: 75 U/L (ref 55–135)
ALT SERPL W/O P-5'-P-CCNC: 7 U/L (ref 10–44)
ANION GAP SERPL CALC-SCNC: 13 MMOL/L (ref 8–16)
AST SERPL-CCNC: 13 U/L (ref 10–40)
BILIRUB SERPL-MCNC: 0.6 MG/DL (ref 0.1–1)
BUN SERPL-MCNC: 13 MG/DL (ref 6–20)
CALCIUM SERPL-MCNC: 9.2 MG/DL (ref 8.7–10.5)
CHLORIDE SERPL-SCNC: 96 MMOL/L (ref 95–110)
CO2 SERPL-SCNC: 25 MMOL/L (ref 23–29)
CREAT SERPL-MCNC: 1.3 MG/DL (ref 0.5–1.4)
EST. GFR  (AFRICAN AMERICAN): 58 ML/MIN/1.73 M^2
EST. GFR  (NON AFRICAN AMERICAN): 50 ML/MIN/1.73 M^2
GLUCOSE SERPL-MCNC: 77 MG/DL (ref 70–110)
POTASSIUM SERPL-SCNC: 3.4 MMOL/L (ref 3.5–5.1)
PROT SERPL-MCNC: 7.4 G/DL (ref 6–8.4)
SODIUM SERPL-SCNC: 134 MMOL/L (ref 136–145)

## 2021-07-27 PROCEDURE — 99232 PR SUBSEQUENT HOSPITAL CARE,LEVL II: ICD-10-PCS | Mod: ,,, | Performed by: NURSE PRACTITIONER

## 2021-07-27 PROCEDURE — 99232 SBSQ HOSP IP/OBS MODERATE 35: CPT | Mod: ,,, | Performed by: NURSE PRACTITIONER

## 2021-07-27 PROCEDURE — C1751 CATH, INF, PER/CENT/MIDLINE: HCPCS

## 2021-07-27 PROCEDURE — 25000003 PHARM REV CODE 250: Performed by: HOSPITALIST

## 2021-07-27 PROCEDURE — 63600175 PHARM REV CODE 636 W HCPCS: Performed by: NURSE PRACTITIONER

## 2021-07-27 PROCEDURE — 25000003 PHARM REV CODE 250: Performed by: STUDENT IN AN ORGANIZED HEALTH CARE EDUCATION/TRAINING PROGRAM

## 2021-07-27 PROCEDURE — 63600175 PHARM REV CODE 636 W HCPCS: Performed by: STUDENT IN AN ORGANIZED HEALTH CARE EDUCATION/TRAINING PROGRAM

## 2021-07-27 PROCEDURE — C9113 INJ PANTOPRAZOLE SODIUM, VIA: HCPCS | Performed by: NURSE PRACTITIONER

## 2021-07-27 PROCEDURE — 36569 INSJ PICC 5 YR+ W/O IMAGING: CPT

## 2021-07-27 PROCEDURE — 80053 COMPREHEN METABOLIC PANEL: CPT | Performed by: HOSPITALIST

## 2021-07-27 PROCEDURE — 36415 COLL VENOUS BLD VENIPUNCTURE: CPT | Performed by: HOSPITALIST

## 2021-07-27 PROCEDURE — 11000001 HC ACUTE MED/SURG PRIVATE ROOM

## 2021-07-27 RX ADMIN — OXYCODONE 10 MG: 5 TABLET ORAL at 04:07

## 2021-07-27 RX ADMIN — METOPROLOL TARTRATE 100 MG: 50 TABLET, FILM COATED ORAL at 08:07

## 2021-07-27 RX ADMIN — PANTOPRAZOLE SODIUM 40 MG: 40 INJECTION, POWDER, FOR SOLUTION INTRAVENOUS at 08:07

## 2021-07-27 RX ADMIN — OXYCODONE 10 MG: 5 TABLET ORAL at 09:07

## 2021-07-27 RX ADMIN — OXYCODONE 10 MG: 5 TABLET ORAL at 05:07

## 2021-07-27 RX ADMIN — PIPERACILLIN AND TAZOBACTAM 4.5 G: 4; .5 INJECTION, POWDER, LYOPHILIZED, FOR SOLUTION INTRAVENOUS; PARENTERAL at 03:07

## 2021-07-27 RX ADMIN — PIPERACILLIN AND TAZOBACTAM 4.5 G: 4; .5 INJECTION, POWDER, LYOPHILIZED, FOR SOLUTION INTRAVENOUS; PARENTERAL at 09:07

## 2021-07-27 RX ADMIN — POTASSIUM BICARBONATE 50 MEQ: 978 TABLET, EFFERVESCENT ORAL at 11:07

## 2021-07-27 RX ADMIN — PIPERACILLIN AND TAZOBACTAM 4.5 G: 4; .5 INJECTION, POWDER, LYOPHILIZED, FOR SOLUTION INTRAVENOUS; PARENTERAL at 05:07

## 2021-07-27 RX ADMIN — ENOXAPARIN SODIUM 40 MG: 40 INJECTION SUBCUTANEOUS at 04:07

## 2021-07-28 ENCOUNTER — TELEPHONE (OUTPATIENT)
Dept: FAMILY MEDICINE | Facility: CLINIC | Age: 46
End: 2021-07-28

## 2021-07-28 VITALS
DIASTOLIC BLOOD PRESSURE: 72 MMHG | SYSTOLIC BLOOD PRESSURE: 140 MMHG | BODY MASS INDEX: 31.83 KG/M2 | RESPIRATION RATE: 19 BRPM | OXYGEN SATURATION: 96 % | HEART RATE: 79 BPM | WEIGHT: 210 LBS | TEMPERATURE: 98 F | HEIGHT: 68 IN

## 2021-07-28 LAB
ALBUMIN SERPL BCP-MCNC: 3.4 G/DL (ref 3.5–5.2)
ALP SERPL-CCNC: 64 U/L (ref 55–135)
ALT SERPL W/O P-5'-P-CCNC: 11 U/L (ref 10–44)
ANION GAP SERPL CALC-SCNC: 8 MMOL/L (ref 8–16)
AST SERPL-CCNC: 13 U/L (ref 10–40)
BACTERIA BLD CULT: NORMAL
BACTERIA BLD CULT: NORMAL
BILIRUB SERPL-MCNC: 0.4 MG/DL (ref 0.1–1)
BUN SERPL-MCNC: 14 MG/DL (ref 6–20)
CALCIUM SERPL-MCNC: 9 MG/DL (ref 8.7–10.5)
CHLORIDE SERPL-SCNC: 99 MMOL/L (ref 95–110)
CO2 SERPL-SCNC: 30 MMOL/L (ref 23–29)
CREAT SERPL-MCNC: 1.2 MG/DL (ref 0.5–1.4)
EST. GFR  (AFRICAN AMERICAN): >60 ML/MIN/1.73 M^2
EST. GFR  (NON AFRICAN AMERICAN): 55 ML/MIN/1.73 M^2
GLUCOSE SERPL-MCNC: 105 MG/DL (ref 70–110)
POTASSIUM SERPL-SCNC: 3.5 MMOL/L (ref 3.5–5.1)
PROT SERPL-MCNC: 6.9 G/DL (ref 6–8.4)
SODIUM SERPL-SCNC: 137 MMOL/L (ref 136–145)

## 2021-07-28 PROCEDURE — 63600175 PHARM REV CODE 636 W HCPCS: Performed by: STUDENT IN AN ORGANIZED HEALTH CARE EDUCATION/TRAINING PROGRAM

## 2021-07-28 PROCEDURE — 80053 COMPREHEN METABOLIC PANEL: CPT | Performed by: HOSPITALIST

## 2021-07-28 PROCEDURE — C9113 INJ PANTOPRAZOLE SODIUM, VIA: HCPCS | Performed by: NURSE PRACTITIONER

## 2021-07-28 PROCEDURE — 63600175 PHARM REV CODE 636 W HCPCS: Performed by: HOSPITALIST

## 2021-07-28 PROCEDURE — 63600175 PHARM REV CODE 636 W HCPCS: Performed by: NURSE PRACTITIONER

## 2021-07-28 PROCEDURE — 99231 PR SUBSEQUENT HOSPITAL CARE,LEVL I: ICD-10-PCS | Mod: ,,, | Performed by: INTERNAL MEDICINE

## 2021-07-28 PROCEDURE — 25000003 PHARM REV CODE 250: Performed by: STUDENT IN AN ORGANIZED HEALTH CARE EDUCATION/TRAINING PROGRAM

## 2021-07-28 PROCEDURE — 99231 SBSQ HOSP IP/OBS SF/LOW 25: CPT | Mod: ,,, | Performed by: INTERNAL MEDICINE

## 2021-07-28 PROCEDURE — 25000003 PHARM REV CODE 250: Performed by: HOSPITALIST

## 2021-07-28 RX ORDER — METOPROLOL TARTRATE 100 MG/1
100 TABLET ORAL 2 TIMES DAILY
Qty: 60 TABLET | Refills: 11 | Status: SHIPPED | OUTPATIENT
Start: 2021-07-28 | End: 2022-07-28

## 2021-07-28 RX ORDER — PROCHLORPERAZINE MALEATE 5 MG
5 TABLET ORAL 3 TIMES DAILY PRN
Qty: 30 TABLET | Refills: 1 | Status: SHIPPED | OUTPATIENT
Start: 2021-07-28

## 2021-07-28 RX ORDER — ONDANSETRON 4 MG/1
8 TABLET, ORALLY DISINTEGRATING ORAL EVERY 6 HOURS PRN
Qty: 30 TABLET | Refills: 1 | Status: SHIPPED | OUTPATIENT
Start: 2021-07-28 | End: 2021-09-14 | Stop reason: SDUPTHER

## 2021-07-28 RX ORDER — POLYETHYLENE GLYCOL 3350 17 G/17G
17 POWDER, FOR SOLUTION ORAL DAILY
Refills: 0
Start: 2021-07-29 | End: 2021-08-16

## 2021-07-28 RX ORDER — MUPIROCIN 20 MG/G
OINTMENT TOPICAL 2 TIMES DAILY
Status: DISCONTINUED | OUTPATIENT
Start: 2021-07-28 | End: 2021-07-28 | Stop reason: HOSPADM

## 2021-07-28 RX ORDER — SENNOSIDES 8.6 MG/1
1 TABLET ORAL 2 TIMES DAILY
Qty: 30 TABLET | Refills: 0 | Status: SHIPPED | OUTPATIENT
Start: 2021-07-28

## 2021-07-28 RX ORDER — OXYCODONE HYDROCHLORIDE 10 MG/1
10 TABLET ORAL EVERY 4 HOURS PRN
Qty: 28 TABLET | Refills: 0 | Status: ON HOLD | OUTPATIENT
Start: 2021-07-28 | End: 2021-08-26 | Stop reason: HOSPADM

## 2021-07-28 RX ORDER — LISINOPRIL 5 MG/1
5 TABLET ORAL DAILY
Qty: 30 TABLET | Refills: 0 | Status: SHIPPED | OUTPATIENT
Start: 2021-07-28 | End: 2022-07-28

## 2021-07-28 RX ADMIN — ONDANSETRON 4 MG: 2 INJECTION INTRAMUSCULAR; INTRAVENOUS at 11:07

## 2021-07-28 RX ADMIN — SODIUM CHLORIDE, SODIUM LACTATE, POTASSIUM CHLORIDE, AND CALCIUM CHLORIDE 500 ML: .6; .31; .03; .02 INJECTION, SOLUTION INTRAVENOUS at 12:07

## 2021-07-28 RX ADMIN — PANTOPRAZOLE SODIUM 40 MG: 40 INJECTION, POWDER, FOR SOLUTION INTRAVENOUS at 09:07

## 2021-07-28 RX ADMIN — PIPERACILLIN AND TAZOBACTAM 4.5 G: 4; .5 INJECTION, POWDER, LYOPHILIZED, FOR SOLUTION INTRAVENOUS; PARENTERAL at 05:07

## 2021-07-28 RX ADMIN — OXYCODONE 10 MG: 5 TABLET ORAL at 09:07

## 2021-07-28 RX ADMIN — OXYCODONE 10 MG: 5 TABLET ORAL at 05:07

## 2021-07-28 RX ADMIN — PIPERACILLIN AND TAZOBACTAM 4.5 G: 4; .5 INJECTION, POWDER, LYOPHILIZED, FOR SOLUTION INTRAVENOUS; PARENTERAL at 03:07

## 2021-07-29 ENCOUNTER — PATIENT MESSAGE (OUTPATIENT)
Dept: GASTROENTEROLOGY | Facility: CLINIC | Age: 46
End: 2021-07-29

## 2021-07-29 ENCOUNTER — PATIENT OUTREACH (OUTPATIENT)
Dept: ADMINISTRATIVE | Facility: CLINIC | Age: 46
End: 2021-07-29

## 2021-07-29 ENCOUNTER — TELEPHONE (OUTPATIENT)
Dept: GASTROENTEROLOGY | Facility: CLINIC | Age: 46
End: 2021-07-29

## 2021-08-02 ENCOUNTER — TELEPHONE (OUTPATIENT)
Dept: FAMILY MEDICINE | Facility: CLINIC | Age: 46
End: 2021-08-02

## 2021-08-02 PROCEDURE — G0180 MD CERTIFICATION HHA PATIENT: HCPCS | Mod: CR,,, | Performed by: HOSPITALIST

## 2021-08-02 PROCEDURE — G0180 PR HOME HEALTH MD CERTIFICATION: ICD-10-PCS | Mod: CR,,, | Performed by: HOSPITALIST

## 2021-08-03 ENCOUNTER — PATIENT OUTREACH (OUTPATIENT)
Dept: ADMINISTRATIVE | Facility: OTHER | Age: 46
End: 2021-08-03

## 2021-08-05 ENCOUNTER — OFFICE VISIT (OUTPATIENT)
Dept: FAMILY MEDICINE | Facility: CLINIC | Age: 46
End: 2021-08-05
Payer: COMMERCIAL

## 2021-08-05 VITALS
TEMPERATURE: 98 F | OXYGEN SATURATION: 98 % | BODY MASS INDEX: 32.62 KG/M2 | HEART RATE: 71 BPM | HEIGHT: 68 IN | SYSTOLIC BLOOD PRESSURE: 140 MMHG | WEIGHT: 215.25 LBS | DIASTOLIC BLOOD PRESSURE: 108 MMHG

## 2021-08-05 DIAGNOSIS — I10 ESSENTIAL HYPERTENSION: ICD-10-CM

## 2021-08-05 DIAGNOSIS — R10.84 GENERALIZED ABDOMINAL PAIN: ICD-10-CM

## 2021-08-05 DIAGNOSIS — Z11.4 ENCOUNTER FOR SCREENING FOR HIV: ICD-10-CM

## 2021-08-05 DIAGNOSIS — K52.9 COLITIS: ICD-10-CM

## 2021-08-05 DIAGNOSIS — Z95.828 STATUS POST PERIPHERALLY INSERTED CENTRAL CATHETER (PICC) CENTRAL LINE PLACEMENT: ICD-10-CM

## 2021-08-05 DIAGNOSIS — K57.32 DIVERTICULITIS OF SIGMOID COLON: Primary | ICD-10-CM

## 2021-08-05 DIAGNOSIS — R11.0 NAUSEA: ICD-10-CM

## 2021-08-05 PROCEDURE — 99999 PR PBB SHADOW E&M-EST. PATIENT-LVL IV: CPT | Mod: PBBFAC,,, | Performed by: INTERNAL MEDICINE

## 2021-08-05 PROCEDURE — 1125F AMNT PAIN NOTED PAIN PRSNT: CPT | Mod: CPTII,S$GLB,, | Performed by: INTERNAL MEDICINE

## 2021-08-05 PROCEDURE — 3077F PR MOST RECENT SYSTOLIC BLOOD PRESSURE >= 140 MM HG: ICD-10-PCS | Mod: CPTII,S$GLB,, | Performed by: INTERNAL MEDICINE

## 2021-08-05 PROCEDURE — 3080F PR MOST RECENT DIASTOLIC BLOOD PRESSURE >= 90 MM HG: ICD-10-PCS | Mod: CPTII,S$GLB,, | Performed by: INTERNAL MEDICINE

## 2021-08-05 PROCEDURE — 1111F PR DISCHARGE MEDS RECONCILED W/ CURRENT OUTPATIENT MED LIST: ICD-10-PCS | Mod: CPTII,S$GLB,, | Performed by: INTERNAL MEDICINE

## 2021-08-05 PROCEDURE — 3080F DIAST BP >= 90 MM HG: CPT | Mod: CPTII,S$GLB,, | Performed by: INTERNAL MEDICINE

## 2021-08-05 PROCEDURE — 3008F PR BODY MASS INDEX (BMI) DOCUMENTED: ICD-10-PCS | Mod: CPTII,S$GLB,, | Performed by: INTERNAL MEDICINE

## 2021-08-05 PROCEDURE — 99214 PR OFFICE/OUTPT VISIT, EST, LEVL IV, 30-39 MIN: ICD-10-PCS | Mod: S$GLB,,, | Performed by: INTERNAL MEDICINE

## 2021-08-05 PROCEDURE — 1125F PR PAIN SEVERITY QUANTIFIED, PAIN PRESENT: ICD-10-PCS | Mod: CPTII,S$GLB,, | Performed by: INTERNAL MEDICINE

## 2021-08-05 PROCEDURE — 3008F BODY MASS INDEX DOCD: CPT | Mod: CPTII,S$GLB,, | Performed by: INTERNAL MEDICINE

## 2021-08-05 PROCEDURE — 1160F RVW MEDS BY RX/DR IN RCRD: CPT | Mod: CPTII,S$GLB,, | Performed by: INTERNAL MEDICINE

## 2021-08-05 PROCEDURE — 1159F PR MEDICATION LIST DOCUMENTED IN MEDICAL RECORD: ICD-10-PCS | Mod: CPTII,S$GLB,, | Performed by: INTERNAL MEDICINE

## 2021-08-05 PROCEDURE — 99214 OFFICE O/P EST MOD 30 MIN: CPT | Mod: S$GLB,,, | Performed by: INTERNAL MEDICINE

## 2021-08-05 PROCEDURE — 1160F PR REVIEW ALL MEDS BY PRESCRIBER/CLIN PHARMACIST DOCUMENTED: ICD-10-PCS | Mod: CPTII,S$GLB,, | Performed by: INTERNAL MEDICINE

## 2021-08-05 PROCEDURE — 3077F SYST BP >= 140 MM HG: CPT | Mod: CPTII,S$GLB,, | Performed by: INTERNAL MEDICINE

## 2021-08-05 PROCEDURE — 1111F DSCHRG MED/CURRENT MED MERGE: CPT | Mod: CPTII,S$GLB,, | Performed by: INTERNAL MEDICINE

## 2021-08-05 PROCEDURE — 99999 PR PBB SHADOW E&M-EST. PATIENT-LVL IV: ICD-10-PCS | Mod: PBBFAC,,, | Performed by: INTERNAL MEDICINE

## 2021-08-05 PROCEDURE — 1159F MED LIST DOCD IN RCRD: CPT | Mod: CPTII,S$GLB,, | Performed by: INTERNAL MEDICINE

## 2021-08-05 RX ORDER — HYDROCODONE BITARTRATE AND ACETAMINOPHEN 10; 325 MG/1; MG/1
1 TABLET ORAL EVERY 6 HOURS PRN
Qty: 28 TABLET | Refills: 0 | Status: SHIPPED | OUTPATIENT
Start: 2021-08-05 | End: 2021-09-14 | Stop reason: SDUPTHER

## 2021-08-05 RX ORDER — AMLODIPINE BESYLATE 10 MG/1
10 TABLET ORAL DAILY
Qty: 30 TABLET | Refills: 11 | Status: SHIPPED | OUTPATIENT
Start: 2021-08-05 | End: 2022-09-05

## 2021-08-06 ENCOUNTER — TELEPHONE (OUTPATIENT)
Dept: FAMILY MEDICINE | Facility: CLINIC | Age: 46
End: 2021-08-06

## 2021-08-09 ENCOUNTER — TELEPHONE (OUTPATIENT)
Dept: FAMILY MEDICINE | Facility: CLINIC | Age: 46
End: 2021-08-09

## 2021-08-09 ENCOUNTER — OFFICE VISIT (OUTPATIENT)
Dept: GASTROENTEROLOGY | Facility: CLINIC | Age: 46
End: 2021-08-09
Payer: COMMERCIAL

## 2021-08-09 VITALS
DIASTOLIC BLOOD PRESSURE: 98 MMHG | SYSTOLIC BLOOD PRESSURE: 158 MMHG | WEIGHT: 218.56 LBS | HEIGHT: 67 IN | HEART RATE: 70 BPM | BODY MASS INDEX: 34.3 KG/M2

## 2021-08-09 DIAGNOSIS — K51.50 LEFT SIDED COLITIS WITHOUT COMPLICATIONS: Primary | ICD-10-CM

## 2021-08-09 PROCEDURE — 99999 PR PBB SHADOW E&M-EST. PATIENT-LVL III: CPT | Mod: PBBFAC,,, | Performed by: INTERNAL MEDICINE

## 2021-08-09 PROCEDURE — 1125F AMNT PAIN NOTED PAIN PRSNT: CPT | Mod: CPTII,S$GLB,, | Performed by: INTERNAL MEDICINE

## 2021-08-09 PROCEDURE — 1111F DSCHRG MED/CURRENT MED MERGE: CPT | Mod: CPTII,S$GLB,, | Performed by: INTERNAL MEDICINE

## 2021-08-09 PROCEDURE — 3008F PR BODY MASS INDEX (BMI) DOCUMENTED: ICD-10-PCS | Mod: CPTII,S$GLB,, | Performed by: INTERNAL MEDICINE

## 2021-08-09 PROCEDURE — 3077F PR MOST RECENT SYSTOLIC BLOOD PRESSURE >= 140 MM HG: ICD-10-PCS | Mod: CPTII,S$GLB,, | Performed by: INTERNAL MEDICINE

## 2021-08-09 PROCEDURE — 3077F SYST BP >= 140 MM HG: CPT | Mod: CPTII,S$GLB,, | Performed by: INTERNAL MEDICINE

## 2021-08-09 PROCEDURE — 1125F PR PAIN SEVERITY QUANTIFIED, PAIN PRESENT: ICD-10-PCS | Mod: CPTII,S$GLB,, | Performed by: INTERNAL MEDICINE

## 2021-08-09 PROCEDURE — 3080F PR MOST RECENT DIASTOLIC BLOOD PRESSURE >= 90 MM HG: ICD-10-PCS | Mod: CPTII,S$GLB,, | Performed by: INTERNAL MEDICINE

## 2021-08-09 PROCEDURE — 3080F DIAST BP >= 90 MM HG: CPT | Mod: CPTII,S$GLB,, | Performed by: INTERNAL MEDICINE

## 2021-08-09 PROCEDURE — 99214 OFFICE O/P EST MOD 30 MIN: CPT | Mod: S$GLB,,, | Performed by: INTERNAL MEDICINE

## 2021-08-09 PROCEDURE — 1111F PR DISCHARGE MEDS RECONCILED W/ CURRENT OUTPATIENT MED LIST: ICD-10-PCS | Mod: CPTII,S$GLB,, | Performed by: INTERNAL MEDICINE

## 2021-08-09 PROCEDURE — 1159F MED LIST DOCD IN RCRD: CPT | Mod: CPTII,S$GLB,, | Performed by: INTERNAL MEDICINE

## 2021-08-09 PROCEDURE — 1159F PR MEDICATION LIST DOCUMENTED IN MEDICAL RECORD: ICD-10-PCS | Mod: CPTII,S$GLB,, | Performed by: INTERNAL MEDICINE

## 2021-08-09 PROCEDURE — 99214 PR OFFICE/OUTPT VISIT, EST, LEVL IV, 30-39 MIN: ICD-10-PCS | Mod: S$GLB,,, | Performed by: INTERNAL MEDICINE

## 2021-08-09 PROCEDURE — 3008F BODY MASS INDEX DOCD: CPT | Mod: CPTII,S$GLB,, | Performed by: INTERNAL MEDICINE

## 2021-08-09 PROCEDURE — 99999 PR PBB SHADOW E&M-EST. PATIENT-LVL III: ICD-10-PCS | Mod: PBBFAC,,, | Performed by: INTERNAL MEDICINE

## 2021-08-16 ENCOUNTER — OFFICE VISIT (OUTPATIENT)
Dept: SURGERY | Facility: CLINIC | Age: 46
End: 2021-08-16
Payer: COMMERCIAL

## 2021-08-16 ENCOUNTER — TELEPHONE (OUTPATIENT)
Dept: GASTROENTEROLOGY | Facility: CLINIC | Age: 46
End: 2021-08-16

## 2021-08-16 ENCOUNTER — PATIENT MESSAGE (OUTPATIENT)
Dept: SURGERY | Facility: CLINIC | Age: 46
End: 2021-08-16

## 2021-08-16 VITALS
DIASTOLIC BLOOD PRESSURE: 92 MMHG | SYSTOLIC BLOOD PRESSURE: 142 MMHG | BODY MASS INDEX: 34.21 KG/M2 | WEIGHT: 218 LBS | HEIGHT: 67 IN | HEART RATE: 63 BPM

## 2021-08-16 DIAGNOSIS — K57.92 DIVERTICULITIS: Primary | ICD-10-CM

## 2021-08-16 PROCEDURE — 1159F MED LIST DOCD IN RCRD: CPT | Mod: CPTII,S$GLB,, | Performed by: SURGERY

## 2021-08-16 PROCEDURE — 1159F PR MEDICATION LIST DOCUMENTED IN MEDICAL RECORD: ICD-10-PCS | Mod: CPTII,S$GLB,, | Performed by: SURGERY

## 2021-08-16 PROCEDURE — 1160F PR REVIEW ALL MEDS BY PRESCRIBER/CLIN PHARMACIST DOCUMENTED: ICD-10-PCS | Mod: CPTII,S$GLB,, | Performed by: SURGERY

## 2021-08-16 PROCEDURE — 3008F PR BODY MASS INDEX (BMI) DOCUMENTED: ICD-10-PCS | Mod: CPTII,S$GLB,, | Performed by: SURGERY

## 2021-08-16 PROCEDURE — 1160F RVW MEDS BY RX/DR IN RCRD: CPT | Mod: CPTII,S$GLB,, | Performed by: SURGERY

## 2021-08-16 PROCEDURE — 3077F PR MOST RECENT SYSTOLIC BLOOD PRESSURE >= 140 MM HG: ICD-10-PCS | Mod: CPTII,S$GLB,, | Performed by: SURGERY

## 2021-08-16 PROCEDURE — 99214 OFFICE O/P EST MOD 30 MIN: CPT | Mod: S$GLB,,, | Performed by: SURGERY

## 2021-08-16 PROCEDURE — 1125F PR PAIN SEVERITY QUANTIFIED, PAIN PRESENT: ICD-10-PCS | Mod: CPTII,S$GLB,, | Performed by: SURGERY

## 2021-08-16 PROCEDURE — 99214 PR OFFICE/OUTPT VISIT, EST, LEVL IV, 30-39 MIN: ICD-10-PCS | Mod: S$GLB,,, | Performed by: SURGERY

## 2021-08-16 PROCEDURE — 1125F AMNT PAIN NOTED PAIN PRSNT: CPT | Mod: CPTII,S$GLB,, | Performed by: SURGERY

## 2021-08-16 PROCEDURE — 3077F SYST BP >= 140 MM HG: CPT | Mod: CPTII,S$GLB,, | Performed by: SURGERY

## 2021-08-16 PROCEDURE — 3080F PR MOST RECENT DIASTOLIC BLOOD PRESSURE >= 90 MM HG: ICD-10-PCS | Mod: CPTII,S$GLB,, | Performed by: SURGERY

## 2021-08-16 PROCEDURE — 3080F DIAST BP >= 90 MM HG: CPT | Mod: CPTII,S$GLB,, | Performed by: SURGERY

## 2021-08-16 PROCEDURE — 1111F PR DISCHARGE MEDS RECONCILED W/ CURRENT OUTPATIENT MED LIST: ICD-10-PCS | Mod: CPTII,S$GLB,, | Performed by: SURGERY

## 2021-08-16 PROCEDURE — 1111F DSCHRG MED/CURRENT MED MERGE: CPT | Mod: CPTII,S$GLB,, | Performed by: SURGERY

## 2021-08-16 PROCEDURE — 3008F BODY MASS INDEX DOCD: CPT | Mod: CPTII,S$GLB,, | Performed by: SURGERY

## 2021-08-18 ENCOUNTER — EXTERNAL HOME HEALTH (OUTPATIENT)
Dept: HOME HEALTH SERVICES | Facility: HOSPITAL | Age: 46
End: 2021-08-18
Payer: COMMERCIAL

## 2021-08-21 ENCOUNTER — HOSPITAL ENCOUNTER (INPATIENT)
Facility: HOSPITAL | Age: 46
LOS: 5 days | Discharge: HOME | DRG: 392 | End: 2021-08-26
Attending: EMERGENCY MEDICINE | Admitting: EMERGENCY MEDICINE
Payer: COMMERCIAL

## 2021-08-21 DIAGNOSIS — K57.92 ACUTE DIVERTICULITIS OF INTESTINE: ICD-10-CM

## 2021-08-21 DIAGNOSIS — I10 HYPERTENSION, UNSPECIFIED TYPE: ICD-10-CM

## 2021-08-21 DIAGNOSIS — R11.10 EMESIS: ICD-10-CM

## 2021-08-21 DIAGNOSIS — K57.92 DIVERTICULITIS: Primary | ICD-10-CM

## 2021-08-21 DIAGNOSIS — R94.31 PROLONGED QT INTERVAL: ICD-10-CM

## 2021-08-21 DIAGNOSIS — K52.9 ACUTE ON CHRONIC COLITIS: ICD-10-CM

## 2021-08-21 DIAGNOSIS — K52.9 COLITIS: ICD-10-CM

## 2021-08-21 LAB
ALBUMIN SERPL BCP-MCNC: 4.6 G/DL (ref 3.5–5.2)
ALP SERPL-CCNC: 100 U/L (ref 55–135)
ALT SERPL W/O P-5'-P-CCNC: 21 U/L (ref 10–44)
ANION GAP SERPL CALC-SCNC: 17 MMOL/L (ref 8–16)
AST SERPL-CCNC: 18 U/L (ref 10–40)
B-HCG UR QL: NEGATIVE
BACTERIA #/AREA URNS HPF: ABNORMAL /HPF
BASOPHILS # BLD AUTO: 0.05 K/UL (ref 0–0.2)
BASOPHILS NFR BLD: 0.3 % (ref 0–1.9)
BILIRUB SERPL-MCNC: 0.7 MG/DL (ref 0.1–1)
BILIRUB UR QL STRIP: NEGATIVE
BUN SERPL-MCNC: 6 MG/DL (ref 6–20)
CALCIUM SERPL-MCNC: 10.8 MG/DL (ref 8.7–10.5)
CHLORIDE SERPL-SCNC: 105 MMOL/L (ref 95–110)
CLARITY UR: CLEAR
CO2 SERPL-SCNC: 20 MMOL/L (ref 23–29)
COLOR UR: YELLOW
CREAT SERPL-MCNC: 0.9 MG/DL (ref 0.5–1.4)
CTP QC/QA: YES
CTP QC/QA: YES
DIFFERENTIAL METHOD: ABNORMAL
EOSINOPHIL # BLD AUTO: 0 K/UL (ref 0–0.5)
EOSINOPHIL NFR BLD: 0 % (ref 0–8)
ERYTHROCYTE [DISTWIDTH] IN BLOOD BY AUTOMATED COUNT: 13 % (ref 11.5–14.5)
EST. GFR  (AFRICAN AMERICAN): >60 ML/MIN/1.73 M^2
EST. GFR  (NON AFRICAN AMERICAN): >60 ML/MIN/1.73 M^2
GLUCOSE SERPL-MCNC: 181 MG/DL (ref 70–110)
GLUCOSE UR QL STRIP: ABNORMAL
HCT VFR BLD AUTO: 46.8 % (ref 37–48.5)
HGB BLD-MCNC: 16.8 G/DL (ref 12–16)
HGB UR QL STRIP: NEGATIVE
HYALINE CASTS #/AREA URNS LPF: 3 /LPF
IMM GRANULOCYTES # BLD AUTO: 0.09 K/UL (ref 0–0.04)
IMM GRANULOCYTES NFR BLD AUTO: 0.5 % (ref 0–0.5)
KETONES UR QL STRIP: ABNORMAL
LACTATE SERPL-SCNC: 1.8 MMOL/L (ref 0.5–2.2)
LACTATE SERPL-SCNC: 3 MMOL/L (ref 0.5–2.2)
LEUKOCYTE ESTERASE UR QL STRIP: NEGATIVE
LIPASE SERPL-CCNC: 9 U/L (ref 4–60)
LYMPHOCYTES # BLD AUTO: 1 K/UL (ref 1–4.8)
LYMPHOCYTES NFR BLD: 5.3 % (ref 18–48)
MAGNESIUM SERPL-MCNC: 1.6 MG/DL (ref 1.6–2.6)
MCH RBC QN AUTO: 30.2 PG (ref 27–31)
MCHC RBC AUTO-ENTMCNC: 35.9 G/DL (ref 32–36)
MCV RBC AUTO: 84 FL (ref 82–98)
MICROSCOPIC COMMENT: ABNORMAL
MONOCYTES # BLD AUTO: 0.5 K/UL (ref 0.3–1)
MONOCYTES NFR BLD: 2.8 % (ref 4–15)
NEUTROPHILS # BLD AUTO: 17.1 K/UL (ref 1.8–7.7)
NEUTROPHILS NFR BLD: 91.1 % (ref 38–73)
NITRITE UR QL STRIP: NEGATIVE
NRBC BLD-RTO: 0 /100 WBC
PH UR STRIP: 8 [PH] (ref 5–8)
PHOSPHATE SERPL-MCNC: 2.2 MG/DL (ref 2.7–4.5)
PLATELET # BLD AUTO: 331 K/UL (ref 150–450)
PMV BLD AUTO: 10.7 FL (ref 9.2–12.9)
POTASSIUM SERPL-SCNC: 3.4 MMOL/L (ref 3.5–5.1)
PROT SERPL-MCNC: 9 G/DL (ref 6–8.4)
PROT UR QL STRIP: ABNORMAL
RBC # BLD AUTO: 5.57 M/UL (ref 4–5.4)
RBC #/AREA URNS HPF: 6 /HPF (ref 0–4)
SARS-COV-2 RDRP RESP QL NAA+PROBE: NEGATIVE
SODIUM SERPL-SCNC: 142 MMOL/L (ref 136–145)
SP GR UR STRIP: 1.02 (ref 1–1.03)
SQUAMOUS #/AREA URNS HPF: 2 /HPF
TROPONIN I SERPL DL<=0.01 NG/ML-MCNC: 0.01 NG/ML (ref 0–0.03)
URN SPEC COLLECT METH UR: ABNORMAL
UROBILINOGEN UR STRIP-ACNC: NEGATIVE EU/DL
WBC # BLD AUTO: 18.81 K/UL (ref 3.9–12.7)
WBC #/AREA URNS HPF: 1 /HPF (ref 0–5)

## 2021-08-21 PROCEDURE — 85025 COMPLETE CBC W/AUTO DIFF WBC: CPT | Performed by: NURSE PRACTITIONER

## 2021-08-21 PROCEDURE — 93005 ELECTROCARDIOGRAM TRACING: CPT

## 2021-08-21 PROCEDURE — 93010 EKG 12-LEAD: ICD-10-PCS | Mod: ,,, | Performed by: INTERNAL MEDICINE

## 2021-08-21 PROCEDURE — 81000 URINALYSIS NONAUTO W/SCOPE: CPT | Performed by: NURSE PRACTITIONER

## 2021-08-21 PROCEDURE — 83605 ASSAY OF LACTIC ACID: CPT | Performed by: EMERGENCY MEDICINE

## 2021-08-21 PROCEDURE — 63600175 PHARM REV CODE 636 W HCPCS: Performed by: NURSE PRACTITIONER

## 2021-08-21 PROCEDURE — 80053 COMPREHEN METABOLIC PANEL: CPT | Performed by: NURSE PRACTITIONER

## 2021-08-21 PROCEDURE — 12000002 HC ACUTE/MED SURGE SEMI-PRIVATE ROOM

## 2021-08-21 PROCEDURE — 96375 TX/PRO/DX INJ NEW DRUG ADDON: CPT

## 2021-08-21 PROCEDURE — 25000003 PHARM REV CODE 250: Performed by: NURSE PRACTITIONER

## 2021-08-21 PROCEDURE — 93010 ELECTROCARDIOGRAM REPORT: CPT | Mod: ,,, | Performed by: INTERNAL MEDICINE

## 2021-08-21 PROCEDURE — 96361 HYDRATE IV INFUSION ADD-ON: CPT

## 2021-08-21 PROCEDURE — 99285 EMERGENCY DEPT VISIT HI MDM: CPT | Mod: 25

## 2021-08-21 PROCEDURE — 87040 BLOOD CULTURE FOR BACTERIA: CPT | Performed by: NURSE PRACTITIONER

## 2021-08-21 PROCEDURE — 83690 ASSAY OF LIPASE: CPT | Performed by: NURSE PRACTITIONER

## 2021-08-21 PROCEDURE — 99291 PR CRITICAL CARE, E/M 30-74 MINUTES: ICD-10-PCS | Mod: ,,, | Performed by: INTERNAL MEDICINE

## 2021-08-21 PROCEDURE — 25500020 PHARM REV CODE 255: Performed by: NURSE PRACTITIONER

## 2021-08-21 PROCEDURE — 99291 CRITICAL CARE FIRST HOUR: CPT | Mod: ,,, | Performed by: INTERNAL MEDICINE

## 2021-08-21 PROCEDURE — 96365 THER/PROPH/DIAG IV INF INIT: CPT

## 2021-08-21 PROCEDURE — 83735 ASSAY OF MAGNESIUM: CPT | Performed by: NURSE PRACTITIONER

## 2021-08-21 PROCEDURE — 84100 ASSAY OF PHOSPHORUS: CPT | Performed by: NURSE PRACTITIONER

## 2021-08-21 PROCEDURE — 63600175 PHARM REV CODE 636 W HCPCS: Performed by: EMERGENCY MEDICINE

## 2021-08-21 PROCEDURE — U0002 COVID-19 LAB TEST NON-CDC: HCPCS | Performed by: NURSE PRACTITIONER

## 2021-08-21 PROCEDURE — 83605 ASSAY OF LACTIC ACID: CPT | Mod: 91 | Performed by: NURSE PRACTITIONER

## 2021-08-21 PROCEDURE — 81025 URINE PREGNANCY TEST: CPT | Performed by: NURSE PRACTITIONER

## 2021-08-21 PROCEDURE — 84484 ASSAY OF TROPONIN QUANT: CPT | Performed by: NURSE PRACTITIONER

## 2021-08-21 RX ORDER — SENNOSIDES 8.6 MG/1
1 TABLET ORAL 2 TIMES DAILY
Status: DISCONTINUED | OUTPATIENT
Start: 2021-08-21 | End: 2021-08-23

## 2021-08-21 RX ORDER — MAGNESIUM SULFATE HEPTAHYDRATE 40 MG/ML
4 INJECTION, SOLUTION INTRAVENOUS
Status: DISCONTINUED | OUTPATIENT
Start: 2021-08-22 | End: 2021-08-22

## 2021-08-21 RX ORDER — NICARDIPINE HYDROCHLORIDE 0.2 MG/ML
5 INJECTION INTRAVENOUS CONTINUOUS
Status: DISCONTINUED | OUTPATIENT
Start: 2021-08-21 | End: 2021-08-22

## 2021-08-21 RX ORDER — DIPHENHYDRAMINE HCL 25 MG
25 CAPSULE ORAL NIGHTLY PRN
Status: DISCONTINUED | OUTPATIENT
Start: 2021-08-22 | End: 2021-08-22

## 2021-08-21 RX ORDER — LABETALOL HYDROCHLORIDE 5 MG/ML
10 INJECTION, SOLUTION INTRAVENOUS
Status: COMPLETED | OUTPATIENT
Start: 2021-08-21 | End: 2021-08-21

## 2021-08-21 RX ORDER — HYDRALAZINE HYDROCHLORIDE 20 MG/ML
20 INJECTION INTRAMUSCULAR; INTRAVENOUS EVERY 4 HOURS PRN
Status: DISCONTINUED | OUTPATIENT
Start: 2021-08-21 | End: 2021-08-22

## 2021-08-21 RX ORDER — LISINOPRIL 5 MG/1
5 TABLET ORAL DAILY
Status: DISCONTINUED | OUTPATIENT
Start: 2021-08-22 | End: 2021-08-22

## 2021-08-21 RX ORDER — SODIUM CHLORIDE 0.9 % (FLUSH) 0.9 %
10 SYRINGE (ML) INJECTION
Status: DISCONTINUED | OUTPATIENT
Start: 2021-08-22 | End: 2021-08-26 | Stop reason: HOSPADM

## 2021-08-21 RX ORDER — POTASSIUM CHLORIDE 7.45 MG/ML
10 INJECTION INTRAVENOUS ONCE
Status: COMPLETED | OUTPATIENT
Start: 2021-08-21 | End: 2021-08-21

## 2021-08-21 RX ORDER — POTASSIUM CHLORIDE 7.45 MG/ML
40 INJECTION INTRAVENOUS
Status: DISCONTINUED | OUTPATIENT
Start: 2021-08-22 | End: 2021-08-22

## 2021-08-21 RX ORDER — ONDANSETRON 2 MG/ML
4 INJECTION INTRAMUSCULAR; INTRAVENOUS
Status: COMPLETED | OUTPATIENT
Start: 2021-08-21 | End: 2021-08-21

## 2021-08-21 RX ORDER — METOPROLOL TARTRATE 50 MG/1
100 TABLET ORAL 2 TIMES DAILY
Status: DISCONTINUED | OUTPATIENT
Start: 2021-08-22 | End: 2021-08-22

## 2021-08-21 RX ORDER — AMLODIPINE BESYLATE 5 MG/1
10 TABLET ORAL DAILY
Status: DISCONTINUED | OUTPATIENT
Start: 2021-08-22 | End: 2021-08-22

## 2021-08-21 RX ORDER — MORPHINE SULFATE 4 MG/ML
4 INJECTION, SOLUTION INTRAMUSCULAR; INTRAVENOUS
Status: COMPLETED | OUTPATIENT
Start: 2021-08-21 | End: 2021-08-21

## 2021-08-21 RX ORDER — HYDROCODONE BITARTRATE AND ACETAMINOPHEN 10; 325 MG/1; MG/1
1 TABLET ORAL EVERY 6 HOURS PRN
Status: DISCONTINUED | OUTPATIENT
Start: 2021-08-22 | End: 2021-08-22

## 2021-08-21 RX ORDER — MAGNESIUM SULFATE HEPTAHYDRATE 40 MG/ML
2 INJECTION, SOLUTION INTRAVENOUS
Status: DISCONTINUED | OUTPATIENT
Start: 2021-08-22 | End: 2021-08-22

## 2021-08-21 RX ORDER — ONDANSETRON 2 MG/ML
4 INJECTION INTRAMUSCULAR; INTRAVENOUS EVERY 8 HOURS PRN
Status: DISCONTINUED | OUTPATIENT
Start: 2021-08-21 | End: 2021-08-22

## 2021-08-21 RX ORDER — ENOXAPARIN SODIUM 100 MG/ML
40 INJECTION SUBCUTANEOUS EVERY 24 HOURS
Status: DISCONTINUED | OUTPATIENT
Start: 2021-08-21 | End: 2021-08-26 | Stop reason: HOSPADM

## 2021-08-21 RX ORDER — ACETAMINOPHEN 325 MG/1
650 TABLET ORAL EVERY 4 HOURS PRN
Status: DISCONTINUED | OUTPATIENT
Start: 2021-08-21 | End: 2021-08-26 | Stop reason: HOSPADM

## 2021-08-21 RX ADMIN — IOHEXOL 100 ML: 350 INJECTION, SOLUTION INTRAVENOUS at 07:08

## 2021-08-21 RX ADMIN — POTASSIUM CHLORIDE 10 MEQ: 7.46 INJECTION, SOLUTION INTRAVENOUS at 08:08

## 2021-08-21 RX ADMIN — MORPHINE SULFATE 4 MG: 4 INJECTION INTRAVENOUS at 07:08

## 2021-08-21 RX ADMIN — NICARDIPINE HYDROCHLORIDE 5 MG/HR: 0.2 INJECTION, SOLUTION INTRAVENOUS at 11:08

## 2021-08-21 RX ADMIN — SODIUM CHLORIDE 1898 ML: 0.9 INJECTION, SOLUTION INTRAVENOUS at 08:08

## 2021-08-21 RX ADMIN — LABETALOL HYDROCHLORIDE 10 MG: 5 INJECTION INTRAVENOUS at 09:08

## 2021-08-21 RX ADMIN — SODIUM CHLORIDE 1000 ML: 0.9 INJECTION, SOLUTION INTRAVENOUS at 07:08

## 2021-08-21 RX ADMIN — PIPERACILLIN AND TAZOBACTAM 4.5 G: 4; .5 INJECTION, POWDER, LYOPHILIZED, FOR SOLUTION INTRAVENOUS; PARENTERAL at 10:08

## 2021-08-21 RX ADMIN — PROMETHAZINE HYDROCHLORIDE 12.5 MG: 25 INJECTION INTRAMUSCULAR; INTRAVENOUS at 07:08

## 2021-08-21 RX ADMIN — ONDANSETRON 4 MG: 2 INJECTION INTRAMUSCULAR; INTRAVENOUS at 06:08

## 2021-08-22 PROBLEM — K57.92 ACUTE DIVERTICULITIS OF INTESTINE: Status: ACTIVE | Noted: 2021-08-22

## 2021-08-22 LAB
ALBUMIN SERPL BCP-MCNC: 4.4 G/DL (ref 3.5–5.2)
ALP SERPL-CCNC: 99 U/L (ref 55–135)
ALT SERPL W/O P-5'-P-CCNC: 19 U/L (ref 10–44)
ANION GAP SERPL CALC-SCNC: 12 MMOL/L (ref 8–16)
AST SERPL-CCNC: 17 U/L (ref 10–40)
BASOPHILS # BLD AUTO: 0.06 K/UL (ref 0–0.2)
BASOPHILS NFR BLD: 0.3 % (ref 0–1.9)
BILIRUB SERPL-MCNC: 0.6 MG/DL (ref 0.1–1)
BUN SERPL-MCNC: 4 MG/DL (ref 6–20)
CALCIUM SERPL-MCNC: 9.9 MG/DL (ref 8.7–10.5)
CHLORIDE SERPL-SCNC: 102 MMOL/L (ref 95–110)
CO2 SERPL-SCNC: 21 MMOL/L (ref 23–29)
CREAT SERPL-MCNC: 0.8 MG/DL (ref 0.5–1.4)
DIFFERENTIAL METHOD: ABNORMAL
EOSINOPHIL # BLD AUTO: 0 K/UL (ref 0–0.5)
EOSINOPHIL NFR BLD: 0 % (ref 0–8)
ERYTHROCYTE [DISTWIDTH] IN BLOOD BY AUTOMATED COUNT: 13.3 % (ref 11.5–14.5)
EST. GFR  (AFRICAN AMERICAN): >60 ML/MIN/1.73 M^2
EST. GFR  (NON AFRICAN AMERICAN): >60 ML/MIN/1.73 M^2
GLUCOSE SERPL-MCNC: 139 MG/DL (ref 70–110)
HCT VFR BLD AUTO: 46.1 % (ref 37–48.5)
HGB BLD-MCNC: 16.4 G/DL (ref 12–16)
IMM GRANULOCYTES # BLD AUTO: 0.11 K/UL (ref 0–0.04)
IMM GRANULOCYTES NFR BLD AUTO: 0.5 % (ref 0–0.5)
LYMPHOCYTES # BLD AUTO: 1.6 K/UL (ref 1–4.8)
LYMPHOCYTES NFR BLD: 6.9 % (ref 18–48)
MCH RBC QN AUTO: 30 PG (ref 27–31)
MCHC RBC AUTO-ENTMCNC: 35.6 G/DL (ref 32–36)
MCV RBC AUTO: 84 FL (ref 82–98)
MONOCYTES # BLD AUTO: 1.6 K/UL (ref 0.3–1)
MONOCYTES NFR BLD: 6.8 % (ref 4–15)
NEUTROPHILS # BLD AUTO: 20.1 K/UL (ref 1.8–7.7)
NEUTROPHILS NFR BLD: 85.5 % (ref 38–73)
NRBC BLD-RTO: 0 /100 WBC
PLATELET # BLD AUTO: 312 K/UL (ref 150–450)
PMV BLD AUTO: 10.6 FL (ref 9.2–12.9)
POTASSIUM SERPL-SCNC: 3.1 MMOL/L (ref 3.5–5.1)
PROT SERPL-MCNC: 8.7 G/DL (ref 6–8.4)
RBC # BLD AUTO: 5.47 M/UL (ref 4–5.4)
SODIUM SERPL-SCNC: 135 MMOL/L (ref 136–145)
WBC # BLD AUTO: 23.53 K/UL (ref 3.9–12.7)

## 2021-08-22 PROCEDURE — 99222 1ST HOSP IP/OBS MODERATE 55: CPT | Mod: ,,, | Performed by: INTERNAL MEDICINE

## 2021-08-22 PROCEDURE — 63600175 PHARM REV CODE 636 W HCPCS: Performed by: STUDENT IN AN ORGANIZED HEALTH CARE EDUCATION/TRAINING PROGRAM

## 2021-08-22 PROCEDURE — 63600175 PHARM REV CODE 636 W HCPCS: Performed by: INTERNAL MEDICINE

## 2021-08-22 PROCEDURE — 80053 COMPREHEN METABOLIC PANEL: CPT | Performed by: INTERNAL MEDICINE

## 2021-08-22 PROCEDURE — 25000003 PHARM REV CODE 250: Performed by: INTERNAL MEDICINE

## 2021-08-22 PROCEDURE — S5010 5% DEXTROSE AND 0.45% SALINE: HCPCS | Performed by: INTERNAL MEDICINE

## 2021-08-22 PROCEDURE — 85025 COMPLETE CBC W/AUTO DIFF WBC: CPT | Performed by: INTERNAL MEDICINE

## 2021-08-22 PROCEDURE — 20000000 HC ICU ROOM

## 2021-08-22 PROCEDURE — 99222 PR INITIAL HOSPITAL CARE,LEVL II: ICD-10-PCS | Mod: ,,, | Performed by: INTERNAL MEDICINE

## 2021-08-22 PROCEDURE — 25000003 PHARM REV CODE 250: Performed by: NURSE PRACTITIONER

## 2021-08-22 PROCEDURE — 94761 N-INVAS EAR/PLS OXIMETRY MLT: CPT

## 2021-08-22 RX ORDER — HYDRALAZINE HYDROCHLORIDE 20 MG/ML
10 INJECTION INTRAMUSCULAR; INTRAVENOUS EVERY 8 HOURS
Status: DISCONTINUED | OUTPATIENT
Start: 2021-08-22 | End: 2021-08-25

## 2021-08-22 RX ORDER — PROCHLORPERAZINE EDISYLATE 5 MG/ML
2.5 INJECTION INTRAMUSCULAR; INTRAVENOUS EVERY 6 HOURS PRN
Status: DISCONTINUED | OUTPATIENT
Start: 2021-08-22 | End: 2021-08-26 | Stop reason: HOSPADM

## 2021-08-22 RX ORDER — ONDANSETRON 2 MG/ML
8 INJECTION INTRAMUSCULAR; INTRAVENOUS EVERY 8 HOURS PRN
Status: DISCONTINUED | OUTPATIENT
Start: 2021-08-22 | End: 2021-08-26 | Stop reason: HOSPADM

## 2021-08-22 RX ORDER — MORPHINE SULFATE 4 MG/ML
4 INJECTION, SOLUTION INTRAMUSCULAR; INTRAVENOUS EVERY 6 HOURS PRN
Status: DISCONTINUED | OUTPATIENT
Start: 2021-08-22 | End: 2021-08-23

## 2021-08-22 RX ORDER — MUPIROCIN 20 MG/G
OINTMENT TOPICAL 2 TIMES DAILY
Status: DISCONTINUED | OUTPATIENT
Start: 2021-08-22 | End: 2021-08-26 | Stop reason: HOSPADM

## 2021-08-22 RX ORDER — METOPROLOL TARTRATE 1 MG/ML
5 INJECTION, SOLUTION INTRAVENOUS EVERY 8 HOURS
Status: DISCONTINUED | OUTPATIENT
Start: 2021-08-22 | End: 2021-08-23

## 2021-08-22 RX ADMIN — HYDRALAZINE HYDROCHLORIDE 10 MG: 20 INJECTION, SOLUTION INTRAMUSCULAR; INTRAVENOUS at 09:08

## 2021-08-22 RX ADMIN — MUPIROCIN: 20 OINTMENT TOPICAL at 09:08

## 2021-08-22 RX ADMIN — PROMETHAZINE HYDROCHLORIDE 12.5 MG: 25 INJECTION INTRAMUSCULAR; INTRAVENOUS at 05:08

## 2021-08-22 RX ADMIN — NICARDIPINE HYDROCHLORIDE 5 MG/HR: 0.2 INJECTION, SOLUTION INTRAVENOUS at 07:08

## 2021-08-22 RX ADMIN — ENOXAPARIN SODIUM 40 MG: 40 INJECTION SUBCUTANEOUS at 05:08

## 2021-08-22 RX ADMIN — HYDRALAZINE HYDROCHLORIDE 10 MG: 20 INJECTION, SOLUTION INTRAMUSCULAR; INTRAVENOUS at 02:08

## 2021-08-22 RX ADMIN — SODIUM CHLORIDE 1000 ML: 0.9 INJECTION, SOLUTION INTRAVENOUS at 02:08

## 2021-08-22 RX ADMIN — PROMETHAZINE HYDROCHLORIDE 12.5 MG: 25 INJECTION INTRAMUSCULAR; INTRAVENOUS at 02:08

## 2021-08-22 RX ADMIN — PIPERACILLIN AND TAZOBACTAM 4.5 G: 4; .5 INJECTION, POWDER, LYOPHILIZED, FOR SOLUTION INTRAVENOUS; PARENTERAL at 06:08

## 2021-08-22 RX ADMIN — PIPERACILLIN AND TAZOBACTAM 4.5 G: 4; .5 INJECTION, POWDER, LYOPHILIZED, FOR SOLUTION INTRAVENOUS; PARENTERAL at 09:08

## 2021-08-22 RX ADMIN — METOROPROLOL TARTRATE 5 MG: 5 INJECTION, SOLUTION INTRAVENOUS at 02:08

## 2021-08-22 RX ADMIN — ONDANSETRON 8 MG: 2 INJECTION INTRAMUSCULAR; INTRAVENOUS at 03:08

## 2021-08-22 RX ADMIN — POTASSIUM CHLORIDE: 2 INJECTION, SOLUTION, CONCENTRATE INTRAVENOUS at 05:08

## 2021-08-22 RX ADMIN — METOROPROLOL TARTRATE 5 MG: 5 INJECTION, SOLUTION INTRAVENOUS at 09:08

## 2021-08-22 RX ADMIN — MORPHINE SULFATE 4 MG: 4 INJECTION INTRAVENOUS at 03:08

## 2021-08-22 RX ADMIN — MORPHINE SULFATE 4 MG: 4 INJECTION INTRAVENOUS at 09:08

## 2021-08-22 RX ADMIN — PROMETHAZINE HYDROCHLORIDE 12.5 MG: 25 INJECTION INTRAMUSCULAR; INTRAVENOUS at 12:08

## 2021-08-22 RX ADMIN — PIPERACILLIN AND TAZOBACTAM 4.5 G: 4; .5 INJECTION, POWDER, LYOPHILIZED, FOR SOLUTION INTRAVENOUS; PARENTERAL at 02:08

## 2021-08-22 RX ADMIN — HYDROCODONE BITARTRATE AND ACETAMINOPHEN 1 TABLET: 10; 325 TABLET ORAL at 02:08

## 2021-08-22 RX ADMIN — PROCHLORPERAZINE EDISYLATE 2.5 MG: 5 INJECTION INTRAMUSCULAR; INTRAVENOUS at 11:08

## 2021-08-22 RX ADMIN — ONDANSETRON 8 MG: 2 INJECTION INTRAMUSCULAR; INTRAVENOUS at 11:08

## 2021-08-22 RX ADMIN — ENOXAPARIN SODIUM 40 MG: 40 INJECTION SUBCUTANEOUS at 12:08

## 2021-08-23 LAB
ALBUMIN SERPL BCP-MCNC: 3.9 G/DL (ref 3.5–5.2)
ALP SERPL-CCNC: 89 U/L (ref 55–135)
ALT SERPL W/O P-5'-P-CCNC: 18 U/L (ref 10–44)
ANION GAP SERPL CALC-SCNC: 10 MMOL/L (ref 8–16)
AST SERPL-CCNC: 18 U/L (ref 10–40)
BASOPHILS # BLD AUTO: 0.03 K/UL (ref 0–0.2)
BASOPHILS NFR BLD: 0.2 % (ref 0–1.9)
BILIRUB SERPL-MCNC: 0.7 MG/DL (ref 0.1–1)
BUN SERPL-MCNC: 4 MG/DL (ref 6–20)
CALCIUM SERPL-MCNC: 9.5 MG/DL (ref 8.7–10.5)
CHLORIDE SERPL-SCNC: 104 MMOL/L (ref 95–110)
CO2 SERPL-SCNC: 21 MMOL/L (ref 23–29)
CREAT SERPL-MCNC: 0.9 MG/DL (ref 0.5–1.4)
CRP SERPL-MCNC: 4.6 MG/L (ref 0–8.2)
DIFFERENTIAL METHOD: ABNORMAL
EOSINOPHIL # BLD AUTO: 0 K/UL (ref 0–0.5)
EOSINOPHIL NFR BLD: 0.2 % (ref 0–8)
ERYTHROCYTE [DISTWIDTH] IN BLOOD BY AUTOMATED COUNT: 13.8 % (ref 11.5–14.5)
EST. GFR  (AFRICAN AMERICAN): >60 ML/MIN/1.73 M^2
EST. GFR  (NON AFRICAN AMERICAN): >60 ML/MIN/1.73 M^2
GLUCOSE SERPL-MCNC: 144 MG/DL (ref 70–110)
HCT VFR BLD AUTO: 47 % (ref 37–48.5)
HGB BLD-MCNC: 16.1 G/DL (ref 12–16)
IMM GRANULOCYTES # BLD AUTO: 0.07 K/UL (ref 0–0.04)
IMM GRANULOCYTES NFR BLD AUTO: 0.4 % (ref 0–0.5)
LYMPHOCYTES # BLD AUTO: 1.9 K/UL (ref 1–4.8)
LYMPHOCYTES NFR BLD: 11.3 % (ref 18–48)
MCH RBC QN AUTO: 29.7 PG (ref 27–31)
MCHC RBC AUTO-ENTMCNC: 34.3 G/DL (ref 32–36)
MCV RBC AUTO: 87 FL (ref 82–98)
MONOCYTES # BLD AUTO: 1.2 K/UL (ref 0.3–1)
MONOCYTES NFR BLD: 7.4 % (ref 4–15)
NEUTROPHILS # BLD AUTO: 13.5 K/UL (ref 1.8–7.7)
NEUTROPHILS NFR BLD: 80.5 % (ref 38–73)
NRBC BLD-RTO: 0 /100 WBC
PLATELET # BLD AUTO: 253 K/UL (ref 150–450)
PLATELET BLD QL SMEAR: ABNORMAL
PMV BLD AUTO: 10.7 FL (ref 9.2–12.9)
POTASSIUM SERPL-SCNC: 3.2 MMOL/L (ref 3.5–5.1)
PROT SERPL-MCNC: 7.8 G/DL (ref 6–8.4)
RBC # BLD AUTO: 5.43 M/UL (ref 4–5.4)
SODIUM SERPL-SCNC: 135 MMOL/L (ref 136–145)
WBC # BLD AUTO: 16.79 K/UL (ref 3.9–12.7)

## 2021-08-23 PROCEDURE — 25000003 PHARM REV CODE 250: Performed by: INTERNAL MEDICINE

## 2021-08-23 PROCEDURE — 36415 COLL VENOUS BLD VENIPUNCTURE: CPT | Performed by: INTERNAL MEDICINE

## 2021-08-23 PROCEDURE — 63600175 PHARM REV CODE 636 W HCPCS: Performed by: INTERNAL MEDICINE

## 2021-08-23 PROCEDURE — S5010 5% DEXTROSE AND 0.45% SALINE: HCPCS | Performed by: INTERNAL MEDICINE

## 2021-08-23 PROCEDURE — 85025 COMPLETE CBC W/AUTO DIFF WBC: CPT | Performed by: INTERNAL MEDICINE

## 2021-08-23 PROCEDURE — 25000003 PHARM REV CODE 250

## 2021-08-23 PROCEDURE — 63600175 PHARM REV CODE 636 W HCPCS: Performed by: STUDENT IN AN ORGANIZED HEALTH CARE EDUCATION/TRAINING PROGRAM

## 2021-08-23 PROCEDURE — 80053 COMPREHEN METABOLIC PANEL: CPT | Performed by: INTERNAL MEDICINE

## 2021-08-23 PROCEDURE — 20000000 HC ICU ROOM

## 2021-08-23 PROCEDURE — 36415 COLL VENOUS BLD VENIPUNCTURE: CPT | Performed by: NURSE PRACTITIONER

## 2021-08-23 PROCEDURE — 86140 C-REACTIVE PROTEIN: CPT | Performed by: NURSE PRACTITIONER

## 2021-08-23 PROCEDURE — 99232 SBSQ HOSP IP/OBS MODERATE 35: CPT | Mod: ,,, | Performed by: NURSE PRACTITIONER

## 2021-08-23 PROCEDURE — 99232 PR SUBSEQUENT HOSPITAL CARE,LEVL II: ICD-10-PCS | Mod: ,,, | Performed by: NURSE PRACTITIONER

## 2021-08-23 RX ORDER — NICARDIPINE HYDROCHLORIDE 0.2 MG/ML
0-15 INJECTION INTRAVENOUS CONTINUOUS
Status: DISCONTINUED | OUTPATIENT
Start: 2021-08-23 | End: 2021-08-23

## 2021-08-23 RX ORDER — DEXTROSE MONOHYDRATE, SODIUM CHLORIDE, AND POTASSIUM CHLORIDE 50; 1.49; 4.5 G/1000ML; G/1000ML; G/1000ML
INJECTION, SOLUTION INTRAVENOUS CONTINUOUS
Status: DISCONTINUED | OUTPATIENT
Start: 2021-08-23 | End: 2021-08-25

## 2021-08-23 RX ORDER — MORPHINE SULFATE 4 MG/ML
4 INJECTION, SOLUTION INTRAMUSCULAR; INTRAVENOUS EVERY 4 HOURS PRN
Status: DISCONTINUED | OUTPATIENT
Start: 2021-08-23 | End: 2021-08-25

## 2021-08-23 RX ORDER — METOPROLOL TARTRATE 1 MG/ML
10 INJECTION, SOLUTION INTRAVENOUS EVERY 8 HOURS
Status: DISCONTINUED | OUTPATIENT
Start: 2021-08-23 | End: 2021-08-24

## 2021-08-23 RX ORDER — ONDANSETRON 2 MG/ML
8 INJECTION INTRAMUSCULAR; INTRAVENOUS EVERY 8 HOURS
Status: DISCONTINUED | OUTPATIENT
Start: 2021-08-23 | End: 2021-08-23

## 2021-08-23 RX ORDER — ONDANSETRON 2 MG/ML
8 INJECTION INTRAMUSCULAR; INTRAVENOUS EVERY 8 HOURS
Status: DISCONTINUED | OUTPATIENT
Start: 2021-08-23 | End: 2021-08-26 | Stop reason: HOSPADM

## 2021-08-23 RX ORDER — NICARDIPINE HYDROCHLORIDE 0.2 MG/ML
INJECTION INTRAVENOUS
Status: COMPLETED
Start: 2021-08-23 | End: 2021-08-23

## 2021-08-23 RX ADMIN — ENOXAPARIN SODIUM 40 MG: 40 INJECTION SUBCUTANEOUS at 05:08

## 2021-08-23 RX ADMIN — PIPERACILLIN AND TAZOBACTAM 4.5 G: 4; .5 INJECTION, POWDER, LYOPHILIZED, FOR SOLUTION INTRAVENOUS; PARENTERAL at 09:08

## 2021-08-23 RX ADMIN — PROCHLORPERAZINE EDISYLATE 2.5 MG: 5 INJECTION INTRAMUSCULAR; INTRAVENOUS at 07:08

## 2021-08-23 RX ADMIN — ONDANSETRON 8 MG: 2 INJECTION INTRAMUSCULAR; INTRAVENOUS at 07:08

## 2021-08-23 RX ADMIN — MORPHINE SULFATE 4 MG: 4 INJECTION INTRAVENOUS at 03:08

## 2021-08-23 RX ADMIN — METOROPROLOL TARTRATE 10 MG: 5 INJECTION, SOLUTION INTRAVENOUS at 01:08

## 2021-08-23 RX ADMIN — MORPHINE SULFATE 4 MG: 4 INJECTION, SOLUTION INTRAMUSCULAR; INTRAVENOUS at 11:08

## 2021-08-23 RX ADMIN — MUPIROCIN: 20 OINTMENT TOPICAL at 09:08

## 2021-08-23 RX ADMIN — ONDANSETRON 8 MG: 2 INJECTION INTRAMUSCULAR; INTRAVENOUS at 09:08

## 2021-08-23 RX ADMIN — HYDRALAZINE HYDROCHLORIDE 10 MG: 20 INJECTION, SOLUTION INTRAMUSCULAR; INTRAVENOUS at 01:08

## 2021-08-23 RX ADMIN — HYDRALAZINE HYDROCHLORIDE 10 MG: 20 INJECTION, SOLUTION INTRAMUSCULAR; INTRAVENOUS at 09:08

## 2021-08-23 RX ADMIN — NICARDIPINE HYDROCHLORIDE 5 MG/HR: 0.2 INJECTION, SOLUTION INTRAVENOUS at 01:08

## 2021-08-23 RX ADMIN — MORPHINE SULFATE 4 MG: 4 INJECTION, SOLUTION INTRAMUSCULAR; INTRAVENOUS at 02:08

## 2021-08-23 RX ADMIN — METOROPROLOL TARTRATE 5 MG: 5 INJECTION, SOLUTION INTRAVENOUS at 05:08

## 2021-08-23 RX ADMIN — DEXTROSE, SODIUM CHLORIDE, AND POTASSIUM CHLORIDE: 5; .45; .15 INJECTION INTRAVENOUS at 09:08

## 2021-08-23 RX ADMIN — ONDANSETRON 8 MG: 2 INJECTION INTRAMUSCULAR; INTRAVENOUS at 03:08

## 2021-08-23 RX ADMIN — POTASSIUM CHLORIDE: 2 INJECTION, SOLUTION, CONCENTRATE INTRAVENOUS at 03:08

## 2021-08-23 RX ADMIN — PIPERACILLIN AND TAZOBACTAM 4.5 G: 4; .5 INJECTION, POWDER, LYOPHILIZED, FOR SOLUTION INTRAVENOUS; PARENTERAL at 01:08

## 2021-08-23 RX ADMIN — PROCHLORPERAZINE EDISYLATE 2.5 MG: 5 INJECTION INTRAMUSCULAR; INTRAVENOUS at 05:08

## 2021-08-23 RX ADMIN — DEXTROSE, SODIUM CHLORIDE, AND POTASSIUM CHLORIDE: 5; .45; .15 INJECTION INTRAVENOUS at 10:08

## 2021-08-23 RX ADMIN — PROCHLORPERAZINE EDISYLATE 2.5 MG: 5 INJECTION INTRAMUSCULAR; INTRAVENOUS at 01:08

## 2021-08-23 RX ADMIN — MORPHINE SULFATE 4 MG: 4 INJECTION, SOLUTION INTRAMUSCULAR; INTRAVENOUS at 07:08

## 2021-08-23 RX ADMIN — NICARDIPINE HYDROCHLORIDE 5 MG/HR: 0.2 INJECTION INTRAVENOUS at 01:08

## 2021-08-23 RX ADMIN — PIPERACILLIN AND TAZOBACTAM 4.5 G: 4; .5 INJECTION, POWDER, LYOPHILIZED, FOR SOLUTION INTRAVENOUS; PARENTERAL at 05:08

## 2021-08-23 RX ADMIN — HYDRALAZINE HYDROCHLORIDE 10 MG: 20 INJECTION, SOLUTION INTRAMUSCULAR; INTRAVENOUS at 05:08

## 2021-08-23 RX ADMIN — METOROPROLOL TARTRATE 10 MG: 5 INJECTION, SOLUTION INTRAVENOUS at 09:08

## 2021-08-23 RX ADMIN — MORPHINE SULFATE 4 MG: 4 INJECTION INTRAVENOUS at 09:08

## 2021-08-24 PROBLEM — K57.92 DIVERTICULITIS: Status: ACTIVE | Noted: 2021-08-24

## 2021-08-24 LAB
ALBUMIN SERPL BCP-MCNC: 3.4 G/DL (ref 3.5–5.2)
ALP SERPL-CCNC: 86 U/L (ref 55–135)
ALT SERPL W/O P-5'-P-CCNC: 15 U/L (ref 10–44)
ANION GAP SERPL CALC-SCNC: 11 MMOL/L (ref 8–16)
AST SERPL-CCNC: 17 U/L (ref 10–40)
BASOPHILS # BLD AUTO: 0.05 K/UL (ref 0–0.2)
BASOPHILS NFR BLD: 0.5 % (ref 0–1.9)
BILIRUB SERPL-MCNC: 0.7 MG/DL (ref 0.1–1)
BUN SERPL-MCNC: 6 MG/DL (ref 6–20)
CALCIUM SERPL-MCNC: 8.9 MG/DL (ref 8.7–10.5)
CHLORIDE SERPL-SCNC: 105 MMOL/L (ref 95–110)
CO2 SERPL-SCNC: 20 MMOL/L (ref 23–29)
CREAT SERPL-MCNC: 1 MG/DL (ref 0.5–1.4)
DIFFERENTIAL METHOD: NORMAL
EOSINOPHIL # BLD AUTO: 0.1 K/UL (ref 0–0.5)
EOSINOPHIL NFR BLD: 0.7 % (ref 0–8)
ERYTHROCYTE [DISTWIDTH] IN BLOOD BY AUTOMATED COUNT: 13.8 % (ref 11.5–14.5)
EST. GFR  (AFRICAN AMERICAN): >60 ML/MIN/1.73 M^2
EST. GFR  (NON AFRICAN AMERICAN): >60 ML/MIN/1.73 M^2
GLUCOSE SERPL-MCNC: 115 MG/DL (ref 70–110)
HCT VFR BLD AUTO: 44.6 % (ref 37–48.5)
HGB BLD-MCNC: 14.6 G/DL (ref 12–16)
IMM GRANULOCYTES # BLD AUTO: 0.04 K/UL (ref 0–0.04)
IMM GRANULOCYTES NFR BLD AUTO: 0.4 % (ref 0–0.5)
LYMPHOCYTES # BLD AUTO: 2.1 K/UL (ref 1–4.8)
LYMPHOCYTES NFR BLD: 21.7 % (ref 18–48)
MCH RBC QN AUTO: 29.2 PG (ref 27–31)
MCHC RBC AUTO-ENTMCNC: 32.7 G/DL (ref 32–36)
MCV RBC AUTO: 89 FL (ref 82–98)
MONOCYTES # BLD AUTO: 0.9 K/UL (ref 0.3–1)
MONOCYTES NFR BLD: 9.7 % (ref 4–15)
NEUTROPHILS # BLD AUTO: 6.5 K/UL (ref 1.8–7.7)
NEUTROPHILS NFR BLD: 67 % (ref 38–73)
NRBC BLD-RTO: 0 /100 WBC
PLATELET # BLD AUTO: 233 K/UL (ref 150–450)
PMV BLD AUTO: 11.3 FL (ref 9.2–12.9)
POTASSIUM SERPL-SCNC: 3.6 MMOL/L (ref 3.5–5.1)
PROT SERPL-MCNC: 6.8 G/DL (ref 6–8.4)
RBC # BLD AUTO: 5 M/UL (ref 4–5.4)
SODIUM SERPL-SCNC: 136 MMOL/L (ref 136–145)
WBC # BLD AUTO: 9.68 K/UL (ref 3.9–12.7)
WBC #/AREA STL HPF: NORMAL /[HPF]

## 2021-08-24 PROCEDURE — 85025 COMPLETE CBC W/AUTO DIFF WBC: CPT | Performed by: INTERNAL MEDICINE

## 2021-08-24 PROCEDURE — 99232 SBSQ HOSP IP/OBS MODERATE 35: CPT | Mod: ,,, | Performed by: NURSE PRACTITIONER

## 2021-08-24 PROCEDURE — 99232 PR SUBSEQUENT HOSPITAL CARE,LEVL II: ICD-10-PCS | Mod: ,,, | Performed by: NURSE PRACTITIONER

## 2021-08-24 PROCEDURE — 63600175 PHARM REV CODE 636 W HCPCS: Performed by: INTERNAL MEDICINE

## 2021-08-24 PROCEDURE — 83993 ASSAY FOR CALPROTECTIN FECAL: CPT | Performed by: NURSE PRACTITIONER

## 2021-08-24 PROCEDURE — 80053 COMPREHEN METABOLIC PANEL: CPT | Performed by: INTERNAL MEDICINE

## 2021-08-24 PROCEDURE — 25000003 PHARM REV CODE 250: Performed by: INTERNAL MEDICINE

## 2021-08-24 PROCEDURE — 25000003 PHARM REV CODE 250: Performed by: STUDENT IN AN ORGANIZED HEALTH CARE EDUCATION/TRAINING PROGRAM

## 2021-08-24 PROCEDURE — 36415 COLL VENOUS BLD VENIPUNCTURE: CPT | Performed by: INTERNAL MEDICINE

## 2021-08-24 PROCEDURE — 94761 N-INVAS EAR/PLS OXIMETRY MLT: CPT

## 2021-08-24 PROCEDURE — 11000001 HC ACUTE MED/SURG PRIVATE ROOM

## 2021-08-24 PROCEDURE — 89055 LEUKOCYTE ASSESSMENT FECAL: CPT | Performed by: INTERNAL MEDICINE

## 2021-08-24 RX ORDER — AMLODIPINE BESYLATE 5 MG/1
10 TABLET ORAL DAILY
Status: DISCONTINUED | OUTPATIENT
Start: 2021-08-24 | End: 2021-08-26 | Stop reason: HOSPADM

## 2021-08-24 RX ORDER — METOPROLOL TARTRATE 50 MG/1
100 TABLET ORAL 2 TIMES DAILY
Status: DISCONTINUED | OUTPATIENT
Start: 2021-08-24 | End: 2021-08-26 | Stop reason: HOSPADM

## 2021-08-24 RX ADMIN — MUPIROCIN: 20 OINTMENT TOPICAL at 09:08

## 2021-08-24 RX ADMIN — HYDRALAZINE HYDROCHLORIDE 10 MG: 20 INJECTION, SOLUTION INTRAMUSCULAR; INTRAVENOUS at 05:08

## 2021-08-24 RX ADMIN — PIPERACILLIN AND TAZOBACTAM 4.5 G: 4; .5 INJECTION, POWDER, LYOPHILIZED, FOR SOLUTION INTRAVENOUS; PARENTERAL at 01:08

## 2021-08-24 RX ADMIN — HYDRALAZINE HYDROCHLORIDE 10 MG: 20 INJECTION, SOLUTION INTRAMUSCULAR; INTRAVENOUS at 01:08

## 2021-08-24 RX ADMIN — MORPHINE SULFATE 4 MG: 4 INJECTION, SOLUTION INTRAMUSCULAR; INTRAVENOUS at 03:08

## 2021-08-24 RX ADMIN — METOROPROLOL TARTRATE 10 MG: 5 INJECTION, SOLUTION INTRAVENOUS at 05:08

## 2021-08-24 RX ADMIN — HYDRALAZINE HYDROCHLORIDE 10 MG: 20 INJECTION, SOLUTION INTRAMUSCULAR; INTRAVENOUS at 09:08

## 2021-08-24 RX ADMIN — ONDANSETRON 8 MG: 2 INJECTION INTRAMUSCULAR; INTRAVENOUS at 09:08

## 2021-08-24 RX ADMIN — ENOXAPARIN SODIUM 40 MG: 40 INJECTION SUBCUTANEOUS at 05:08

## 2021-08-24 RX ADMIN — MORPHINE SULFATE 4 MG: 4 INJECTION, SOLUTION INTRAMUSCULAR; INTRAVENOUS at 05:08

## 2021-08-24 RX ADMIN — METOROPROLOL TARTRATE 10 MG: 5 INJECTION, SOLUTION INTRAVENOUS at 01:08

## 2021-08-24 RX ADMIN — AMLODIPINE BESYLATE 10 MG: 5 TABLET ORAL at 10:08

## 2021-08-24 RX ADMIN — METOPROLOL TARTRATE 100 MG: 50 TABLET, FILM COATED ORAL at 09:08

## 2021-08-24 RX ADMIN — PIPERACILLIN AND TAZOBACTAM 4.5 G: 4; .5 INJECTION, POWDER, LYOPHILIZED, FOR SOLUTION INTRAVENOUS; PARENTERAL at 09:08

## 2021-08-24 RX ADMIN — DEXTROSE, SODIUM CHLORIDE, AND POTASSIUM CHLORIDE: 5; .45; .15 INJECTION INTRAVENOUS at 07:08

## 2021-08-24 RX ADMIN — MORPHINE SULFATE 4 MG: 4 INJECTION, SOLUTION INTRAMUSCULAR; INTRAVENOUS at 09:08

## 2021-08-24 RX ADMIN — ONDANSETRON 8 MG: 2 INJECTION INTRAMUSCULAR; INTRAVENOUS at 05:08

## 2021-08-24 RX ADMIN — ONDANSETRON 8 MG: 2 INJECTION INTRAMUSCULAR; INTRAVENOUS at 01:08

## 2021-08-24 RX ADMIN — DEXTROSE, SODIUM CHLORIDE, AND POTASSIUM CHLORIDE: 5; .45; .15 INJECTION INTRAVENOUS at 05:08

## 2021-08-24 RX ADMIN — PIPERACILLIN AND TAZOBACTAM 4.5 G: 4; .5 INJECTION, POWDER, LYOPHILIZED, FOR SOLUTION INTRAVENOUS; PARENTERAL at 05:08

## 2021-08-25 VITALS
HEART RATE: 77 BPM | DIASTOLIC BLOOD PRESSURE: 62 MMHG | OXYGEN SATURATION: 99 % | TEMPERATURE: 98 F | RESPIRATION RATE: 18 BRPM | BODY MASS INDEX: 33.9 KG/M2 | WEIGHT: 216 LBS | SYSTOLIC BLOOD PRESSURE: 133 MMHG | HEIGHT: 67 IN

## 2021-08-25 LAB
ALBUMIN SERPL BCP-MCNC: 3.1 G/DL (ref 3.5–5.2)
ALP SERPL-CCNC: 61 U/L (ref 55–135)
ALT SERPL W/O P-5'-P-CCNC: 17 U/L (ref 10–44)
ANION GAP SERPL CALC-SCNC: 8 MMOL/L (ref 8–16)
AST SERPL-CCNC: 18 U/L (ref 10–40)
BACTERIA BLD CULT: NORMAL
BACTERIA BLD CULT: NORMAL
BILIRUB SERPL-MCNC: 0.5 MG/DL (ref 0.1–1)
BUN SERPL-MCNC: 5 MG/DL (ref 6–20)
CALCIUM SERPL-MCNC: 8.8 MG/DL (ref 8.7–10.5)
CHLORIDE SERPL-SCNC: 106 MMOL/L (ref 95–110)
CO2 SERPL-SCNC: 22 MMOL/L (ref 23–29)
CREAT SERPL-MCNC: 1 MG/DL (ref 0.5–1.4)
ERYTHROCYTE [DISTWIDTH] IN BLOOD BY AUTOMATED COUNT: 13.2 % (ref 11.5–14.5)
EST. GFR  (AFRICAN AMERICAN): >60 ML/MIN/1.73 M^2
EST. GFR  (NON AFRICAN AMERICAN): >60 ML/MIN/1.73 M^2
GLUCOSE SERPL-MCNC: 127 MG/DL (ref 70–110)
HCT VFR BLD AUTO: 43.8 % (ref 37–48.5)
HGB BLD-MCNC: 14.5 G/DL (ref 12–16)
MCH RBC QN AUTO: 29.4 PG (ref 27–31)
MCHC RBC AUTO-ENTMCNC: 33.1 G/DL (ref 32–36)
MCV RBC AUTO: 89 FL (ref 82–98)
PLATELET # BLD AUTO: 251 K/UL (ref 150–450)
PMV BLD AUTO: 10.6 FL (ref 9.2–12.9)
POTASSIUM SERPL-SCNC: 3.6 MMOL/L (ref 3.5–5.1)
PROT SERPL-MCNC: 6.3 G/DL (ref 6–8.4)
RBC # BLD AUTO: 4.93 M/UL (ref 4–5.4)
SODIUM SERPL-SCNC: 136 MMOL/L (ref 136–145)
WBC # BLD AUTO: 8.86 K/UL (ref 3.9–12.7)

## 2021-08-25 PROCEDURE — 63600175 PHARM REV CODE 636 W HCPCS: Performed by: INTERNAL MEDICINE

## 2021-08-25 PROCEDURE — 99232 SBSQ HOSP IP/OBS MODERATE 35: CPT | Mod: ,,, | Performed by: NURSE PRACTITIONER

## 2021-08-25 PROCEDURE — 36415 COLL VENOUS BLD VENIPUNCTURE: CPT | Performed by: INTERNAL MEDICINE

## 2021-08-25 PROCEDURE — 80053 COMPREHEN METABOLIC PANEL: CPT | Performed by: INTERNAL MEDICINE

## 2021-08-25 PROCEDURE — 99232 PR SUBSEQUENT HOSPITAL CARE,LEVL II: ICD-10-PCS | Mod: ,,, | Performed by: NURSE PRACTITIONER

## 2021-08-25 PROCEDURE — 63600175 PHARM REV CODE 636 W HCPCS: Performed by: STUDENT IN AN ORGANIZED HEALTH CARE EDUCATION/TRAINING PROGRAM

## 2021-08-25 PROCEDURE — 25000003 PHARM REV CODE 250: Performed by: STUDENT IN AN ORGANIZED HEALTH CARE EDUCATION/TRAINING PROGRAM

## 2021-08-25 PROCEDURE — 11000001 HC ACUTE MED/SURG PRIVATE ROOM

## 2021-08-25 PROCEDURE — 25000003 PHARM REV CODE 250: Performed by: HOSPITALIST

## 2021-08-25 PROCEDURE — 63600175 PHARM REV CODE 636 W HCPCS: Performed by: HOSPITALIST

## 2021-08-25 PROCEDURE — 85027 COMPLETE CBC AUTOMATED: CPT | Performed by: HOSPITALIST

## 2021-08-25 PROCEDURE — 36415 COLL VENOUS BLD VENIPUNCTURE: CPT | Performed by: HOSPITALIST

## 2021-08-25 RX ORDER — NICOTINE 7MG/24HR
1 PATCH, TRANSDERMAL 24 HOURS TRANSDERMAL DAILY
Status: DISCONTINUED | OUTPATIENT
Start: 2021-08-26 | End: 2021-08-26 | Stop reason: HOSPADM

## 2021-08-25 RX ORDER — LISINOPRIL 5 MG/1
5 TABLET ORAL DAILY
Status: DISCONTINUED | OUTPATIENT
Start: 2021-08-25 | End: 2021-08-26 | Stop reason: HOSPADM

## 2021-08-25 RX ORDER — MORPHINE SULFATE 4 MG/ML
2 INJECTION, SOLUTION INTRAMUSCULAR; INTRAVENOUS EVERY 4 HOURS PRN
Status: DISCONTINUED | OUTPATIENT
Start: 2021-08-25 | End: 2021-08-26 | Stop reason: HOSPADM

## 2021-08-25 RX ORDER — HYDRALAZINE HYDROCHLORIDE 20 MG/ML
10 INJECTION INTRAMUSCULAR; INTRAVENOUS EVERY 4 HOURS PRN
Status: DISCONTINUED | OUTPATIENT
Start: 2021-08-25 | End: 2021-08-26 | Stop reason: HOSPADM

## 2021-08-25 RX ORDER — OXYCODONE AND ACETAMINOPHEN 5; 325 MG/1; MG/1
1 TABLET ORAL EVERY 4 HOURS PRN
Status: DISCONTINUED | OUTPATIENT
Start: 2021-08-25 | End: 2021-08-26 | Stop reason: HOSPADM

## 2021-08-25 RX ADMIN — HYDRALAZINE HYDROCHLORIDE 10 MG: 20 INJECTION, SOLUTION INTRAMUSCULAR; INTRAVENOUS at 05:08

## 2021-08-25 RX ADMIN — METOPROLOL TARTRATE 100 MG: 50 TABLET, FILM COATED ORAL at 10:08

## 2021-08-25 RX ADMIN — MORPHINE SULFATE 4 MG: 4 INJECTION, SOLUTION INTRAMUSCULAR; INTRAVENOUS at 05:08

## 2021-08-25 RX ADMIN — ONDANSETRON 8 MG: 2 INJECTION INTRAMUSCULAR; INTRAVENOUS at 05:08

## 2021-08-25 RX ADMIN — MUPIROCIN: 20 OINTMENT TOPICAL at 10:08

## 2021-08-25 RX ADMIN — ONDANSETRON 8 MG: 2 INJECTION INTRAMUSCULAR; INTRAVENOUS at 09:08

## 2021-08-25 RX ADMIN — MORPHINE SULFATE 4 MG: 4 INJECTION, SOLUTION INTRAMUSCULAR; INTRAVENOUS at 01:08

## 2021-08-25 RX ADMIN — PIPERACILLIN AND TAZOBACTAM 4.5 G: 4; .5 INJECTION, POWDER, LYOPHILIZED, FOR SOLUTION INTRAVENOUS; PARENTERAL at 03:08

## 2021-08-25 RX ADMIN — PIPERACILLIN AND TAZOBACTAM 4.5 G: 4; .5 INJECTION, POWDER, LYOPHILIZED, FOR SOLUTION INTRAVENOUS; PARENTERAL at 09:08

## 2021-08-25 RX ADMIN — MORPHINE SULFATE 4 MG: 4 INJECTION, SOLUTION INTRAMUSCULAR; INTRAVENOUS at 03:08

## 2021-08-25 RX ADMIN — MUPIROCIN: 20 OINTMENT TOPICAL at 09:08

## 2021-08-25 RX ADMIN — ONDANSETRON 8 MG: 2 INJECTION INTRAMUSCULAR; INTRAVENOUS at 01:08

## 2021-08-25 RX ADMIN — MORPHINE SULFATE 4 MG: 4 INJECTION, SOLUTION INTRAMUSCULAR; INTRAVENOUS at 10:08

## 2021-08-25 RX ADMIN — ONDANSETRON 8 MG: 2 INJECTION INTRAMUSCULAR; INTRAVENOUS at 03:08

## 2021-08-25 RX ADMIN — ENOXAPARIN SODIUM 40 MG: 40 INJECTION SUBCUTANEOUS at 06:08

## 2021-08-25 RX ADMIN — MORPHINE SULFATE 2 MG: 4 INJECTION, SOLUTION INTRAMUSCULAR; INTRAVENOUS at 07:08

## 2021-08-25 RX ADMIN — PROCHLORPERAZINE EDISYLATE 2.5 MG: 5 INJECTION INTRAMUSCULAR; INTRAVENOUS at 10:08

## 2021-08-25 RX ADMIN — DEXTROSE, SODIUM CHLORIDE, AND POTASSIUM CHLORIDE: 5; .45; .15 INJECTION INTRAVENOUS at 05:08

## 2021-08-25 RX ADMIN — AMLODIPINE BESYLATE 10 MG: 5 TABLET ORAL at 10:08

## 2021-08-25 RX ADMIN — LISINOPRIL 5 MG: 5 TABLET ORAL at 10:08

## 2021-08-25 RX ADMIN — METOPROLOL TARTRATE 100 MG: 50 TABLET, FILM COATED ORAL at 09:08

## 2021-08-25 RX ADMIN — PIPERACILLIN AND TAZOBACTAM 4.5 G: 4; .5 INJECTION, POWDER, LYOPHILIZED, FOR SOLUTION INTRAVENOUS; PARENTERAL at 05:08

## 2021-08-26 VITALS
TEMPERATURE: 99 F | WEIGHT: 202.81 LBS | HEART RATE: 62 BPM | OXYGEN SATURATION: 100 % | HEIGHT: 67 IN | BODY MASS INDEX: 31.83 KG/M2 | SYSTOLIC BLOOD PRESSURE: 136 MMHG | RESPIRATION RATE: 17 BRPM | DIASTOLIC BLOOD PRESSURE: 88 MMHG

## 2021-08-26 LAB
ALBUMIN SERPL BCP-MCNC: 3.5 G/DL (ref 3.5–5.2)
ALP SERPL-CCNC: 75 U/L (ref 55–135)
ALT SERPL W/O P-5'-P-CCNC: 42 U/L (ref 10–44)
ANION GAP SERPL CALC-SCNC: 8 MMOL/L (ref 8–16)
AST SERPL-CCNC: 51 U/L (ref 10–40)
BASOPHILS # BLD AUTO: 0.05 K/UL (ref 0–0.2)
BASOPHILS NFR BLD: 0.6 % (ref 0–1.9)
BILIRUB SERPL-MCNC: 0.6 MG/DL (ref 0.1–1)
BUN SERPL-MCNC: 6 MG/DL (ref 6–20)
CALCIUM SERPL-MCNC: 9.3 MG/DL (ref 8.7–10.5)
CHLORIDE SERPL-SCNC: 106 MMOL/L (ref 95–110)
CO2 SERPL-SCNC: 22 MMOL/L (ref 23–29)
CREAT SERPL-MCNC: 1 MG/DL (ref 0.5–1.4)
DIFFERENTIAL METHOD: NORMAL
EOSINOPHIL # BLD AUTO: 0.2 K/UL (ref 0–0.5)
EOSINOPHIL NFR BLD: 2.3 % (ref 0–8)
ERYTHROCYTE [DISTWIDTH] IN BLOOD BY AUTOMATED COUNT: 12.9 % (ref 11.5–14.5)
EST. GFR  (AFRICAN AMERICAN): >60 ML/MIN/1.73 M^2
EST. GFR  (NON AFRICAN AMERICAN): >60 ML/MIN/1.73 M^2
GLUCOSE SERPL-MCNC: 131 MG/DL (ref 70–110)
HCT VFR BLD AUTO: 41.3 % (ref 37–48.5)
HGB BLD-MCNC: 13.9 G/DL (ref 12–16)
IMM GRANULOCYTES # BLD AUTO: 0.01 K/UL (ref 0–0.04)
IMM GRANULOCYTES NFR BLD AUTO: 0.1 % (ref 0–0.5)
LYMPHOCYTES # BLD AUTO: 2.1 K/UL (ref 1–4.8)
LYMPHOCYTES NFR BLD: 27.4 % (ref 18–48)
MCH RBC QN AUTO: 29.4 PG (ref 27–31)
MCHC RBC AUTO-ENTMCNC: 33.7 G/DL (ref 32–36)
MCV RBC AUTO: 88 FL (ref 82–98)
MONOCYTES # BLD AUTO: 0.7 K/UL (ref 0.3–1)
MONOCYTES NFR BLD: 9.5 % (ref 4–15)
NEUTROPHILS # BLD AUTO: 4.7 K/UL (ref 1.8–7.7)
NEUTROPHILS NFR BLD: 60.1 % (ref 38–73)
NRBC BLD-RTO: 0 /100 WBC
PLATELET # BLD AUTO: 238 K/UL (ref 150–450)
PMV BLD AUTO: 10.6 FL (ref 9.2–12.9)
POTASSIUM SERPL-SCNC: 3.6 MMOL/L (ref 3.5–5.1)
PROT SERPL-MCNC: 6.9 G/DL (ref 6–8.4)
RBC # BLD AUTO: 4.72 M/UL (ref 4–5.4)
SODIUM SERPL-SCNC: 136 MMOL/L (ref 136–145)
WBC # BLD AUTO: 7.78 K/UL (ref 3.9–12.7)

## 2021-08-26 PROCEDURE — 99231 SBSQ HOSP IP/OBS SF/LOW 25: CPT | Mod: ,,, | Performed by: NURSE PRACTITIONER

## 2021-08-26 PROCEDURE — 25000003 PHARM REV CODE 250: Performed by: HOSPITALIST

## 2021-08-26 PROCEDURE — 80053 COMPREHEN METABOLIC PANEL: CPT | Performed by: STUDENT IN AN ORGANIZED HEALTH CARE EDUCATION/TRAINING PROGRAM

## 2021-08-26 PROCEDURE — 25000003 PHARM REV CODE 250: Performed by: STUDENT IN AN ORGANIZED HEALTH CARE EDUCATION/TRAINING PROGRAM

## 2021-08-26 PROCEDURE — 63600175 PHARM REV CODE 636 W HCPCS: Performed by: HOSPITALIST

## 2021-08-26 PROCEDURE — 99231 PR SUBSEQUENT HOSPITAL CARE,LEVL I: ICD-10-PCS | Mod: ,,, | Performed by: NURSE PRACTITIONER

## 2021-08-26 PROCEDURE — 36415 COLL VENOUS BLD VENIPUNCTURE: CPT | Performed by: STUDENT IN AN ORGANIZED HEALTH CARE EDUCATION/TRAINING PROGRAM

## 2021-08-26 PROCEDURE — 63600175 PHARM REV CODE 636 W HCPCS: Performed by: STUDENT IN AN ORGANIZED HEALTH CARE EDUCATION/TRAINING PROGRAM

## 2021-08-26 PROCEDURE — 85025 COMPLETE CBC W/AUTO DIFF WBC: CPT | Performed by: HOSPITALIST

## 2021-08-26 PROCEDURE — S4991 NICOTINE PATCH NONLEGEND: HCPCS | Performed by: HOSPITALIST

## 2021-08-26 RX ORDER — METRONIDAZOLE 500 MG/1
500 TABLET ORAL EVERY 12 HOURS
Qty: 18 TABLET | Refills: 0 | Status: SHIPPED | OUTPATIENT
Start: 2021-08-26 | End: 2021-09-04

## 2021-08-26 RX ORDER — AMOXICILLIN AND CLAVULANATE POTASSIUM 875; 125 MG/1; MG/1
1 TABLET, FILM COATED ORAL EVERY 12 HOURS
Qty: 18 TABLET | Refills: 0 | Status: SHIPPED | OUTPATIENT
Start: 2021-08-26 | End: 2021-09-04

## 2021-08-26 RX ORDER — NICOTINE 7MG/24HR
1 PATCH, TRANSDERMAL 24 HOURS TRANSDERMAL DAILY
Qty: 30 PATCH | Refills: 1 | Status: SHIPPED | OUTPATIENT
Start: 2021-08-26

## 2021-08-26 RX ADMIN — METOPROLOL TARTRATE 100 MG: 50 TABLET, FILM COATED ORAL at 09:08

## 2021-08-26 RX ADMIN — AMLODIPINE BESYLATE 10 MG: 5 TABLET ORAL at 09:08

## 2021-08-26 RX ADMIN — PIPERACILLIN AND TAZOBACTAM 4.5 G: 4; .5 INJECTION, POWDER, LYOPHILIZED, FOR SOLUTION INTRAVENOUS; PARENTERAL at 05:08

## 2021-08-26 RX ADMIN — OXYCODONE HYDROCHLORIDE AND ACETAMINOPHEN 1 TABLET: 5; 325 TABLET ORAL at 06:08

## 2021-08-26 RX ADMIN — MORPHINE SULFATE 2 MG: 4 INJECTION, SOLUTION INTRAMUSCULAR; INTRAVENOUS at 02:08

## 2021-08-26 RX ADMIN — NICOTINE 1 PATCH: 7 PATCH TRANSDERMAL at 09:08

## 2021-08-26 RX ADMIN — LISINOPRIL 5 MG: 5 TABLET ORAL at 09:08

## 2021-08-26 RX ADMIN — OXYCODONE HYDROCHLORIDE AND ACETAMINOPHEN 1 TABLET: 5; 325 TABLET ORAL at 12:08

## 2021-08-26 RX ADMIN — ONDANSETRON 8 MG: 2 INJECTION INTRAMUSCULAR; INTRAVENOUS at 05:08

## 2021-08-27 ENCOUNTER — PATIENT OUTREACH (OUTPATIENT)
Dept: ADMINISTRATIVE | Facility: CLINIC | Age: 46
End: 2021-08-27

## 2021-08-31 LAB — CALPROTECTIN STL-MCNT: <27.1 MCG/G

## 2021-09-13 PROBLEM — O99.331 SMOKING (TOBACCO) COMPLICATING PREGNANCY, FIRST TRIMESTER: Status: RESOLVED | Noted: 2020-03-27 | Resolved: 2021-09-13

## 2021-09-13 PROBLEM — R10.84 GENERALIZED ABDOMINAL PAIN: Status: RESOLVED | Noted: 2021-07-24 | Resolved: 2021-09-13

## 2021-09-13 PROBLEM — K57.92 ACUTE DIVERTICULITIS OF INTESTINE: Status: RESOLVED | Noted: 2021-08-22 | Resolved: 2021-09-13

## 2021-09-13 PROBLEM — K57.32 DIVERTICULITIS OF SIGMOID COLON: Status: RESOLVED | Noted: 2021-07-24 | Resolved: 2021-09-13

## 2021-09-14 ENCOUNTER — OFFICE VISIT (OUTPATIENT)
Dept: FAMILY MEDICINE | Facility: CLINIC | Age: 46
End: 2021-09-14
Payer: COMMERCIAL

## 2021-09-14 VITALS
WEIGHT: 213.75 LBS | TEMPERATURE: 98 F | DIASTOLIC BLOOD PRESSURE: 80 MMHG | BODY MASS INDEX: 33.48 KG/M2 | SYSTOLIC BLOOD PRESSURE: 130 MMHG | HEART RATE: 77 BPM | OXYGEN SATURATION: 99 %

## 2021-09-14 DIAGNOSIS — I16.0 HYPERTENSIVE URGENCY: ICD-10-CM

## 2021-09-14 DIAGNOSIS — K52.9 ACUTE ON CHRONIC COLITIS: ICD-10-CM

## 2021-09-14 DIAGNOSIS — F17.200 TOBACCO USE DISORDER: ICD-10-CM

## 2021-09-14 DIAGNOSIS — B37.31 VAGINAL CANDIDIASIS: ICD-10-CM

## 2021-09-14 DIAGNOSIS — F12.10 MARIJUANA ABUSE: ICD-10-CM

## 2021-09-14 DIAGNOSIS — Z87.19 HISTORY OF DIVERTICULITIS: Primary | ICD-10-CM

## 2021-09-14 DIAGNOSIS — I10 ESSENTIAL HYPERTENSION: ICD-10-CM

## 2021-09-14 DIAGNOSIS — Z28.21 COVID-19 VIRUS VACCINATION REFUSED: ICD-10-CM

## 2021-09-14 PROCEDURE — 99214 OFFICE O/P EST MOD 30 MIN: CPT | Mod: S$GLB,,, | Performed by: INTERNAL MEDICINE

## 2021-09-14 PROCEDURE — 3008F BODY MASS INDEX DOCD: CPT | Mod: CPTII,S$GLB,, | Performed by: INTERNAL MEDICINE

## 2021-09-14 PROCEDURE — 1159F PR MEDICATION LIST DOCUMENTED IN MEDICAL RECORD: ICD-10-PCS | Mod: CPTII,S$GLB,, | Performed by: INTERNAL MEDICINE

## 2021-09-14 PROCEDURE — 3079F DIAST BP 80-89 MM HG: CPT | Mod: CPTII,S$GLB,, | Performed by: INTERNAL MEDICINE

## 2021-09-14 PROCEDURE — 99999 PR PBB SHADOW E&M-EST. PATIENT-LVL IV: ICD-10-PCS | Mod: PBBFAC,,, | Performed by: INTERNAL MEDICINE

## 2021-09-14 PROCEDURE — 99214 PR OFFICE/OUTPT VISIT, EST, LEVL IV, 30-39 MIN: ICD-10-PCS | Mod: S$GLB,,, | Performed by: INTERNAL MEDICINE

## 2021-09-14 PROCEDURE — 1160F PR REVIEW ALL MEDS BY PRESCRIBER/CLIN PHARMACIST DOCUMENTED: ICD-10-PCS | Mod: CPTII,S$GLB,, | Performed by: INTERNAL MEDICINE

## 2021-09-14 PROCEDURE — 4010F PR ACE/ARB THEARPY RXD/TAKEN: ICD-10-PCS | Mod: CPTII,S$GLB,, | Performed by: INTERNAL MEDICINE

## 2021-09-14 PROCEDURE — 3008F PR BODY MASS INDEX (BMI) DOCUMENTED: ICD-10-PCS | Mod: CPTII,S$GLB,, | Performed by: INTERNAL MEDICINE

## 2021-09-14 PROCEDURE — 3079F PR MOST RECENT DIASTOLIC BLOOD PRESSURE 80-89 MM HG: ICD-10-PCS | Mod: CPTII,S$GLB,, | Performed by: INTERNAL MEDICINE

## 2021-09-14 PROCEDURE — 99999 PR PBB SHADOW E&M-EST. PATIENT-LVL IV: CPT | Mod: PBBFAC,,, | Performed by: INTERNAL MEDICINE

## 2021-09-14 PROCEDURE — 3075F PR MOST RECENT SYSTOLIC BLOOD PRESS GE 130-139MM HG: ICD-10-PCS | Mod: CPTII,S$GLB,, | Performed by: INTERNAL MEDICINE

## 2021-09-14 PROCEDURE — 1160F RVW MEDS BY RX/DR IN RCRD: CPT | Mod: CPTII,S$GLB,, | Performed by: INTERNAL MEDICINE

## 2021-09-14 PROCEDURE — 3075F SYST BP GE 130 - 139MM HG: CPT | Mod: CPTII,S$GLB,, | Performed by: INTERNAL MEDICINE

## 2021-09-14 PROCEDURE — 1159F MED LIST DOCD IN RCRD: CPT | Mod: CPTII,S$GLB,, | Performed by: INTERNAL MEDICINE

## 2021-09-14 PROCEDURE — 4010F ACE/ARB THERAPY RXD/TAKEN: CPT | Mod: CPTII,S$GLB,, | Performed by: INTERNAL MEDICINE

## 2021-09-14 RX ORDER — ONDANSETRON 4 MG/1
8 TABLET, ORALLY DISINTEGRATING ORAL EVERY 6 HOURS PRN
Qty: 30 TABLET | Refills: 1 | Status: SHIPPED | OUTPATIENT
Start: 2021-09-14 | End: 2021-09-30 | Stop reason: SDUPTHER

## 2021-09-14 RX ORDER — AMOXICILLIN AND CLAVULANATE POTASSIUM 875; 125 MG/1; MG/1
1 TABLET, FILM COATED ORAL 2 TIMES DAILY
Qty: 30 TABLET | Refills: 0 | Status: SHIPPED | OUTPATIENT
Start: 2021-09-14 | End: 2021-09-29

## 2021-09-14 RX ORDER — FLUCONAZOLE 150 MG/1
150 TABLET ORAL ONCE
Qty: 1 TABLET | Refills: 0 | Status: SHIPPED | OUTPATIENT
Start: 2021-09-14 | End: 2021-09-14

## 2021-09-14 RX ORDER — HYDROCODONE BITARTRATE AND ACETAMINOPHEN 10; 325 MG/1; MG/1
1 TABLET ORAL EVERY 6 HOURS PRN
Qty: 10 TABLET | Refills: 0 | Status: SHIPPED | OUTPATIENT
Start: 2021-09-14 | End: 2021-10-03 | Stop reason: SDUPTHER

## 2021-09-14 RX ORDER — METRONIDAZOLE 500 MG/1
500 TABLET ORAL EVERY 12 HOURS
Qty: 30 TABLET | Refills: 0 | Status: SHIPPED | OUTPATIENT
Start: 2021-09-14 | End: 2021-09-29

## 2021-09-29 ENCOUNTER — DOCUMENT SCAN (OUTPATIENT)
Dept: HOME HEALTH SERVICES | Facility: HOSPITAL | Age: 46
End: 2021-09-29
Payer: COMMERCIAL

## 2021-09-30 ENCOUNTER — OFFICE VISIT (OUTPATIENT)
Dept: GASTROENTEROLOGY | Facility: CLINIC | Age: 46
End: 2021-09-30
Payer: COMMERCIAL

## 2021-09-30 VITALS
HEART RATE: 85 BPM | SYSTOLIC BLOOD PRESSURE: 160 MMHG | DIASTOLIC BLOOD PRESSURE: 98 MMHG | BODY MASS INDEX: 33.3 KG/M2 | WEIGHT: 212.19 LBS | HEIGHT: 67 IN

## 2021-09-30 DIAGNOSIS — Z87.19 HISTORY OF COLONIC DIVERTICULITIS: Primary | ICD-10-CM

## 2021-09-30 DIAGNOSIS — Z87.19 HISTORY OF DIVERTICULITIS: ICD-10-CM

## 2021-09-30 PROCEDURE — 3077F PR MOST RECENT SYSTOLIC BLOOD PRESSURE >= 140 MM HG: ICD-10-PCS | Mod: CPTII,S$GLB,, | Performed by: INTERNAL MEDICINE

## 2021-09-30 PROCEDURE — 3080F PR MOST RECENT DIASTOLIC BLOOD PRESSURE >= 90 MM HG: ICD-10-PCS | Mod: CPTII,S$GLB,, | Performed by: INTERNAL MEDICINE

## 2021-09-30 PROCEDURE — 4010F PR ACE/ARB THEARPY RXD/TAKEN: ICD-10-PCS | Mod: CPTII,S$GLB,, | Performed by: INTERNAL MEDICINE

## 2021-09-30 PROCEDURE — 3008F PR BODY MASS INDEX (BMI) DOCUMENTED: ICD-10-PCS | Mod: CPTII,S$GLB,, | Performed by: INTERNAL MEDICINE

## 2021-09-30 PROCEDURE — 99999 PR PBB SHADOW E&M-EST. PATIENT-LVL III: ICD-10-PCS | Mod: PBBFAC,,, | Performed by: INTERNAL MEDICINE

## 2021-09-30 PROCEDURE — 3077F SYST BP >= 140 MM HG: CPT | Mod: CPTII,S$GLB,, | Performed by: INTERNAL MEDICINE

## 2021-09-30 PROCEDURE — 1159F MED LIST DOCD IN RCRD: CPT | Mod: CPTII,S$GLB,, | Performed by: INTERNAL MEDICINE

## 2021-09-30 PROCEDURE — 3080F DIAST BP >= 90 MM HG: CPT | Mod: CPTII,S$GLB,, | Performed by: INTERNAL MEDICINE

## 2021-09-30 PROCEDURE — 3008F BODY MASS INDEX DOCD: CPT | Mod: CPTII,S$GLB,, | Performed by: INTERNAL MEDICINE

## 2021-09-30 PROCEDURE — 1159F PR MEDICATION LIST DOCUMENTED IN MEDICAL RECORD: ICD-10-PCS | Mod: CPTII,S$GLB,, | Performed by: INTERNAL MEDICINE

## 2021-09-30 PROCEDURE — 99999 PR PBB SHADOW E&M-EST. PATIENT-LVL III: CPT | Mod: PBBFAC,,, | Performed by: INTERNAL MEDICINE

## 2021-09-30 PROCEDURE — 99213 PR OFFICE/OUTPT VISIT, EST, LEVL III, 20-29 MIN: ICD-10-PCS | Mod: S$GLB,,, | Performed by: INTERNAL MEDICINE

## 2021-09-30 PROCEDURE — 4010F ACE/ARB THERAPY RXD/TAKEN: CPT | Mod: CPTII,S$GLB,, | Performed by: INTERNAL MEDICINE

## 2021-09-30 PROCEDURE — 99213 OFFICE O/P EST LOW 20 MIN: CPT | Mod: S$GLB,,, | Performed by: INTERNAL MEDICINE

## 2021-09-30 RX ORDER — ONDANSETRON 4 MG/1
4 TABLET, ORALLY DISINTEGRATING ORAL EVERY 6 HOURS PRN
Qty: 60 TABLET | Refills: 0 | Status: SHIPPED | OUTPATIENT
Start: 2021-09-30

## 2021-10-03 ENCOUNTER — HOSPITAL ENCOUNTER (EMERGENCY)
Facility: HOSPITAL | Age: 46
Discharge: HOME | End: 2021-10-03
Attending: EMERGENCY MEDICINE
Payer: COMMERCIAL

## 2021-10-03 VITALS
HEART RATE: 98 BPM | BODY MASS INDEX: 33.43 KG/M2 | OXYGEN SATURATION: 96 % | SYSTOLIC BLOOD PRESSURE: 128 MMHG | RESPIRATION RATE: 17 BRPM | WEIGHT: 213 LBS | TEMPERATURE: 99 F | DIASTOLIC BLOOD PRESSURE: 80 MMHG | HEIGHT: 67 IN

## 2021-10-03 DIAGNOSIS — R11.10 EMESIS: ICD-10-CM

## 2021-10-03 DIAGNOSIS — K59.00 CONSTIPATION, UNSPECIFIED CONSTIPATION TYPE: ICD-10-CM

## 2021-10-03 DIAGNOSIS — Z87.19 HISTORY OF DIVERTICULITIS: ICD-10-CM

## 2021-10-03 DIAGNOSIS — K52.9 ACUTE ON CHRONIC COLITIS: ICD-10-CM

## 2021-10-03 DIAGNOSIS — E87.6 HYPOKALEMIA: Primary | ICD-10-CM

## 2021-10-03 LAB
ALBUMIN SERPL BCP-MCNC: 4 G/DL (ref 3.5–5.2)
ALP SERPL-CCNC: 84 U/L (ref 55–135)
ALT SERPL W/O P-5'-P-CCNC: 10 U/L (ref 10–44)
ANION GAP SERPL CALC-SCNC: 14 MMOL/L (ref 8–16)
ANION GAP SERPL CALC-SCNC: 18 MMOL/L (ref 8–16)
AST SERPL-CCNC: 12 U/L (ref 10–40)
B-HCG UR QL: NEGATIVE
BACTERIA #/AREA URNS HPF: ABNORMAL /HPF
BASOPHILS # BLD AUTO: 0.04 K/UL (ref 0–0.2)
BASOPHILS NFR BLD: 0.2 % (ref 0–1.9)
BILIRUB SERPL-MCNC: 1 MG/DL (ref 0.1–1)
BILIRUB UR QL STRIP: NEGATIVE
BNP SERPL-MCNC: 57 PG/ML (ref 0–99)
BUN SERPL-MCNC: 8 MG/DL (ref 6–30)
BUN SERPL-MCNC: 9 MG/DL (ref 6–20)
CALCIUM SERPL-MCNC: 9.7 MG/DL (ref 8.7–10.5)
CHLORIDE SERPL-SCNC: 100 MMOL/L (ref 95–110)
CHLORIDE SERPL-SCNC: 96 MMOL/L (ref 95–110)
CLARITY UR: CLEAR
CO2 SERPL-SCNC: 23 MMOL/L (ref 23–29)
COLOR UR: YELLOW
CREAT SERPL-MCNC: 0.9 MG/DL (ref 0.5–1.4)
CREAT SERPL-MCNC: 1 MG/DL (ref 0.5–1.4)
CTP QC/QA: YES
DIFFERENTIAL METHOD: ABNORMAL
EOSINOPHIL # BLD AUTO: 0 K/UL (ref 0–0.5)
EOSINOPHIL NFR BLD: 0 % (ref 0–8)
ERYTHROCYTE [DISTWIDTH] IN BLOOD BY AUTOMATED COUNT: 13.4 % (ref 11.5–14.5)
EST. GFR  (AFRICAN AMERICAN): >60 ML/MIN/1.73 M^2
EST. GFR  (NON AFRICAN AMERICAN): >60 ML/MIN/1.73 M^2
GLUCOSE SERPL-MCNC: 141 MG/DL (ref 70–110)
GLUCOSE SERPL-MCNC: 143 MG/DL (ref 70–110)
GLUCOSE UR QL STRIP: NEGATIVE
HCT VFR BLD AUTO: 49.1 % (ref 37–48.5)
HCT VFR BLD CALC: 50 %PCV (ref 36–54)
HGB BLD-MCNC: 16.7 G/DL (ref 12–16)
HGB UR QL STRIP: ABNORMAL
HYALINE CASTS #/AREA URNS LPF: 1 /LPF
IMM GRANULOCYTES # BLD AUTO: 0.12 K/UL (ref 0–0.04)
IMM GRANULOCYTES NFR BLD AUTO: 0.5 % (ref 0–0.5)
KETONES UR QL STRIP: ABNORMAL
LACTATE SERPL-SCNC: 1.7 MMOL/L (ref 0.5–2.2)
LACTATE SERPL-SCNC: 1.8 MMOL/L (ref 0.5–2.2)
LEUKOCYTE ESTERASE UR QL STRIP: NEGATIVE
LIPASE SERPL-CCNC: 17 U/L (ref 4–60)
LYMPHOCYTES # BLD AUTO: 1.4 K/UL (ref 1–4.8)
LYMPHOCYTES NFR BLD: 6.1 % (ref 18–48)
MAGNESIUM SERPL-MCNC: 1.6 MG/DL (ref 1.6–2.6)
MCH RBC QN AUTO: 29.8 PG (ref 27–31)
MCHC RBC AUTO-ENTMCNC: 34 G/DL (ref 32–36)
MCV RBC AUTO: 88 FL (ref 82–98)
MICROSCOPIC COMMENT: ABNORMAL
MONOCYTES # BLD AUTO: 1.5 K/UL (ref 0.3–1)
MONOCYTES NFR BLD: 6.5 % (ref 4–15)
NEUTROPHILS # BLD AUTO: 20.1 K/UL (ref 1.8–7.7)
NEUTROPHILS NFR BLD: 86.7 % (ref 38–73)
NITRITE UR QL STRIP: NEGATIVE
NRBC BLD-RTO: 0 /100 WBC
PH UR STRIP: 8 [PH] (ref 5–8)
PLATELET # BLD AUTO: 318 K/UL (ref 150–450)
PMV BLD AUTO: 11.5 FL (ref 9.2–12.9)
POC IONIZED CALCIUM: 1.15 MMOL/L (ref 1.06–1.42)
POC TCO2 (MEASURED): 27 MMOL/L (ref 23–29)
POTASSIUM BLD-SCNC: 2.6 MMOL/L (ref 3.5–5.1)
POTASSIUM SERPL-SCNC: 2.8 MMOL/L (ref 3.5–5.1)
PROCALCITONIN SERPL IA-MCNC: 0.02 NG/ML
PROT SERPL-MCNC: 8 G/DL (ref 6–8.4)
PROT UR QL STRIP: ABNORMAL
RBC # BLD AUTO: 5.6 M/UL (ref 4–5.4)
RBC #/AREA URNS HPF: 3 /HPF (ref 0–4)
SAMPLE: ABNORMAL
SODIUM BLD-SCNC: 138 MMOL/L (ref 136–145)
SODIUM SERPL-SCNC: 137 MMOL/L (ref 136–145)
SP GR UR STRIP: 1.01 (ref 1–1.03)
SQUAMOUS #/AREA URNS HPF: 10 /HPF
TROPONIN I SERPL DL<=0.01 NG/ML-MCNC: 0.01 NG/ML (ref 0–0.03)
URN SPEC COLLECT METH UR: ABNORMAL
UROBILINOGEN UR STRIP-ACNC: NEGATIVE EU/DL
WBC # BLD AUTO: 23.24 K/UL (ref 3.9–12.7)
WBC #/AREA URNS HPF: 2 /HPF (ref 0–5)

## 2021-10-03 PROCEDURE — 82330 ASSAY OF CALCIUM: CPT

## 2021-10-03 PROCEDURE — 25500020 PHARM REV CODE 255: Performed by: EMERGENCY MEDICINE

## 2021-10-03 PROCEDURE — 81025 URINE PREGNANCY TEST: CPT | Performed by: EMERGENCY MEDICINE

## 2021-10-03 PROCEDURE — 96361 HYDRATE IV INFUSION ADD-ON: CPT

## 2021-10-03 PROCEDURE — 85014 HEMATOCRIT: CPT

## 2021-10-03 PROCEDURE — 84132 ASSAY OF SERUM POTASSIUM: CPT | Mod: 91

## 2021-10-03 PROCEDURE — 25000003 PHARM REV CODE 250: Performed by: EMERGENCY MEDICINE

## 2021-10-03 PROCEDURE — 93005 ELECTROCARDIOGRAM TRACING: CPT

## 2021-10-03 PROCEDURE — 82565 ASSAY OF CREATININE: CPT

## 2021-10-03 PROCEDURE — 63600175 PHARM REV CODE 636 W HCPCS: Performed by: EMERGENCY MEDICINE

## 2021-10-03 PROCEDURE — 87040 BLOOD CULTURE FOR BACTERIA: CPT | Performed by: EMERGENCY MEDICINE

## 2021-10-03 PROCEDURE — 99900035 HC TECH TIME PER 15 MIN (STAT)

## 2021-10-03 PROCEDURE — 84145 PROCALCITONIN (PCT): CPT | Performed by: EMERGENCY MEDICINE

## 2021-10-03 PROCEDURE — 96375 TX/PRO/DX INJ NEW DRUG ADDON: CPT

## 2021-10-03 PROCEDURE — 93010 EKG 12-LEAD: ICD-10-PCS | Mod: ,,, | Performed by: INTERNAL MEDICINE

## 2021-10-03 PROCEDURE — 85025 COMPLETE CBC W/AUTO DIFF WBC: CPT | Performed by: EMERGENCY MEDICINE

## 2021-10-03 PROCEDURE — 93010 ELECTROCARDIOGRAM REPORT: CPT | Mod: ,,, | Performed by: INTERNAL MEDICINE

## 2021-10-03 PROCEDURE — 83880 ASSAY OF NATRIURETIC PEPTIDE: CPT | Performed by: EMERGENCY MEDICINE

## 2021-10-03 PROCEDURE — 84295 ASSAY OF SERUM SODIUM: CPT | Mod: 91

## 2021-10-03 PROCEDURE — 83690 ASSAY OF LIPASE: CPT | Performed by: EMERGENCY MEDICINE

## 2021-10-03 PROCEDURE — 83605 ASSAY OF LACTIC ACID: CPT | Mod: 91 | Performed by: EMERGENCY MEDICINE

## 2021-10-03 PROCEDURE — 96365 THER/PROPH/DIAG IV INF INIT: CPT | Mod: 59

## 2021-10-03 PROCEDURE — 80053 COMPREHEN METABOLIC PANEL: CPT | Performed by: EMERGENCY MEDICINE

## 2021-10-03 PROCEDURE — 99285 EMERGENCY DEPT VISIT HI MDM: CPT | Mod: 25

## 2021-10-03 PROCEDURE — 81000 URINALYSIS NONAUTO W/SCOPE: CPT | Performed by: EMERGENCY MEDICINE

## 2021-10-03 PROCEDURE — 83735 ASSAY OF MAGNESIUM: CPT | Performed by: EMERGENCY MEDICINE

## 2021-10-03 PROCEDURE — 84484 ASSAY OF TROPONIN QUANT: CPT | Performed by: EMERGENCY MEDICINE

## 2021-10-03 RX ORDER — PROMETHAZINE HYDROCHLORIDE 25 MG/1
25 SUPPOSITORY RECTAL EVERY 6 HOURS PRN
Qty: 12 SUPPOSITORY | Refills: 0 | OUTPATIENT
Start: 2021-10-03 | End: 2023-04-28

## 2021-10-03 RX ORDER — MORPHINE SULFATE 4 MG/ML
4 INJECTION, SOLUTION INTRAMUSCULAR; INTRAVENOUS
Status: COMPLETED | OUTPATIENT
Start: 2021-10-03 | End: 2021-10-03

## 2021-10-03 RX ORDER — ENEMA 19; 7 G/133ML; G/133ML
1 ENEMA RECTAL ONCE
Qty: 1 ENEMA | Refills: 0 | Status: SHIPPED | OUTPATIENT
Start: 2021-10-03 | End: 2021-10-03

## 2021-10-03 RX ORDER — DOCUSATE SODIUM 100 MG/1
100 CAPSULE, LIQUID FILLED ORAL 2 TIMES DAILY
Qty: 60 CAPSULE | Refills: 0 | Status: SHIPPED | OUTPATIENT
Start: 2021-10-03

## 2021-10-03 RX ORDER — LABETALOL HYDROCHLORIDE 5 MG/ML
10 INJECTION, SOLUTION INTRAVENOUS
Status: COMPLETED | OUTPATIENT
Start: 2021-10-03 | End: 2021-10-03

## 2021-10-03 RX ORDER — HYDROCODONE BITARTRATE AND ACETAMINOPHEN 10; 325 MG/1; MG/1
1 TABLET ORAL EVERY 6 HOURS PRN
Qty: 10 TABLET | Refills: 0 | Status: SHIPPED | OUTPATIENT
Start: 2021-10-03

## 2021-10-03 RX ORDER — ONDANSETRON 2 MG/ML
4 INJECTION INTRAMUSCULAR; INTRAVENOUS
Status: COMPLETED | OUTPATIENT
Start: 2021-10-03 | End: 2021-10-03

## 2021-10-03 RX ORDER — HALOPERIDOL 5 MG/ML
5 INJECTION INTRAMUSCULAR
Status: COMPLETED | OUTPATIENT
Start: 2021-10-03 | End: 2021-10-03

## 2021-10-03 RX ORDER — HYDRALAZINE HYDROCHLORIDE 20 MG/ML
10 INJECTION INTRAMUSCULAR; INTRAVENOUS
Status: COMPLETED | OUTPATIENT
Start: 2021-10-03 | End: 2021-10-03

## 2021-10-03 RX ADMIN — LABETALOL HYDROCHLORIDE 10 MG: 5 INJECTION, SOLUTION INTRAVENOUS at 12:10

## 2021-10-03 RX ADMIN — HYDRALAZINE HYDROCHLORIDE 10 MG: 20 INJECTION, SOLUTION INTRAMUSCULAR; INTRAVENOUS at 03:10

## 2021-10-03 RX ADMIN — ONDANSETRON 4 MG: 2 INJECTION INTRAMUSCULAR; INTRAVENOUS at 01:10

## 2021-10-03 RX ADMIN — MORPHINE SULFATE 4 MG: 4 INJECTION INTRAVENOUS at 01:10

## 2021-10-03 RX ADMIN — POTASSIUM BICARBONATE 40 MEQ: 391 TABLET, EFFERVESCENT ORAL at 08:10

## 2021-10-03 RX ADMIN — IOHEXOL 100 ML: 350 INJECTION, SOLUTION INTRAVENOUS at 07:10

## 2021-10-03 RX ADMIN — PIPERACILLIN AND TAZOBACTAM 4.5 G: 4; .5 INJECTION, POWDER, LYOPHILIZED, FOR SOLUTION INTRAVENOUS; PARENTERAL at 03:10

## 2021-10-03 RX ADMIN — SODIUM CHLORIDE 1848 ML: 0.9 INJECTION, SOLUTION INTRAVENOUS at 12:10

## 2021-10-03 RX ADMIN — HALOPERIDOL LACTATE 5 MG: 5 INJECTION, SOLUTION INTRAMUSCULAR at 05:10

## 2021-10-04 ENCOUNTER — PATIENT MESSAGE (OUTPATIENT)
Dept: ADMINISTRATIVE | Facility: HOSPITAL | Age: 46
End: 2021-10-04

## 2021-10-04 LAB — POCT GLUCOSE: 156 MG/DL (ref 70–110)

## 2021-10-07 LAB
BACTERIA BLD CULT: NORMAL
BACTERIA BLD CULT: NORMAL

## 2021-10-20 ENCOUNTER — TELEPHONE (OUTPATIENT)
Dept: FAMILY MEDICINE | Facility: CLINIC | Age: 46
End: 2021-10-20

## 2021-11-23 ENCOUNTER — DOCUMENT SCAN (OUTPATIENT)
Dept: HOME HEALTH SERVICES | Facility: HOSPITAL | Age: 46
End: 2021-11-23
Payer: COMMERCIAL

## 2021-12-08 ENCOUNTER — PATIENT MESSAGE (OUTPATIENT)
Dept: ADMINISTRATIVE | Facility: HOSPITAL | Age: 46
End: 2021-12-08
Payer: COMMERCIAL

## 2022-01-31 ENCOUNTER — HOSPITAL ENCOUNTER (EMERGENCY)
Facility: HOSPITAL | Age: 47
Discharge: HOME | End: 2022-01-31
Attending: EMERGENCY MEDICINE
Payer: COMMERCIAL

## 2022-01-31 VITALS
OXYGEN SATURATION: 99 % | TEMPERATURE: 99 F | RESPIRATION RATE: 20 BRPM | HEART RATE: 94 BPM | SYSTOLIC BLOOD PRESSURE: 140 MMHG | HEIGHT: 67 IN | BODY MASS INDEX: 34.53 KG/M2 | WEIGHT: 220 LBS | DIASTOLIC BLOOD PRESSURE: 95 MMHG

## 2022-01-31 DIAGNOSIS — K57.90 DIVERTICULOSIS: ICD-10-CM

## 2022-01-31 DIAGNOSIS — E87.6 HYPOKALEMIA: ICD-10-CM

## 2022-01-31 DIAGNOSIS — N83.202 LEFT OVARIAN CYST: ICD-10-CM

## 2022-01-31 DIAGNOSIS — E86.0 DEHYDRATION: Primary | ICD-10-CM

## 2022-01-31 DIAGNOSIS — R11.2 NAUSEA AND VOMITING: ICD-10-CM

## 2022-01-31 DIAGNOSIS — D72.829 LEUKOCYTOSIS: ICD-10-CM

## 2022-01-31 DIAGNOSIS — K76.0 FATTY LIVER: ICD-10-CM

## 2022-01-31 LAB
ALBUMIN SERPL BCP-MCNC: 4.3 G/DL (ref 3.5–5.2)
ALP SERPL-CCNC: 96 U/L (ref 55–135)
ALT SERPL W/O P-5'-P-CCNC: 22 U/L (ref 10–44)
ANION GAP SERPL CALC-SCNC: 11 MMOL/L (ref 8–16)
AST SERPL-CCNC: 13 U/L (ref 10–40)
BACTERIA #/AREA URNS HPF: ABNORMAL /HPF
BASOPHILS # BLD AUTO: 0.05 K/UL (ref 0–0.2)
BASOPHILS NFR BLD: 0.3 % (ref 0–1.9)
BILIRUB SERPL-MCNC: 0.7 MG/DL (ref 0.1–1)
BILIRUB UR QL STRIP: NEGATIVE
BUN SERPL-MCNC: 25 MG/DL (ref 6–20)
CALCIUM SERPL-MCNC: 9.4 MG/DL (ref 8.7–10.5)
CHLORIDE SERPL-SCNC: 93 MMOL/L (ref 95–110)
CLARITY UR: ABNORMAL
CO2 SERPL-SCNC: 26 MMOL/L (ref 23–29)
COLOR UR: YELLOW
CREAT SERPL-MCNC: 1.5 MG/DL (ref 0.5–1.4)
DIFFERENTIAL METHOD: ABNORMAL
EOSINOPHIL # BLD AUTO: 0 K/UL (ref 0–0.5)
EOSINOPHIL NFR BLD: 0.1 % (ref 0–8)
ERYTHROCYTE [DISTWIDTH] IN BLOOD BY AUTOMATED COUNT: 13.6 % (ref 11.5–14.5)
EST. GFR  (AFRICAN AMERICAN): 48 ML/MIN/1.73 M^2
EST. GFR  (NON AFRICAN AMERICAN): 42 ML/MIN/1.73 M^2
GLUCOSE SERPL-MCNC: 136 MG/DL (ref 70–110)
GLUCOSE UR QL STRIP: NEGATIVE
GRAN CASTS #/AREA URNS LPF: 2 /LPF
HCT VFR BLD AUTO: 48.5 % (ref 37–48.5)
HGB BLD-MCNC: 16.8 G/DL (ref 12–16)
HGB UR QL STRIP: NEGATIVE
HYALINE CASTS #/AREA URNS LPF: 15 /LPF
IMM GRANULOCYTES # BLD AUTO: 0.1 K/UL (ref 0–0.04)
IMM GRANULOCYTES NFR BLD AUTO: 0.5 % (ref 0–0.5)
KETONES UR QL STRIP: ABNORMAL
LEUKOCYTE ESTERASE UR QL STRIP: ABNORMAL
LIPASE SERPL-CCNC: 47 U/L (ref 4–60)
LYMPHOCYTES # BLD AUTO: 2.9 K/UL (ref 1–4.8)
LYMPHOCYTES NFR BLD: 15.7 % (ref 18–48)
MCH RBC QN AUTO: 29.7 PG (ref 27–31)
MCHC RBC AUTO-ENTMCNC: 34.6 G/DL (ref 32–36)
MCV RBC AUTO: 86 FL (ref 82–98)
MICROSCOPIC COMMENT: ABNORMAL
MONOCYTES # BLD AUTO: 1.7 K/UL (ref 0.3–1)
MONOCYTES NFR BLD: 8.9 % (ref 4–15)
NEUTROPHILS # BLD AUTO: 13.7 K/UL (ref 1.8–7.7)
NEUTROPHILS NFR BLD: 74.5 % (ref 38–73)
NITRITE UR QL STRIP: NEGATIVE
NRBC BLD-RTO: 0 /100 WBC
PH UR STRIP: 6 [PH] (ref 5–8)
PLATELET # BLD AUTO: 335 K/UL (ref 150–450)
PMV BLD AUTO: 10.2 FL (ref 9.2–12.9)
POTASSIUM SERPL-SCNC: 3.3 MMOL/L (ref 3.5–5.1)
PROT SERPL-MCNC: 8.4 G/DL (ref 6–8.4)
PROT UR QL STRIP: ABNORMAL
RBC # BLD AUTO: 5.66 M/UL (ref 4–5.4)
RBC #/AREA URNS HPF: 4 /HPF (ref 0–4)
SODIUM SERPL-SCNC: 130 MMOL/L (ref 136–145)
SP GR UR STRIP: >1.03 (ref 1–1.03)
SQUAMOUS #/AREA URNS HPF: 15 /HPF
URN SPEC COLLECT METH UR: ABNORMAL
UROBILINOGEN UR STRIP-ACNC: ABNORMAL EU/DL
WBC # BLD AUTO: 18.46 K/UL (ref 3.9–12.7)
WBC #/AREA URNS HPF: 7 /HPF (ref 0–5)
WBC CLUMPS URNS QL MICRO: ABNORMAL

## 2022-01-31 PROCEDURE — 93010 EKG 12-LEAD: ICD-10-PCS | Mod: ,,, | Performed by: INTERNAL MEDICINE

## 2022-01-31 PROCEDURE — 93005 ELECTROCARDIOGRAM TRACING: CPT

## 2022-01-31 PROCEDURE — 96361 HYDRATE IV INFUSION ADD-ON: CPT

## 2022-01-31 PROCEDURE — 85025 COMPLETE CBC W/AUTO DIFF WBC: CPT | Performed by: PHYSICIAN ASSISTANT

## 2022-01-31 PROCEDURE — 25000003 PHARM REV CODE 250: Performed by: PHYSICIAN ASSISTANT

## 2022-01-31 PROCEDURE — 25000003 PHARM REV CODE 250: Performed by: NURSE PRACTITIONER

## 2022-01-31 PROCEDURE — 93010 ELECTROCARDIOGRAM REPORT: CPT | Mod: ,,, | Performed by: INTERNAL MEDICINE

## 2022-01-31 PROCEDURE — 63600175 PHARM REV CODE 636 W HCPCS: Performed by: PHYSICIAN ASSISTANT

## 2022-01-31 PROCEDURE — 80053 COMPREHEN METABOLIC PANEL: CPT | Performed by: NURSE PRACTITIONER

## 2022-01-31 PROCEDURE — 81000 URINALYSIS NONAUTO W/SCOPE: CPT | Performed by: PHYSICIAN ASSISTANT

## 2022-01-31 PROCEDURE — 96374 THER/PROPH/DIAG INJ IV PUSH: CPT

## 2022-01-31 PROCEDURE — 99285 EMERGENCY DEPT VISIT HI MDM: CPT | Mod: 25

## 2022-01-31 PROCEDURE — 83690 ASSAY OF LIPASE: CPT | Performed by: NURSE PRACTITIONER

## 2022-01-31 PROCEDURE — 25500020 PHARM REV CODE 255: Performed by: EMERGENCY MEDICINE

## 2022-01-31 RX ORDER — POTASSIUM CHLORIDE 20 MEQ/1
40 TABLET, EXTENDED RELEASE ORAL
Status: COMPLETED | OUTPATIENT
Start: 2022-01-31 | End: 2022-01-31

## 2022-01-31 RX ORDER — ONDANSETRON 2 MG/ML
4 INJECTION INTRAMUSCULAR; INTRAVENOUS
Status: COMPLETED | OUTPATIENT
Start: 2022-01-31 | End: 2022-01-31

## 2022-01-31 RX ORDER — DICYCLOMINE HYDROCHLORIDE 20 MG/1
20 TABLET ORAL 2 TIMES DAILY PRN
Qty: 10 TABLET | Refills: 0 | Status: SHIPPED | OUTPATIENT
Start: 2022-01-31 | End: 2022-03-02

## 2022-01-31 RX ORDER — ONDANSETRON 4 MG/1
4 TABLET, FILM COATED ORAL EVERY 8 HOURS PRN
Qty: 12 TABLET | Refills: 0 | Status: SHIPPED | OUTPATIENT
Start: 2022-01-31

## 2022-01-31 RX ADMIN — ONDANSETRON 4 MG: 2 INJECTION INTRAMUSCULAR; INTRAVENOUS at 11:01

## 2022-01-31 RX ADMIN — SODIUM CHLORIDE 1000 ML: 0.9 INJECTION, SOLUTION INTRAVENOUS at 03:01

## 2022-01-31 RX ADMIN — POTASSIUM CHLORIDE 40 MEQ: 1500 TABLET, EXTENDED RELEASE ORAL at 01:01

## 2022-01-31 RX ADMIN — IOHEXOL 75 ML: 350 INJECTION, SOLUTION INTRAVENOUS at 02:01

## 2022-01-31 RX ADMIN — SODIUM CHLORIDE 1000 ML: 0.9 INJECTION, SOLUTION INTRAVENOUS at 11:01

## 2022-01-31 NOTE — DISCHARGE INSTRUCTIONS
Please return to the Emergency Department for any new or worsening symptoms including: worsening abdominal pain, dark\black\bloody bowel movements, vomiting blood, hard abdomen, fever, chest pain, shortness of breath, loss of consciousness or any other concerns.     Please follow up with your Primary Care Provider within in the week. If you do not have a Primary Care Provider, you may contact the one listed on your discharge paperwork or you may also call the [facility] Appointment Desk at [phone] to schedule an appointment with a Primary Care Provider.     Zofran for nausea or vomiting, Bentyl for abdominal pain or cramping.

## 2022-01-31 NOTE — Clinical Note
"Cipriano"Abdullahi Gamez was seen and treated in our emergency department on 1/31/2022.  She may return to work on 02/01/2022.       If you have any questions or concerns, please don't hesitate to call.      ANU Aguilar"

## 2022-01-31 NOTE — FIRST PROVIDER EVALUATION
Emergency Department TeleTriage Encounter Note      CHIEF COMPLAINT    Chief Complaint   Patient presents with    Vomiting     Pt chief complaint is n/v, pt states has has been having n/v 3-4 days .        VITAL SIGNS   Initial Vitals   BP Pulse Resp Temp SpO2   01/31/22 1026 01/31/22 1024 01/31/22 1024 01/31/22 1024 01/31/22 1024   (!) 141/100 (!) 135 18 99 °F (37.2 °C) 100 %      MAP       --                   ALLERGIES    Review of patient's allergies indicates:   Allergen Reactions    Ciprofloxacin Swelling and Blisters    Ibuprofen Hives    Meloxicam      rash    Cyclobenzaprine Rash     rasg       PROVIDER TRIAGE NOTE  This is a teletriage evaluation of a 46 y.o. female presenting to the ED complaining of N/V, weakness x 3 days.     Initial orders will be placed and care will be transferred to an alternate provider when patient is roomed for a full evaluation. Any additional orders and the final disposition will be determined by that provider.           ORDERS  Labs Reviewed - No data to display    ED Orders (720h ago, onward)    Start Ordered     Status Ordering Provider    01/31/22 1045 01/31/22 1041  sodium chloride 0.9% bolus 1,000 mL  Once         Ordered MELINA STONE P.    01/31/22 1045 01/31/22 1041  ondansetron injection 4 mg  ED 1 Time         Ordered MELINA STONE P.    01/31/22 1042 01/31/22 1041  Vital signs  Every 2 hours         Ordered MELINA STONE P.    01/31/22 1042 01/31/22 1041  Diet NPO  Diet effective now         Ordered MELINA STONE P.    01/31/22 1042 01/31/22 1041  Insert peripheral IV  Once         Ordered MELINA STONE P.    01/31/22 1042 01/31/22 1041  CBC W/ AUTO DIFFERENTIAL  STAT         Ordered MELINA STONE P.    01/31/22 1042 01/31/22 1041  Comp. Metabolic Panel  STAT         Ordered MELINA STONE P.    01/31/22 1028 01/31/22 1028  EKG 12-lead ([place] and Vomiting for diabetes) Age > 20  Once        Comments: If patient is > 20 yrs.    Completed by  KATHLEEN LEE on 1/31/2022 at 10:33 AM CAREN CARMONA    Unscheduled 01/31/22 1041  ISTAT CHEM8  Once         Ordered MELINA STONE    Unscheduled 01/31/22 1041  Lipase  STAT         Ordered MELINA STONE    Unscheduled 01/31/22 1041  Urinalysis, Reflex to Urine Culture Urine, Clean Catch  STAT         Ordered MELINA STONE            Virtual Visit Note: The provider triage portion of this emergency department evaluation and documentation was performed via [employer], a HIPAA-compliant telemedicine application, in concert with a tele-presenter in the room. A face to face patient evaluation with one of my colleagues will occur once the patient is placed in an emergency department room.      DISCLAIMER: This note was prepared with [employer]bel[employer] voice recognition transcription software. Garbled syntax, mangled pronouns, and other bizarre constructions may be attributed to that software system.

## 2022-01-31 NOTE — ED PROVIDER NOTES
Encounter Date: 1/31/2022       History     Chief Complaint   Patient presents with    Vomiting     Pt chief complaint is n/v, pt states has has been having n/v 3-4 days .      Chief complaint:  Nausea and vomiting    History of present illness:  Patient is a 46-year-old female who reports 3-4 days nausea and vomiting with associated fatigue.  She has a history of diverticulitis, reflux, hypertension but denies fever chills congestion runny nose ear pain pressure change in hearing discharge her ears eyes watery or itchy coughing wheezing shortness of breath abdominal pain, vaginal bleeding or discharge, urinary changes.  Took Zofran and Nexium without relief.    The history is provided by the patient. No  was used.     Review of patient's allergies indicates:   Allergen Reactions    Ciprofloxacin Swelling and Blisters    Ibuprofen Hives    Meloxicam      rash    Cyclobenzaprine Rash     rasg     Past Medical History:   Diagnosis Date    Abnormal Pap smear of cervix     Cigarette smoker     Diverticulosis     GERD (gastroesophageal reflux disease)     Hiatal hernia     Hypertension      Past Surgical History:   Procedure Laterality Date    CERVICAL BIOPSY  W/ LOOP ELECTRODE EXCISION  2/11/14    LAPAROSCOPIC OCCLUSION OF OVIDUCTS BY DEVICE Bilateral 7/10/2020    Procedure: OCCLUSION, FALLOPIAN TUBE, LAPAROSCOPIC, USING DEVICE;  Surgeon: Alex Waller MD;  Location: [facility] OR;  Service: OB/GYN;  Laterality: Bilateral;  RN PREOP 6/23/2020   T/S--COVID NEGATIVE---UPT  CONSENTS INCOMPLETE     Family History   Problem Relation Age of Onset    Heart disease Mother 54        MI    Heart disease Maternal Grandmother 40        MI    Diabetes Other     Heart disease Brother 44        MI    Sickle cell trait Sister     Crohn's disease Neg Hx     Colon cancer Neg Hx      Social History     Tobacco Use    Smoking status: Current Every Day Smoker     Packs/day: 1.00     Years: 16.00      Pack years: 16.00     Types: Cigarettes    Smokeless tobacco: Never Used   Substance Use Topics    Alcohol use: Yes     Comment: social 1-2 x / month    Drug use: Yes     Frequency: 1.0 times per week     Types: Marijuana     Comment: social- last use 1 week ago     Review of Systems   Constitutional: Positive for fatigue. Negative for chills and fever.   HENT: Negative for congestion, ear discharge, ear pain, postnasal drip, rhinorrhea, sinus pressure, sneezing, sore throat and voice change.    Eyes: Negative for discharge and itching.   Respiratory: Negative for cough, shortness of breath and wheezing.    Cardiovascular: Negative for chest pain, palpitations and leg swelling.   Gastrointestinal: Positive for nausea and vomiting. Negative for abdominal pain, constipation and diarrhea.   Endocrine: Negative for polydipsia, polyphagia and polyuria.   Genitourinary: Negative for dysuria, frequency, hematuria, urgency, vaginal bleeding, vaginal discharge and vaginal pain.   Musculoskeletal: Negative for arthralgias and myalgias.   Skin: Negative for rash and wound.   Neurological: Negative for dizziness, seizures, syncope, weakness and numbness.   Hematological: Negative for adenopathy. Does not bruise/bleed easily.   Psychiatric/Behavioral: Negative for self-injury and suicidal ideas. The patient is not nervous/anxious.        Physical Exam     Initial Vitals   BP Pulse Resp Temp SpO2   01/31/22 1026 01/31/22 1024 01/31/22 1024 01/31/22 1024 01/31/22 1024   (!) 141/100 (!) 135 18 99 °F (37.2 °C) 100 %      MAP       --                Physical Exam    Nursing note and vitals reviewed.  Constitutional: She appears well-developed and well-nourished.   HENT:   Head: Normocephalic and atraumatic.   Right Ear: External ear normal.   Left Ear: External ear normal.   Nose: Nose normal.   Eyes: Conjunctivae and EOM are normal. Pupils are equal, round, and reactive to light. Right eye exhibits no discharge. Left eye exhibits  no discharge.   Neck:   Normal range of motion.  Cardiovascular: Regular rhythm, S1 normal, S2 normal and normal heart sounds. Exam reveals no gallop.    No murmur heard.  Pulmonary/Chest: Effort normal and breath sounds normal. No respiratory distress. She has no decreased breath sounds. She has no wheezes. She has no rhonchi. She has no rales.   Abdominal: Abdomen is soft. Bowel sounds are normal. She exhibits no distension. There is no abdominal tenderness.   Musculoskeletal:         General: Normal range of motion.      Cervical back: Normal range of motion.     Neurological: She is alert and oriented to person, place, and time.   Skin: Skin is dry. Capillary refill takes less than 2 seconds.         ED Course   Procedures  Labs Reviewed   CBC W/ AUTO DIFFERENTIAL - Abnormal; Notable for the following components:       Result Value    WBC 18.46 (*)     RBC 5.66 (*)     Hemoglobin 16.8 (*)     Gran # (ANC) 13.7 (*)     Immature Grans (Abs) 0.10 (*)     Mono # 1.7 (*)     Gran % 74.5 (*)     Lymph % 15.7 (*)     All other components within normal limits   URINALYSIS, REFLEX TO URINE CULTURE - Abnormal; Notable for the following components:    Appearance, UA Hazy (*)     Specific Gravity, UA >1.030 (*)     Protein, UA 1+ (*)     Ketones, UA Trace (*)     Urobilinogen, UA 2.0-3.0 (*)     Leukocytes, UA Trace (*)     All other components within normal limits    Narrative:     Specimen Source->Urine   URINALYSIS MICROSCOPIC - Abnormal; Notable for the following components:    WBC, UA 7 (*)     Hyaline Casts, UA 15 (*)     Granular Casts, UA 2 (*)     All other components within normal limits    Narrative:     Specimen Source->Urine   COMPREHENSIVE METABOLIC PANEL - Abnormal; Notable for the following components:    Sodium 130 (*)     Potassium 3.3 (*)     Chloride 93 (*)     Glucose 136 (*)     BUN 25 (*)     Creatinine 1.5 (*)     eGFR if  48 (*)     eGFR if non  42 (*)     All other  components within normal limits   LIPASE          Imaging Results           CT Abdomen Pelvis With Contrast (Final result)  Result time 01/31/22 14:39:23    Final result by Joaquin Laboy MD (01/31/22 14:39:23)                 Impression:      1. Bilateral renal striated nephrogram, while nonspecific can be seen with sequela of underlying pyelonephritis.  No renal abscess seen.  Clinical correlation and with urinalysis advised.  2. Hepatic steatosis.  3. Colonic diverticulosis.  4. Left adnexal 3.7 cm hypodense mass suggestive of a complex/hemorrhagic cyst.  Further evaluation with elective/nonemergent pelvic ultrasound can be obtained as warranted.  5. Grossly stable additional findings as above.  This report was flagged in Epic as abnormal.      Electronically signed by: Joaquin Laboy MD  Date:    01/31/2022  Time:    14:39             Narrative:    EXAMINATION:  CT ABDOMEN PELVIS WITH CONTRAST    CLINICAL HISTORY:  Abdominal abscess/infection suspected;    TECHNIQUE:  Low dose axial images, sagittal and coronal reformations were obtained from the lung bases to the pubic symphysis following the IV administration of 75 mL of Omnipaque 350 .  Oral contrast was not given.    COMPARISON:  CT abdomen and pelvis most recent 10/03/2021    FINDINGS:  Imaged lung bases show minimal dependent atelectasis.  Base of the heart is within normal limits.    Liver is normal in size with diffuse parenchymal hypoattenuation consistent with fatty infiltration.  Gallbladder, pancreas, spleen, stomach, duodenum and right adrenal gland are within normal limits.  Grossly stable nodular thickening of the left adrenal gland.  No biliary ductal dilatation.  Small accessory splenule again noted.    Bilateral kidneys are normal in size, shape and location.  No hydronephrosis or significant perinephric stranding.  There is subtle striated nephrogram throughout both kidneys particularly at each upper pole.  Ureters are nondilated.  Urinary  bladder is within normal limits.  Uterus is within normal limits.  No right adnexal mass.  Bilateral tubal ligation clips noted.  3.7 cm hypodense left adnexal mass with average 25 Hounsfield units.  No significant amount of free fluid within the pelvis.  Pelvic phleboliths noted.    No ascites, free air or lymphadenopathy.  Minimal scattered calcific atherosclerosis of the aorta.  No aortic aneurysm or dissection.    Small fat containing umbilical hernia.  Appendix and terminal ileum are within normal limits.  There is mural fat deposition involving the cecum, ascending, transverse and majority of the descending colon without adjacent inflammation and similar to distribution from most recent imaging suggesting sequela of prior/remote infectious or inflammatory process or excessive laxative use.  Numerous scattered colonic diverticula.  No convincing evidence of acute diverticulitis.  No evidence of bowel obstruction or acute bowel inflammation.  No pneumatosis or portal venous gas.    Osseous structures appear stable without acute process seen.                               X-Ray Chest PA And Lateral (Final result)  Result time 01/31/22 12:21:38    Final result by Joaquin Laboy MD (01/31/22 12:21:38)                 Impression:      No detrimental change or radiographic acute intrathoracic process seen.  Specifically, no focal consolidation.      Electronically signed by: Joaquin Laboy MD  Date:    01/31/2022  Time:    12:21             Narrative:    EXAMINATION:  XR CHEST PA AND LATERAL    CLINICAL HISTORY:  Elevated white blood cell count, unspecified    TECHNIQUE:  PA and lateral views of the chest were performed.    COMPARISON:  Chest radiograph 10/03/2021    FINDINGS:  No detrimental change.  Symmetric appearing nipple shadows project over both lung bases on the frontal view.  Trachea is relatively midline and patent.  Few scattered linear opacities throughout each lung consistent with minimal platelike  scarring versus atelectasis.  The lungs are otherwise well expanded and clear, with normal appearance of pulmonary vasculature and no pleural effusion or pneumothorax.    The cardiac silhouette is normal in size. The hilar and mediastinal contours are unremarkable.    Bones are intact.                                 Medications   sodium chloride 0.9% bolus 1,000 mL (0 mLs Intravenous Stopped 1/31/22 1243)   ondansetron injection 4 mg (4 mg Intravenous Given 1/31/22 1143)   potassium chloride SA CR tablet 40 mEq (40 mEq Oral Given 1/31/22 1329)   iohexoL (OMNIPAQUE 350) injection 75 mL (75 mLs Intravenous Given 1/31/22 1416)   sodium chloride 0.9% bolus 1,000 mL (0 mLs Intravenous Stopped 1/31/22 1610)                 ED Course as of 01/31/22 1639   Mon Jan 31, 2022   1106 BP(!): 141/100 [VC]   1106 Temp: 99 °F (37.2 °C) [VC]   1106 Temp src: Oral [VC]   1106 Pulse(!): 130 [VC]   1106 Resp: 20 [VC]   1106 SpO2: 100 % [VC]   1129 INDEPENDENT EKG INTERPRETATION: rate 108, ST, no ectopy, no stemi, signed by Dr. Devries.   [VC]   1155 CBC W/ AUTO DIFFERENTIAL(!)  Elevated wbc count.  No anemia, normal platelet count. [VC]   1155 Urinalysis Microscopic(!)  Not likely to represent uti. [VC]   1157 Pt does not meet sepsis criteria, however wbc of 18.46 is concerning, due to vomiting, will get ct abd pel with contrast to observe for infection and get cxr.  [VC]   1312 Lipase: 47 [VC]   1312 X-Ray Chest PA And Lateral    No detrimental change or radiographic acute intrathoracic process seen.  Specifically, no focal consolidation.    [VC]   1312 Comprehensive metabolic panel(!)  Mild hypokalemia.  Mild reduction in nacl.  Likely transient increase in creatinine and bun secondary to dehydration, will replace fluids and K. [VC]   1313 Lipase: 47 [VC]   1443 CT Abdomen Pelvis With Contrast(!)  1. Bilateral renal striated nephrogram, while nonspecific can be seen with sequela of underlying pyelonephritis.  No renal abscess  seen.  Clinical correlation and with urinalysis advised.  2. Hepatic steatosis.  3. Colonic diverticulosis.  4. Left adnexal 3.7 cm hypodense mass suggestive of a complex/hemorrhagic cyst.  Further evaluation with elective/nonemergent pelvic ultrasound can be obtained as warranted. [VC]   1507 SBAR received from IVONNE Castillo DNP. ED workup completed and discussed with Dr. Domínguez to this point. Patient states N/V improved. Patient remains with HR of 111. Will give 2nd L of IVF's. Patient can be discharged after IVF's if HR is improved. Will reassess after patient receives fluids.  [AF]   1508 Sbar given to ayo padgett np, my care ends now. [VC]   1637 Reassessment:  Patient reports symptoms improved.  Heart rate under 100. Will discharge home. [AF]      ED Course User Index  [AF] ANU Aguilar  [VC] Moris Castillo DNP             Clinical Impression:   Final diagnoses:  [R11.2] Nausea and vomiting  [D72.829] Leukocytosis  [E86.0] Dehydration (Primary)  [N83.202] Left ovarian cyst  [K76.0] Fatty liver  [K57.90] Diverticulosis  [E87.6] Hypokalemia          ED Disposition Condition    Discharge Stable        ED Prescriptions     Medication Sig Dispense Start Date End Date Auth. Provider    ondansetron (ZOFRAN) 4 MG tablet Take 1 tablet (4 mg total) by mouth every 8 (eight) hours as needed for Nausea. 12 tablet 1/31/2022  ANU Aguilar    dicyclomine (BENTYL) 20 mg tablet Take 1 tablet (20 mg total) by mouth 2 (two) times daily as needed (Abdominal Pain/Cramping). 10 tablet 1/31/2022 3/2/2022 ANU Aguilar        Follow-up Information     Follow up With Specialties Details Why Contact Info    Your Primary Care Doctor  Schedule an appointment as soon as possible for a visit  Please call and schedule an appointment for follow up this week.     [facility] Emergency Dept Emergency Medicine Go to  If symptoms worsen [address] [place] [address] [phone]           Ayo  MANSI Ritter, [facility]  01/31/22 9513

## 2022-01-31 NOTE — Clinical Note
"Cipriano"Abdullahi Gamez was seen and treated in our emergency department on 1/31/2022.  She may return to work on 02/02/2022.       If you have any questions or concerns, please don't hesitate to call.      ANU Aguilar"

## 2022-06-08 DIAGNOSIS — Z12.31 OTHER SCREENING MAMMOGRAM: ICD-10-CM

## 2022-06-16 ENCOUNTER — HOSPITAL ENCOUNTER (EMERGENCY)
Facility: HOSPITAL | Age: 47
Discharge: HOME | End: 2022-06-16
Attending: EMERGENCY MEDICINE
Payer: COMMERCIAL

## 2022-06-16 VITALS
HEART RATE: 93 BPM | DIASTOLIC BLOOD PRESSURE: 98 MMHG | BODY MASS INDEX: 32.96 KG/M2 | HEIGHT: 67 IN | RESPIRATION RATE: 18 BRPM | SYSTOLIC BLOOD PRESSURE: 175 MMHG | TEMPERATURE: 98 F | OXYGEN SATURATION: 100 % | WEIGHT: 210 LBS

## 2022-06-16 DIAGNOSIS — R00.0 TACHYCARDIA: ICD-10-CM

## 2022-06-16 DIAGNOSIS — D72.829 LEUKOCYTOSIS, UNSPECIFIED TYPE: ICD-10-CM

## 2022-06-16 DIAGNOSIS — U07.1 COVID-19 VIRUS DETECTED: ICD-10-CM

## 2022-06-16 DIAGNOSIS — U07.1 COVID-19 VIRUS INFECTION: Primary | ICD-10-CM

## 2022-06-16 DIAGNOSIS — R11.2 NON-INTRACTABLE VOMITING WITH NAUSEA, UNSPECIFIED VOMITING TYPE: ICD-10-CM

## 2022-06-16 DIAGNOSIS — E87.6 HYPOKALEMIA: ICD-10-CM

## 2022-06-16 DIAGNOSIS — N39.0 ACUTE UTI: ICD-10-CM

## 2022-06-16 LAB
ALBUMIN SERPL BCP-MCNC: 4.1 G/DL (ref 3.5–5.2)
ALP SERPL-CCNC: 90 U/L (ref 55–135)
ALT SERPL W/O P-5'-P-CCNC: 12 U/L (ref 10–44)
ANION GAP SERPL CALC-SCNC: 14 MMOL/L (ref 8–16)
AST SERPL-CCNC: 21 U/L (ref 10–40)
B-HCG UR QL: NEGATIVE
BACTERIA #/AREA URNS HPF: ABNORMAL /HPF
BASOPHILS # BLD AUTO: 0.04 K/UL (ref 0–0.2)
BASOPHILS NFR BLD: 0.2 % (ref 0–1.9)
BILIRUB SERPL-MCNC: 0.4 MG/DL (ref 0.1–1)
BILIRUB UR QL STRIP: ABNORMAL
BUN SERPL-MCNC: 12 MG/DL (ref 6–20)
CALCIUM SERPL-MCNC: 9.8 MG/DL (ref 8.7–10.5)
CHLORIDE SERPL-SCNC: 96 MMOL/L (ref 95–110)
CLARITY UR: ABNORMAL
CO2 SERPL-SCNC: 24 MMOL/L (ref 23–29)
COLOR UR: ABNORMAL
CREAT SERPL-MCNC: 1.2 MG/DL (ref 0.5–1.4)
CRP SERPL-MCNC: 73.7 MG/L (ref 0–8.2)
CTP QC/QA: YES
DIFFERENTIAL METHOD: ABNORMAL
EOSINOPHIL # BLD AUTO: 0 K/UL (ref 0–0.5)
EOSINOPHIL NFR BLD: 0 % (ref 0–8)
ERYTHROCYTE [DISTWIDTH] IN BLOOD BY AUTOMATED COUNT: 13.8 % (ref 11.5–14.5)
EST. GFR  (AFRICAN AMERICAN): >60 ML/MIN/1.73 M^2
EST. GFR  (NON AFRICAN AMERICAN): 55 ML/MIN/1.73 M^2
GLUCOSE SERPL-MCNC: 142 MG/DL (ref 70–110)
GLUCOSE UR QL STRIP: ABNORMAL
GRAN CASTS #/AREA URNS LPF: 12 /LPF
HCT VFR BLD AUTO: 45.4 % (ref 37–48.5)
HGB BLD-MCNC: 15.9 G/DL (ref 12–16)
HGB UR QL STRIP: ABNORMAL
HYALINE CASTS #/AREA URNS LPF: 0 /LPF
IMM GRANULOCYTES # BLD AUTO: 0.13 K/UL (ref 0–0.04)
IMM GRANULOCYTES NFR BLD AUTO: 0.6 % (ref 0–0.5)
KETONES UR QL STRIP: ABNORMAL
LEUKOCYTE ESTERASE UR QL STRIP: ABNORMAL
LIPASE SERPL-CCNC: 18 U/L (ref 4–60)
LYMPHOCYTES # BLD AUTO: 1.1 K/UL (ref 1–4.8)
LYMPHOCYTES NFR BLD: 5.2 % (ref 18–48)
MCH RBC QN AUTO: 29.6 PG (ref 27–31)
MCHC RBC AUTO-ENTMCNC: 35 G/DL (ref 32–36)
MCV RBC AUTO: 85 FL (ref 82–98)
MICROSCOPIC COMMENT: ABNORMAL
MONOCYTES # BLD AUTO: 1.3 K/UL (ref 0.3–1)
MONOCYTES NFR BLD: 6.1 % (ref 4–15)
NEUTROPHILS # BLD AUTO: 18.8 K/UL (ref 1.8–7.7)
NEUTROPHILS NFR BLD: 87.9 % (ref 38–73)
NITRITE UR QL STRIP: NEGATIVE
NRBC BLD-RTO: 0 /100 WBC
PH UR STRIP: 6 [PH] (ref 5–8)
PLATELET # BLD AUTO: 300 K/UL (ref 150–450)
PMV BLD AUTO: 10.8 FL (ref 9.2–12.9)
POC MOLECULAR INFLUENZA A AGN: NEGATIVE
POC MOLECULAR INFLUENZA B AGN: NEGATIVE
POCT GLUCOSE: 154 MG/DL (ref 70–110)
POTASSIUM SERPL-SCNC: 2.9 MMOL/L (ref 3.5–5.1)
PROCALCITONIN SERPL IA-MCNC: 0.1 NG/ML
PROT SERPL-MCNC: 8.8 G/DL (ref 6–8.4)
PROT UR QL STRIP: ABNORMAL
RBC # BLD AUTO: 5.37 M/UL (ref 4–5.4)
RBC #/AREA URNS HPF: 7 /HPF (ref 0–4)
SARS-COV-2 RDRP RESP QL NAA+PROBE: POSITIVE
SODIUM SERPL-SCNC: 134 MMOL/L (ref 136–145)
SP GR UR STRIP: >1.03 (ref 1–1.03)
SQUAMOUS #/AREA URNS HPF: 18 /HPF
URN SPEC COLLECT METH UR: ABNORMAL
UROBILINOGEN UR STRIP-ACNC: ABNORMAL EU/DL
WBC # BLD AUTO: 21.38 K/UL (ref 3.9–12.7)
WBC #/AREA URNS HPF: 15 /HPF (ref 0–5)
WBC CASTS #/AREA URNS LPF: 6 /LPF
WBC CLUMPS URNS QL MICRO: ABNORMAL

## 2022-06-16 PROCEDURE — 96365 THER/PROPH/DIAG IV INF INIT: CPT

## 2022-06-16 PROCEDURE — 81025 URINE PREGNANCY TEST: CPT | Performed by: EMERGENCY MEDICINE

## 2022-06-16 PROCEDURE — 85025 COMPLETE CBC W/AUTO DIFF WBC: CPT | Performed by: PHYSICIAN ASSISTANT

## 2022-06-16 PROCEDURE — 83690 ASSAY OF LIPASE: CPT | Performed by: PHYSICIAN ASSISTANT

## 2022-06-16 PROCEDURE — 96375 TX/PRO/DX INJ NEW DRUG ADDON: CPT

## 2022-06-16 PROCEDURE — 63600175 PHARM REV CODE 636 W HCPCS: Performed by: PHYSICIAN ASSISTANT

## 2022-06-16 PROCEDURE — 87040 BLOOD CULTURE FOR BACTERIA: CPT | Performed by: PHYSICIAN ASSISTANT

## 2022-06-16 PROCEDURE — 93010 ELECTROCARDIOGRAM REPORT: CPT | Mod: ,,, | Performed by: INTERNAL MEDICINE

## 2022-06-16 PROCEDURE — 86140 C-REACTIVE PROTEIN: CPT | Performed by: PHYSICIAN ASSISTANT

## 2022-06-16 PROCEDURE — 25000003 PHARM REV CODE 250: Performed by: PHYSICIAN ASSISTANT

## 2022-06-16 PROCEDURE — 84145 PROCALCITONIN (PCT): CPT | Performed by: PHYSICIAN ASSISTANT

## 2022-06-16 PROCEDURE — 25500020 PHARM REV CODE 255: Performed by: EMERGENCY MEDICINE

## 2022-06-16 PROCEDURE — 96361 HYDRATE IV INFUSION ADD-ON: CPT

## 2022-06-16 PROCEDURE — 87491 CHLMYD TRACH DNA AMP PROBE: CPT | Performed by: PHYSICIAN ASSISTANT

## 2022-06-16 PROCEDURE — 93010 EKG 12-LEAD: ICD-10-PCS | Mod: ,,, | Performed by: INTERNAL MEDICINE

## 2022-06-16 PROCEDURE — 81000 URINALYSIS NONAUTO W/SCOPE: CPT | Performed by: PHYSICIAN ASSISTANT

## 2022-06-16 PROCEDURE — U0002 COVID-19 LAB TEST NON-CDC: HCPCS | Performed by: EMERGENCY MEDICINE

## 2022-06-16 PROCEDURE — 87086 URINE CULTURE/COLONY COUNT: CPT | Performed by: PHYSICIAN ASSISTANT

## 2022-06-16 PROCEDURE — 80053 COMPREHEN METABOLIC PANEL: CPT | Performed by: PHYSICIAN ASSISTANT

## 2022-06-16 PROCEDURE — 96376 TX/PRO/DX INJ SAME DRUG ADON: CPT

## 2022-06-16 PROCEDURE — 99285 EMERGENCY DEPT VISIT HI MDM: CPT | Mod: 25

## 2022-06-16 PROCEDURE — 93005 ELECTROCARDIOGRAM TRACING: CPT

## 2022-06-16 PROCEDURE — 87591 N.GONORRHOEAE DNA AMP PROB: CPT | Performed by: PHYSICIAN ASSISTANT

## 2022-06-16 RX ORDER — PROMETHAZINE HYDROCHLORIDE 25 MG/1
25 TABLET ORAL EVERY 6 HOURS PRN
Qty: 15 TABLET | Refills: 0 | Status: SHIPPED | OUTPATIENT
Start: 2022-06-16 | End: 2023-04-28 | Stop reason: SDUPTHER

## 2022-06-16 RX ORDER — AMOXICILLIN AND CLAVULANATE POTASSIUM 875; 125 MG/1; MG/1
1 TABLET, FILM COATED ORAL 2 TIMES DAILY
Qty: 20 TABLET | Refills: 0 | Status: SHIPPED | OUTPATIENT
Start: 2022-06-16 | End: 2022-06-26

## 2022-06-16 RX ORDER — DIPHENHYDRAMINE HYDROCHLORIDE 50 MG/ML
25 INJECTION INTRAMUSCULAR; INTRAVENOUS
Status: COMPLETED | OUTPATIENT
Start: 2022-06-16 | End: 2022-06-16

## 2022-06-16 RX ORDER — MORPHINE SULFATE 4 MG/ML
3 INJECTION, SOLUTION INTRAMUSCULAR; INTRAVENOUS
Status: COMPLETED | OUTPATIENT
Start: 2022-06-16 | End: 2022-06-16

## 2022-06-16 RX ORDER — ACETAMINOPHEN 500 MG
1000 TABLET ORAL
Status: COMPLETED | OUTPATIENT
Start: 2022-06-16 | End: 2022-06-16

## 2022-06-16 RX ORDER — DICYCLOMINE HYDROCHLORIDE 20 MG/1
20 TABLET ORAL 4 TIMES DAILY
Qty: 12 TABLET | Refills: 0 | Status: SHIPPED | OUTPATIENT
Start: 2022-06-16 | End: 2022-06-19

## 2022-06-16 RX ORDER — POTASSIUM CHLORIDE 20 MEQ/1
40 TABLET, EXTENDED RELEASE ORAL ONCE
Status: COMPLETED | OUTPATIENT
Start: 2022-06-16 | End: 2022-06-16

## 2022-06-16 RX ORDER — ONDANSETRON 2 MG/ML
8 INJECTION INTRAMUSCULAR; INTRAVENOUS
Status: COMPLETED | OUTPATIENT
Start: 2022-06-16 | End: 2022-06-16

## 2022-06-16 RX ORDER — ONDANSETRON 4 MG/1
8 TABLET, FILM COATED ORAL EVERY 6 HOURS PRN
Qty: 30 TABLET | Refills: 0 | Status: SHIPPED | OUTPATIENT
Start: 2022-06-16 | End: 2022-07-16

## 2022-06-16 RX ORDER — ONDANSETRON 8 MG/1
8 TABLET, ORALLY DISINTEGRATING ORAL
Status: COMPLETED | OUTPATIENT
Start: 2022-06-16 | End: 2022-06-16

## 2022-06-16 RX ADMIN — ACETAMINOPHEN 1000 MG: 500 TABLET ORAL at 11:06

## 2022-06-16 RX ADMIN — POTASSIUM CHLORIDE 40 MEQ: 1500 TABLET, EXTENDED RELEASE ORAL at 03:06

## 2022-06-16 RX ADMIN — SODIUM CHLORIDE 1000 ML: 0.9 INJECTION, SOLUTION INTRAVENOUS at 12:06

## 2022-06-16 RX ADMIN — IOHEXOL 100 ML: 350 INJECTION, SOLUTION INTRAVENOUS at 01:06

## 2022-06-16 RX ADMIN — ONDANSETRON 8 MG: 2 INJECTION INTRAMUSCULAR; INTRAVENOUS at 12:06

## 2022-06-16 RX ADMIN — CEFTRIAXONE 2 G: 2 INJECTION, SOLUTION INTRAVENOUS at 02:06

## 2022-06-16 RX ADMIN — ONDANSETRON 8 MG: 8 TABLET, ORALLY DISINTEGRATING ORAL at 11:06

## 2022-06-16 RX ADMIN — DIPHENHYDRAMINE HYDROCHLORIDE 25 MG: 50 INJECTION INTRAMUSCULAR; INTRAVENOUS at 03:06

## 2022-06-16 RX ADMIN — MORPHINE SULFATE 3 MG: 4 INJECTION INTRAVENOUS at 12:06

## 2022-06-16 NOTE — Clinical Note
"Cipriano "Abdullahi Gamez was seen and treated in our emergency department on 6/16/2022.     COVID-19 is present in our communities across the state. There is limited testing for COVID at this time, so not all patients can be tested. In this situation, your employee meets the following criteria:    Cipriano Gamez has met the criteria for COVID-19 testing and has a POSITIVE result. She can return to work once they are asymptomatic for 24 hours without the use of fever reducing medications AND at least five days from the first positive result. A mask is recommended for 5 days post quarantine.     If you have any questions or concerns, or if I can be of further assistance, please do not hesitate to contact me.    Sincerely,             Veto Paz PA-C"

## 2022-06-16 NOTE — DISCHARGE INSTRUCTIONS

## 2022-06-16 NOTE — ED PROVIDER NOTES
Encounter Date: 6/16/2022    SCRIBE #1 NOTE: I, Calvin Larson, am scribing for, and in the presence of,  Veto Paz PA-C. I have scribed the following portions of the note - Other sections scribed: HPI, ROS, PE.       History     Chief Complaint   Patient presents with    flu like symptoms     Patient reports nausea, vomiting, generalized body aches, generalized weakness x 2 days. Denies fevers. Patient states that she was having headache about 2 days ago.      Shell Gamez is a 46 y. o female with a PMHx of diverticulosis, GERD, hiatal hernia, and HTN, that comes to the ED complaining of vomiting beginning 2 days ago. The patient reports complaints of nausea, multiple emesis episodes, generalized weakness, generalized body aches, and LLQ abdominal pain, as well as noting a headache 2 days ago but notes it is now resolved. No medications taken PTA. No alleviating or exacerbating factors noted. Denies dysuria, hematuria, shortness of breath, chest pain, fever, or diarrhea. Allergic to ciprofloxacin, ibuprofen, meloxicam, and cyclobenzaprine.    The history is provided by the patient. No  was used.     Review of patient's allergies indicates:   Allergen Reactions    Ciprofloxacin Swelling and Blisters    Ibuprofen Hives    Meloxicam      rash    Cyclobenzaprine Rash     rasg     Past Medical History:   Diagnosis Date    Abnormal Pap smear of cervix     Cigarette smoker     Diverticulosis     GERD (gastroesophageal reflux disease)     Hiatal hernia     Hypertension      Past Surgical History:   Procedure Laterality Date    CERVICAL BIOPSY  W/ LOOP ELECTRODE EXCISION  2/11/14    LAPAROSCOPIC OCCLUSION OF OVIDUCTS BY DEVICE Bilateral 7/10/2020    Procedure: OCCLUSION, FALLOPIAN TUBE, LAPAROSCOPIC, USING DEVICE;  Surgeon: Alex Waller MD;  Location: [facility];  Service: OB/GYN;  Laterality: Bilateral;  RN PREOP 6/23/2020   T/S--COVID NEGATIVE---UPT  CONSENTS INCOMPLETE     Family  History   Problem Relation Age of Onset    Heart disease Mother 54        MI    Heart disease Maternal Grandmother 40        MI    Diabetes Other     Heart disease Brother 44        MI    Sickle cell trait Sister     Crohn's disease Neg Hx     Colon cancer Neg Hx      Social History     Tobacco Use    Smoking status: Current Every Day Smoker     Packs/day: 1.00     Years: 16.00     Pack years: 16.00     Types: Cigarettes    Smokeless tobacco: Never Used   Substance Use Topics    Alcohol use: Yes     Comment: social 1-2 x / month    Drug use: Yes     Frequency: 1.0 times per week     Types: Marijuana     Comment: social- last use 1 week ago     Review of Systems   Constitutional: Negative for fever.   HENT: Negative for congestion, sore throat and trouble swallowing.    Respiratory: Negative for cough and shortness of breath.    Cardiovascular: Negative for chest pain.   Gastrointestinal: Positive for abdominal pain (LLQ), nausea and vomiting. Negative for constipation and diarrhea.   Genitourinary: Negative for dysuria, flank pain, frequency and urgency.   Musculoskeletal: Positive for myalgias. Negative for back pain.   Skin: Negative for rash.   Neurological: Positive for weakness (generalized) and headaches (resolved).   All other systems reviewed and are negative.      Physical Exam     Initial Vitals [06/16/22 1059]   BP Pulse Resp Temp SpO2   (!) 170/102 (!) 118 18 98.6 °F (37 °C) 97 %      MAP       --         Physical Exam    Nursing note and vitals reviewed.  Constitutional: She appears well-developed and well-nourished. She is not diaphoretic. No distress.   Appears uncomfortable.   HENT:   Head: Normocephalic and atraumatic.   Nose: Nose normal.   Nasal congestion present   Eyes: Conjunctivae and EOM are normal. Right eye exhibits no discharge. Left eye exhibits no discharge.   Neck: No tracheal deviation present. No JVD present.   Normal range of motion.  Cardiovascular: Regular rhythm and  normal heart sounds. Tachycardia present.  Exam reveals no friction rub.    No murmur heard.  Pulmonary/Chest: Breath sounds normal. No stridor. No respiratory distress. She has no wheezes. She has no rhonchi. She has no rales. She exhibits no tenderness.   Abdominal: There is abdominal tenderness (mild) in the left lower quadrant.   No right CVA tenderness.  No left CVA tenderness. There is no rebound, no guarding, no tenderness at McBurney's point and negative Burns's sign. negative Rovsing's sign  Musculoskeletal:         General: No tenderness or edema.      Cervical back: Normal range of motion.     Neurological: She is alert and oriented to person, place, and time.   Skin: Skin is warm and dry. No rash and no abscess noted. No erythema. No pallor.   Psychiatric: She has a normal mood and affect. Thought content normal.         ED Course   Procedures  Labs Reviewed   CBC W/ AUTO DIFFERENTIAL - Abnormal; Notable for the following components:       Result Value    WBC 21.38 (*)     Immature Granulocytes 0.6 (*)     Gran # (ANC) 18.8 (*)     Immature Grans (Abs) 0.13 (*)     Mono # 1.3 (*)     Gran % 87.9 (*)     Lymph % 5.2 (*)     All other components within normal limits   COMPREHENSIVE METABOLIC PANEL - Abnormal; Notable for the following components:    Sodium 134 (*)     Potassium 2.9 (*)     Glucose 142 (*)     Total Protein 8.8 (*)     eGFR if non  55 (*)     All other components within normal limits   URINALYSIS, REFLEX TO URINE CULTURE - Abnormal; Notable for the following components:    Color, UA Orange (*)     Appearance, UA Hazy (*)     Specific Gravity, UA >1.030 (*)     Protein, UA 3+ (*)     Glucose, UA Trace (*)     Ketones, UA 1+ (*)     Bilirubin (UA) 1+ (*)     Occult Blood UA 3+ (*)     Urobilinogen, UA 2.0-3.0 (*)     Leukocytes, UA Trace (*)     All other components within normal limits    Narrative:     Specimen Source->Urine   C-REACTIVE PROTEIN - Abnormal; Notable for the  following components:    CRP 73.7 (*)     All other components within normal limits   URINALYSIS MICROSCOPIC - Abnormal; Notable for the following components:    RBC, UA 7 (*)     WBC, UA 15 (*)     WBC Clumps, UA Occasional (*)     Bacteria Few (*)     WBC Casts, UA 6 (*)     Granular Casts, UA 12 (*)     All other components within normal limits    Narrative:     Specimen Source->Urine   SARS-COV-2 RDRP GENE - Abnormal; Notable for the following components:    POC Rapid COVID Positive (*)     All other components within normal limits   POCT GLUCOSE - Abnormal; Notable for the following components:    POCT Glucose 154 (*)     All other components within normal limits   C. TRACHOMATIS/N. GONORRHOEAE BY AMP DNA   CULTURE, URINE   CULTURE, BLOOD   CULTURE, BLOOD   LIPASE   PROCALCITONIN   POCT URINE PREGNANCY   POCT INFLUENZA A/B MOLECULAR          Imaging Results          CT Abdomen Pelvis With Contrast (Final result)  Result time 06/16/22 13:45:06    Final result by Óscar Mcleod MD (06/16/22 13:45:06)                 Impression:      No acute abnormalities in the abdomen or pelvis.    Colonic diverticulosis without evidence of acute diverticulitis.      Electronically signed by: Óscar Mcleod MD  Date:    06/16/2022  Time:    13:45             Narrative:    EXAMINATION:  CT ABDOMEN PELVIS WITH CONTRAST    CLINICAL HISTORY:  LLQ abdominal pain;    TECHNIQUE:  Low dose axial images, sagittal and coronal reformations were obtained from the lung bases to the pubic symphysis following the IV administration of 100 mL of Omnipaque 350 .  Oral contrast was not given.    COMPARISON:  01/31/2022    FINDINGS:  Mild ground-glass opacities at the lung bases may relate to atelectasis. Otherwise, visualized lung bases and heart show no significant abnormalities.    Liver shows no focal abnormalities on noncontrast imaging.  Gallbladder shows no radiopaque stones or inflammatory changes.  No biliary ductal dilatation.  Portal  vein is patent.    Stomach, spleen, pancreas and adrenal glands show no significant abnormalities.  Small accessory splenule.    Kidneys are normal in contour and attenuation.  No hydronephrosis.  Urinary bladder is nondistended.  Uterus and adnexal structures show no significant abnormalities.  There are tubal ligation clips.    There is diverticulosis throughout the colon.  No CT evidence of acute diverticulitis.  Appendix shows no significant abnormalities.  No free air or ascites.  No focal fluid collections to suggest abscess.    Abdominal aorta is normal in caliber.  Major aortic branch vessels are patent.  Mild atherosclerosis.    No abnormal lymph node enlargement.    Bones show no acute abnormalities.  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                               X-Ray Chest AP Portable (Final result)  Result time 06/16/22 13:45:31    Final result by Óscar Mcleod MD (06/16/22 13:45:31)                 Impression:      No acute radiographic findings in the chest.      Electronically signed by: Óscar Mcleod MD  Date:    06/16/2022  Time:    13:45             Narrative:    EXAMINATION:  XR CHEST AP PORTABLE    CLINICAL HISTORY:  . COVID-19    TECHNIQUE:  Single frontal portable view of the chest was performed.    COMPARISON:  01/31/2022    FINDINGS:  Support devices: None    The lungs are clear, with normal appearance of pulmonary vasculature and no pleural effusion or pneumothorax.    The cardiac silhouette is normal in size. The hilar and mediastinal contours are unremarkable.    Bones show mild scoliosis.  No acute bony abnormalities.                                 Medications   cefTRIAXone (ROCEPHIN) 2 g/50 mL D5W IVPB (has no administration in time range)   potassium chloride SA CR tablet 40 mEq (has no administration in time range)   acetaminophen tablet 1,000 mg (1,000 mg Oral Given 6/16/22 1148)   ondansetron disintegrating tablet 8 mg (8 mg Oral Given 6/16/22 1147)   sodium chloride 0.9% bolus  1,000 mL (1,000 mLs Intravenous New Bag 6/16/22 1234)   ondansetron injection 8 mg (8 mg Intravenous Given 6/16/22 1234)   morphine injection 3 mg (3 mg Intravenous Given 6/16/22 1237)   iohexoL (OMNIPAQUE 350) injection 100 mL (100 mLs Intravenous Given 6/16/22 1333)     Medical Decision Making:   History:   Old Medical Records: I decided to obtain old medical records.  Independently Interpreted Test(s):   I have ordered and independently interpreted X-rays - see prior notes.  I have ordered and independently interpreted EKG Reading(s) - see prior notes  Clinical Tests:   Lab Tests: Ordered and Reviewed  Radiological Study: Ordered and Reviewed  Medical Tests: Ordered and Reviewed  ED Management:  COVID-19 positive without hypoxia or respiratory distress.  Also has associated nausea and vomiting that resolved in the ED.  Pain has also resolved.  She appears more comfortable.  Incidental UTI noted; started on antibiotics.  He denies pelvic pain and concern for STDs.  Leukocytosis noted which is not significantly different from her baseline and is likely related to demargination.  No anemia.  Mild hypokalemia noted; repleted in the ED.  Very minimal hyperglycemia without DKA.  No acute kidney injury.  No pneumonia.  CT of abdomen shows no acute findings, including for diverticulitis or obstruction.  We discussed quarantine.  She declines EUA therapies.  Advising follow-up with PCP. Strict return precautions discussed.  Agreeable to plan.          Scribe Attestation:   Scribe #1: I performed the above scribed service and the documentation accurately describes the services I performed. I attest to the accuracy of the note.               I, Veto Paz PA-C, personally performed the services described in this documentation. All medical record entries made by the scribe were at my direction and in my presence. I have reviewed the chart and agree that the record reflects my personal performance and is accurate and  complete.    Clinical Impression:   Final diagnoses:  [R00.0] Tachycardia  [U07.1] COVID-19 virus infection (Primary)  [E87.6] Hypokalemia  [N39.0] Acute UTI  [D72.829] Leukocytosis, unspecified type  [R11.2] Non-intractable vomiting with nausea, unspecified vomiting type          ED Disposition Condition    Discharge Stable        ED Prescriptions     Medication Sig Dispense Start Date End Date Auth. Provider    amoxicillin-clavulanate 875-125mg (AUGMENTIN) 875-125 mg per tablet Take 1 tablet by mouth 2 (two) times daily. for 10 days 20 tablet 6/16/2022 6/26/2022 Veto Paz PA-C    ondansetron (ZOFRAN) 4 MG tablet Take 2 tablets (8 mg total) by mouth every 6 (six) hours as needed for Nausea. 30 tablet 6/16/2022 7/16/2022 Veto Paz PA-C    dicyclomine (BENTYL) 20 mg tablet Take 1 tablet (20 mg total) by mouth 4 (four) times daily. for 3 days 12 tablet 6/16/2022 6/19/2022 Veto Paz PA-C    promethazine (PHENERGAN) 25 MG tablet Take 1 tablet (25 mg total) by mouth every 6 (six) hours as needed for Nausea. 15 tablet 6/16/2022  Veto Paz PA-C        Follow-up Information     Follow up With Specialties Details Why Contact Info    Brooks Bergeron MD Internal Medicine Schedule an appointment as soon as possible for a visit in 1 week For re-evaluation [address] [facility]  [place] [address]  [phone]      [facility] Emergency Dept Emergency Medicine Go to  If symptoms worsen [address] [facility] [address] [phone]           Veto Paz PA-C  06/16/22 0060

## 2022-06-16 NOTE — Clinical Note
Princegen Franco accompanied their caregiver to the emergency department on 6/16/2022. They may return to work on 06/21/2022.      If you have any questions or concerns, please don't hesitate to call.      Veto Paz PA-C

## 2022-06-16 NOTE — ED NOTES
Patient reports abd pain with nausea and vomiting x3 days. Patient reports son had similar episode earlier this week. Patient denies fever. Reports x4 episodes of vomiting today.

## 2022-06-18 LAB — BACTERIA UR CULT: NORMAL

## 2022-06-20 LAB
BACTERIA BLD CULT: NORMAL
BACTERIA BLD CULT: NORMAL

## 2022-06-21 LAB
C TRACH DNA SPEC QL NAA+PROBE: NOT DETECTED
N GONORRHOEA DNA SPEC QL NAA+PROBE: NOT DETECTED

## 2022-10-08 ENCOUNTER — HOSPITAL ENCOUNTER (EMERGENCY)
Facility: HOSPITAL | Age: 47
Discharge: HOME | End: 2022-10-08
Attending: EMERGENCY MEDICINE
Payer: COMMERCIAL

## 2022-10-08 VITALS
HEART RATE: 90 BPM | SYSTOLIC BLOOD PRESSURE: 167 MMHG | WEIGHT: 215 LBS | DIASTOLIC BLOOD PRESSURE: 79 MMHG | OXYGEN SATURATION: 98 % | RESPIRATION RATE: 16 BRPM | HEIGHT: 67 IN | TEMPERATURE: 98 F | BODY MASS INDEX: 33.74 KG/M2

## 2022-10-08 DIAGNOSIS — R31.9 URINARY TRACT INFECTION WITH HEMATURIA, SITE UNSPECIFIED: ICD-10-CM

## 2022-10-08 DIAGNOSIS — N39.0 URINARY TRACT INFECTION WITH HEMATURIA, SITE UNSPECIFIED: ICD-10-CM

## 2022-10-08 DIAGNOSIS — M54.6 RIGHT-SIDED THORACIC BACK PAIN, UNSPECIFIED CHRONICITY: Primary | ICD-10-CM

## 2022-10-08 LAB
ALBUMIN SERPL BCP-MCNC: 4.7 G/DL (ref 3.5–5.2)
ALP SERPL-CCNC: 118 U/L (ref 55–135)
ALT SERPL W/O P-5'-P-CCNC: 14 U/L (ref 10–44)
ANION GAP SERPL CALC-SCNC: 16 MMOL/L (ref 8–16)
AST SERPL-CCNC: 16 U/L (ref 10–40)
B-HCG UR QL: NEGATIVE
B-OH-BUTYR BLD STRIP-SCNC: 0.3 MMOL/L (ref 0–0.5)
BACTERIA #/AREA URNS HPF: ABNORMAL /HPF
BASOPHILS # BLD AUTO: 0.03 K/UL (ref 0–0.2)
BASOPHILS NFR BLD: 0.2 % (ref 0–1.9)
BILIRUB SERPL-MCNC: 0.4 MG/DL (ref 0.1–1)
BILIRUB UR QL STRIP: NEGATIVE
BUN SERPL-MCNC: 9 MG/DL (ref 6–20)
CALCIUM SERPL-MCNC: 10.3 MG/DL (ref 8.7–10.5)
CHLORIDE SERPL-SCNC: 99 MMOL/L (ref 95–110)
CLARITY UR: ABNORMAL
CO2 SERPL-SCNC: 25 MMOL/L (ref 23–29)
COLOR UR: YELLOW
CREAT SERPL-MCNC: 0.9 MG/DL (ref 0.5–1.4)
CTP QC/QA: YES
DIFFERENTIAL METHOD: ABNORMAL
EOSINOPHIL # BLD AUTO: 0 K/UL (ref 0–0.5)
EOSINOPHIL NFR BLD: 0.1 % (ref 0–8)
ERYTHROCYTE [DISTWIDTH] IN BLOOD BY AUTOMATED COUNT: 13.3 % (ref 11.5–14.5)
EST. GFR  (NO RACE VARIABLE): >60 ML/MIN/1.73 M^2
GLUCOSE SERPL-MCNC: 198 MG/DL (ref 70–110)
GLUCOSE UR QL STRIP: ABNORMAL
HCT VFR BLD AUTO: 46.4 % (ref 37–48.5)
HGB BLD-MCNC: 16.4 G/DL (ref 12–16)
HGB UR QL STRIP: ABNORMAL
HYALINE CASTS #/AREA URNS LPF: 0 /LPF
IMM GRANULOCYTES # BLD AUTO: 0.04 K/UL (ref 0–0.04)
IMM GRANULOCYTES NFR BLD AUTO: 0.2 % (ref 0–0.5)
KETONES UR QL STRIP: ABNORMAL
LACTATE SERPL-SCNC: 1.3 MMOL/L (ref 0.5–2.2)
LEUKOCYTE ESTERASE UR QL STRIP: NEGATIVE
LYMPHOCYTES # BLD AUTO: 1 K/UL (ref 1–4.8)
LYMPHOCYTES NFR BLD: 6 % (ref 18–48)
MCH RBC QN AUTO: 29 PG (ref 27–31)
MCHC RBC AUTO-ENTMCNC: 35.3 G/DL (ref 32–36)
MCV RBC AUTO: 82 FL (ref 82–98)
MICROSCOPIC COMMENT: ABNORMAL
MONOCYTES # BLD AUTO: 0.6 K/UL (ref 0.3–1)
MONOCYTES NFR BLD: 3.9 % (ref 4–15)
NEUTROPHILS # BLD AUTO: 14.5 K/UL (ref 1.8–7.7)
NEUTROPHILS NFR BLD: 89.6 % (ref 38–73)
NITRITE UR QL STRIP: NEGATIVE
NRBC BLD-RTO: 0 /100 WBC
PH UR STRIP: 7 [PH] (ref 5–8)
PLATELET # BLD AUTO: 217 K/UL (ref 150–450)
PMV BLD AUTO: 10.5 FL (ref 9.2–12.9)
POTASSIUM SERPL-SCNC: 3.1 MMOL/L (ref 3.5–5.1)
PROCALCITONIN SERPL IA-MCNC: 0.02 NG/ML
PROT SERPL-MCNC: 9.2 G/DL (ref 6–8.4)
PROT UR QL STRIP: ABNORMAL
RBC # BLD AUTO: 5.65 M/UL (ref 4–5.4)
RBC #/AREA URNS HPF: 19 /HPF (ref 0–4)
SODIUM SERPL-SCNC: 140 MMOL/L (ref 136–145)
SP GR UR STRIP: 1.03 (ref 1–1.03)
SQUAMOUS #/AREA URNS HPF: 11 /HPF
URN SPEC COLLECT METH UR: ABNORMAL
UROBILINOGEN UR STRIP-ACNC: NEGATIVE EU/DL
WBC # BLD AUTO: 16.24 K/UL (ref 3.9–12.7)
WBC #/AREA URNS HPF: 4 /HPF (ref 0–5)
YEAST URNS QL MICRO: ABNORMAL

## 2022-10-08 PROCEDURE — 85025 COMPLETE CBC W/AUTO DIFF WBC: CPT | Performed by: EMERGENCY MEDICINE

## 2022-10-08 PROCEDURE — 63600175 PHARM REV CODE 636 W HCPCS: Performed by: EMERGENCY MEDICINE

## 2022-10-08 PROCEDURE — 96375 TX/PRO/DX INJ NEW DRUG ADDON: CPT

## 2022-10-08 PROCEDURE — 81000 URINALYSIS NONAUTO W/SCOPE: CPT | Performed by: EMERGENCY MEDICINE

## 2022-10-08 PROCEDURE — 82010 KETONE BODYS QUAN: CPT | Performed by: EMERGENCY MEDICINE

## 2022-10-08 PROCEDURE — 80053 COMPREHEN METABOLIC PANEL: CPT | Performed by: EMERGENCY MEDICINE

## 2022-10-08 PROCEDURE — 81025 URINE PREGNANCY TEST: CPT | Performed by: EMERGENCY MEDICINE

## 2022-10-08 PROCEDURE — 96361 HYDRATE IV INFUSION ADD-ON: CPT

## 2022-10-08 PROCEDURE — 99285 EMERGENCY DEPT VISIT HI MDM: CPT | Mod: 25

## 2022-10-08 PROCEDURE — 25500020 PHARM REV CODE 255: Performed by: EMERGENCY MEDICINE

## 2022-10-08 PROCEDURE — 96365 THER/PROPH/DIAG IV INF INIT: CPT

## 2022-10-08 PROCEDURE — 84145 PROCALCITONIN (PCT): CPT | Performed by: EMERGENCY MEDICINE

## 2022-10-08 PROCEDURE — 83605 ASSAY OF LACTIC ACID: CPT | Performed by: EMERGENCY MEDICINE

## 2022-10-08 RX ORDER — MORPHINE SULFATE 4 MG/ML
4 INJECTION, SOLUTION INTRAMUSCULAR; INTRAVENOUS
Status: COMPLETED | OUTPATIENT
Start: 2022-10-08 | End: 2022-10-08

## 2022-10-08 RX ORDER — HYDROCODONE BITARTRATE AND ACETAMINOPHEN 5; 325 MG/1; MG/1
1 TABLET ORAL EVERY 6 HOURS PRN
Qty: 8 TABLET | Refills: 0 | Status: SHIPPED | OUTPATIENT
Start: 2022-10-08 | End: 2022-10-10

## 2022-10-08 RX ORDER — CEPHALEXIN 500 MG/1
500 CAPSULE ORAL EVERY 8 HOURS
Qty: 15 CAPSULE | Refills: 0 | Status: SHIPPED | OUTPATIENT
Start: 2022-10-08 | End: 2022-10-13

## 2022-10-08 RX ORDER — ONDANSETRON 2 MG/ML
4 INJECTION INTRAMUSCULAR; INTRAVENOUS
Status: COMPLETED | OUTPATIENT
Start: 2022-10-08 | End: 2022-10-08

## 2022-10-08 RX ADMIN — DEXTROSE AND SODIUM CHLORIDE 1000 ML: 5; .9 INJECTION, SOLUTION INTRAVENOUS at 10:10

## 2022-10-08 RX ADMIN — IOHEXOL 100 ML: 350 INJECTION, SOLUTION INTRAVENOUS at 11:10

## 2022-10-08 RX ADMIN — MORPHINE SULFATE 4 MG: 4 INJECTION INTRAVENOUS at 10:10

## 2022-10-08 RX ADMIN — CEFTRIAXONE 1 G: 1 INJECTION, SOLUTION INTRAVENOUS at 12:10

## 2022-10-08 RX ADMIN — ONDANSETRON 4 MG: 2 INJECTION INTRAMUSCULAR; INTRAVENOUS at 10:10

## 2022-10-08 NOTE — ED TRIAGE NOTES
Patient presents to ED for complaints of Nausea, vomiting and back pain that started on yesterday. Denies diarrhea and fever.

## 2022-10-08 NOTE — DISCHARGE INSTRUCTIONS
You were seen in the emergency department for back pain. Your labs, exam, and vitals are reassuring. You have a urinary tract infection. We gave you some antibiotics.  We think you're safe to go home.  Please follow-up with your primary care provider.  Please return for any new or worsening pain, black or bloody stool, persistent nausea and vomiting, fevers, chills, dizziness, or you become concerned in any other way.

## 2022-10-08 NOTE — ED PROVIDER NOTES
Encounter Date: 10/8/2022       History     Chief Complaint   Patient presents with    Back Pain     Pt to ED with complaints of n/v and back (R flank) pain X1 week. Denies bowel/bladder dysfunction. Pt denies being exposed to anyone sick. Denies diarrhea. Denies medications PTA. Denies trauma/recent surgeries.      47-year-old female presenting with 1 week of right-sided back and flank pain.  Patient notes persistent nausea and vomiting.  Reports being unable eating.  Denies fevers, chills.  Denies abdominal pain.  Denies chest pain, shortness of breath, difficulty breathing.  Denies lower extremity edema hemoptysis.  Previously in usual state of health.  Denies history of similar symptoms or kidney stones.  Denies dysuria, vaginal bleeding or discharge or pelvic pain.    Review of patient's allergies indicates:   Allergen Reactions    Ciprofloxacin Swelling and Blisters    Ibuprofen Hives    Meloxicam      rash    Cyclobenzaprine Rash     rasg     Past Medical History:   Diagnosis Date    Abnormal Pap smear of cervix     Cigarette smoker     Diverticulosis     GERD (gastroesophageal reflux disease)     Hiatal hernia     Hypertension      Past Surgical History:   Procedure Laterality Date    CERVICAL BIOPSY  W/ LOOP ELECTRODE EXCISION  2/11/14    LAPAROSCOPIC OCCLUSION OF OVIDUCTS BY DEVICE Bilateral 7/10/2020    Procedure: OCCLUSION, FALLOPIAN TUBE, LAPAROSCOPIC, USING DEVICE;  Surgeon: Alex Waller MD;  Location: [facility] OR;  Service: OB/GYN;  Laterality: Bilateral;  RN PREOP 6/23/2020   T/S--COVID NEGATIVE---UPT  CONSENTS INCOMPLETE     Family History   Problem Relation Age of Onset    Heart disease Mother 54        MI    Heart disease Maternal Grandmother 40        MI    Diabetes Other     Heart disease Brother 44        MI    Sickle cell trait Sister     Crohn's disease Neg Hx     Colon cancer Neg Hx      Social History     Tobacco Use    Smoking status: Every Day     Packs/day: 1.00     Years: 16.00     Pack  years: 16.00     Types: Cigarettes    Smokeless tobacco: Never   Substance Use Topics    Alcohol use: Yes     Comment: social 1-2 x / month    Drug use: Yes     Frequency: 1.0 times per week     Types: Marijuana     Comment: daily     Review of Systems   Constitutional:  Negative for chills and fever.   HENT:  Negative for sore throat.    Respiratory:  Negative for shortness of breath.    Cardiovascular:  Negative for chest pain.   Gastrointestinal:  Positive for nausea and vomiting.   Genitourinary:  Positive for flank pain. Negative for dysuria.   Musculoskeletal:  Positive for back pain.   Skin:  Negative for rash.   Neurological:  Negative for weakness.     Physical Exam     Initial Vitals [10/08/22 0818]   BP Pulse Resp Temp SpO2   (!) 165/105 104 17 97.9 °F (36.6 °C) 99 %      MAP       --         Physical Exam    Nursing note and vitals reviewed.  Constitutional: She appears well-developed and well-nourished. She is not diaphoretic. No distress.   HENT:   Head: Normocephalic and atraumatic.   Nose: Nose normal.   Eyes: EOM are normal. Pupils are equal, round, and reactive to light. No scleral icterus.   Neck: Neck supple.   Normal range of motion.  Cardiovascular:  Normal rate, regular rhythm, normal heart sounds and intact distal pulses.     Exam reveals no gallop and no friction rub.       No murmur heard.  Pulmonary/Chest: Breath sounds normal. No stridor. No respiratory distress. She has no wheezes. She has no rhonchi. She has no rales.   Abdominal: Abdomen is soft. Bowel sounds are normal. She exhibits no distension. There is no abdominal tenderness.   Right-sided flank tenderness to percussion There is no rebound and no guarding.   Musculoskeletal:         General: No tenderness or edema. Normal range of motion.      Cervical back: Normal range of motion and neck supple.     Neurological: She is oriented to person, place, and time. No cranial nerve deficit.   Skin: Skin is warm and dry. No rash noted.    Psychiatric: She has a normal mood and affect. Her behavior is normal.       ED Course   Procedures  Labs Reviewed   CBC W/ AUTO DIFFERENTIAL - Abnormal; Notable for the following components:       Result Value    WBC 16.24 (*)     RBC 5.65 (*)     Hemoglobin 16.4 (*)     Gran # (ANC) 14.5 (*)     Gran % 89.6 (*)     Lymph % 6.0 (*)     Mono % 3.9 (*)     All other components within normal limits   COMPREHENSIVE METABOLIC PANEL - Abnormal; Notable for the following components:    Potassium 3.1 (*)     Glucose 198 (*)     Total Protein 9.2 (*)     All other components within normal limits   URINALYSIS, REFLEX TO URINE CULTURE - Abnormal; Notable for the following components:    Appearance, UA Hazy (*)     Protein, UA 3+ (*)     Glucose, UA 3+ (*)     Ketones, UA 3+ (*)     Occult Blood UA 1+ (*)     All other components within normal limits    Narrative:     Specimen Source->Urine   URINALYSIS MICROSCOPIC - Abnormal; Notable for the following components:    RBC, UA 19 (*)     All other components within normal limits    Narrative:     Specimen Source->Urine   BETA - HYDROXYBUTYRATE, SERUM   LACTIC ACID, PLASMA   PROCALCITONIN   POCT URINE PREGNANCY          Imaging Results              CT Abdomen Pelvis With Contrast (Final result)  Result time 10/08/22 11:23:55      Final result by Debra Gilman MD (10/08/22 11:23:55)                   Impression:      No acute finding.  No evidence of hydronephrosis or ureteral calculus.      Electronically signed by: Debra Gilman MD  Date:    10/08/2022  Time:    11:23               Narrative:    EXAMINATION:  CT ABDOMEN PELVIS WITH CONTRAST    CLINICAL HISTORY:  Flank pain, kidney stone suspected;    TECHNIQUE:  Low dose axial images, sagittal and coronal reformations were obtained from the lung bases to the pubic symphysis following the IV administration of 100 mL of Omnipaque 350 and no oral contrast    COMPARISON:  June 2022    FINDINGS:  Lung bases are  unremarkable.    The liver demonstrates no focal abnormality.  The spleen is not enlarged.    The stomach, pancreas and adrenal glands are unremarkable.    The gallbladder demonstrates no gallstones.  There is no biliary ductal dilatation.    The kidneys concentrate contrast satisfactorily.  There is no renal mass or hydronephrosis.  The ureters are normal in course and caliber.  Bladder is partially distended grossly unremarkable.    The aorta tapers normally without adjacent lymphadenopathy.  There is no pelvic lymphadenopathy.    Uterus is present.  Surgical clips in the adnexal regions are most compatible with tubal ligation.    There is colonic diverticulosis.  There is no finding to indicate diverticulitis.  There is no bowel distension.  The appendix is unremarkable.    The osseous structures demonstrate mild degenerative change.  There is osteitis condensans illi.                                       Medications   morphine injection 4 mg (4 mg Intravenous Given 10/8/22 1044)   ondansetron injection 4 mg (4 mg Intravenous Given 10/8/22 1044)   dextrose 5 % and 0.9% NaCl 5-0.9 % bolus 1,000 mL (0 mLs Intravenous Stopped 10/8/22 1245)   iohexoL (OMNIPAQUE 350) injection 100 mL (100 mLs Intravenous Given 10/8/22 1101)   cefTRIAXone (ROCEPHIN) 1 g/50 mL D5W IVPB (0 g Intravenous Stopped 10/8/22 1235)     Medical Decision Making:   Initial Assessment:   47-year-old female presenting with flank pain.  On exam patient appears uncomfortable.  Less persistent nausea and vomiting.  Tenderness to percussion right flank.  Suspect stone versus infection.  Urinalysis with reds, few whites.  Leukocytosis noted.  Will get CT scan.  Will provide analgesia, reassess.  ED Management:  CT scan unremarkable.  Possible UTI.  Patient given prescription for Keflex, ceftriaxone given.  Pain improved.  Believe she is safe for discharge home.  Discussed return precautions.                        Clinical Impression:   Final  diagnoses:  [M54.6] Right-sided thoracic back pain, unspecified chronicity (Primary)  [N39.0, R31.9] Urinary tract infection with hematuria, site unspecified      ED Disposition Condition    Discharge Stable          ED Prescriptions       Medication Sig Dispense Start Date End Date Auth. Provider    cephALEXin (KEFLEX) 500 MG capsule Take 1 capsule (500 mg total) by mouth every 8 (eight) hours. for 5 days 15 capsule 10/8/2022 10/13/2022 Venkat Montenegro MD    HYDROcodone-acetaminophen (NORCO) 5-325 mg per tablet Take 1 tablet by mouth every 6 (six) hours as needed for Pain. 8 tablet 10/8/2022 10/10/2022 Venkat Montenegro MD          Follow-up Information       Follow up With Specialties Details Why Contact Info    Brooks Bergeron MD Internal Medicine Schedule an appointment as soon as possible for a visit   [address] [facility] [address]  [phone]      [facility] Emergency Dept Emergency Medicine  As needed, If symptoms worsen [address] [place], [place] [address] [address] [phone]             Venkat Montenegro MD  10/08/22 0391

## 2022-10-11 ENCOUNTER — PATIENT MESSAGE (OUTPATIENT)
Dept: ADMINISTRATIVE | Facility: HOSPITAL | Age: 47
End: 2022-10-11
Payer: COMMERCIAL

## 2022-10-13 ENCOUNTER — TELEPHONE (OUTPATIENT)
Dept: FAMILY MEDICINE | Facility: CLINIC | Age: 47
End: 2022-10-13
Payer: COMMERCIAL

## 2022-10-21 ENCOUNTER — PATIENT OUTREACH (OUTPATIENT)
Dept: ADMINISTRATIVE | Facility: HOSPITAL | Age: 47
End: 2022-10-21
Payer: COMMERCIAL

## 2022-11-09 PROBLEM — E66.09 CLASS 1 OBESITY DUE TO EXCESS CALORIES WITH SERIOUS COMORBIDITY AND BODY MASS INDEX (BMI) OF 33.0 TO 33.9 IN ADULT: Status: ACTIVE | Noted: 2021-07-04

## 2022-12-06 ENCOUNTER — PATIENT OUTREACH (OUTPATIENT)
Dept: ADMINISTRATIVE | Facility: HOSPITAL | Age: 47
End: 2022-12-06
Payer: COMMERCIAL

## 2022-12-06 NOTE — ASSESSMENT & PLAN NOTE
Failed out patient treatment. X-ray no free air. Clinical diagnosis as patient states this feels just like her previous diverticulitis.  IV fluids with IV Zosyn/Flagyl. (cipro allergy). No sepsis.      WBC count resolved. Clinically improved. Blood cultures are NG     Resolved. Cipro/flagyl for 12 days      Scc Poorly Differentiated Histology Text: There were aggregates of poorly-differentiated squamous cells.

## 2023-01-09 ENCOUNTER — PATIENT MESSAGE (OUTPATIENT)
Dept: ADMINISTRATIVE | Facility: HOSPITAL | Age: 48
End: 2023-01-09
Payer: COMMERCIAL

## 2023-04-12 ENCOUNTER — PATIENT MESSAGE (OUTPATIENT)
Dept: ADMINISTRATIVE | Facility: HOSPITAL | Age: 48
End: 2023-04-12
Payer: COMMERCIAL

## 2023-04-27 ENCOUNTER — HOSPITAL ENCOUNTER (EMERGENCY)
Facility: HOSPITAL | Age: 48
Discharge: HOME | End: 2023-04-28
Attending: STUDENT IN AN ORGANIZED HEALTH CARE EDUCATION/TRAINING PROGRAM
Payer: COMMERCIAL

## 2023-04-27 DIAGNOSIS — E86.0 DEHYDRATION: ICD-10-CM

## 2023-04-27 DIAGNOSIS — R11.2 NAUSEA AND VOMITING, UNSPECIFIED VOMITING TYPE: Primary | ICD-10-CM

## 2023-04-27 DIAGNOSIS — E87.6 HYPOKALEMIA: ICD-10-CM

## 2023-04-27 DIAGNOSIS — R10.9 ABDOMINAL PAIN, UNSPECIFIED ABDOMINAL LOCATION: ICD-10-CM

## 2023-04-27 LAB
ALBUMIN SERPL BCP-MCNC: 4.8 G/DL (ref 3.5–5.2)
ALP SERPL-CCNC: 111 U/L (ref 55–135)
ALT SERPL W/O P-5'-P-CCNC: 17 U/L (ref 10–44)
ANION GAP SERPL CALC-SCNC: 16 MMOL/L (ref 8–16)
AST SERPL-CCNC: 19 U/L (ref 10–40)
B-HCG UR QL: NEGATIVE
BACTERIA #/AREA URNS HPF: ABNORMAL /HPF
BASOPHILS # BLD AUTO: 0.02 K/UL (ref 0–0.2)
BASOPHILS NFR BLD: 0.1 % (ref 0–1.9)
BILIRUB SERPL-MCNC: 0.7 MG/DL (ref 0.1–1)
BILIRUB UR QL STRIP: NEGATIVE
BUN SERPL-MCNC: 7 MG/DL (ref 6–20)
CALCIUM SERPL-MCNC: 10.4 MG/DL (ref 8.7–10.5)
CHLORIDE SERPL-SCNC: 102 MMOL/L (ref 95–110)
CLARITY UR: ABNORMAL
CO2 SERPL-SCNC: 22 MMOL/L (ref 23–29)
COLOR UR: YELLOW
CREAT SERPL-MCNC: 0.9 MG/DL (ref 0.5–1.4)
CTP QC/QA: YES
DIFFERENTIAL METHOD: ABNORMAL
EOSINOPHIL # BLD AUTO: 0 K/UL (ref 0–0.5)
EOSINOPHIL NFR BLD: 0 % (ref 0–8)
ERYTHROCYTE [DISTWIDTH] IN BLOOD BY AUTOMATED COUNT: 14.3 % (ref 11.5–14.5)
EST. GFR  (NO RACE VARIABLE): >60 ML/MIN/1.73 M^2
GLUCOSE SERPL-MCNC: 202 MG/DL (ref 70–110)
GLUCOSE UR QL STRIP: ABNORMAL
HCT VFR BLD AUTO: 46.4 % (ref 37–48.5)
HGB BLD-MCNC: 16.6 G/DL (ref 12–16)
HGB UR QL STRIP: NEGATIVE
HYALINE CASTS #/AREA URNS LPF: 0 /LPF
IMM GRANULOCYTES # BLD AUTO: 0.07 K/UL (ref 0–0.04)
IMM GRANULOCYTES NFR BLD AUTO: 0.4 % (ref 0–0.5)
KETONES UR QL STRIP: ABNORMAL
LACTATE SERPL-SCNC: 1.7 MMOL/L (ref 0.5–2.2)
LEUKOCYTE ESTERASE UR QL STRIP: NEGATIVE
LIPASE SERPL-CCNC: 18 U/L (ref 4–60)
LYMPHOCYTES # BLD AUTO: 1.1 K/UL (ref 1–4.8)
LYMPHOCYTES NFR BLD: 6.7 % (ref 18–48)
MAGNESIUM SERPL-MCNC: 1.7 MG/DL (ref 1.6–2.6)
MCH RBC QN AUTO: 29.3 PG (ref 27–31)
MCHC RBC AUTO-ENTMCNC: 35.8 G/DL (ref 32–36)
MCV RBC AUTO: 82 FL (ref 82–98)
MICROSCOPIC COMMENT: ABNORMAL
MONOCYTES # BLD AUTO: 0.5 K/UL (ref 0.3–1)
MONOCYTES NFR BLD: 2.8 % (ref 4–15)
NEUTROPHILS # BLD AUTO: 15.2 K/UL (ref 1.8–7.7)
NEUTROPHILS NFR BLD: 90 % (ref 38–73)
NITRITE UR QL STRIP: NEGATIVE
NRBC BLD-RTO: 0 /100 WBC
PH UR STRIP: 8 [PH] (ref 5–8)
PLATELET # BLD AUTO: 317 K/UL (ref 150–450)
PMV BLD AUTO: 10.5 FL (ref 9.2–12.9)
POTASSIUM SERPL-SCNC: 2.8 MMOL/L (ref 3.5–5.1)
PROT SERPL-MCNC: 9.2 G/DL (ref 6–8.4)
PROT UR QL STRIP: ABNORMAL
RBC # BLD AUTO: 5.67 M/UL (ref 4–5.4)
RBC #/AREA URNS HPF: 19 /HPF (ref 0–4)
SODIUM SERPL-SCNC: 140 MMOL/L (ref 136–145)
SP GR UR STRIP: 1.02 (ref 1–1.03)
SQUAMOUS #/AREA URNS HPF: 6 /HPF
URN SPEC COLLECT METH UR: ABNORMAL
UROBILINOGEN UR STRIP-ACNC: NEGATIVE EU/DL
WBC # BLD AUTO: 16.87 K/UL (ref 3.9–12.7)
WBC #/AREA URNS HPF: 6 /HPF (ref 0–5)
YEAST URNS QL MICRO: ABNORMAL

## 2023-04-27 PROCEDURE — 83605 ASSAY OF LACTIC ACID: CPT | Performed by: STUDENT IN AN ORGANIZED HEALTH CARE EDUCATION/TRAINING PROGRAM

## 2023-04-27 PROCEDURE — 96374 THER/PROPH/DIAG INJ IV PUSH: CPT

## 2023-04-27 PROCEDURE — 96361 HYDRATE IV INFUSION ADD-ON: CPT

## 2023-04-27 PROCEDURE — 96375 TX/PRO/DX INJ NEW DRUG ADDON: CPT

## 2023-04-27 PROCEDURE — 85025 COMPLETE CBC W/AUTO DIFF WBC: CPT | Performed by: STUDENT IN AN ORGANIZED HEALTH CARE EDUCATION/TRAINING PROGRAM

## 2023-04-27 PROCEDURE — 83690 ASSAY OF LIPASE: CPT | Performed by: STUDENT IN AN ORGANIZED HEALTH CARE EDUCATION/TRAINING PROGRAM

## 2023-04-27 PROCEDURE — 81000 URINALYSIS NONAUTO W/SCOPE: CPT | Performed by: STUDENT IN AN ORGANIZED HEALTH CARE EDUCATION/TRAINING PROGRAM

## 2023-04-27 PROCEDURE — 83735 ASSAY OF MAGNESIUM: CPT | Performed by: STUDENT IN AN ORGANIZED HEALTH CARE EDUCATION/TRAINING PROGRAM

## 2023-04-27 PROCEDURE — 99285 EMERGENCY DEPT VISIT HI MDM: CPT | Mod: 25

## 2023-04-27 PROCEDURE — 25000003 PHARM REV CODE 250: Performed by: STUDENT IN AN ORGANIZED HEALTH CARE EDUCATION/TRAINING PROGRAM

## 2023-04-27 PROCEDURE — 81025 URINE PREGNANCY TEST: CPT | Performed by: STUDENT IN AN ORGANIZED HEALTH CARE EDUCATION/TRAINING PROGRAM

## 2023-04-27 PROCEDURE — 63600175 PHARM REV CODE 636 W HCPCS: Performed by: STUDENT IN AN ORGANIZED HEALTH CARE EDUCATION/TRAINING PROGRAM

## 2023-04-27 PROCEDURE — 80053 COMPREHEN METABOLIC PANEL: CPT | Performed by: STUDENT IN AN ORGANIZED HEALTH CARE EDUCATION/TRAINING PROGRAM

## 2023-04-27 RX ORDER — MAG HYDROX/ALUMINUM HYD/SIMETH 200-200-20
30 SUSPENSION, ORAL (FINAL DOSE FORM) ORAL ONCE
Status: COMPLETED | OUTPATIENT
Start: 2023-04-27 | End: 2023-04-27

## 2023-04-27 RX ORDER — ONDANSETRON 2 MG/ML
4 INJECTION INTRAMUSCULAR; INTRAVENOUS
Status: COMPLETED | OUTPATIENT
Start: 2023-04-27 | End: 2023-04-27

## 2023-04-27 RX ORDER — LIDOCAINE HYDROCHLORIDE 20 MG/ML
15 SOLUTION OROPHARYNGEAL ONCE
Status: COMPLETED | OUTPATIENT
Start: 2023-04-27 | End: 2023-04-27

## 2023-04-27 RX ORDER — DROPERIDOL 2.5 MG/ML
0.62 INJECTION, SOLUTION INTRAMUSCULAR; INTRAVENOUS ONCE
Status: COMPLETED | OUTPATIENT
Start: 2023-04-28 | End: 2023-04-27

## 2023-04-27 RX ORDER — MORPHINE SULFATE 4 MG/ML
4 INJECTION, SOLUTION INTRAMUSCULAR; INTRAVENOUS
Status: COMPLETED | OUTPATIENT
Start: 2023-04-27 | End: 2023-04-27

## 2023-04-27 RX ADMIN — ONDANSETRON 4 MG: 2 INJECTION INTRAMUSCULAR; INTRAVENOUS at 10:04

## 2023-04-27 RX ADMIN — SODIUM CHLORIDE 1000 ML: 9 INJECTION, SOLUTION INTRAVENOUS at 11:04

## 2023-04-27 RX ADMIN — MORPHINE SULFATE 4 MG: 4 INJECTION, SOLUTION INTRAMUSCULAR; INTRAVENOUS at 11:04

## 2023-04-27 RX ADMIN — LIDOCAINE HYDROCHLORIDE 15 ML: 20 SOLUTION ORAL at 10:04

## 2023-04-27 RX ADMIN — POTASSIUM BICARBONATE 50 MEQ: 977.5 TABLET, EFFERVESCENT ORAL at 11:04

## 2023-04-27 RX ADMIN — ALUMINUM HYDROXIDE, MAGNESIUM HYDROXIDE, AND DIMETHICONE 30 ML: 200; 20; 200 SUSPENSION ORAL at 10:04

## 2023-04-27 RX ADMIN — DROPERIDOL 0.62 MG: 2.5 INJECTION, SOLUTION INTRAMUSCULAR; INTRAVENOUS at 11:04

## 2023-04-27 NOTE — Clinical Note
"Cipriano"Abdullahi Gamez was seen and treated in our emergency department on 4/27/2023.  She may return to work on 05/01/2023.       If you have any questions or concerns, please don't hesitate to call.      Dori LAWRENCE RN    "

## 2023-04-28 VITALS
SYSTOLIC BLOOD PRESSURE: 139 MMHG | HEART RATE: 84 BPM | RESPIRATION RATE: 20 BRPM | TEMPERATURE: 98 F | WEIGHT: 214.94 LBS | HEIGHT: 67 IN | DIASTOLIC BLOOD PRESSURE: 86 MMHG | BODY MASS INDEX: 33.74 KG/M2 | OXYGEN SATURATION: 99 %

## 2023-04-28 PROCEDURE — 25500020 PHARM REV CODE 255: Performed by: STUDENT IN AN ORGANIZED HEALTH CARE EDUCATION/TRAINING PROGRAM

## 2023-04-28 PROCEDURE — 63600175 PHARM REV CODE 636 W HCPCS: Performed by: STUDENT IN AN ORGANIZED HEALTH CARE EDUCATION/TRAINING PROGRAM

## 2023-04-28 RX ORDER — PROMETHAZINE HYDROCHLORIDE 25 MG/1
25 TABLET ORAL EVERY 6 HOURS PRN
Qty: 15 TABLET | Refills: 0 | Status: SHIPPED | OUTPATIENT
Start: 2023-04-28

## 2023-04-28 RX ADMIN — IOHEXOL 70 ML: 350 INJECTION, SOLUTION INTRAVENOUS at 01:04

## 2023-04-28 NOTE — ED PROVIDER NOTES
Encounter Date: 4/27/2023       History     Chief Complaint   Patient presents with    Nausea    Vomiting    Weakness     Patient arrives with complaints of nausea, vomiting, and weakness, ongoing since early this morning. Denies blood in vomit. Hypertensive in triage, was unable to keep her medication down today due to vomiting.     47-year-old female with past medical history of diverticulitis, hiatal hernia, cyclical vomiting syndrome presents with nausea, vomiting, abdominal pain and generalized fatigue.  Patient reports symptoms began this morning and has not been able to keep anything down.  Furthermore she reports having left-sided abdominal pain in his concerned it may be her diverticulitis recurrence.  Denies any diarrhea melena or constipation.  Emesis has been nonbloody nonbilious.  Denies any fevers or chills or recent sick contacts.  Denies dysuria or hematuria.        Review of patient's allergies indicates:   Allergen Reactions    Ciprofloxacin Swelling and Blisters    Ibuprofen Hives    Meloxicam      rash    Cyclobenzaprine Rash     rasg     Past Medical History:   Diagnosis Date    Abnormal Pap smear of cervix     Cigarette smoker     Diverticulosis     GERD (gastroesophageal reflux disease)     Hiatal hernia     Hypertension      Past Surgical History:   Procedure Laterality Date    CERVICAL BIOPSY  W/ LOOP ELECTRODE EXCISION  2/11/14    LAPAROSCOPIC OCCLUSION OF OVIDUCTS BY DEVICE Bilateral 7/10/2020    Procedure: OCCLUSION, FALLOPIAN TUBE, LAPAROSCOPIC, USING DEVICE;  Surgeon: Alex Waller MD;  Location: [facility] OR;  Service: OB/GYN;  Laterality: Bilateral;  RN PREOP 6/23/2020   T/S--COVID NEGATIVE---UPT  CONSENTS INCOMPLETE     Family History   Problem Relation Age of Onset    Heart disease Mother 54        MI    Heart disease Maternal Grandmother 40        MI    Diabetes Other     Heart disease Brother 44        MI    Sickle cell trait Sister     Crohn's disease Neg Hx     Colon cancer Neg Hx       Social History     Tobacco Use    Smoking status: Every Day     Packs/day: 1.00     Years: 16.00     Pack years: 16.00     Types: Cigarettes    Smokeless tobacco: Never   Substance Use Topics    Alcohol use: Yes     Comment: social 1-2 x / month    Drug use: Yes     Frequency: 1.0 times per week     Types: Marijuana     Comment: daily     Review of Systems   Constitutional:  Negative for chills and fever.   HENT:  Negative for congestion and rhinorrhea.    Eyes:  Negative for pain.   Respiratory:  Negative for cough and shortness of breath.    Cardiovascular:  Negative for chest pain and leg swelling.   Gastrointestinal:  Positive for abdominal pain, nausea and vomiting.   Endocrine: Negative for polyuria.   Genitourinary:  Negative for dysuria and hematuria.   Musculoskeletal:  Negative for gait problem and neck pain.   Skin:  Negative for rash.   Allergic/Immunologic: Negative for immunocompromised state.   Neurological:  Negative for weakness and headaches.     Physical Exam     Initial Vitals [04/27/23 2159]   BP Pulse Resp Temp SpO2   (!) 197/110 88 18 97.9 °F (36.6 °C) 97 %      MAP       --         Physical Exam    Nursing note and vitals reviewed.  Constitutional: She appears well-developed and well-nourished. She is not diaphoretic. No distress.   HENT:   Head: Normocephalic and atraumatic.   Eyes: Conjunctivae and EOM are normal. Pupils are equal, round, and reactive to light.   Neck:   Normal range of motion.  Cardiovascular:  Regular rhythm.           Pulmonary/Chest: Breath sounds normal. No respiratory distress.   Abdominal: Abdomen is soft. Bowel sounds are normal. She exhibits no distension. There is generalized abdominal tenderness. There is no rebound and no guarding.   Musculoskeletal:         General: No tenderness. Normal range of motion.      Cervical back: Normal range of motion.     Lymphadenopathy:     She has no cervical adenopathy.   Neurological: She is alert and oriented to person,  place, and time.   Skin: Skin is warm. Capillary refill takes less than 2 seconds.   Psychiatric: She has a normal mood and affect. Her behavior is normal.       ED Course   Procedures  Labs Reviewed   CBC W/ AUTO DIFFERENTIAL - Abnormal; Notable for the following components:       Result Value    WBC 16.87 (*)     RBC 5.67 (*)     Hemoglobin 16.6 (*)     Gran # (ANC) 15.2 (*)     Immature Grans (Abs) 0.07 (*)     Gran % 90.0 (*)     Lymph % 6.7 (*)     Mono % 2.8 (*)     All other components within normal limits   COMPREHENSIVE METABOLIC PANEL - Abnormal; Notable for the following components:    Potassium 2.8 (*)     CO2 22 (*)     Glucose 202 (*)     Total Protein 9.2 (*)     All other components within normal limits   URINALYSIS, REFLEX TO URINE CULTURE - Abnormal; Notable for the following components:    Appearance, UA Hazy (*)     Protein, UA 3+ (*)     Glucose, UA 3+ (*)     Ketones, UA 3+ (*)     All other components within normal limits    Narrative:     Specimen Source->Urine   URINALYSIS MICROSCOPIC - Abnormal; Notable for the following components:    RBC, UA 19 (*)     WBC, UA 6 (*)     Bacteria Few (*)     All other components within normal limits    Narrative:     Specimen Source->Urine   LIPASE   MAGNESIUM   LACTIC ACID, PLASMA   POCT URINE PREGNANCY          Imaging Results              CT Abdomen Pelvis With Contrast (Final result)  Result time 04/28/23 01:42:19      Final result by Dana Mcfarlane MD (04/28/23 01:42:19)                   Impression:      No acute abdominal or pelvic pathology CT of the abdomen and pelvis with contrast.    Colonic diverticulosis.      Electronically signed by: Dana Mcfarlane  Date:    04/28/2023  Time:    01:42               Narrative:    EXAMINATION:  CT OF ABDOMEN PELVIS WITH    CLINICAL HISTORY:  LLQ abdominal pain;History of diverticulosis;    TECHNIQUE:  5 mm enhanced axial images were obtained from the lung bases through the greater trochanters.  Seventy  mL of Omnipaque 350 was injected.    COMPARISON:  10/08/2022    FINDINGS:  The liver is not enlarged.  There is no significant change in the appearance of the liver.  There is no intrahepatic biliary ductal dilatation.    Spleen, pancreas, kidneys, and adrenal glands are unremarkable. The gallbladder contains no calcified gallstones.    There is no definite evidence for abdominal adenopathy or ascites.  There is a small fat containing umbilical hernia.    Colonic diverticulosis present without definite evidence of acute diverticulitis.  There are no pelvic masses or adenopathy.  The uterus is tilted to the right.  The appendix is not inflamed.  Tubal ligation clips are seen.    There is no free fluid in the pelvis.    There is mild bibasilar atelectasis.    Sclerotic changes and vacuum phenomena seen at the sacroiliac joints suggesting degenerative change.                                       Medications   ondansetron injection 4 mg (4 mg Intravenous Given 4/27/23 2250)   aluminum-magnesium hydroxide-simethicone 200-200-20 mg/5 mL suspension 30 mL (30 mLs Oral Given 4/27/23 2253)     And   LIDOcaine HCl 2% oral solution 15 mL (15 mLs Oral Given 4/27/23 2253)   sodium chloride 0.9% bolus 1,000 mL 1,000 mL (0 mLs Intravenous Stopped 4/28/23 0049)   morphine injection 4 mg (4 mg Intravenous Given 4/27/23 2359)   droPERidol injection 0.625 mg (0.625 mg Intravenous Given 4/27/23 2355)   potassium bicarbonate disintegrating tablet 50 mEq (50 mEq Oral Given 4/27/23 2353)   iohexoL (OMNIPAQUE 350) injection 70 mL (70 mLs Intravenous Given 4/28/23 0114)     Medical Decision Making:   History:   Old Medical Records: I decided to obtain old medical records.  Initial Assessment:   47-year-old female with past medical history of diverticulitis, hiatal hernia, cyclical vomiting syndrome presents with nausea, vomiting, abdominal pain and generalized fatigue.  Patient currently in no acute distress, vitals notable for elevated  blood pressure.  Abdominal exam without peritoneal signs. No evidence of acute abdomen at this time. Pts abdominal pain likely secondary to cyclical vomiting syndrome but given her history of diverticulosis concern for diverticulitis.  Reassuring patient is afebrile.. Differential includes acute hepatobiliary disease (including acute cholecystitis or cholangitis), acute pancreatitis (neg lipase), PUD (including gastric perforation), acute infectious processes (pneumonia, hepatitis, pyelonephritis), acute appendicitis, vascular catastrophe, bowel obstruction, viscus perforation, or diverticulitis. Will w/u with labs and imaging pt was given morphine for pain control. Will reassess after labs and rest of the w/u.    Clinical Tests:   Lab Tests: Ordered and Reviewed  Radiological Study: Ordered and Reviewed  Medical Tests: Ordered and Reviewed           ED Course as of 04/28/23 0256   Thu Apr 27, 2023 2341 Potassium(!): 2.8  Chemistry without any acute sodium abnormalities, potassium of 2.8 likely from vomiting episode.  Will replete p.o..  Creatinine within normal limits.  No evidence of transaminitis.  UA without signs of infection.  She does have ketonuria and glucosuria. [AS]   2341 WBC(!): 16.87  CBC with leukocytosis to 16.8, no acute anemia or platelet dysfunction.  Suspect hemoconcentration secondary to dehydration.  Leukocytosis likely from de marginalization from vomiting episodes.  But given her history of diverticulosis will obtain CT abdomen and pelvis to assess for diverticulitis.  Reassuring lactic acid is normal. [AS]   Fri Apr 28, 2023   0005 Ketones, UA(!): 3+ [AS]   0103 Lactate, Mikhail: 1.7 [AS]   0149 The patient was reassessed and on subsequent re-evaluation, they were subjectively feeling better. They were resting comfortably and in no acute distress. I discussed the laboratory and diagnostic findings with the patient. Pt is currently stable for discharge. I see no indication of an emergent  process beyond that addressed during our encounter but have duly counseled the patient/family regarding the need for prompt follow-up as well as the indications that should prompt immediate return to the emergency room should new or worrisome developments occur. The patient/family has been provided with verbal and printed direction regarding our final diagnosis(es) as well as instructions regarding use of OTC and/or Rx medications intended to manage the patient's aforementioned conditions. The patient/family verbalized an understanding. The patient/family is asked if there are any questions or concerns. We discuss the case, until all issues are addressed to the patient/family's satisfaction. Patient/family understands and is agreeable to the plan.   [AS]      ED Course User Index  [AS] Adonis Pan MD          DISCLAIMER: This note was prepared with Assembly voice recognition transcription software. Garbled syntax, mangled pronouns, and other bizarre constructions may be attributed to that software system.        Clinical Impression:   Final diagnoses:  [R11.2] Nausea and vomiting, unspecified vomiting type (Primary)  [R10.9] Abdominal pain, unspecified abdominal location  [E86.0] Dehydration  [E87.6] Hypokalemia        ED Disposition Condition    Discharge Stable          ED Prescriptions       Medication Sig Dispense Start Date End Date Auth. Provider    promethazine (PHENERGAN) 25 MG tablet Take 1 tablet (25 mg total) by mouth every 6 (six) hours as needed for Nausea. 15 tablet 4/28/2023 -- Adonis Pan MD          Follow-up Information       Follow up With Specialties Details Why Contact Info    Brooks Bergeron MD Internal Medicine Schedule an appointment as soon as possible for a visit  for reassesment [address] [facility] [address] [facility]  [place] [address]  [phone]      [facility] Emergency Dept Emergency Medicine  If symptoms worsen [address]  [facility] [address] [phone]             Adonis  MD Maxim  04/28/23 0256

## 2023-04-28 NOTE — ED NOTES
Pt is aaox3. Nadn. Resp eu. Vss. Passed po challenge. Passed road test. Ambulated to wr w/steady gait. Ride to drive home

## 2023-04-28 NOTE — ED NOTES
Pt to ed c/o n/v onset this morning, denies diarrhea and fever. Denies cp or sob. Had b/l tubal ligation. Hx of htn. Attempted to take bp meds today but was unable to keep anything down. Reports feeling fine yesterday

## 2023-04-28 NOTE — DISCHARGE INSTRUCTIONS
Thank you for coming to our Emergency Department today. It is important to remember that some problems are difficult to diagnose and may not be found during your first visit. Be sure to follow up with your primary care doctor and review any labs/imaging that was performed with them. If you do not have a primary care doctor, you may contact the one listed on your discharge paperwork or you may also call the [facility] Appointment Desk at [phone] to schedule an appointment with one.     All medications may potentially have side effects and it is impossible to predict which medications may give you side effects. If you feel that you are having a negative effect of any medication you should immediately stop taking them and seek medical attention.    Return to the ER with any questions/concerns, new/concerning symptoms, worsening or failure to improve. Do not drive or make any important decisions for 24 hours if you have received any pain medications, sedatives or mood altering drugs during your ER visit.

## 2023-05-08 ENCOUNTER — PATIENT OUTREACH (OUTPATIENT)
Dept: ADMINISTRATIVE | Facility: HOSPITAL | Age: 48
End: 2023-05-08
Payer: COMMERCIAL

## 2023-05-18 ENCOUNTER — PATIENT MESSAGE (OUTPATIENT)
Dept: ADMINISTRATIVE | Facility: HOSPITAL | Age: 48
End: 2023-05-18
Payer: COMMERCIAL

## 2023-05-29 ENCOUNTER — PATIENT MESSAGE (OUTPATIENT)
Dept: ADMINISTRATIVE | Facility: HOSPITAL | Age: 48
End: 2023-05-29
Payer: COMMERCIAL

## 2023-06-15 DIAGNOSIS — I10 ESSENTIAL HYPERTENSION: ICD-10-CM

## 2023-06-15 RX ORDER — AMLODIPINE BESYLATE 10 MG/1
TABLET ORAL
Qty: 90 TABLET | OUTPATIENT
Start: 2023-06-15

## 2023-06-15 NOTE — TELEPHONE ENCOUNTER
Care Due:                  Date            Visit Type   Department     Provider  --------------------------------------------------------------------------------                                             [facility]     MED/ INTERNAL  Last Visit: 09-      FOLLOW UP    MED/ COLETTE Bergeron  Next Visit: None Scheduled  None         None Found                                                            Last  Test          Frequency    Reason                     Performed    Due Date  --------------------------------------------------------------------------------    Office Visit  12 months..  amLODIPine...............  09- 09-    [facility] Embedded Care Due Messages. Reference number: [id number].   6/15/2023 12:17:37 AM CDT

## 2023-07-31 DIAGNOSIS — I10 ESSENTIAL HYPERTENSION: ICD-10-CM

## 2023-07-31 RX ORDER — AMLODIPINE BESYLATE 10 MG/1
10 TABLET ORAL DAILY
Qty: 30 TABLET | Refills: 0 | Status: CANCELLED | OUTPATIENT
Start: 2023-07-31

## 2023-07-31 NOTE — TELEPHONE ENCOUNTER
No care due was identified.  [facility] Embedded Care Due Messages. Reference number: [id number].   7/31/2023 1:49:34 PM CDT

## 2024-08-24 ENCOUNTER — HOSPITAL ENCOUNTER (INPATIENT)
Facility: HOSPITAL | Age: 49
LOS: 5 days | Discharge: HOME | DRG: 871 | End: 2024-08-30
Attending: STUDENT IN AN ORGANIZED HEALTH CARE EDUCATION/TRAINING PROGRAM | Admitting: INTERNAL MEDICINE

## 2024-08-24 DIAGNOSIS — N17.9 ACUTE KIDNEY INJURY: ICD-10-CM

## 2024-08-24 DIAGNOSIS — K57.20 DIVERTICULITIS OF COLON WITH PERFORATION: ICD-10-CM

## 2024-08-24 DIAGNOSIS — R65.20 SEVERE SEPSIS: Primary | ICD-10-CM

## 2024-08-24 DIAGNOSIS — E87.6 HYPOKALEMIA: ICD-10-CM

## 2024-08-24 DIAGNOSIS — R07.9 CHEST PAIN: ICD-10-CM

## 2024-08-24 DIAGNOSIS — E86.0 SEVERE DEHYDRATION: ICD-10-CM

## 2024-08-24 DIAGNOSIS — E11.65 TYPE 2 DIABETES MELLITUS WITH HYPERGLYCEMIA, WITHOUT LONG-TERM CURRENT USE OF INSULIN: ICD-10-CM

## 2024-08-24 DIAGNOSIS — K57.92 DIVERTICULITIS: ICD-10-CM

## 2024-08-24 DIAGNOSIS — A41.9 SEVERE SEPSIS: Primary | ICD-10-CM

## 2024-08-24 LAB
ALBUMIN SERPL BCP-MCNC: 3.8 G/DL (ref 3.5–5.2)
ALP SERPL-CCNC: 95 U/L (ref 55–135)
ALT SERPL W/O P-5'-P-CCNC: 9 U/L (ref 10–44)
ANION GAP SERPL CALC-SCNC: 17 MMOL/L (ref 8–16)
AST SERPL-CCNC: 12 U/L (ref 10–40)
B-OH-BUTYR BLD STRIP-SCNC: 0.3 MMOL/L (ref 0–0.5)
BASOPHILS # BLD AUTO: 0.03 K/UL (ref 0–0.2)
BASOPHILS NFR BLD: 0.1 % (ref 0–1.9)
BILIRUB SERPL-MCNC: 0.9 MG/DL (ref 0.1–1)
BUN SERPL-MCNC: 28 MG/DL (ref 6–20)
CALCIUM SERPL-MCNC: 10.2 MG/DL (ref 8.7–10.5)
CHLORIDE SERPL-SCNC: 89 MMOL/L (ref 95–110)
CO2 SERPL-SCNC: 25 MMOL/L (ref 23–29)
CREAT SERPL-MCNC: 2.6 MG/DL (ref 0.5–1.4)
DIFFERENTIAL METHOD BLD: ABNORMAL
EOSINOPHIL # BLD AUTO: 0 K/UL (ref 0–0.5)
EOSINOPHIL NFR BLD: 0.1 % (ref 0–8)
ERYTHROCYTE [DISTWIDTH] IN BLOOD BY AUTOMATED COUNT: 13.7 % (ref 11.5–14.5)
EST. GFR  (NO RACE VARIABLE): 22 ML/MIN/1.73 M^2
GLUCOSE SERPL-MCNC: 217 MG/DL (ref 70–110)
HCT VFR BLD AUTO: 47.1 % (ref 37–48.5)
HGB BLD-MCNC: 16.7 G/DL (ref 12–16)
IMM GRANULOCYTES # BLD AUTO: 0.13 K/UL (ref 0–0.04)
IMM GRANULOCYTES NFR BLD AUTO: 0.6 % (ref 0–0.5)
LACTATE SERPL-SCNC: 3 MMOL/L (ref 0.5–2.2)
LIPASE SERPL-CCNC: 23 U/L (ref 4–60)
LYMPHOCYTES # BLD AUTO: 2.1 K/UL (ref 1–4.8)
LYMPHOCYTES NFR BLD: 9.8 % (ref 18–48)
MCH RBC QN AUTO: 29.6 PG (ref 27–31)
MCHC RBC AUTO-ENTMCNC: 35.5 G/DL (ref 32–36)
MCV RBC AUTO: 83 FL (ref 82–98)
MONOCYTES # BLD AUTO: 1.1 K/UL (ref 0.3–1)
MONOCYTES NFR BLD: 5 % (ref 4–15)
NEUTROPHILS # BLD AUTO: 18.2 K/UL (ref 1.8–7.7)
NEUTROPHILS NFR BLD: 84.4 % (ref 38–73)
NRBC BLD-RTO: 0 /100 WBC
PLATELET # BLD AUTO: 374 K/UL (ref 150–450)
PMV BLD AUTO: 9.9 FL (ref 9.2–12.9)
POTASSIUM SERPL-SCNC: 3 MMOL/L (ref 3.5–5.1)
PROT SERPL-MCNC: 8.2 G/DL (ref 6–8.4)
RBC # BLD AUTO: 5.65 M/UL (ref 4–5.4)
SODIUM SERPL-SCNC: 131 MMOL/L (ref 136–145)
TSH SERPL DL<=0.005 MIU/L-ACNC: 0.95 UIU/ML (ref 0.4–4)
WBC # BLD AUTO: 21.59 K/UL (ref 3.9–12.7)

## 2024-08-24 PROCEDURE — 82010 KETONE BODYS QUAN: CPT | Performed by: STUDENT IN AN ORGANIZED HEALTH CARE EDUCATION/TRAINING PROGRAM

## 2024-08-24 PROCEDURE — 87040 BLOOD CULTURE FOR BACTERIA: CPT | Performed by: STUDENT IN AN ORGANIZED HEALTH CARE EDUCATION/TRAINING PROGRAM

## 2024-08-24 PROCEDURE — 85025 COMPLETE CBC W/AUTO DIFF WBC: CPT | Performed by: NURSE PRACTITIONER

## 2024-08-24 PROCEDURE — 83690 ASSAY OF LIPASE: CPT | Performed by: NURSE PRACTITIONER

## 2024-08-24 PROCEDURE — 63600175 PHARM REV CODE 636 W HCPCS: Performed by: STUDENT IN AN ORGANIZED HEALTH CARE EDUCATION/TRAINING PROGRAM

## 2024-08-24 PROCEDURE — 96375 TX/PRO/DX INJ NEW DRUG ADDON: CPT

## 2024-08-24 PROCEDURE — 63600175 PHARM REV CODE 636 W HCPCS: Performed by: NURSE PRACTITIONER

## 2024-08-24 PROCEDURE — 80053 COMPREHEN METABOLIC PANEL: CPT | Performed by: NURSE PRACTITIONER

## 2024-08-24 PROCEDURE — 84443 ASSAY THYROID STIM HORMONE: CPT | Performed by: STUDENT IN AN ORGANIZED HEALTH CARE EDUCATION/TRAINING PROGRAM

## 2024-08-24 PROCEDURE — 83605 ASSAY OF LACTIC ACID: CPT | Performed by: STUDENT IN AN ORGANIZED HEALTH CARE EDUCATION/TRAINING PROGRAM

## 2024-08-24 PROCEDURE — 96361 HYDRATE IV INFUSION ADD-ON: CPT

## 2024-08-24 PROCEDURE — 81025 URINE PREGNANCY TEST: CPT | Performed by: NURSE PRACTITIONER

## 2024-08-24 RX ORDER — ONDANSETRON HYDROCHLORIDE 2 MG/ML
4 INJECTION, SOLUTION INTRAVENOUS
Status: COMPLETED | OUTPATIENT
Start: 2024-08-24 | End: 2024-08-24

## 2024-08-24 RX ORDER — DIPHENHYDRAMINE HYDROCHLORIDE 50 MG/ML
12.5 INJECTION INTRAMUSCULAR; INTRAVENOUS
Status: COMPLETED | OUTPATIENT
Start: 2024-08-24 | End: 2024-08-24

## 2024-08-24 RX ADMIN — SODIUM CHLORIDE, POTASSIUM CHLORIDE, SODIUM LACTATE AND CALCIUM CHLORIDE 2000 ML: 600; 310; 30; 20 INJECTION, SOLUTION INTRAVENOUS at 10:08

## 2024-08-24 RX ADMIN — DIPHENHYDRAMINE HYDROCHLORIDE 12.5 MG: 50 INJECTION INTRAMUSCULAR; INTRAVENOUS at 11:08

## 2024-08-24 RX ADMIN — ONDANSETRON 4 MG: 2 INJECTION INTRAMUSCULAR; INTRAVENOUS at 10:08

## 2024-08-24 NOTE — Clinical Note
Diagnosis: Severe sepsis [056599]   Future Attending Provider: RENEE FRANCO [[id number]]   Reason for IP Medical Treatment  (Clinical interventions that can only be accomplished in the IP setting? ) :: diverticulitis, sepsis   Special Needs:: No Special Needs [1]

## 2024-08-25 PROBLEM — K57.20 DIVERTICULITIS OF COLON WITH PERFORATION: Status: ACTIVE | Noted: 2020-08-20

## 2024-08-25 PROBLEM — E11.65 TYPE 2 DIABETES MELLITUS WITH HYPERGLYCEMIA: Status: ACTIVE | Noted: 2024-08-25

## 2024-08-25 PROBLEM — E78.5 HYPERLIPIDEMIA: Status: ACTIVE | Noted: 2024-08-25

## 2024-08-25 PROBLEM — N17.9 SEPSIS WITH ACUTE RENAL FAILURE: Status: ACTIVE | Noted: 2020-01-07

## 2024-08-25 LAB
AMPHET+METHAMPHET UR QL: NEGATIVE
ANION GAP SERPL CALC-SCNC: 11 MMOL/L (ref 8–16)
ANION GAP SERPL CALC-SCNC: 15 MMOL/L (ref 8–16)
B-HCG UR QL: NEGATIVE
BACTERIA #/AREA URNS HPF: ABNORMAL /HPF
BARBITURATES UR QL SCN>200 NG/ML: NEGATIVE
BASOPHILS # BLD AUTO: 0.02 K/UL (ref 0–0.2)
BASOPHILS NFR BLD: 0.1 % (ref 0–1.9)
BENZODIAZ UR QL SCN>200 NG/ML: NEGATIVE
BILIRUB UR QL STRIP: NEGATIVE
BUN SERPL-MCNC: 22 MG/DL (ref 6–20)
BUN SERPL-MCNC: 23 MG/DL (ref 6–20)
BZE UR QL SCN: NEGATIVE
CALCIUM SERPL-MCNC: 8.9 MG/DL (ref 8.7–10.5)
CALCIUM SERPL-MCNC: 9.3 MG/DL (ref 8.7–10.5)
CANNABINOIDS UR QL SCN: ABNORMAL
CHLORIDE SERPL-SCNC: 89 MMOL/L (ref 95–110)
CHLORIDE SERPL-SCNC: 93 MMOL/L (ref 95–110)
CLARITY UR: CLEAR
CO2 SERPL-SCNC: 30 MMOL/L (ref 23–29)
CO2 SERPL-SCNC: 31 MMOL/L (ref 23–29)
COLOR UR: YELLOW
CREAT SERPL-MCNC: 1.8 MG/DL (ref 0.5–1.4)
CREAT SERPL-MCNC: 1.9 MG/DL (ref 0.5–1.4)
CREAT UR-MCNC: 162.4 MG/DL (ref 15–325)
CTP QC/QA: YES
DIFFERENTIAL METHOD BLD: ABNORMAL
EOSINOPHIL # BLD AUTO: 0 K/UL (ref 0–0.5)
EOSINOPHIL NFR BLD: 0.1 % (ref 0–8)
ERYTHROCYTE [DISTWIDTH] IN BLOOD BY AUTOMATED COUNT: 13.8 % (ref 11.5–14.5)
EST. GFR  (NO RACE VARIABLE): 33 ML/MIN/1.73 M^2
EST. GFR  (NO RACE VARIABLE): 35 ML/MIN/1.73 M^2
GLUCOSE SERPL-MCNC: 142 MG/DL (ref 70–110)
GLUCOSE SERPL-MCNC: 193 MG/DL (ref 70–110)
GLUCOSE UR QL STRIP: ABNORMAL
HCT VFR BLD AUTO: 40.3 % (ref 37–48.5)
HGB BLD-MCNC: 13.5 G/DL (ref 12–16)
HGB UR QL STRIP: ABNORMAL
HYALINE CASTS #/AREA URNS LPF: 17 /LPF
IMM GRANULOCYTES # BLD AUTO: 0.07 K/UL (ref 0–0.04)
IMM GRANULOCYTES NFR BLD AUTO: 0.4 % (ref 0–0.5)
INR PPP: 1 (ref 0.8–1.2)
KETONES UR QL STRIP: ABNORMAL
LACTATE SERPL-SCNC: 2 MMOL/L (ref 0.5–2.2)
LEUKOCYTE ESTERASE UR QL STRIP: NEGATIVE
LYMPHOCYTES # BLD AUTO: 1.5 K/UL (ref 1–4.8)
LYMPHOCYTES NFR BLD: 8.7 % (ref 18–48)
MAGNESIUM SERPL-MCNC: 1.6 MG/DL (ref 1.6–2.6)
MCH RBC QN AUTO: 28.7 PG (ref 27–31)
MCHC RBC AUTO-ENTMCNC: 33.5 G/DL (ref 32–36)
MCV RBC AUTO: 86 FL (ref 82–98)
METHADONE UR QL SCN>300 NG/ML: NEGATIVE
MICROSCOPIC COMMENT: ABNORMAL
MONOCYTES # BLD AUTO: 0.9 K/UL (ref 0.3–1)
MONOCYTES NFR BLD: 5 % (ref 4–15)
NEUTROPHILS # BLD AUTO: 15 K/UL (ref 1.8–7.7)
NEUTROPHILS NFR BLD: 85.7 % (ref 38–73)
NITRITE UR QL STRIP: NEGATIVE
NRBC BLD-RTO: 0 /100 WBC
OPIATES UR QL SCN: ABNORMAL
PCP UR QL SCN>25 NG/ML: NEGATIVE
PH UR STRIP: 7 [PH] (ref 5–8)
PLATELET # BLD AUTO: 311 K/UL (ref 150–450)
PMV BLD AUTO: 10.5 FL (ref 9.2–12.9)
POCT GLUCOSE: 150 MG/DL (ref 70–110)
POTASSIUM SERPL-SCNC: 2.9 MMOL/L (ref 3.5–5.1)
POTASSIUM SERPL-SCNC: 2.9 MMOL/L (ref 3.5–5.1)
PROT UR QL STRIP: ABNORMAL
PROTHROMBIN TIME: 11.2 SEC (ref 9–12.5)
RBC # BLD AUTO: 4.7 M/UL (ref 4–5.4)
RBC #/AREA URNS HPF: 4 /HPF (ref 0–4)
SODIUM SERPL-SCNC: 134 MMOL/L (ref 136–145)
SODIUM SERPL-SCNC: 135 MMOL/L (ref 136–145)
SP GR UR STRIP: >1.03 (ref 1–1.03)
SQUAMOUS #/AREA URNS HPF: 9 /HPF
TOXICOLOGY INFORMATION: ABNORMAL
URN SPEC COLLECT METH UR: ABNORMAL
UROBILINOGEN UR STRIP-ACNC: NEGATIVE EU/DL
WBC # BLD AUTO: 17.54 K/UL (ref 3.9–12.7)
WBC #/AREA URNS HPF: 0 /HPF (ref 0–5)

## 2024-08-25 PROCEDURE — 81000 URINALYSIS NONAUTO W/SCOPE: CPT | Mod: 59 | Performed by: NURSE PRACTITIONER

## 2024-08-25 PROCEDURE — 63600175 PHARM REV CODE 636 W HCPCS: Performed by: STUDENT IN AN ORGANIZED HEALTH CARE EDUCATION/TRAINING PROGRAM

## 2024-08-25 PROCEDURE — 83605 ASSAY OF LACTIC ACID: CPT | Performed by: INTERNAL MEDICINE

## 2024-08-25 PROCEDURE — 96375 TX/PRO/DX INJ NEW DRUG ADDON: CPT

## 2024-08-25 PROCEDURE — 63600175 PHARM REV CODE 636 W HCPCS: Performed by: HOSPITALIST

## 2024-08-25 PROCEDURE — 21400001 HC TELEMETRY ROOM

## 2024-08-25 PROCEDURE — 96361 HYDRATE IV INFUSION ADD-ON: CPT

## 2024-08-25 PROCEDURE — 87040 BLOOD CULTURE FOR BACTERIA: CPT | Performed by: STUDENT IN AN ORGANIZED HEALTH CARE EDUCATION/TRAINING PROGRAM

## 2024-08-25 PROCEDURE — 83735 ASSAY OF MAGNESIUM: CPT | Performed by: INTERNAL MEDICINE

## 2024-08-25 PROCEDURE — 85025 COMPLETE CBC W/AUTO DIFF WBC: CPT | Performed by: INTERNAL MEDICINE

## 2024-08-25 PROCEDURE — 63600175 PHARM REV CODE 636 W HCPCS: Performed by: INTERNAL MEDICINE

## 2024-08-25 PROCEDURE — 99223 1ST HOSP IP/OBS HIGH 75: CPT | Mod: ,,, | Performed by: SURGERY

## 2024-08-25 PROCEDURE — 25000003 PHARM REV CODE 250: Performed by: STUDENT IN AN ORGANIZED HEALTH CARE EDUCATION/TRAINING PROGRAM

## 2024-08-25 PROCEDURE — 80048 BASIC METABOLIC PNL TOTAL CA: CPT | Mod: 91 | Performed by: HOSPITALIST

## 2024-08-25 PROCEDURE — 94761 N-INVAS EAR/PLS OXIMETRY MLT: CPT

## 2024-08-25 PROCEDURE — 80307 DRUG TEST PRSMV CHEM ANLYZR: CPT | Performed by: STUDENT IN AN ORGANIZED HEALTH CARE EDUCATION/TRAINING PROGRAM

## 2024-08-25 PROCEDURE — 99900035 HC TECH TIME PER 15 MIN (STAT)

## 2024-08-25 PROCEDURE — 99285 EMERGENCY DEPT VISIT HI MDM: CPT | Mod: 25

## 2024-08-25 PROCEDURE — 25000003 PHARM REV CODE 250: Performed by: HOSPITALIST

## 2024-08-25 PROCEDURE — 85610 PROTHROMBIN TIME: CPT | Performed by: INTERNAL MEDICINE

## 2024-08-25 PROCEDURE — 96366 THER/PROPH/DIAG IV INF ADDON: CPT

## 2024-08-25 PROCEDURE — 80048 BASIC METABOLIC PNL TOTAL CA: CPT | Performed by: INTERNAL MEDICINE

## 2024-08-25 PROCEDURE — 96365 THER/PROPH/DIAG IV INF INIT: CPT | Mod: 59

## 2024-08-25 PROCEDURE — 25000003 PHARM REV CODE 250: Performed by: INTERNAL MEDICINE

## 2024-08-25 PROCEDURE — 96376 TX/PRO/DX INJ SAME DRUG ADON: CPT

## 2024-08-25 RX ORDER — ALUMINUM HYDROXIDE, MAGNESIUM HYDROXIDE, AND SIMETHICONE 1200; 120; 1200 MG/30ML; MG/30ML; MG/30ML
30 SUSPENSION ORAL 4 TIMES DAILY PRN
Status: DISCONTINUED | OUTPATIENT
Start: 2024-08-25 | End: 2024-08-30 | Stop reason: HOSPADM

## 2024-08-25 RX ORDER — SODIUM CHLORIDE 0.9 % (FLUSH) 0.9 %
10 SYRINGE (ML) INJECTION EVERY 12 HOURS PRN
Status: DISCONTINUED | OUTPATIENT
Start: 2024-08-25 | End: 2024-08-30 | Stop reason: HOSPADM

## 2024-08-25 RX ORDER — TALC
6 POWDER (GRAM) TOPICAL NIGHTLY PRN
Status: DISCONTINUED | OUTPATIENT
Start: 2024-08-25 | End: 2024-08-30 | Stop reason: HOSPADM

## 2024-08-25 RX ORDER — NALOXONE HCL 0.4 MG/ML
0.4 VIAL (ML) INJECTION
Status: DISCONTINUED | OUTPATIENT
Start: 2024-08-25 | End: 2024-08-30 | Stop reason: HOSPADM

## 2024-08-25 RX ORDER — ACETAMINOPHEN 325 MG/1
650 TABLET ORAL EVERY 4 HOURS PRN
Status: DISCONTINUED | OUTPATIENT
Start: 2024-08-25 | End: 2024-08-30 | Stop reason: HOSPADM

## 2024-08-25 RX ORDER — SIMETHICONE 80 MG
1 TABLET,CHEWABLE ORAL 4 TIMES DAILY PRN
Status: DISCONTINUED | OUTPATIENT
Start: 2024-08-25 | End: 2024-08-30 | Stop reason: HOSPADM

## 2024-08-25 RX ORDER — SODIUM CHLORIDE 9 MG/ML
INJECTION, SOLUTION INTRAVENOUS CONTINUOUS
Status: DISCONTINUED | OUTPATIENT
Start: 2024-08-25 | End: 2024-08-26

## 2024-08-25 RX ORDER — ONDANSETRON HYDROCHLORIDE 2 MG/ML
4 INJECTION, SOLUTION INTRAVENOUS EVERY 6 HOURS PRN
Status: DISCONTINUED | OUTPATIENT
Start: 2024-08-25 | End: 2024-08-30 | Stop reason: HOSPADM

## 2024-08-25 RX ORDER — MORPHINE SULFATE 4 MG/ML
4 INJECTION, SOLUTION INTRAMUSCULAR; INTRAVENOUS
Status: COMPLETED | OUTPATIENT
Start: 2024-08-25 | End: 2024-08-25

## 2024-08-25 RX ORDER — METOPROLOL SUCCINATE 25 MG/1
1 TABLET, EXTENDED RELEASE ORAL DAILY
COMMUNITY
Start: 2024-07-21 | End: 2025-07-21

## 2024-08-25 RX ORDER — GLUCAGON 1 MG
1 KIT INJECTION
Status: DISCONTINUED | OUTPATIENT
Start: 2024-08-25 | End: 2024-08-30 | Stop reason: HOSPADM

## 2024-08-25 RX ORDER — MORPHINE SULFATE 4 MG/ML
4 INJECTION, SOLUTION INTRAMUSCULAR; INTRAVENOUS EVERY 6 HOURS PRN
Status: DISPENSED | OUTPATIENT
Start: 2024-08-25 | End: 2024-08-27

## 2024-08-25 RX ORDER — POTASSIUM CHLORIDE 7.45 MG/ML
10 INJECTION INTRAVENOUS
Status: COMPLETED | OUTPATIENT
Start: 2024-08-25 | End: 2024-08-25

## 2024-08-25 RX ORDER — MAGNESIUM SULFATE HEPTAHYDRATE 40 MG/ML
2 INJECTION, SOLUTION INTRAVENOUS ONCE
Status: COMPLETED | OUTPATIENT
Start: 2024-08-25 | End: 2024-08-25

## 2024-08-25 RX ORDER — METFORMIN HYDROCHLORIDE 500 MG/1
500 TABLET, EXTENDED RELEASE ORAL DAILY
COMMUNITY
Start: 2024-07-21 | End: 2025-07-21

## 2024-08-25 RX ORDER — SODIUM CHLORIDE 9 MG/ML
INJECTION, SOLUTION INTRAVENOUS CONTINUOUS
Status: DISCONTINUED | OUTPATIENT
Start: 2024-08-25 | End: 2024-08-25

## 2024-08-25 RX ORDER — ACETAMINOPHEN 500 MG
1000 TABLET ORAL
Status: COMPLETED | OUTPATIENT
Start: 2024-08-25 | End: 2024-08-25

## 2024-08-25 RX ORDER — IPRATROPIUM BROMIDE AND ALBUTEROL SULFATE 2.5; .5 MG/3ML; MG/3ML
3 SOLUTION RESPIRATORY (INHALATION) EVERY 4 HOURS PRN
Status: DISCONTINUED | OUTPATIENT
Start: 2024-08-25 | End: 2024-08-30 | Stop reason: HOSPADM

## 2024-08-25 RX ORDER — INSULIN ASPART 100 [IU]/ML
0-5 INJECTION, SOLUTION INTRAVENOUS; SUBCUTANEOUS EVERY 6 HOURS PRN
Status: DISCONTINUED | OUTPATIENT
Start: 2024-08-25 | End: 2024-08-26

## 2024-08-25 RX ORDER — MORPHINE SULFATE 4 MG/ML
2 INJECTION, SOLUTION INTRAMUSCULAR; INTRAVENOUS EVERY 4 HOURS PRN
Status: DISCONTINUED | OUTPATIENT
Start: 2024-08-25 | End: 2024-08-27

## 2024-08-25 RX ORDER — AMLODIPINE BESYLATE 5 MG/1
1 TABLET ORAL DAILY
COMMUNITY
Start: 2024-07-21 | End: 2025-07-21

## 2024-08-25 RX ADMIN — POTASSIUM BICARBONATE 50 MEQ: 977.5 TABLET, EFFERVESCENT ORAL at 12:08

## 2024-08-25 RX ADMIN — SODIUM CHLORIDE: 9 INJECTION, SOLUTION INTRAVENOUS at 02:08

## 2024-08-25 RX ADMIN — POTASSIUM CHLORIDE 10 MEQ: 7.46 INJECTION, SOLUTION INTRAVENOUS at 08:08

## 2024-08-25 RX ADMIN — POTASSIUM CHLORIDE 10 MEQ: 7.46 INJECTION, SOLUTION INTRAVENOUS at 09:08

## 2024-08-25 RX ADMIN — PIPERACILLIN AND TAZOBACTAM 4.5 G: 4; .5 INJECTION, POWDER, LYOPHILIZED, FOR SOLUTION INTRAVENOUS; PARENTERAL at 08:08

## 2024-08-25 RX ADMIN — ACETAMINOPHEN 1000 MG: 500 TABLET ORAL at 02:08

## 2024-08-25 RX ADMIN — POTASSIUM CHLORIDE 10 MEQ: 7.46 INJECTION, SOLUTION INTRAVENOUS at 07:08

## 2024-08-25 RX ADMIN — SODIUM CHLORIDE: 9 INJECTION, SOLUTION INTRAVENOUS at 04:08

## 2024-08-25 RX ADMIN — POTASSIUM CHLORIDE 10 MEQ: 7.46 INJECTION, SOLUTION INTRAVENOUS at 02:08

## 2024-08-25 RX ADMIN — POTASSIUM CHLORIDE 10 MEQ: 7.46 INJECTION, SOLUTION INTRAVENOUS at 06:08

## 2024-08-25 RX ADMIN — MAGNESIUM SULFATE HEPTAHYDRATE 2 G: 40 INJECTION, SOLUTION INTRAVENOUS at 11:08

## 2024-08-25 RX ADMIN — POTASSIUM CHLORIDE 10 MEQ: 7.46 INJECTION, SOLUTION INTRAVENOUS at 10:08

## 2024-08-25 RX ADMIN — PROMETHAZINE HYDROCHLORIDE 12.5 MG: 25 INJECTION INTRAMUSCULAR; INTRAVENOUS at 02:08

## 2024-08-25 RX ADMIN — PIPERACILLIN AND TAZOBACTAM 4.5 G: 4; .5 INJECTION, POWDER, LYOPHILIZED, FOR SOLUTION INTRAVENOUS; PARENTERAL at 02:08

## 2024-08-25 RX ADMIN — MORPHINE SULFATE 4 MG: 4 INJECTION, SOLUTION INTRAMUSCULAR; INTRAVENOUS at 08:08

## 2024-08-25 RX ADMIN — SODIUM CHLORIDE: 9 INJECTION, SOLUTION INTRAVENOUS at 09:08

## 2024-08-25 RX ADMIN — MORPHINE SULFATE 4 MG: 4 INJECTION, SOLUTION INTRAMUSCULAR; INTRAVENOUS at 02:08

## 2024-08-25 RX ADMIN — MORPHINE SULFATE 4 MG: 4 INJECTION, SOLUTION INTRAMUSCULAR; INTRAVENOUS at 10:08

## 2024-08-25 RX ADMIN — POTASSIUM CHLORIDE 10 MEQ: 7.46 INJECTION, SOLUTION INTRAVENOUS at 04:08

## 2024-08-25 NOTE — PLAN OF CARE
[facility] - Emergency Dept  Initial Discharge Assessment       Primary Care Provider: Brooks Bergeron MD  .Case Management Assessment     PCP: [facility]   Pharmacy: [facility]    Patient Arrived From: Home   Existing Help at Home: Self; Lives with Son Ty [phone]; Sister helps as needed- Yudith [phone]    Barriers to Discharge: None    Discharge Plan:    A. Home   B. Home with family     Pt drove herself to ER, is Independent with ADLs & has no HME. Pt lives with son and  has a sister who can help as needed. Pt. Plans to drive herself home upon discharge. Pt reports she does not have PCP at the moment due to lack of insurance coverage. She states her application for Medicaid has been submitted and is pending. Will plan to follow up at the [facility] for now for PCP care.     Admission Diagnosis: Severe sepsis [A41.9, R65.20]    Admission Date: 8/24/2024  Expected Discharge Date:     Transition of Care Barriers: Unisured (Pt reports her Medicaid application is not approved yet)    Payor: /     Extended Emergency Contact Information  Primary Emergency Contact: Yudith Gamez  Address: [address] [place] [address] of [facility]  Home Phone: [phone]  Relation: Sister  Secondary Emergency Contact: Prince Castano  Mobile Phone: [phone]  Relation: Son    Discharge Plan A: Home  Discharge Plan B: Home with family      [id number] [place] - [address]  [address] [facility] [address]  Phone: [phone] Fax: [phone]    [id number] - [place] - [address]  [address] [place] [address]  Phone: [phone] Fax: [phone]    [facility] [address]  [address]   [facility] [address]  Phone: [phone] Fax: [phone]      Initial Assessment (most recent)       Adult Discharge Assessment - 08/25/24 1600          Discharge Assessment    Assessment Type  Discharge Planning Assessment     Confirmed/corrected address, phone number and insurance Yes     Confirmed Demographics Correct on Facesheet     Source of Information patient     Reason For Admission Sepsis     People in Home child(madisyn), adult     Facility Arrived From: Home     Do you expect to return to your current living situation? Yes     Do you have help at home or someone to help you manage your care at home? Yes     Who are your caregiver(s) and their phone number(s)? Son- Prince [phone] or SisterJuan Pablo Zurita [phone]     Prior to hospitilization cognitive status: Alert/Oriented     Current cognitive status: Alert/Oriented     Walking or Climbing Stairs Difficulty no     Dressing/Bathing Difficulty no     Equipment Currently Used at Home none     Readmission within 30 days? No     Patient currently being followed by outpatient case management? No     Do you currently have service(s) that help you manage your care at home? No     Do you take prescription medications? Yes     Do you have prescription coverage? No     Do you have any problems affording any of your prescribed medications? TBD   pt reports her Medicaid application is pending    Is the patient taking medications as prescribed? yes     Who is going to help you get home at discharge? Pt plans to drive herself     How do you get to doctors appointments? car, drives self     Are you on dialysis? No     Do you take coumadin? No     Discharge Plan A Home     Discharge Plan B Home with family     DME Needed Upon Discharge  none     Discharge Plan discussed with: Patient     Transition of Care Barriers Unisured   Pt reports her Medicaid application is not approved yet

## 2024-08-25 NOTE — ASSESSMENT & PLAN NOTE
This patient does have evidence of infective focus  My overall impression is sepsis.  Source: Abdominal  Antibiotics given-   Antibiotics (72h ago, onward)      Start     Stop Route Frequency Ordered    08/25/24 0900  piperacillin-tazobactam (ZOSYN) 4.5 g in D5W 100 mL IVPB (MB+)         -- IV Every 8 hours (non-standard times) 08/25/24 0229          Latest lactate reviewed-  Recent Labs   Lab 08/25/24  0423   LACTATE 2.0     Organ dysfunction indicated by Acute kidney injury    Fluid challenge Not needed - patient is not hypotensive      Post- resuscitation assessment No - Post resuscitation assessment not needed       Will Not start Pressors- Levophed for MAP of 65  Source control achieved by: IV abx and f/u with general surgery regarding perforation. NPO for now and pain control. Monitor on tele.

## 2024-08-25 NOTE — ASSESSMENT & PLAN NOTE
Chronic, controlled. Latest blood pressure and vitals reviewed-     Temp:  [98.3 °F (36.8 °C)-100.1 °F (37.8 °C)]   Pulse:  []   Resp:  [10-22]   BP: (125-181)/()   SpO2:  [96 %-100 %] .   Home meds for hypertension were reviewed and noted below.   Hypertension Medications               amLODIPine (NORVASC) 5 MG tablet Take 1 tablet by mouth once daily.    metoprolol succinate (TOPROL-XL) 25 MG 24 hr tablet Take 1 tablet by mouth once daily.            While in the hospital, will manage blood pressure as follows; Continue home antihypertensive regimen with hold parameters for low/normal BP and HR.     Will utilize p.r.n. blood pressure medication only if patient's blood pressure greater than  180/90  and she develops symptoms such as worsening chest pain or shortness of breath.

## 2024-08-25 NOTE — CARE UPDATE
Assumed care of Ms Cipriano Gamez who was admitted with acute diverticulitis, ORALIA, and hypokalemia. Started IVF, electrolyte supplementation, and zosyn. General surgery consulted- continue current management. This afternoon she is hungry, no nausea/vomiting, tolerating sips of water, no diarrhea, but still has 8-9/10 abdominal pain. Abdominal exam normal bowel sounds, tender in RLQ and LLQ but no peritoneal signs.     Cintia Segovia MD  08/25/2024 2:38 PM

## 2024-08-25 NOTE — NURSING
Report given to COLLIN Jacob. Pt to be admitted to room 433. Pt notified. Pt left unit in WC. Pt has all personal belongings. Including cell phone and purse.

## 2024-08-25 NOTE — NURSING
Patient admitted to  433 via w/c and transport. Restarted Ns at 125 cc hr contin, c/o abdomen pain noted 9/10, had IV opain medication in er.

## 2024-08-25 NOTE — PLAN OF CARE
Problem: Adult Inpatient Plan of Care  Goal: Plan of Care Review  Outcome: Progressing  Goal: Absence of Hospital-Acquired Illness or Injury  Outcome: Progressing  Goal: Readiness for Transition of Care  Outcome: Progressing     Problem: Sepsis/Septic Shock  Goal: Absence of Bleeding  Outcome: Progressing  Goal: Optimal Nutrition Intake  Outcome: Progressing

## 2024-08-25 NOTE — ASSESSMENT & PLAN NOTE
Patient's FSGs are controlled on current medication regimen.  Last A1c reviewed-   Lab Results   Component Value Date    HGBA1C 7.9 (H) 07/21/2024   Current correctional scale  Low  Maintain anti-hyperglycemic dose as follows-   Antihyperglycemics (From admission, onward)      Start     Stop Route Frequency Ordered    08/25/24 1049  insulin aspart U-100 pen 0-5 Units         -- SubQ Every 6 hours PRN 08/25/24 0949          Hold Oral hypoglycemics while patient is in the hospital->Metformin.   A1c 7.9 (7/2024). Diet to be adjusted when able to eat.

## 2024-08-25 NOTE — ASSESSMENT & PLAN NOTE
Colonic diverticulosis with acute diverticulitis of the sigmoid colon with questionable small focal contained perforation versus air within a diverticulum is seen.  No evidence of abscess.  Cont. On IV abx and f/u with general surgery

## 2024-08-25 NOTE — ED PROVIDER NOTES
Encounter Date: 8/24/2024       History     Chief Complaint   Patient presents with    multiple complaints     N/V no diarrhea, abdominal pain and hives on and off since July. This episode for a few days, hiveas are reported to be all over, cannot keep anything down     48 y.o. female with history below presents for evaluation of abdominal pain, nausea and vomiting.  Endorses nausea and vomiting with right lower quadrant abdominal pain began 4 days ago.  Associated chills without fevers.  History of marijuana abuse and cyclic vomitus syndrome but tells me that she has not used marijuana in 3-4 weeks.  She does have past intra-abdominal surgeries.  Denies constipation or diarrhea. Reports this has been an intermittent issue has been seen multiple times, has been admitted to outside facilities with diagnosis of cyclic vomiting syndrome    Triage vitals were reviewed and are: Initial Vitals [08/24/24 2209]  BP: (!) 152/90  Pulse: (!) 120  Resp: 18  Temp: 99.6 °F (37.6 °C)  SpO2: 100 %  MAP: n/a    The Patient:   has a past medical history of Abnormal Pap smear of cervix, Cigarette smoker, Diverticulosis, GERD (gastroesophageal reflux disease), Hiatal hernia, and Hypertension.   has a past surgical history that includes Cervical biopsy w/ loop electrode excision (2/11/14) and Laparoscopic occlusion of oviducts by device (Bilateral, 7/10/2020).   reports that she has been smoking cigarettes. She has a 16 pack-year smoking history. She has never used smokeless tobacco. She reports current alcohol use. She reports current drug use. Frequency: 1.00 time per week. Drug: Marijuana.  Has allergies listed as: Ciprofloxacin, Ibuprofen, Meloxicam, and Cyclobenzaprine        The history is provided by the patient. No  was used.     Review of patient's allergies indicates:   Allergen Reactions    Ciprofloxacin Swelling and Blisters    Ibuprofen Hives    Meloxicam      rash    Cyclobenzaprine Rash     rasg      Past Medical History:   Diagnosis Date    Abnormal Pap smear of cervix     Cigarette smoker     Diverticulosis     GERD (gastroesophageal reflux disease)     Hiatal hernia     Hypertension      Past Surgical History:   Procedure Laterality Date    CERVICAL BIOPSY  W/ LOOP ELECTRODE EXCISION  2/11/14    LAPAROSCOPIC OCCLUSION OF OVIDUCTS BY DEVICE Bilateral 7/10/2020    Procedure: OCCLUSION, FALLOPIAN TUBE, LAPAROSCOPIC, USING DEVICE;  Surgeon: Alex Waller MD;  Location: [facility] OR;  Service: OB/GYN;  Laterality: Bilateral;  RN PREOP 6/23/2020   T/S--COVID NEGATIVE---UPT  CONSENTS INCOMPLETE     Family History   Problem Relation Name Age of Onset    Heart disease Mother  54        MI    Heart disease Maternal Grandmother  40        MI    Diabetes Other      Heart disease Brother  44        MI    Sickle cell trait Sister      Crohn's disease Neg Hx      Colon cancer Neg Hx       Social History     Tobacco Use    Smoking status: Every Day     Current packs/day: 1.00     Average packs/day: 1 pack/day for 16.0 years (16.0 ttl pk-yrs)     Types: Cigarettes    Smokeless tobacco: Never   Substance Use Topics    Alcohol use: Yes     Comment: social 1-2 x / month    Drug use: Yes     Frequency: 1.0 times per week     Types: Marijuana     Comment: daily     Review of Systems   Constitutional:  Positive for chills. Negative for fatigue and fever.   HENT:  Negative for congestion, hearing loss, sore throat and trouble swallowing.    Eyes:  Negative for visual disturbance.   Respiratory:  Negative for cough, chest tightness and shortness of breath.    Cardiovascular:  Negative for chest pain.   Gastrointestinal:  Positive for abdominal pain, nausea and vomiting. Negative for blood in stool, constipation and diarrhea.   Endocrine: Negative for cold intolerance and heat intolerance.   Genitourinary:  Negative for difficulty urinating, dysuria, frequency and vaginal discharge.   Musculoskeletal:  Negative for arthralgias and  myalgias.   Skin:  Negative for rash.   Neurological:  Negative for dizziness and headaches.   Psychiatric/Behavioral:  Negative for suicidal ideas. The patient is not nervous/anxious.        Physical Exam     Initial Vitals [08/24/24 2209]   BP Pulse Resp Temp SpO2   (!) 152/90 (!) 120 18 99.6 °F (37.6 °C) 100 %      MAP       --         Physical Exam    Constitutional: She appears well-developed. She is diaphoretic.   Body mass index is 32.69 kg/m².  Acutely ill-appearing   HENT:   Head: Normocephalic and atraumatic.   Dry mucous membranes throughout   Eyes: Conjunctivae and EOM are normal.   Neck: Neck supple.   Cardiovascular:  Regular rhythm, normal heart sounds and intact distal pulses.           Tachycardic   Pulmonary/Chest: Breath sounds normal. No respiratory distress.   Abdominal:   Patient placed supine with flexed knees.  Abdomen is soft, nondistended, severely tender in the RLQ and with guarding. There are no overlying skin changes. There are normal bowel sounds. Distal Pulses 2+.     Focused Testing //  (-) Burns's sign   (+) Rebound Tenderness   (-) Heel Tap / Bed rock          Musculoskeletal:         General: Normal range of motion.      Cervical back: Neck supple.     Neurological: She is alert and oriented to person, place, and time. GCS score is 15. GCS eye subscore is 4. GCS verbal subscore is 5. GCS motor subscore is 6.   Skin: Skin is warm. Capillary refill takes less than 2 seconds.   Urticarial lesions to bilateral upper extremities and anterior chest.   Psychiatric: She has a normal mood and affect. Her behavior is normal. Judgment and thought content normal.         ED Course   Critical Care    Date/Time: 8/25/2024 1:50 AM    Performed by: Reagan Blunt PA-C  Authorized by: Fortino Deutsch MD  Total critical care time (exclusive of procedural time) : 0 minutes  Comments: A total of 70 minutes of critical care time was spent by me in the evaluation and management of this patient.  Critical care time was performed independently of other procedures and teaching time and included taking a history; performing a physical exam; review of medical charts and documentation; ordering and interpreting labs, imaging and other tests; discussion of patient presentation with consultants; discussion of goals of care of the patient; reevaluation of the patients condition; determination of the patient disposition; and documentation.          Labs Reviewed   CBC W/ AUTO DIFFERENTIAL - Abnormal       Result Value    WBC 21.59 (*)     RBC 5.65 (*)     Hemoglobin 16.7 (*)     Hematocrit 47.1      MCV 83      MCH 29.6      MCHC 35.5      RDW 13.7      Platelets 374      MPV 9.9      Immature Granulocytes 0.6 (*)     Gran # (ANC) 18.2 (*)     Immature Grans (Abs) 0.13 (*)     Lymph # 2.1      Mono # 1.1 (*)     Eos # 0.0      Baso # 0.03      nRBC 0      Gran % 84.4 (*)     Lymph % 9.8 (*)     Mono % 5.0      Eosinophil % 0.1      Basophil % 0.1      Differential Method Automated     COMPREHENSIVE METABOLIC PANEL - Abnormal    Sodium 131 (*)     Potassium 3.0 (*)     Chloride 89 (*)     CO2 25      Glucose 217 (*)     BUN 28 (*)     Creatinine 2.6 (*)     Calcium 10.2      Total Protein 8.2      Albumin 3.8      Total Bilirubin 0.9      Alkaline Phosphatase 95      AST 12      ALT 9 (*)     eGFR 22 (*)     Anion Gap 17 (*)    LACTIC ACID, PLASMA - Abnormal    Lactate (Lactic Acid) 3.0 (*)    CULTURE, BLOOD   CULTURE, BLOOD   LIPASE    Lipase 23     TSH    TSH 0.952     BETA - HYDROXYBUTYRATE, SERUM    Beta-Hydroxybutyrate 0.3     URINALYSIS, REFLEX TO URINE CULTURE   DRUG SCREEN PANEL, URINE EMERGENCY   POCT URINE PREGNANCY    POC Preg Test, Ur Negative       Acceptable Yes            Imaging Results              CT Abdomen Pelvis  Without Contrast (Final result)  Result time 08/25/24 01:12:41   Procedure changed from CT Abdomen Pelvis With IV Contrast NO Oral Contrast     Final result by Erick  Kit FRAZIER MD (08/25/24 01:12:41)                   Impression:      Colonic diverticulosis with acute diverticulitis seen involving the sigmoid colon.  Questionable small focal contained perforation versus air within a diverticulum is seen.  No evidence of abscess.    Additional findings as detailed above.      Electronically signed by: Kit Suarez MD  Date:    08/25/2024  Time:    01:12               Narrative:    EXAMINATION:  CT ABDOMEN PELVIS WITHOUT CONTRAST    CLINICAL HISTORY:  RLQ abdominal pain (Age >= 14y);    TECHNIQUE:  Low dose axial images, sagittal and coronal reformations were obtained from the lung bases to the pubic symphysis.  Oral contrast was not administered.    COMPARISON:  CT abdomen and pelvis from April 2023.    FINDINGS:  The visualized portion of the heart is unremarkable.  The lung bases are clear.    No significant hepatic abnormality seen on this noncontrast exam.  There is no intra-or extrahepatic biliary ductal dilatation.  The gallbladder is unremarkable.  The stomach, pancreas, spleen, and adrenal glands are unremarkable.    Contrast excretion is seen from the kidneys.  No hydronephrosis.  No abnormalities are seen along the ureteral courses.  Urinary bladder is nondistended.  Uterus is unremarkable.  Bilateral pelvic clips, presumed tubal ligation clips are seen.    Appendix is visualized and is unremarkable.  No evidence of bowel obstruction.  There is colonic diverticulosis.  Inflammatory changes are seen surrounding diverticula within the proximal sigmoid colon consistent with acute diverticulitis.  There is questionable small focal contained perforation versus air within a diverticulum.  Otherwise no additional free air seen.  No abscess.  No significant free fluid.    Aorta tapers normally.    No acute osseous abnormality identified.  Sclerosis is seen at the bilateral SI joints which may be seen with osteitis condensans.  Small fat containing umbilical hernia is noted.                                        Medications   piperacillin-tazobactam (ZOSYN) 4.5 g in D5W 100 mL IVPB (MB+) (has no administration in time range)   morphine injection 4 mg (has no administration in time range)   acetaminophen tablet 1,000 mg (has no administration in time range)   promethazine (PHENERGAN) 12.5 mg in 0.9% NaCl 50 mL IVPB (has no administration in time range)   ondansetron injection 4 mg (4 mg Intravenous Given 8/24/24 2257)   lactated ringers bolus 2,000 mL (2,000 mLs Intravenous New Bag 8/24/24 2257)   diphenhydrAMINE injection 12.5 mg (12.5 mg Intravenous Given 8/24/24 2329)   potassium bicarbonate disintegrating tablet 50 mEq (50 mEq Oral Given 8/25/24 0051)     Medical Decision Making  Encounter Date: 8/24/2024  --------------------------------------------------------------------------  48 y.o. female presents for evaluation of abdominal pain.  Hemodynamically stable, tachycardic. Afebrile. Phonating and protecting the airway spontaneously. No clinical evidence for cardiovascular instability or impending airway compromise.  Remainder of physical exam as above.    Additional or Independent Historians available and contributing to the history as above: NONE  Prior medical records, when available, were reviewed. This includes a review of the patients comorbidities, medications, and recent hospital or outpatient notes.  Reviewed notes from previous hospital admissions with dehydration/ORALIA secondary to presumed cyclic vomiting syndrome.  Comorbid Conditions affecting evaluation, treatment or discharge planning: Morbid Obesity and Tobacco/Alcohol/Substance Abuse, Hypertension, and H/o Nicotine or Tobacco Dependency and H/o Illicit Substance Abuse   Social Determinates of Health identified and considered in the evaluation and treatment of this patient: Drug or Alcohol Abuse    Differential diagnoses includes but is not limited to:   AAA, aortic dissection, mesenteric ischemia, perforated viscous,  "MI/ACS, SBO/volvulus, incarcerated/strangulated hernia, intussusception, ileus, appendicitis, cholecystitis, cholangitis, diverticulitis, esophagitis, hepatitis, nephrolithiasis, pancreatitis, gastroenteritis, colitis, IBD/IBS, biliary colic, GERD, PUD, constipation, UTI/pyelonephritis,  disorder.  --------------------------------------------------------------------------  All available clinical tests were reviewed by me and documented in the ED course.  Vitals range:   Temp:  [99.6 °F (37.6 °C)-100.1 °F (37.8 °C)] 100.1 °F (37.8 °C)  Pulse:  [100-120] 100  Resp:  [18-21] 21  SpO2:  [98 %-100 %] 98 %  BP: (152-181)/() 181/116  Estimated body mass index is 32.69 kg/m² as calculated from the following:    Height as of this encounter: 5' 8" (1.727 m).    Weight as of this encounter: 97.5 kg (215 lb).    ED MEDS GIVEN:  Medications  piperacillin-tazobactam (ZOSYN) 4.5 g in D5W 100 mL IVPB (MB+) (has no administration in time range)  morphine injection 4 mg (has no administration in time range)  acetaminophen tablet 1,000 mg (has no administration in time range)  promethazine (PHENERGAN) 12.5 mg in 0.9% NaCl 50 mL IVPB (has no administration in time range)  ondansetron injection 4 mg (4 mg Intravenous Given 8/24/24 2257)  lactated ringers bolus 2,000 mL (2,000 mLs Intravenous New Bag 8/24/24 2257)   diphenhydrAMINE injection 12.5 mg (12.5 mg Intravenous Given 8/24/24 0029)  potassium bicarbonate disintegrating tablet 50 mEq (50 mEq Oral Given 8/25/24 0051)    Procedures Performed: Critical Care  --------------------------------------------------------------------------  Clinical picture today most consistent with severe sepsis, diverticulitis, severe dehydration, acute kidney injury. Patient has a leukocytosis to 21,000, acute kidney injury with a creatinine  of 2.6.  Other metabolic abnormalities noting hypokalemia which was repleted and hyperglycemia.  Lab studies also appear hemoconcentrated, likely in the " setting of dehydration.   Lactate 3.0. Ketones normal.  Remainder of lab work normal. CT demonstrating diverticulitis with questionable small focal contained perforation versus air within a diverticulum.  There is no evidence of abscess.  While presentation is likely multifactorial, there is a concern for severe sepsis with an intra-abdominal source, severe dehydration, acute kidney injury, and hypokalemia.  Patient received 2 L IVF, empiric Zosyn.  Repleted potassium, Benadryl for itching/hives which are not new.   Doubt alternate pathology as listed in the differential above.    The patient presentation is consistent with a diagnosis that poses significant threat of bodily harm or function with need for further evaluation, inpatient treatment or observation. As such, Hospital Medicine team was consulted for admission.   DISCLAIMER: This note was prepared with [employer] voice recognition transcription software. Garbled syntax, mangled pronouns, and other bizarre constructions may be attributed to that software system.    Final diagnoses:  [N17.9] Acute kidney injury  [E87.6] Hypokalemia  [E86.0] Severe dehydration  [A41.9, R65.20] Severe sepsis (Primary)  [K57.92] Diverticulitis                Amount and/or Complexity of Data Reviewed  Labs: ordered. Decision-making details documented in ED Course.  Radiology: ordered. Decision-making details documented in ED Course.    Risk  OTC drugs.  Prescription drug management.  Decision regarding hospitalization.               ED Course as of 08/25/24 0152   Sat Aug 24, 2024   2259 BP(!): 152/90 [AN]   2300 Temp: 99.6 °F (37.6 °C) [AN]   2300 Pulse(!): 120 [AN]   2300 Resp: 18 [AN]   2300 SpO2: 100 % [AN]   2314 Patient was screened in triage and initial orders placed.  I have evaluated the patient.  Patient is tachycardic, temperature 99.6° F.  She has a very tender abdomen.  I am concerned for possible sepsis vs severe dehydration.  I have placed additional orders and will  initiate sepsis time or now as per hospital protocol.  IV Zosyn, IVF ordered. [AN]   2347 CBC auto differential(!)  CBC reviewed and interpreted by me:   with leukocytosis - 21.59;, without new or worsening and hemodynamically significant anemia;, and without platelet abnormalities.    Leukocytosis, question infectious etiology but favored demarginalization and hemo concentration given hemoglobin 16.7 and overall appears dehydrated.   [AN]   2354 Comprehensive metabolic panel(!)  Noted acute kidney injury with creatinine (0.9 -> 2.6), associated hypokalemia (3.0), corrected Na is 133. Hypochloremia 89     [AN]   Sun Aug 25, 2024   0027 Beta-Hydroxybutyrate: 0.3 [AN]   0027 Lipase [AN]   0027 Lactic Acid Level(!): 3.0 [AN]   0043 hCG Qualitative, Urine: Negative [AN]   0043 TSH [AN]   0118 CT Abdomen Pelvis  Without Contrast  Colonic diverticulosis with acute diverticulitis seen involving the sigmoid colon.  Questionable small focal contained perforation versus air within a diverticulum is seen.  No evidence of abscess. [AN]      ED Course User Index  [AN] Reagan Blunt PA-C                           Clinical Impression:  Final diagnoses:  [N17.9] Acute kidney injury  [E87.6] Hypokalemia  [E86.0] Severe dehydration  [A41.9, R65.20] Severe sepsis (Primary)  [K57.92] Diverticulitis          ED Disposition Condition    Admit                 Reagan Blunt PA-C  08/25/24 0152

## 2024-08-25 NOTE — SUBJECTIVE & OBJECTIVE
Past Medical History:   Diagnosis Date    Abnormal Pap smear of cervix     Cigarette smoker     Diverticulosis     GERD (gastroesophageal reflux disease)     Hiatal hernia     Hypertension        Past Surgical History:   Procedure Laterality Date    CERVICAL BIOPSY  W/ LOOP ELECTRODE EXCISION  2/11/14    LAPAROSCOPIC OCCLUSION OF OVIDUCTS BY DEVICE Bilateral 7/10/2020    Procedure: OCCLUSION, FALLOPIAN TUBE, LAPAROSCOPIC, USING DEVICE;  Surgeon: Alex Waller MD;  Location: [facility];  Service: OB/GYN;  Laterality: Bilateral;  RN PREOP 6/23/2020   T/S--COVID NEGATIVE---UPT  CONSENTS INCOMPLETE       Review of patient's allergies indicates:   Allergen Reactions    Ciprofloxacin Swelling and Blisters    Ibuprofen Hives    Meloxicam      rash    Cyclobenzaprine Rash     rasg       No current facility-administered medications on file prior to encounter.     Current Outpatient Medications on File Prior to Encounter   Medication Sig    amLODIPine (NORVASC) 5 MG tablet Take 1 tablet by mouth once daily.    metFORMIN (GLUCOPHAGE-XR) 500 MG ER 24hr tablet Take 500 mg by mouth once daily.    metoprolol succinate (TOPROL-XL) 25 MG 24 hr tablet Take 1 tablet by mouth once daily.    [DISCONTINUED] amLODIPine (NORVASC) 10 MG tablet Take 1 tablet (10 mg total) by mouth once daily. Needs office visit for further refills    [DISCONTINUED] diphenhydrAMINE (SOMINEX) 25 mg tablet Take 25 mg by mouth nightly as needed for Insomnia.     [DISCONTINUED] docusate sodium (COLACE) 100 MG capsule Take 1 capsule (100 mg total) by mouth 2 (two) times daily.    [DISCONTINUED] HYDROcodone-acetaminophen (NORCO)  mg per tablet Take 1 tablet by mouth every 6 (six) hours as needed for Pain.    [DISCONTINUED] lisinopriL (PRINIVIL,ZESTRIL) 5 MG tablet Take 1 tablet (5 mg total) by mouth once daily.    [DISCONTINUED] metoprolol tartrate (LOPRESSOR) 100 MG tablet Take 1 tablet (100 mg total) by mouth 2 (two) times daily.     [DISCONTINUED] nicotine (NICODERM CQ) 7 mg/24 hr Place 1 patch onto the skin once daily.    [DISCONTINUED] ondansetron (ZOFRAN) 4 MG tablet Take 1 tablet (4 mg total) by mouth every 8 (eight) hours as needed for Nausea.    [DISCONTINUED] ondansetron (ZOFRAN-ODT) 4 MG TbDL Take 1 tablet (4 mg total) by mouth every 6 (six) hours as needed (nausea).    [DISCONTINUED] prochlorperazine (COMPAZINE) 5 MG tablet Take 1 tablet (5 mg total) by mouth 3 (three) times daily as needed (breakthrough N/V). (Patient not taking: No sig reported)    [DISCONTINUED] promethazine (PHENERGAN) 25 MG tablet Take 1 tablet (25 mg total) by mouth every 6 (six) hours as needed for Nausea.    [DISCONTINUED] senna (SENOKOT) 8.6 mg tablet Take 1 tablet by mouth 2 (two) times a day.     Family History       Problem Relation (Age of Onset)    Diabetes Other    Heart disease Mother (54), Maternal Grandmother (40), Brother (44)    Sickle cell trait Sister          Tobacco Use    Smoking status: Every Day     Current packs/day: 1.00     Average packs/day: 1 pack/day for 16.0 years (16.0 ttl pk-yrs)     Types: Cigarettes    Smokeless tobacco: Never   Substance and Sexual Activity    Alcohol use: Yes     Comment: social 1-2 x / month    Drug use: Yes     Frequency: 1.0 times per week     Types: Marijuana     Comment: daily    Sexual activity: Not Currently     Partners: Male     Birth control/protection: None     Review of Systems   Constitutional:  Positive for appetite change, chills and fatigue. Negative for activity change, diaphoresis, fever and unexpected weight change.   HENT:  Negative for congestion, postnasal drip and sore throat.    Eyes:  Positive for visual disturbance.   Respiratory:  Negative for cough, choking, shortness of breath and wheezing.    Cardiovascular:  Negative for chest pain, palpitations and leg swelling.   Gastrointestinal:  Positive for abdominal pain, nausea and vomiting. Negative for blood in stool,  constipation and diarrhea.   Endocrine: Negative for polyuria.   Genitourinary:  Negative for dysuria.   Musculoskeletal:  Negative for arthralgias and myalgias.   Neurological:  Negative for dizziness, light-headedness and headaches.   Psychiatric/Behavioral:  The patient is not nervous/anxious.      Objective:     Vital Signs (Most Recent):  Temp: 99.6 °F (37.6 °C) (08/25/24 0730)  Pulse: 92 (08/25/24 0900)  Resp: (!) 22 (08/25/24 0900)  BP: (!) 149/89 (08/25/24 0900)  SpO2: 100 % (08/25/24 0900) Vital Signs (24h Range):  Temp:  [98.3 °F (36.8 °C)-100.1 °F (37.8 °C)] 99.6 °F (37.6 °C)  Pulse:  [] 92  Resp:  [10-22] 22  SpO2:  [96 %-100 %] 100 %  BP: (125-181)/() 149/89     Weight: 97.5 kg (215 lb)  Body mass index is 32.69 kg/m².     Physical Exam  Vitals and nursing note reviewed.   Constitutional:       General: She is not in acute distress.     Appearance: Normal appearance. She is obese. She is not ill-appearing, toxic-appearing or diaphoretic.   HENT:      Head: Normocephalic and atraumatic.      Nose: Nose normal. No congestion or rhinorrhea.      Mouth/Throat:      Mouth: Mucous membranes are moist.      Pharynx: Oropharynx is clear. No oropharyngeal exudate or posterior oropharyngeal erythema.   Eyes:      General: No scleral icterus.     Extraocular Movements: Extraocular movements intact.      Conjunctiva/sclera: Conjunctivae normal.      Pupils: Pupils are equal, round, and reactive to light.   Cardiovascular:      Rate and Rhythm: Normal rate and regular rhythm.      Pulses: Normal pulses.      Heart sounds: No murmur heard.     No friction rub. No gallop.   Pulmonary:      Effort: Pulmonary effort is normal. No respiratory distress.      Breath sounds: Normal breath sounds. No wheezing, rhonchi or rales.   Abdominal:      General: Bowel sounds are normal. There is no distension.      Palpations: Abdomen is soft.      Tenderness: There is abdominal tenderness (RLQ mostly with deep  palpation). There is no guarding or rebound.   Musculoskeletal:         General: No swelling. Normal range of motion.      Cervical back: Normal range of motion and neck supple.      Right lower leg: No edema.      Left lower leg: No edema.   Skin:     General: Skin is warm.      Capillary Refill: Capillary refill takes less than 2 seconds.      Coloration: Skin is not pale.   Neurological:      General: No focal deficit present.      Mental Status: She is alert and oriented to person, place, and time.      Cranial Nerves: No cranial nerve deficit.      Motor: No weakness.      Coordination: Coordination normal.   Psychiatric:         Mood and Affect: Mood normal.         Behavior: Behavior normal.          CRANIAL NERVES     CN III, IV, VI   Pupils are equal, round, and reactive to light.     Recent Results (from the past 24 hour(s))   CBC auto differential    Collection Time: 08/24/24 10:55 PM   Result Value Ref Range    WBC 21.59 (H) 3.90 - 12.70 K/uL    RBC 5.65 (H) 4.00 - 5.40 M/uL    Hemoglobin 16.7 (H) 12.0 - 16.0 g/dL    Hematocrit 47.1 37.0 - 48.5 %    MCV 83 82 - 98 fL    MCH 29.6 27.0 - 31.0 pg    MCHC 35.5 32.0 - 36.0 g/dL    RDW 13.7 11.5 - 14.5 %    Platelets 374 150 - 450 K/uL    MPV 9.9 9.2 - 12.9 fL    Immature Granulocytes 0.6 (H) 0.0 - 0.5 %    Gran # (ANC) 18.2 (H) 1.8 - 7.7 K/uL    Immature Grans (Abs) 0.13 (H) 0.00 - 0.04 K/uL    Lymph # 2.1 1.0 - 4.8 K/uL    Mono # 1.1 (H) 0.3 - 1.0 K/uL    Eos # 0.0 0.0 - 0.5 K/uL    Baso # 0.03 0.00 - 0.20 K/uL    nRBC 0 0 /100 WBC    Gran % 84.4 (H) 38.0 - 73.0 %    Lymph % 9.8 (L) 18.0 - 48.0 %    Mono % 5.0 4.0 - 15.0 %    Eosinophil % 0.1 0.0 - 8.0 %    Basophil % 0.1 0.0 - 1.9 %    Differential Method Automated    Comprehensive metabolic panel    Collection Time: 08/24/24 10:55 PM   Result Value Ref Range    Sodium 131 (L) 136 - 145 mmol/L    Potassium 3.0 (L) 3.5 - 5.1 mmol/L    Chloride 89 (L) 95 - 110 mmol/L    CO2 25 23 - 29 mmol/L    Glucose 217  (H) 70 - 110 mg/dL    BUN 28 (H) 6 - 20 mg/dL    Creatinine 2.6 (H) 0.5 - 1.4 mg/dL    Calcium 10.2 8.7 - 10.5 mg/dL    Total Protein 8.2 6.0 - 8.4 g/dL    Albumin 3.8 3.5 - 5.2 g/dL    Total Bilirubin 0.9 0.1 - 1.0 mg/dL    Alkaline Phosphatase 95 55 - 135 U/L    AST 12 10 - 40 U/L    ALT 9 (L) 10 - 44 U/L    eGFR 22 (A) >60 mL/min/1.73 m^2    Anion Gap 17 (H) 8 - 16 mmol/L   Lipase    Collection Time: 08/24/24 10:55 PM   Result Value Ref Range    Lipase 23 4 - 60 U/L   TSH    Collection Time: 08/24/24 10:55 PM   Result Value Ref Range    TSH 0.952 0.400 - 4.000 uIU/mL   Beta - Hydroxybutyrate, Serum    Collection Time: 08/24/24 10:55 PM   Result Value Ref Range    Beta-Hydroxybutyrate 0.3 0.0 - 0.5 mmol/L   Lactic acid, plasma    Collection Time: 08/24/24 11:13 PM   Result Value Ref Range    Lactate (Lactic Acid) 3.0 (H) 0.5 - 2.2 mmol/L   Blood Culture #2 **CANNOT BE ORDERED STAT**    Collection Time: 08/24/24 11:41 PM    Specimen: Peripheral, Antecubital, Left; Blood   Result Value Ref Range    Blood Culture, Routine No Growth to date    Blood Culture #1 **CANNOT BE ORDERED STAT**    Collection Time: 08/25/24 12:04 AM    Specimen: Peripheral, Forearm, Right; Blood   Result Value Ref Range    Blood Culture, Routine No Growth to date    POCT urine pregnancy    Collection Time: 08/25/24 12:22 AM   Result Value Ref Range    POC Preg Test, Ur Negative Negative     Acceptable Yes    Urinalysis, Reflex to Urine Culture Urine, Clean Catch    Collection Time: 08/25/24  3:59 AM    Specimen: Urine, Clean Catch   Result Value Ref Range    Specimen UA Urine, Unspecified     Color, UA Yellow Yellow, Straw, Nora    Appearance, UA Clear Clear    pH, UA 7.0 5.0 - 8.0    Specific Gravity, UA >1.030 (A) 1.005 - 1.030    Protein, UA 1+ (A) Negative    Glucose, UA 2+ (A) Negative    Ketones, UA Trace (A) Negative    Bilirubin (UA) Negative Negative    Occult Blood UA Trace (A) Negative    Nitrite, UA Negative  Negative    Urobilinogen, UA Negative <2.0 EU/dL    Leukocytes, UA Negative Negative   Drug screen panel, emergency    Collection Time: 08/25/24  3:59 AM   Result Value Ref Range    Benzodiazepines Negative Negative    Methadone metabolites Negative Negative    Cocaine (Metab.) Negative Negative    Opiate Scrn, Ur Presumptive Positive (A) Negative    Barbiturate Screen, Ur Negative Negative    Amphetamine Screen, Ur Negative Negative    THC Presumptive Positive (A) Negative    Phencyclidine Negative Negative    Creatinine, Urine 162.4 15.0 - 325.0 mg/dL    Toxicology Information SEE COMMENT    Urinalysis Microscopic    Collection Time: 08/25/24  3:59 AM   Result Value Ref Range    RBC, UA 4 0 - 4 /hpf    WBC, UA 0 0 - 5 /hpf    Bacteria Occasional None-Occ /hpf    Squam Epithel, UA 9 /hpf    Hyaline Casts, UA 17 (A) 0-1/lpf /lpf    Microscopic Comment SEE COMMENT    Basic metabolic panel    Collection Time: 08/25/24  4:23 AM   Result Value Ref Range    Sodium 134 (L) 136 - 145 mmol/L    Potassium 2.9 (L) 3.5 - 5.1 mmol/L    Chloride 89 (L) 95 - 110 mmol/L    CO2 30 (H) 23 - 29 mmol/L    Glucose 193 (H) 70 - 110 mg/dL    BUN 23 (H) 6 - 20 mg/dL    Creatinine 1.9 (H) 0.5 - 1.4 mg/dL    Calcium 9.3 8.7 - 10.5 mg/dL    Anion Gap 15 8 - 16 mmol/L    eGFR 33 (A) >60 mL/min/1.73 m^2   CBC Auto Differential    Collection Time: 08/25/24  4:23 AM   Result Value Ref Range    WBC 17.54 (H) 3.90 - 12.70 K/uL    RBC 4.70 4.00 - 5.40 M/uL    Hemoglobin 13.5 12.0 - 16.0 g/dL    Hematocrit 40.3 37.0 - 48.5 %    MCV 86 82 - 98 fL    MCH 28.7 27.0 - 31.0 pg    MCHC 33.5 32.0 - 36.0 g/dL    RDW 13.8 11.5 - 14.5 %    Platelets 311 150 - 450 K/uL    MPV 10.5 9.2 - 12.9 fL    Immature Granulocytes 0.4 0.0 - 0.5 %    Gran # (ANC) 15.0 (H) 1.8 - 7.7 K/uL    Immature Grans (Abs) 0.07 (H) 0.00 - 0.04 K/uL    Lymph # 1.5 1.0 - 4.8 K/uL    Mono # 0.9 0.3 - 1.0 K/uL    Eos # 0.0 0.0 - 0.5 K/uL    Baso # 0.02 0.00 - 0.20 K/uL    nRBC 0 0 /100 WBC     Gran % 85.7 (H) 38.0 - 73.0 %    Lymph % 8.7 (L) 18.0 - 48.0 %    Mono % 5.0 4.0 - 15.0 %    Eosinophil % 0.1 0.0 - 8.0 %    Basophil % 0.1 0.0 - 1.9 %    Differential Method Automated    Lactic acid, plasma    Collection Time: 08/25/24  4:23 AM   Result Value Ref Range    Lactate (Lactic Acid) 2.0 0.5 - 2.2 mmol/L   Magnesium    Collection Time: 08/25/24  4:23 AM   Result Value Ref Range    Magnesium 1.6 1.6 - 2.6 mg/dL   Protime-INR    Collection Time: 08/25/24  4:23 AM   Result Value Ref Range    Prothrombin Time 11.2 9.0 - 12.5 sec    INR 1.0 0.8 - 1.2       Microbiology Results (last 7 days)       Procedure Component Value Units Date/Time    Blood Culture #2 **CANNOT BE ORDERED STAT** [[id number]] Collected: 08/24/24 2341    Order Status: Completed Specimen: Blood from Peripheral, Antecubital, Left Updated: 08/25/24 0712     Blood Culture, Routine No Growth to date    Blood Culture #1 **CANNOT BE ORDERED STAT** [[id number]] Collected: 08/25/24 0004    Order Status: Completed Specimen: Blood from Peripheral, Forearm, Right Updated: 08/25/24 0712     Blood Culture, Routine No Growth to date            Imaging Results              CT Abdomen Pelvis  Without Contrast (Final result)  Result time 08/25/24 01:12:41   Procedure changed from CT Abdomen Pelvis With IV Contrast NO Oral Contrast     Final result by Kit Suarez MD (08/25/24 01:12:41)                   Impression:      Colonic diverticulosis with acute diverticulitis seen involving the sigmoid colon.  Questionable small focal contained perforation versus air within a diverticulum is seen.  No evidence of abscess.    Additional findings as detailed above.      Electronically signed by: Kit Suarez MD  Date:    08/25/2024  Time:    01:12               Narrative:    EXAMINATION:  CT ABDOMEN PELVIS WITHOUT CONTRAST    CLINICAL HISTORY:  RLQ abdominal pain (Age >= 14y);    TECHNIQUE:  Low dose axial images, sagittal and coronal reformations were  obtained from the lung bases to the pubic symphysis.  Oral contrast was not administered.    COMPARISON:  CT abdomen and pelvis from April 2023.    FINDINGS:  The visualized portion of the heart is unremarkable.  The lung bases are clear.    No significant hepatic abnormality seen on this noncontrast exam.  There is no intra-or extrahepatic biliary ductal dilatation.  The gallbladder is unremarkable.  The stomach, pancreas, spleen, and adrenal glands are unremarkable.    Contrast excretion is seen from the kidneys.  No hydronephrosis.  No abnormalities are seen along the ureteral courses.  Urinary bladder is nondistended.  Uterus is unremarkable.  Bilateral pelvic clips, presumed tubal ligation clips are seen.    Appendix is visualized and is unremarkable.  No evidence of bowel obstruction.  There is colonic diverticulosis.  Inflammatory changes are seen surrounding diverticula within the proximal sigmoid colon consistent with acute diverticulitis.  There is questionable small focal contained perforation versus air within a diverticulum.  Otherwise no additional free air seen.  No abscess.  No significant free fluid.    Aorta tapers normally.    No acute osseous abnormality identified.  Sclerosis is seen at the bilateral SI joints which may be seen with osteitis condensans.  Small fat containing umbilical hernia is noted.

## 2024-08-25 NOTE — ED TRIAGE NOTES
Pt presents to ED d/t nausea, vomiting, and RLQ abd pain x 4 days.  Pt reports she has been seen for these symptoms before at other facilities and is an ongoing issue.  Also reports hives all over body. Redness and whelps to arms, hands, and legs noted.

## 2024-08-25 NOTE — H&P
[facility] [place] [facility]  [facility] Medicine  History & Physical    Patient Name: Cipriano Gamez  MRN: [id number]  Patient Class: IP- Inpatient  Admission Date: 8/24/2024  Attending Physician: Dr. Yolanda Dooley   Primary Care Provider: Brooks Bergeron MD         Patient information was obtained from patient, past medical records, and ER records    Subjective:     Principal Problem:abdominal pain     Chief Complaint:   Chief Complaint   Patient presents with    multiple complaints     N/V no diarrhea, abdominal pain and hives on and off since July. This episode for a few days, hiveas are reported to be all over, cannot keep anything down        HPI: 48 y.o AAF with h/o NIDDM type 2, essential HTN, GERD, h/o cyclic vomiting due to weed (has had none in 4 weeks), Diverticulosis presents to the ER with multiple complaints but mostly abdominal pain and N/V for the past month. Denies any diarrhea or blood in stools. No Chest pain or SOB. No fevers but admits to chills. Has not been able to eat due to the pain this past few days.     Labst noted for WBC 21 and H/H 16.7/47.  Creatinine elevated at 2.6 (baseline <1).  Potassium 3. LFT's normal. Lactic acid was 3.    Vitals stable with no hypotension noted.     CT abd/pelvis w/o:   Impression:       Colonic diverticulosis with acute diverticulitis seen involving the sigmoid colon.  Questionable small focal contained perforation versus air within a diverticulum is seen.  No evidence of abscess.       Surgery was contacted by ED and will see her. NPO and  IV abx started.     We have been consulted for further management and admission to the inpatient telemetry hospitalist service.           Because this patient's clinical condition is guarded with acute diverticulitis with perforation and sepsis and requires IV abx and close monitoring, care in an alternative location isn't clinically appropriate for the reasons stated above.       Past Medical History:   Diagnosis Date     Abnormal Pap smear of cervix     Cigarette smoker     Diverticulosis     GERD (gastroesophageal reflux disease)     Hiatal hernia     Hypertension        Past Surgical History:   Procedure Laterality Date    CERVICAL BIOPSY  W/ LOOP ELECTRODE EXCISION  2/11/14    LAPAROSCOPIC OCCLUSION OF OVIDUCTS BY DEVICE Bilateral 7/10/2020    Procedure: OCCLUSION, FALLOPIAN TUBE, LAPAROSCOPIC, USING DEVICE;  Surgeon: Alex Waller MD;  Location: [facility];  Service: OB/GYN;  Laterality: Bilateral;  RN PREOP 6/23/2020   T/S--COVID NEGATIVE---UPT  CONSENTS INCOMPLETE       Review of patient's allergies indicates:   Allergen Reactions    Ciprofloxacin Swelling and Blisters    Ibuprofen Hives    Meloxicam      rash    Cyclobenzaprine Rash     rasg       No current facility-administered medications on file prior to encounter.     Current Outpatient Medications on File Prior to Encounter   Medication Sig    amLODIPine (NORVASC) 5 MG tablet Take 1 tablet by mouth once daily.    metFORMIN (GLUCOPHAGE-XR) 500 MG ER 24hr tablet Take 500 mg by mouth once daily.    metoprolol succinate (TOPROL-XL) 25 MG 24 hr tablet Take 1 tablet by mouth once daily.    [DISCONTINUED] amLODIPine (NORVASC) 10 MG tablet Take 1 tablet (10 mg total) by mouth once daily. Needs office visit for further refills    [DISCONTINUED] diphenhydrAMINE (SOMINEX) 25 mg tablet Take 25 mg by mouth nightly as needed for Insomnia.     [DISCONTINUED] docusate sodium (COLACE) 100 MG capsule Take 1 capsule (100 mg total) by mouth 2 (two) times daily.    [DISCONTINUED] HYDROcodone-acetaminophen (NORCO)  mg per tablet Take 1 tablet by mouth every 6 (six) hours as needed for Pain.    [DISCONTINUED] lisinopriL (PRINIVIL,ZESTRIL) 5 MG tablet Take 1 tablet (5 mg total) by mouth once daily.    [DISCONTINUED] metoprolol tartrate (LOPRESSOR) 100 MG tablet Take 1 tablet (100 mg total) by mouth 2 (two) times daily.    [DISCONTINUED] nicotine (NICODERM CQ) 7 mg/24 hr Place 1 patch  onto the skin once daily.    [DISCONTINUED] ondansetron (ZOFRAN) 4 MG tablet Take 1 tablet (4 mg total) by mouth every 8 (eight) hours as needed for Nausea.    [DISCONTINUED] ondansetron (ZOFRAN-ODT) 4 MG TbDL Take 1 tablet (4 mg total) by mouth every 6 (six) hours as needed (nausea).    [DISCONTINUED] prochlorperazine (COMPAZINE) 5 MG tablet Take 1 tablet (5 mg total) by mouth 3 (three) times daily as needed (breakthrough N/V). (Patient not taking: No sig reported)    [DISCONTINUED] promethazine (PHENERGAN) 25 MG tablet Take 1 tablet (25 mg total) by mouth every 6 (six) hours as needed for Nausea.    [DISCONTINUED] senna (SENOKOT) 8.6 mg tablet Take 1 tablet by mouth 2 (two) times a day.     Family History       Problem Relation (Age of Onset)    Diabetes Other    Heart disease Mother (54), Maternal Grandmother (40), Brother (44)    Sickle cell trait Sister          Tobacco Use    Smoking status: Every Day     Current packs/day: 1.00     Average packs/day: 1 pack/day for 16.0 years (16.0 ttl pk-yrs)     Types: Cigarettes    Smokeless tobacco: Never   Substance and Sexual Activity    Alcohol use: Yes     Comment: social 1-2 x / month    Drug use: Yes     Frequency: 1.0 times per week     Types: Marijuana     Comment: daily    Sexual activity: Not Currently     Partners: Male     Birth control/protection: None     Review of Systems   Constitutional:  Positive for appetite change, chills and fatigue. Negative for activity change, diaphoresis, fever and unexpected weight change.   HENT:  Negative for congestion, postnasal drip and sore throat.    Eyes:  Positive for visual disturbance.   Respiratory:  Negative for cough, choking, shortness of breath and wheezing.    Cardiovascular:  Negative for chest pain, palpitations and leg swelling.   Gastrointestinal:  Positive for abdominal pain, nausea and vomiting. Negative for blood in stool, constipation and diarrhea.   Endocrine: Negative for polyuria.   Genitourinary:   Negative for dysuria.   Musculoskeletal:  Negative for arthralgias and myalgias.   Neurological:  Negative for dizziness, light-headedness and headaches.   Psychiatric/Behavioral:  The patient is not nervous/anxious.      Objective:     Vital Signs (Most Recent):  Temp: 99.6 °F (37.6 °C) (08/25/24 0730)  Pulse: 92 (08/25/24 0900)  Resp: (!) 22 (08/25/24 0900)  BP: (!) 149/89 (08/25/24 0900)  SpO2: 100 % (08/25/24 0900) Vital Signs (24h Range):  Temp:  [98.3 °F (36.8 °C)-100.1 °F (37.8 °C)] 99.6 °F (37.6 °C)  Pulse:  [] 92  Resp:  [10-22] 22  SpO2:  [96 %-100 %] 100 %  BP: (125-181)/() 149/89     Weight: 97.5 kg (215 lb)  Body mass index is 32.69 kg/m².     Physical Exam  Vitals and nursing note reviewed.   Constitutional:       General: She is not in acute distress.     Appearance: Normal appearance. She is obese. She is not ill-appearing, toxic-appearing or diaphoretic.   HENT:      Head: Normocephalic and atraumatic.      Nose: Nose normal. No congestion or rhinorrhea.      Mouth/Throat:      Mouth: Mucous membranes are moist.      Pharynx: Oropharynx is clear. No oropharyngeal exudate or posterior oropharyngeal erythema.   Eyes:      General: No scleral icterus.     Extraocular Movements: Extraocular movements intact.      Conjunctiva/sclera: Conjunctivae normal.      Pupils: Pupils are equal, round, and reactive to light.   Cardiovascular:      Rate and Rhythm: Normal rate and regular rhythm.      Pulses: Normal pulses.      Heart sounds: No murmur heard.     No friction rub. No gallop.   Pulmonary:      Effort: Pulmonary effort is normal. No respiratory distress.      Breath sounds: Normal breath sounds. No wheezing, rhonchi or rales.   Abdominal:      General: Bowel sounds are normal. There is no distension.      Palpations: Abdomen is soft.      Tenderness: There is abdominal tenderness (RLQ mostly with deep palpation). There is no guarding or rebound.   Musculoskeletal:         General: No  swelling. Normal range of motion.      Cervical back: Normal range of motion and neck supple.      Right lower leg: No edema.      Left lower leg: No edema.   Skin:     General: Skin is warm.      Capillary Refill: Capillary refill takes less than 2 seconds.      Coloration: Skin is not pale.   Neurological:      General: No focal deficit present.      Mental Status: She is alert and oriented to person, place, and time.      Cranial Nerves: No cranial nerve deficit.      Motor: No weakness.      Coordination: Coordination normal.   Psychiatric:         Mood and Affect: Mood normal.         Behavior: Behavior normal.          CRANIAL NERVES     CN III, IV, VI   Pupils are equal, round, and reactive to light.     Recent Results (from the past 24 hour(s))   CBC auto differential    Collection Time: 08/24/24 10:55 PM   Result Value Ref Range    WBC 21.59 (H) 3.90 - 12.70 K/uL    RBC 5.65 (H) 4.00 - 5.40 M/uL    Hemoglobin 16.7 (H) 12.0 - 16.0 g/dL    Hematocrit 47.1 37.0 - 48.5 %    MCV 83 82 - 98 fL    MCH 29.6 27.0 - 31.0 pg    MCHC 35.5 32.0 - 36.0 g/dL    RDW 13.7 11.5 - 14.5 %    Platelets 374 150 - 450 K/uL    MPV 9.9 9.2 - 12.9 fL    Immature Granulocytes 0.6 (H) 0.0 - 0.5 %    Gran # (ANC) 18.2 (H) 1.8 - 7.7 K/uL    Immature Grans (Abs) 0.13 (H) 0.00 - 0.04 K/uL    Lymph # 2.1 1.0 - 4.8 K/uL    Mono # 1.1 (H) 0.3 - 1.0 K/uL    Eos # 0.0 0.0 - 0.5 K/uL    Baso # 0.03 0.00 - 0.20 K/uL    nRBC 0 0 /100 WBC    Gran % 84.4 (H) 38.0 - 73.0 %    Lymph % 9.8 (L) 18.0 - 48.0 %    Mono % 5.0 4.0 - 15.0 %    Eosinophil % 0.1 0.0 - 8.0 %    Basophil % 0.1 0.0 - 1.9 %    Differential Method Automated    Comprehensive metabolic panel    Collection Time: 08/24/24 10:55 PM   Result Value Ref Range    Sodium 131 (L) 136 - 145 mmol/L    Potassium 3.0 (L) 3.5 - 5.1 mmol/L    Chloride 89 (L) 95 - 110 mmol/L    CO2 25 23 - 29 mmol/L    Glucose 217 (H) 70 - 110 mg/dL    BUN 28 (H) 6 - 20 mg/dL    Creatinine 2.6 (H) 0.5 - 1.4 mg/dL     Calcium 10.2 8.7 - 10.5 mg/dL    Total Protein 8.2 6.0 - 8.4 g/dL    Albumin 3.8 3.5 - 5.2 g/dL    Total Bilirubin 0.9 0.1 - 1.0 mg/dL    Alkaline Phosphatase 95 55 - 135 U/L    AST 12 10 - 40 U/L    ALT 9 (L) 10 - 44 U/L    eGFR 22 (A) >60 mL/min/1.73 m^2    Anion Gap 17 (H) 8 - 16 mmol/L   Lipase    Collection Time: 08/24/24 10:55 PM   Result Value Ref Range    Lipase 23 4 - 60 U/L   TSH    Collection Time: 08/24/24 10:55 PM   Result Value Ref Range    TSH 0.952 0.400 - 4.000 uIU/mL   Beta - Hydroxybutyrate, Serum    Collection Time: 08/24/24 10:55 PM   Result Value Ref Range    Beta-Hydroxybutyrate 0.3 0.0 - 0.5 mmol/L   Lactic acid, plasma    Collection Time: 08/24/24 11:13 PM   Result Value Ref Range    Lactate (Lactic Acid) 3.0 (H) 0.5 - 2.2 mmol/L   Blood Culture #2 **CANNOT BE ORDERED STAT**    Collection Time: 08/24/24 11:41 PM    Specimen: Peripheral, Antecubital, Left; Blood   Result Value Ref Range    Blood Culture, Routine No Growth to date    Blood Culture #1 **CANNOT BE ORDERED STAT**    Collection Time: 08/25/24 12:04 AM    Specimen: Peripheral, Forearm, Right; Blood   Result Value Ref Range    Blood Culture, Routine No Growth to date    POCT urine pregnancy    Collection Time: 08/25/24 12:22 AM   Result Value Ref Range    POC Preg Test, Ur Negative Negative     Acceptable Yes    Urinalysis, Reflex to Urine Culture Urine, Clean Catch    Collection Time: 08/25/24  3:59 AM    Specimen: Urine, Clean Catch   Result Value Ref Range    Specimen UA Urine, Unspecified     Color, UA Yellow Yellow, Straw, Nora    Appearance, UA Clear Clear    pH, UA 7.0 5.0 - 8.0    Specific Gravity, UA >1.030 (A) 1.005 - 1.030    Protein, UA 1+ (A) Negative    Glucose, UA 2+ (A) Negative    Ketones, UA Trace (A) Negative    Bilirubin (UA) Negative Negative    Occult Blood UA Trace (A) Negative    Nitrite, UA Negative Negative    Urobilinogen, UA Negative <2.0 EU/dL    Leukocytes, UA Negative Negative   Drug  screen panel, emergency    Collection Time: 08/25/24  3:59 AM   Result Value Ref Range    Benzodiazepines Negative Negative    Methadone metabolites Negative Negative    Cocaine (Metab.) Negative Negative    Opiate Scrn, Ur Presumptive Positive (A) Negative    Barbiturate Screen, Ur Negative Negative    Amphetamine Screen, Ur Negative Negative    THC Presumptive Positive (A) Negative    Phencyclidine Negative Negative    Creatinine, Urine 162.4 15.0 - 325.0 mg/dL    Toxicology Information SEE COMMENT    Urinalysis Microscopic    Collection Time: 08/25/24  3:59 AM   Result Value Ref Range    RBC, UA 4 0 - 4 /hpf    WBC, UA 0 0 - 5 /hpf    Bacteria Occasional None-Occ /hpf    Squam Epithel, UA 9 /hpf    Hyaline Casts, UA 17 (A) 0-1/lpf /lpf    Microscopic Comment SEE COMMENT    Basic metabolic panel    Collection Time: 08/25/24  4:23 AM   Result Value Ref Range    Sodium 134 (L) 136 - 145 mmol/L    Potassium 2.9 (L) 3.5 - 5.1 mmol/L    Chloride 89 (L) 95 - 110 mmol/L    CO2 30 (H) 23 - 29 mmol/L    Glucose 193 (H) 70 - 110 mg/dL    BUN 23 (H) 6 - 20 mg/dL    Creatinine 1.9 (H) 0.5 - 1.4 mg/dL    Calcium 9.3 8.7 - 10.5 mg/dL    Anion Gap 15 8 - 16 mmol/L    eGFR 33 (A) >60 mL/min/1.73 m^2   CBC Auto Differential    Collection Time: 08/25/24  4:23 AM   Result Value Ref Range    WBC 17.54 (H) 3.90 - 12.70 K/uL    RBC 4.70 4.00 - 5.40 M/uL    Hemoglobin 13.5 12.0 - 16.0 g/dL    Hematocrit 40.3 37.0 - 48.5 %    MCV 86 82 - 98 fL    MCH 28.7 27.0 - 31.0 pg    MCHC 33.5 32.0 - 36.0 g/dL    RDW 13.8 11.5 - 14.5 %    Platelets 311 150 - 450 K/uL    MPV 10.5 9.2 - 12.9 fL    Immature Granulocytes 0.4 0.0 - 0.5 %    Gran # (ANC) 15.0 (H) 1.8 - 7.7 K/uL    Immature Grans (Abs) 0.07 (H) 0.00 - 0.04 K/uL    Lymph # 1.5 1.0 - 4.8 K/uL    Mono # 0.9 0.3 - 1.0 K/uL    Eos # 0.0 0.0 - 0.5 K/uL    Baso # 0.02 0.00 - 0.20 K/uL    nRBC 0 0 /100 WBC    Gran % 85.7 (H) 38.0 - 73.0 %    Lymph % 8.7 (L) 18.0 - 48.0 %    Mono % 5.0 4.0 - 15.0  %    Eosinophil % 0.1 0.0 - 8.0 %    Basophil % 0.1 0.0 - 1.9 %    Differential Method Automated    Lactic acid, plasma    Collection Time: 08/25/24  4:23 AM   Result Value Ref Range    Lactate (Lactic Acid) 2.0 0.5 - 2.2 mmol/L   Magnesium    Collection Time: 08/25/24  4:23 AM   Result Value Ref Range    Magnesium 1.6 1.6 - 2.6 mg/dL   Protime-INR    Collection Time: 08/25/24  4:23 AM   Result Value Ref Range    Prothrombin Time 11.2 9.0 - 12.5 sec    INR 1.0 0.8 - 1.2       Microbiology Results (last 7 days)       Procedure Component Value Units Date/Time    Blood Culture #2 **CANNOT BE ORDERED STAT** [[id number]] Collected: 08/24/24 2341    Order Status: Completed Specimen: Blood from Peripheral, Antecubital, Left Updated: 08/25/24 0712     Blood Culture, Routine No Growth to date    Blood Culture #1 **CANNOT BE ORDERED STAT** [[id number]] Collected: 08/25/24 0004    Order Status: Completed Specimen: Blood from Peripheral, Forearm, Right Updated: 08/25/24 0712     Blood Culture, Routine No Growth to date            Imaging Results              CT Abdomen Pelvis  Without Contrast (Final result)  Result time 08/25/24 01:12:41   Procedure changed from CT Abdomen Pelvis With IV Contrast NO Oral Contrast     Final result by Kit Suarez MD (08/25/24 01:12:41)                   Impression:      Colonic diverticulosis with acute diverticulitis seen involving the sigmoid colon.  Questionable small focal contained perforation versus air within a diverticulum is seen.  No evidence of abscess.    Additional findings as detailed above.      Electronically signed by: Kit Suarez MD  Date:    08/25/2024  Time:    01:12               Narrative:    EXAMINATION:  CT ABDOMEN PELVIS WITHOUT CONTRAST    CLINICAL HISTORY:  RLQ abdominal pain (Age >= 14y);    TECHNIQUE:  Low dose axial images, sagittal and coronal reformations were obtained from the lung bases to the pubic symphysis.  Oral contrast was not  administered.    COMPARISON:  CT abdomen and pelvis from April 2023.    FINDINGS:  The visualized portion of the heart is unremarkable.  The lung bases are clear.    No significant hepatic abnormality seen on this noncontrast exam.  There is no intra-or extrahepatic biliary ductal dilatation.  The gallbladder is unremarkable.  The stomach, pancreas, spleen, and adrenal glands are unremarkable.    Contrast excretion is seen from the kidneys.  No hydronephrosis.  No abnormalities are seen along the ureteral courses.  Urinary bladder is nondistended.  Uterus is unremarkable.  Bilateral pelvic clips, presumed tubal ligation clips are seen.    Appendix is visualized and is unremarkable.  No evidence of bowel obstruction.  There is colonic diverticulosis.  Inflammatory changes are seen surrounding diverticula within the proximal sigmoid colon consistent with acute diverticulitis.  There is questionable small focal contained perforation versus air within a diverticulum.  Otherwise no additional free air seen.  No abscess.  No significant free fluid.    Aorta tapers normally.    No acute osseous abnormality identified.  Sclerosis is seen at the bilateral SI joints which may be seen with osteitis condensans.  Small fat containing umbilical hernia is noted.                                        Assessment/Plan:     Sepsis with acute renal failure  This patient does have evidence of infective focus  My overall impression is sepsis.  Source: Abdominal  Antibiotics given-   Antibiotics (72h ago, onward)      Start     Stop Route Frequency Ordered    08/25/24 0900  piperacillin-tazobactam (ZOSYN) 4.5 g in D5W 100 mL IVPB (MB+)         -- IV Every 8 hours (non-standard times) 08/25/24 0229          Latest lactate reviewed-  Recent Labs   Lab 08/25/24  0423   LACTATE 2.0     Organ dysfunction indicated by Acute kidney injury    Fluid challenge Not needed - patient is not hypotensive      Post- resuscitation assessment No - Post  resuscitation assessment not needed       Will Not start Pressors- Levophed for MAP of 65  Source control achieved by: IV abx and f/u with general surgery regarding perforation. NPO for now and pain control. Monitor on tele.     Diverticulitis of colon with perforation  Colonic diverticulosis with acute diverticulitis of the sigmoid colon with questionable small focal contained perforation versus air within a diverticulum is seen.  No evidence of abscess.  Cont. On IV abx and f/u with general surgery          Type 2 diabetes mellitus with hyperglycemia  Patient's FSGs are controlled on current medication regimen.  Last A1c reviewed-   Lab Results   Component Value Date    HGBA1C 7.9 (H) 07/21/2024   Current correctional scale  Low  Maintain anti-hyperglycemic dose as follows-   Antihyperglycemics (From admission, onward)      Start     Stop Route Frequency Ordered    08/25/24 1049  insulin aspart U-100 pen 0-5 Units         -- SubQ Every 6 hours PRN 08/25/24 0949          Hold Oral hypoglycemics while patient is in the hospital->Metformin.   A1c 7.9 (7/2024). Diet to be adjusted when able to eat.     Essential hypertension  Chronic, controlled. Latest blood pressure and vitals reviewed-     Temp:  [98.3 °F (36.8 °C)-100.1 °F (37.8 °C)]   Pulse:  []   Resp:  [10-22]   BP: (125-181)/()   SpO2:  [96 %-100 %] .   Home meds for hypertension were reviewed and noted below.   Hypertension Medications               amLODIPine (NORVASC) 5 MG tablet Take 1 tablet by mouth once daily.    metoprolol succinate (TOPROL-XL) 25 MG 24 hr tablet Take 1 tablet by mouth once daily.            While in the hospital, will manage blood pressure as follows; Continue home antihypertensive regimen with hold parameters for low/normal BP and HR.     Will utilize p.r.n. blood pressure medication only if patient's blood pressure greater than  180/90  and she develops symptoms such as worsening chest pain or shortness of  breath.      VTE Risk Mitigation (From admission, onward)           Ordered     Place LOLITA hose  Until discontinued         08/25/24 0235     Reason for No Pharmacological VTE Prophylaxis  Once        Question:  Reasons:  Answer:  Physician Provided (leave comment)  Comment:  surgery eval in process    08/25/24 0235     IP VTE HIGH RISK PATIENT  Once         08/25/24 0235     Place sequential compression device  Until discontinued         08/25/24 0235                   CODE STATUS: FULL CODE                  Yolanda Dooley MD  Department of Hospital Medicine  [facility] [place] - Emergency Dept

## 2024-08-25 NOTE — CONSULTS
[facility] - Emergency Dept  General Surgery  Consult Note    Inpatient consult to General Surgery  Consult performed by: Cristal Marinelli MD  Consult ordered by: Reagan Blunt, SOFIA        Subjective:     Chief Complaint/Reason for Admission: Acute diverticulitis    History of Present Illness:   Cipriano Gamez is a 48 y.o. female who with a PMHx of T2DM, HTN, GERD, cannabinoid hyperemesis syndrome and diverticulosis, presented to the hospital for acute abdominal pain onset few days ago. States abdominal pain started few days ago, located over lower abdomen, progressively worsened and began vomiting yesterday. Reports BM's have been regular, last BM few days ago when the pain started. Is still passing gas. States pain and nausea has improved though still with pain. Notes experiencing same symptoms few years ago for diverticulitis, treating with Abx, no abscess or drain at the time. Denies blood in stool, constipation, diarrhea.     CT A/P showing acute sigmoid diverticulitis, questionable small focal/contained perforation vs air in diverticulum, no abscess.     No current facility-administered medications on file prior to encounter.     Current Outpatient Medications on File Prior to Encounter   Medication Sig    amLODIPine (NORVASC) 5 MG tablet Take 1 tablet by mouth once daily.    metFORMIN (GLUCOPHAGE-XR) 500 MG ER 24hr tablet Take 500 mg by mouth once daily.    metoprolol succinate (TOPROL-XL) 25 MG 24 hr tablet Take 1 tablet by mouth once daily.    [DISCONTINUED] amLODIPine (NORVASC) 10 MG tablet Take 1 tablet (10 mg total) by mouth once daily. Needs office visit for further refills    [DISCONTINUED] diphenhydrAMINE (SOMINEX) 25 mg tablet Take 25 mg by mouth nightly as needed for Insomnia.     [DISCONTINUED] docusate sodium (COLACE) 100 MG capsule Take 1 capsule (100 mg total) by mouth 2 (two) times daily.    [DISCONTINUED] HYDROcodone-acetaminophen (NORCO)  mg per tablet Take 1 tablet by mouth every  6 (six) hours as needed for Pain.    [DISCONTINUED] lisinopriL (PRINIVIL,ZESTRIL) 5 MG tablet Take 1 tablet (5 mg total) by mouth once daily.    [DISCONTINUED] metoprolol tartrate (LOPRESSOR) 100 MG tablet Take 1 tablet (100 mg total) by mouth 2 (two) times daily.    [DISCONTINUED] nicotine (NICODERM CQ) 7 mg/24 hr Place 1 patch onto the skin once daily.    [DISCONTINUED] ondansetron (ZOFRAN) 4 MG tablet Take 1 tablet (4 mg total) by mouth every 8 (eight) hours as needed for Nausea.    [DISCONTINUED] ondansetron (ZOFRAN-ODT) 4 MG TbDL Take 1 tablet (4 mg total) by mouth every 6 (six) hours as needed (nausea).    [DISCONTINUED] prochlorperazine (COMPAZINE) 5 MG tablet Take 1 tablet (5 mg total) by mouth 3 (three) times daily as needed (breakthrough N/V). (Patient not taking: No sig reported)    [DISCONTINUED] promethazine (PHENERGAN) 25 MG tablet Take 1 tablet (25 mg total) by mouth every 6 (six) hours as needed for Nausea.    [DISCONTINUED] senna (SENOKOT) 8.6 mg tablet Take 1 tablet by mouth 2 (two) times a day.       Review of patient's allergies indicates:   Allergen Reactions    Ciprofloxacin Swelling and Blisters    Ibuprofen Hives    Meloxicam      rash    Cyclobenzaprine Rash     rasg       Past Medical History:   Diagnosis Date    Abnormal Pap smear of cervix     Cigarette smoker     Diverticulosis     GERD (gastroesophageal reflux disease)     Hiatal hernia     Hyperlipidemia 8/25/2024    Hypertension      Past Surgical History:   Procedure Laterality Date    CERVICAL BIOPSY  W/ LOOP ELECTRODE EXCISION  2/11/14    LAPAROSCOPIC OCCLUSION OF OVIDUCTS BY DEVICE Bilateral 7/10/2020    Procedure: OCCLUSION, FALLOPIAN TUBE, LAPAROSCOPIC, USING DEVICE;  Surgeon: Alex Waller MD;  Location: [facility] OR;  Service: OB/GYN;  Laterality: Bilateral;  RN PREOP 6/23/2020   T/S--COVID NEGATIVE---UPT  CONSENTS INCOMPLETE     Family History       Problem Relation (Age of Onset)    Diabetes Other    Heart disease Mother (54),  Maternal Grandmother (40), Brother (44)    Sickle cell trait Sister          Tobacco Use    Smoking status: Every Day     Current packs/day: 1.00     Average packs/day: 1 pack/day for 16.0 years (16.0 ttl pk-yrs)     Types: Cigarettes    Smokeless tobacco: Never   Substance and Sexual Activity    Alcohol use: Yes     Comment: social 1-2 x / month    Drug use: Yes     Frequency: 1.0 times per week     Types: Marijuana     Comment: daily    Sexual activity: Not Currently     Partners: Male     Birth control/protection: None     Review of Systems  10 point ROS negative except noted in HPI    Objective:     Vital Signs (Most Recent):  Temp: 99.6 °F (37.6 °C) (08/25/24 0730)  Pulse: 87 (08/25/24 1100)  Resp: 17 (08/25/24 1100)  BP: 126/79 (08/25/24 1100)  SpO2: 96 % (08/25/24 1100) Vital Signs (24h Range):  Temp:  [98.3 °F (36.8 °C)-100.1 °F (37.8 °C)] 99.6 °F (37.6 °C)  Pulse:  [] 87  Resp:  [10-22] 17  SpO2:  [96 %-100 %] 96 %  BP: (116-181)/() 126/79     Weight: 97.5 kg (215 lb)  Body mass index is 32.69 kg/m².      Intake/Output Summary (Last 24 hours) at 8/25/2024 1103  Last data filed at 8/25/2024 0306  Gross per 24 hour   Intake 145.09 ml   Output --   Net 145.09 ml       Physical Exam  General: Awake and alert, in no acute distress, appears stated age, well-nourished  HEENT: Atraumatic, normocephalic, MMM  Cardiac: Regular rate on monitor, well-perfused  Pulm: Breathing comfortably, symmetric chest rise, no respiratory distress. Satting well on RA  Abdomen: Soft, moderate tender in LLQ/suprapubic > RLQ to deep palpation, non-distended, no guarding or rebound, no signs of peritonitis  MSK: moving all extremities appropriately  Neuro: Aox4, CN 2-12 grossly intact    Significant Labs:  CBC:   Recent Labs   Lab 08/25/24  0423   WBC 17.54*   RBC 4.70   HGB 13.5   HCT 40.3      MCV 86   MCH 28.7   MCHC 33.5     CMP:   Recent Labs   Lab 08/24/24  2255 08/25/24  0423   * 193*   CALCIUM 10.2 9.3    ALBUMIN 3.8  --    PROT 8.2  --    * 134*   K 3.0* 2.9*   CO2 25 30*   CL 89* 89*   BUN 28* 23*   CREATININE 2.6* 1.9*   ALKPHOS 95  --    ALT 9*  --    AST 12  --    BILITOT 0.9  --      Lactic Acid:   Recent Labs   Lab 08/25/24  0423   LACTATE 2.0       Significant Diagnostics:  CT: I have reviewed all pertinent results/findings within the past 24 hours.      Assessment/Plan:     Active Diagnoses:    Diagnosis Date Noted POA    Type 2 diabetes mellitus with hyperglycemia [E11.65] 08/25/2024 Yes    Hyperlipidemia [E78.5] 08/25/2024 Yes    Class 1 obesity due to excess calories with serious comorbidity and body mass index (BMI) of 32.0 to 32.9 in adult [E66.09, Z68.32] 07/04/2021 Not Applicable    Diverticulitis of colon with perforation [K57.20] 08/20/2020 Yes    Sepsis with acute renal failure [A41.9, R65.20, N17.9] 01/07/2020 Yes    ORALIA (acute kidney injury) [N17.9] 01/07/2020 Yes    Marijuana abuse [F12.10] 12/14/2018 Yes    Tobacco use disorder [F17.200] 03/21/2018 Yes    Hypokalemia [E87.6] 05/04/2015 Yes    Essential hypertension [I10]  Yes      Problems Resolved During this Admission:       Cipriano Gamez is a 48 y.o. female who with a PMHx of T2DM, HTN, GERD, cannabinoid hyperemesis syndrome and diverticulosis, presenting with acute sigmoid diverticulitis, questionable small focal/contained perforation vs air in diverticulum, no abscess. Afebrile, HDS, WBC and lactate elevated but downtrending since admission. Abdominal exam soft, non-distended, tender in LLQ/suprapubic > RLQ, no signs of peritonitis. CT A/P showing acute sigmoid diverticulitis, questionable small focal/contained perforation vs air in diverticulum, no abscess.     - No acute surgical intervention. Continue IV Abx therapy  - Serial abdominal exams  - Daily CBC/BMP. Lactate normalized  - Replete lytes prn  - Diet: NPO/bowel rest for 24 hours  - Abx: IV zosyn  - MMPC and IVFs per primary  - Will continue to follow    Patient case  discussed with attending staff, Dr. Shi. Attestation to follow.    Cristal Marinelli MD  General Surgery Resident  [facility]

## 2024-08-25 NOTE — HPI
48 y.o AAF with h/o NIDDM type 2, essential HTN, GERD, h/o cyclic vomiting due to weed (has had none in 4 weeks), Diverticulosis presents to the ER with multiple complaints but mostly abdominal pain and N/V for the past month. Denies any diarrhea or blood in stools. No Chest pain or SOB. No fevers but admits to chills. Has not been able to eat due to the pain this past few days.     Labst noted for WBC 21 and H/H 16.7/47.  Creatinine elevated at 2.6 (baseline <1).  Potassium 3. LFT's normal. Lactic acid was 3.  Vitals stable with no hypotension noted. CT abd/pelvis w/o: Colonic diverticulosis with acute diverticulitis seen involving the sigmoid colon.  Questionable small focal contained perforation versus air within a diverticulum is seen.  No evidence of abscess. Surgery was contacted by ED and will see her. NPO and  IV abx started. We have been consulted for further management and admission to the inpatient telemetry hospitalist service.

## 2024-08-25 NOTE — ED NOTES
Primary RN and Dr. Dooley notified via secure chat of drop in K from 3.0 to 2.9. Awaiting additional orders at this time.

## 2024-08-26 LAB
ALBUMIN SERPL BCP-MCNC: 2.9 G/DL (ref 3.5–5.2)
ALP SERPL-CCNC: 73 U/L (ref 55–135)
ALT SERPL W/O P-5'-P-CCNC: 9 U/L (ref 10–44)
ANION GAP SERPL CALC-SCNC: 11 MMOL/L (ref 8–16)
AST SERPL-CCNC: 10 U/L (ref 10–40)
BASOPHILS # BLD AUTO: 0.02 K/UL (ref 0–0.2)
BASOPHILS NFR BLD: 0.2 % (ref 0–1.9)
BILIRUB SERPL-MCNC: 0.8 MG/DL (ref 0.1–1)
BUN SERPL-MCNC: 13 MG/DL (ref 6–20)
CALCIUM SERPL-MCNC: 8.9 MG/DL (ref 8.7–10.5)
CHLORIDE SERPL-SCNC: 100 MMOL/L (ref 95–110)
CO2 SERPL-SCNC: 27 MMOL/L (ref 23–29)
CREAT SERPL-MCNC: 1.3 MG/DL (ref 0.5–1.4)
DIFFERENTIAL METHOD BLD: ABNORMAL
EOSINOPHIL # BLD AUTO: 0.3 K/UL (ref 0–0.5)
EOSINOPHIL NFR BLD: 2.5 % (ref 0–8)
ERYTHROCYTE [DISTWIDTH] IN BLOOD BY AUTOMATED COUNT: 13.7 % (ref 11.5–14.5)
EST. GFR  (NO RACE VARIABLE): 51 ML/MIN/1.73 M^2
GLUCOSE SERPL-MCNC: 111 MG/DL (ref 70–110)
HCT VFR BLD AUTO: 36.7 % (ref 37–48.5)
HGB BLD-MCNC: 11.8 G/DL (ref 12–16)
IMM GRANULOCYTES # BLD AUTO: 0.05 K/UL (ref 0–0.04)
IMM GRANULOCYTES NFR BLD AUTO: 0.4 % (ref 0–0.5)
LYMPHOCYTES # BLD AUTO: 1.8 K/UL (ref 1–4.8)
LYMPHOCYTES NFR BLD: 16.1 % (ref 18–48)
MAGNESIUM SERPL-MCNC: 2.1 MG/DL (ref 1.6–2.6)
MCH RBC QN AUTO: 28.5 PG (ref 27–31)
MCHC RBC AUTO-ENTMCNC: 32.2 G/DL (ref 32–36)
MCV RBC AUTO: 89 FL (ref 82–98)
MONOCYTES # BLD AUTO: 0.7 K/UL (ref 0.3–1)
MONOCYTES NFR BLD: 6.1 % (ref 4–15)
NEUTROPHILS # BLD AUTO: 8.4 K/UL (ref 1.8–7.7)
NEUTROPHILS NFR BLD: 74.7 % (ref 38–73)
NRBC BLD-RTO: 0 /100 WBC
PHOSPHATE SERPL-MCNC: 2.5 MG/DL (ref 2.7–4.5)
PLATELET # BLD AUTO: 299 K/UL (ref 150–450)
PMV BLD AUTO: 9.9 FL (ref 9.2–12.9)
POCT GLUCOSE: 119 MG/DL (ref 70–110)
POCT GLUCOSE: 122 MG/DL (ref 70–110)
POCT GLUCOSE: 131 MG/DL (ref 70–110)
POCT GLUCOSE: 141 MG/DL (ref 70–110)
POTASSIUM SERPL-SCNC: 3.2 MMOL/L (ref 3.5–5.1)
PROT SERPL-MCNC: 6.2 G/DL (ref 6–8.4)
RBC # BLD AUTO: 4.14 M/UL (ref 4–5.4)
SODIUM SERPL-SCNC: 138 MMOL/L (ref 136–145)
WBC # BLD AUTO: 11.28 K/UL (ref 3.9–12.7)

## 2024-08-26 PROCEDURE — 83735 ASSAY OF MAGNESIUM: CPT | Performed by: HOSPITALIST

## 2024-08-26 PROCEDURE — 11000001 HC ACUTE MED/SURG PRIVATE ROOM

## 2024-08-26 PROCEDURE — 80053 COMPREHEN METABOLIC PANEL: CPT | Performed by: HOSPITALIST

## 2024-08-26 PROCEDURE — 99232 SBSQ HOSP IP/OBS MODERATE 35: CPT | Mod: ,,, | Performed by: SURGERY

## 2024-08-26 PROCEDURE — 84100 ASSAY OF PHOSPHORUS: CPT | Performed by: HOSPITALIST

## 2024-08-26 PROCEDURE — 25000003 PHARM REV CODE 250: Performed by: HOSPITALIST

## 2024-08-26 PROCEDURE — 63600175 PHARM REV CODE 636 W HCPCS: Performed by: HOSPITALIST

## 2024-08-26 PROCEDURE — 21400001 HC TELEMETRY ROOM

## 2024-08-26 PROCEDURE — 63600175 PHARM REV CODE 636 W HCPCS: Performed by: INTERNAL MEDICINE

## 2024-08-26 PROCEDURE — 36415 COLL VENOUS BLD VENIPUNCTURE: CPT | Performed by: HOSPITALIST

## 2024-08-26 PROCEDURE — 85025 COMPLETE CBC W/AUTO DIFF WBC: CPT | Performed by: HOSPITALIST

## 2024-08-26 PROCEDURE — 25000003 PHARM REV CODE 250: Performed by: INTERNAL MEDICINE

## 2024-08-26 PROCEDURE — 99900035 HC TECH TIME PER 15 MIN (STAT)

## 2024-08-26 RX ORDER — POTASSIUM CHLORIDE 20 MEQ/1
40 TABLET, EXTENDED RELEASE ORAL ONCE
Status: COMPLETED | OUTPATIENT
Start: 2024-08-26 | End: 2024-08-26

## 2024-08-26 RX ORDER — ENOXAPARIN SODIUM 100 MG/ML
40 INJECTION SUBCUTANEOUS EVERY 24 HOURS
Status: DISCONTINUED | OUTPATIENT
Start: 2024-08-26 | End: 2024-08-30 | Stop reason: HOSPADM

## 2024-08-26 RX ORDER — AMLODIPINE BESYLATE 5 MG/1
5 TABLET ORAL DAILY
Status: DISCONTINUED | OUTPATIENT
Start: 2024-08-27 | End: 2024-08-30 | Stop reason: HOSPADM

## 2024-08-26 RX ORDER — SODIUM CHLORIDE 9 MG/ML
INJECTION, SOLUTION INTRAVENOUS CONTINUOUS
Status: DISCONTINUED | OUTPATIENT
Start: 2024-08-26 | End: 2024-08-27

## 2024-08-26 RX ORDER — INSULIN ASPART 100 [IU]/ML
0-5 INJECTION, SOLUTION INTRAVENOUS; SUBCUTANEOUS
Status: DISCONTINUED | OUTPATIENT
Start: 2024-08-26 | End: 2024-08-30 | Stop reason: HOSPADM

## 2024-08-26 RX ORDER — IBUPROFEN 200 MG
1 TABLET ORAL DAILY PRN
Status: DISCONTINUED | OUTPATIENT
Start: 2024-08-26 | End: 2024-08-30 | Stop reason: HOSPADM

## 2024-08-26 RX ORDER — METOPROLOL SUCCINATE 25 MG/1
25 TABLET, EXTENDED RELEASE ORAL DAILY
Status: DISCONTINUED | OUTPATIENT
Start: 2024-08-27 | End: 2024-08-30 | Stop reason: HOSPADM

## 2024-08-26 RX ADMIN — PIPERACILLIN AND TAZOBACTAM 4.5 G: 4; .5 INJECTION, POWDER, LYOPHILIZED, FOR SOLUTION INTRAVENOUS; PARENTERAL at 04:08

## 2024-08-26 RX ADMIN — ENOXAPARIN SODIUM 40 MG: 40 INJECTION SUBCUTANEOUS at 04:08

## 2024-08-26 RX ADMIN — PIPERACILLIN AND TAZOBACTAM 4.5 G: 4; .5 INJECTION, POWDER, LYOPHILIZED, FOR SOLUTION INTRAVENOUS; PARENTERAL at 12:08

## 2024-08-26 RX ADMIN — POTASSIUM CHLORIDE 40 MEQ: 1500 TABLET, EXTENDED RELEASE ORAL at 09:08

## 2024-08-26 RX ADMIN — SODIUM CHLORIDE: 9 INJECTION, SOLUTION INTRAVENOUS at 11:08

## 2024-08-26 RX ADMIN — ONDANSETRON 4 MG: 2 INJECTION INTRAMUSCULAR; INTRAVENOUS at 05:08

## 2024-08-26 RX ADMIN — MORPHINE SULFATE 4 MG: 4 INJECTION, SOLUTION INTRAMUSCULAR; INTRAVENOUS at 12:08

## 2024-08-26 RX ADMIN — PIPERACILLIN AND TAZOBACTAM 4.5 G: 4; .5 INJECTION, POWDER, LYOPHILIZED, FOR SOLUTION INTRAVENOUS; PARENTERAL at 08:08

## 2024-08-26 RX ADMIN — MORPHINE SULFATE 4 MG: 4 INJECTION, SOLUTION INTRAMUSCULAR; INTRAVENOUS at 04:08

## 2024-08-26 RX ADMIN — POTASSIUM PHOSPHATE, MONOBASIC 500 MG: 500 TABLET, SOLUBLE ORAL at 09:08

## 2024-08-26 RX ADMIN — MORPHINE SULFATE 4 MG: 4 INJECTION, SOLUTION INTRAMUSCULAR; INTRAVENOUS at 06:08

## 2024-08-26 NOTE — ASSESSMENT & PLAN NOTE
Body mass index is 32.69 kg/m². Morbid obesity complicates all aspects of disease management from diagnostic modalities to treatment. Weight loss encouraged and health benefits explained to patient.

## 2024-08-26 NOTE — PROGRESS NOTES
Surgery Progress Note    Cipriano Gamez is a 48 y.o. year old female in hospital for acute sigmoid diverticulitis, no abscess.    NAEON, VSS, afebrile  Pain in lower abdomen continues but improved since admission, well controlled  No nausea/vomiting  Passing gas, no BM today  No f/c/n/v/sob/cp    ROS:  Negative except above    PE:  Vitals:    08/25/24 2355 08/26/24 0244 08/26/24 0416 08/26/24 0420   BP: 130/84  134/86    BP Location: Right arm  Right arm    Patient Position: Lying  Lying    Pulse: 75 75 77    Resp: 18  18 18   Temp: 98.3 °F (36.8 °C)  98.4 °F (36.9 °C)    TempSrc: Oral  Oral    SpO2: 97%  98%    Weight:       Height:         General: Awake and alert, in no acute distress, appears stated age, well-nourished  HEENT: Atraumatic, normocephalic, MMM  Cardiac: Regular rate on monitor, well-perfused  Pulm: Breathing comfortably, symmetric chest rise, no respiratory distress. Satting well on RA  Abdomen: Soft, moderate tender in LLQ/suprapubic > RLQ to deep palpation, (improving), non-distended, no guarding or rebound, no signs of peritonitis  MSK: moving all extremities appropriately  Neuro: Aox4, CN 2-12 grossly intact    Significant Diagnostic Studies: Labs: CMP   Recent Labs   Lab 08/24/24  2255 08/25/24  0423 08/25/24  1354 08/26/24  0430   * 134* 135* 138   K 3.0* 2.9* 2.9* 3.2*   CL 89* 89* 93* 100   CO2 25 30* 31* 27   * 193* 142* 111*   BUN 28* 23* 22* 13   CREATININE 2.6* 1.9* 1.8* 1.3   CALCIUM 10.2 9.3 8.9 8.9   PROT 8.2  --   --  6.2   ALBUMIN 3.8  --   --  2.9*   BILITOT 0.9  --   --  0.8   ALKPHOS 95  --   --  73   AST 12  --   --  10   ALT 9*  --   --  9*   ANIONGAP 17* 15 11 11    and CBC   Recent Labs   Lab 08/24/24  2255 08/25/24  0423 08/26/24  0430   WBC 21.59* 17.54* 11.28   HGB 16.7* 13.5 11.8*   HCT 47.1 40.3 36.7*    311 299     Microbiology: Blood Culture   Lab Results   Component Value Date    LABBLOO No Growth to date 08/25/2024    LABBLOO No Growth to date  08/25/2024       A/P:  Cipriano Gamez is a 48 y.o. year old female with a PMHx of T2DM, HTN, GERD, cannabinoid hyperemesis syndrome and diverticulosis, presenting with acute sigmoid diverticulitis, questionable small focal/contained perforation vs air in diverticulum, no abscess. Afebrile, HDS, WBC and lactate normalized. Initial abdominal exam soft, non-distended, tender in LLQ/suprapubic > RLQ, no signs of peritonitis. CT A/P showing acute sigmoid diverticulitis, questionable small focal/contained perforation vs air in diverticulum, no abscess.      - No acute surgical intervention. Some symptomatic improvement. Continue IV Abx therapy  - Serial abdominal exams  - Daily CBC/BMP. Lactate normalized  - Replete lytes prn  - Diet: Okay for CLD  - Abx: IV zosyn  - MMPC and IVFs per primary  - Will continue to follow     Patient case discussed with attending staff, Dr. Bhakta. Attestation to follow.     Cristal Marinelli MD  General Surgery Resident  [facility]

## 2024-08-26 NOTE — NURSING
[facility] - [place]  Nurses Note -- 4 Eyes      8/26/2024       Skin assessed on: Q Shift      [x] No Pressure Injuries Present    []Prevention Measures Documented    [] Yes LDA  for Pressure Injury Previously documented     [] Yes New Pressure Injury Discovered   [] LDA for New Pressure Injury Added      Attending RN:  Larisa Carranza RN     Second RN:  NadiaRN

## 2024-08-26 NOTE — ASSESSMENT & PLAN NOTE
ORALIA is likely due to pre-renal azotemia due to dehydration. Baseline creatinine is  0.9 . Most recent creatinine and eGFR are listed below.  Recent Labs     08/25/24  0423 08/25/24  1354 08/26/24  0430   CREATININE 1.9* 1.8* 1.3   EGFRNORACEVR 33* 35* 51*      Plan  - ORALIA is improving with IVF  - Avoid nephrotoxins and renally dose meds for GFR listed above  - Monitor urine output, serial BMP, and adjust therapy as needed  - NS at 75cc/hr x24 more hours  - now clear liquid diet

## 2024-08-26 NOTE — ASSESSMENT & PLAN NOTE
Patient's most recent potassium results are listed below.   Recent Labs     08/25/24  0423 08/25/24  1354 08/26/24  0430   K 2.9* 2.9* 3.2*     Plan  - Replete potassium per protocol  - Monitor potassium Daily  - Patient's hypokalemia is improving  - supplement Mg PRN too

## 2024-08-26 NOTE — ASSESSMENT & PLAN NOTE
Colonic diverticulosis with acute diverticulitis of the sigmoid colon with questionable small focal contained perforation versus air within a diverticulum is seen.  No evidence of abscess.  - given IVF, decreased to 75cc/hr for 24 more hours  - continue zosyn  - continue PRN nausea, pain Rx  - Gen surg consulted  - advanced to clear liquid diet today  - monitor abdominal exam

## 2024-08-26 NOTE — ASSESSMENT & PLAN NOTE
This patient does have evidence of infective focus. My overall impression is sepsis. Source: Abdominal- diverticulitis  Antibiotics given-   Antibiotics (72h ago, onward)      Start     Stop Route Frequency Ordered    08/25/24 0900  piperacillin-tazobactam (ZOSYN) 4.5 g in D5W 100 mL IVPB (MB+)         -- IV Every 8 hours (non-standard times) 08/25/24 0229          - continue current antibiotics  - sepsis improving, WBC normalized, afebrile, HR normal

## 2024-08-26 NOTE — HOSPITAL COURSE
Ms Cipriano Gamez was admitted with sepsis due to acute diverticulitis with potential contained perforation, ORALIA, and hypokalemia. Started IVF, zosyn, and bowel rest. Pain still present but no nausea, vomiting. WBC normalized, K improving, ORALIA improving.  Patient still having significant pain and not improving, repeat CT ordered showing a potential abscess formation behind the bladder and fat stranding likely representing pancreatitis, lipase ordered and increased rate of fluids. Will ask ID for intervention and abx guidance.

## 2024-08-26 NOTE — ASSESSMENT & PLAN NOTE
Patient's FSGs are controlled on current medication regimen.  Last A1c reviewed-   Lab Results   Component Value Date    HGBA1C 7.9 (H) 07/21/2024   Current correctional scale  Low  Maintain anti-hyperglycemic dose as follows-   Antihyperglycemics (From admission, onward)      Start     Stop Route Frequency Ordered    08/26/24 0956  insulin aspart U-100 pen 0-5 Units         -- SubQ Before meals & nightly PRN 08/26/24 0856          Hold Oral hypoglycemics while patient is in the hospital->Metformin.

## 2024-08-26 NOTE — PROGRESS NOTES
[facility]  Progress Note    Patient Name: Cipriano Gamez  MRN: [id number]  Patient Class: IP- Inpatient   Admission Date: 8/24/2024  Length of Stay: 1 days  Attending Physician: Cintia Segovia MD  Primary Care Provider: Brooks Bergeron MD        Subjective:     Principal Problem:Diverticulitis of colon with perforation        HPI:  48 y.o AAF with h/o NIDDM type 2, essential HTN, GERD, h/o cyclic vomiting due to weed (has had none in 4 weeks), Diverticulosis presents to the ER with multiple complaints but mostly abdominal pain and N/V for the past month. Denies any diarrhea or blood in stools. No Chest pain or SOB. No fevers but admits to chills. Has not been able to eat due to the pain this past few days.     Labst noted for WBC 21 and H/H 16.7/47.  Creatinine elevated at 2.6 (baseline <1).  Potassium 3. LFT's normal. Lactic acid was 3.    Vitals stable with no hypotension noted.     CT abd/pelvis w/o:   Impression:       Colonic diverticulosis with acute diverticulitis seen involving the sigmoid colon.  Questionable small focal contained perforation versus air within a diverticulum is seen.  No evidence of abscess.       Surgery was contacted by ED and will see her. NPO and  IV abx started.     We have been consulted for further management and admission to the inpatient telemetry hospitalist service.               Overview/Hospital Course:  Ms Cipriano Gamez was admitted with sepsis due to acute diverticulitis with potential contained perforation, ORALIA, and hypokalemia. Started IVF, zosyn, and bowel rest. Pain still present but no nausea, vomiting. WBC normalized, K improving, ORALIA improving.      Interval History: She still has LLQ pain that is mildly improved from yesterday. She is hungry, tolerated liquids, no nausea/vomiting/diarrhea.     Review of Systems   Constitutional:  Negative for chills and fever.   Respiratory:  Negative for shortness of breath.    Cardiovascular:   Negative for chest pain.   Gastrointestinal:  Positive for abdominal pain. Negative for abdominal distention, anal bleeding, blood in stool, constipation, diarrhea, nausea and vomiting.   Genitourinary:  Negative for difficulty urinating.   Psychiatric/Behavioral:  Negative for confusion.      Objective:     Vital Signs (Most Recent):  Temp: 98.4 °F (36.9 °C) (08/26/24 0808)  Pulse: 83 (08/26/24 0808)  Resp: 16 (08/26/24 0808)  BP: (!) 167/87 (08/26/24 0808)  SpO2: 98 % (08/26/24 0808) Vital Signs (24h Range):  Temp:  [97.7 °F (36.5 °C)-99.4 °F (37.4 °C)] 98.4 °F (36.9 °C)  Pulse:  [75-88] 83  Resp:  [14-20] 16  SpO2:  [95 %-98 %] 98 %  BP: (116-167)/(78-91) 167/87     Weight: 97.5 kg (215 lb)  Body mass index is 32.69 kg/m².    Intake/Output Summary (Last 24 hours) at 8/26/2024 1032  Last data filed at 8/26/2024 0804  Gross per 24 hour   Intake 2962.9 ml   Output --   Net 2962.9 ml         Physical Exam  Vitals and nursing note reviewed.   Constitutional:       General: She is not in acute distress.     Appearance: She is obese. She is ill-appearing. She is not toxic-appearing.   Cardiovascular:      Rate and Rhythm: Normal rate and regular rhythm.   Pulmonary:      Effort: Pulmonary effort is normal.      Breath sounds: Normal breath sounds.   Abdominal:      General: Bowel sounds are normal. There is no distension.      Palpations: Abdomen is soft. There is no mass.      Tenderness: There is abdominal tenderness (LLQ). There is no guarding or rebound.   Musculoskeletal:      Right lower leg: No edema.      Left lower leg: No edema.   Skin:     General: Skin is warm and dry.   Neurological:      Mental Status: She is alert. Mental status is at baseline.             Significant Labs: All pertinent labs within the past 24 hours have been reviewed.    Significant Imaging: I have reviewed all pertinent imaging results/findings within the past 24 hours.    Assessment/Plan:      * Diverticulitis of colon with  perforation  Colonic diverticulosis with acute diverticulitis of the sigmoid colon with questionable small focal contained perforation versus air within a diverticulum is seen.  No evidence of abscess.  - given IVF, decreased to 75cc/hr for 24 more hours  - continue zosyn  - continue PRN nausea, pain Rx  - Gen surg consulted  - advanced to clear liquid diet today  - monitor abdominal exam         Hyperlipidemia  Not on statin at home, will not start now but consider when following up with PCP      Type 2 diabetes mellitus with hyperglycemia  Patient's FSGs are controlled on current medication regimen.  Last A1c reviewed-   Lab Results   Component Value Date    HGBA1C 7.9 (H) 07/21/2024   Current correctional scale  Low  Maintain anti-hyperglycemic dose as follows-   Antihyperglycemics (From admission, onward)      Start     Stop Route Frequency Ordered    08/26/24 0956  insulin aspart U-100 pen 0-5 Units         -- SubQ Before meals & nightly PRN 08/26/24 0856          Hold Oral hypoglycemics while patient is in the hospital->Metformin.       Class 1 obesity due to excess calories with serious comorbidity and body mass index (BMI) of 32.0 to 32.9 in adult  Body mass index is 32.69 kg/m². Morbid obesity complicates all aspects of disease management from diagnostic modalities to treatment. Weight loss encouraged and health benefits explained to patient.         Sepsis with acute renal failure  This patient does have evidence of infective focus. My overall impression is sepsis. Source: Abdominal- diverticulitis  Antibiotics given-   Antibiotics (72h ago, onward)      Start     Stop Route Frequency Ordered    08/25/24 0900  piperacillin-tazobactam (ZOSYN) 4.5 g in D5W 100 mL IVPB (MB+)         -- IV Every 8 hours (non-standard times) 08/25/24 0229          - continue current antibiotics  - sepsis improving, WBC normalized, afebrile, HR normal    ORALIA (acute kidney injury)  ORALIA is likely due to pre-renal azotemia due to  dehydration. Baseline creatinine is  0.9 . Most recent creatinine and eGFR are listed below.  Recent Labs     08/25/24  0423 08/25/24  1354 08/26/24  0430   CREATININE 1.9* 1.8* 1.3   EGFRNORACEVR 33* 35* 51*      Plan  - ORALIA is improving with IVF  - Avoid nephrotoxins and renally dose meds for GFR listed above  - Monitor urine output, serial BMP, and adjust therapy as needed  - NS at 75cc/hr x24 more hours  - now clear liquid diet    Marijuana abuse  Encouraged cessation, though THC is not the cause of her current abdominal pain      Tobacco use disorder  Patient was counseled on smoking cessation for 4 minutes. We discussed how smoking is detrimental to the patient's health. Prescription for nicotine patch was offered.       Hypokalemia  Patient's most recent potassium results are listed below.   Recent Labs     08/25/24  0423 08/25/24  1354 08/26/24  0430   K 2.9* 2.9* 3.2*     Plan  - Replete potassium per protocol  - Monitor potassium Daily  - Patient's hypokalemia is improving  - supplement Mg PRN too    Essential hypertension  Chronic, controlled. Latest blood pressure and vitals reviewed-     Temp:  [97.7 °F (36.5 °C)-99.4 °F (37.4 °C)]   Pulse:  [75-88]   Resp:  [14-20]   BP: (116-167)/(78-91)   SpO2:  [95 %-98 %] .   Home meds for hypertension were reviewed and noted below.   Hypertension Medications               amLODIPine (NORVASC) 5 MG tablet Take 1 tablet by mouth once daily.    metoprolol succinate (TOPROL-XL) 25 MG 24 hr tablet Take 1 tablet by mouth once daily.            While in the hospital, will manage blood pressure as follows; Continue home antihypertensive regimen with hold parameters     Will utilize p.r.n. blood pressure medication only if patient's blood pressure greater than  180/90  and she develops symptoms such as worsening chest pain or shortness of breath.      VTE Risk Mitigation (From admission, onward)           Ordered     Place LOLITA hose  Until discontinued         08/25/24 6863      Reason for No Pharmacological VTE Prophylaxis  Once        Question:  Reasons:  Answer:  Physician Provided (leave comment)  Comment:  surgery eval in process    08/25/24 0235     IP VTE HIGH RISK PATIENT  Once         08/25/24 0235     Place sequential compression device  Until discontinued         08/25/24 0235                    Discharge Planning   RADU:      Code Status: Full Code   Is the patient medically ready for discharge?:     Reason for patient still in hospital (select all that apply): Patient trending condition, Treatment, and Consult recommendations  Discharge Plan A: Home                  Cintia Segovia MD  Department of Hospital Medicine   [facility]

## 2024-08-26 NOTE — ASSESSMENT & PLAN NOTE
Chronic, controlled. Latest blood pressure and vitals reviewed-     Temp:  [97.7 °F (36.5 °C)-99.4 °F (37.4 °C)]   Pulse:  [75-88]   Resp:  [14-20]   BP: (116-167)/(78-91)   SpO2:  [95 %-98 %] .   Home meds for hypertension were reviewed and noted below.   Hypertension Medications               amLODIPine (NORVASC) 5 MG tablet Take 1 tablet by mouth once daily.    metoprolol succinate (TOPROL-XL) 25 MG 24 hr tablet Take 1 tablet by mouth once daily.            While in the hospital, will manage blood pressure as follows; Continue home antihypertensive regimen with hold parameters     Will utilize p.r.n. blood pressure medication only if patient's blood pressure greater than  180/90  and she develops symptoms such as worsening chest pain or shortness of breath.

## 2024-08-26 NOTE — ASSESSMENT & PLAN NOTE
Patient was counseled on smoking cessation for 4 minutes. We discussed how smoking is detrimental to the patient's health. Prescription for nicotine patch was offered.

## 2024-08-26 NOTE — SUBJECTIVE & OBJECTIVE
Interval History: She still has LLQ pain that is mildly improved from yesterday. She is hungry, tolerated liquids, no nausea/vomiting/diarrhea.     Review of Systems   Constitutional:  Negative for chills and fever.   Respiratory:  Negative for shortness of breath.    Cardiovascular:  Negative for chest pain.   Gastrointestinal:  Positive for abdominal pain. Negative for abdominal distention, anal bleeding, blood in stool, constipation, diarrhea, nausea and vomiting.   Genitourinary:  Negative for difficulty urinating.   Psychiatric/Behavioral:  Negative for confusion.      Objective:     Vital Signs (Most Recent):  Temp: 98.4 °F (36.9 °C) (08/26/24 0808)  Pulse: 83 (08/26/24 0808)  Resp: 16 (08/26/24 0808)  BP: (!) 167/87 (08/26/24 0808)  SpO2: 98 % (08/26/24 0808) Vital Signs (24h Range):  Temp:  [97.7 °F (36.5 °C)-99.4 °F (37.4 °C)] 98.4 °F (36.9 °C)  Pulse:  [75-88] 83  Resp:  [14-20] 16  SpO2:  [95 %-98 %] 98 %  BP: (116-167)/(78-91) 167/87     Weight: 97.5 kg (215 lb)  Body mass index is 32.69 kg/m².    Intake/Output Summary (Last 24 hours) at 8/26/2024 1032  Last data filed at 8/26/2024 0804  Gross per 24 hour   Intake 2962.9 ml   Output --   Net 2962.9 ml         Physical Exam  Vitals and nursing note reviewed.   Constitutional:       General: She is not in acute distress.     Appearance: She is obese. She is ill-appearing. She is not toxic-appearing.   Cardiovascular:      Rate and Rhythm: Normal rate and regular rhythm.   Pulmonary:      Effort: Pulmonary effort is normal.      Breath sounds: Normal breath sounds.   Abdominal:      General: Bowel sounds are normal. There is no distension.      Palpations: Abdomen is soft. There is no mass.      Tenderness: There is abdominal tenderness (LLQ). There is no guarding or rebound.   Musculoskeletal:      Right lower leg: No edema.      Left lower leg: No edema.   Skin:     General: Skin is warm and dry.   Neurological:      Mental Status: She is alert. Mental  status is at baseline.             Significant Labs: All pertinent labs within the past 24 hours have been reviewed.    Significant Imaging: I have reviewed all pertinent imaging results/findings within the past 24 hours.

## 2024-08-27 LAB
ANION GAP SERPL CALC-SCNC: 10 MMOL/L (ref 8–16)
BASOPHILS # BLD AUTO: 0.01 K/UL (ref 0–0.2)
BASOPHILS NFR BLD: 0.1 % (ref 0–1.9)
BUN SERPL-MCNC: 5 MG/DL (ref 6–20)
CALCIUM SERPL-MCNC: 9.3 MG/DL (ref 8.7–10.5)
CHLORIDE SERPL-SCNC: 102 MMOL/L (ref 95–110)
CO2 SERPL-SCNC: 22 MMOL/L (ref 23–29)
CREAT SERPL-MCNC: 1 MG/DL (ref 0.5–1.4)
DIFFERENTIAL METHOD BLD: ABNORMAL
EOSINOPHIL # BLD AUTO: 0.3 K/UL (ref 0–0.5)
EOSINOPHIL NFR BLD: 2.1 % (ref 0–8)
ERYTHROCYTE [DISTWIDTH] IN BLOOD BY AUTOMATED COUNT: 13.2 % (ref 11.5–14.5)
EST. GFR  (NO RACE VARIABLE): >60 ML/MIN/1.73 M^2
GLUCOSE SERPL-MCNC: 96 MG/DL (ref 70–110)
HCT VFR BLD AUTO: 38.1 % (ref 37–48.5)
HGB BLD-MCNC: 12.2 G/DL (ref 12–16)
IMM GRANULOCYTES # BLD AUTO: 0.04 K/UL (ref 0–0.04)
IMM GRANULOCYTES NFR BLD AUTO: 0.3 % (ref 0–0.5)
LYMPHOCYTES # BLD AUTO: 2.2 K/UL (ref 1–4.8)
LYMPHOCYTES NFR BLD: 19 % (ref 18–48)
MAGNESIUM SERPL-MCNC: 1.8 MG/DL (ref 1.6–2.6)
MCH RBC QN AUTO: 28.3 PG (ref 27–31)
MCHC RBC AUTO-ENTMCNC: 32 G/DL (ref 32–36)
MCV RBC AUTO: 88 FL (ref 82–98)
MONOCYTES # BLD AUTO: 0.7 K/UL (ref 0.3–1)
MONOCYTES NFR BLD: 6.1 % (ref 4–15)
NEUTROPHILS # BLD AUTO: 8.6 K/UL (ref 1.8–7.7)
NEUTROPHILS NFR BLD: 72.4 % (ref 38–73)
NRBC BLD-RTO: 0 /100 WBC
PHOSPHATE SERPL-MCNC: 2.1 MG/DL (ref 2.7–4.5)
PLATELET # BLD AUTO: 323 K/UL (ref 150–450)
PMV BLD AUTO: 9.9 FL (ref 9.2–12.9)
POCT GLUCOSE: 103 MG/DL (ref 70–110)
POCT GLUCOSE: 133 MG/DL (ref 70–110)
POCT GLUCOSE: 135 MG/DL (ref 70–110)
POCT GLUCOSE: 147 MG/DL (ref 70–110)
POTASSIUM SERPL-SCNC: 3.1 MMOL/L (ref 3.5–5.1)
RBC # BLD AUTO: 4.31 M/UL (ref 4–5.4)
SODIUM SERPL-SCNC: 134 MMOL/L (ref 136–145)
WBC # BLD AUTO: 11.82 K/UL (ref 3.9–12.7)

## 2024-08-27 PROCEDURE — 94761 N-INVAS EAR/PLS OXIMETRY MLT: CPT

## 2024-08-27 PROCEDURE — 99232 SBSQ HOSP IP/OBS MODERATE 35: CPT | Mod: ,,, | Performed by: SURGERY

## 2024-08-27 PROCEDURE — 83735 ASSAY OF MAGNESIUM: CPT | Performed by: HOSPITALIST

## 2024-08-27 PROCEDURE — 85025 COMPLETE CBC W/AUTO DIFF WBC: CPT | Performed by: HOSPITALIST

## 2024-08-27 PROCEDURE — 80048 BASIC METABOLIC PNL TOTAL CA: CPT | Performed by: HOSPITALIST

## 2024-08-27 PROCEDURE — 25000003 PHARM REV CODE 250: Performed by: HOSPITALIST

## 2024-08-27 PROCEDURE — 21400001 HC TELEMETRY ROOM

## 2024-08-27 PROCEDURE — 25500020 PHARM REV CODE 255: Performed by: STUDENT IN AN ORGANIZED HEALTH CARE EDUCATION/TRAINING PROGRAM

## 2024-08-27 PROCEDURE — 25000003 PHARM REV CODE 250: Performed by: INTERNAL MEDICINE

## 2024-08-27 PROCEDURE — 84100 ASSAY OF PHOSPHORUS: CPT | Performed by: HOSPITALIST

## 2024-08-27 PROCEDURE — 25000003 PHARM REV CODE 250: Performed by: STUDENT IN AN ORGANIZED HEALTH CARE EDUCATION/TRAINING PROGRAM

## 2024-08-27 PROCEDURE — 63600175 PHARM REV CODE 636 W HCPCS

## 2024-08-27 PROCEDURE — 63600175 PHARM REV CODE 636 W HCPCS: Performed by: HOSPITALIST

## 2024-08-27 PROCEDURE — 99900035 HC TECH TIME PER 15 MIN (STAT)

## 2024-08-27 PROCEDURE — 63600175 PHARM REV CODE 636 W HCPCS: Performed by: INTERNAL MEDICINE

## 2024-08-27 RX ORDER — OXYCODONE HYDROCHLORIDE 5 MG/1
10 TABLET ORAL EVERY 4 HOURS PRN
Status: DISCONTINUED | OUTPATIENT
Start: 2024-08-27 | End: 2024-08-27

## 2024-08-27 RX ORDER — HYDRALAZINE HYDROCHLORIDE 20 MG/ML
10 INJECTION INTRAMUSCULAR; INTRAVENOUS EVERY 6 HOURS PRN
Status: DISCONTINUED | OUTPATIENT
Start: 2024-08-27 | End: 2024-08-30 | Stop reason: HOSPADM

## 2024-08-27 RX ORDER — OXYCODONE HYDROCHLORIDE 5 MG/1
5 TABLET ORAL EVERY 4 HOURS PRN
Status: DISCONTINUED | OUTPATIENT
Start: 2024-08-27 | End: 2024-08-30 | Stop reason: HOSPADM

## 2024-08-27 RX ORDER — SODIUM,POTASSIUM PHOSPHATES 280-250MG
1 POWDER IN PACKET (EA) ORAL ONCE
Status: COMPLETED | OUTPATIENT
Start: 2024-08-27 | End: 2024-08-27

## 2024-08-27 RX ORDER — POTASSIUM CHLORIDE 20 MEQ/1
40 TABLET, EXTENDED RELEASE ORAL ONCE
Status: COMPLETED | OUTPATIENT
Start: 2024-08-27 | End: 2024-08-27

## 2024-08-27 RX ORDER — SODIUM CHLORIDE 9 MG/ML
INJECTION, SOLUTION INTRAVENOUS CONTINUOUS
Status: DISCONTINUED | OUTPATIENT
Start: 2024-08-27 | End: 2024-08-28

## 2024-08-27 RX ADMIN — PIPERACILLIN AND TAZOBACTAM 4.5 G: 4; .5 INJECTION, POWDER, LYOPHILIZED, FOR SOLUTION INTRAVENOUS; PARENTERAL at 04:08

## 2024-08-27 RX ADMIN — Medication 1 PACKET: at 05:08

## 2024-08-27 RX ADMIN — PIPERACILLIN AND TAZOBACTAM 4.5 G: 4; .5 INJECTION, POWDER, LYOPHILIZED, FOR SOLUTION INTRAVENOUS; PARENTERAL at 08:08

## 2024-08-27 RX ADMIN — SODIUM CHLORIDE: 9 INJECTION, SOLUTION INTRAVENOUS at 04:08

## 2024-08-27 RX ADMIN — IOHEXOL 100 ML: 350 INJECTION, SOLUTION INTRAVENOUS at 06:08

## 2024-08-27 RX ADMIN — POTASSIUM CHLORIDE 40 MEQ: 1500 TABLET, EXTENDED RELEASE ORAL at 05:08

## 2024-08-27 RX ADMIN — PIPERACILLIN AND TAZOBACTAM 4.5 G: 4; .5 INJECTION, POWDER, LYOPHILIZED, FOR SOLUTION INTRAVENOUS; PARENTERAL at 01:08

## 2024-08-27 RX ADMIN — AMLODIPINE BESYLATE 5 MG: 5 TABLET ORAL at 08:08

## 2024-08-27 RX ADMIN — OXYCODONE 10 MG: 5 TABLET ORAL at 10:08

## 2024-08-27 RX ADMIN — SODIUM CHLORIDE: 9 INJECTION, SOLUTION INTRAVENOUS at 11:08

## 2024-08-27 RX ADMIN — ENOXAPARIN SODIUM 40 MG: 40 INJECTION SUBCUTANEOUS at 04:08

## 2024-08-27 RX ADMIN — ONDANSETRON 4 MG: 2 INJECTION INTRAMUSCULAR; INTRAVENOUS at 06:08

## 2024-08-27 RX ADMIN — MORPHINE SULFATE 4 MG: 4 INJECTION, SOLUTION INTRAMUSCULAR; INTRAVENOUS at 01:08

## 2024-08-27 RX ADMIN — HYDRALAZINE HYDROCHLORIDE 10 MG: 20 INJECTION INTRAMUSCULAR; INTRAVENOUS at 04:08

## 2024-08-27 RX ADMIN — METOPROLOL SUCCINATE 25 MG: 25 TABLET, EXTENDED RELEASE ORAL at 08:08

## 2024-08-27 RX ADMIN — OXYCODONE 5 MG: 5 TABLET ORAL at 09:08

## 2024-08-27 RX ADMIN — ONDANSETRON 4 MG: 2 INJECTION INTRAMUSCULAR; INTRAVENOUS at 09:08

## 2024-08-27 NOTE — NURSING
[facility] [place]  Nurses Note -- 4 Eyes      8/27/2024       Skin assessed on: Q Shift      [x] No Pressure Injuries Present    []Prevention Measures Documented    [] Yes LDA  for Pressure Injury Previously documented     [] Yes New Pressure Injury Discovered   [] LDA for New Pressure Injury Added      Attending RN:  Larisa Carranza RN     Second RN:  NadiaRN

## 2024-08-27 NOTE — PROGRESS NOTES
Surgery Progress Note    Cipriano Gamez is a 48 y.o. year old female in hospital for acute sigmoid diverticulitis, no abscess.    NAEON, VSS, afebrile  Pain in LLQ continues but improved since admission, well controlled. No longer having RLQ pain  No nausea/vomiting  Passing gas, no BM today  No f/c/n/v/sob/cp    ROS:  Negative except above    PE:  Vitals:    08/27/24 0357 08/27/24 0436 08/27/24 0500 08/27/24 0702   BP: (!) 178/107  (!) 174/97    BP Location: Left arm  Left arm    Patient Position: Lying      Pulse: 80 76 74 78   Resp: 18      Temp: 98.6 °F (37 °C)      TempSrc: Oral      SpO2: 99%      Weight:       Height:         General: Awake and alert, in no acute distress, appears stated age, well-nourished  HEENT: Atraumatic, normocephalic, MMM  Cardiac: Regular rate on monitor, well-perfused  Pulm: Breathing comfortably, symmetric chest rise, no respiratory distress. Satting well on RA  Abdomen: Soft, moderate tender in LLQ/suprapubic to deep palpation (improving), non-distended, no guarding or rebound, no signs of peritonitis  MSK: moving all extremities appropriately  Neuro: Aox4, CN 2-12 grossly intact    Significant Diagnostic Studies: Labs: CMP   Recent Labs   Lab 08/25/24  1354 08/26/24  0430 08/27/24  0559   * 138 134*   K 2.9* 3.2* 3.1*   CL 93* 100 102   CO2 31* 27 22*   * 111* 96   BUN 22* 13 5*   CREATININE 1.8* 1.3 1.0   CALCIUM 8.9 8.9 9.3   PROT  --  6.2  --    ALBUMIN  --  2.9*  --    BILITOT  --  0.8  --    ALKPHOS  --  73  --    AST  --  10  --    ALT  --  9*  --    ANIONGAP 11 11 10    and CBC   Recent Labs   Lab 08/26/24  0430 08/27/24  0559   WBC 11.28 11.82   HGB 11.8* 12.2   HCT 36.7* 38.1    323     Microbiology: Blood Culture   Lab Results   Component Value Date    LABBLOO No Growth to date 08/25/2024    LABBLOO No Growth to date 08/25/2024    LABBLOO No Growth to date 08/25/2024       A/P:  Cipriano Gamez is a 48 y.o. year old female with a PMHx of T2DM, HTN,  GERD, cannabinoid hyperemesis syndrome and diverticulosis, presenting with acute sigmoid diverticulitis, questionable small focal/contained perforation vs air in diverticulum, no abscess. Afebrile, HDS, WBC and lactate normalized. Initial abdominal exam soft, non-distended, tender in LLQ/suprapubic > RLQ, no signs of peritonitis. CT A/P showing acute sigmoid diverticulitis, questionable small focal/contained perforation vs air in diverticulum, no abscess. Stable abdominal exam, slight improvement in tenderness.     - No acute surgical intervention. Some symptomatic improvement. Continue IV Abx therapy  - Serial abdominal exams, stable  - Daily CBC/BMP. Lactate and WBC normalized  - Replete lytes prn  - Diet: Okay to advance diet as tolerated  - Abx: IV zosyn  - MMPC and IVFs per primary  - Will continue to follow     Patient case discussed with attending staff, Dr. Bhakta. Attestation to follow.     Cristal Marinelli MD  General Surgery Resident  [facility]

## 2024-08-27 NOTE — PROGRESS NOTES
[facility] Medicine  Progress Note    Patient Name: Cipriano Gamez  MRN: [id number]  Patient Class: IP- Inpatient   Admission Date: 8/24/2024  Length of Stay: 2 days  Attending Physician: Max Cao MD  Primary Care Provider: Brooks Bergeron MD        Subjective:     Principal Problem:Diverticulitis of colon with perforation        HPI:  48 y.o AAF with h/o NIDDM type 2, essential HTN, GERD, h/o cyclic vomiting due to weed (has had none in 4 weeks), Diverticulosis presents to the ER with multiple complaints but mostly abdominal pain and N/V for the past month. Denies any diarrhea or blood in stools. No Chest pain or SOB. No fevers but admits to chills. Has not been able to eat due to the pain this past few days.     Labst noted for WBC 21 and H/H 16.7/47.  Creatinine elevated at 2.6 (baseline <1).  Potassium 3. LFT's normal. Lactic acid was 3.  Vitals stable with no hypotension noted. CT abd/pelvis w/o: Colonic diverticulosis with acute diverticulitis seen involving the sigmoid colon.  Questionable small focal contained perforation versus air within a diverticulum is seen.  No evidence of abscess. Surgery was contacted by ED and will see her. NPO and  IV abx started. We have been consulted for further management and admission to the inpatient telemetry hospitalist service.     Overview/Hospital Course:  Ms Cipriano Gamez was admitted with sepsis due to acute diverticulitis with potential contained perforation, ORALIA, and hypokalemia. Started IVF, zosyn, and bowel rest. Pain still present but no nausea, vomiting. WBC normalized, K improving, ORALIA improving.      Interval History:  NAEON.  No new issues.   CC- abdo pain  Patient complaining the pain is not improving, and perhaps worse  Will repeat CT with contrast to see if perforation contained  IV fluids to reduce renal injury  All questions answered and updates on care given.       ROS:  General: Negative for fevers   Cardiac: Negative for chest  pain   Pulmonary: Negative for wheezing  GI: Negative for abdominal distention      Vitals:    08/27/24 0500 08/27/24 0702 08/27/24 0808 08/27/24 0849   BP: (!) 174/97  (!) 164/98    BP Location: Left arm      Patient Position:       Pulse: 74 78 85    Resp:   19    Temp:   99.9 °F (37.7 °C)    TempSrc:       SpO2:   (!) 94% 98%   Weight:       Height:              Body mass index is 32.69 kg/m².      PHYSICAL EXAM:  GENERAL APPEARANCE: alert and cooperative.     HEAD: NC/AT  CARDIAC: There is no cyanosis or pallor.   LUNGS: No apparent wheezing or stridor.  ABDOMEN: Non-distended. No guarding.  MSK: No joint erythema or tenderness.   EXTREMITIES: No significant new deformity or new joint abnormality.   NEUROLOGICAL: CN II-XII grossly intact.   SKIN: No lesions or eruptions.  PSYCHIATRIC: No tangential speech. No Hyperactive features.        Recent Results (from the past 24 hour(s))   POCT glucose    Collection Time: 08/26/24 11:54 AM   Result Value Ref Range    POCT Glucose 122 (H) 70 - 110 mg/dL   POCT glucose    Collection Time: 08/26/24  3:53 PM   Result Value Ref Range    POCT Glucose 119 (H) 70 - 110 mg/dL   POCT glucose    Collection Time: 08/26/24  7:55 PM   Result Value Ref Range    POCT Glucose 131 (H) 70 - 110 mg/dL   CBC Auto Differential    Collection Time: 08/27/24  5:59 AM   Result Value Ref Range    WBC 11.82 3.90 - 12.70 K/uL    RBC 4.31 4.00 - 5.40 M/uL    Hemoglobin 12.2 12.0 - 16.0 g/dL    Hematocrit 38.1 37.0 - 48.5 %    MCV 88 82 - 98 fL    MCH 28.3 27.0 - 31.0 pg    MCHC 32.0 32.0 - 36.0 g/dL    RDW 13.2 11.5 - 14.5 %    Platelets 323 150 - 450 K/uL    MPV 9.9 9.2 - 12.9 fL    Immature Granulocytes 0.3 0.0 - 0.5 %    Gran # (ANC) 8.6 (H) 1.8 - 7.7 K/uL    Immature Grans (Abs) 0.04 0.00 - 0.04 K/uL    Lymph # 2.2 1.0 - 4.8 K/uL    Mono # 0.7 0.3 - 1.0 K/uL    Eos # 0.3 0.0 - 0.5 K/uL    Baso # 0.01 0.00 - 0.20 K/uL    nRBC 0 0 /100 WBC    Gran % 72.4 38.0 - 73.0 %    Lymph % 19.0 18.0 - 48.0 %     Mono % 6.1 4.0 - 15.0 %    Eosinophil % 2.1 0.0 - 8.0 %    Basophil % 0.1 0.0 - 1.9 %    Differential Method Automated    Magnesium    Collection Time: 08/27/24  5:59 AM   Result Value Ref Range    Magnesium 1.8 1.6 - 2.6 mg/dL   Basic Metabolic Panel    Collection Time: 08/27/24  5:59 AM   Result Value Ref Range    Sodium 134 (L) 136 - 145 mmol/L    Potassium 3.1 (L) 3.5 - 5.1 mmol/L    Chloride 102 95 - 110 mmol/L    CO2 22 (L) 23 - 29 mmol/L    Glucose 96 70 - 110 mg/dL    BUN 5 (L) 6 - 20 mg/dL    Creatinine 1.0 0.5 - 1.4 mg/dL    Calcium 9.3 8.7 - 10.5 mg/dL    Anion Gap 10 8 - 16 mmol/L    eGFR >60 >60 mL/min/1.73 m^2   Phosphorus    Collection Time: 08/27/24  5:59 AM   Result Value Ref Range    Phosphorus 2.1 (L) 2.7 - 4.5 mg/dL   POCT glucose    Collection Time: 08/27/24  8:06 AM   Result Value Ref Range    POCT Glucose 103 70 - 110 mg/dL       Microbiology Results (last 7 days)       Procedure Component Value Units Date/Time    Blood Culture #2 **CANNOT BE ORDERED STAT** [[id number]] Collected: 08/24/24 2341    Order Status: Completed Specimen: Blood from Peripheral, Antecubital, Left Updated: 08/27/24 0303     Blood Culture, Routine No Growth to date      No Growth to date      No Growth to date    Blood Culture #1 **CANNOT BE ORDERED STAT** [[id number]] Collected: 08/25/24 0004    Order Status: Completed Specimen: Blood from Peripheral, Forearm, Right Updated: 08/27/24 0303     Blood Culture, Routine No Growth to date      No Growth to date      No Growth to date             Imaging Results              CT Abdomen Pelvis  Without Contrast (Final result)  Result time 08/25/24 01:12:41   Procedure changed from CT Abdomen Pelvis With IV Contrast NO Oral Contrast     Final result by Kit Suarez MD (08/25/24 01:12:41)                   Impression:      Colonic diverticulosis with acute diverticulitis seen involving the sigmoid colon.  Questionable small focal contained perforation versus air within a  diverticulum is seen.  No evidence of abscess.    Additional findings as detailed above.      Electronically signed by: Kit Suarez MD  Date:    08/25/2024  Time:    01:12               Narrative:    EXAMINATION:  CT ABDOMEN PELVIS WITHOUT CONTRAST    CLINICAL HISTORY:  RLQ abdominal pain (Age >= 14y);    TECHNIQUE:  Low dose axial images, sagittal and coronal reformations were obtained from the lung bases to the pubic symphysis.  Oral contrast was not administered.    COMPARISON:  CT abdomen and pelvis from April 2023.    FINDINGS:  The visualized portion of the heart is unremarkable.  The lung bases are clear.    No significant hepatic abnormality seen on this noncontrast exam.  There is no intra-or extrahepatic biliary ductal dilatation.  The gallbladder is unremarkable.  The stomach, pancreas, spleen, and adrenal glands are unremarkable.    Contrast excretion is seen from the kidneys.  No hydronephrosis.  No abnormalities are seen along the ureteral courses.  Urinary bladder is nondistended.  Uterus is unremarkable.  Bilateral pelvic clips, presumed tubal ligation clips are seen.    Appendix is visualized and is unremarkable.  No evidence of bowel obstruction.  There is colonic diverticulosis.  Inflammatory changes are seen surrounding diverticula within the proximal sigmoid colon consistent with acute diverticulitis.  There is questionable small focal contained perforation versus air within a diverticulum.  Otherwise no additional free air seen.  No abscess.  No significant free fluid.    Aorta tapers normally.    No acute osseous abnormality identified.  Sclerosis is seen at the bilateral SI joints which may be seen with osteitis condensans.  Small fat containing umbilical hernia is noted.                                             Assessment/Plan:      * Diverticulitis of colon with perforation  Colonic diverticulosis with acute diverticulitis of the sigmoid colon with questionable small focal contained  perforation versus air within a diverticulum is seen.  No evidence of abscess.  - given IVF, decreased to 75cc/hr for 24 more hours  - continue zosyn  - continue PRN nausea, pain Rx  - Gen surg consulted  - advanced to clear liquid diet today  - monitor abdominal exam         Hyperlipidemia  Not on statin at home, will not start now but consider when following up with PCP      Type 2 diabetes mellitus with hyperglycemia  Patient's FSGs are controlled on current medication regimen.  Last A1c reviewed-   Lab Results   Component Value Date    HGBA1C 7.9 (H) 07/21/2024   Current correctional scale  Low  Maintain anti-hyperglycemic dose as follows-   Antihyperglycemics (From admission, onward)      Start     Stop Route Frequency Ordered    08/26/24 0956  insulin aspart U-100 pen 0-5 Units         -- SubQ Before meals & nightly PRN 08/26/24 0856          Hold Oral hypoglycemics while patient is in the hospital->Metformin.       Class 1 obesity due to excess calories with serious comorbidity and body mass index (BMI) of 32.0 to 32.9 in adult  Body mass index is 32.69 kg/m². Morbid obesity complicates all aspects of disease management from diagnostic modalities to treatment. Weight loss encouraged and health benefits explained to patient.         Sepsis with acute renal failure  This patient does have evidence of infective focus. My overall impression is sepsis. Source: Abdominal- diverticulitis  Antibiotics given-   Antibiotics (72h ago, onward)      Start     Stop Route Frequency Ordered    08/25/24 0900  piperacillin-tazobactam (ZOSYN) 4.5 g in D5W 100 mL IVPB (MB+)         -- IV Every 8 hours (non-standard times) 08/25/24 0229          - continue current antibiotics  - sepsis improving, WBC normalized, afebrile, HR normal    ORALIA (acute kidney injury)  ORALIA is likely due to pre-renal azotemia due to dehydration. Baseline creatinine is  0.9 . Most recent creatinine and eGFR are listed below.  Recent Labs     08/25/24  6574  08/25/24  1354 08/26/24  0430   CREATININE 1.9* 1.8* 1.3   EGFRNORACEVR 33* 35* 51*      Plan  - ORALIA is improving with IVF  - Avoid nephrotoxins and renally dose meds for GFR listed above  - Monitor urine output, serial BMP, and adjust therapy as needed  - NS at 75cc/hr x24 more hours  - now clear liquid diet    Marijuana abuse  Encouraged cessation, though THC is not the cause of her current abdominal pain      Tobacco use disorder  Patient was counseled on smoking cessation for 4 minutes. We discussed how smoking is detrimental to the patient's health. Prescription for nicotine patch was offered.       Hypokalemia  Patient's most recent potassium results are listed below.   Recent Labs     08/25/24  0423 08/25/24  1354 08/26/24  0430   K 2.9* 2.9* 3.2*     Plan  - Replete potassium per protocol  - Monitor potassium Daily  - Patient's hypokalemia is improving  - supplement Mg PRN too    Essential hypertension  Chronic, controlled. Latest blood pressure and vitals reviewed-     Temp:  [97.7 °F (36.5 °C)-99.4 °F (37.4 °C)]   Pulse:  [75-88]   Resp:  [14-20]   BP: (116-167)/(78-91)   SpO2:  [95 %-98 %] .   Home meds for hypertension were reviewed and noted below.   Hypertension Medications               amLODIPine (NORVASC) 5 MG tablet Take 1 tablet by mouth once daily.    metoprolol succinate (TOPROL-XL) 25 MG 24 hr tablet Take 1 tablet by mouth once daily.            While in the hospital, will manage blood pressure as follows; Continue home antihypertensive regimen with hold parameters     Will utilize p.r.n. blood pressure medication only if patient's blood pressure greater than  180/90  and she develops symptoms such as worsening chest pain or shortness of breath.      VTE Risk Mitigation (From admission, onward)           Ordered     enoxaparin injection 40 mg  Every 24 hours         08/26/24 1040     Place LOLITA hose  Until discontinued         08/25/24 0235     Reason for No Pharmacological VTE Prophylaxis  Once         Question:  Reasons:  Answer:  Physician Provided (leave comment)  Comment:  surgery eval in process    08/25/24 0235     IP VTE HIGH RISK PATIENT  Once         08/25/24 0235     Place sequential compression device  Until discontinued         08/25/24 0235                    Discharge Planning   RADU: 8/29/2024     Code Status: Full Code   Is the patient medically ready for discharge?:     Reason for patient still in hospital (select all that apply): Patient trending condition, Treatment, and Consult recommendations  Discharge Plan A: Home                  Max Cao MD  Department of [facility] Medicine   [facility]

## 2024-08-28 LAB
ANION GAP SERPL CALC-SCNC: 13 MMOL/L (ref 8–16)
BASOPHILS # BLD AUTO: 0.01 K/UL (ref 0–0.2)
BASOPHILS NFR BLD: 0.1 % (ref 0–1.9)
BUN SERPL-MCNC: 3 MG/DL (ref 6–20)
CALCIUM SERPL-MCNC: 9.5 MG/DL (ref 8.7–10.5)
CHLORIDE SERPL-SCNC: 101 MMOL/L (ref 95–110)
CO2 SERPL-SCNC: 21 MMOL/L (ref 23–29)
CREAT SERPL-MCNC: 1 MG/DL (ref 0.5–1.4)
DIFFERENTIAL METHOD BLD: NORMAL
EOSINOPHIL # BLD AUTO: 0.2 K/UL (ref 0–0.5)
EOSINOPHIL NFR BLD: 1.6 % (ref 0–8)
ERYTHROCYTE [DISTWIDTH] IN BLOOD BY AUTOMATED COUNT: 13.2 % (ref 11.5–14.5)
EST. GFR  (NO RACE VARIABLE): >60 ML/MIN/1.73 M^2
GLUCOSE SERPL-MCNC: 105 MG/DL (ref 70–110)
HCT VFR BLD AUTO: 38.9 % (ref 37–48.5)
HGB BLD-MCNC: 12.8 G/DL (ref 12–16)
IMM GRANULOCYTES # BLD AUTO: 0.02 K/UL (ref 0–0.04)
IMM GRANULOCYTES NFR BLD AUTO: 0.2 % (ref 0–0.5)
LIPASE SERPL-CCNC: 19 U/L (ref 4–60)
LYMPHOCYTES # BLD AUTO: 2 K/UL (ref 1–4.8)
LYMPHOCYTES NFR BLD: 21.4 % (ref 18–48)
MAGNESIUM SERPL-MCNC: 1.7 MG/DL (ref 1.6–2.6)
MCH RBC QN AUTO: 29 PG (ref 27–31)
MCHC RBC AUTO-ENTMCNC: 32.9 G/DL (ref 32–36)
MCV RBC AUTO: 88 FL (ref 82–98)
MONOCYTES # BLD AUTO: 0.5 K/UL (ref 0.3–1)
MONOCYTES NFR BLD: 5.4 % (ref 4–15)
NEUTROPHILS # BLD AUTO: 6.5 K/UL (ref 1.8–7.7)
NEUTROPHILS NFR BLD: 71.3 % (ref 38–73)
NRBC BLD-RTO: 0 /100 WBC
PLATELET # BLD AUTO: NORMAL K/UL (ref 150–450)
PMV BLD AUTO: NORMAL FL (ref 9.2–12.9)
POCT GLUCOSE: 108 MG/DL (ref 70–110)
POCT GLUCOSE: 132 MG/DL (ref 70–110)
POCT GLUCOSE: 143 MG/DL (ref 70–110)
POCT GLUCOSE: 154 MG/DL (ref 70–110)
POTASSIUM SERPL-SCNC: 3.5 MMOL/L (ref 3.5–5.1)
RBC # BLD AUTO: 4.41 M/UL (ref 4–5.4)
SODIUM SERPL-SCNC: 135 MMOL/L (ref 136–145)
WBC # BLD AUTO: 9.15 K/UL (ref 3.9–12.7)

## 2024-08-28 PROCEDURE — 25000003 PHARM REV CODE 250: Performed by: HOSPITALIST

## 2024-08-28 PROCEDURE — 36410 VNPNXR 3YR/> PHY/QHP DX/THER: CPT

## 2024-08-28 PROCEDURE — 99900035 HC TECH TIME PER 15 MIN (STAT)

## 2024-08-28 PROCEDURE — 83735 ASSAY OF MAGNESIUM: CPT | Performed by: HOSPITALIST

## 2024-08-28 PROCEDURE — 21400001 HC TELEMETRY ROOM

## 2024-08-28 PROCEDURE — 80048 BASIC METABOLIC PNL TOTAL CA: CPT | Performed by: HOSPITALIST

## 2024-08-28 PROCEDURE — 83690 ASSAY OF LIPASE: CPT | Performed by: HOSPITALIST

## 2024-08-28 PROCEDURE — 99232 SBSQ HOSP IP/OBS MODERATE 35: CPT | Mod: ,,, | Performed by: SURGERY

## 2024-08-28 PROCEDURE — 63600175 PHARM REV CODE 636 W HCPCS: Performed by: HOSPITALIST

## 2024-08-28 PROCEDURE — 25000003 PHARM REV CODE 250: Performed by: INTERNAL MEDICINE

## 2024-08-28 PROCEDURE — 63600175 PHARM REV CODE 636 W HCPCS

## 2024-08-28 PROCEDURE — 63600175 PHARM REV CODE 636 W HCPCS: Performed by: INTERNAL MEDICINE

## 2024-08-28 PROCEDURE — 99223 1ST HOSP IP/OBS HIGH 75: CPT | Mod: ,,, | Performed by: STUDENT IN AN ORGANIZED HEALTH CARE EDUCATION/TRAINING PROGRAM

## 2024-08-28 PROCEDURE — 85025 COMPLETE CBC W/AUTO DIFF WBC: CPT | Performed by: HOSPITALIST

## 2024-08-28 PROCEDURE — C1751 CATH, INF, PER/CENT/MIDLINE: HCPCS

## 2024-08-28 PROCEDURE — 25000003 PHARM REV CODE 250: Performed by: STUDENT IN AN ORGANIZED HEALTH CARE EDUCATION/TRAINING PROGRAM

## 2024-08-28 RX ORDER — SODIUM CHLORIDE 0.9 % (FLUSH) 0.9 %
10 SYRINGE (ML) INJECTION
Status: DISCONTINUED | OUTPATIENT
Start: 2024-08-29 | End: 2024-08-30 | Stop reason: HOSPADM

## 2024-08-28 RX ORDER — SODIUM CHLORIDE 0.9 % (FLUSH) 0.9 %
10 SYRINGE (ML) INJECTION EVERY 6 HOURS
Status: DISCONTINUED | OUTPATIENT
Start: 2024-08-29 | End: 2024-08-30 | Stop reason: HOSPADM

## 2024-08-28 RX ORDER — SODIUM CHLORIDE 9 MG/ML
INJECTION, SOLUTION INTRAVENOUS CONTINUOUS
Status: ACTIVE | OUTPATIENT
Start: 2024-08-28 | End: 2024-08-29

## 2024-08-28 RX ORDER — SODIUM CHLORIDE 9 MG/ML
INJECTION, SOLUTION INTRAVENOUS CONTINUOUS
Status: DISCONTINUED | OUTPATIENT
Start: 2024-08-28 | End: 2024-08-28

## 2024-08-28 RX ADMIN — ONDANSETRON 4 MG: 2 INJECTION INTRAMUSCULAR; INTRAVENOUS at 04:08

## 2024-08-28 RX ADMIN — SODIUM CHLORIDE: 9 INJECTION, SOLUTION INTRAVENOUS at 11:08

## 2024-08-28 RX ADMIN — SODIUM CHLORIDE: 9 INJECTION, SOLUTION INTRAVENOUS at 06:08

## 2024-08-28 RX ADMIN — PIPERACILLIN AND TAZOBACTAM 4.5 G: 4; .5 INJECTION, POWDER, LYOPHILIZED, FOR SOLUTION INTRAVENOUS; PARENTERAL at 05:08

## 2024-08-28 RX ADMIN — ONDANSETRON 4 MG: 2 INJECTION INTRAMUSCULAR; INTRAVENOUS at 11:08

## 2024-08-28 RX ADMIN — ACETAMINOPHEN 650 MG: 325 TABLET ORAL at 06:08

## 2024-08-28 RX ADMIN — AMLODIPINE BESYLATE 5 MG: 5 TABLET ORAL at 06:08

## 2024-08-28 RX ADMIN — OXYCODONE 5 MG: 5 TABLET ORAL at 06:08

## 2024-08-28 RX ADMIN — HYDRALAZINE HYDROCHLORIDE 10 MG: 20 INJECTION INTRAMUSCULAR; INTRAVENOUS at 11:08

## 2024-08-28 RX ADMIN — ENOXAPARIN SODIUM 40 MG: 40 INJECTION SUBCUTANEOUS at 05:08

## 2024-08-28 RX ADMIN — SODIUM CHLORIDE: 9 INJECTION, SOLUTION INTRAVENOUS at 09:08

## 2024-08-28 RX ADMIN — OXYCODONE 5 MG: 5 TABLET ORAL at 04:08

## 2024-08-28 RX ADMIN — OXYCODONE 5 MG: 5 TABLET ORAL at 11:08

## 2024-08-28 RX ADMIN — PIPERACILLIN AND TAZOBACTAM 4.5 G: 4; .5 INJECTION, POWDER, LYOPHILIZED, FOR SOLUTION INTRAVENOUS; PARENTERAL at 01:08

## 2024-08-28 RX ADMIN — METOPROLOL SUCCINATE 25 MG: 25 TABLET, EXTENDED RELEASE ORAL at 07:08

## 2024-08-28 RX ADMIN — PIPERACILLIN AND TAZOBACTAM 4.5 G: 4; .5 INJECTION, POWDER, LYOPHILIZED, FOR SOLUTION INTRAVENOUS; PARENTERAL at 08:08

## 2024-08-28 RX ADMIN — SODIUM CHLORIDE: 9 INJECTION, SOLUTION INTRAVENOUS at 02:08

## 2024-08-28 NOTE — PLAN OF CARE
Plan for patient discharge home once medically clear. Medicaid pending. ID consulted, continue iv abx and pain control, per attending physician. CM to continue to follow for dc needs.    08/28/24 7961   Discharge Reassessment   Assessment Type Discharge Planning Reassessment   Did the patient's condition or plan change since previous assessment? No   Discharge Plan discussed with: Patient   Communicated RADU with patient/caregiver Yes   Discharge Plan A Home   Discharge Plan B Home with family   DME Needed Upon Discharge  none   Transition of Care Barriers Unisured  (Medicaid pending)   Why the patient remains in the hospital Requires continued medical care   Post-Acute Status   Discharge Delays None known at this time

## 2024-08-28 NOTE — SUBJECTIVE & OBJECTIVE
Past Medical History:   Diagnosis Date    Abnormal Pap smear of cervix     Cigarette smoker     Diverticulosis     GERD (gastroesophageal reflux disease)     Hiatal hernia     Hyperlipidemia 8/25/2024    Hypertension        Past Surgical History:   Procedure Laterality Date    CERVICAL BIOPSY  W/ LOOP ELECTRODE EXCISION  2/11/14    LAPAROSCOPIC OCCLUSION OF OVIDUCTS BY DEVICE Bilateral 7/10/2020    Procedure: OCCLUSION, FALLOPIAN TUBE, LAPAROSCOPIC, USING DEVICE;  Surgeon: Alex Waller MD;  Location: [facility] OR;  Service: OB/GYN;  Laterality: Bilateral;  RN PREOP 6/23/2020   T/S--COVID NEGATIVE---UPT  CONSENTS INCOMPLETE       Review of patient's allergies indicates:   Allergen Reactions    Ciprofloxacin Swelling and Blisters    Ibuprofen Hives    Meloxicam      rash    Cyclobenzaprine Rash     rasg       Medications:  Medications Prior to Admission   Medication Sig    amLODIPine (NORVASC) 5 MG tablet Take 1 tablet by mouth once daily.    metFORMIN (GLUCOPHAGE-XR) 500 MG ER 24hr tablet Take 500 mg by mouth once daily.    metoprolol succinate (TOPROL-XL) 25 MG 24 hr tablet Take 1 tablet by mouth once daily.     Antibiotics (From admission, onward)      Start     Stop Route Frequency Ordered    08/25/24 0900  piperacillin-tazobactam (ZOSYN) 4.5 g in D5W 100 mL IVPB (MB+)         -- IV Every 8 hours (non-standard times) 08/25/24 0229          Antifungals (From admission, onward)      None          Antivirals (From admission, onward)      None             Immunization History   Administered Date(s) Administered    Influenza - Quadrivalent - PF *Preferred* (6 months and older) 03/13/2020    Tdap 10/04/2013       Family History       Problem Relation (Age of Onset)    Diabetes Other    Heart disease Mother (54), Maternal Grandmother (40), Brother (44)    Sickle cell trait Sister          Social History     Socioeconomic History    Marital status: Single   Occupational History    Occupation: [employer] in [place] [employer]      Employer: [employer]   Tobacco Use    Smoking status: Every Day     Current packs/day: 1.00     Average packs/day: 1 pack/day for 16.0 years (16.0 ttl pk-yrs)     Types: Cigarettes    Smokeless tobacco: Never   Substance and Sexual Activity    Alcohol use: Yes     Comment: social 1-2 x / month    Drug use: Yes     Frequency: 1.0 times per week     Types: Marijuana     Comment: daily    Sexual activity: Not Currently     Partners: Male     Birth control/protection: None     Social Determinants of Health     Financial Resource Strain: High Risk (8/25/2024)    Overall Financial Resource Strain (CARDIA)     Difficulty of Paying Living Expenses: Hard   Food Insecurity: Food Insecurity Present (8/25/2024)    Hunger Vital Sign     Worried About Running Out of Food in the Last Year: Sometimes true     Ran Out of Food in the Last Year: Sometimes true   Transportation Needs: No Transportation Needs (8/25/2024)    TRANSPORTATION NEEDS     Transportation : No   Physical Activity: Sufficiently Active (8/5/2021)    Exercise Vital Sign     Days of Exercise per Week: 5 days     Minutes of Exercise per Session: 150+ min   Stress: No Stress Concern Present (8/25/2024)    [employer] - Occupational Stress Questionnaire     Feeling of Stress : Only a little   Housing Stability: High Risk (8/25/2024)    Housing Stability Vital Sign     Unable to Pay for Housing in the Last Year: Yes     Homeless in the Last Year: Yes     Review of Systems   Constitutional:  Positive for appetite change. Negative for fatigue and fever.   Respiratory: Negative.     Gastrointestinal:  Positive for abdominal pain, diarrhea and nausea.   Genitourinary: Negative.      Objective:     Vital Signs (Most Recent):  Temp: 97.8 °F (36.6 °C) (08/28/24 1553)  Pulse: 73 (08/28/24 1553)  Resp: 18 (08/28/24 1553)  BP: (!) 153/72 (08/28/24 1553)  SpO2: 99 % (08/28/24 1553) Vital Signs (24h Range):  Temp:  [97.6 °F (36.4 °C)-98.5 °F (36.9 °C)]  97.8 °F (36.6 °C)  Pulse:  [68-78] 73  Resp:  [16-19] 18  SpO2:  [96 %-100 %] 99 %  BP: (112-182)/() 153/72     Weight: 97.5 kg (215 lb)  Body mass index is 32.69 kg/m².    Estimated Creatinine Clearance: 85.8 mL/min (based on SCr of 1 mg/dL).     Physical Exam  Vitals and nursing note reviewed.   Constitutional:       Appearance: Normal appearance. She is not ill-appearing.   HENT:      Head: Normocephalic.      Mouth/Throat:      Mouth: Mucous membranes are moist.      Pharynx: Oropharynx is clear.   Pulmonary:      Effort: Pulmonary effort is normal. No respiratory distress.   Abdominal:      General: There is no distension.      Palpations: Abdomen is soft. There is no mass.      Tenderness: There is abdominal tenderness. There is no guarding.   Skin:     General: Skin is warm and dry.      Findings: No lesion or rash.   Neurological:      General: No focal deficit present.      Mental Status: She is alert and oriented to person, place, and time.   Psychiatric:         Mood and Affect: Mood normal.         Behavior: Behavior normal.          Significant Labs:   Microbiology Results (last 7 days)       Procedure Component Value Units Date/Time    Blood Culture #2 **CANNOT BE ORDERED STAT** [[id number]] Collected: 08/24/24 2341    Order Status: Completed Specimen: Blood from Peripheral, Antecubital, Left Updated: 08/28/24 0303     Blood Culture, Routine No Growth to date      No Growth to date      No Growth to date      No Growth to date    Blood Culture #1 **CANNOT BE ORDERED STAT** [[id number]] Collected: 08/25/24 0004    Order Status: Completed Specimen: Blood from Peripheral, Forearm, Right Updated: 08/28/24 0303     Blood Culture, Routine No Growth to date      No Growth to date      No Growth to date      No Growth to date            Significant Imaging: I have reviewed all pertinent imaging results/findings within the past 24 hours.

## 2024-08-28 NOTE — PROGRESS NOTES
[facility] Medicine  Progress Note    Patient Name: Cipriano Gamez  MRN: [id number]  Patient Class: IP- Inpatient   Admission Date: 8/24/2024  Length of Stay: 3 days  Attending Physician: Max Cao MD  Primary Care Provider: Brooks Bergeron MD        Subjective:     Principal Problem:Diverticulitis of colon with perforation        HPI:  48 y.o AAF with h/o NIDDM type 2, essential HTN, GERD, h/o cyclic vomiting due to weed (has had none in 4 weeks), Diverticulosis presents to the ER with multiple complaints but mostly abdominal pain and N/V for the past month. Denies any diarrhea or blood in stools. No Chest pain or SOB. No fevers but admits to chills. Has not been able to eat due to the pain this past few days.     Labst noted for WBC 21 and H/H 16.7/47.  Creatinine elevated at 2.6 (baseline <1).  Potassium 3. LFT's normal. Lactic acid was 3.  Vitals stable with no hypotension noted. CT abd/pelvis w/o: Colonic diverticulosis with acute diverticulitis seen involving the sigmoid colon.  Questionable small focal contained perforation versus air within a diverticulum is seen.  No evidence of abscess. Surgery was contacted by ED and will see her. NPO and  IV abx started. We have been consulted for further management and admission to the inpatient telemetry hospitalist service.     Overview/Hospital Course:  Ms Cipriano Gamez was admitted with sepsis due to acute diverticulitis with potential contained perforation, ORALIA, and hypokalemia. Started IVF, zosyn, and bowel rest. Pain still present but no nausea, vomiting. WBC normalized, K improving, ORALIA improving.  Patient still having significant pain and not improving, repeat CT ordered showing a potential abscess formation behind the bladder and fat stranding likely representing pancreatitis, lipase ordered and increased rate of fluids. Will ask ID for intervention and abx guidance.     Interval History:  NAEON.  No new issues.   CC- abdo pain  All  questions answered and updates on care given.       ROS:  General: Negative for fevers   Cardiac: Negative for chest pain   Pulmonary: Negative for wheezing  GI: Negative for abdominal distention      Vitals:    08/28/24 0656 08/28/24 0715 08/28/24 0729 08/28/24 0732   BP:  (!) 112/102  (!) 173/97   BP Location:       Patient Position:       Pulse: 68  69 74   Resp: 18   18   Temp: 97.8 °F (36.6 °C)      TempSrc: Oral      SpO2: 100%      Weight:       Height:              Body mass index is 32.69 kg/m².      PHYSICAL EXAM:  GENERAL APPEARANCE: alert and cooperative.     HEAD: NC/AT  CARDIAC: There is no cyanosis or pallor.   LUNGS: No apparent wheezing or stridor.  ABDOMEN: Non-distended. No guarding.  MSK: No joint erythema or tenderness.   EXTREMITIES: No significant new deformity or new joint abnormality.   NEUROLOGICAL: CN II-XII grossly intact.   SKIN: No lesions or eruptions.  PSYCHIATRIC: No tangential speech. No Hyperactive features.        Recent Results (from the past 24 hour(s))   POCT glucose    Collection Time: 08/27/24 11:42 AM   Result Value Ref Range    POCT Glucose 135 (H) 70 - 110 mg/dL   POCT glucose    Collection Time: 08/27/24  4:45 PM   Result Value Ref Range    POCT Glucose 133 (H) 70 - 110 mg/dL   POCT glucose    Collection Time: 08/27/24  7:38 PM   Result Value Ref Range    POCT Glucose 147 (H) 70 - 110 mg/dL   CBC Auto Differential    Collection Time: 08/28/24  5:08 AM   Result Value Ref Range    WBC 9.15 3.90 - 12.70 K/uL    RBC 4.41 4.00 - 5.40 M/uL    Hemoglobin 12.8 12.0 - 16.0 g/dL    Hematocrit 38.9 37.0 - 48.5 %    MCV 88 82 - 98 fL    MCH 29.0 27.0 - 31.0 pg    MCHC 32.9 32.0 - 36.0 g/dL    RDW 13.2 11.5 - 14.5 %    Platelets SEE COMMENT 150 - 450 K/uL    MPV SEE COMMENT 9.2 - 12.9 fL    Immature Granulocytes 0.2 0.0 - 0.5 %    Gran # (ANC) 6.5 1.8 - 7.7 K/uL    Immature Grans (Abs) 0.02 0.00 - 0.04 K/uL    Lymph # 2.0 1.0 - 4.8 K/uL    Mono # 0.5 0.3 - 1.0 K/uL    Eos # 0.2 0.0 -  0.5 K/uL    Baso # 0.01 0.00 - 0.20 K/uL    nRBC 0 0 /100 WBC    Gran % 71.3 38.0 - 73.0 %    Lymph % 21.4 18.0 - 48.0 %    Mono % 5.4 4.0 - 15.0 %    Eosinophil % 1.6 0.0 - 8.0 %    Basophil % 0.1 0.0 - 1.9 %    Differential Method Automated    Magnesium    Collection Time: 08/28/24  5:08 AM   Result Value Ref Range    Magnesium 1.7 1.6 - 2.6 mg/dL   Basic Metabolic Panel    Collection Time: 08/28/24  5:08 AM   Result Value Ref Range    Sodium 135 (L) 136 - 145 mmol/L    Potassium 3.5 3.5 - 5.1 mmol/L    Chloride 101 95 - 110 mmol/L    CO2 21 (L) 23 - 29 mmol/L    Glucose 105 70 - 110 mg/dL    BUN 3 (L) 6 - 20 mg/dL    Creatinine 1.0 0.5 - 1.4 mg/dL    Calcium 9.5 8.7 - 10.5 mg/dL    Anion Gap 13 8 - 16 mmol/L    eGFR >60 >60 mL/min/1.73 m^2   POCT glucose    Collection Time: 08/28/24  6:54 AM   Result Value Ref Range    POCT Glucose 108 70 - 110 mg/dL       Microbiology Results (last 7 days)       Procedure Component Value Units Date/Time    Blood Culture #2 **CANNOT BE ORDERED STAT** [[id number]] Collected: 08/24/24 2341    Order Status: Completed Specimen: Blood from Peripheral, Antecubital, Left Updated: 08/28/24 0303     Blood Culture, Routine No Growth to date      No Growth to date      No Growth to date      No Growth to date    Blood Culture #1 **CANNOT BE ORDERED STAT** [[id number]] Collected: 08/25/24 0004    Order Status: Completed Specimen: Blood from Peripheral, Forearm, Right Updated: 08/28/24 0303     Blood Culture, Routine No Growth to date      No Growth to date      No Growth to date      No Growth to date             Imaging Results              CT Abdomen Pelvis  Without Contrast (Final result)  Result time 08/25/24 01:12:41   Procedure changed from CT Abdomen Pelvis With IV Contrast NO Oral Contrast     Final result by Kit Suarez MD (08/25/24 01:12:41)                   Impression:      Colonic diverticulosis with acute diverticulitis seen involving the sigmoid colon.  Questionable  small focal contained perforation versus air within a diverticulum is seen.  No evidence of abscess.    Additional findings as detailed above.      Electronically signed by: Kit Suarez MD  Date:    08/25/2024  Time:    01:12               Narrative:    EXAMINATION:  CT ABDOMEN PELVIS WITHOUT CONTRAST    CLINICAL HISTORY:  RLQ abdominal pain (Age >= 14y);    TECHNIQUE:  Low dose axial images, sagittal and coronal reformations were obtained from the lung bases to the pubic symphysis.  Oral contrast was not administered.    COMPARISON:  CT abdomen and pelvis from April 2023.    FINDINGS:  The visualized portion of the heart is unremarkable.  The lung bases are clear.    No significant hepatic abnormality seen on this noncontrast exam.  There is no intra-or extrahepatic biliary ductal dilatation.  The gallbladder is unremarkable.  The stomach, pancreas, spleen, and adrenal glands are unremarkable.    Contrast excretion is seen from the kidneys.  No hydronephrosis.  No abnormalities are seen along the ureteral courses.  Urinary bladder is nondistended.  Uterus is unremarkable.  Bilateral pelvic clips, presumed tubal ligation clips are seen.    Appendix is visualized and is unremarkable.  No evidence of bowel obstruction.  There is colonic diverticulosis.  Inflammatory changes are seen surrounding diverticula within the proximal sigmoid colon consistent with acute diverticulitis.  There is questionable small focal contained perforation versus air within a diverticulum.  Otherwise no additional free air seen.  No abscess.  No significant free fluid.    Aorta tapers normally.    No acute osseous abnormality identified.  Sclerosis is seen at the bilateral SI joints which may be seen with osteitis condensans.  Small fat containing umbilical hernia is noted.                                             Assessment/Plan:      * Diverticulitis of colon with perforation  Colonic diverticulosis with acute diverticulitis of the  sigmoid colon with questionable small focal contained perforation versus air within a diverticulum is seen.  No evidence of abscess.  - given IVF, decreased to 75cc/hr for 24 more hours  - continue zosyn  - continue PRN nausea, pain Rx  - Gen surg consulted  - advanced to clear liquid diet today  - monitor abdominal exam         Hyperlipidemia  Not on statin at home, will not start now but consider when following up with PCP      Type 2 diabetes mellitus with hyperglycemia  Patient's FSGs are controlled on current medication regimen.  Last A1c reviewed-   Lab Results   Component Value Date    HGBA1C 7.9 (H) 07/21/2024   Current correctional scale  Low  Maintain anti-hyperglycemic dose as follows-   Antihyperglycemics (From admission, onward)      Start     Stop Route Frequency Ordered    08/26/24 0956  insulin aspart U-100 pen 0-5 Units         -- SubQ Before meals & nightly PRN 08/26/24 0856          Hold Oral hypoglycemics while patient is in the hospital->Metformin.       Class 1 obesity due to excess calories with serious comorbidity and body mass index (BMI) of 32.0 to 32.9 in adult  Body mass index is 32.69 kg/m². Morbid obesity complicates all aspects of disease management from diagnostic modalities to treatment. Weight loss encouraged and health benefits explained to patient.         Sepsis with acute renal failure  This patient does have evidence of infective focus. My overall impression is sepsis. Source: Abdominal- diverticulitis  Antibiotics given-   Antibiotics (72h ago, onward)      Start     Stop Route Frequency Ordered    08/25/24 0900  piperacillin-tazobactam (ZOSYN) 4.5 g in D5W 100 mL IVPB (MB+)         -- IV Every 8 hours (non-standard times) 08/25/24 0229          - continue current antibiotics  - sepsis improving, WBC normalized, afebrile, HR normal    ORALIA (acute kidney injury)  ORALIA is likely due to pre-renal azotemia due to dehydration. Baseline creatinine is  0.9 . Most recent creatinine and  eGFR are listed below.  Recent Labs     08/25/24  0423 08/25/24  1354 08/26/24  0430   CREATININE 1.9* 1.8* 1.3   EGFRNORACEVR 33* 35* 51*      Plan  - ORALIA is improving with IVF  - Avoid nephrotoxins and renally dose meds for GFR listed above  - Monitor urine output, serial BMP, and adjust therapy as needed  - NS at 75cc/hr x24 more hours  - now clear liquid diet    Marijuana abuse  Encouraged cessation, though THC is not the cause of her current abdominal pain      Tobacco use disorder  Patient was counseled on smoking cessation for 4 minutes. We discussed how smoking is detrimental to the patient's health. Prescription for nicotine patch was offered.       Hypokalemia  Patient's most recent potassium results are listed below.   Recent Labs     08/25/24  0423 08/25/24  1354 08/26/24  0430   K 2.9* 2.9* 3.2*     Plan  - Replete potassium per protocol  - Monitor potassium Daily  - Patient's hypokalemia is improving  - supplement Mg PRN too    Essential hypertension  Chronic, controlled. Latest blood pressure and vitals reviewed-     Temp:  [97.7 °F (36.5 °C)-99.4 °F (37.4 °C)]   Pulse:  [75-88]   Resp:  [14-20]   BP: (116-167)/(78-91)   SpO2:  [95 %-98 %] .   Home meds for hypertension were reviewed and noted below.   Hypertension Medications               amLODIPine (NORVASC) 5 MG tablet Take 1 tablet by mouth once daily.    metoprolol succinate (TOPROL-XL) 25 MG 24 hr tablet Take 1 tablet by mouth once daily.            While in the hospital, will manage blood pressure as follows; Continue home antihypertensive regimen with hold parameters     Will utilize p.r.n. blood pressure medication only if patient's blood pressure greater than  180/90  and she develops symptoms such as worsening chest pain or shortness of breath.      VTE Risk Mitigation (From admission, onward)           Ordered     enoxaparin injection 40 mg  Every 24 hours         08/26/24 1040     Place LOLITA hose  Until discontinued         08/25/24 9654      Reason for No Pharmacological VTE Prophylaxis  Once        Question:  Reasons:  Answer:  Physician Provided (leave comment)  Comment:  surgery eval in process    08/25/24 0235     IP VTE HIGH RISK PATIENT  Once         08/25/24 0235     Place sequential compression device  Until discontinued         08/25/24 0235                    Discharge Planning   RADU: 8/29/2024     Code Status: Full Code   Is the patient medically ready for discharge?:     Reason for patient still in hospital (select all that apply): Patient trending condition, Treatment, and Consult recommendations  Discharge Plan A: Home                  Max Cao MD  Department of [facility] Medicine   [facility] Surg

## 2024-08-28 NOTE — CONSULTS
[facility] Surg  Infectious Disease  Consult Note    Patient Name: Cipriano Gamez  MRN: [id number]  Admission Date: 8/24/2024  Hospital Length of Stay: 3 days  Attending Physician: Max Cao MD  Primary Care Provider: Brooks Bergeron MD     Isolation Status: No active isolations    Patient information was obtained from patient and ER records.      Inpatient consult to Infectious Diseases  Consult performed by: Yaneth Becerra MD  Consult ordered by: Max Cao MD        Assessment/Plan:     GI  * Diverticulitis of colon with perforation  Cipriano Gamez is a 48 year old woman with diabetes and diverticulosis who was admitted for diverticulitis. WBC elevated on admission but improved. Blood cultures no growth. CT A/P with fluid collection between in the posterior urinary bladder wall and the anterior uterine body concerning for abscess, air within the pelvic mesenteric fat medial to the inflamed sigmoid colon in the setting of acute sigmoid diverticulitis that has progressed since admission. Not amenable to IR drainage. Currently on pip-tazo.     Recommendations  - continue pip-tazo 4.5 q8 hours  - will follow surgical plan       Above discussed with primary team.     Time: 75 minutes   50% of time spent on face-to-face counseling and coordination of care. Counseling included review of test results, diagnosis, and treatment plan with patient and/or family.  I have reviewed hospital notes from HM service and other specialty providers as well as outside medical records. I have also reviewed CBC, CMP/BMP,  cultures and imaging with my interpretation as documented. Patient is high risk of morbidity, on antibiotics requiring intensive monitoring for toxicity.       Thank you for your consult. I will follow-up with patient. Please contact us if you have any additional questions.    Yaneth Becerra MD  Infectious Disease  [facility] - Med Surg    Subjective:     Principal Problem: Diverticulitis of colon  with perforation    HPI: Cipriano Gamez is a 48 year old woman with diabetes and diverticulosis who was admitted 8/24 for nausea and abdominal pain secondary to diverticulitis. She denies fevers, melena, hematochezia. Did have watery diarrhea few days prior to admission. She has been evaluated by surgery and is currently being managed non-operatively. She has a prior history of diverticulitis.      Past Medical History:   Diagnosis Date    Abnormal Pap smear of cervix     Cigarette smoker     Diverticulosis     GERD (gastroesophageal reflux disease)     Hiatal hernia     Hyperlipidemia 8/25/2024    Hypertension        Past Surgical History:   Procedure Laterality Date    CERVICAL BIOPSY  W/ LOOP ELECTRODE EXCISION  2/11/14    LAPAROSCOPIC OCCLUSION OF OVIDUCTS BY DEVICE Bilateral 7/10/2020    Procedure: OCCLUSION, FALLOPIAN TUBE, LAPAROSCOPIC, USING DEVICE;  Surgeon: Alex Waller MD;  Location: [facility];  Service: OB/GYN;  Laterality: Bilateral;  RN PREOP 6/23/2020   T/S--COVID NEGATIVE---UPT  CONSENTS INCOMPLETE       Review of patient's allergies indicates:   Allergen Reactions    Ciprofloxacin Swelling and Blisters    Ibuprofen Hives    Meloxicam      rash    Cyclobenzaprine Rash     rasg       Medications:  Medications Prior to Admission   Medication Sig    amLODIPine (NORVASC) 5 MG tablet Take 1 tablet by mouth once daily.    metFORMIN (GLUCOPHAGE-XR) 500 MG ER 24hr tablet Take 500 mg by mouth once daily.    metoprolol succinate (TOPROL-XL) 25 MG 24 hr tablet Take 1 tablet by mouth once daily.     Antibiotics (From admission, onward)      Start     Stop Route Frequency Ordered    08/25/24 0900  piperacillin-tazobactam (ZOSYN) 4.5 g in D5W 100 mL IVPB (MB+)         -- IV Every 8 hours (non-standard times) 08/25/24 0229          Antifungals (From admission, onward)      None          Antivirals (From admission, onward)      None             Immunization History   Administered Date(s) Administered    Influenza  - Quadrivalent - PF *Preferred* (6 months and older) 03/13/2020    Tdap 10/04/2013       Family History       Problem Relation (Age of Onset)    Diabetes Other    Heart disease Mother (54), Maternal Grandmother (40), Brother (44)    Sickle cell trait Sister          Social History     Socioeconomic History    Marital status: Single   Occupational History    Occupation: [email or web address]     Employer: [employer]   Tobacco Use    Smoking status: Every Day     Current packs/day: 1.00     Average packs/day: 1 pack/day for 16.0 years (16.0 ttl pk-yrs)     Types: Cigarettes    Smokeless tobacco: Never   Substance and Sexual Activity    Alcohol use: Yes     Comment: social 1-2 x / month    Drug use: Yes     Frequency: 1.0 times per week     Types: Marijuana     Comment: daily    Sexual activity: Not Currently     Partners: Male     Birth control/protection: None     Social Determinants of Health     Financial Resource Strain: High Risk (8/25/2024)    Overall Financial Resource Strain (CARDIA)     Difficulty of Paying Living Expenses: Hard   Food Insecurity: Food Insecurity Present (8/25/2024)    Hunger Vital Sign     Worried About Running Out of Food in the Last Year: Sometimes true     Ran Out of Food in the Last Year: Sometimes true   Transportation Needs: No Transportation Needs (8/25/2024)    TRANSPORTATION NEEDS     Transportation : No   Physical Activity: Sufficiently Active (8/5/2021)    Exercise Vital Sign     Days of Exercise per Week: 5 days     Minutes of Exercise per Session: 150+ min   Stress: No Stress Concern Present (8/25/2024)    [place] [employer] - Occupational Stress Questionnaire     Feeling of Stress : Only a little   Housing Stability: High Risk (8/25/2024)    Housing Stability Vital Sign     Unable to Pay for Housing in the Last Year: Yes     Homeless in the Last Year: Yes     Review of Systems   Constitutional:  Positive for appetite change. Negative for fatigue and fever.    Respiratory: Negative.     Gastrointestinal:  Positive for abdominal pain, diarrhea and nausea.   Genitourinary: Negative.      Objective:     Vital Signs (Most Recent):  Temp: 97.8 °F (36.6 °C) (08/28/24 1553)  Pulse: 73 (08/28/24 1553)  Resp: 18 (08/28/24 1553)  BP: (!) 153/72 (08/28/24 1553)  SpO2: 99 % (08/28/24 1553) Vital Signs (24h Range):  Temp:  [97.6 °F (36.4 °C)-98.5 °F (36.9 °C)] 97.8 °F (36.6 °C)  Pulse:  [68-78] 73  Resp:  [16-19] 18  SpO2:  [96 %-100 %] 99 %  BP: (112-182)/() 153/72     Weight: 97.5 kg (215 lb)  Body mass index is 32.69 kg/m².    Estimated Creatinine Clearance: 85.8 mL/min (based on SCr of 1 mg/dL).     Physical Exam  Vitals and nursing note reviewed.   Constitutional:       Appearance: Normal appearance. She is not ill-appearing.   HENT:      Head: Normocephalic.      Mouth/Throat:      Mouth: Mucous membranes are moist.      Pharynx: Oropharynx is clear.   Pulmonary:      Effort: Pulmonary effort is normal. No respiratory distress.   Abdominal:      General: There is no distension.      Palpations: Abdomen is soft. There is no mass.      Tenderness: There is abdominal tenderness. There is no guarding.   Skin:     General: Skin is warm and dry.      Findings: No lesion or rash.   Neurological:      General: No focal deficit present.      Mental Status: She is alert and oriented to person, place, and time.   Psychiatric:         Mood and Affect: Mood normal.         Behavior: Behavior normal.          Significant Labs:   Microbiology Results (last 7 days)       Procedure Component Value Units Date/Time    Blood Culture #2 **CANNOT BE ORDERED STAT** [[id number]] Collected: 08/24/24 2341    Order Status: Completed Specimen: Blood from Peripheral, Antecubital, Left Updated: 08/28/24 0303     Blood Culture, Routine No Growth to date      No Growth to date      No Growth to date      No Growth to date    Blood Culture #1 **CANNOT BE ORDERED STAT** [[id number]] Collected: 08/25/24  0004    Order Status: Completed Specimen: Blood from Peripheral, Forearm, Right Updated: 08/28/24 0303     Blood Culture, Routine No Growth to date      No Growth to date      No Growth to date      No Growth to date            Significant Imaging: I have reviewed all pertinent imaging results/findings within the past 24 hours.

## 2024-08-28 NOTE — NURSING
The patient c/o pain 8/10. When I went to give her the Oxy 10 she only took one tablet and refuse the other tablet stating that 2 tablets were 2 strong. Contacted LORAINE Cantrell requesting an order for Oxycodone hydrochloride 5mg.

## 2024-08-28 NOTE — HPI
Cipriano Gamez is a 48 year old woman with diabetes and diverticulosis who was admitted 8/24 for nausea and abdominal pain secondary to diverticulitis. She denies fevers, melena, hematochezia. Did have watery diarrhea few days prior to admission. She has been evaluated by surgery and is currently being managed non-operatively. She has a prior history of diverticulitis.

## 2024-08-28 NOTE — ASSESSMENT & PLAN NOTE
Cipriano Gamez is a 48 year old woman with diabetes and diverticulosis who was admitted for diverticulitis. WBC elevated on admission but improved. Blood cultures no growth. CT A/P with fluid collection between in the posterior urinary bladder wall and the anterior uterine body concerning for abscess, air within the pelvic mesenteric fat medial to the inflamed sigmoid colon in the setting of acute sigmoid diverticulitis that has progressed since admission. Not amenable to IR drainage. Currently on pip-tazo.     Recommendations  - continue pip-tazo 4.5 q8 hours  - will follow surgical plan

## 2024-08-29 LAB
ANION GAP SERPL CALC-SCNC: 14 MMOL/L (ref 8–16)
BACTERIA BLD CULT: NORMAL
BACTERIA BLD CULT: NORMAL
BASOPHILS # BLD AUTO: 0.01 K/UL (ref 0–0.2)
BASOPHILS NFR BLD: 0.1 % (ref 0–1.9)
BUN SERPL-MCNC: 2 MG/DL (ref 6–20)
CALCIUM SERPL-MCNC: 9.7 MG/DL (ref 8.7–10.5)
CHLORIDE SERPL-SCNC: 101 MMOL/L (ref 95–110)
CO2 SERPL-SCNC: 21 MMOL/L (ref 23–29)
CREAT SERPL-MCNC: 1.1 MG/DL (ref 0.5–1.4)
DIFFERENTIAL METHOD BLD: NORMAL
EOSINOPHIL # BLD AUTO: 0.2 K/UL (ref 0–0.5)
EOSINOPHIL NFR BLD: 1.7 % (ref 0–8)
ERYTHROCYTE [DISTWIDTH] IN BLOOD BY AUTOMATED COUNT: 13.2 % (ref 11.5–14.5)
EST. GFR  (NO RACE VARIABLE): >60 ML/MIN/1.73 M^2
GLUCOSE SERPL-MCNC: 164 MG/DL (ref 70–110)
HCT VFR BLD AUTO: 40.6 % (ref 37–48.5)
HGB BLD-MCNC: 13.3 G/DL (ref 12–16)
IMM GRANULOCYTES # BLD AUTO: 0.03 K/UL (ref 0–0.04)
IMM GRANULOCYTES NFR BLD AUTO: 0.3 % (ref 0–0.5)
LYMPHOCYTES # BLD AUTO: 1.9 K/UL (ref 1–4.8)
LYMPHOCYTES NFR BLD: 20.9 % (ref 18–48)
MAGNESIUM SERPL-MCNC: 1.7 MG/DL (ref 1.6–2.6)
MCH RBC QN AUTO: 29 PG (ref 27–31)
MCHC RBC AUTO-ENTMCNC: 32.8 G/DL (ref 32–36)
MCV RBC AUTO: 89 FL (ref 82–98)
MONOCYTES # BLD AUTO: 0.5 K/UL (ref 0.3–1)
MONOCYTES NFR BLD: 5.2 % (ref 4–15)
NEUTROPHILS # BLD AUTO: 6.4 K/UL (ref 1.8–7.7)
NEUTROPHILS NFR BLD: 71.8 % (ref 38–73)
NRBC BLD-RTO: 0 /100 WBC
PLATELET # BLD AUTO: 358 K/UL (ref 150–450)
PMV BLD AUTO: 10.3 FL (ref 9.2–12.9)
POCT GLUCOSE: 134 MG/DL (ref 70–110)
POCT GLUCOSE: 152 MG/DL (ref 70–110)
POCT GLUCOSE: 189 MG/DL (ref 70–110)
POCT GLUCOSE: 194 MG/DL (ref 70–110)
POTASSIUM SERPL-SCNC: 3.5 MMOL/L (ref 3.5–5.1)
RBC # BLD AUTO: 4.59 M/UL (ref 4–5.4)
SODIUM SERPL-SCNC: 136 MMOL/L (ref 136–145)
WBC # BLD AUTO: 8.98 K/UL (ref 3.9–12.7)

## 2024-08-29 PROCEDURE — 94761 N-INVAS EAR/PLS OXIMETRY MLT: CPT

## 2024-08-29 PROCEDURE — 36415 COLL VENOUS BLD VENIPUNCTURE: CPT | Performed by: HOSPITALIST

## 2024-08-29 PROCEDURE — 11000001 HC ACUTE MED/SURG PRIVATE ROOM

## 2024-08-29 PROCEDURE — 80048 BASIC METABOLIC PNL TOTAL CA: CPT | Performed by: HOSPITALIST

## 2024-08-29 PROCEDURE — 83735 ASSAY OF MAGNESIUM: CPT | Performed by: HOSPITALIST

## 2024-08-29 PROCEDURE — 25000003 PHARM REV CODE 250: Performed by: HOSPITALIST

## 2024-08-29 PROCEDURE — 99233 SBSQ HOSP IP/OBS HIGH 50: CPT | Mod: ,,, | Performed by: SURGERY

## 2024-08-29 PROCEDURE — 63600175 PHARM REV CODE 636 W HCPCS: Performed by: INTERNAL MEDICINE

## 2024-08-29 PROCEDURE — 25000003 PHARM REV CODE 250: Performed by: INTERNAL MEDICINE

## 2024-08-29 PROCEDURE — 99900035 HC TECH TIME PER 15 MIN (STAT)

## 2024-08-29 PROCEDURE — 63600175 PHARM REV CODE 636 W HCPCS: Performed by: HOSPITALIST

## 2024-08-29 PROCEDURE — 25000003 PHARM REV CODE 250: Performed by: STUDENT IN AN ORGANIZED HEALTH CARE EDUCATION/TRAINING PROGRAM

## 2024-08-29 PROCEDURE — 63600175 PHARM REV CODE 636 W HCPCS

## 2024-08-29 PROCEDURE — 99233 SBSQ HOSP IP/OBS HIGH 50: CPT | Mod: ,,, | Performed by: STUDENT IN AN ORGANIZED HEALTH CARE EDUCATION/TRAINING PROGRAM

## 2024-08-29 PROCEDURE — 85025 COMPLETE CBC W/AUTO DIFF WBC: CPT | Performed by: HOSPITALIST

## 2024-08-29 PROCEDURE — A4216 STERILE WATER/SALINE, 10 ML: HCPCS | Performed by: HOSPITALIST

## 2024-08-29 RX ORDER — DIPHENHYDRAMINE HCL 25 MG
25 CAPSULE ORAL NIGHTLY PRN
Status: DISCONTINUED | OUTPATIENT
Start: 2024-08-29 | End: 2024-08-30 | Stop reason: HOSPADM

## 2024-08-29 RX ORDER — HYDROXYZINE HYDROCHLORIDE 25 MG/1
50 TABLET, FILM COATED ORAL 4 TIMES DAILY PRN
Status: DISCONTINUED | OUTPATIENT
Start: 2024-08-29 | End: 2024-08-30 | Stop reason: HOSPADM

## 2024-08-29 RX ADMIN — ENOXAPARIN SODIUM 40 MG: 40 INJECTION SUBCUTANEOUS at 04:08

## 2024-08-29 RX ADMIN — SODIUM CHLORIDE: 9 INJECTION, SOLUTION INTRAVENOUS at 07:08

## 2024-08-29 RX ADMIN — PIPERACILLIN AND TAZOBACTAM 4.5 G: 4; .5 INJECTION, POWDER, LYOPHILIZED, FOR SOLUTION INTRAVENOUS; PARENTERAL at 04:08

## 2024-08-29 RX ADMIN — Medication 10 ML: at 05:08

## 2024-08-29 RX ADMIN — AMLODIPINE BESYLATE 5 MG: 5 TABLET ORAL at 08:08

## 2024-08-29 RX ADMIN — METOPROLOL SUCCINATE 25 MG: 25 TABLET, EXTENDED RELEASE ORAL at 08:08

## 2024-08-29 RX ADMIN — Medication 10 ML: at 12:08

## 2024-08-29 RX ADMIN — OXYCODONE 5 MG: 5 TABLET ORAL at 04:08

## 2024-08-29 RX ADMIN — HYDROXYZINE HYDROCHLORIDE 50 MG: 25 TABLET ORAL at 09:08

## 2024-08-29 RX ADMIN — PIPERACILLIN AND TAZOBACTAM 4.5 G: 4; .5 INJECTION, POWDER, LYOPHILIZED, FOR SOLUTION INTRAVENOUS; PARENTERAL at 08:08

## 2024-08-29 RX ADMIN — HYDRALAZINE HYDROCHLORIDE 10 MG: 20 INJECTION INTRAMUSCULAR; INTRAVENOUS at 04:08

## 2024-08-29 NOTE — PLAN OF CARE
Problem: Adult Inpatient Plan of Care  Goal: Plan of Care Review  Outcome: Met  Goal: Patient-Specific Goal (Individualized)  Outcome: Met  Goal: Absence of Hospital-Acquired Illness or Injury  Outcome: Met  Goal: Optimal Comfort and Wellbeing  Outcome: Met  Goal: Readiness for Transition of Care  Outcome: Met     Problem: Diabetes Comorbidity  Goal: Blood Glucose Level Within Targeted Range  Outcome: Met     Problem: Sepsis/Septic Shock  Goal: Optimal Coping  Outcome: Met  Goal: Absence of Bleeding  Outcome: Met  Goal: Blood Glucose Level Within Targeted Range  Outcome: Met  Goal: Absence of Infection Signs and Symptoms  Outcome: Met  Goal: Optimal Nutrition Intake  Outcome: Met     Problem: Acute Kidney Injury/Impairment  Goal: Fluid and Electrolyte Balance  Outcome: Met  Goal: Improved Oral Intake  Outcome: Met  Goal: Effective Renal Function  Outcome: Met     Problem: Infection  Goal: Absence of Infection Signs and Symptoms  Outcome: Met

## 2024-08-29 NOTE — PLAN OF CARE
Problem: Adult Inpatient Plan of Care  Goal: Plan of Care Review  Outcome: Progressing     Problem: Adult Inpatient Plan of Care  Goal: Patient-Specific Goal (Individualized)  Outcome: Progressing     Problem: Adult Inpatient Plan of Care  Goal: Absence of Hospital-Acquired Illness or Injury  Outcome: Progressing     Problem: Diabetes Comorbidity  Goal: Blood Glucose Level Within Targeted Range  Outcome: Progressing     Problem: Sepsis/Septic Shock  Goal: Absence of Bleeding  Outcome: Progressing     Problem: Acute Kidney Injury/Impairment  Goal: Fluid and Electrolyte Balance  Outcome: Progressing     Problem: Acute Kidney Injury/Impairment  Goal: Improved Oral Intake  Outcome: Progressing

## 2024-08-29 NOTE — PROGRESS NOTES
[facility]  Infectious Disease  Progress Note    Patient Name: Cipriano Gamez  MRN: [id number]  Admission Date: 8/24/2024  Length of Stay: 4 days  Attending Physician: Max Cao MD  Primary Care Provider: Brooks Bergeron MD    Isolation Status: No active isolations  Assessment/Plan:      GI  * Diverticulitis of colon with perforation  Cipriano Gamez is a 48 year old woman with diabetes and diverticulosis who was admitted for diverticulitis. WBC elevated on admission but improved. Blood cultures no growth. CT A/P with fluid collection between in the posterior urinary bladder wall and the anterior uterine body concerning for abscess, air within the pelvic mesenteric fat medial to the inflamed sigmoid colon in the setting of acute sigmoid diverticulitis that has progressed since admission. Not amenable to IR drainage. Currently on pip-tazo.     Recommendations  - continue pip-tazo 4.5 q8 hours  - will follow surgical plan       Above discussed with primary team.     Time: 50 minutes   50% of time spent on face-to-face counseling and coordination of care. Counseling included review of test results, diagnosis, and treatment plan with patient and/or family.  I have reviewed hospital notes from HM service and other specialty providers as well as outside medical records. I have also reviewed CBC, CMP/BMP,  cultures and imaging with my interpretation as documented. Patient is high risk of morbidity, on antibiotics requiring intensive monitoring for toxicity.     Anticipated Disposition: TBD    Thank you for your consult. I will follow-up with patient. Please contact us if you have any additional questions.    Yaneth Becerra MD  Infectious Disease  [facility] Surg    Subjective:     Principal Problem:Diverticulitis of colon with perforation    HPI: Cipriano Gamez is a 48 year old woman with diabetes and diverticulosis who was admitted 8/24 for nausea and abdominal pain secondary to diverticulitis. She  denies fevers, melena, hematochezia. Did have watery diarrhea few days prior to admission. She has been evaluated by surgery and is currently being managed non-operatively. She has a prior history of diverticulitis.    Interval History: tolerating PO. Abdominal pain is improved. No nausea or vomiting. Having bowel movements.     Review of Systems   Constitutional:  Positive for fatigue. Negative for fever.   Gastrointestinal:  Positive for abdominal pain. Negative for nausea and vomiting.   Musculoskeletal: Negative.      Objective:     Vital Signs (Most Recent):  Temp: 97.7 °F (36.5 °C) (08/29/24 1100)  Pulse: 80 (08/29/24 1100)  Resp: 18 (08/29/24 1100)  BP: 138/66 (08/29/24 1100)  SpO2: 98 % (08/29/24 1100) Vital Signs (24h Range):  Temp:  [97.7 °F (36.5 °C)-98.7 °F (37.1 °C)] 97.7 °F (36.5 °C)  Pulse:  [71-97] 80  Resp:  [17-18] 18  SpO2:  [98 %-99 %] 98 %  BP: (138-178)/() 138/66     Weight: 97.5 kg (215 lb)  Body mass index is 32.69 kg/m².    Estimated Creatinine Clearance: 78 mL/min (based on SCr of 1.1 mg/dL).     Physical Exam  Vitals and nursing note reviewed.   Constitutional:       Appearance: Normal appearance. She is not ill-appearing.   Pulmonary:      Effort: Pulmonary effort is normal. No respiratory distress.   Abdominal:      General: There is no distension.      Palpations: There is no mass.      Tenderness: There is abdominal tenderness.   Skin:     General: Skin is warm and dry.      Coloration: Skin is not jaundiced.   Neurological:      General: No focal deficit present.      Mental Status: She is alert and oriented to person, place, and time.          Significant Labs:   Microbiology Results (last 7 days)       Procedure Component Value Units Date/Time    Blood Culture #2 **CANNOT BE ORDERED STAT** [[id number]] Collected: 08/24/24 3220    Order Status: Completed Specimen: Blood from Peripheral, Antecubital, Left Updated: 08/29/24 0303     Blood Culture, Routine No Growth after 4 days.     Blood Culture #1 **CANNOT BE ORDERED STAT** [[id number]] Collected: 08/25/24 0004    Order Status: Completed Specimen: Blood from Peripheral, Forearm, Right Updated: 08/29/24 0303     Blood Culture, Routine No Growth after 4 days.            Significant Imaging: I have reviewed all pertinent imaging results/findings within the past 24 hours.

## 2024-08-29 NOTE — SUBJECTIVE & OBJECTIVE
Interval History: tolerating PO. Abdominal pain is improved. No nausea or vomiting. Having bowel movements.     Review of Systems   Constitutional:  Positive for fatigue. Negative for fever.   Gastrointestinal:  Positive for abdominal pain. Negative for nausea and vomiting.   Musculoskeletal: Negative.      Objective:     Vital Signs (Most Recent):  Temp: 97.7 °F (36.5 °C) (08/29/24 1100)  Pulse: 80 (08/29/24 1100)  Resp: 18 (08/29/24 1100)  BP: 138/66 (08/29/24 1100)  SpO2: 98 % (08/29/24 1100) Vital Signs (24h Range):  Temp:  [97.7 °F (36.5 °C)-98.7 °F (37.1 °C)] 97.7 °F (36.5 °C)  Pulse:  [71-97] 80  Resp:  [17-18] 18  SpO2:  [98 %-99 %] 98 %  BP: (138-178)/() 138/66     Weight: 97.5 kg (215 lb)  Body mass index is 32.69 kg/m².    Estimated Creatinine Clearance: 78 mL/min (based on SCr of 1.1 mg/dL).     Physical Exam  Vitals and nursing note reviewed.   Constitutional:       Appearance: Normal appearance. She is not ill-appearing.   Pulmonary:      Effort: Pulmonary effort is normal. No respiratory distress.   Abdominal:      General: There is no distension.      Palpations: There is no mass.      Tenderness: There is abdominal tenderness.   Skin:     General: Skin is warm and dry.      Coloration: Skin is not jaundiced.   Neurological:      General: No focal deficit present.      Mental Status: She is alert and oriented to person, place, and time.          Significant Labs:   Microbiology Results (last 7 days)       Procedure Component Value Units Date/Time    Blood Culture #2 **CANNOT BE ORDERED STAT** [[id number]] Collected: 08/24/24 2341    Order Status: Completed Specimen: Blood from Peripheral, Antecubital, Left Updated: 08/29/24 0303     Blood Culture, Routine No Growth after 4 days.    Blood Culture #1 **CANNOT BE ORDERED STAT** [[id number]] Collected: 08/25/24 0004    Order Status: Completed Specimen: Blood from Peripheral, Forearm, Right Updated: 08/29/24 0303     Blood Culture, Routine No Growth  after 4 days.            Significant Imaging: I have reviewed all pertinent imaging results/findings within the past 24 hours.

## 2024-08-29 NOTE — NURSING
Patient complaint of itching and hives. Assessed patient and scratches noted to back and betty le and swelling under rt side of lower lip noted, no hives noted. Notified Dr Cao and will continue to monitor

## 2024-08-29 NOTE — NURSING
Pt resting in bed, IV infiltrated, attempted to start new line, unable to, midline ordered.  Tele box monitored.  Purewick in place.  BG monitored, WNL.  Pt reports pain to abdomen.  Full liquid diet tolerated well. No acute distress noted, pt free from falls or injury.  Bed in low position, wheels locked, bed alarm on, call light in reach for assistance, will continue to monitor.    [facility] [place]  Nurses Note -- 4 Eyes      8/28/2024       Skin assessed on: Q Shift      [x] No Pressure Injuries Present    [x]Prevention Measures Documented    [] Yes LDA  for Pressure Injury Previously documented     [] Yes New Pressure Injury Discovered   [] LDA for New Pressure Injury Added      Attending RN:  Steph Gu RN     Second RN:  COLLIN Peck

## 2024-08-29 NOTE — NURSING
[facility] [place]  Nurses Note -- 4 Eyes      8/29/2024       Skin assessed on: Q Shift      [x] No Pressure Injuries Present    Prevention Measures Documented    [] Yes LDA  for Pressure Injury Previously documented     [] Yes New Pressure Injury Discovered   [] LDA for New Pressure Injury Added      Attending RN:  Nidia Johnson RN     Second RN:  Riccardo

## 2024-08-29 NOTE — PROGRESS NOTES
Surgery Progress Note    Cipriano Gamez is a 48 y.o. year old female in hospital for acute sigmoid diverticulitis, no abscess.    NAEON, VSS, afebrile.   Patient reports pain today improved from yesterday  Tolerating FLD, No nausea/vomiting  Passing gas/BM  No f/c/n/v/sob/cp    ROS:  Negative except above    PE:  Vitals:    08/29/24 0325 08/29/24 0414 08/29/24 0709 08/29/24 0712   BP: (!) 178/111   (!) 141/76   BP Location: Right arm      Patient Position: Lying   Lying   Pulse: 72  89 79   Resp: 18 17  18   Temp: 98.7 °F (37.1 °C)   97.8 °F (36.6 °C)   TempSrc: Oral   Oral   SpO2: 99%   98%   Weight:       Height:         General: Awake and alert, in no acute distress, appears stated age, well-nourished  HEENT: Atraumatic, normocephalic, MMM  Cardiac: Regular rate on monitor, well-perfused  Pulm: Breathing comfortably, symmetric chest rise, no respiratory distress. Satting well on RA  Abdomen: Soft, mild-moderate tender in LLQ/suprapubic/RLQ to palpation, some improvement,  non-distended, no guarding or rebound, no signs of peritonitis  MSK: moving all extremities appropriately  Neuro: Aox4, CN 2-12 grossly intact    Significant Diagnostic Studies: Labs: CMP   Recent Labs   Lab 08/28/24  0508 08/29/24  0450   * 136   K 3.5 3.5    101   CO2 21* 21*    164*   BUN 3* 2*   CREATININE 1.0 1.1   CALCIUM 9.5 9.7   ANIONGAP 13 14    and CBC   Recent Labs   Lab 08/28/24  0508 08/29/24  0450   WBC 9.15 8.98   HGB 12.8 13.3   HCT 38.9 40.6   PLT SEE COMMENT 358     Microbiology: Blood Culture   Lab Results   Component Value Date    LABBLOO No Growth after 4 days. 08/25/2024       A/P:  Ciprinao Gamez is a 48 y.o. year old female with a PMHx of T2DM, HTN, GERD, cannabinoid hyperemesis syndrome and diverticulosis, presenting with acute sigmoid diverticulitis, questionable small focal/contained perforation vs air in diverticulum, no abscess. Afebrile, HDS, WBC and lactate normalized. Initial abdominal exam  soft, non-distended, tender in LLQ/suprapubic > RLQ, no signs of peritonitis. CT A/P 8/25 showing acute sigmoid diverticulitis, questionable small focal/contained perforation vs air in diverticulum, no abscess.  Repeat CT 8/27 showing interval increase in foci of air within mesentery adjacent to inflamed sigmoid and possible developing abscess compared to 8/25.      - Improvement on exam and symptoms today. Continue IV Abx therapy  - If worsens, will re-visit discussion with patient and staff regarding surgical intervention  - Serial abdominal exams, improved  - Daily CBC/BMP. Lactate and WBC normalized  - Replete lytes prn  - Diet: FLD, okay to advance diet as tolerated  - Abx: IV zosyn  - MMPC and IVFs per primary  - Will continue to follow     Patient case discussed with attending staff, Dr. Shi. Attestation to follow.     Cristal Marinelli MD  General Surgery Resident  [facility]

## 2024-08-29 NOTE — PROGRESS NOTES
[facility] Medicine  Progress Note    Patient Name: Cipriano Gamez  MRN: [id number]  Patient Class: IP- Inpatient   Admission Date: 8/24/2024  Length of Stay: 4 days  Attending Physician: Max Cao MD  Primary Care Provider: Brooks Bergeron MD        Subjective:     Principal Problem:Diverticulitis of colon with perforation        HPI:  48 y.o AAF with h/o NIDDM type 2, essential HTN, GERD, h/o cyclic vomiting due to weed (has had none in 4 weeks), Diverticulosis presents to the ER with multiple complaints but mostly abdominal pain and N/V for the past month. Denies any diarrhea or blood in stools. No Chest pain or SOB. No fevers but admits to chills. Has not been able to eat due to the pain this past few days.     Labst noted for WBC 21 and H/H 16.7/47.  Creatinine elevated at 2.6 (baseline <1).  Potassium 3. LFT's normal. Lactic acid was 3.  Vitals stable with no hypotension noted. CT abd/pelvis w/o: Colonic diverticulosis with acute diverticulitis seen involving the sigmoid colon.  Questionable small focal contained perforation versus air within a diverticulum is seen.  No evidence of abscess. Surgery was contacted by ED and will see her. NPO and  IV abx started. We have been consulted for further management and admission to the inpatient telemetry hospitalist service.     Overview/Hospital Course:  Ms Cipriano Gamez was admitted with sepsis due to acute diverticulitis with potential contained perforation, ORALIA, and hypokalemia. Started IVF, zosyn, and bowel rest. Pain still present but no nausea, vomiting. WBC normalized, K improving, ORALIA improving.  Patient still having significant pain and not improving, repeat CT ordered showing a potential abscess formation behind the bladder and fat stranding likely representing pancreatitis, lipase ordered and increased rate of fluids. Will ask ID for intervention and abx guidance.     Interval History:  NAEON.  No new issues.   CC- abdo pain,  improving  Advancing diet   All questions answered and updates on care given.       ROS:  General: Negative for fevers   Cardiac: Negative for chest pain   Pulmonary: Negative for wheezing  GI: Negative for abdominal distention      Vitals:    08/29/24 0325 08/29/24 0414 08/29/24 0709 08/29/24 0712   BP: (!) 178/111   (!) 141/76   BP Location: Right arm      Patient Position: Lying   Lying   Pulse: 72  89 79   Resp: 18 17  18   Temp: 98.7 °F (37.1 °C)   97.8 °F (36.6 °C)   TempSrc: Oral   Oral   SpO2: 99%   98%   Weight:       Height:              Body mass index is 32.69 kg/m².      PHYSICAL EXAM:  GENERAL APPEARANCE: alert and cooperative.     HEAD: NC/AT  CARDIAC: There is no cyanosis or pallor.   LUNGS: No apparent wheezing or stridor.  ABDOMEN: Non-distended. No guarding.  MSK: No joint erythema or tenderness.   EXTREMITIES: No significant new deformity or new joint abnormality.   NEUROLOGICAL: CN II-XII grossly intact.   SKIN: No lesions or eruptions.  PSYCHIATRIC: No tangential speech. No Hyperactive features.        Recent Results (from the past 24 hour(s))   POCT glucose    Collection Time: 08/28/24 10:56 AM   Result Value Ref Range    POCT Glucose 132 (H) 70 - 110 mg/dL   POCT glucose    Collection Time: 08/28/24  3:52 PM   Result Value Ref Range    POCT Glucose 143 (H) 70 - 110 mg/dL   POCT glucose    Collection Time: 08/28/24  7:37 PM   Result Value Ref Range    POCT Glucose 154 (H) 70 - 110 mg/dL   CBC Auto Differential    Collection Time: 08/29/24  4:50 AM   Result Value Ref Range    WBC 8.98 3.90 - 12.70 K/uL    RBC 4.59 4.00 - 5.40 M/uL    Hemoglobin 13.3 12.0 - 16.0 g/dL    Hematocrit 40.6 37.0 - 48.5 %    MCV 89 82 - 98 fL    MCH 29.0 27.0 - 31.0 pg    MCHC 32.8 32.0 - 36.0 g/dL    RDW 13.2 11.5 - 14.5 %    Platelets 358 150 - 450 K/uL    MPV 10.3 9.2 - 12.9 fL    Immature Granulocytes 0.3 0.0 - 0.5 %    Gran # (ANC) 6.4 1.8 - 7.7 K/uL    Immature Grans (Abs) 0.03 0.00 - 0.04 K/uL    Lymph # 1.9  1.0 - 4.8 K/uL    Mono # 0.5 0.3 - 1.0 K/uL    Eos # 0.2 0.0 - 0.5 K/uL    Baso # 0.01 0.00 - 0.20 K/uL    nRBC 0 0 /100 WBC    Gran % 71.8 38.0 - 73.0 %    Lymph % 20.9 18.0 - 48.0 %    Mono % 5.2 4.0 - 15.0 %    Eosinophil % 1.7 0.0 - 8.0 %    Basophil % 0.1 0.0 - 1.9 %    Differential Method Automated    Magnesium    Collection Time: 08/29/24  4:50 AM   Result Value Ref Range    Magnesium 1.7 1.6 - 2.6 mg/dL   Basic Metabolic Panel    Collection Time: 08/29/24  4:50 AM   Result Value Ref Range    Sodium 136 136 - 145 mmol/L    Potassium 3.5 3.5 - 5.1 mmol/L    Chloride 101 95 - 110 mmol/L    CO2 21 (L) 23 - 29 mmol/L    Glucose 164 (H) 70 - 110 mg/dL    BUN 2 (L) 6 - 20 mg/dL    Creatinine 1.1 0.5 - 1.4 mg/dL    Calcium 9.7 8.7 - 10.5 mg/dL    Anion Gap 14 8 - 16 mmol/L    eGFR >60 >60 mL/min/1.73 m^2   POCT glucose    Collection Time: 08/29/24  7:10 AM   Result Value Ref Range    POCT Glucose 134 (H) 70 - 110 mg/dL       Microbiology Results (last 7 days)       Procedure Component Value Units Date/Time    Blood Culture #2 **CANNOT BE ORDERED STAT** [[id number]] Collected: 08/24/24 2341    Order Status: Completed Specimen: Blood from Peripheral, Antecubital, Left Updated: 08/29/24 0303     Blood Culture, Routine No Growth after 4 days.    Blood Culture #1 **CANNOT BE ORDERED STAT** [[id number]] Collected: 08/25/24 0004    Order Status: Completed Specimen: Blood from Peripheral, Forearm, Right Updated: 08/29/24 0303     Blood Culture, Routine No Growth after 4 days.             Imaging Results              CT Abdomen Pelvis  Without Contrast (Final result)  Result time 08/25/24 01:12:41   Procedure changed from CT Abdomen Pelvis With IV Contrast NO Oral Contrast     Final result by Kit Suarez MD (08/25/24 01:12:41)                   Impression:      Colonic diverticulosis with acute diverticulitis seen involving the sigmoid colon.  Questionable small focal contained perforation versus air within a  diverticulum is seen.  No evidence of abscess.    Additional findings as detailed above.      Electronically signed by: Kit Suarez MD  Date:    08/25/2024  Time:    01:12               Narrative:    EXAMINATION:  CT ABDOMEN PELVIS WITHOUT CONTRAST    CLINICAL HISTORY:  RLQ abdominal pain (Age >= 14y);    TECHNIQUE:  Low dose axial images, sagittal and coronal reformations were obtained from the lung bases to the pubic symphysis.  Oral contrast was not administered.    COMPARISON:  CT abdomen and pelvis from April 2023.    FINDINGS:  The visualized portion of the heart is unremarkable.  The lung bases are clear.    No significant hepatic abnormality seen on this noncontrast exam.  There is no intra-or extrahepatic biliary ductal dilatation.  The gallbladder is unremarkable.  The stomach, pancreas, spleen, and adrenal glands are unremarkable.    Contrast excretion is seen from the kidneys.  No hydronephrosis.  No abnormalities are seen along the ureteral courses.  Urinary bladder is nondistended.  Uterus is unremarkable.  Bilateral pelvic clips, presumed tubal ligation clips are seen.    Appendix is visualized and is unremarkable.  No evidence of bowel obstruction.  There is colonic diverticulosis.  Inflammatory changes are seen surrounding diverticula within the proximal sigmoid colon consistent with acute diverticulitis.  There is questionable small focal contained perforation versus air within a diverticulum.  Otherwise no additional free air seen.  No abscess.  No significant free fluid.    Aorta tapers normally.    No acute osseous abnormality identified.  Sclerosis is seen at the bilateral SI joints which may be seen with osteitis condensans.  Small fat containing umbilical hernia is noted.                                             Assessment/Plan:      * Diverticulitis of colon with perforation  Colonic diverticulosis with acute diverticulitis of the sigmoid colon with questionable small focal contained  perforation versus air within a diverticulum is seen.  No evidence of abscess.  - given IVF, decreased to 75cc/hr for 24 more hours  - continue zosyn  - continue PRN nausea, pain Rx  - Gen surg consulted  - advanced to clear liquid diet today  - monitor abdominal exam         Hyperlipidemia  Not on statin at home, will not start now but consider when following up with PCP      Type 2 diabetes mellitus with hyperglycemia  Patient's FSGs are controlled on current medication regimen.  Last A1c reviewed-   Lab Results   Component Value Date    HGBA1C 7.9 (H) 07/21/2024   Current correctional scale  Low  Maintain anti-hyperglycemic dose as follows-   Antihyperglycemics (From admission, onward)      Start     Stop Route Frequency Ordered    08/26/24 0956  insulin aspart U-100 pen 0-5 Units         -- SubQ Before meals & nightly PRN 08/26/24 0856          Hold Oral hypoglycemics while patient is in the hospital->Metformin.       Class 1 obesity due to excess calories with serious comorbidity and body mass index (BMI) of 32.0 to 32.9 in adult  Body mass index is 32.69 kg/m². Morbid obesity complicates all aspects of disease management from diagnostic modalities to treatment. Weight loss encouraged and health benefits explained to patient.         Sepsis with acute renal failure  This patient does have evidence of infective focus. My overall impression is sepsis. Source: Abdominal- diverticulitis  Antibiotics given-   Antibiotics (72h ago, onward)      Start     Stop Route Frequency Ordered    08/25/24 0900  piperacillin-tazobactam (ZOSYN) 4.5 g in D5W 100 mL IVPB (MB+)         -- IV Every 8 hours (non-standard times) 08/25/24 0229          - continue current antibiotics  - sepsis improving, WBC normalized, afebrile, HR normal    ORALIA (acute kidney injury)  ORALIA is likely due to pre-renal azotemia due to dehydration. Baseline creatinine is  0.9 . Most recent creatinine and eGFR are listed below.  Recent Labs     08/25/24  8876  08/25/24  1354 08/26/24  0430   CREATININE 1.9* 1.8* 1.3   EGFRNORACEVR 33* 35* 51*      Plan  - ORALIA is improving with IVF  - Avoid nephrotoxins and renally dose meds for GFR listed above  - Monitor urine output, serial BMP, and adjust therapy as needed  - NS at 75cc/hr x24 more hours  - now clear liquid diet    Marijuana abuse  Encouraged cessation, though THC is not the cause of her current abdominal pain      Tobacco use disorder  Patient was counseled on smoking cessation for 4 minutes. We discussed how smoking is detrimental to the patient's health. Prescription for nicotine patch was offered.       Hypokalemia  Patient's most recent potassium results are listed below.   Recent Labs     08/25/24  0423 08/25/24  1354 08/26/24  0430   K 2.9* 2.9* 3.2*     Plan  - Replete potassium per protocol  - Monitor potassium Daily  - Patient's hypokalemia is improving  - supplement Mg PRN too    Essential hypertension  Chronic, controlled. Latest blood pressure and vitals reviewed-     Temp:  [97.7 °F (36.5 °C)-99.4 °F (37.4 °C)]   Pulse:  [75-88]   Resp:  [14-20]   BP: (116-167)/(78-91)   SpO2:  [95 %-98 %] .   Home meds for hypertension were reviewed and noted below.   Hypertension Medications               amLODIPine (NORVASC) 5 MG tablet Take 1 tablet by mouth once daily.    metoprolol succinate (TOPROL-XL) 25 MG 24 hr tablet Take 1 tablet by mouth once daily.            While in the hospital, will manage blood pressure as follows; Continue home antihypertensive regimen with hold parameters     Will utilize p.r.n. blood pressure medication only if patient's blood pressure greater than  180/90  and she develops symptoms such as worsening chest pain or shortness of breath.      VTE Risk Mitigation (From admission, onward)           Ordered     enoxaparin injection 40 mg  Every 24 hours         08/26/24 1040     Place LOLITA hose  Until discontinued         08/25/24 0235     Reason for No Pharmacological VTE Prophylaxis  Once         Question:  Reasons:  Answer:  Physician Provided (leave comment)  Comment:  surgery eval in process    08/25/24 0235     IP VTE HIGH RISK PATIENT  Once         08/25/24 0235     Place sequential compression device  Until discontinued         08/25/24 0235                    Discharge Planning   RADU: 9/1/2024     Code Status: Full Code   Is the patient medically ready for discharge?:     Reason for patient still in hospital (select all that apply): Patient trending condition, Treatment, and Consult recommendations  Discharge Plan A: Home   Discharge Delays: None known at this time              Max Cao MD  Department of Hospital Medicine   [facility]

## 2024-08-30 VITALS
HEART RATE: 71 BPM | HEIGHT: 68 IN | TEMPERATURE: 99 F | DIASTOLIC BLOOD PRESSURE: 80 MMHG | RESPIRATION RATE: 18 BRPM | SYSTOLIC BLOOD PRESSURE: 166 MMHG | BODY MASS INDEX: 32.58 KG/M2 | WEIGHT: 215 LBS | OXYGEN SATURATION: 95 %

## 2024-08-30 LAB
ANION GAP SERPL CALC-SCNC: 10 MMOL/L (ref 8–16)
BASOPHILS # BLD AUTO: 0.01 K/UL (ref 0–0.2)
BASOPHILS NFR BLD: 0.1 % (ref 0–1.9)
BUN SERPL-MCNC: 4 MG/DL (ref 6–20)
CALCIUM SERPL-MCNC: 9.1 MG/DL (ref 8.7–10.5)
CHLORIDE SERPL-SCNC: 107 MMOL/L (ref 95–110)
CO2 SERPL-SCNC: 21 MMOL/L (ref 23–29)
CREAT SERPL-MCNC: 1 MG/DL (ref 0.5–1.4)
DIFFERENTIAL METHOD BLD: NORMAL
EOSINOPHIL # BLD AUTO: 0.2 K/UL (ref 0–0.5)
EOSINOPHIL NFR BLD: 2.2 % (ref 0–8)
ERYTHROCYTE [DISTWIDTH] IN BLOOD BY AUTOMATED COUNT: 13.4 % (ref 11.5–14.5)
EST. GFR  (NO RACE VARIABLE): >60 ML/MIN/1.73 M^2
GLUCOSE SERPL-MCNC: 124 MG/DL (ref 70–110)
HCT VFR BLD AUTO: 39.7 % (ref 37–48.5)
HGB BLD-MCNC: 12.7 G/DL (ref 12–16)
IMM GRANULOCYTES # BLD AUTO: 0.02 K/UL (ref 0–0.04)
IMM GRANULOCYTES NFR BLD AUTO: 0.3 % (ref 0–0.5)
LYMPHOCYTES # BLD AUTO: 2.6 K/UL (ref 1–4.8)
LYMPHOCYTES NFR BLD: 35 % (ref 18–48)
MAGNESIUM SERPL-MCNC: 1.6 MG/DL (ref 1.6–2.6)
MCH RBC QN AUTO: 28.4 PG (ref 27–31)
MCHC RBC AUTO-ENTMCNC: 32 G/DL (ref 32–36)
MCV RBC AUTO: 89 FL (ref 82–98)
MONOCYTES # BLD AUTO: 0.4 K/UL (ref 0.3–1)
MONOCYTES NFR BLD: 5.9 % (ref 4–15)
NEUTROPHILS # BLD AUTO: 4.2 K/UL (ref 1.8–7.7)
NEUTROPHILS NFR BLD: 56.5 % (ref 38–73)
NRBC BLD-RTO: 0 /100 WBC
PLATELET # BLD AUTO: 355 K/UL (ref 150–450)
PMV BLD AUTO: 9.8 FL (ref 9.2–12.9)
POCT GLUCOSE: 117 MG/DL (ref 70–110)
POCT GLUCOSE: 124 MG/DL (ref 70–110)
POCT GLUCOSE: 132 MG/DL (ref 70–110)
POTASSIUM SERPL-SCNC: 3.3 MMOL/L (ref 3.5–5.1)
RBC # BLD AUTO: 4.47 M/UL (ref 4–5.4)
SODIUM SERPL-SCNC: 138 MMOL/L (ref 136–145)
WBC # BLD AUTO: 7.4 K/UL (ref 3.9–12.7)

## 2024-08-30 PROCEDURE — 25000003 PHARM REV CODE 250: Performed by: HOSPITALIST

## 2024-08-30 PROCEDURE — 99232 SBSQ HOSP IP/OBS MODERATE 35: CPT | Mod: ,,, | Performed by: STUDENT IN AN ORGANIZED HEALTH CARE EDUCATION/TRAINING PROGRAM

## 2024-08-30 PROCEDURE — 63600175 PHARM REV CODE 636 W HCPCS: Performed by: INTERNAL MEDICINE

## 2024-08-30 PROCEDURE — 83735 ASSAY OF MAGNESIUM: CPT | Performed by: HOSPITALIST

## 2024-08-30 PROCEDURE — 85025 COMPLETE CBC W/AUTO DIFF WBC: CPT | Performed by: HOSPITALIST

## 2024-08-30 PROCEDURE — 80048 BASIC METABOLIC PNL TOTAL CA: CPT | Performed by: HOSPITALIST

## 2024-08-30 PROCEDURE — 99900035 HC TECH TIME PER 15 MIN (STAT)

## 2024-08-30 PROCEDURE — 99231 SBSQ HOSP IP/OBS SF/LOW 25: CPT | Mod: ,,, | Performed by: SURGERY

## 2024-08-30 PROCEDURE — 25000003 PHARM REV CODE 250: Performed by: INTERNAL MEDICINE

## 2024-08-30 PROCEDURE — 63600175 PHARM REV CODE 636 W HCPCS: Performed by: HOSPITALIST

## 2024-08-30 PROCEDURE — A4216 STERILE WATER/SALINE, 10 ML: HCPCS | Performed by: HOSPITALIST

## 2024-08-30 PROCEDURE — 94761 N-INVAS EAR/PLS OXIMETRY MLT: CPT

## 2024-08-30 RX ORDER — OXYCODONE HYDROCHLORIDE 5 MG/1
5 TABLET ORAL EVERY 6 HOURS PRN
Qty: 20 TABLET | Refills: 0 | Status: SHIPPED | OUTPATIENT
Start: 2024-08-30 | End: 2024-09-06

## 2024-08-30 RX ORDER — AMOXICILLIN AND CLAVULANATE POTASSIUM 875; 125 MG/1; MG/1
1 TABLET, FILM COATED ORAL EVERY 12 HOURS
Qty: 28 TABLET | Refills: 0 | Status: SHIPPED | OUTPATIENT
Start: 2024-08-30 | End: 2024-09-13

## 2024-08-30 RX ADMIN — PIPERACILLIN AND TAZOBACTAM 4.5 G: 4; .5 INJECTION, POWDER, LYOPHILIZED, FOR SOLUTION INTRAVENOUS; PARENTERAL at 09:08

## 2024-08-30 RX ADMIN — PIPERACILLIN AND TAZOBACTAM 4.5 G: 4; .5 INJECTION, POWDER, LYOPHILIZED, FOR SOLUTION INTRAVENOUS; PARENTERAL at 12:08

## 2024-08-30 RX ADMIN — ENOXAPARIN SODIUM 40 MG: 40 INJECTION SUBCUTANEOUS at 04:08

## 2024-08-30 RX ADMIN — PIPERACILLIN AND TAZOBACTAM 4.5 G: 4; .5 INJECTION, POWDER, LYOPHILIZED, FOR SOLUTION INTRAVENOUS; PARENTERAL at 04:08

## 2024-08-30 RX ADMIN — OXYCODONE 5 MG: 5 TABLET ORAL at 02:08

## 2024-08-30 RX ADMIN — AMLODIPINE BESYLATE 5 MG: 5 TABLET ORAL at 08:08

## 2024-08-30 RX ADMIN — Medication 10 ML: at 11:08

## 2024-08-30 RX ADMIN — OXYCODONE 5 MG: 5 TABLET ORAL at 12:08

## 2024-08-30 RX ADMIN — METOPROLOL SUCCINATE 25 MG: 25 TABLET, EXTENDED RELEASE ORAL at 08:08

## 2024-08-30 RX ADMIN — Medication 10 ML: at 04:08

## 2024-08-30 NOTE — ASSESSMENT & PLAN NOTE
Cipriano Gamez is a 48 year old woman with diabetes and diverticulosis who was admitted for diverticulitis. WBC elevated on admission but improved. Blood cultures no growth. CT A/P with fluid collection between in the posterior urinary bladder wall and the anterior uterine body concerning for abscess, air within the pelvic mesenteric fat medial to the inflamed sigmoid colon in the setting of acute sigmoid diverticulitis that has progressed since admission. Not amenable to IR drainage. Currently on pip-tazo.     Recommendations  - continue pip-tazo 4.5 q8 hours while admitted  - at discharge can convert to PO amox-clav 875 BID until 9/13  - will arrange repeat CT and ID follow up

## 2024-08-30 NOTE — NURSING
[facility]  Nurses Note -- 4 Eyes      8/30/2024       Skin assessed on: Q Shift      [x] No Pressure Injuries Present    []Prevention Measures Documented    [] Yes LDA  for Pressure Injury Previously documented     [] Yes New Pressure Injury Discovered   [] LDA for New Pressure Injury Added      Attending RN:  Larisa Carranza RN     Second RN:  [email or web address]

## 2024-08-30 NOTE — DISCHARGE SUMMARY
[facility]  Discharge Summary      Patient Name: Cipriano Gamez  MRN: [id number]  [facility]: [id number]  Patient Class: IP- Inpatient  Admission Date: 8/24/2024  Hospital Length of Stay: 5 days  Discharge Date and Time:  08/30/2024 3:49 PM  Attending Physician: Max Cao MD   Discharging Provider: Max Cao MD  Primary Care Provider: Brooks Bergeron MD    Primary Care Team: Networked reference to record PCT     HPI:   48 y.o AAF with h/o NIDDM type 2, essential HTN, GERD, h/o cyclic vomiting due to weed (has had none in 4 weeks), Diverticulosis presents to the ER with multiple complaints but mostly abdominal pain and N/V for the past month. Denies any diarrhea or blood in stools. No Chest pain or SOB. No fevers but admits to chills. Has not been able to eat due to the pain this past few days.     Labst noted for WBC 21 and H/H 16.7/47.  Creatinine elevated at 2.6 (baseline <1).  Potassium 3. LFT's normal. Lactic acid was 3.  Vitals stable with no hypotension noted. CT abd/pelvis w/o: Colonic diverticulosis with acute diverticulitis seen involving the sigmoid colon.  Questionable small focal contained perforation versus air within a diverticulum is seen.  No evidence of abscess. Surgery was contacted by ED and will see her. NPO and  IV abx started. We have been consulted for further management and admission to the inpatient telemetry hospitalist service.     * No surgery found *      Hospital Course:   Ms Cipriano Gamez was admitted with sepsis due to acute diverticulitis with potential contained perforation, ORALIA, and hypokalemia. Started IVF, zosyn, and bowel rest. Pain still present but no nausea, vomiting. WBC normalized, K improving, ORALIA improving.  Patient still having significant pain and not improving, repeat CT ordered showing a potential abscess formation behind the bladder and fat stranding likely representing pancreatitis, lipase ordered and increased rate of fluids. Will ask  ID for intervention and abx guidance.      Goals of Care Treatment Preferences:  Code Status: Full Code      Take Augmentin twice daily until gone, 9/13/24  Come back if you have sustained fevers or your infection worsens in any way  Some pain meds sent to pharmacy, can also take 1000 mg of Tylenol every 8 hours  Get CT in 2 weeks and follow up with your PCP and infectious disease      Thank you for trusting [facility] and me with your care.  We are honored that you entrusted us with your healthcare needs. Your satisfaction is very important to us and we hope you have been very pleased with your experience at [facility]. After your discharge you may receive a survey asking you to rate your hospital experience- Please help us by completing this survey. We hope that you have received the very best care possible during your hospitalization at [facility], as your satisfaction is our top priority.    Let me know if there is anything more I can do!!              Amarjit Cao MD        Medical Director        Section Head of         Board-Certified IM Attending        SDOH Screening:  The patient was screened for food insecurity, housing instability, transportation needs, utility difficulties, and interpersonal safety. The social determinant(s) of health identified as a concern this admission are:  Housing instability  Food insecurity        Social Determinants of Health with Concerns     Food Insecurity: Food Insecurity Present (8/25/2024)   Housing Stability: High Risk (8/25/2024)        Consults:   Consults (From admission, onward)          Status Ordering Provider     Inpatient consult to Midline team  Once        Provider:  (Not yet assigned)    Acknowledged JESUS BREWER JR     Inpatient consult to Infectious Diseases  Once        Provider:  Yaneth Becerra RN    Completed AMARJIT CAO     Inpatient consult to General Surgery  Once        Provider:  Sergio Shi MD     Completed BABAK HANSON new Assessment & Plan notes have been filed under this hospital service since the last note was generated.  Service: Hospital Medicine    Final Active Diagnoses:    Diagnosis Date Noted POA    PRINCIPAL PROBLEM:  Diverticulitis of colon with perforation [K57.20] 08/20/2020 Yes    Type 2 diabetes mellitus with hyperglycemia [E11.65] 08/25/2024 Yes    Hyperlipidemia [E78.5] 08/25/2024 Yes    Class 1 obesity due to excess calories with serious comorbidity and body mass index (BMI) of 32.0 to 32.9 in adult [E66.09, Z68.32] 07/04/2021 Not Applicable    Sepsis with acute renal failure [A41.9, R65.20, N17.9] 01/07/2020 Yes    ORALIA (acute kidney injury) [N17.9] 01/07/2020 Yes    Marijuana abuse [F12.10] 12/14/2018 Yes    Tobacco use disorder [F17.200] 03/21/2018 Yes    Hypokalemia [E87.6] 05/04/2015 Yes    Essential hypertension [I10]  Yes      Problems Resolved During this Admission:       Discharged Condition: good    Disposition: home    Follow Up:   Follow-up Information       [facility] -. Schedule an appointment as soon as possible for a visit in 1 week(s).    Contact information:  [address] [place] [address] [phone]                           Patient Instructions:      CT Abdomen Pelvis With IV Contrast Routine Oral Contrast   Standing Status: Future Standing Exp. Date: 08/30/25     Order Specific Question Answer Comments   Is the patient allergic to iodine contrast? No    Is the patient taking metformin or a metformin combination medication?  If so, the patient should hold the medication for 2 days following contrast. Yes    Does this patient have impaired renal function? No    May the Radiologist modify the order per protocol to meet the clinical needs of the patient? Yes    Oral/Rectal Contrast instructions: Routine Oral Contrast      Ambulatory referral/consult to Infectious Disease   Standing Status: Future   Referral Priority: Routine Referral  Type: Consultation   Referral Reason: Specialty Services Required   Requested Specialty: Infectious Diseases   Number of Visits Requested: 1     Ambulatory referral/consult to Internal Medicine   Standing Status: Future   Referral Priority: Routine Referral Type: Consultation   Referral Reason: Specialty Services Required   Requested Specialty: Internal Medicine   Number of Visits Requested: 1       Significant Diagnostic Studies:     Recent Results (from the past 100 hour(s))   POCT glucose    Collection Time: 08/26/24 11:54 AM   Result Value Ref Range    POCT Glucose 122 (H) 70 - 110 mg/dL   POCT glucose    Collection Time: 08/26/24  3:53 PM   Result Value Ref Range    POCT Glucose 119 (H) 70 - 110 mg/dL   POCT glucose    Collection Time: 08/26/24  7:55 PM   Result Value Ref Range    POCT Glucose 131 (H) 70 - 110 mg/dL   CBC Auto Differential    Collection Time: 08/27/24  5:59 AM   Result Value Ref Range    WBC 11.82 3.90 - 12.70 K/uL    RBC 4.31 4.00 - 5.40 M/uL    Hemoglobin 12.2 12.0 - 16.0 g/dL    Hematocrit 38.1 37.0 - 48.5 %    MCV 88 82 - 98 fL    MCH 28.3 27.0 - 31.0 pg    MCHC 32.0 32.0 - 36.0 g/dL    RDW 13.2 11.5 - 14.5 %    Platelets 323 150 - 450 K/uL    MPV 9.9 9.2 - 12.9 fL    Immature Granulocytes 0.3 0.0 - 0.5 %    Gran # (ANC) 8.6 (H) 1.8 - 7.7 K/uL    Immature Grans (Abs) 0.04 0.00 - 0.04 K/uL    Lymph # 2.2 1.0 - 4.8 K/uL    Mono # 0.7 0.3 - 1.0 K/uL    Eos # 0.3 0.0 - 0.5 K/uL    Baso # 0.01 0.00 - 0.20 K/uL    nRBC 0 0 /100 WBC    Gran % 72.4 38.0 - 73.0 %    Lymph % 19.0 18.0 - 48.0 %    Mono % 6.1 4.0 - 15.0 %    Eosinophil % 2.1 0.0 - 8.0 %    Basophil % 0.1 0.0 - 1.9 %    Differential Method Automated    Magnesium    Collection Time: 08/27/24  5:59 AM   Result Value Ref Range    Magnesium 1.8 1.6 - 2.6 mg/dL   Basic Metabolic Panel    Collection Time: 08/27/24  5:59 AM   Result Value Ref Range    Sodium 134 (L) 136 - 145 mmol/L    Potassium 3.1 (L) 3.5 - 5.1 mmol/L    Chloride 102 95 -  110 mmol/L    CO2 22 (L) 23 - 29 mmol/L    Glucose 96 70 - 110 mg/dL    BUN 5 (L) 6 - 20 mg/dL    Creatinine 1.0 0.5 - 1.4 mg/dL    Calcium 9.3 8.7 - 10.5 mg/dL    Anion Gap 10 8 - 16 mmol/L    eGFR >60 >60 mL/min/1.73 m^2   Phosphorus    Collection Time: 08/27/24  5:59 AM   Result Value Ref Range    Phosphorus 2.1 (L) 2.7 - 4.5 mg/dL   POCT glucose    Collection Time: 08/27/24  8:06 AM   Result Value Ref Range    POCT Glucose 103 70 - 110 mg/dL   POCT glucose    Collection Time: 08/27/24 11:42 AM   Result Value Ref Range    POCT Glucose 135 (H) 70 - 110 mg/dL   POCT glucose    Collection Time: 08/27/24  4:45 PM   Result Value Ref Range    POCT Glucose 133 (H) 70 - 110 mg/dL   POCT glucose    Collection Time: 08/27/24  7:38 PM   Result Value Ref Range    POCT Glucose 147 (H) 70 - 110 mg/dL   CBC Auto Differential    Collection Time: 08/28/24  5:08 AM   Result Value Ref Range    WBC 9.15 3.90 - 12.70 K/uL    RBC 4.41 4.00 - 5.40 M/uL    Hemoglobin 12.8 12.0 - 16.0 g/dL    Hematocrit 38.9 37.0 - 48.5 %    MCV 88 82 - 98 fL    MCH 29.0 27.0 - 31.0 pg    MCHC 32.9 32.0 - 36.0 g/dL    RDW 13.2 11.5 - 14.5 %    Platelets SEE COMMENT 150 - 450 K/uL    MPV SEE COMMENT 9.2 - 12.9 fL    Immature Granulocytes 0.2 0.0 - 0.5 %    Gran # (ANC) 6.5 1.8 - 7.7 K/uL    Immature Grans (Abs) 0.02 0.00 - 0.04 K/uL    Lymph # 2.0 1.0 - 4.8 K/uL    Mono # 0.5 0.3 - 1.0 K/uL    Eos # 0.2 0.0 - 0.5 K/uL    Baso # 0.01 0.00 - 0.20 K/uL    nRBC 0 0 /100 WBC    Gran % 71.3 38.0 - 73.0 %    Lymph % 21.4 18.0 - 48.0 %    Mono % 5.4 4.0 - 15.0 %    Eosinophil % 1.6 0.0 - 8.0 %    Basophil % 0.1 0.0 - 1.9 %    Differential Method Automated    Magnesium    Collection Time: 08/28/24  5:08 AM   Result Value Ref Range    Magnesium 1.7 1.6 - 2.6 mg/dL   Basic Metabolic Panel    Collection Time: 08/28/24  5:08 AM   Result Value Ref Range    Sodium 135 (L) 136 - 145 mmol/L    Potassium 3.5 3.5 - 5.1 mmol/L    Chloride 101 95 - 110 mmol/L    CO2 21 (L) 23  - 29 mmol/L    Glucose 105 70 - 110 mg/dL    BUN 3 (L) 6 - 20 mg/dL    Creatinine 1.0 0.5 - 1.4 mg/dL    Calcium 9.5 8.7 - 10.5 mg/dL    Anion Gap 13 8 - 16 mmol/L    eGFR >60 >60 mL/min/1.73 m^2   Lipase    Collection Time: 08/28/24  5:08 AM   Result Value Ref Range    Lipase 19 4 - 60 U/L   POCT glucose    Collection Time: 08/28/24  6:54 AM   Result Value Ref Range    POCT Glucose 108 70 - 110 mg/dL   POCT glucose    Collection Time: 08/28/24 10:56 AM   Result Value Ref Range    POCT Glucose 132 (H) 70 - 110 mg/dL   POCT glucose    Collection Time: 08/28/24  3:52 PM   Result Value Ref Range    POCT Glucose 143 (H) 70 - 110 mg/dL   POCT glucose    Collection Time: 08/28/24  7:37 PM   Result Value Ref Range    POCT Glucose 154 (H) 70 - 110 mg/dL   CBC Auto Differential    Collection Time: 08/29/24  4:50 AM   Result Value Ref Range    WBC 8.98 3.90 - 12.70 K/uL    RBC 4.59 4.00 - 5.40 M/uL    Hemoglobin 13.3 12.0 - 16.0 g/dL    Hematocrit 40.6 37.0 - 48.5 %    MCV 89 82 - 98 fL    MCH 29.0 27.0 - 31.0 pg    MCHC 32.8 32.0 - 36.0 g/dL    RDW 13.2 11.5 - 14.5 %    Platelets 358 150 - 450 K/uL    MPV 10.3 9.2 - 12.9 fL    Immature Granulocytes 0.3 0.0 - 0.5 %    Gran # (ANC) 6.4 1.8 - 7.7 K/uL    Immature Grans (Abs) 0.03 0.00 - 0.04 K/uL    Lymph # 1.9 1.0 - 4.8 K/uL    Mono # 0.5 0.3 - 1.0 K/uL    Eos # 0.2 0.0 - 0.5 K/uL    Baso # 0.01 0.00 - 0.20 K/uL    nRBC 0 0 /100 WBC    Gran % 71.8 38.0 - 73.0 %    Lymph % 20.9 18.0 - 48.0 %    Mono % 5.2 4.0 - 15.0 %    Eosinophil % 1.7 0.0 - 8.0 %    Basophil % 0.1 0.0 - 1.9 %    Differential Method Automated    Magnesium    Collection Time: 08/29/24  4:50 AM   Result Value Ref Range    Magnesium 1.7 1.6 - 2.6 mg/dL   Basic Metabolic Panel    Collection Time: 08/29/24  4:50 AM   Result Value Ref Range    Sodium 136 136 - 145 mmol/L    Potassium 3.5 3.5 - 5.1 mmol/L    Chloride 101 95 - 110 mmol/L    CO2 21 (L) 23 - 29 mmol/L    Glucose 164 (H) 70 - 110 mg/dL    BUN 2 (L) 6 -  20 mg/dL    Creatinine 1.1 0.5 - 1.4 mg/dL    Calcium 9.7 8.7 - 10.5 mg/dL    Anion Gap 14 8 - 16 mmol/L    eGFR >60 >60 mL/min/1.73 m^2   POCT glucose    Collection Time: 08/29/24  7:10 AM   Result Value Ref Range    POCT Glucose 134 (H) 70 - 110 mg/dL   POCT glucose    Collection Time: 08/29/24 10:58 AM   Result Value Ref Range    POCT Glucose 194 (H) 70 - 110 mg/dL   POCT glucose    Collection Time: 08/29/24  4:57 PM   Result Value Ref Range    POCT Glucose 189 (H) 70 - 110 mg/dL   POCT glucose    Collection Time: 08/29/24  8:55 PM   Result Value Ref Range    POCT Glucose 152 (H) 70 - 110 mg/dL   CBC Auto Differential    Collection Time: 08/30/24  4:48 AM   Result Value Ref Range    WBC 7.40 3.90 - 12.70 K/uL    RBC 4.47 4.00 - 5.40 M/uL    Hemoglobin 12.7 12.0 - 16.0 g/dL    Hematocrit 39.7 37.0 - 48.5 %    MCV 89 82 - 98 fL    MCH 28.4 27.0 - 31.0 pg    MCHC 32.0 32.0 - 36.0 g/dL    RDW 13.4 11.5 - 14.5 %    Platelets 355 150 - 450 K/uL    MPV 9.8 9.2 - 12.9 fL    Immature Granulocytes 0.3 0.0 - 0.5 %    Gran # (ANC) 4.2 1.8 - 7.7 K/uL    Immature Grans (Abs) 0.02 0.00 - 0.04 K/uL    Lymph # 2.6 1.0 - 4.8 K/uL    Mono # 0.4 0.3 - 1.0 K/uL    Eos # 0.2 0.0 - 0.5 K/uL    Baso # 0.01 0.00 - 0.20 K/uL    nRBC 0 0 /100 WBC    Gran % 56.5 38.0 - 73.0 %    Lymph % 35.0 18.0 - 48.0 %    Mono % 5.9 4.0 - 15.0 %    Eosinophil % 2.2 0.0 - 8.0 %    Basophil % 0.1 0.0 - 1.9 %    Differential Method Automated    Magnesium    Collection Time: 08/30/24  4:48 AM   Result Value Ref Range    Magnesium 1.6 1.6 - 2.6 mg/dL   Basic Metabolic Panel    Collection Time: 08/30/24  4:48 AM   Result Value Ref Range    Sodium 138 136 - 145 mmol/L    Potassium 3.3 (L) 3.5 - 5.1 mmol/L    Chloride 107 95 - 110 mmol/L    CO2 21 (L) 23 - 29 mmol/L    Glucose 124 (H) 70 - 110 mg/dL    BUN 4 (L) 6 - 20 mg/dL    Creatinine 1.0 0.5 - 1.4 mg/dL    Calcium 9.1 8.7 - 10.5 mg/dL    Anion Gap 10 8 - 16 mmol/L    eGFR >60 >60 mL/min/1.73 m^2   POCT  glucose    Collection Time: 08/30/24  7:21 AM   Result Value Ref Range    POCT Glucose 132 (H) 70 - 110 mg/dL   POCT glucose    Collection Time: 08/30/24 11:39 AM   Result Value Ref Range    POCT Glucose 124 (H) 70 - 110 mg/dL       Microbiology Results (last 7 days)       Procedure Component Value Units Date/Time    Blood Culture #2 **CANNOT BE ORDERED STAT** [[id number]] Collected: 08/24/24 2341    Order Status: Completed Specimen: Blood from Peripheral, Antecubital, Left Updated: 08/29/24 0303     Blood Culture, Routine No Growth after 4 days.    Blood Culture #1 **CANNOT BE ORDERED STAT** [[id number]] Collected: 08/25/24 0004    Order Status: Completed Specimen: Blood from Peripheral, Forearm, Right Updated: 08/29/24 0303     Blood Culture, Routine No Growth after 4 days.            Imaging Results              CT Abdomen Pelvis  Without Contrast (Final result)  Result time 08/25/24 01:12:41   Procedure changed from CT Abdomen Pelvis With IV Contrast NO Oral Contrast     Final result by Kit Suarez MD (08/25/24 01:12:41)                   Impression:      Colonic diverticulosis with acute diverticulitis seen involving the sigmoid colon.  Questionable small focal contained perforation versus air within a diverticulum is seen.  No evidence of abscess.    Additional findings as detailed above.      Electronically signed by: Kit Suarez MD  Date:    08/25/2024  Time:    01:12               Narrative:    EXAMINATION:  CT ABDOMEN PELVIS WITHOUT CONTRAST    CLINICAL HISTORY:  RLQ abdominal pain (Age >= 14y);    TECHNIQUE:  Low dose axial images, sagittal and coronal reformations were obtained from the lung bases to the pubic symphysis.  Oral contrast was not administered.    COMPARISON:  CT abdomen and pelvis from April 2023.    FINDINGS:  The visualized portion of the heart is unremarkable.  The lung bases are clear.    No significant hepatic abnormality seen on this noncontrast exam.  There is no intra-or  extrahepatic biliary ductal dilatation.  The gallbladder is unremarkable.  The stomach, pancreas, spleen, and adrenal glands are unremarkable.    Contrast excretion is seen from the kidneys.  No hydronephrosis.  No abnormalities are seen along the ureteral courses.  Urinary bladder is nondistended.  Uterus is unremarkable.  Bilateral pelvic clips, presumed tubal ligation clips are seen.    Appendix is visualized and is unremarkable.  No evidence of bowel obstruction.  There is colonic diverticulosis.  Inflammatory changes are seen surrounding diverticula within the proximal sigmoid colon consistent with acute diverticulitis.  There is questionable small focal contained perforation versus air within a diverticulum.  Otherwise no additional free air seen.  No abscess.  No significant free fluid.    Aorta tapers normally.    No acute osseous abnormality identified.  Sclerosis is seen at the bilateral SI joints which may be seen with osteitis condensans.  Small fat containing umbilical hernia is noted.                                          Pending Diagnostic Studies:       None           Medications:  Reconciled Home Medications:      Medication List        START taking these medications      amoxicillin-clavulanate 875-125mg 875-125 mg per tablet  Commonly known as: AUGMENTIN  Take 1 tablet by mouth every 12 (twelve) hours. for 14 days     oxyCODONE 5 MG immediate release tablet  Commonly known as: ROXICODONE  Take 1 tablet (5 mg total) by mouth every 6 (six) hours as needed for Pain.            CONTINUE taking these medications      amLODIPine 5 MG tablet  Commonly known as: NORVASC  Take 1 tablet by mouth once daily.     metFORMIN 500 MG ER 24hr tablet  Commonly known as: GLUCOPHAGE-XR  Take 500 mg by mouth once daily.     metoprolol succinate 25 MG 24 hr tablet  Commonly known as: TOPROL-XL  Take 1 tablet by mouth once daily.              Indwelling Lines/Drains at time of discharge:   Lines/Drains/Airways        None                   Time spent on the discharge of patient: 35 minutes         Max Cao MD  Department of Hospital Medicine  [facility]

## 2024-08-30 NOTE — PROGRESS NOTES
Surgery Progress Note    Cipriano Gamez is a 48 y.o. year old female in hospital for acute sigmoid diverticulitis, no abscess.    NAEON, VSS, afebrile.   Patient reports pain today improved, minimal tenderness on exam  Tolerating solid diet, No nausea/vomiting  Passing gas/BM  No f/c/n/v/sob/cp    ROS:  Negative except above    PE:  Vitals:    08/30/24 0808 08/30/24 1003 08/30/24 1232 08/30/24 1425   BP:  136/81 (!) 166/80    BP Location:   Right arm    Patient Position:  Lying Sitting    Pulse:   71    Resp:   17 18   Temp:   99 °F (37.2 °C)    TempSrc:   Oral    SpO2: 100%  95%    Weight:       Height:         General: Awake and alert, in no acute distress, appears stated age, well-nourished  HEENT: Atraumatic, normocephalic, MMM  Cardiac: Regular rate on monitor, well-perfused  Pulm: Breathing comfortably, symmetric chest rise, no respiratory distress. Satting well on RA  Abdomen: Soft, mild tender in LLQ/suprapubic/RLQ to palpation, improved,  non-distended, no guarding or rebound, no signs of peritonitis  MSK: moving all extremities appropriately  Neuro: Aox4, CN 2-12 grossly intact    Significant Diagnostic Studies: Labs: CMP   Recent Labs   Lab 08/29/24  0450 08/30/24  0448    138   K 3.5 3.3*    107   CO2 21* 21*   * 124*   BUN 2* 4*   CREATININE 1.1 1.0   CALCIUM 9.7 9.1   ANIONGAP 14 10    and CBC   Recent Labs   Lab 08/29/24  0450 08/30/24  0448   WBC 8.98 7.40   HGB 13.3 12.7   HCT 40.6 39.7    355     Microbiology: Blood Culture   Lab Results   Component Value Date    LABBLOO No Growth after 4 days. 08/25/2024       A/P:  Cipriano Gamez is a 48 y.o. year old female with a PMHx of T2DM, HTN, GERD, cannabinoid hyperemesis syndrome and diverticulosis, presenting with acute sigmoid diverticulitis, questionable small focal/contained perforation vs air in diverticulum, no abscess. Afebrile, HDS, WBC and lactate normalized. Initial abdominal exam soft, non-distended, tender in  LLQ/suprapubic > RLQ, no signs of peritonitis. CT A/P 8/25 showing acute sigmoid diverticulitis, questionable small focal/contained perforation vs air in diverticulum, no abscess.  Repeat CT 8/27 showing interval increase in foci of air within mesentery adjacent to inflamed sigmoid and possible developing abscess compared to 8/25. Stable and with improvement on Abx therapy/non-operative management.     - Continuing to improve, continue Abx therapy, agree plan for discharge on PO course  - Serial abdominal exams, improved  - Surgery signing off, please call for any further question/concerns or changes in patient condition. Thank you for the consult, rest of care per primary.    Patient case discussed with attending staff, Dr. Bhakta. Attestation to follow.     Cristal Marinelli MD  General Surgery Resident  [facility]

## 2024-08-30 NOTE — PLAN OF CARE
Case Management Final Discharge Note      Discharge Disposition: Home    New DME ordered / company name: NA    Relevant SDOH / Transition of Care Barriers:  Pending medicaid    Primary Caretaker and contact information: Independent    Scheduled followup appointment: PCP and ID needed; Patient to schedule own PCP appt.     Referrals placed: ID referral sent    Transportation: family        Patient and family educated on discharge services and updated on DC plan. Bedside RN notified, patient clear to discharge from Case Management Perspective.      08/30/24 1552   Final Note   Assessment Type Final Discharge Note   Anticipated Discharge Disposition Home   What phone number can be called within the next 1-3 days to see how you are doing after discharge? [phone]   Hospital Resources/Appts/Education Provided   (Patient to schedule follow up at [facility]   Post-Acute Status   Post-Acute Authorization Other   Other Status No Post-Acute Service Needs   Discharge Delays None known at this time

## 2024-08-30 NOTE — PROGRESS NOTES
[facility] Medicine  Progress Note    Patient Name: Cipriano Gamez  MRN: [id number]  Patient Class: IP- Inpatient   Admission Date: 8/24/2024  Length of Stay: 5 days  Attending Physician: Max Cao MD  Primary Care Provider: Brooks Bergeron MD        Subjective:     Principal Problem:Diverticulitis of colon with perforation        HPI:  48 y.o AAF with h/o NIDDM type 2, essential HTN, GERD, h/o cyclic vomiting due to weed (has had none in 4 weeks), Diverticulosis presents to the ER with multiple complaints but mostly abdominal pain and N/V for the past month. Denies any diarrhea or blood in stools. No Chest pain or SOB. No fevers but admits to chills. Has not been able to eat due to the pain this past few days.     Labst noted for WBC 21 and H/H 16.7/47.  Creatinine elevated at 2.6 (baseline <1).  Potassium 3. LFT's normal. Lactic acid was 3.  Vitals stable with no hypotension noted. CT abd/pelvis w/o: Colonic diverticulosis with acute diverticulitis seen involving the sigmoid colon.  Questionable small focal contained perforation versus air within a diverticulum is seen.  No evidence of abscess. Surgery was contacted by ED and will see her. NPO and  IV abx started. We have been consulted for further management and admission to the inpatient telemetry hospitalist service.     Overview/Hospital Course:  Ms Cipriano Gamez was admitted with sepsis due to acute diverticulitis with potential contained perforation, ORALIA, and hypokalemia. Started IVF, zosyn, and bowel rest. Pain still present but no nausea, vomiting. WBC normalized, K improving, ORALIA improving.  Patient still having significant pain and not improving, repeat CT ordered showing a potential abscess formation behind the bladder and fat stranding likely representing pancreatitis, lipase ordered and increased rate of fluids. Will ask ID for intervention and abx guidance.     Interval History:  NAEON.  No new issues.   CC- abdo pain,  improving  Advancing diet   All questions answered and updates on care given.       ROS:  General: Negative for fevers   Cardiac: Negative for chest pain   Pulmonary: Negative for wheezing  GI: Negative for abdominal distention      Vitals:    08/30/24 0038 08/30/24 0045 08/30/24 0408 08/30/24 0725   BP: (!) 150/88  (!) 146/77 (!) 183/97   BP Location: Right arm  Right arm Right arm   Patient Position: Lying  Lying Lying   Pulse: 73  61 70   Resp: 18 18 16 18   Temp: 98.7 °F (37.1 °C)  98.5 °F (36.9 °C) 99 °F (37.2 °C)   TempSrc: Oral  Oral Oral   SpO2: 97%  99% 100%   Weight:       Height:              Body mass index is 32.69 kg/m².      PHYSICAL EXAM:  GENERAL APPEARANCE: alert and cooperative.     HEAD: NC/AT  CARDIAC: There is no cyanosis or pallor.   LUNGS: No apparent wheezing or stridor.  ABDOMEN: Non-distended. No guarding.  MSK: No joint erythema or tenderness.   EXTREMITIES: No significant new deformity or new joint abnormality.   NEUROLOGICAL: CN II-XII grossly intact.   SKIN: No lesions or eruptions.  PSYCHIATRIC: No tangential speech. No Hyperactive features.        Recent Results (from the past 24 hour(s))   POCT glucose    Collection Time: 08/29/24 10:58 AM   Result Value Ref Range    POCT Glucose 194 (H) 70 - 110 mg/dL   POCT glucose    Collection Time: 08/29/24  4:57 PM   Result Value Ref Range    POCT Glucose 189 (H) 70 - 110 mg/dL   POCT glucose    Collection Time: 08/29/24  8:55 PM   Result Value Ref Range    POCT Glucose 152 (H) 70 - 110 mg/dL   CBC Auto Differential    Collection Time: 08/30/24  4:48 AM   Result Value Ref Range    WBC 7.40 3.90 - 12.70 K/uL    RBC 4.47 4.00 - 5.40 M/uL    Hemoglobin 12.7 12.0 - 16.0 g/dL    Hematocrit 39.7 37.0 - 48.5 %    MCV 89 82 - 98 fL    MCH 28.4 27.0 - 31.0 pg    MCHC 32.0 32.0 - 36.0 g/dL    RDW 13.4 11.5 - 14.5 %    Platelets 355 150 - 450 K/uL    MPV 9.8 9.2 - 12.9 fL    Immature Granulocytes 0.3 0.0 - 0.5 %    Gran # (ANC) 4.2 1.8 - 7.7 K/uL     Immature Grans (Abs) 0.02 0.00 - 0.04 K/uL    Lymph # 2.6 1.0 - 4.8 K/uL    Mono # 0.4 0.3 - 1.0 K/uL    Eos # 0.2 0.0 - 0.5 K/uL    Baso # 0.01 0.00 - 0.20 K/uL    nRBC 0 0 /100 WBC    Gran % 56.5 38.0 - 73.0 %    Lymph % 35.0 18.0 - 48.0 %    Mono % 5.9 4.0 - 15.0 %    Eosinophil % 2.2 0.0 - 8.0 %    Basophil % 0.1 0.0 - 1.9 %    Differential Method Automated    Magnesium    Collection Time: 08/30/24  4:48 AM   Result Value Ref Range    Magnesium 1.6 1.6 - 2.6 mg/dL   Basic Metabolic Panel    Collection Time: 08/30/24  4:48 AM   Result Value Ref Range    Sodium 138 136 - 145 mmol/L    Potassium 3.3 (L) 3.5 - 5.1 mmol/L    Chloride 107 95 - 110 mmol/L    CO2 21 (L) 23 - 29 mmol/L    Glucose 124 (H) 70 - 110 mg/dL    BUN 4 (L) 6 - 20 mg/dL    Creatinine 1.0 0.5 - 1.4 mg/dL    Calcium 9.1 8.7 - 10.5 mg/dL    Anion Gap 10 8 - 16 mmol/L    eGFR >60 >60 mL/min/1.73 m^2   POCT glucose    Collection Time: 08/30/24  7:21 AM   Result Value Ref Range    POCT Glucose 132 (H) 70 - 110 mg/dL       Microbiology Results (last 7 days)       Procedure Component Value Units Date/Time    Blood Culture #2 **CANNOT BE ORDERED STAT** [[id number]] Collected: 08/24/24 2341    Order Status: Completed Specimen: Blood from Peripheral, Antecubital, Left Updated: 08/29/24 0303     Blood Culture, Routine No Growth after 4 days.    Blood Culture #1 **CANNOT BE ORDERED STAT** [[id number]] Collected: 08/25/24 0004    Order Status: Completed Specimen: Blood from Peripheral, Forearm, Right Updated: 08/29/24 0303     Blood Culture, Routine No Growth after 4 days.             Imaging Results              CT Abdomen Pelvis  Without Contrast (Final result)  Result time 08/25/24 01:12:41   Procedure changed from CT Abdomen Pelvis With IV Contrast NO Oral Contrast     Final result by Kit Suarez MD (08/25/24 01:12:41)                   Impression:      Colonic diverticulosis with acute diverticulitis seen involving the sigmoid colon.  Questionable  small focal contained perforation versus air within a diverticulum is seen.  No evidence of abscess.    Additional findings as detailed above.      Electronically signed by: Kit Suaerz MD  Date:    08/25/2024  Time:    01:12               Narrative:    EXAMINATION:  CT ABDOMEN PELVIS WITHOUT CONTRAST    CLINICAL HISTORY:  RLQ abdominal pain (Age >= 14y);    TECHNIQUE:  Low dose axial images, sagittal and coronal reformations were obtained from the lung bases to the pubic symphysis.  Oral contrast was not administered.    COMPARISON:  CT abdomen and pelvis from April 2023.    FINDINGS:  The visualized portion of the heart is unremarkable.  The lung bases are clear.    No significant hepatic abnormality seen on this noncontrast exam.  There is no intra-or extrahepatic biliary ductal dilatation.  The gallbladder is unremarkable.  The stomach, pancreas, spleen, and adrenal glands are unremarkable.    Contrast excretion is seen from the kidneys.  No hydronephrosis.  No abnormalities are seen along the ureteral courses.  Urinary bladder is nondistended.  Uterus is unremarkable.  Bilateral pelvic clips, presumed tubal ligation clips are seen.    Appendix is visualized and is unremarkable.  No evidence of bowel obstruction.  There is colonic diverticulosis.  Inflammatory changes are seen surrounding diverticula within the proximal sigmoid colon consistent with acute diverticulitis.  There is questionable small focal contained perforation versus air within a diverticulum.  Otherwise no additional free air seen.  No abscess.  No significant free fluid.    Aorta tapers normally.    No acute osseous abnormality identified.  Sclerosis is seen at the bilateral SI joints which may be seen with osteitis condensans.  Small fat containing umbilical hernia is noted.                                             Assessment/Plan:      * Diverticulitis of colon with perforation  Colonic diverticulosis with acute diverticulitis of the  sigmoid colon with questionable small focal contained perforation versus air within a diverticulum is seen.  No evidence of abscess.  - given IVF, decreased to 75cc/hr for 24 more hours  - continue zosyn  - continue PRN nausea, pain Rx  - Gen surg consulted  - advanced to clear liquid diet today  - monitor abdominal exam         Hyperlipidemia  Not on statin at home, will not start now but consider when following up with PCP      Type 2 diabetes mellitus with hyperglycemia  Patient's FSGs are controlled on current medication regimen.  Last A1c reviewed-   Lab Results   Component Value Date    HGBA1C 7.9 (H) 07/21/2024   Current correctional scale  Low  Maintain anti-hyperglycemic dose as follows-   Antihyperglycemics (From admission, onward)      Start     Stop Route Frequency Ordered    08/26/24 0956  insulin aspart U-100 pen 0-5 Units         -- SubQ Before meals & nightly PRN 08/26/24 0856          Hold Oral hypoglycemics while patient is in the hospital->Metformin.       Class 1 obesity due to excess calories with serious comorbidity and body mass index (BMI) of 32.0 to 32.9 in adult  Body mass index is 32.69 kg/m². Morbid obesity complicates all aspects of disease management from diagnostic modalities to treatment. Weight loss encouraged and health benefits explained to patient.         Sepsis with acute renal failure  This patient does have evidence of infective focus. My overall impression is sepsis. Source: Abdominal- diverticulitis  Antibiotics given-   Antibiotics (72h ago, onward)      Start     Stop Route Frequency Ordered    08/25/24 0900  piperacillin-tazobactam (ZOSYN) 4.5 g in D5W 100 mL IVPB (MB+)         -- IV Every 8 hours (non-standard times) 08/25/24 0229          - continue current antibiotics  - sepsis improving, WBC normalized, afebrile, HR normal    ORALIA (acute kidney injury)  ORALIA is likely due to pre-renal azotemia due to dehydration. Baseline creatinine is  0.9 . Most recent creatinine and  eGFR are listed below.  Recent Labs     08/25/24  0423 08/25/24  1354 08/26/24  0430   CREATININE 1.9* 1.8* 1.3   EGFRNORACEVR 33* 35* 51*      Plan  - ORALIA is improving with IVF  - Avoid nephrotoxins and renally dose meds for GFR listed above  - Monitor urine output, serial BMP, and adjust therapy as needed  - NS at 75cc/hr x24 more hours  - now clear liquid diet    Marijuana abuse  Encouraged cessation, though THC is not the cause of her current abdominal pain      Tobacco use disorder  Patient was counseled on smoking cessation for 4 minutes. We discussed how smoking is detrimental to the patient's health. Prescription for nicotine patch was offered.       Hypokalemia  Patient's most recent potassium results are listed below.   Recent Labs     08/25/24  0423 08/25/24  1354 08/26/24  0430   K 2.9* 2.9* 3.2*     Plan  - Replete potassium per protocol  - Monitor potassium Daily  - Patient's hypokalemia is improving  - supplement Mg PRN too    Essential hypertension  Chronic, controlled. Latest blood pressure and vitals reviewed-     Temp:  [97.7 °F (36.5 °C)-99.4 °F (37.4 °C)]   Pulse:  [75-88]   Resp:  [14-20]   BP: (116-167)/(78-91)   SpO2:  [95 %-98 %] .   Home meds for hypertension were reviewed and noted below.   Hypertension Medications               amLODIPine (NORVASC) 5 MG tablet Take 1 tablet by mouth once daily.    metoprolol succinate (TOPROL-XL) 25 MG 24 hr tablet Take 1 tablet by mouth once daily.            While in the hospital, will manage blood pressure as follows; Continue home antihypertensive regimen with hold parameters     Will utilize p.r.n. blood pressure medication only if patient's blood pressure greater than  180/90  and she develops symptoms such as worsening chest pain or shortness of breath.      VTE Risk Mitigation (From admission, onward)           Ordered     enoxaparin injection 40 mg  Every 24 hours         08/26/24 1040     Place LOLITA hose  Until discontinued         08/25/24 8998      Reason for No Pharmacological VTE Prophylaxis  Once        Question:  Reasons:  Answer:  Physician Provided (leave comment)  Comment:  surgery eval in process    08/25/24 0235     IP VTE HIGH RISK PATIENT  Once         08/25/24 0235     Place sequential compression device  Until discontinued         08/25/24 0235                    Discharge Planning   RADU: 9/1/2024     Code Status: Full Code   Is the patient medically ready for discharge?:     Reason for patient still in hospital (select all that apply): Patient trending condition, Treatment, and Consult recommendations  Discharge Plan A: Home   Discharge Delays: None known at this time              Max Cao MD  Department of Hospital Medicine   [facility] Surg

## 2024-08-30 NOTE — NURSING
Discharge teaching completed, verbalized understanding, Rx delivered to bedside, no concerns voiced at this time.[facility]  Nurses Note -- 4 Eyes      8/30/2024       Skin assessed on: Discharge      [x] No Pressure Injuries Present    []Prevention Measures Documented    [] Yes LDA  for Pressure Injury Previously documented     [] Yes New Pressure Injury Discovered   [] LDA for New Pressure Injury Added      Attending RN:  Larisa Carranza, RN     Second RN:  Mimi,PCT

## 2024-08-30 NOTE — DISCHARGE INSTRUCTIONS
Take Augmentin twice daily until gone, 9/13/24  Come back if you have sustained fevers or your infection worsens in any way  Some pain meds sent to pharmacy, can also take 1000 mg of Tylenol every 8 hours  Get CT in 2 weeks and follow up with your PCP and infectious disease      Thank you for trusting [facility] and me with your care.  We are honored that you entrusted us with your healthcare needs. Your satisfaction is very important to us and we hope you have been very pleased with your experience at [facility]. After your discharge you may receive a survey asking you to rate your hospital experience- Please help us by completing this survey. We hope that you have received the very best care possible during your hospitalization at [facility], as your satisfaction is our top priority.    Let me know if there is anything more I can do!!              Max Cao MD        Medical Director        Section Head of         Board-Certified IM Attending      PATIENT GENERAL DISCHARGE INFORMATION   Things that YOU are responsible for to Manage Your Care At Home:  1. Getting your prescriptions filled.  2. Taking you medications as directed. (DO NOT MISS ANY DOSES!)  3. Going to your follow-up doctor appointments.                 *This is important because it allows the doctor to monitor your progress and make changes.      If no one calls you for your appointments, please call ([phone]) and reschedule this appointment.   After discharge, if you need assistance, you can call [facility] On Call Nurse Care Line for 24/7 assistance at [phone]  If you are experience any signs or symptoms, Call your doctor or Call [phone] and come to your nearest Emergency Room.    You should receive a call from [facility] within 48-72 hours to help manage your care after discharge.   Please try to make sure that you answer your phone for this important phone call.

## 2024-08-30 NOTE — NURSING
[facility], [facility]  Nurses Note -- 4 Eyes      8/29/2024       Skin assessed on: Q Shift      [x] No Pressure Injuries Present    []Prevention Measures Documented    [] Yes LDA  for Pressure Injury Previously documented     [] Yes New Pressure Injury Discovered   [] LDA for New Pressure Injury Added      Attending RN:  Braulio Valdez RN     Second LPN:  Cisco

## 2024-08-30 NOTE — PROGRESS NOTES
[facility] - [facility] Surg  Infectious Disease  Progress Note    Patient Name: Cipriano Gamez  MRN: [id number]  Admission Date: 8/24/2024  Length of Stay: 5 days  Attending Physician: Max Cao MD  Primary Care Provider: Brooks Bergeron MD    Isolation Status: No active isolations  Assessment/Plan:      GI  * Diverticulitis of colon with perforation  Cipriano Gamez is a 48 year old woman with diabetes and diverticulosis who was admitted for diverticulitis. WBC elevated on admission but improved. Blood cultures no growth. CT A/P with fluid collection between in the posterior urinary bladder wall and the anterior uterine body concerning for abscess, air within the pelvic mesenteric fat medial to the inflamed sigmoid colon in the setting of acute sigmoid diverticulitis that has progressed since admission. Not amenable to IR drainage. Currently on pip-tazo.     Recommendations  - continue pip-tazo 4.5 q8 hours while admitted  - at discharge can convert to PO amox-clav 875 BID until 9/13  - will arrange repeat CT and ID follow up         Above discussed with primary team.     Time: 35 minutes   50% of time spent on face-to-face counseling and coordination of care. Counseling included review of test results, diagnosis, and treatment plan with patient and/or family.  I have reviewed hospital notes from HM service and other specialty providers as well as outside medical records. I have also reviewed CBC, CMP/BMP,  cultures and imaging with my interpretation as documented. Patient is high risk of morbidity, on antibiotics requiring intensive monitoring for toxicity.       Anticipated Disposition: TBD    Thank you for your consult. I will sign off. Please contact us if you have any additional questions.    Yaneth Becerra MD  Infectious Disease  [facility] S[facility]    Subjective:     Principal Problem:Diverticulitis of colon with perforation    HPI: Cipriano Gamez is a 48 year old woman with diabetes and diverticulosis who  was admitted 8/24 for nausea and abdominal pain secondary to diverticulitis. She denies fevers, melena, hematochezia. Did have watery diarrhea few days prior to admission. She has been evaluated by surgery and is currently being managed non-operatively. She has a prior history of diverticulitis.    Interval History: abdominal pain still present but improved. No nausea or vomiting. Having bowel movements. Had episode of rash on legs and lip swelling that resolved. She has had similar symptoms off and on for >1 month. She hasn't noticed any specific triggers.     Review of Systems   Constitutional:  Positive for fatigue. Negative for fever.   Gastrointestinal:  Positive for abdominal pain. Negative for nausea and vomiting.   Skin:  Positive for rash.     Objective:     Vital Signs (Most Recent):  Temp: 99 °F (37.2 °C) (08/30/24 1232)  Pulse: 71 (08/30/24 1232)  Resp: 18 (08/30/24 1425)  BP: (!) 166/80 (08/30/24 1232)  SpO2: 95 % (08/30/24 1232) Vital Signs (24h Range):  Temp:  [97.8 °F (36.6 °C)-99 °F (37.2 °C)] 99 °F (37.2 °C)  Pulse:  [61-88] 71  Resp:  [16-18] 18  SpO2:  [95 %-100 %] 95 %  BP: (110-183)/(65-97) 166/80     Weight: 97.5 kg (215 lb)  Body mass index is 32.69 kg/m².    Estimated Creatinine Clearance: 85.8 mL/min (based on SCr of 1 mg/dL).     Physical Exam  Vitals and nursing note reviewed.   Constitutional:       Appearance: Normal appearance. She is not ill-appearing.   Abdominal:      General: There is no distension.      Tenderness: There is abdominal tenderness.   Skin:     General: Skin is warm and dry.      Findings: No rash.   Neurological:      Mental Status: She is alert.          Significant Labs:   Microbiology Results (last 7 days)       Procedure Component Value Units Date/Time    Blood Culture #2 **CANNOT BE ORDERED STAT** [[id number]] Collected: 08/24/24 5380    Order Status: Completed Specimen: Blood from Peripheral, Antecubital, Left Updated: 08/29/24 0305     Blood Culture, Routine  No Growth after 4 days.    Blood Culture #1 **CANNOT BE ORDERED STAT** [[id number]] Collected: 08/25/24 0004    Order Status: Completed Specimen: Blood from Peripheral, Forearm, Right Updated: 08/29/24 0303     Blood Culture, Routine No Growth after 4 days.            Significant Imaging: I have reviewed all pertinent imaging results/findings within the past 24 hours.

## 2024-08-30 NOTE — NURSING
Transported downstairs via wheelchair for dc home with belongings at side, no distress noted, safety maintained.

## 2024-08-30 NOTE — SUBJECTIVE & OBJECTIVE
Interval History: abdominal pain still present but improved. No nausea or vomiting. Having bowel movements. Had episode of rash on legs and lip swelling that resolved. She has had similar symptoms off and on for >1 month. She hasn't noticed any specific triggers.     Review of Systems   Constitutional:  Positive for fatigue. Negative for fever.   Gastrointestinal:  Positive for abdominal pain. Negative for nausea and vomiting.   Skin:  Positive for rash.     Objective:     Vital Signs (Most Recent):  Temp: 99 °F (37.2 °C) (08/30/24 1232)  Pulse: 71 (08/30/24 1232)  Resp: 18 (08/30/24 1425)  BP: (!) 166/80 (08/30/24 1232)  SpO2: 95 % (08/30/24 1232) Vital Signs (24h Range):  Temp:  [97.8 °F (36.6 °C)-99 °F (37.2 °C)] 99 °F (37.2 °C)  Pulse:  [61-88] 71  Resp:  [16-18] 18  SpO2:  [95 %-100 %] 95 %  BP: (110-183)/(65-97) 166/80     Weight: 97.5 kg (215 lb)  Body mass index is 32.69 kg/m².    Estimated Creatinine Clearance: 85.8 mL/min (based on SCr of 1 mg/dL).     Physical Exam  Vitals and nursing note reviewed.   Constitutional:       Appearance: Normal appearance. She is not ill-appearing.   Abdominal:      General: There is no distension.      Tenderness: There is abdominal tenderness.   Skin:     General: Skin is warm and dry.      Findings: No rash.   Neurological:      Mental Status: She is alert.          Significant Labs:   Microbiology Results (last 7 days)       Procedure Component Value Units Date/Time    Blood Culture #2 **CANNOT BE ORDERED STAT** [[id number]] Collected: 08/24/24 2341    Order Status: Completed Specimen: Blood from Peripheral, Antecubital, Left Updated: 08/29/24 0303     Blood Culture, Routine No Growth after 4 days.    Blood Culture #1 **CANNOT BE ORDERED STAT** [[id number]] Collected: 08/25/24 0004    Order Status: Completed Specimen: Blood from Peripheral, Forearm, Right Updated: 08/29/24 0303     Blood Culture, Routine No Growth after 4 days.            Significant Imaging: I have  reviewed all pertinent imaging results/findings within the past 24 hours.

## 2024-09-12 ENCOUNTER — PATIENT MESSAGE (OUTPATIENT)
Dept: INFECTIOUS DISEASES | Facility: CLINIC | Age: 49
End: 2024-09-12
Payer: COMMERCIAL

## 2024-09-13 ENCOUNTER — TELEPHONE (OUTPATIENT)
Dept: INFECTIOUS DISEASES | Facility: CLINIC | Age: 49
End: 2024-09-13
Payer: COMMERCIAL

## 2025-07-23 ENCOUNTER — HOSPITAL ENCOUNTER (OUTPATIENT)
Facility: HOSPITAL | Age: 50
Discharge: HOME | End: 2025-07-26
Attending: STUDENT IN AN ORGANIZED HEALTH CARE EDUCATION/TRAINING PROGRAM | Admitting: STUDENT IN AN ORGANIZED HEALTH CARE EDUCATION/TRAINING PROGRAM
Payer: MEDICAID

## 2025-07-23 DIAGNOSIS — R07.9 CHEST PAIN: ICD-10-CM

## 2025-07-23 DIAGNOSIS — R45.851 SUICIDAL IDEATION: ICD-10-CM

## 2025-07-23 DIAGNOSIS — K57.92 DIVERTICULITIS: Primary | ICD-10-CM

## 2025-07-23 DIAGNOSIS — R00.0 TACHYCARDIA: ICD-10-CM

## 2025-07-23 DIAGNOSIS — N17.9 AKI (ACUTE KIDNEY INJURY): ICD-10-CM

## 2025-07-23 DIAGNOSIS — E87.6 HYPOKALEMIA: ICD-10-CM

## 2025-07-23 LAB
ABSOLUTE EOSINOPHIL (OHS): 0.05 K/UL
ABSOLUTE MONOCYTE (OHS): 0.74 K/UL (ref 0.3–1)
ABSOLUTE NEUTROPHIL COUNT (OHS): 8.15 K/UL (ref 1.8–7.7)
AMPHET UR QL SCN: NEGATIVE
B-HCG UR QL: NEGATIVE
BACTERIA #/AREA URNS AUTO: NORMAL /HPF
BARBITURATE SCN PRESENT UR: NEGATIVE
BASOPHILS # BLD AUTO: 0.02 K/UL
BASOPHILS NFR BLD AUTO: 0.2 %
BENZODIAZ UR QL SCN: NEGATIVE
BILIRUB UR QL STRIP.AUTO: ABNORMAL
CANNABINOIDS UR QL SCN: ABNORMAL
CLARITY UR: ABNORMAL
COCAINE UR QL SCN: NEGATIVE
COLOR UR AUTO: YELLOW
CREAT UR-MCNC: >450 MG/DL (ref 15–325)
CTP QC/QA: YES
ERYTHROCYTE [DISTWIDTH] IN BLOOD BY AUTOMATED COUNT: 13.8 % (ref 11.5–14.5)
GLUCOSE UR QL STRIP: ABNORMAL
HCT VFR BLD AUTO: 40.3 % (ref 37–48.5)
HGB BLD-MCNC: 13.5 GM/DL (ref 12–16)
HGB UR QL STRIP: ABNORMAL
HYALINE CASTS UR QL AUTO: 0 /LPF (ref 0–1)
IMM GRANULOCYTES # BLD AUTO: 0.03 K/UL (ref 0–0.04)
IMM GRANULOCYTES NFR BLD AUTO: 0.3 % (ref 0–0.5)
KETONES UR QL STRIP: ABNORMAL
LACTATE SERPL-SCNC: 1.2 MMOL/L (ref 0.5–2.2)
LEUKOCYTE ESTERASE UR QL STRIP: NEGATIVE
LYMPHOCYTES # BLD AUTO: 1.8 K/UL (ref 1–4.8)
MCH RBC QN AUTO: 28.2 PG (ref 27–31)
MCHC RBC AUTO-ENTMCNC: 33.5 G/DL (ref 32–36)
MCV RBC AUTO: 84 FL (ref 82–98)
METHADONE UR QL SCN: NEGATIVE
MICROSCOPIC COMMENT: NORMAL
NITRITE UR QL STRIP: NEGATIVE
NUCLEATED RBC (/100WBC) (OHS): 0 /100 WBC
OPIATES UR QL SCN: NEGATIVE
PCP UR QL: NEGATIVE
PH UR STRIP: 6 [PH]
PLATELET # BLD AUTO: 333 K/UL (ref 150–450)
PMV BLD AUTO: 9.8 FL (ref 9.2–12.9)
PROT UR QL STRIP: ABNORMAL
RBC # BLD AUTO: 4.78 M/UL (ref 4–5.4)
RBC #/AREA URNS AUTO: 0 /HPF (ref 0–4)
RELATIVE EOSINOPHIL (OHS): 0.5 %
RELATIVE LYMPHOCYTE (OHS): 16.7 % (ref 18–48)
RELATIVE MONOCYTE (OHS): 6.9 % (ref 4–15)
RELATIVE NEUTROPHIL (OHS): 75.4 % (ref 38–73)
SP GR UR STRIP: >=1.03
UROBILINOGEN UR STRIP-ACNC: ABNORMAL EU/DL
WBC # BLD AUTO: 10.79 K/UL (ref 3.9–12.7)
WBC #/AREA URNS AUTO: 0 /HPF (ref 0–5)

## 2025-07-23 PROCEDURE — 80143 DRUG ASSAY ACETAMINOPHEN: CPT | Performed by: STUDENT IN AN ORGANIZED HEALTH CARE EDUCATION/TRAINING PROGRAM

## 2025-07-23 PROCEDURE — 82077 ASSAY SPEC XCP UR&BREATH IA: CPT | Performed by: STUDENT IN AN ORGANIZED HEALTH CARE EDUCATION/TRAINING PROGRAM

## 2025-07-23 PROCEDURE — 83690 ASSAY OF LIPASE: CPT | Performed by: STUDENT IN AN ORGANIZED HEALTH CARE EDUCATION/TRAINING PROGRAM

## 2025-07-23 PROCEDURE — 80307 DRUG TEST PRSMV CHEM ANLYZR: CPT | Performed by: STUDENT IN AN ORGANIZED HEALTH CARE EDUCATION/TRAINING PROGRAM

## 2025-07-23 PROCEDURE — 81001 URINALYSIS AUTO W/SCOPE: CPT | Mod: XB | Performed by: STUDENT IN AN ORGANIZED HEALTH CARE EDUCATION/TRAINING PROGRAM

## 2025-07-23 PROCEDURE — 93010 ELECTROCARDIOGRAM REPORT: CPT | Mod: ,,, | Performed by: INTERNAL MEDICINE

## 2025-07-23 PROCEDURE — 80053 COMPREHEN METABOLIC PANEL: CPT | Performed by: STUDENT IN AN ORGANIZED HEALTH CARE EDUCATION/TRAINING PROGRAM

## 2025-07-23 PROCEDURE — 82962 GLUCOSE BLOOD TEST: CPT

## 2025-07-23 PROCEDURE — 81025 URINE PREGNANCY TEST: CPT | Performed by: STUDENT IN AN ORGANIZED HEALTH CARE EDUCATION/TRAINING PROGRAM

## 2025-07-23 PROCEDURE — 93005 ELECTROCARDIOGRAM TRACING: CPT

## 2025-07-23 PROCEDURE — 99285 EMERGENCY DEPT VISIT HI MDM: CPT | Mod: 25

## 2025-07-23 PROCEDURE — 84145 PROCALCITONIN (PCT): CPT | Performed by: STUDENT IN AN ORGANIZED HEALTH CARE EDUCATION/TRAINING PROGRAM

## 2025-07-23 PROCEDURE — 83605 ASSAY OF LACTIC ACID: CPT | Performed by: STUDENT IN AN ORGANIZED HEALTH CARE EDUCATION/TRAINING PROGRAM

## 2025-07-23 PROCEDURE — 85025 COMPLETE CBC W/AUTO DIFF WBC: CPT | Performed by: STUDENT IN AN ORGANIZED HEALTH CARE EDUCATION/TRAINING PROGRAM

## 2025-07-24 PROBLEM — R22.1 NECK SWELLING: Status: ACTIVE | Noted: 2025-07-24

## 2025-07-24 PROBLEM — R45.851 SUICIDAL IDEATION: Status: ACTIVE | Noted: 2025-07-24

## 2025-07-24 LAB
ABSOLUTE EOSINOPHIL (OHS): 0.05 K/UL
ABSOLUTE MONOCYTE (OHS): 0.66 K/UL (ref 0.3–1)
ABSOLUTE NEUTROPHIL COUNT (OHS): 6.55 K/UL (ref 1.8–7.7)
ALBUMIN SERPL BCP-MCNC: 3.3 G/DL (ref 3.5–5.2)
ALBUMIN SERPL BCP-MCNC: 3.9 G/DL (ref 3.5–5.2)
ALP SERPL-CCNC: 70 UNIT/L (ref 40–150)
ALP SERPL-CCNC: 78 UNIT/L (ref 40–150)
ALT SERPL W/O P-5'-P-CCNC: 6 UNIT/L (ref 10–44)
ALT SERPL W/O P-5'-P-CCNC: 7 UNIT/L (ref 10–44)
ANION GAP (OHS): 15 MMOL/L (ref 8–16)
ANION GAP (OHS): 9 MMOL/L (ref 8–16)
APAP SERPL-MCNC: <3 UG/ML (ref 10–20)
AST SERPL-CCNC: 10 UNIT/L (ref 11–45)
AST SERPL-CCNC: 11 UNIT/L (ref 11–45)
BASOPHILS # BLD AUTO: 0.03 K/UL
BASOPHILS NFR BLD AUTO: 0.3 %
BILIRUB SERPL-MCNC: 0.5 MG/DL (ref 0.1–1)
BILIRUB SERPL-MCNC: 0.7 MG/DL (ref 0.1–1)
BUN SERPL-MCNC: 10 MG/DL (ref 6–20)
BUN SERPL-MCNC: 12 MG/DL (ref 6–20)
CALCIUM SERPL-MCNC: 8.8 MG/DL (ref 8.7–10.5)
CALCIUM SERPL-MCNC: 9.5 MG/DL (ref 8.7–10.5)
CHLORIDE SERPL-SCNC: 97 MMOL/L (ref 95–110)
CHLORIDE SERPL-SCNC: 99 MMOL/L (ref 95–110)
CO2 SERPL-SCNC: 26 MMOL/L (ref 23–29)
CO2 SERPL-SCNC: 27 MMOL/L (ref 23–29)
CREAT SERPL-MCNC: 1.5 MG/DL (ref 0.5–1.4)
CREAT SERPL-MCNC: 1.8 MG/DL (ref 0.5–1.4)
ERYTHROCYTE [DISTWIDTH] IN BLOOD BY AUTOMATED COUNT: 14 % (ref 11.5–14.5)
ETHANOL SERPL-MCNC: <10 MG/DL
GFR SERPLBLD CREATININE-BSD FMLA CKD-EPI: 35 ML/MIN/1.73/M2
GFR SERPLBLD CREATININE-BSD FMLA CKD-EPI: 43 ML/MIN/1.73/M2
GLUCOSE SERPL-MCNC: 163 MG/DL (ref 70–110)
GLUCOSE SERPL-MCNC: 183 MG/DL (ref 70–110)
HCT VFR BLD AUTO: 36.2 % (ref 37–48.5)
HGB BLD-MCNC: 12.4 GM/DL (ref 12–16)
IMM GRANULOCYTES # BLD AUTO: 0.03 K/UL (ref 0–0.04)
IMM GRANULOCYTES NFR BLD AUTO: 0.3 % (ref 0–0.5)
LIPASE SERPL-CCNC: 29 U/L (ref 4–60)
LYMPHOCYTES # BLD AUTO: 1.49 K/UL (ref 1–4.8)
MAGNESIUM SERPL-MCNC: 1.7 MG/DL (ref 1.6–2.6)
MCH RBC QN AUTO: 29.2 PG (ref 27–31)
MCHC RBC AUTO-ENTMCNC: 34.3 G/DL (ref 32–36)
MCV RBC AUTO: 85 FL (ref 82–98)
NUCLEATED RBC (/100WBC) (OHS): 0 /100 WBC
PHOSPHATE SERPL-MCNC: 2.5 MG/DL (ref 2.7–4.5)
PLATELET # BLD AUTO: 302 K/UL (ref 150–450)
PMV BLD AUTO: 10 FL (ref 9.2–12.9)
POCT GLUCOSE: 178 MG/DL (ref 70–110)
POTASSIUM SERPL-SCNC: 2.8 MMOL/L (ref 3.5–5.1)
POTASSIUM SERPL-SCNC: 3.1 MMOL/L (ref 3.5–5.1)
PROCALCITONIN SERPL-MCNC: 0.06 NG/ML
PROT SERPL-MCNC: 7.1 GM/DL (ref 6–8.4)
PROT SERPL-MCNC: 8.1 GM/DL (ref 6–8.4)
RBC # BLD AUTO: 4.25 M/UL (ref 4–5.4)
RELATIVE EOSINOPHIL (OHS): 0.6 %
RELATIVE LYMPHOCYTE (OHS): 16.9 % (ref 18–48)
RELATIVE MONOCYTE (OHS): 7.5 % (ref 4–15)
RELATIVE NEUTROPHIL (OHS): 74.4 % (ref 38–73)
SODIUM SERPL-SCNC: 135 MMOL/L (ref 136–145)
SODIUM SERPL-SCNC: 138 MMOL/L (ref 136–145)
WBC # BLD AUTO: 8.81 K/UL (ref 3.9–12.7)

## 2025-07-24 PROCEDURE — 63600175 PHARM REV CODE 636 W HCPCS: Performed by: STUDENT IN AN ORGANIZED HEALTH CARE EDUCATION/TRAINING PROGRAM

## 2025-07-24 PROCEDURE — 99205 OFFICE O/P NEW HI 60 MIN: CPT | Mod: 95,,, | Performed by: PSYCHIATRY & NEUROLOGY

## 2025-07-24 PROCEDURE — 63600175 PHARM REV CODE 636 W HCPCS: Performed by: PHYSICIAN ASSISTANT

## 2025-07-24 PROCEDURE — 36415 COLL VENOUS BLD VENIPUNCTURE: CPT | Performed by: STUDENT IN AN ORGANIZED HEALTH CARE EDUCATION/TRAINING PROGRAM

## 2025-07-24 PROCEDURE — 25000003 PHARM REV CODE 250: Performed by: STUDENT IN AN ORGANIZED HEALTH CARE EDUCATION/TRAINING PROGRAM

## 2025-07-24 PROCEDURE — G0378 HOSPITAL OBSERVATION PER HR: HCPCS

## 2025-07-24 PROCEDURE — 96365 THER/PROPH/DIAG IV INF INIT: CPT

## 2025-07-24 PROCEDURE — 25000003 PHARM REV CODE 250: Performed by: PHYSICIAN ASSISTANT

## 2025-07-24 PROCEDURE — 85025 COMPLETE CBC W/AUTO DIFF WBC: CPT | Performed by: STUDENT IN AN ORGANIZED HEALTH CARE EDUCATION/TRAINING PROGRAM

## 2025-07-24 PROCEDURE — 84100 ASSAY OF PHOSPHORUS: CPT | Performed by: STUDENT IN AN ORGANIZED HEALTH CARE EDUCATION/TRAINING PROGRAM

## 2025-07-24 PROCEDURE — 84450 TRANSFERASE (AST) (SGOT): CPT | Performed by: PHYSICIAN ASSISTANT

## 2025-07-24 PROCEDURE — 96372 THER/PROPH/DIAG INJ SC/IM: CPT | Performed by: STUDENT IN AN ORGANIZED HEALTH CARE EDUCATION/TRAINING PROGRAM

## 2025-07-24 PROCEDURE — 96375 TX/PRO/DX INJ NEW DRUG ADDON: CPT

## 2025-07-24 PROCEDURE — 25500020 PHARM REV CODE 255: Performed by: STUDENT IN AN ORGANIZED HEALTH CARE EDUCATION/TRAINING PROGRAM

## 2025-07-24 PROCEDURE — 83735 ASSAY OF MAGNESIUM: CPT | Performed by: STUDENT IN AN ORGANIZED HEALTH CARE EDUCATION/TRAINING PROGRAM

## 2025-07-24 PROCEDURE — 96361 HYDRATE IV INFUSION ADD-ON: CPT

## 2025-07-24 RX ORDER — SODIUM CHLORIDE 0.9 % (FLUSH) 0.9 %
10 SYRINGE (ML) INJECTION EVERY 12 HOURS PRN
Status: DISCONTINUED | OUTPATIENT
Start: 2025-07-24 | End: 2025-07-26 | Stop reason: HOSPADM

## 2025-07-24 RX ORDER — HYDROCODONE BITARTRATE AND ACETAMINOPHEN 7.5; 325 MG/1; MG/1
1 TABLET ORAL EVERY 6 HOURS PRN
Refills: 0 | Status: DISCONTINUED | OUTPATIENT
Start: 2025-07-24 | End: 2025-07-26 | Stop reason: HOSPADM

## 2025-07-24 RX ORDER — TALC
6 POWDER (GRAM) TOPICAL NIGHTLY PRN
Status: DISCONTINUED | OUTPATIENT
Start: 2025-07-24 | End: 2025-07-26 | Stop reason: HOSPADM

## 2025-07-24 RX ORDER — AMOXICILLIN AND CLAVULANATE POTASSIUM 500; 125 MG/1; MG/1
1 TABLET, FILM COATED ORAL 2 TIMES DAILY
Qty: 20 TABLET | Refills: 0 | Status: SHIPPED | OUTPATIENT
Start: 2025-07-24 | End: 2025-07-26

## 2025-07-24 RX ORDER — IBUPROFEN 200 MG
16 TABLET ORAL
Status: DISCONTINUED | OUTPATIENT
Start: 2025-07-24 | End: 2025-07-26 | Stop reason: HOSPADM

## 2025-07-24 RX ORDER — LANOLIN ALCOHOL/MO/W.PET/CERES
800 CREAM (GRAM) TOPICAL
Status: DISCONTINUED | OUTPATIENT
Start: 2025-07-24 | End: 2025-07-26 | Stop reason: HOSPADM

## 2025-07-24 RX ORDER — SODIUM CHLORIDE 9 MG/ML
INJECTION, SOLUTION INTRAVENOUS CONTINUOUS
Status: ACTIVE | OUTPATIENT
Start: 2025-07-24 | End: 2025-07-24

## 2025-07-24 RX ORDER — HYDRALAZINE HYDROCHLORIDE 20 MG/ML
5 INJECTION INTRAMUSCULAR; INTRAVENOUS EVERY 6 HOURS PRN
Status: DISCONTINUED | OUTPATIENT
Start: 2025-07-24 | End: 2025-07-26 | Stop reason: HOSPADM

## 2025-07-24 RX ORDER — SODIUM,POTASSIUM PHOSPHATES 280-250MG
2 POWDER IN PACKET (EA) ORAL
Status: DISCONTINUED | OUTPATIENT
Start: 2025-07-24 | End: 2025-07-26 | Stop reason: HOSPADM

## 2025-07-24 RX ORDER — GLUCAGON 1 MG
1 KIT INJECTION
Status: DISCONTINUED | OUTPATIENT
Start: 2025-07-24 | End: 2025-07-26 | Stop reason: HOSPADM

## 2025-07-24 RX ORDER — ONDANSETRON HYDROCHLORIDE 2 MG/ML
4 INJECTION, SOLUTION INTRAVENOUS
Status: COMPLETED | OUTPATIENT
Start: 2025-07-24 | End: 2025-07-24

## 2025-07-24 RX ORDER — POTASSIUM CHLORIDE 750 MG/1
30 TABLET, EXTENDED RELEASE ORAL ONCE
Status: COMPLETED | OUTPATIENT
Start: 2025-07-24 | End: 2025-07-24

## 2025-07-24 RX ORDER — MORPHINE SULFATE 4 MG/ML
4 INJECTION, SOLUTION INTRAMUSCULAR; INTRAVENOUS EVERY 6 HOURS PRN
Status: DISCONTINUED | OUTPATIENT
Start: 2025-07-24 | End: 2025-07-26 | Stop reason: HOSPADM

## 2025-07-24 RX ORDER — ACETAMINOPHEN 325 MG/1
650 TABLET ORAL EVERY 4 HOURS PRN
Status: DISCONTINUED | OUTPATIENT
Start: 2025-07-24 | End: 2025-07-26 | Stop reason: HOSPADM

## 2025-07-24 RX ORDER — BISACODYL 10 MG/1
10 SUPPOSITORY RECTAL DAILY PRN
Status: DISCONTINUED | OUTPATIENT
Start: 2025-07-24 | End: 2025-07-26 | Stop reason: HOSPADM

## 2025-07-24 RX ORDER — IBUPROFEN 200 MG
24 TABLET ORAL
Status: DISCONTINUED | OUTPATIENT
Start: 2025-07-24 | End: 2025-07-26 | Stop reason: HOSPADM

## 2025-07-24 RX ORDER — ALUMINUM HYDROXIDE, MAGNESIUM HYDROXIDE, AND SIMETHICONE 1200; 120; 1200 MG/30ML; MG/30ML; MG/30ML
30 SUSPENSION ORAL 4 TIMES DAILY PRN
Status: DISCONTINUED | OUTPATIENT
Start: 2025-07-24 | End: 2025-07-26 | Stop reason: HOSPADM

## 2025-07-24 RX ORDER — INSULIN ASPART 100 [IU]/ML
0-5 INJECTION, SOLUTION INTRAVENOUS; SUBCUTANEOUS
Status: DISCONTINUED | OUTPATIENT
Start: 2025-07-24 | End: 2025-07-26 | Stop reason: HOSPADM

## 2025-07-24 RX ORDER — HYDROCODONE BITARTRATE AND ACETAMINOPHEN 5; 325 MG/1; MG/1
1 TABLET ORAL EVERY 6 HOURS PRN
Refills: 0 | Status: DISCONTINUED | OUTPATIENT
Start: 2025-07-24 | End: 2025-07-26 | Stop reason: HOSPADM

## 2025-07-24 RX ORDER — ONDANSETRON HYDROCHLORIDE 2 MG/ML
4 INJECTION, SOLUTION INTRAVENOUS EVERY 8 HOURS PRN
Status: DISCONTINUED | OUTPATIENT
Start: 2025-07-24 | End: 2025-07-26 | Stop reason: HOSPADM

## 2025-07-24 RX ORDER — ACETAMINOPHEN 325 MG/1
650 TABLET ORAL EVERY 8 HOURS PRN
Status: DISCONTINUED | OUTPATIENT
Start: 2025-07-24 | End: 2025-07-26 | Stop reason: HOSPADM

## 2025-07-24 RX ORDER — CEFTRIAXONE 2 G/1
2 INJECTION, POWDER, FOR SOLUTION INTRAMUSCULAR; INTRAVENOUS
Status: DISCONTINUED | OUTPATIENT
Start: 2025-07-24 | End: 2025-07-26 | Stop reason: HOSPADM

## 2025-07-24 RX ORDER — MORPHINE SULFATE 4 MG/ML
6 INJECTION, SOLUTION INTRAMUSCULAR; INTRAVENOUS
Status: COMPLETED | OUTPATIENT
Start: 2025-07-24 | End: 2025-07-24

## 2025-07-24 RX ORDER — ENOXAPARIN SODIUM 100 MG/ML
40 INJECTION SUBCUTANEOUS EVERY 24 HOURS
Status: DISCONTINUED | OUTPATIENT
Start: 2025-07-24 | End: 2025-07-26 | Stop reason: HOSPADM

## 2025-07-24 RX ORDER — SIMETHICONE 80 MG
1 TABLET,CHEWABLE ORAL 4 TIMES DAILY PRN
Status: DISCONTINUED | OUTPATIENT
Start: 2025-07-24 | End: 2025-07-26 | Stop reason: HOSPADM

## 2025-07-24 RX ORDER — AMLODIPINE BESYLATE 5 MG/1
5 TABLET ORAL DAILY
Status: DISCONTINUED | OUTPATIENT
Start: 2025-07-24 | End: 2025-07-26 | Stop reason: HOSPADM

## 2025-07-24 RX ORDER — FAMOTIDINE 20 MG/1
20 TABLET, FILM COATED ORAL ONCE
Status: COMPLETED | OUTPATIENT
Start: 2025-07-24 | End: 2025-07-24

## 2025-07-24 RX ORDER — AMOXICILLIN 250 MG
1 CAPSULE ORAL DAILY PRN
Status: DISCONTINUED | OUTPATIENT
Start: 2025-07-24 | End: 2025-07-26 | Stop reason: HOSPADM

## 2025-07-24 RX ORDER — DIPHENHYDRAMINE HCL 25 MG
25 CAPSULE ORAL ONCE
Status: COMPLETED | OUTPATIENT
Start: 2025-07-24 | End: 2025-07-24

## 2025-07-24 RX ORDER — AMLODIPINE BESYLATE 5 MG/1
5 TABLET ORAL
Status: COMPLETED | OUTPATIENT
Start: 2025-07-24 | End: 2025-07-24

## 2025-07-24 RX ORDER — METRONIDAZOLE 500 MG/1
500 TABLET ORAL EVERY 8 HOURS
Status: DISCONTINUED | OUTPATIENT
Start: 2025-07-24 | End: 2025-07-26 | Stop reason: HOSPADM

## 2025-07-24 RX ORDER — NALOXONE HCL 0.4 MG/ML
0.02 VIAL (ML) INJECTION
Status: DISCONTINUED | OUTPATIENT
Start: 2025-07-24 | End: 2025-07-26 | Stop reason: HOSPADM

## 2025-07-24 RX ADMIN — PIPERACILLIN SODIUM AND TAZOBACTAM SODIUM 4.5 G: 4; .5 INJECTION, POWDER, FOR SOLUTION INTRAVENOUS at 01:07

## 2025-07-24 RX ADMIN — ONDANSETRON 4 MG: 2 INJECTION INTRAMUSCULAR; INTRAVENOUS at 01:07

## 2025-07-24 RX ADMIN — HYDROCODONE BITARTRATE AND ACETAMINOPHEN 1 TABLET: 5; 325 TABLET ORAL at 07:07

## 2025-07-24 RX ADMIN — AMLODIPINE BESYLATE 5 MG: 5 TABLET ORAL at 04:07

## 2025-07-24 RX ADMIN — FAMOTIDINE 20 MG: 20 TABLET, FILM COATED ORAL at 08:07

## 2025-07-24 RX ADMIN — SODIUM CHLORIDE 1000 ML: 9 INJECTION, SOLUTION INTRAVENOUS at 12:07

## 2025-07-24 RX ADMIN — DIPHENHYDRAMINE HYDROCHLORIDE 25 MG: 25 CAPSULE ORAL at 08:07

## 2025-07-24 RX ADMIN — POTASSIUM BICARBONATE 50 MEQ: 977.5 TABLET, EFFERVESCENT ORAL at 12:07

## 2025-07-24 RX ADMIN — CEFTRIAXONE 2 G: 2 INJECTION, POWDER, FOR SOLUTION INTRAMUSCULAR; INTRAVENOUS at 08:07

## 2025-07-24 RX ADMIN — PREDNISONE 50 MG: 20 TABLET ORAL at 08:07

## 2025-07-24 RX ADMIN — IOHEXOL 75 ML: 350 INJECTION, SOLUTION INTRAVENOUS at 12:07

## 2025-07-24 RX ADMIN — Medication 6 MG: at 11:07

## 2025-07-24 RX ADMIN — AMLODIPINE BESYLATE 5 MG: 5 TABLET ORAL at 12:07

## 2025-07-24 RX ADMIN — POTASSIUM CHLORIDE 30 MEQ: 750 TABLET, EXTENDED RELEASE ORAL at 09:07

## 2025-07-24 RX ADMIN — MORPHINE SULFATE 4 MG: 4 INJECTION, SOLUTION INTRAMUSCULAR; INTRAVENOUS at 09:07

## 2025-07-24 RX ADMIN — HYDROCODONE BITARTRATE AND ACETAMINOPHEN 1 TABLET: 7.5; 325 TABLET ORAL at 06:07

## 2025-07-24 RX ADMIN — METRONIDAZOLE 500 MG: 500 TABLET ORAL at 09:07

## 2025-07-24 RX ADMIN — SODIUM CHLORIDE: 9 INJECTION, SOLUTION INTRAVENOUS at 05:07

## 2025-07-24 RX ADMIN — ENOXAPARIN SODIUM 40 MG: 40 INJECTION SUBCUTANEOUS at 03:07

## 2025-07-24 RX ADMIN — MORPHINE SULFATE 6 MG: 4 INJECTION, SOLUTION INTRAMUSCULAR; INTRAVENOUS at 12:07

## 2025-07-24 RX ADMIN — METRONIDAZOLE 500 MG: 500 TABLET ORAL at 08:07

## 2025-07-24 RX ADMIN — ACETAMINOPHEN 650 MG: 325 TABLET ORAL at 03:07

## 2025-07-24 RX ADMIN — METRONIDAZOLE 500 MG: 500 TABLET ORAL at 03:07

## 2025-07-24 NOTE — ASSESSMENT & PLAN NOTE
Patient's most recent potassium results are listed below.   Recent Labs     07/23/25  2330 07/24/25  0747   K 2.8* 3.1*     Plan  - Replete potassium per protocol  - Monitor potassium Daily  - Patient's hypokalemia is stable

## 2025-07-24 NOTE — PLAN OF CARE
Case Management Assessment     PCP: Brooks Bergeron  Pharmacy: [facility]    Patient Arrived From: home   Existing Help at Home: none    Barriers to Discharge: none    Discharge Plan:    A. Home    B. Home      07/24/25 1112   Discharge Planning   Assessment Type Discharge Planning Brief Assessment   Resource/Environmental Concerns none   Support Systems Family members   Equipment Currently Used at Home none   Current Living Arrangements home   Patient/Family Anticipates Transition to home   Patient/Family Anticipated Services at Transition none   DME Needed Upon Discharge  none   Discharge Plan A Home   Discharge Plan B Home

## 2025-07-24 NOTE — HPI
This is a 49-year-old female with a past medical history of hypertension, hyperlipidemia, obesity, diverticulosis who presents with abdominal pain.      Patient presents for evaluation of abdominal pain that started 2 days prior to presentation.  She reports left lower quadrant abdominal pain with radiation to her back, associated with nausea, and vomiting.  Additionally, she reports nonexertional intermittent sharp chest pain that occurs randomly.  She denied diarrhea, fevers or chills.  Furthermore, patient reports passive suicidal ideations and feels overall with home/family stressors.  She has not been taking her prescription medications due to difficulty getting them refilled.    In the ED, the patient was hypertensive (up to 200/104), tachycardic (110s > 80s).  Labs were remarkable for hypokalemia (2.8), an elevated creatinine (1.8-baseline around 1.3). UDS was positive marijuana. CT abdomen and pelvis showed acute proximal sigmoid diverticulitis, no evidence of abscess or perforation, inflammatory changes extend to involve adjacent coursing small bowel loops in the region.  She was PEC'd.  Patient was given 1 L of NS, Zosyn, potassium bicarbonate 50 mEq, Zofran 4 mg IV, morphine 6 mg IV, amlodipine 5 mg p.o..  She was admitted for further management.

## 2025-07-24 NOTE — ED NOTES
"Pt states "Tylenol does not work for me. I take that at home and it don't work. That's why I came here." Pt appears agitated. LORAINE Jeong notified   "

## 2025-07-24 NOTE — ED TRIAGE NOTES
"Pt arrived reported left sided abd pain, left flank pain, and midsternal/left anterior CP x a couple days. Additionally reports SI x 3 wks. "I feel like everything keeps getting worse financially and with my health", "I had a plan to do something painless". Denies SOB, n/v, dysuria, hematuria, constipation/diarrhea, or HI. Pt is calm, cooperative, and tearful. Security at bedside and pt being changed into maroon scrubs.   "

## 2025-07-24 NOTE — PROGRESS NOTES
[facility]  [facility]  Progress Note    Patient Name: Cipriano Gamez  MRN: [id number]  Patient Class: OP- Observation   Admission Date: 7/23/2025  Length of Stay: 0 days  Attending Physician: Anisha Harden DO  Primary Care Provider: Brooks Bergeron MD        Subjective     Principal Problem:Sigmoid diverticulitis        HPI:  This is a 49-year-old female with a past medical history of hypertension, hyperlipidemia, obesity, diverticulosis who presents with abdominal pain.      Patient presents for evaluation of abdominal pain that started 2 days prior to presentation.  She reports left lower quadrant abdominal pain with radiation to her back, associated with nausea, and vomiting.  Additionally, she reports nonexertional intermittent sharp chest pain that occurs randomly.  She denied diarrhea, fevers or chills.  Furthermore, patient reports passive suicidal ideations and feels overall with home/family stressors.  She has not been taking her prescription medications due to difficulty getting them refilled.    In the ED, the patient was hypertensive (up to 200/104), tachycardic (110s > 80s).  Labs were remarkable for hypokalemia (2.8), an elevated creatinine (1.8-baseline around 1.3). UDS was positive marijuana. CT abdomen and pelvis showed acute proximal sigmoid diverticulitis, no evidence of abscess or perforation, inflammatory changes extend to involve adjacent coursing small bowel loops in the region.  She was PEC'd.  Patient was given 1 L of NS, Zosyn, potassium bicarbonate 50 mEq, Zofran 4 mg IV, morphine 6 mg IV, amlodipine 5 mg p.o..  She was admitted for further management.    Overview/Hospital Course:  Cipriano Gamez 49 y.o. female placed in observation for sigmoid diverticulitis. She presented to the ED with abdominal pain. In the ED her CT scan had findings of sigmoid diverticulitis, her K was 2.8 and Cr 1.8. She also had complaints of passive suicidal ideation and psychiatry was  consulted recommending PEC. She then developed unilateral right neck swelling.     Interval History: improvement in abdominal pain tolerating PO clears with controlled nausea. Developed right sided neck swelling only under right angle of mandible. No difficult swallowing or breathing. No swelling to mouth, no new rashes.     Review of Systems   Constitutional:  Negative for chills and fever.   HENT:          Neck swelling   Eyes:  Negative for photophobia and visual disturbance.   Respiratory:  Negative for chest tightness and shortness of breath.    Cardiovascular:  Negative for chest pain and palpitations.   Gastrointestinal:  Positive for abdominal pain. Negative for nausea and vomiting.   Genitourinary:  Negative for dysuria and hematuria.     Objective:     Vital Signs (Most Recent):  Temp: 99 °F (37.2 °C) (07/24/25 0236)  Pulse: 75 (07/24/25 0900)  Resp: 17 (07/24/25 0834)  BP: 129/63 (07/24/25 0900)  SpO2: 100 % (07/24/25 0900) Vital Signs (24h Range):  Temp:  [99 °F (37.2 °C)-99.6 °F (37.6 °C)] 99 °F (37.2 °C)  Pulse:  [] 75  Resp:  [13-43] 17  SpO2:  [98 %-100 %] 100 %  BP: (117-200)/() 129/63     Weight: 99.8 kg (220 lb)  Body mass index is 34.46 kg/m².    Intake/Output Summary (Last 24 hours) at 7/24/2025 0954  Last data filed at 7/24/2025 0236  Gross per 24 hour   Intake 1082.44 ml   Output --   Net 1082.44 ml         Physical Exam  Vitals and nursing note reviewed.   Constitutional:       General: She is not in acute distress.     Appearance: She is well-developed.   HENT:      Head: Normocephalic and atraumatic.   Eyes:      General:         Right eye: No discharge.         Left eye: No discharge.      Conjunctiva/sclera: Conjunctivae normal.   Neck:      Thyroid: No thyromegaly.      Comments: Swelling to left side of neck under angle of mandible. No sub-ungual swelling. No posterior oropharynx swelling. No difficulty phonating, tolerating PO, no stridor.   Cardiovascular:      Rate and  Rhythm: Normal rate and regular rhythm.      Heart sounds: No murmur heard.  Pulmonary:      Effort: Pulmonary effort is normal. No respiratory distress.      Breath sounds: Normal breath sounds.   Abdominal:      General: Bowel sounds are normal. There is no distension.      Palpations: Abdomen is soft. There is no mass.      Tenderness: There is abdominal tenderness (mild LLQ).   Musculoskeletal:         General: No deformity.      Cervical back: Normal range of motion.   Skin:     General: Skin is warm and dry.   Neurological:      Mental Status: She is alert and oriented to person, place, and time.   Psychiatric:         Mood and Affect: Mood normal.         Behavior: Behavior normal.               Significant Labs: CBC:   Recent Labs   Lab 07/23/25 2330 07/24/25  0747   WBC 10.79 8.81   HGB 13.5 12.4   HCT 40.3 36.2*    302     CMP:   Recent Labs   Lab 07/23/25 2330 07/24/25  0747    135*   K 2.8* 3.1*   CL 97 99   CO2 26 27   * 163*   BUN 12 10   CREATININE 1.8* 1.5*   CALCIUM 9.5 8.8   PROT 8.1 7.1   ALBUMIN 3.9 3.3*   BILITOT 0.7 0.5   ALKPHOS 78 70   AST 11 10*   ALT 7* 6*   ANIONGAP 15 9     Urine Studies:   Recent Labs   Lab 07/23/25  2334   COLORU Yellow   APPEARANCEUA Hazy*   PHUR 6.0   SPECGRAV >=1.030*   PROTEINUA 1+*   GLUCUA Trace*   BILIRUBINUA 1+*   OCCULTUA 1+*   NITRITE Negative   UROBILINOGEN 4.0-6.0*   LEUKOCYTESUR Negative   RBCUA 0   WBCUA 0   BACTERIA None   HYALINECASTS 0       Significant Imaging:   Imaging Results              CT Abdomen Pelvis With IV Contrast NO Oral Contrast (Final result)  Result time 07/24/25 00:53:06      Final result by Kit Suarez MD (07/24/25 00:53:06)                   Impression:      1. Acute proximal sigmoid diverticulitis.  No evidence of abscess or perforation.  Inflammatory changes extend to involve adjacent coursing small bowel loops in the region.  Potential coloenteric fistula not excluded on the basis of this CT.  2.  Additional findings as detailed above.      Electronically signed by: Kit Suarez MD  Date:    07/24/2025  Time:    00:53               Narrative:    EXAMINATION:  CT ABDOMEN PELVIS WITH IV CONTRAST    CLINICAL HISTORY:  LLQ abdominal pain;    TECHNIQUE:  Low dose axial images, sagittal and coronal reformations were obtained from the lung bases to the pubic symphysis following the IV administration of 75 mL of Omnipaque 350 .  Oral contrast was not given.    COMPARISON:  CT abdomen and pelvis August 2024.    FINDINGS:  The visualized portion of the heart is unremarkable.  The lung bases are clear.    No significant hepatic abnormalities are identified.  There is no intra-or extrahepatic biliary ductal dilatation.  The gallbladder is unremarkable.  The stomach, pancreas, spleen, and adrenal glands are unremarkable.    Kidneys enhance normally with no evidence of hydronephrosis.  No abnormalities are seen along the ureteral courses.  Urinary bladder is nondistended.  Uterus is unremarkable.  Bilateral pelvic clips, presumed tubal ligation clips are seen.    Appendix is visualized and is unremarkable.  There is extensive colonic diverticulosis.  Abnormal colonic wall thickening with surrounding inflammatory changes are seen involving the proximal sigmoid colon in a region of multiple diverticula consistent with acute diverticulitis.  Inflammatory changes extend to involve the adjacent coursing small bowel loops in the region.  No evidence of abnormal bowel dilatation or obstruction.  No free air or significant free fluid seen.  No evidence of abscess or perforation.    Aorta tapers normally.    No acute osseous abnormality identified. Small fat containing umbilical hernia is noted.                                          Assessment & Plan  Sigmoid diverticulitis  Presents with abdominal pain, vomiting  CT abdomen and pelvis showed acute proximal sigmoid diverticulitis, no evidence of abscess or perforation,  inflammatory changes extend to involve adjacent coursing small bowel loops in the region.     Plan:   Continue ceftriaxone/Flagyl   Symptomatic control.  Outpatient surgery f/u to consider colectomy given recurrent diverticulitis  Essential hypertension  Patient's blood pressure range in the last 24 hours was: BP  Min: 117/74  Max: 200/104.The patient's inpatient anti-hypertensive regimen is listed below:  Current Antihypertensives  amLODIPine tablet 5 mg, Daily, Oral  hydrALAZINE injection 5 mg, Every 6 hours PRN, Intravenous    Plan  - BP is controlled, no changes needed to their regimen  - monitor BP  Hypokalemia  Patient's most recent potassium results are listed below.   Recent Labs     07/23/25  2330 07/24/25  0747   K 2.8* 3.1*     Plan  - Replete potassium per protocol  - Monitor potassium Daily  - Patient's hypokalemia is stable  ORALIA (acute kidney injury)  ORALIA is likely due to pre-renal azotemia due to intravascular volume depletion. Baseline creatinine is 1.3. Most recent creatinine and eGFR are listed below.  Recent Labs     07/23/25 2330 07/24/25  0747   CREATININE 1.8* 1.5*   EGFRNORACEVR 35* 43*      Plan  - ORALIA is stable  - Avoid nephrotoxins and renally dose meds for GFR listed above  - Monitor urine output, serial BMP, and adjust therapy as needed  - IV fluids improving to near baseline. Continue IVF while decreased PO intake  Type 2 diabetes mellitus with hyperglycemia  Patient's FSGs are controlled on current medication regimen.  Last A1c reviewed-   Lab Results   Component Value Date    HGBA1C 8.9 (H) 06/05/2025     Most recent fingerstick glucose reviewed-   Recent Labs   Lab 07/24/25  0629   POCTGLUCOSE 178*     Current correctional scale  Low  Maintain anti-hyperglycemic dose as follows-   Antihyperglycemics (From admission, onward)      Start     Stop Route Frequency Ordered    07/24/25 0533  insulin aspart U-100 pen 0-5 Units         -- SubQ Before meals & nightly PRN 07/24/25 7998           Hold Oral hypoglycemics while patient is in the hospital.  Suicidal ideation  Currently PEC'd.  Psychiatry consulted recommending PEC, and continued stabilization of medical problems and re-evaluation in 24hrs to determine if PEC still indicated  Reconsult psych tomorrow    Neck swelling  She complained of neck swelling under right mandible without associated oropharyngeal swelling. Tolerating PO no stridor or difficulty phonating. No medications were given with history of allergy to those medications  Trial of prednisone, famotidine, and benadryl  Check US neck  ENT consulted    VTE Risk Mitigation (From admission, onward)           Ordered     enoxaparin injection 40 mg  Daily         07/24/25 0314     IP VTE HIGH RISK PATIENT  Once         07/24/25 0314     Place sequential compression device  Until discontinued         07/24/25 0314                    Discharge Planning   RADU:      Code Status: Full Code   Medical Readiness for Discharge Date:            Jani Briscoe PA-C  Department of Hospital Medicine   [facility] [place] - Emergency Dept

## 2025-07-24 NOTE — ASSESSMENT & PLAN NOTE
ORALIA is likely due to pre-renal azotemia due to intravascular volume depletion. Baseline creatinine is 1.3. Most recent creatinine and eGFR are listed below.  Recent Labs     07/23/25  2330 07/24/25  0747   CREATININE 1.8* 1.5*   EGFRNORACEVR 35* 43*      Plan  - ORALIA is stable  - Avoid nephrotoxins and renally dose meds for GFR listed above  - Monitor urine output, serial BMP, and adjust therapy as needed  - IV fluids improving to near baseline. Continue IVF while decreased PO intake

## 2025-07-24 NOTE — ASSESSMENT & PLAN NOTE
Presents with abdominal pain, vomiting  CT abdomen and pelvis showed acute proximal sigmoid diverticulitis, no evidence of abscess or perforation, inflammatory changes extend to involve adjacent coursing small bowel loops in the region.     Plan:   Continue ceftriaxone/Flagyl   Symptomatic control.  Outpatient surgery f/u to consider colectomy given recurrent diverticulitis

## 2025-07-24 NOTE — ASSESSMENT & PLAN NOTE
Presents with abdominal pain, vomiting  CT abdomen and pelvis showed acute proximal sigmoid diverticulitis, no evidence of abscess or perforation, inflammatory changes extend to involve adjacent coursing small bowel loops in the region.     Plan:   Continue ceftriaxone/Flagyl   Symptomatic control.

## 2025-07-24 NOTE — ASSESSMENT & PLAN NOTE
Currently PEC'd.  Psychiatry consulted recommending PEC, and continued stabilization of medical problems and re-evaluation in 24hrs to determine if PEC still indicated  Reconsult psych tomorrow

## 2025-07-24 NOTE — ED NOTES
"Pt states " do I have the right to refuse medications? I don't want to take opioids. I'm allergic to ibuprofin but now I don't trust them to give me pain meds."  "

## 2025-07-24 NOTE — ED NOTES
Pt c/o right sided neck swelling. LORAINE Gunter notified and is aware. Pt in no distress. Airway patent

## 2025-07-24 NOTE — ED PROVIDER NOTES
Encounter Date: 7/23/2025       History     Chief Complaint   Patient presents with    Abdominal Pain     To left side starting yesterday, had N/V a couple days ago but resolved yesterday, Pt also states that she is having suicidal thoughts for a few weeks     49 y.o. female who has a past medical history of Diverticulosis, GERD (gastroesophageal reflux disease), Hiatal hernia, Hyperlipidemia, and Hypertension. presents to the emergency department due to  nausea vomiting that began 4 days ago that resolved yesterday and now having left lower quadrant abdominal pain.  Patient reports having a normal bowel movement earlier today.  Denies any fevers or chills.  She reports pain begins in the left lower quadrant and radiates posteriorly and towards her pelvic region.  Denies any urinary symptoms.  Denies any fevers or chills.    Furthermore patient reports suicidal ideation.  She reports having a lot of stressors at home.  Does not have an overt plan.  Denies history of suicidal attempts in the past.  Does not own any firearms.  Denies any visual or auditory hallucinations.  Denies any homicidal ideation.    The history is provided by the patient.     Review of patient's allergies indicates:   Allergen Reactions    Ciprofloxacin Swelling and Blisters    Ibuprofen Hives    Meloxicam      rash    Nsaids (non-steroidal anti-inflammatory drug) Hives    Cyclobenzaprine Rash     rasg     Past Medical History:   Diagnosis Date    Abnormal Pap smear of cervix     Cigarette smoker     Diverticulosis     GERD (gastroesophageal reflux disease)     Hiatal hernia     Hyperlipidemia 8/25/2024    Hypertension      Past Surgical History:   Procedure Laterality Date    CERVICAL BIOPSY  W/ LOOP ELECTRODE EXCISION  2/11/14    LAPAROSCOPIC OCCLUSION OF OVIDUCTS BY DEVICE Bilateral 7/10/2020    Procedure: OCCLUSION, FALLOPIAN TUBE, LAPAROSCOPIC, USING DEVICE;  Surgeon: Alex Waller MD;  Location: [facility];  Service: OB/GYN;  Laterality:  Bilateral;  RN PREOP 6/23/2020   T/S--COVID NEGATIVE---UPT  CONSENTS INCOMPLETE     Family History   Problem Relation Name Age of Onset    Heart disease Mother  54        MI    Heart disease Maternal Grandmother  40        MI    Diabetes Other      Heart disease Brother  44        MI    Sickle cell trait Sister      Crohn's disease Neg Hx      Colon cancer Neg Hx       Social History[1]  Review of Systems   Constitutional:  Negative for fever.   HENT:  Negative for sore throat.    Respiratory:  Negative for shortness of breath.    Cardiovascular:  Negative for chest pain.   Gastrointestinal:  Positive for abdominal pain, nausea and vomiting.   Genitourinary:  Negative for dysuria.   Musculoskeletal:  Negative for back pain.   Skin:  Negative for rash.   Neurological:  Negative for weakness.   Hematological:  Does not bruise/bleed easily.       Physical Exam     Initial Vitals [07/23/25 2248]   BP Pulse Resp Temp SpO2   (!) 146/105 (!) 116 16 99.6 °F (37.6 °C) 98 %      MAP       --         Physical Exam    Nursing note and vitals reviewed.  Constitutional: She is not diaphoretic. No distress.   HENT:   Head: Normocephalic and atraumatic.   Eyes: Conjunctivae and EOM are normal. Pupils are equal, round, and reactive to light.   Neck:   Normal range of motion.  Pulmonary/Chest: Breath sounds normal. No respiratory distress.   Abdominal: Abdomen is soft. Bowel sounds are normal. She exhibits no distension. There is abdominal tenderness in the left lower quadrant.   Musculoskeletal:         General: No tenderness. Normal range of motion.      Cervical back: Normal range of motion.     Neurological: She is alert.   Skin: Skin is warm. Capillary refill takes less than 2 seconds.   Psychiatric: Her behavior is normal. She is not actively hallucinating. She exhibits a depressed mood. She expresses suicidal ideation. She expresses no homicidal ideation. She expresses no suicidal plans. She is attentive.         ED Course  "  Procedures  Labs Reviewed   COMPREHENSIVE METABOLIC PANEL - Abnormal       Result Value    Sodium 138      Potassium 2.8 (*)     Chloride 97      CO2 26      Glucose 183 (*)     BUN 12      Creatinine 1.8 (*)     Calcium 9.5      Protein Total 8.1      Albumin 3.9      Bilirubin Total 0.7      ALP 78      AST 11      ALT 7 (*)     Anion Gap 15      eGFR 35 (*)    URINALYSIS, REFLEX TO URINE CULTURE - Abnormal    Color, UA Yellow      Appearance, UA Hazy (*)     pH, UA 6.0      Spec Grav UA >=1.030 (*)     Protein, UA 1+ (*)     Glucose, UA Trace (*)     Ketones, UA Trace (*)     Bilirubin, UA 1+ (*)     Blood, UA 1+ (*)     Nitrites, UA Negative      Urobilinogen, UA 4.0-6.0 (*)     Leukocyte Esterase, UA Negative     DRUG SCREEN PANEL, URINE EMERGENCY - Abnormal    Benzodiazepine, Urine Negative      Methadone, Urine Negative      Cocaine, Urine Negative      Opiates, Urine Negative      Barbiturates, Urine Negative      Amphetamines, Urine Negative      THC Presumptive Positive (*)     Phencyclidine, Urine Negative      Urine Creatinine >450.0 (*)     Narrative:     This screen includes the following classes of drugs at the listed cut-off:     Benzodiazepines:        200 ng/ml   Methadone:              300 ng/ml   Cocaine metabolite:     300 ng/ml   Opiates:                300 ng/ml   Barbiturates:           200 ng/ml   Amphetamines:           1000 ng/ml   Marijuana metabs (THC): 50 ng/ml   Phencyclidine (PCP):    25 ng/ml     This is a screening test. If results do not correlate with clinical presentation, then a confirmatory send out test is advised.    This report is intended for use in clinical monitoring and management of patients. It is not intended for use in employment related drug testing."   ACETAMINOPHEN LEVEL - Abnormal    Acetaminophen Level <3.0 (*)    CBC WITH DIFFERENTIAL - Abnormal    WBC 10.79      RBC 4.78      HGB 13.5      HCT 40.3      MCV 84      MCH 28.2      MCHC 33.5      RDW 13.8      " Platelet Count 333      MPV 9.8      Nucleated RBC 0      Neut % 75.4 (*)     Lymph % 16.7 (*)     Mono % 6.9      Eos % 0.5      Basophil % 0.2      Imm Grans % 0.3      Neut # 8.15 (*)     Lymph # 1.80      Mono # 0.74      Eos # 0.05      Baso # 0.02      Imm Grans # 0.03     ALCOHOL,MEDICAL (ETHANOL) - Normal    Alcohol, Serum <10     LACTIC ACID, PLASMA - Normal    Lactic Acid Level 1.2      Narrative:     Falsely low lactic acid results can be found in samples containing >=13.0 mg/dL total bilirubin and/or >=3.5 mg/dL direct bilirubin.    PROCALCITONIN - Normal    Procalcitonin 0.06     LIPASE - Normal    Lipase Level 29     CBC W/ AUTO DIFFERENTIAL    Narrative:     The following orders were created for panel order CBC auto differential.  Procedure                               Abnormality         Status                     ---------                               -----------         ------                     CBC with Differential[[id number]]       Abnormal            Final result                 Please view results for these tests on the individual orders.   URINALYSIS MICROSCOPIC    RBC, UA 0      WBC, UA 0      Bacteria, UA None      Hyaline Casts, UA 0      Microscopic Comment       GREY TOP URINE HOLD   POCT URINE PREGNANCY    POC Preg Test, Ur Negative       Acceptable Yes            Imaging Results              CT Abdomen Pelvis With IV Contrast NO Oral Contrast (Final result)  Result time 07/24/25 00:53:06      Final result by Kit Suarez MD (07/24/25 00:53:06)                   Impression:      1. Acute proximal sigmoid diverticulitis.  No evidence of abscess or perforation.  Inflammatory changes extend to involve adjacent coursing small bowel loops in the region.  Potential coloenteric fistula not excluded on the basis of this CT.  2. Additional findings as detailed above.      Electronically signed by: Kit Suarez MD  Date:    07/24/2025  Time:    00:53                Narrative:    EXAMINATION:  CT ABDOMEN PELVIS WITH IV CONTRAST    CLINICAL HISTORY:  LLQ abdominal pain;    TECHNIQUE:  Low dose axial images, sagittal and coronal reformations were obtained from the lung bases to the pubic symphysis following the IV administration of 75 mL of Omnipaque 350 .  Oral contrast was not given.    COMPARISON:  CT abdomen and pelvis August 2024.    FINDINGS:  The visualized portion of the heart is unremarkable.  The lung bases are clear.    No significant hepatic abnormalities are identified.  There is no intra-or extrahepatic biliary ductal dilatation.  The gallbladder is unremarkable.  The stomach, pancreas, spleen, and adrenal glands are unremarkable.    Kidneys enhance normally with no evidence of hydronephrosis.  No abnormalities are seen along the ureteral courses.  Urinary bladder is nondistended.  Uterus is unremarkable.  Bilateral pelvic clips, presumed tubal ligation clips are seen.    Appendix is visualized and is unremarkable.  There is extensive colonic diverticulosis.  Abnormal colonic wall thickening with surrounding inflammatory changes are seen involving the proximal sigmoid colon in a region of multiple diverticula consistent with acute diverticulitis.  Inflammatory changes extend to involve the adjacent coursing small bowel loops in the region.  No evidence of abnormal bowel dilatation or obstruction.  No free air or significant free fluid seen.  No evidence of abscess or perforation.    Aorta tapers normally.    No acute osseous abnormality identified. Small fat containing umbilical hernia is noted.                                       Medications   piperacillin-tazobactam (ZOSYN) 4.5 g in D5W 100 mL IVPB (MB+) (4.5 g Intravenous New Bag 7/24/25 0142)   sodium chloride 0.9% bolus 1,000 mL 1,000 mL (0 mLs Intravenous Stopped 7/24/25 0131)   potassium bicarbonate disintegrating tablet 50 mEq (50 mEq Oral Given 7/24/25 0033)   iohexoL (OMNIPAQUE 350) injection 75 mL (75  mLs Intravenous Given 7/24/25 0023)   morphine injection 6 mg (6 mg Intravenous Given 7/24/25 0045)   amLODIPine tablet 5 mg (5 mg Oral Given 7/24/25 0045)   ondansetron injection 4 mg (4 mg Intravenous Given 7/24/25 0149)     Medical Decision Making:   Initial Assessment:   49 y.o. female who has a past medical history of Diverticulosis, GERD (gastroesophageal reflux disease), Hiatal hernia, Hyperlipidemia, and Hypertension. presents to the emergency department due to  nausea vomiting that began 4 days ago that resolved yesterday and now having left lower quadrant abdominal pain.  Patient in no acute distress vital signs notable for elevated blood pressure otherwise unremarkable.  Patient with tenderness to palpation of the left lower quadrant region.  Given patient's history of diverticulosis will obtain labs and CT imaging to assess for possible diverticulitis.  Furthermore given patient's suicidal ideation with cause of psychiatry to see if patient needs inpatient psychiatric admission.  Differential Diagnosis:   Differential Diagnosis includes, but is not limited to:  AAA, aortic dissection, mesenteric ischemia, perforated viscous, MI/ACS, SBO/volvulus, incarcerated/strangulated hernia, intussusception, ileus, appendicitis, cholecystitis, cholangitis, diverticulitis, esophagitis, hepatitis, nephrolithiasis, pancreatitis, gastroenteritis, colitis, IBD/IBS, biliary colic, GERD, PUD, constipation, UTI/pyelonephritis,  disorder. Decompensated psychiatric disease (schizophrenia, bipolar disorder, major depression), excited delirium, medication noncompliance, substance abuse/withdrawal, intentional drug overdose, medication toxicity, APAP/ASA overdose, acute stress reaction, personality disorder, malingering, metabolic derangement    Clinical Tests:   Lab Tests: Ordered and Reviewed  Radiological Study: Ordered and Reviewed  Medical Tests: Ordered and Reviewed             ED Course as of 07/24/25 0203   Wed Jul 23,  2025 2258 Independent Interpretation of EKG:  Rhythm: Sinus   Rate: 109  QTC: 500  No STEMI  [AS]   2340 CBC auto differential(!)  CBC reviewed and interpreted by me:   No significant leukocytosis, anemia (at baseline), or platelet abnormalities.   [AS]   Thu Jul 24, 2025   0041 Patient's reports worsening left lower quadrant abdominal pain will give morphine.  Suspect patient's blood pressure elevation pain related will give him 1 time dose of her home antihypertensives as well. [AS]   0055 CT Abdomen Pelvis With IV Contrast NO Oral Contrast [AS]   0123 Patient evaluated by psychiatry recommended inpatient psychiatric treatment.  Given patient's concomitant mild ORALIA and diverticulitis patient will benefit from med psych placement.  Will attempt to placement at a med psych facility.  Given her kidney function (eGFR 35)  Will adjust Augmentin to 500mg b.i.d. for 10 days.  Patient without leukocytosis,  lactic acid within normal limits and tolerating p.o. patient currently medically cleared for psychiatric placement. [AS]   0150 Patient just had large volume emesis.  Given she is unable to tolerate p.o. will admit patient to the the hospital for further management [AS]   0200 After review of the patient's physical exam, ED testing, and history/symptoms, the patient requires additional care in the hospital for the treatment of diverticulitis . Discussed patients case with Dr. Foote who will accept the patient and any pending labs/imaging/interventions.   I discussed the objective findings with the patient including laboratory studies, diagnostic imaging, and any consultant involvement. The patient/ family was educated on their clinical presentation and all questions were answered. They acknowledged and verbally agreed to the treatment plan. The patient will be admitted to the hospital for further management.    [AS]      ED Course User Index  [AS] Adonis Pan MD          Medical Decision Making  Amount and/or  Complexity of Data Reviewed  Labs: ordered. Decision-making details documented in ED Course.  Radiology: ordered. Decision-making details documented in ED Course.    Risk  Prescription drug management.         Critical Care Procedure Note  Authorized and Performed by: Adonis Pan MD  Total critical care time: 45 minutes  Due to a high probability of clinically significant, life threatening deterioration, the patient required my highest level of preparedness to intervene emergently and I personally spent this critical care time directly and personally managing the patient. This critical care time included obtaining a history; examining the patient; pulse oximetry; ordering and review of studies; arranging urgent treatment with development of a management plan; evaluation of patient's response to treatment; frequent reassessment; and, discussions with other providers.  This critical care time was performed to assess and manage the high probability of imminent, life-threatening deterioration that could result in multi-organ failure. It was exclusive of separately billable procedures and treating other patients and teaching time.  Please see MDM section and the rest of the note for further information on patient assessment and treatment.    Clinical Impression:   Final diagnoses:  [R00.0] Tachycardia  [R45.851] Suicidal ideation (Primary)  [K57.92] Diverticulitis  [N17.9] ORALIA (acute kidney injury)  [E87.6] Hypokalemia          ED Disposition Condition    Observation                  DISCLAIMER: This note was prepared with [employer] voice recognition transcription software. Garbled syntax, mangled pronouns, and other bizarre constructions may be attributed to that software system.       Adonis Pan MD  07/24/25 0126       Adonis Pan MD  07/24/25 0144         [1]   Social History  Tobacco Use    Smoking status: Every Day     Current packs/day: 1.00     Average packs/day: 1 pack/day for 16.0 years (16.0 ttl  pk-yrs)     Types: Cigarettes    Smokeless tobacco: Never   Substance Use Topics    Alcohol use: Yes     Comment: social 1-2 x / month    Drug use: Yes     Frequency: 1.0 times per week     Types: Marijuana     Comment: daily        Adonis Pan MD  07/24/25 0206

## 2025-07-24 NOTE — SUBJECTIVE & OBJECTIVE
Past Medical History:   Diagnosis Date    Abnormal Pap smear of cervix     Cigarette smoker     Diverticulosis     GERD (gastroesophageal reflux disease)     Hiatal hernia     Hyperlipidemia 8/25/2024    Hypertension        Past Surgical History:   Procedure Laterality Date    CERVICAL BIOPSY  W/ LOOP ELECTRODE EXCISION  2/11/14    LAPAROSCOPIC OCCLUSION OF OVIDUCTS BY DEVICE Bilateral 7/10/2020    Procedure: OCCLUSION, FALLOPIAN TUBE, LAPAROSCOPIC, USING DEVICE;  Surgeon: Alex Waller MD;  Location: [facility] OR;  Service: OB/GYN;  Laterality: Bilateral;  RN PREOP 6/23/2020   T/S--COVID NEGATIVE---UPT  CONSENTS INCOMPLETE       Review of patient's allergies indicates:   Allergen Reactions    Ciprofloxacin Swelling and Blisters    Ibuprofen Hives    Meloxicam      rash    Nsaids (non-steroidal anti-inflammatory drug) Hives    Cyclobenzaprine Rash     rasg       No current facility-administered medications on file prior to encounter.     Current Outpatient Medications on File Prior to Encounter   Medication Sig    amLODIPine (NORVASC) 5 MG tablet Take 1 tablet by mouth once daily.    metFORMIN (GLUCOPHAGE-XR) 500 MG ER 24hr tablet Take 500 mg by mouth once daily.    metoprolol succinate (TOPROL-XL) 25 MG 24 hr tablet Take 1 tablet by mouth once daily.     Family History       Problem Relation (Age of Onset)    Diabetes Other    Heart disease Mother (54), Maternal Grandmother (40), Brother (44)    Sickle cell trait Sister          Tobacco Use    Smoking status: Every Day     Current packs/day: 1.00     Average packs/day: 1 pack/day for 16.0 years (16.0 ttl pk-yrs)     Types: Cigarettes    Smokeless tobacco: Never   Substance and Sexual Activity    Alcohol use: Yes     Comment: social 1-2 x / month    Drug use: Yes     Frequency: 1.0 times per week     Types: Marijuana     Comment: daily    Sexual activity: Not Currently     Partners: Male     Birth control/protection: None     Review of Systems   Respiratory: Negative.      Cardiovascular: Negative.    Gastrointestinal:  Positive for abdominal pain, nausea and vomiting.   Genitourinary: Negative.    Musculoskeletal: Negative.    Neurological: Negative.    Psychiatric/Behavioral:  Positive for suicidal ideas.      Objective:     Vital Signs (Most Recent):  Temp: 99 °F (37.2 °C) (07/24/25 0236)  Pulse: 80 (07/24/25 0400)  Resp: 17 (07/24/25 0300)  BP: 117/74 (07/24/25 0400)  SpO2: 100 % (07/24/25 0400) Vital Signs (24h Range):  Temp:  [99 °F (37.2 °C)-99.6 °F (37.6 °C)] 99 °F (37.2 °C)  Pulse:  [] 80  Resp:  [13-43] 17  SpO2:  [98 %-100 %] 100 %  BP: (117-200)/() 117/74     Weight: 99.8 kg (220 lb)  Body mass index is 34.46 kg/m².     Physical Exam  Vitals and nursing note reviewed.   Constitutional:       General: She is not in acute distress.     Appearance: She is not ill-appearing.   HENT:      Mouth/Throat:      Mouth: Mucous membranes are moist.   Cardiovascular:      Rate and Rhythm: Normal rate.   Pulmonary:      Effort: Pulmonary effort is normal.   Abdominal:      General: Abdomen is flat.      Tenderness: There is abdominal tenderness (Left lower quadrant).   Skin:     General: Skin is warm.   Neurological:      General: No focal deficit present.      Mental Status: She is alert.                Significant Labs: All pertinent labs within the past 24 hours have been reviewed.    Significant Imaging: I have reviewed all pertinent imaging results/findings within the past 24 hours.

## 2025-07-24 NOTE — H&P
[facility] Medicine  History & Physical    Patient Name: Cipriano Gamez  MRN: [id number]  Patient Class: OP- Observation  Admission Date: 7/23/2025  Attending Physician: Anisha Harden DO   Primary Care Provider: Brooks Bergeron MD         Patient information was obtained from patient, past medical records, and ER records.     Subjective:     Principal Problem:Sigmoid diverticulitis    Chief Complaint:   Chief Complaint   Patient presents with    Abdominal Pain     To left side starting yesterday, had N/V a couple days ago but resolved yesterday, Pt also states that she is having suicidal thoughts for a few weeks        HPI: This is a 49-year-old female with a past medical history of hypertension, hyperlipidemia, obesity, diverticulosis who presents with abdominal pain.      Patient presents for evaluation of abdominal pain that started 2 days prior to presentation.  She reports left lower quadrant abdominal pain with radiation to her back, associated with nausea, and vomiting.  Additionally, she reports nonexertional intermittent sharp chest pain that occurs randomly.  She denied diarrhea, fevers or chills.  Furthermore, patient reports passive suicidal ideations and feels overall with home/family stressors.  She has not been taking her prescription medications due to difficulty getting them refilled.    In the ED, the patient was hypertensive (up to 200/104), tachycardic (110s > 80s).  Labs were remarkable for hypokalemia (2.8), an elevated creatinine (1.8-baseline around 1.3). UDS was positive marijuana. CT abdomen and pelvis showed acute proximal sigmoid diverticulitis, no evidence of abscess or perforation, inflammatory changes extend to involve adjacent coursing small bowel loops in the region.  She was PEC'd.  Patient was given 1 L of NS, Zosyn, potassium bicarbonate 50 mEq, Zofran 4 mg IV, morphine 6 mg IV, amlodipine 5 mg p.o..  She was admitted for further management.    Past  Medical History:   Diagnosis Date    Abnormal Pap smear of cervix     Cigarette smoker     Diverticulosis     GERD (gastroesophageal reflux disease)     Hiatal hernia     Hyperlipidemia 8/25/2024    Hypertension        Past Surgical History:   Procedure Laterality Date    CERVICAL BIOPSY  W/ LOOP ELECTRODE EXCISION  2/11/14    LAPAROSCOPIC OCCLUSION OF OVIDUCTS BY DEVICE Bilateral 7/10/2020    Procedure: OCCLUSION, FALLOPIAN TUBE, LAPAROSCOPIC, USING DEVICE;  Surgeon: Alex Waller MD;  Location: [facility] OR;  Service: OB/GYN;  Laterality: Bilateral;  RN PREOP 6/23/2020   T/S--COVID NEGATIVE---UPT  CONSENTS INCOMPLETE       Review of patient's allergies indicates:   Allergen Reactions    Ciprofloxacin Swelling and Blisters    Ibuprofen Hives    Meloxicam      rash    Nsaids (non-steroidal anti-inflammatory drug) Hives    Cyclobenzaprine Rash     rasg       No current facility-administered medications on file prior to encounter.     Current Outpatient Medications on File Prior to Encounter   Medication Sig    amLODIPine (NORVASC) 5 MG tablet Take 1 tablet by mouth once daily.    metFORMIN (GLUCOPHAGE-XR) 500 MG ER 24hr tablet Take 500 mg by mouth once daily.    metoprolol succinate (TOPROL-XL) 25 MG 24 hr tablet Take 1 tablet by mouth once daily.     Family History       Problem Relation (Age of Onset)    Diabetes Other    Heart disease Mother (54), Maternal Grandmother (40), Brother (44)    Sickle cell trait Sister          Tobacco Use    Smoking status: Every Day     Current packs/day: 1.00     Average packs/day: 1 pack/day for 16.0 years (16.0 ttl pk-yrs)     Types: Cigarettes    Smokeless tobacco: Never   Substance and Sexual Activity    Alcohol use: Yes     Comment: social 1-2 x / month    Drug use: Yes     Frequency: 1.0 times per week     Types: Marijuana     Comment: daily    Sexual activity: Not Currently     Partners: Male     Birth control/protection: None     Review of Systems   Respiratory: Negative.      Cardiovascular: Negative.    Gastrointestinal:  Positive for abdominal pain, nausea and vomiting.   Genitourinary: Negative.    Musculoskeletal: Negative.    Neurological: Negative.    Psychiatric/Behavioral:  Positive for suicidal ideas.      Objective:     Vital Signs (Most Recent):  Temp: 99 °F (37.2 °C) (07/24/25 0236)  Pulse: 80 (07/24/25 0400)  Resp: 17 (07/24/25 0300)  BP: 117/74 (07/24/25 0400)  SpO2: 100 % (07/24/25 0400) Vital Signs (24h Range):  Temp:  [99 °F (37.2 °C)-99.6 °F (37.6 °C)] 99 °F (37.2 °C)  Pulse:  [] 80  Resp:  [13-43] 17  SpO2:  [98 %-100 %] 100 %  BP: (117-200)/() 117/74     Weight: 99.8 kg (220 lb)  Body mass index is 34.46 kg/m².     Physical Exam  Vitals and nursing note reviewed.   Constitutional:       General: She is not in acute distress.     Appearance: She is not ill-appearing.   HENT:      Mouth/Throat:      Mouth: Mucous membranes are moist.   Cardiovascular:      Rate and Rhythm: Normal rate.   Pulmonary:      Effort: Pulmonary effort is normal.   Abdominal:      General: Abdomen is flat.      Tenderness: There is abdominal tenderness (Left lower quadrant).   Skin:     General: Skin is warm.   Neurological:      General: No focal deficit present.      Mental Status: She is alert.                Significant Labs: All pertinent labs within the past 24 hours have been reviewed.    Significant Imaging: I have reviewed all pertinent imaging results/findings within the past 24 hours.  Assessment/Plan:     Assessment & Plan  Sigmoid diverticulitis  Presents with abdominal pain, vomiting  CT abdomen and pelvis showed acute proximal sigmoid diverticulitis, no evidence of abscess or perforation, inflammatory changes extend to involve adjacent coursing small bowel loops in the region.     Plan:   Continue ceftriaxone/Flagyl   Symptomatic control.  Essential hypertension  Patient's blood pressure range in the last 24 hours was: BP  Min: 117/74  Max: 200/104.The patient's  "inpatient anti-hypertensive regimen is listed below:  Current Antihypertensives  amLODIPine tablet 5 mg, Daily, Oral  hydrALAZINE injection 5 mg, Every 6 hours PRN, Intravenous    Plan  - BP is controlled, no changes needed to their regimen  - monitor BP  Hypokalemia  Patient's most recent potassium results are listed below.   Recent Labs     07/23/25  2330   K 2.8*     Plan  - Replete potassium per protocol  - Monitor potassium Daily  - Patient's hypokalemia is stable  ORALIA (acute kidney injury)  ORALIA is likely due to pre-renal azotemia due to intravascular volume depletion. Baseline creatinine is 1.3. Most recent creatinine and eGFR are listed below.  Recent Labs     07/23/25  2330   CREATININE 1.8*   EGFRNORACEVR 35*      Plan  - ORALIA is stable  - Avoid nephrotoxins and renally dose meds for GFR listed above  - Monitor urine output, serial BMP, and adjust therapy as needed  - IV fluids  Type 2 diabetes mellitus with hyperglycemia  Patient's FSGs are controlled on current medication regimen.  Last A1c reviewed-   Lab Results   Component Value Date    HGBA1C 8.9 (H) 06/05/2025     Most recent fingerstick glucose reviewed- No results for input(s): "POCTGLUCOSE" in the last 24 hours.  Current correctional scale  Low  Maintain anti-hyperglycemic dose as follows-   Antihyperglycemics (From admission, onward)      Start     Stop Route Frequency Ordered    07/24/25 0517  insulin aspart U-100 pen 0-5 Units         -- SubQ Before meals & nightly PRN 07/24/25 0417          Hold Oral hypoglycemics while patient is in the hospital.  Suicidal ideation  Currently PEC'd.  Psychiatry consulted.  Re-evaluate prior to discharge.    VTE Risk Mitigation (From admission, onward)           Ordered     enoxaparin injection 40 mg  Daily         07/24/25 0314     IP VTE HIGH RISK PATIENT  Once         07/24/25 0314     Place sequential compression device  Until discontinued         07/24/25 0314                       On 07/24/2025, patient " should be placed in hospital observation services under my care.             Robert Foote MD  Department of Hospital Medicine  [facility] [place] - Emergency Dept

## 2025-07-24 NOTE — ED NOTES
Report received from COLLIN Das. Pt care assumed. Pt resting comfortably on stretcher. No distress noted. Pt denies any pain at present. Updated on POC. Will continue to monitor

## 2025-07-24 NOTE — ASSESSMENT & PLAN NOTE
"Patient's FSGs are controlled on current medication regimen.  Last A1c reviewed-   Lab Results   Component Value Date    HGBA1C 8.9 (H) 06/05/2025     Most recent fingerstick glucose reviewed- No results for input(s): "POCTGLUCOSE" in the last 24 hours.  Current correctional scale  Low  Maintain anti-hyperglycemic dose as follows-   Antihyperglycemics (From admission, onward)      Start     Stop Route Frequency Ordered    07/24/25 0517  insulin aspart U-100 pen 0-5 Units         -- SubQ Before meals & nightly PRN 07/24/25 0417          Hold Oral hypoglycemics while patient is in the hospital.  "

## 2025-07-24 NOTE — ASSESSMENT & PLAN NOTE
Patient's FSGs are controlled on current medication regimen.  Last A1c reviewed-   Lab Results   Component Value Date    HGBA1C 8.9 (H) 06/05/2025     Most recent fingerstick glucose reviewed-   Recent Labs   Lab 07/24/25  0629   POCTGLUCOSE 178*     Current correctional scale  Low  Maintain anti-hyperglycemic dose as follows-   Antihyperglycemics (From admission, onward)      Start     Stop Route Frequency Ordered    07/24/25 0517  insulin aspart U-100 pen 0-5 Units         -- SubQ Before meals & nightly PRN 07/24/25 0417          Hold Oral hypoglycemics while patient is in the hospital.

## 2025-07-24 NOTE — ASSESSMENT & PLAN NOTE
She complained of neck swelling under right mandible without associated oropharyngeal swelling. Tolerating PO no stridor or difficulty phonating. No medications were given with history of allergy to those medications  Trial of prednisone, famotidine, and benadryl  Check US neck  ENT consulted

## 2025-07-24 NOTE — SUBJECTIVE & OBJECTIVE
Interval History: improvement in abdominal pain tolerating PO clears with controlled nausea. Developed right sided neck swelling only under right angle of mandible. No difficult swallowing or breathing. No swelling to mouth, no new rashes.     Review of Systems   Constitutional:  Negative for chills and fever.   HENT:          Neck swelling   Eyes:  Negative for photophobia and visual disturbance.   Respiratory:  Negative for chest tightness and shortness of breath.    Cardiovascular:  Negative for chest pain and palpitations.   Gastrointestinal:  Positive for abdominal pain. Negative for nausea and vomiting.   Genitourinary:  Negative for dysuria and hematuria.     Objective:     Vital Signs (Most Recent):  Temp: 99 °F (37.2 °C) (07/24/25 0236)  Pulse: 75 (07/24/25 0900)  Resp: 17 (07/24/25 0834)  BP: 129/63 (07/24/25 0900)  SpO2: 100 % (07/24/25 0900) Vital Signs (24h Range):  Temp:  [99 °F (37.2 °C)-99.6 °F (37.6 °C)] 99 °F (37.2 °C)  Pulse:  [] 75  Resp:  [13-43] 17  SpO2:  [98 %-100 %] 100 %  BP: (117-200)/() 129/63     Weight: 99.8 kg (220 lb)  Body mass index is 34.46 kg/m².    Intake/Output Summary (Last 24 hours) at 7/24/2025 0954  Last data filed at 7/24/2025 0236  Gross per 24 hour   Intake 1082.44 ml   Output --   Net 1082.44 ml         Physical Exam  Vitals and nursing note reviewed.   Constitutional:       General: She is not in acute distress.     Appearance: She is well-developed.   HENT:      Head: Normocephalic and atraumatic.   Eyes:      General:         Right eye: No discharge.         Left eye: No discharge.      Conjunctiva/sclera: Conjunctivae normal.   Neck:      Thyroid: No thyromegaly.      Comments: Swelling to left side of neck under angle of mandible. No sub-ungual swelling. No posterior oropharynx swelling. No difficulty phonating, tolerating PO, no stridor.   Cardiovascular:      Rate and Rhythm: Normal rate and regular rhythm.      Heart sounds: No murmur  heard.  Pulmonary:      Effort: Pulmonary effort is normal. No respiratory distress.      Breath sounds: Normal breath sounds.   Abdominal:      General: Bowel sounds are normal. There is no distension.      Palpations: Abdomen is soft. There is no mass.      Tenderness: There is abdominal tenderness (mild LLQ).   Musculoskeletal:         General: No deformity.      Cervical back: Normal range of motion.   Skin:     General: Skin is warm and dry.   Neurological:      Mental Status: She is alert and oriented to person, place, and time.   Psychiatric:         Mood and Affect: Mood normal.         Behavior: Behavior normal.               Significant Labs: CBC:   Recent Labs   Lab 07/23/25 2330 07/24/25  0747   WBC 10.79 8.81   HGB 13.5 12.4   HCT 40.3 36.2*    302     CMP:   Recent Labs   Lab 07/23/25 2330 07/24/25  0747    135*   K 2.8* 3.1*   CL 97 99   CO2 26 27   * 163*   BUN 12 10   CREATININE 1.8* 1.5*   CALCIUM 9.5 8.8   PROT 8.1 7.1   ALBUMIN 3.9 3.3*   BILITOT 0.7 0.5   ALKPHOS 78 70   AST 11 10*   ALT 7* 6*   ANIONGAP 15 9     Urine Studies:   Recent Labs   Lab 07/23/25  2334   COLORU Yellow   APPEARANCEUA Hazy*   PHUR 6.0   SPECGRAV >=1.030*   PROTEINUA 1+*   GLUCUA Trace*   BILIRUBINUA 1+*   OCCULTUA 1+*   NITRITE Negative   UROBILINOGEN 4.0-6.0*   LEUKOCYTESUR Negative   RBCUA 0   WBCUA 0   BACTERIA None   HYALINECASTS 0       Significant Imaging:   Imaging Results              CT Abdomen Pelvis With IV Contrast NO Oral Contrast (Final result)  Result time 07/24/25 00:53:06      Final result by Kit Suarez MD (07/24/25 00:53:06)                   Impression:      1. Acute proximal sigmoid diverticulitis.  No evidence of abscess or perforation.  Inflammatory changes extend to involve adjacent coursing small bowel loops in the region.  Potential coloenteric fistula not excluded on the basis of this CT.  2. Additional findings as detailed above.      Electronically signed  by: Kit Suarez MD  Date:    07/24/2025  Time:    00:53               Narrative:    EXAMINATION:  CT ABDOMEN PELVIS WITH IV CONTRAST    CLINICAL HISTORY:  LLQ abdominal pain;    TECHNIQUE:  Low dose axial images, sagittal and coronal reformations were obtained from the lung bases to the pubic symphysis following the IV administration of 75 mL of Omnipaque 350 .  Oral contrast was not given.    COMPARISON:  CT abdomen and pelvis August 2024.    FINDINGS:  The visualized portion of the heart is unremarkable.  The lung bases are clear.    No significant hepatic abnormalities are identified.  There is no intra-or extrahepatic biliary ductal dilatation.  The gallbladder is unremarkable.  The stomach, pancreas, spleen, and adrenal glands are unremarkable.    Kidneys enhance normally with no evidence of hydronephrosis.  No abnormalities are seen along the ureteral courses.  Urinary bladder is nondistended.  Uterus is unremarkable.  Bilateral pelvic clips, presumed tubal ligation clips are seen.    Appendix is visualized and is unremarkable.  There is extensive colonic diverticulosis.  Abnormal colonic wall thickening with surrounding inflammatory changes are seen involving the proximal sigmoid colon in a region of multiple diverticula consistent with acute diverticulitis.  Inflammatory changes extend to involve the adjacent coursing small bowel loops in the region.  No evidence of abnormal bowel dilatation or obstruction.  No free air or significant free fluid seen.  No evidence of abscess or perforation.    Aorta tapers normally.    No acute osseous abnormality identified. Small fat containing umbilical hernia is noted.

## 2025-07-24 NOTE — HOSPITAL COURSE
Cipriano Gamez 49 y.o. female placed in observation for sigmoid diverticulitis. She presented to the ED with abdominal pain. In the ED her CT scan had findings of sigmoid diverticulitis, her K was 2.8 and Cr 1.8. She also had complaints of passive suicidal ideation and psychiatry was consulted recommending PEC. She then developed unilateral right neck swelling. Neck swelling resolved and was attributed to sialadenitis. She tolerated a bland diet, and ORALIA/hypoglycemia resolved.  She was re-evaluated by Psychiatry who recommended retention of PC an outpatient follow up.  She will discharge home with Augmentin to complete 10 day course for diverticulitis with outpatient Gastroenterology and General surgery referral was placed for recurrent diverticulitis.  At discharge he is ambulatory tolerating a diet without evidence of neck swelling and denies suicidal ideation.

## 2025-07-24 NOTE — ASSESSMENT & PLAN NOTE
ORALIA is likely due to pre-renal azotemia due to intravascular volume depletion. Baseline creatinine is 1.3. Most recent creatinine and eGFR are listed below.  Recent Labs     07/23/25  2330   CREATININE 1.8*   EGFRNORACEVR 35*      Plan  - ORLAIA is stable  - Avoid nephrotoxins and renally dose meds for GFR listed above  - Monitor urine output, serial BMP, and adjust therapy as needed  - IV fluids

## 2025-07-24 NOTE — ASSESSMENT & PLAN NOTE
Patient's blood pressure range in the last 24 hours was: BP  Min: 117/74  Max: 200/104.The patient's inpatient anti-hypertensive regimen is listed below:  Current Antihypertensives  amLODIPine tablet 5 mg, Daily, Oral  hydrALAZINE injection 5 mg, Every 6 hours PRN, Intravenous    Plan  - BP is controlled, no changes needed to their regimen  - monitor BP

## 2025-07-24 NOTE — CONSULTS
[facility]   DEPARTMENT OF PSYCHIATRY    START TIME: 7/24/2025 12:00 AM  STOP TIME: 7/24/2025 1:02 AM  TOTAL ENCOUNTER TIME: see above start/stop times    -- PATIENT IDENTIFIERS: Cipriano Gamez  [id number]  1975  49 y.o.  female  -- LOCATION OF PATIENT: ED    -- MODE OF ARRIVAL: unknown/unable to assess  -- PRESENT WITH PATIENT DURING SESSION: ALONE  -- SOURCES OF INFORMATION: PATIENT, EHR/chart  -- LOCATION OF ENCOUNTER PROVIDER: [place]  -- ENCOUNTER PROVIDER: Ann Yarbrough MD      Subjective:     History of Present Illness:  No notes on file     Per ER note  Pt came to ED for eval of n/v   Also c/o SI for few weeks                  ======================================================  Telepsychiatry Assessment  (7/24/25)  Interview @ 1225am  Endorses SI for few weeks  Having hard time with life right now    last MJ use:  2 weeks ago       stressors  1)cannot work due to medical issues      ROS  stomach pain  n/v  c/o chest pain  depressed  sleep: poor (longstanding)  appetite:  says it has been poor, picking on food  energy is low  SI over past 2 weeks  denies gigi  denies hallucinations      Home meds  amlodipine  metformin  toprol  tylenol          Allergies:   Nsaids  hives  Flexeril  rash  Ibuprofen  hives  Cipro   swelling/blisters      Psychiatric History:     Previous Psychiatric Hospitalizations: denies  Previous Medication Trials:  denies  Previous Suicide Attempts: denies  History of Violence:   denies  History of Depression: denies  History of Gigi:  denies  History of Auditory/Visual Hallucination  denies  History of Delusions:  denies  Outpatient psychiatrist (current & past): denies          [facility] Toolkit ASQ Suicide Screening Tool:  In the past few weeks, have you wished you were dead?           yes    In the past few weeks, have you felt that you or your family would be better off if you were dead?      No     In the past week, have you been having thoughts about killing yourself?      Yes over past 2 weeks      Have you ever tried to kill yourself?     no      Are you having thoughts of killing yourself right now?     Not right at this moment  But has been struggling over past few days with these thoughts    Has had thoughts about plans >>>  Taking pills  Denies actually making any attempt                        Substance Abuse History:  Tobacco  ½ ppd   in past    Now:  once or 2x per week       Etoh  says she drinks rarely,  1 or 2x per year          Illicits           chronic cannabis  2 or 3x per month       detox/rehab  denies           Legal History: Past charges/incarcerations:  none     Family Psychiatric History: none                  Medical hx  From review of past EPIC encounters   obesity  HTN  HLD  NSTEMI    6/3/25  COVID19  HPV, BV, candida, gardnerella  DM2                                           poorly controlled   Hiatal hernia  Cyclic vomiting syndrome     (MJ)  Abnormal PAP/ASCUS  diverticulosis  diverticulitis    acute on chronic colitis  sepsis 1/20, 7/11/20, 8/20/20, 7/3/21, 8/24/24    denies hx of seizures  denies hx of head trauma      other recent epic encounters  6/26/25 ED eval  [facility]  n/v with eating, Abd pain for days  Low K   (2.7)  dc to home    6/3/25 medical admit  HA, n/v, abd pain, left side chest pain  Dc dx        Surgical Hx  Cervical bx / Loop excision   Lap occlusion of oviducts  btl            labs/Diagnostics  7/21/07  Cortisol 25.2        7/23/25  UDS+THC  UA ketones protein glc blood  Urine Cr >450  Cbc wnl x elevated PMN  K 2.8  cr 1.7  egfr 35 Glc 183  Hcg neg     Social History:  Developmental/Childhood: denies delays  Education:  finished HS    Employment: unemployed  Has worked in past >> out of work for 1  yr  [employer] part sale mgr in past    Relationship status: single  1 child    Housing status: says she has stable housing  Mil hx: denies  Access to gun:  denies  Holiness:  deferred  rec activities/hobbies :   deferred                    Psychiatric Mental Status Exam:  Arousal:  alert  Sensorium/Orientation: oriented to person/place/situation  appearance:  obese, neat but tired appearing   Behavior/Cooperation: wnl  Speech: non pressured, non slurred  Language: fluent  Mood: depressed, hopeless  Affect: restricted, congruent with stated mood  Thought Process: l/l/gd  Thought Content: +SI with plan a few days ago; denies active plan today  Denies HI  Denies AH/VH  Auditory hallucinations: no   Visual hallucinations: no  Paranoia: no  Delusions: no    Suicidal ideation:  yes (for past 2 weeks, not active right at this moment but struggling to prevent return of SI with plan)    Homicidal ideation: no  Attention/Concentration: adequate  Memory:  not tested  Fund of Knowledge: not tested  Abstract reasoning: not tested  Insight:  fair  Judgment:  moderate          Past Medical History:   Past Medical History:   Diagnosis Date    Abnormal Pap smear of cervix     Cigarette smoker     Diverticulosis     GERD (gastroesophageal reflux disease)     Hiatal hernia     Hyperlipidemia 8/25/2024    Hypertension       Laboratory Data:   Labs Reviewed   COMPREHENSIVE METABOLIC PANEL - Abnormal       Result Value    Sodium 138      Potassium 2.8 (*)     Chloride 97      CO2 26      Glucose 183 (*)     BUN 12      Creatinine 1.8 (*)     Calcium 9.5      Protein Total 8.1      Albumin 3.9      Bilirubin Total 0.7      ALP 78      AST 11      ALT 7 (*)     Anion Gap 15      eGFR 35 (*)    URINALYSIS, REFLEX TO URINE CULTURE - Abnormal    Color, UA Yellow      Appearance, UA Hazy (*)     pH, UA 6.0      Spec Grav UA >=1.030 (*)     Protein, UA 1+ (*)     Glucose, UA Trace (*)     Ketones, UA Trace (*)     Bilirubin, UA 1+ (*)     Blood, UA 1+ (*)     Nitrites, UA Negative      Urobilinogen, UA 4.0-6.0 (*)     Leukocyte Esterase, UA Negative     DRUG SCREEN PANEL, URINE EMERGENCY - Abnormal    Benzodiazepine, Urine Negative      Methadone,  "Urine Negative      Cocaine, Urine Negative      Opiates, Urine Negative      Barbiturates, Urine Negative      Amphetamines, Urine Negative      THC Presumptive Positive (*)     Phencyclidine, Urine Negative      Urine Creatinine >450.0 (*)     Narrative:     This screen includes the following classes of drugs at the listed cut-off:     Benzodiazepines:        200 ng/ml   Methadone:              300 ng/ml   Cocaine metabolite:     300 ng/ml   Opiates:                300 ng/ml   Barbiturates:           200 ng/ml   Amphetamines:           1000 ng/ml   Marijuana metabs (THC): 50 ng/ml   Phencyclidine (PCP):    25 ng/ml     This is a screening test. If results do not correlate with clinical presentation, then a confirmatory send out test is advised.    This report is intended for use in clinical monitoring and management of patients. It is not intended for use in employment related drug testing."   ACETAMINOPHEN LEVEL - Abnormal    Acetaminophen Level <3.0 (*)    CBC WITH DIFFERENTIAL - Abnormal    WBC 10.79      RBC 4.78      HGB 13.5      HCT 40.3      MCV 84      MCH 28.2      MCHC 33.5      RDW 13.8      Platelet Count 333      MPV 9.8      Nucleated RBC 0      Neut % 75.4 (*)     Lymph % 16.7 (*)     Mono % 6.9      Eos % 0.5      Basophil % 0.2      Imm Grans % 0.3      Neut # 8.15 (*)     Lymph # 1.80      Mono # 0.74      Eos # 0.05      Baso # 0.02      Imm Grans # 0.03     ALCOHOL,MEDICAL (ETHANOL) - Normal    Alcohol, Serum <10     LACTIC ACID, PLASMA - Normal    Lactic Acid Level 1.2      Narrative:     Falsely low lactic acid results can be found in samples containing >=13.0 mg/dL total bilirubin and/or >=3.5 mg/dL direct bilirubin.    PROCALCITONIN - Normal    Procalcitonin 0.06     LIPASE - Normal    Lipase Level 29     CBC W/ AUTO DIFFERENTIAL    Narrative:     The following orders were created for panel order CBC auto differential.  Procedure                               Abnormality         Status     "                 ---------                               -----------         ------                     CBC with Differential[[id number]]       Abnormal            Final result                 Please view results for these tests on the individual orders.   URINALYSIS MICROSCOPIC    RBC, UA 0      WBC, UA 0      Bacteria, UA None      Hyaline Casts, UA 0      Microscopic Comment       GREY TOP URINE HOLD   POCT URINE PREGNANCY    POC Preg Test, Ur Negative       Acceptable Yes         Neurological History:  Seizures: No  Head trauma: No    Allergies:   Review of patient's allergies indicates:   Allergen Reactions    Ciprofloxacin Swelling and Blisters    Ibuprofen Hives    Meloxicam      rash    Nsaids (non-steroidal anti-inflammatory drug) Hives    Cyclobenzaprine Rash     rasg       Medications in ER:   Medications   sodium chloride 0.9% bolus 1,000 mL 1,000 mL (1,000 mLs Intravenous New Bag 7/24/25 0032)   potassium bicarbonate disintegrating tablet 50 mEq (50 mEq Oral Given 7/24/25 0033)   iohexoL (OMNIPAQUE 350) injection 75 mL (75 mLs Intravenous Given 7/24/25 0023)   morphine injection 6 mg (6 mg Intravenous Given 7/24/25 0045)   amLODIPine tablet 5 mg (5 mg Oral Given 7/24/25 0045)       Medications at home:     No new subjective & objective note has been filed under this hospital service since the last note was generated.      Assessment - Diagnosis - Goals:     48 yo female who came to ED on night of 7/23/25 for eval of physical complaints; divulged struggling with SI over past 2 weeks    7/25/25  telepsych eval (raissa)  Pt endorses neuroveg features of depression that include low energy, poor sleep (chronic), poor appetite, hopeless/helplessness, SI over past two weeks.  Attributes these feelings/problems to many physical problems occurring over past month(s).    Pt denies active plan or intent tonight  but does endorse struggling with intermittent SI.  She denies any past hx of mental  health treatments whatsoever (neither inpt, nor outpt)    Recommend stabilization of medical conditions/symptoms as first priority at this time as it is unclear to this consultant the extent to which neurovegetative features of past 2 weeks are primarily originating from unstable diabetes,  waxing/waning diverticulitis,   emergent CKD  (declining renal function),  r/o other organic etiologies (e.g,  TARA)      Recommend continuing cannabis abstinence  given hx of  cyclic vomiting syndrome and  chest pain  (NSTEMI  6/3/25)    psych reassessment in 24 to 48 hrs    consider  inpatient psych transfer following stabilization of somatic issues  chest pain, r/o acute cardiac event;  GI   r/o diverticulitis flare,  etc)        Diagnosis/Impression:   Unspecified mood symptom  Hx of cannabis use disorder with cyclic vomiting syndrome    [pt states she uses MJ  2 to 3x per month]  Nicotine use   (pt says she smokes 1-2x per week]  Medical issues as above            =============================  Rec:   1)legal    PEC for potential Danger to Self  given  two week pattern of  suicidality  Pt remains uncertain about whether or not  SI with Plan to end life may return  As external stressors/context      remain unchanged    advise suicide precautions    [no sitter at this time, as patient is denying current plan or intent to end her life]          2)diagnostics    COVID if not yet done  ACTH, cortisol  TSH  RPR, HIV  lipase   [cannabis can cause pancreatitis]  B1, b12 Folate, B6     Vitamin D 25-oh  GISELA, CRP  due to past hx of covid    Colitis eval per GI    As outpatient:  Sleep medicine referral, polysomnogram        3)cardiac  Per hospitalist      4)renal  Per hospitalist            5)mood  Recommend deferring use of antidepressant medications at this time in favor of prioritizing  stabilization of  identifiable/modifiable organic contributors to depression symptoms    Ok to begin melatonin 3 mg to 5 mg qhs   (scheduled  dose)    Identify/treat modifiable nutritional deficiencies  Given pt's hx of poorly controlled diabetes, especially important to identify and treat  Deficiencies arising from malabsorption  (B1, B12, folate, B6)        6)cannabis hx   Recommend abstinence    (some addictionologists recommend gabapentin Plus Remeron to assist Cannabis users in decreasing their use of cannabis;  however,   need to consider potential  adverse effects of drugs which include:  gabapentin    tiredness, confusion, delirium  Remeron   weight gain, glucose regulation, nausea/vomiting      7)disposition     Recommend Psychiatric Reassessment in 24 to 48 hours    Low threshold for inpatient psych care based  upon   risk factors:  Gender, limited supports,  multiple medical conditions, lack of spouse, lack of job over past year, depressed mood,  SI for 2 weeks with active plan (waxes/wanes)        Plan of Care communicated to: shawn Yarbrough MD   Psychiatry  [facility]    Consults  [facility] EMERGENCY DEPARTMENT

## 2025-07-24 NOTE — CONSULTS
[facility] [place] Emergency Dept  Otorhinolaryngology-Head & Neck Surgery  Consult Note    Patient Name: Cipriano Gamez  MRN: [id number]  Code Status: Full Code  Admission Date: 7/23/2025  Hospital Length of Stay: 0 days  Attending Physician: Anisha Harden DO  Primary Care Provider: Brooks Bergeron MD    Consults  Continue abx- suspect has early sialadenitis  Warm compresses, massage , lemon  wedges  Iv hydration   No abscess on ultrasound. If continues to clinically improve can hold off on ct     Subjective:     Chief Complaint/Reason for Admission: abdominal pain    Reason for consult: neck swelling     History of Present Illness: 48 yo female presenting with above. She states swelling started around the same time she got here. no trouble breathing. Is swallowing saliva ok. Has not had anything to eat yet since had been npo. She has a broken tooth on the left side. Swelling has improved since this am when she got here. Feels swollen on left slightly but more on the right side. No bad taste in mouth. Has not had issue like this before. She is worried about possible allergic reaction to percocet. She was doing warm compresses and lemonade but swelling got worse . Has been improving however this am    Medications:  Continuous Infusions:   0.9% NaCl   Intravenous Continuous 100 mL/hr at 07/24/25 0501 New Bag at 07/24/25 0501     Scheduled Meds:   amLODIPine  5 mg Oral Daily    cefTRIAXone (Rocephin) IV (PEDS and ADULTS)  2 g Intravenous Q24H    enoxparin  40 mg Subcutaneous Daily    metroNIDAZOLE  500 mg Oral Q8H     PRN Meds:  Current Facility-Administered Medications:     acetaminophen, 650 mg, Oral, Q8H PRN    acetaminophen, 650 mg, Oral, Q4H PRN    aluminum-magnesium hydroxide-simethicone, 30 mL, Oral, QID PRN    bisacodyL, 10 mg, Rectal, Daily PRN    dextrose 50%, 12.5 g, Intravenous, PRN    dextrose 50%, 25 g, Intravenous, PRN    glucagon (human recombinant), 1 mg, Intramuscular, PRN    glucagon (human  recombinant), 1 mg, Intramuscular, PRN    glucose, 16 g, Oral, PRN    glucose, 16 g, Oral, PRN    glucose, 24 g, Oral, PRN    glucose, 24 g, Oral, PRN    hydrALAZINE, 5 mg, Intravenous, Q6H PRN    HYDROcodone-acetaminophen, 1 tablet, Oral, Q6H PRN    HYDROcodone-acetaminophen, 1 tablet, Oral, Q6H PRN    insulin aspart U-100, 0-5 Units, Subcutaneous, QID (AC + HS) PRN    magnesium oxide, 800 mg, Oral, PRN    magnesium oxide, 800 mg, Oral, PRN    melatonin, 6 mg, Oral, Nightly PRN    morphine, 4 mg, Intravenous, Q6H PRN    naloxone, 0.02 mg, Intravenous, PRN    ondansetron, 4 mg, Intravenous, Q8H PRN    potassium bicarbonate, 35 mEq, Oral, PRN    potassium bicarbonate, 50 mEq, Oral, PRN    potassium bicarbonate, 60 mEq, Oral, PRN    potassium, sodium phosphates, 2 packet, Oral, PRN    potassium, sodium phosphates, 2 packet, Oral, PRN    potassium, sodium phosphates, 2 packet, Oral, PRN    senna-docusate, 1 tablet, Oral, Daily PRN    simethicone, 1 tablet, Oral, QID PRN    sodium chloride 0.9%, 10 mL, Intravenous, Q12H PRN     No current facility-administered medications on file prior to encounter.     Current Outpatient Medications on File Prior to Encounter   Medication Sig    amLODIPine (NORVASC) 5 MG tablet Take 1 tablet by mouth once daily.    metFORMIN (GLUCOPHAGE-XR) 500 MG ER 24hr tablet Take 500 mg by mouth once daily.    metoprolol succinate (TOPROL-XL) 25 MG 24 hr tablet Take 1 tablet by mouth once daily.       Review of patient's allergies indicates:   Allergen Reactions    Ciprofloxacin Swelling and Blisters    Ibuprofen Hives    Meloxicam      rash    Nsaids (non-steroidal anti-inflammatory drug) Hives    Cyclobenzaprine Rash     rasg       Past Medical History:   Diagnosis Date    Abnormal Pap smear of cervix     Cigarette smoker     Diverticulosis     GERD (gastroesophageal reflux disease)     Hiatal hernia     Hyperlipidemia 8/25/2024    Hypertension      Past Surgical History:   Procedure Laterality  Date    CERVICAL BIOPSY  W/ LOOP ELECTRODE EXCISION  2/11/14    LAPAROSCOPIC OCCLUSION OF OVIDUCTS BY DEVICE Bilateral 7/10/2020    Procedure: OCCLUSION, FALLOPIAN TUBE, LAPAROSCOPIC, USING DEVICE;  Surgeon: Alex Waller MD;  [facility] OR;  Service: OB/GYN;  Laterality: Bilateral;  RN PREOP 6/23/2020   T/S--COVID NEGATIVE---UPT  CONSENTS INCOMPLETE     Family History       Problem Relation (Age of Onset)    Diabetes Other    Heart disease Mother (54), Maternal Grandmother (40), Brother (44)    Sickle cell trait Sister          Tobacco Use    Smoking status: Every Day     Current packs/day: 1.00     Average packs/day: 1 pack/day for 16.0 years (16.0 ttl pk-yrs)     Types: Cigarettes    Smokeless tobacco: Never   Substance and Sexual Activity    Alcohol use: Yes     Comment: social 1-2 x / month    Drug use: Yes     Frequency: 1.0 times per week     Types: Marijuana     Comment: daily    Sexual activity: Not Currently     Partners: Male     Birth control/protection: None     Review of Systems   Constitutional:  Negative for fever.   HENT:  Negative for facial swelling, sore throat, trouble swallowing and voice change.    Respiratory:  Negative for shortness of breath and stridor.    Gastrointestinal:  Positive for abdominal pain and nausea.   Musculoskeletal:  Negative for neck stiffness.   Neurological:  Negative for speech difficulty.     Objective:     Vital Signs (Most Recent):  Temp: 99 °F (37.2 °C) (07/24/25 0236)  Pulse: 79 (07/24/25 1200)  Resp: 17 (07/24/25 1125)  BP: 137/75 (07/24/25 1200)  SpO2: 100 % (07/24/25 1200) Vital Signs (24h Range):  Temp:  [99 °F (37.2 °C)-99.6 °F (37.6 °C)] 99 °F (37.2 °C)  Pulse:  [] 79  Resp:  [13-43] 17  SpO2:  [75 %-100 %] 100 %  BP: (117-200)/() 137/75     Weight: 99.8 kg (220 lb)  Body mass index is 34.46 kg/m².        Physical Exam  HENT:      Head: Normocephalic.      Right Ear: External ear normal.      Left Ear: External ear normal.      Nose: No  nasal deformity.      Mouth/Throat:      Mouth: Mucous membranes are dry.      Tongue: No lesions.      Pharynx: No posterior oropharyngeal erythema or uvula swelling.      Comments: No pus from leonardo nor donnie duct. No palapble stones. No floor of mouth swelling. No evidence of dental abscess  Eyes:      General: No scleral icterus.  Neck:      Trachea: Trachea normal.      Comments: Submandibular gland swelling right more than left. No erythema . No crepitus. No pain out of proportion to physical exam findings. No palpable abscess  Pulmonary:      Effort: Pulmonary effort is normal.      Breath sounds: No stridor.   Musculoskeletal:      Cervical back: No rigidity or crepitus.   Neurological:      Mental Status: She is alert.         Significant Labs:  CBC:   Recent Labs   Lab 07/24/25  0747   WBC 8.81   RBC 4.25   HGB 12.4   HCT 36.2*      MCV 85   MCH 29.2   MCHC 34.3     CMP:   Recent Labs   Lab 07/24/25  0747   *   CALCIUM 8.8   ALBUMIN 3.3*   PROT 7.1   *   K 3.1*   CO2 27   CL 99   BUN 10   CREATININE 1.5*   ALKPHOS 70   ALT 6*   AST 10*   BILITOT 0.5       Significant Diagnostics:  US: I have reviewed all pertinent results/findings within the past 24 hours and my personal findings are:  ductal dilation submandibular gland right more than left. No stones. No abscess      Left side    Assessment/Plan:     Active Diagnoses:    Diagnosis Date Noted POA    PRINCIPAL PROBLEM:  Sigmoid diverticulitis [K57.32] 07/24/2021 Yes    Suicidal ideation [R45.851] 07/24/2025 Not Applicable    Neck swelling [R22.1] 07/24/2025 Unknown    Type 2 diabetes mellitus with hyperglycemia [E11.65] 08/25/2024 Yes    ORALIA (acute kidney injury) [N17.9] 01/07/2020 Yes    Hypokalemia [E87.6] 05/04/2015 Yes    Essential hypertension [I10]  Yes      Problems Resolved During this Admission:     VTE Risk Mitigation (From admission, onward)           Ordered     enoxaparin injection 40 mg  Daily         07/24/25 3922      IP VTE HIGH RISK PATIENT  Once         07/24/25 0314     Place sequential compression device  Until discontinued         07/24/25 0314                    Thank you for your consult. I will follow-up with patient. Please contact us if you have any additional questions.    Sydni Blair MD  Otorhinolaryngology-Head & Neck Surgery  [facility] [place] - Emergency Dept

## 2025-07-24 NOTE — ASSESSMENT & PLAN NOTE
Patient's most recent potassium results are listed below.   Recent Labs     07/23/25  2330   K 2.8*     Plan  - Replete potassium per protocol  - Monitor potassium Daily  - Patient's hypokalemia is stable

## 2025-07-25 LAB
ALBUMIN SERPL BCP-MCNC: 3.3 G/DL (ref 3.5–5.2)
ALP SERPL-CCNC: 65 UNIT/L (ref 40–150)
ALT SERPL W/O P-5'-P-CCNC: <8 UNIT/L (ref 10–44)
ANION GAP (OHS): 7 MMOL/L (ref 8–16)
AST SERPL-CCNC: 12 UNIT/L (ref 11–45)
BILIRUB SERPL-MCNC: 0.2 MG/DL (ref 0.1–1)
BUN SERPL-MCNC: 9 MG/DL (ref 6–20)
CALCIUM SERPL-MCNC: 8.6 MG/DL (ref 8.7–10.5)
CHLORIDE SERPL-SCNC: 102 MMOL/L (ref 95–110)
CO2 SERPL-SCNC: 26 MMOL/L (ref 23–29)
CREAT SERPL-MCNC: 1.2 MG/DL (ref 0.5–1.4)
GFR SERPLBLD CREATININE-BSD FMLA CKD-EPI: 56 ML/MIN/1.73/M2
GLUCOSE SERPL-MCNC: 151 MG/DL (ref 70–110)
HOLD SPECIMEN: NORMAL
POCT GLUCOSE: 141 MG/DL (ref 70–110)
POCT GLUCOSE: 161 MG/DL (ref 70–110)
POCT GLUCOSE: 162 MG/DL (ref 70–110)
POCT GLUCOSE: 175 MG/DL (ref 70–110)
POTASSIUM SERPL-SCNC: 3.5 MMOL/L (ref 3.5–5.1)
PROT SERPL-MCNC: 6.5 GM/DL (ref 6–8.4)
SODIUM SERPL-SCNC: 135 MMOL/L (ref 136–145)

## 2025-07-25 PROCEDURE — 80053 COMPREHEN METABOLIC PANEL: CPT | Performed by: PHYSICIAN ASSISTANT

## 2025-07-25 PROCEDURE — 36415 COLL VENOUS BLD VENIPUNCTURE: CPT | Performed by: PHYSICIAN ASSISTANT

## 2025-07-25 PROCEDURE — 63600175 PHARM REV CODE 636 W HCPCS: Performed by: STUDENT IN AN ORGANIZED HEALTH CARE EDUCATION/TRAINING PROGRAM

## 2025-07-25 PROCEDURE — 25000003 PHARM REV CODE 250: Performed by: STUDENT IN AN ORGANIZED HEALTH CARE EDUCATION/TRAINING PROGRAM

## 2025-07-25 PROCEDURE — 96376 TX/PRO/DX INJ SAME DRUG ADON: CPT

## 2025-07-25 PROCEDURE — 99215 OFFICE O/P EST HI 40 MIN: CPT | Mod: 95,,, | Performed by: PSYCHIATRY & NEUROLOGY

## 2025-07-25 PROCEDURE — 96372 THER/PROPH/DIAG INJ SC/IM: CPT | Performed by: STUDENT IN AN ORGANIZED HEALTH CARE EDUCATION/TRAINING PROGRAM

## 2025-07-25 PROCEDURE — G0378 HOSPITAL OBSERVATION PER HR: HCPCS

## 2025-07-25 RX ADMIN — METRONIDAZOLE 500 MG: 500 TABLET ORAL at 09:07

## 2025-07-25 RX ADMIN — MORPHINE SULFATE 4 MG: 4 INJECTION, SOLUTION INTRAMUSCULAR; INTRAVENOUS at 05:07

## 2025-07-25 RX ADMIN — HYDROCODONE BITARTRATE AND ACETAMINOPHEN 1 TABLET: 7.5; 325 TABLET ORAL at 04:07

## 2025-07-25 RX ADMIN — ONDANSETRON 4 MG: 2 INJECTION INTRAMUSCULAR; INTRAVENOUS at 09:07

## 2025-07-25 RX ADMIN — METRONIDAZOLE 500 MG: 500 TABLET ORAL at 02:07

## 2025-07-25 RX ADMIN — ENOXAPARIN SODIUM 40 MG: 40 INJECTION SUBCUTANEOUS at 04:07

## 2025-07-25 RX ADMIN — METRONIDAZOLE 500 MG: 500 TABLET ORAL at 05:07

## 2025-07-25 RX ADMIN — AMLODIPINE BESYLATE 5 MG: 5 TABLET ORAL at 08:07

## 2025-07-25 RX ADMIN — MORPHINE SULFATE 4 MG: 4 INJECTION, SOLUTION INTRAMUSCULAR; INTRAVENOUS at 07:07

## 2025-07-25 RX ADMIN — CEFTRIAXONE 2 G: 2 INJECTION, POWDER, FOR SOLUTION INTRAMUSCULAR; INTRAVENOUS at 08:07

## 2025-07-25 NOTE — ASSESSMENT & PLAN NOTE
She complained of neck swelling under right mandible without associated oropharyngeal swelling. Tolerating PO no stridor or difficulty phonating. No medications were given with history of allergy to those medications  Trial of prednisone, famotidine, and benadryl-without improvement suggesting against allergic reaction  Check US neck-without abnormality  ENT consulted-symptoms suspected related to sialadenitis

## 2025-07-25 NOTE — ASSESSMENT & PLAN NOTE
Patient's most recent potassium results are listed below.   Recent Labs     07/23/25  2330 07/24/25  0747 07/25/25  0513   K 2.8* 3.1* 3.5     Plan  - Replete potassium per protocol  - Monitor potassium Daily  - Patient's hypokalemia is resolved

## 2025-07-25 NOTE — ASSESSMENT & PLAN NOTE
Patient's FSGs are controlled on current medication regimen.  Last A1c reviewed-   Lab Results   Component Value Date    HGBA1C 8.9 (H) 06/05/2025     Most recent fingerstick glucose reviewed-   Recent Labs   Lab 07/25/25  0727 07/25/25  1112   POCTGLUCOSE 141* 161*     Current correctional scale  Low  Maintain anti-hyperglycemic dose as follows-   Antihyperglycemics (From admission, onward)      Start     Stop Route Frequency Ordered    07/24/25 0517  insulin aspart U-100 pen 0-5 Units         -- SubQ Before meals & nightly PRN 07/24/25 0417          Hold Oral hypoglycemics while patient is in the hospital.

## 2025-07-25 NOTE — PROGRESS NOTES
[facility] Medicine  Progress Note    Patient Name: Cipriano Gamez  MRN: [id number]  Patient Class: OP- Observation   Admission Date: 7/23/2025  Length of Stay: 0 days  Attending Physician: Anisha Harden DO  Primary Care Provider: Brooks Bergeron MD        Subjective     Principal Problem:Sigmoid diverticulitis        HPI:  This is a 49-year-old female with a past medical history of hypertension, hyperlipidemia, obesity, diverticulosis who presents with abdominal pain.      Patient presents for evaluation of abdominal pain that started 2 days prior to presentation.  She reports left lower quadrant abdominal pain with radiation to her back, associated with nausea, and vomiting.  Additionally, she reports nonexertional intermittent sharp chest pain that occurs randomly.  She denied diarrhea, fevers or chills.  Furthermore, patient reports passive suicidal ideations and feels overall with home/family stressors.  She has not been taking her prescription medications due to difficulty getting them refilled.    In the ED, the patient was hypertensive (up to 200/104), tachycardic (110s > 80s).  Labs were remarkable for hypokalemia (2.8), an elevated creatinine (1.8-baseline around 1.3). UDS was positive marijuana. CT abdomen and pelvis showed acute proximal sigmoid diverticulitis, no evidence of abscess or perforation, inflammatory changes extend to involve adjacent coursing small bowel loops in the region.  She was PEC'd.  Patient was given 1 L of NS, Zosyn, potassium bicarbonate 50 mEq, Zofran 4 mg IV, morphine 6 mg IV, amlodipine 5 mg p.o..  She was admitted for further management.    Overview/Hospital Course:  Cipriano Gamez 49 y.o. female placed in observation for sigmoid diverticulitis. She presented to the ED with abdominal pain. In the ED her CT scan had findings of sigmoid diverticulitis, her K was 2.8 and Cr 1.8. She also had complaints of passive suicidal ideation and psychiatry was  consulted recommending PEC. She then developed unilateral right neck swelling. Neck swelling resolved and was attributed to sialadenitis. She tolerated a bland diet, and ORALIA/hypoglycemia resolved.     Interval History: neck swelling improved, upset regarding diet. Abdominal pain controlled. No further nausea. Seen by  and CEC.     Review of Systems   Constitutional:  Negative for chills and fever.   Eyes:  Negative for photophobia and visual disturbance.   Respiratory:  Negative for chest tightness and shortness of breath.    Cardiovascular:  Negative for chest pain and palpitations.   Gastrointestinal:  Positive for abdominal pain. Negative for nausea and vomiting.   Genitourinary:  Negative for dysuria and hematuria.     Objective:     Vital Signs (Most Recent):  Temp: 98.2 °F (36.8 °C) (07/25/25 1112)  Pulse: 68 (07/25/25 1112)  Resp: 18 (07/25/25 1112)  BP: (!) 151/88 (07/25/25 1112)  SpO2: 98 % (07/25/25 1112) Vital Signs (24h Range):  Temp:  [97.9 °F (36.6 °C)-98.2 °F (36.8 °C)] 98.2 °F (36.8 °C)  Pulse:  [56-84] 68  Resp:  [13-19] 18  SpO2:  [98 %-100 %] 98 %  BP: (127-184)/() 151/88     Weight: 99.8 kg (220 lb)  Body mass index is 34.46 kg/m².    Intake/Output Summary (Last 24 hours) at 7/25/2025 1256  Last data filed at 7/25/2025 1003  Gross per 24 hour   Intake 120 ml   Output 3 ml   Net 117 ml         Physical Exam  Vitals and nursing note reviewed.   Constitutional:       General: She is not in acute distress.     Appearance: She is well-developed. She is not diaphoretic.      Comments: Ambulating in room   HENT:      Head: Normocephalic and atraumatic.      Right Ear: External ear normal.      Left Ear: External ear normal.   Eyes:      General:         Right eye: No discharge.         Left eye: No discharge.      Conjunctiva/sclera: Conjunctivae normal.   Neck:      Thyroid: No thyromegaly.      Comments: Neck swelling resolved  Cardiovascular:      Rate and Rhythm: Normal rate and regular  rhythm.      Heart sounds: No murmur heard.  Pulmonary:      Effort: Pulmonary effort is normal. No respiratory distress.      Breath sounds: Normal breath sounds.   Abdominal:      General: Bowel sounds are normal. There is no distension.      Palpations: Abdomen is soft. There is no mass.      Tenderness: There is no abdominal tenderness.   Musculoskeletal:         General: No deformity.      Cervical back: Normal range of motion and neck supple.   Skin:     General: Skin is warm and dry.   Neurological:      Mental Status: She is alert and oriented to person, place, and time.      Sensory: No sensory deficit.   Psychiatric:         Mood and Affect: Mood normal.         Behavior: Behavior normal.               Significant Labs: CBC:   Recent Labs   Lab 07/23/25 2330 07/24/25  0747   WBC 10.79 8.81   HGB 13.5 12.4   HCT 40.3 36.2*    302     CMP:   Recent Labs   Lab 07/23/25 2330 07/24/25  0747 07/25/25  0513    135* 135*   K 2.8* 3.1* 3.5   CL 97 99 102   CO2 26 27 26   * 163* 151*   BUN 12 10 9   CREATININE 1.8* 1.5* 1.2   CALCIUM 9.5 8.8 8.6*   PROT 8.1 7.1 6.5   ALBUMIN 3.9 3.3* 3.3*   BILITOT 0.7 0.5 0.2   ALKPHOS 78 70 65   AST 11 10* 12   ALT 7* 6* <8*   ANIONGAP 15 9 7*       Significant Imaging:   Imaging Results              US Soft Tissue Head Neck (Final result)  Result time 07/24/25 12:16:08      Final result by Diego Weathers MD (07/24/25 12:16:08)                   Impression:      No acute findings.  Recommend further evaluation as warranted.      Electronically signed by: Diego Weathers MD  Date:    07/24/2025  Time:    12:16               Narrative:    EXAMINATION:  US SOFT TISSUE HEAD NECK    CLINICAL HISTORY:  right neck swelling;    TECHNIQUE:  Multiple images were acquired of the right aspect of the neck within area of concern. The left neck was imaged for comparison.    FINDINGS:  The right submandibular gland measures 4.6 x 3.7 x 1.9 cm and is within area of concern.   There are no enlarged cervical lymph nodes.  There is no mass or fluid collection.  The left submandibular gland was imaged for comparison and a measures 4.4 x 3.1 x 1.8 cm.                                       CT Abdomen Pelvis With IV Contrast NO Oral Contrast (Final result)  Result time 07/24/25 00:53:06      Final result by Kit Suarez MD (07/24/25 00:53:06)                   Impression:      1. Acute proximal sigmoid diverticulitis.  No evidence of abscess or perforation.  Inflammatory changes extend to involve adjacent coursing small bowel loops in the region.  Potential coloenteric fistula not excluded on the basis of this CT.  2. Additional findings as detailed above.      Electronically signed by: Kit Suarez MD  Date:    07/24/2025  Time:    00:53               Narrative:    EXAMINATION:  CT ABDOMEN PELVIS WITH IV CONTRAST    CLINICAL HISTORY:  LLQ abdominal pain;    TECHNIQUE:  Low dose axial images, sagittal and coronal reformations were obtained from the lung bases to the pubic symphysis following the IV administration of 75 mL of Omnipaque 350 .  Oral contrast was not given.    COMPARISON:  CT abdomen and pelvis August 2024.    FINDINGS:  The visualized portion of the heart is unremarkable.  The lung bases are clear.    No significant hepatic abnormalities are identified.  There is no intra-or extrahepatic biliary ductal dilatation.  The gallbladder is unremarkable.  The stomach, pancreas, spleen, and adrenal glands are unremarkable.    Kidneys enhance normally with no evidence of hydronephrosis.  No abnormalities are seen along the ureteral courses.  Urinary bladder is nondistended.  Uterus is unremarkable.  Bilateral pelvic clips, presumed tubal ligation clips are seen.    Appendix is visualized and is unremarkable.  There is extensive colonic diverticulosis.  Abnormal colonic wall thickening with surrounding inflammatory changes are seen involving the proximal sigmoid colon in a region of  multiple diverticula consistent with acute diverticulitis.  Inflammatory changes extend to involve the adjacent coursing small bowel loops in the region.  No evidence of abnormal bowel dilatation or obstruction.  No free air or significant free fluid seen.  No evidence of abscess or perforation.    Aorta tapers normally.    No acute osseous abnormality identified. Small fat containing umbilical hernia is noted.                                          Assessment & Plan  Sigmoid diverticulitis  Presents with abdominal pain, vomiting  CT abdomen and pelvis showed acute proximal sigmoid diverticulitis, no evidence of abscess or perforation, inflammatory changes extend to involve adjacent coursing small bowel loops in the region.     Plan:   Continue ceftriaxone/Flagyl   Symptomatic control-symptoms improved. Now tolerating bland diet  Outpatient surgery f/u to consider colectomy given recurrent diverticulitis  Essential hypertension  Patient's blood pressure range in the last 24 hours was: BP  Min: 127/73  Max: 184/88.The patient's inpatient anti-hypertensive regimen is listed below:  Current Antihypertensives  amLODIPine tablet 5 mg, Daily, Oral  hydrALAZINE injection 5 mg, Every 6 hours PRN, Intravenous    Plan  - BP is controlled, no changes needed to their regimen  - monitor BP  Hypokalemia  Patient's most recent potassium results are listed below.   Recent Labs     07/23/25  2330 07/24/25  0747 07/25/25  0513   K 2.8* 3.1* 3.5     Plan  - Replete potassium per protocol  - Monitor potassium Daily  - Patient's hypokalemia is resolved  ORALIA (acute kidney injury)  ORALIA is likely due to pre-renal azotemia due to intravascular volume depletion. Baseline creatinine is 1.3. Most recent creatinine and eGFR are listed below.  Recent Labs     07/23/25  2330 07/24/25  0747 07/25/25  0513   CREATININE 1.8* 1.5* 1.2   EGFRNORACEVR 35* 43* 56*      Plan  - ORALIA is stable  - Avoid nephrotoxins and renally dose meds for GFR listed  above  - Monitor urine output, serial BMP, and adjust therapy as needed  - Resolved with IVF. Continue regular diet  Type 2 diabetes mellitus with hyperglycemia  Patient's FSGs are controlled on current medication regimen.  Last A1c reviewed-   Lab Results   Component Value Date    HGBA1C 8.9 (H) 06/05/2025     Most recent fingerstick glucose reviewed-   Recent Labs   Lab 07/25/25  0727 07/25/25  1112   POCTGLUCOSE 141* 161*     Current correctional scale  Low  Maintain anti-hyperglycemic dose as follows-   Antihyperglycemics (From admission, onward)      Start     Stop Route Frequency Ordered    07/24/25 0517  insulin aspart U-100 pen 0-5 Units         -- SubQ Before meals & nightly PRN 07/24/25 0417          Hold Oral hypoglycemics while patient is in the hospital.  Suicidal ideation  Currently PEC'd.  Psychiatry consulted recommending PEC, and continued stabilization of medical problems and re-evaluation in 24hrs to determine if PEC still indicated  Reconsult psych-pending psych consult to determine if inpt psych treatment required  Currently CEC with     Neck swelling  She complained of neck swelling under right mandible without associated oropharyngeal swelling. Tolerating PO no stridor or difficulty phonating. No medications were given with history of allergy to those medications  Trial of prednisone, famotidine, and benadryl-without improvement suggesting against allergic reaction  Check US neck-without abnormality  ENT consulted-symptoms suspected related to sialadenitis    VTE Risk Mitigation (From admission, onward)           Ordered     enoxaparin injection 40 mg  Daily         07/24/25 0314     IP VTE HIGH RISK PATIENT  Once         07/24/25 0314     Place sequential compression device  Until discontinued         07/24/25 0314                    Discharge Planning   RADU: 7/25/2025     Code Status: Full Code   Medical Readiness for Discharge Date:   Discharge Plan A: Home        Jani Briscoe  SOFIA  Department of [facility] Medicine   [facility] Tele[facility]

## 2025-07-25 NOTE — ASSESSMENT & PLAN NOTE
Currently PEC'd.  Psychiatry consulted recommending PEC, and continued stabilization of medical problems and re-evaluation in 24hrs to determine if PEC still indicated  Reconsult psych-pending psych consult to determine if inpt psych treatment required  Currently CEC with

## 2025-07-25 NOTE — ASSESSMENT & PLAN NOTE
ORALIA is likely due to pre-renal azotemia due to intravascular volume depletion. Baseline creatinine is 1.3. Most recent creatinine and eGFR are listed below.  Recent Labs     07/23/25  2330 07/24/25  0747 07/25/25  0513   CREATININE 1.8* 1.5* 1.2   EGFRNORACEVR 35* 43* 56*      Plan  - ORALIA is stable  - Avoid nephrotoxins and renally dose meds for GFR listed above  - Monitor urine output, serial BMP, and adjust therapy as needed  - Resolved with IVF. Continue regular diet

## 2025-07-25 NOTE — ED NOTES
Report received from COLLIN Turner. Pt resting comfortably in bed, no signs of distress noted but reporting 9/10 pain. Srs up x 2, sitter at bedside, pt in maroon scrubs. Pt is calm and cooperative

## 2025-07-25 NOTE — ASSESSMENT & PLAN NOTE
Patient's blood pressure range in the last 24 hours was: BP  Min: 127/73  Max: 184/88.The patient's inpatient anti-hypertensive regimen is listed below:  Current Antihypertensives  amLODIPine tablet 5 mg, Daily, Oral  hydrALAZINE injection 5 mg, Every 6 hours PRN, Intravenous    Plan  - BP is controlled, no changes needed to their regimen  - monitor BP

## 2025-07-25 NOTE — NURSING
Report received from night nurse COLLIN Cuellar. Visualized patient and assessed patient's overall condition and appearance. No acute distress noted. Plan of care ongoing.    [facility]  Nurses Note -- 4 Eyes      7/25/2025       Skin assessed on: Q Shift      [x] No Pressure Injuries Present    [x]Prevention Measures Documented    [] Yes LDA  for Pressure Injury Previously documented     [] Yes New Pressure Injury Discovered   [] LDA for New Pressure Injury Added      Attending RN:  Mary Jo Aly LPN     Second RN:  COLLIN Reilly

## 2025-07-25 NOTE — PLAN OF CARE
Problem: Adult Inpatient Plan of Care  Goal: Plan of Care Review  Outcome: Progressing     Problem: Suicide Risk  Goal: Absence of Self-Harm  Outcome: Progressing     Problem: Infection  Goal: Absence of Infection Signs and Symptoms  Outcome: Progressing     Problem: Diabetes Comorbidity  Goal: Blood Glucose Level Within Targeted Range  Outcome: Progressing     Problem: Acute Kidney Injury/Impairment  Goal: Fluid and Electrolyte Balance  Outcome: Progressing

## 2025-07-25 NOTE — NURSING
Patient arrived to the unit via stretcher accompanied by a transporter, with safety sitter at the bedside, on room air and no apparent distress noted. IVF of ND infusing at 100ml/hr. Oriented on her room and got situated on the bed. Put bed in lowest position, with HOB elevated. Side rails raised, instructed to call for assistance. Admit assessment initiated. Vital signs taken. Four eyes completed.     [facility], [facility]  Nurses Note -- 4 Eyes      7/24/2025       Skin assessed on: Admit      [x] No Pressure Injuries Present    [x]Prevention Measures Documented    [] Yes LDA  for Pressure Injury Previously documented     [] Yes New Pressure Injury Discovered   [] LDA for New Pressure Injury Added      Attending RN:  Faisal Mendez RN     Second RN:  Melba Kincaid RN

## 2025-07-25 NOTE — NURSING
Bedside Report given to night nurse COLLIN Guerrero. Walking rounds completed. Visualized and assessed patient NAD noted. Safety precautions maintained and call light within reach.     Chart check completed.

## 2025-07-25 NOTE — ASSESSMENT & PLAN NOTE
Presents with abdominal pain, vomiting  CT abdomen and pelvis showed acute proximal sigmoid diverticulitis, no evidence of abscess or perforation, inflammatory changes extend to involve adjacent coursing small bowel loops in the region.     Plan:   Continue ceftriaxone/Flagyl   Symptomatic control-symptoms improved. Now tolerating bland diet  Outpatient surgery f/u to consider colectomy given recurrent diverticulitis

## 2025-07-25 NOTE — PROGRESS NOTES
[facility]  Otorhinolaryngology-Head & Neck Surgery  Progress Note    Patient Name: Cipriano Gamez  MRN: [id number]  Code Status: Full Code  Admission Date: 7/23/2025  Hospital Length of Stay: 0 days  Attending Physician: Anisha Harden DO  Primary Care Provider: Brooks Bergeron MD    Subjective:     Interval History: neck is feeling much better to her    Post-Op Info:  * No surgery found *      Hospital Day: 3      Medications:  Continuous Infusions:  Scheduled Meds:   amLODIPine  5 mg Oral Daily    cefTRIAXone (Rocephin) IV (PEDS and ADULTS)  2 g Intravenous Q24H    enoxparin  40 mg Subcutaneous Daily    metroNIDAZOLE  500 mg Oral Q8H     PRN Meds:  Current Facility-Administered Medications:     acetaminophen, 650 mg, Oral, Q8H PRN    acetaminophen, 650 mg, Oral, Q4H PRN    aluminum-magnesium hydroxide-simethicone, 30 mL, Oral, QID PRN    bisacodyL, 10 mg, Rectal, Daily PRN    dextrose 50%, 12.5 g, Intravenous, PRN    dextrose 50%, 25 g, Intravenous, PRN    glucagon (human recombinant), 1 mg, Intramuscular, PRN    glucagon (human recombinant), 1 mg, Intramuscular, PRN    glucose, 16 g, Oral, PRN    glucose, 16 g, Oral, PRN    glucose, 24 g, Oral, PRN    glucose, 24 g, Oral, PRN    hydrALAZINE, 5 mg, Intravenous, Q6H PRN    HYDROcodone-acetaminophen, 1 tablet, Oral, Q6H PRN    HYDROcodone-acetaminophen, 1 tablet, Oral, Q6H PRN    insulin aspart U-100, 0-5 Units, Subcutaneous, QID (AC + HS) PRN    magnesium oxide, 800 mg, Oral, PRN    magnesium oxide, 800 mg, Oral, PRN    melatonin, 6 mg, Oral, Nightly PRN    morphine, 4 mg, Intravenous, Q6H PRN    naloxone, 0.02 mg, Intravenous, PRN    ondansetron, 4 mg, Intravenous, Q8H PRN    potassium bicarbonate, 35 mEq, Oral, PRN    potassium bicarbonate, 50 mEq, Oral, PRN    potassium bicarbonate, 60 mEq, Oral, PRN    potassium, sodium phosphates, 2 packet, Oral, PRN    potassium, sodium phosphates, 2 packet, Oral, PRN    potassium, sodium phosphates, 2 packet,  Oral, PRN    senna-docusate, 1 tablet, Oral, Daily PRN    simethicone, 1 tablet, Oral, QID PRN    sodium chloride 0.9%, 10 mL, Intravenous, Q12H PRN     Objective:     Vital Signs (24h Range):  Temp:  [97.9 °F (36.6 °C)-98.2 °F (36.8 °C)] 98.2 °F (36.8 °C)  Pulse:  [56-84] 68  Resp:  [13-19] 18  SpO2:  [98 %-100 %] 98 %  BP: (127-184)/() 151/88     Date 07/25/25 0700 - 07/26/25 0659   Shift 7983-3769 8456-5415 3957-7052 24 Hour Total   INTAKE   P.O. 120   120   Shift Total(mL/kg) 120(1.2)   120(1.2)   OUTPUT   Shift Total(mL/kg)       Weight (kg) 99.8 99.8 99.8 99.8     Lines/Drains/Airways       Peripheral Intravenous Line  Duration             Peripheral IV 07/23/25 2333 20 G Anterior;Right Forearm 1 day                    Physical Exam  Constitutional:       Comments: Sleeping but easily rousable   HENT:      Head:      Jaw: No trismus.      Comments: Very slight swelling of right submandibular gland- significantly improved from yesterday      Mouth/Throat:      Mouth: Mucous membranes are moist.   Pulmonary:      Breath sounds: No stridor.         Significant Labs:  CBC:   Recent Labs   Lab 07/24/25  0747   WBC 8.81   RBC 4.25   HGB 12.4   HCT 36.2*      MCV 85   MCH 29.2   MCHC 34.3       Assessment/Plan:   sialadenitis  Active Diagnoses:    Diagnosis Date Noted POA    PRINCIPAL PROBLEM:  Sigmoid diverticulitis [K57.32] 07/24/2021 Yes    Suicidal ideation [R45.851] 07/24/2025 Not Applicable    Neck swelling [R22.1] 07/24/2025 Unknown    Type 2 diabetes mellitus with hyperglycemia [E11.65] 08/25/2024 Yes    ORALIA (acute kidney injury) [N17.9] 01/07/2020 Yes    Hypokalemia [E87.6] 05/04/2015 Yes    Essential hypertension [I10]  Yes      Problems Resolved During this Admission:   Recommend 7 days of abx for salivary gland infection   Continue warm compresses, massage of gland and lemon wedges/lemonade/lemon candy   Can f/u ent prn     Sydni Blair MD  Otorhinolaryngology-Head & Neck Surgery  [facility] - Telemetry

## 2025-07-25 NOTE — SUBJECTIVE & OBJECTIVE
Interval History: neck swelling improved, upset regarding diet. Abdominal pain controlled. No further nausea. Seen by  and CEC.     Review of Systems   Constitutional:  Negative for chills and fever.   Eyes:  Negative for photophobia and visual disturbance.   Respiratory:  Negative for chest tightness and shortness of breath.    Cardiovascular:  Negative for chest pain and palpitations.   Gastrointestinal:  Positive for abdominal pain. Negative for nausea and vomiting.   Genitourinary:  Negative for dysuria and hematuria.     Objective:     Vital Signs (Most Recent):  Temp: 98.2 °F (36.8 °C) (07/25/25 1112)  Pulse: 68 (07/25/25 1112)  Resp: 18 (07/25/25 1112)  BP: (!) 151/88 (07/25/25 1112)  SpO2: 98 % (07/25/25 1112) Vital Signs (24h Range):  Temp:  [97.9 °F (36.6 °C)-98.2 °F (36.8 °C)] 98.2 °F (36.8 °C)  Pulse:  [56-84] 68  Resp:  [13-19] 18  SpO2:  [98 %-100 %] 98 %  BP: (127-184)/() 151/88     Weight: 99.8 kg (220 lb)  Body mass index is 34.46 kg/m².    Intake/Output Summary (Last 24 hours) at 7/25/2025 1256  Last data filed at 7/25/2025 1003  Gross per 24 hour   Intake 120 ml   Output 3 ml   Net 117 ml         Physical Exam  Vitals and nursing note reviewed.   Constitutional:       General: She is not in acute distress.     Appearance: She is well-developed. She is not diaphoretic.      Comments: Ambulating in room   HENT:      Head: Normocephalic and atraumatic.      Right Ear: External ear normal.      Left Ear: External ear normal.   Eyes:      General:         Right eye: No discharge.         Left eye: No discharge.      Conjunctiva/sclera: Conjunctivae normal.   Neck:      Thyroid: No thyromegaly.      Comments: Neck swelling resolved  Cardiovascular:      Rate and Rhythm: Normal rate and regular rhythm.      Heart sounds: No murmur heard.  Pulmonary:      Effort: Pulmonary effort is normal. No respiratory distress.      Breath sounds: Normal breath sounds.   Abdominal:      General: Bowel  sounds are normal. There is no distension.      Palpations: Abdomen is soft. There is no mass.      Tenderness: There is no abdominal tenderness.   Musculoskeletal:         General: No deformity.      Cervical back: Normal range of motion and neck supple.   Skin:     General: Skin is warm and dry.   Neurological:      Mental Status: She is alert and oriented to person, place, and time.      Sensory: No sensory deficit.   Psychiatric:         Mood and Affect: Mood normal.         Behavior: Behavior normal.               Significant Labs: CBC:   Recent Labs   Lab 07/23/25 2330 07/24/25  0747   WBC 10.79 8.81   HGB 13.5 12.4   HCT 40.3 36.2*    302     CMP:   Recent Labs   Lab 07/23/25 2330 07/24/25  0747 07/25/25  0513    135* 135*   K 2.8* 3.1* 3.5   CL 97 99 102   CO2 26 27 26   * 163* 151*   BUN 12 10 9   CREATININE 1.8* 1.5* 1.2   CALCIUM 9.5 8.8 8.6*   PROT 8.1 7.1 6.5   ALBUMIN 3.9 3.3* 3.3*   BILITOT 0.7 0.5 0.2   ALKPHOS 78 70 65   AST 11 10* 12   ALT 7* 6* <8*   ANIONGAP 15 9 7*       Significant Imaging:   Imaging Results              US Soft Tissue Head Neck (Final result)  Result time 07/24/25 12:16:08      Final result by Diego Weathers MD (07/24/25 12:16:08)                   Impression:      No acute findings.  Recommend further evaluation as warranted.      Electronically signed by: Diego Weathers MD  Date:    07/24/2025  Time:    12:16               Narrative:    EXAMINATION:  US SOFT TISSUE HEAD NECK    CLINICAL HISTORY:  right neck swelling;    TECHNIQUE:  Multiple images were acquired of the right aspect of the neck within area of concern. The left neck was imaged for comparison.    FINDINGS:  The right submandibular gland measures 4.6 x 3.7 x 1.9 cm and is within area of concern.  There are no enlarged cervical lymph nodes.  There is no mass or fluid collection.  The left submandibular gland was imaged for comparison and a measures 4.4 x 3.1 x 1.8 cm.                                        CT Abdomen Pelvis With IV Contrast NO Oral Contrast (Final result)  Result time 07/24/25 00:53:06      Final result by Kit Suarez MD (07/24/25 00:53:06)                   Impression:      1. Acute proximal sigmoid diverticulitis.  No evidence of abscess or perforation.  Inflammatory changes extend to involve adjacent coursing small bowel loops in the region.  Potential coloenteric fistula not excluded on the basis of this CT.  2. Additional findings as detailed above.      Electronically signed by: Kit Suarez MD  Date:    07/24/2025  Time:    00:53               Narrative:    EXAMINATION:  CT ABDOMEN PELVIS WITH IV CONTRAST    CLINICAL HISTORY:  LLQ abdominal pain;    TECHNIQUE:  Low dose axial images, sagittal and coronal reformations were obtained from the lung bases to the pubic symphysis following the IV administration of 75 mL of Omnipaque 350 .  Oral contrast was not given.    COMPARISON:  CT abdomen and pelvis August 2024.    FINDINGS:  The visualized portion of the heart is unremarkable.  The lung bases are clear.    No significant hepatic abnormalities are identified.  There is no intra-or extrahepatic biliary ductal dilatation.  The gallbladder is unremarkable.  The stomach, pancreas, spleen, and adrenal glands are unremarkable.    Kidneys enhance normally with no evidence of hydronephrosis.  No abnormalities are seen along the ureteral courses.  Urinary bladder is nondistended.  Uterus is unremarkable.  Bilateral pelvic clips, presumed tubal ligation clips are seen.    Appendix is visualized and is unremarkable.  There is extensive colonic diverticulosis.  Abnormal colonic wall thickening with surrounding inflammatory changes are seen involving the proximal sigmoid colon in a region of multiple diverticula consistent with acute diverticulitis.  Inflammatory changes extend to involve the adjacent coursing small bowel loops in the region.  No evidence of abnormal bowel dilatation or  obstruction.  No free air or significant free fluid seen.  No evidence of abscess or perforation.    Aorta tapers normally.    No acute osseous abnormality identified. Small fat containing umbilical hernia is noted.

## 2025-07-25 NOTE — NURSING
VIRTUAL NURSE: Pt arrived to unit. Permission received per patient to turn camera to view patient. VIP model explained; patient informed this VN will be working with bedside nurse and the rest of the care team. Plan of care reviewed with patient.  Educated patient on VTE, fall risk and fall risk precautions in place. Call light within reach, side rails up x2. Admission questions completed. Patient instucted to ask staff for assistance. Patient verbalized complete understanding. Patient denies complaints or any needs at this time. Will continue to be available and intervene as needed.

## 2025-07-26 VITALS
OXYGEN SATURATION: 98 % | HEART RATE: 71 BPM | TEMPERATURE: 99 F | HEIGHT: 67 IN | BODY MASS INDEX: 34.53 KG/M2 | SYSTOLIC BLOOD PRESSURE: 145 MMHG | DIASTOLIC BLOOD PRESSURE: 88 MMHG | WEIGHT: 220 LBS | RESPIRATION RATE: 18 BRPM

## 2025-07-26 LAB
ABSOLUTE EOSINOPHIL (OHS): 0.12 K/UL
ABSOLUTE MONOCYTE (OHS): 0.55 K/UL (ref 0.3–1)
ABSOLUTE NEUTROPHIL COUNT (OHS): 6.78 K/UL (ref 1.8–7.7)
ALBUMIN SERPL BCP-MCNC: 3.6 G/DL (ref 3.5–5.2)
ALP SERPL-CCNC: 74 UNIT/L (ref 40–150)
ALT SERPL W/O P-5'-P-CCNC: 8 UNIT/L (ref 10–44)
ANION GAP (OHS): 12 MMOL/L (ref 8–16)
AST SERPL-CCNC: 13 UNIT/L (ref 11–45)
BASOPHILS # BLD AUTO: 0.03 K/UL
BASOPHILS NFR BLD AUTO: 0.3 %
BILIRUB SERPL-MCNC: 0.1 MG/DL (ref 0.1–1)
BUN SERPL-MCNC: 8 MG/DL (ref 6–20)
CALCIUM SERPL-MCNC: 9.1 MG/DL (ref 8.7–10.5)
CHLORIDE SERPL-SCNC: 101 MMOL/L (ref 95–110)
CO2 SERPL-SCNC: 26 MMOL/L (ref 23–29)
CREAT SERPL-MCNC: 1.2 MG/DL (ref 0.5–1.4)
ERYTHROCYTE [DISTWIDTH] IN BLOOD BY AUTOMATED COUNT: 14.1 % (ref 11.5–14.5)
GFR SERPLBLD CREATININE-BSD FMLA CKD-EPI: 56 ML/MIN/1.73/M2
GLUCOSE SERPL-MCNC: 169 MG/DL (ref 70–110)
HCT VFR BLD AUTO: 39.4 % (ref 37–48.5)
HGB BLD-MCNC: 12.9 GM/DL (ref 12–16)
IMM GRANULOCYTES # BLD AUTO: 0.03 K/UL (ref 0–0.04)
IMM GRANULOCYTES NFR BLD AUTO: 0.3 % (ref 0–0.5)
LYMPHOCYTES # BLD AUTO: 2.58 K/UL (ref 1–4.8)
MCH RBC QN AUTO: 28.4 PG (ref 27–31)
MCHC RBC AUTO-ENTMCNC: 32.7 G/DL (ref 32–36)
MCV RBC AUTO: 87 FL (ref 82–98)
NUCLEATED RBC (/100WBC) (OHS): 0 /100 WBC
OHS QRS DURATION: 78 MS
OHS QTC CALCULATION: 500 MS
PLATELET # BLD AUTO: 325 K/UL (ref 150–450)
PMV BLD AUTO: 9.9 FL (ref 9.2–12.9)
POCT GLUCOSE: 126 MG/DL (ref 70–110)
POTASSIUM SERPL-SCNC: 3.4 MMOL/L (ref 3.5–5.1)
PROT SERPL-MCNC: 7.3 GM/DL (ref 6–8.4)
RBC # BLD AUTO: 4.54 M/UL (ref 4–5.4)
RELATIVE EOSINOPHIL (OHS): 1.2 %
RELATIVE LYMPHOCYTE (OHS): 25.6 % (ref 18–48)
RELATIVE MONOCYTE (OHS): 5.5 % (ref 4–15)
RELATIVE NEUTROPHIL (OHS): 67.1 % (ref 38–73)
SODIUM SERPL-SCNC: 139 MMOL/L (ref 136–145)
WBC # BLD AUTO: 10.09 K/UL (ref 3.9–12.7)

## 2025-07-26 PROCEDURE — 63600175 PHARM REV CODE 636 W HCPCS: Performed by: STUDENT IN AN ORGANIZED HEALTH CARE EDUCATION/TRAINING PROGRAM

## 2025-07-26 PROCEDURE — 85025 COMPLETE CBC W/AUTO DIFF WBC: CPT | Performed by: PHYSICIAN ASSISTANT

## 2025-07-26 PROCEDURE — G0378 HOSPITAL OBSERVATION PER HR: HCPCS

## 2025-07-26 PROCEDURE — 96376 TX/PRO/DX INJ SAME DRUG ADON: CPT

## 2025-07-26 PROCEDURE — 25000003 PHARM REV CODE 250: Performed by: STUDENT IN AN ORGANIZED HEALTH CARE EDUCATION/TRAINING PROGRAM

## 2025-07-26 PROCEDURE — 80053 COMPREHEN METABOLIC PANEL: CPT | Performed by: PHYSICIAN ASSISTANT

## 2025-07-26 PROCEDURE — 36415 COLL VENOUS BLD VENIPUNCTURE: CPT | Performed by: PHYSICIAN ASSISTANT

## 2025-07-26 RX ORDER — AMOXICILLIN AND CLAVULANATE POTASSIUM 500; 125 MG/1; MG/1
1 TABLET, FILM COATED ORAL 2 TIMES DAILY
Qty: 20 TABLET | Refills: 0 | Status: SHIPPED | OUTPATIENT
Start: 2025-07-26 | End: 2025-08-05

## 2025-07-26 RX ADMIN — AMLODIPINE BESYLATE 5 MG: 5 TABLET ORAL at 10:07

## 2025-07-26 RX ADMIN — METRONIDAZOLE 500 MG: 500 TABLET ORAL at 05:07

## 2025-07-26 RX ADMIN — HYDROCODONE BITARTRATE AND ACETAMINOPHEN 1 TABLET: 7.5; 325 TABLET ORAL at 05:07

## 2025-07-26 RX ADMIN — MORPHINE SULFATE 4 MG: 4 INJECTION, SOLUTION INTRAMUSCULAR; INTRAVENOUS at 06:07

## 2025-07-26 NOTE — ASSESSMENT & PLAN NOTE
Patient's blood pressure range in the last 24 hours was: BP  Min: 149/94  Max: 170/104.The patient's inpatient anti-hypertensive regimen is listed below:  Current Antihypertensives  amLODIPine tablet 5 mg, Daily, Oral  hydrALAZINE injection 5 mg, Every 6 hours PRN, Intravenous    Plan  - BP is controlled, no changes needed to their regimen  - monitor BP

## 2025-07-26 NOTE — ASSESSMENT & PLAN NOTE
Patient's FSGs are controlled on current medication regimen.  Last A1c reviewed-   Lab Results   Component Value Date    HGBA1C 8.9 (H) 06/05/2025     Most recent fingerstick glucose reviewed-   Recent Labs   Lab 07/25/25  1112 07/25/25  1628 07/25/25 2032 07/26/25  0823   POCTGLUCOSE 161* 175* 162* 126*     Current correctional scale  Low  Maintain anti-hyperglycemic dose as follows-   Antihyperglycemics (From admission, onward)      Start     Stop Route Frequency Ordered    07/24/25 0517  insulin aspart U-100 pen 0-5 Units         -- SubQ Before meals & nightly PRN 07/24/25 0417          Hold Oral hypoglycemics while patient is in the hospital.

## 2025-07-26 NOTE — PROGRESS NOTES
[facility] [place]  Nurses Note -- 4 Eyes      7/26/2025       Skin assessed on: Q Shift      [x] No Pressure Injuries Present    []Prevention Measures Documented    [] Yes LDA  for Pressure Injury Previously documented     [] Yes New Pressure Injury Discovered   [] LDA for New Pressure Injury Added      Attending RN:  Parviz Meyer RN     Second RN:  Yolanda Carter RN

## 2025-07-26 NOTE — PROGRESS NOTES
Valuables returned to patient ( cell phone, , keys and cards). IV and telemetry removed without difficulty.     Discharged to home. Escorted by transport.

## 2025-07-26 NOTE — DISCHARGE INSTRUCTIONS
.Our goal at [facility] is to always give you outstanding care and exceptional service. You may receive a survey from "[employer]" by mail, text or e-mail in the next 24-48 hours asking about the care you received with us. The survey should only take 5-10 minutes to complete and is very important to us.     Your feedback provides us with a way to recognize our staff who work tirelessly to provide the best care! Also, your responses help us learn how to improve when your experience was below our aspiration of excellence. We are always looking for ways to improve your stay. We WILL use your feedback to continue making improvements to help us provide the highest quality care. We keep your personal information and feedback confidential. We appreciate your time completing this survey and can't wait to hear from you!!!    We look forward to your continued care with us! Thanks so much for choosing [facility] for your healthcare needs!

## 2025-07-26 NOTE — CONSULTS
[facility]   DEPARTMENT OF PSYCHIATRY    SERVICE: Telepsychiatry  ENCOUNTER: initial    TELEPSYCHIATRY (AUDIOVISUAL): Each patient who is provided psychiatric services via telehealth is: (1) informed of the relationship between the psychiatric provider and patient, as well as the respective role of any other health care staff/providers with respect to management of the patient; and (2) notified that he or she may decline to receive psychiatric services by telehealth and may withdraw from such care at any time.  Risks of telehealth include the potential for security breaches (HIPPA compliant platforms notwithstanding) and technological failure, as well as the limitations to physical examination inherent to the modality. The patient was agreeable to the use of telehealth services.    START TIME: 7/25/2025 8:24 PM  STOP TIME: 7/25/2025 8:44 PM    -- PATIENT IDENTIFIERS: Cipriano Gamez  [id number]  1975  49 y.o.  female  -- REQUESTING PROVIDER: Anisha Harden DO *  -- LOCATION OF PATIENT: unit (med/surg)    -- MODE OF ARRIVAL: self-presented  -- PRESENT WITH PATIENT DURING SESSION: ALONE  -- SOURCES OF INFORMATION: PATIENT  -- LOCATION OF ENCOUNTER PROVIDER: [place]  -- ENCOUNTER PROVIDER: Santos Padgett DO      Subjective:     History of Present Illness:  On Interview:  Patient seen through teleconference this evening on my approach. She reports that she presented to the hospital because she was having physical pain in her stomach and back. While in the hospital she admitted to having suicidal thoughts. She reports that while she is having these thoughts she would never act on them because of what it would do to her son and the rest of her family. She reports that she has been having these thoughts for the past few weeks. She reports that she has been having a hard time taking care of her life responsibilities. Her sister's are her her main support and she has one sister that lives in the  city.     Collateral  Son Prince Castano [phone]  He reports that he is aware that the patient has been struggling with her mental health and has had some fleeting thoughts of not wanting to live. He does not believe that the patient would act on these thoughts and he has never made any comments about having a plan of self harm. He believes that the patient would benefit from psychiatric hospitalization but he feels like she'll be safe to come home if that is what she is requesting.     Psychiatric History:   Previous Psychiatric Hospitalizations: No   Previous Medication Trials: No   Previous Suicide Attempts: no   History of Violence: No  History of Depression: Yes  History of Sarah: No  History of Auditory/Visual Hallucination No  History of Delusions: No  Outpatient psychiatrist (current & past): No    Substance Abuse History:  Tobacco:No  Alcohol: No  Illicit Substances:Yes, Marijuana rare use   Detox/Rehab: No    Legal History: Past charges/incarcerations: No     Family Psychiatric History: Unknown      Social History:  Developmental/Childhood:Achieved all developmental milestones timely  Education:High School Diploma  Employment Status/Finances: Unemployed    Relationship Status/Sexual Orientation: Single   Children: 1  Housing Status: Home with her son    history:  NO  Access to gun: NO  Denominational:Non-practicing  Recreational activities: None reported   Patient was born and raised in the [place] area     NIMH Toolkit ASQ Suicide Screening Tool:  In the past few weeks, have you wished you were dead? No  In the past few weeks, have you felt that you or your family would be better off if you were dead? No  In the past week, have you been having thoughts about killing yourself? Yes  Have you ever tried to kill yourself? No  Are you having thoughts of killing yourself right now? No    Psychiatric Mental Status Exam:  Arousal: alert  Sensorium/Orientation: oriented to grossly  "intact  Behavior/Cooperation: normal, cooperative   Speech: normal tone, normal rate, normal pitch, normal volume  Language: grossly intact  Mood: " Depressed "   Affect: Dysphoric   Thought Process: Linear   Thought Content:   Auditory hallucinations: NO  Visual hallucinations: NO  Paranoia: NO  Delusions:  NO  Suicidal ideation: Passive   Homicidal ideation: No  Attention/Concentration:  intact  Memory:    Recent:  Intact   Remote: Intact  Fund of Knowledge: Aware of current events   Abstract reasoning: proverbs were abstract, similarities were abstract  Insight: has awareness of illness  Judgment: Fair      Past Medical History:   Past Medical History:   Diagnosis Date    Abnormal Pap smear of cervix     Cigarette smoker     Diverticulosis     GERD (gastroesophageal reflux disease)     Hiatal hernia     Hyperlipidemia 8/25/2024    Hypertension       Laboratory Data:   Labs Reviewed   COMPREHENSIVE METABOLIC PANEL - Abnormal       Result Value    Sodium 138      Potassium 2.8 (*)     Chloride 97      CO2 26      Glucose 183 (*)     BUN 12      Creatinine 1.8 (*)     Calcium 9.5      Protein Total 8.1      Albumin 3.9      Bilirubin Total 0.7      ALP 78      AST 11      ALT 7 (*)     Anion Gap 15      eGFR 35 (*)    URINALYSIS, REFLEX TO URINE CULTURE - Abnormal    Color, UA Yellow      Appearance, UA Hazy (*)     pH, UA 6.0      Spec Grav UA >=1.030 (*)     Protein, UA 1+ (*)     Glucose, UA Trace (*)     Ketones, UA Trace (*)     Bilirubin, UA 1+ (*)     Blood, UA 1+ (*)     Nitrites, UA Negative      Urobilinogen, UA 4.0-6.0 (*)     Leukocyte Esterase, UA Negative     DRUG SCREEN PANEL, URINE EMERGENCY - Abnormal    Benzodiazepine, Urine Negative      Methadone, Urine Negative      Cocaine, Urine Negative      Opiates, Urine Negative      Barbiturates, Urine Negative      Amphetamines, Urine Negative      THC Presumptive Positive (*)     Phencyclidine, Urine Negative      Urine Creatinine >450.0 (*)     " "Narrative:     This screen includes the following classes of drugs at the listed cut-off:     Benzodiazepines:        200 ng/ml   Methadone:              300 ng/ml   Cocaine metabolite:     300 ng/ml   Opiates:                300 ng/ml   Barbiturates:           200 ng/ml   Amphetamines:           1000 ng/ml   Marijuana metabs (THC): 50 ng/ml   Phencyclidine (PCP):    25 ng/ml     This is a screening test. If results do not correlate with clinical presentation, then a confirmatory send out test is advised.    This report is intended for use in clinical monitoring and management of patients. It is not intended for use in employment related drug testing."   ACETAMINOPHEN LEVEL - Abnormal    Acetaminophen Level <3.0 (*)    CBC WITH DIFFERENTIAL - Abnormal    WBC 10.79      RBC 4.78      HGB 13.5      HCT 40.3      MCV 84      MCH 28.2      MCHC 33.5      RDW 13.8      Platelet Count 333      MPV 9.8      Nucleated RBC 0      Neut % 75.4 (*)     Lymph % 16.7 (*)     Mono % 6.9      Eos % 0.5      Basophil % 0.2      Imm Grans % 0.3      Neut # 8.15 (*)     Lymph # 1.80      Mono # 0.74      Eos # 0.05      Baso # 0.02      Imm Grans # 0.03     PHOSPHORUS - Abnormal    Phosphorus Level 2.5 (*)    CBC WITH DIFFERENTIAL - Abnormal    WBC 8.81      RBC 4.25      HGB 12.4      HCT 36.2 (*)     MCV 85      MCH 29.2      MCHC 34.3      RDW 14.0      Platelet Count 302      MPV 10.0      Nucleated RBC 0      Neut % 74.4 (*)     Lymph % 16.9 (*)     Mono % 7.5      Eos % 0.6      Basophil % 0.3      Imm Grans % 0.3      Neut # 6.55      Lymph # 1.49      Mono # 0.66      Eos # 0.05      Baso # 0.03      Imm Grans # 0.03     COMPREHENSIVE METABOLIC PANEL - Abnormal    Sodium 135 (*)     Potassium 3.1 (*)     Chloride 99      CO2 27      Glucose 163 (*)     BUN 10      Creatinine 1.5 (*)     Calcium 8.8      Protein Total 7.1      Albumin 3.3 (*)     Bilirubin Total 0.5      ALP 70      AST 10 (*)     ALT 6 (*)     Anion Gap 9      " eGFR 43 (*)    POCT GLUCOSE - Abnormal    POCT Glucose 178 (*)    ALCOHOL,MEDICAL (ETHANOL) - Normal    Alcohol, Serum <10     LACTIC ACID, PLASMA - Normal    Lactic Acid Level 1.2      Narrative:     Falsely low lactic acid results can be found in samples containing >=13.0 mg/dL total bilirubin and/or >=3.5 mg/dL direct bilirubin.    PROCALCITONIN - Normal    Procalcitonin 0.06     LIPASE - Normal    Lipase Level 29     MAGNESIUM - Normal    Magnesium  1.7     CBC W/ AUTO DIFFERENTIAL    Narrative:     The following orders were created for panel order CBC auto differential.  Procedure                               Abnormality         Status                     ---------                               -----------         ------                     CBC with Differential[[id number]]       Abnormal            Final result                 Please view results for these tests on the individual orders.   URINALYSIS MICROSCOPIC    RBC, UA 0      WBC, UA 0      Bacteria, UA None      Hyaline Casts, UA 0      Microscopic Comment       CBC W/ AUTO DIFFERENTIAL    Narrative:     The following orders were created for panel order CBC Auto Differential.  Procedure                               Abnormality         Status                     ---------                               -----------         ------                     CBC with Differential[[id number]]       Abnormal            Final result                 Please view results for these tests on the individual orders.   POCT URINE PREGNANCY    POC Preg Test, Ur Negative       Acceptable Yes     POCT GLUCOSE MONITORING CONTINUOUS       Neurological History:  Seizures: No  Head trauma: No    Allergies:   Review of patient's allergies indicates:   Allergen Reactions    Ciprofloxacin Swelling and Blisters    Ibuprofen Hives    Meloxicam      rash    Nsaids (non-steroidal anti-inflammatory drug) Hives    Cyclobenzaprine Rash     rasg       Medications in ER:    Medications   sodium chloride 0.9% flush 10 mL (has no administration in time range)   melatonin tablet 6 mg (6 mg Oral Given 7/24/25 7926)   ondansetron injection 4 mg (4 mg Intravenous Given 7/25/25 0901)   senna-docusate 8.6-50 mg per tablet 1 tablet (has no administration in time range)   bisacodyL suppository 10 mg (has no administration in time range)   acetaminophen tablet 650 mg (has no administration in time range)   simethicone chewable tablet 80 mg (has no administration in time range)   aluminum-magnesium hydroxide-simethicone 200-200-20 mg/5 mL suspension 30 mL (has no administration in time range)   acetaminophen tablet 650 mg (650 mg Oral Given 7/24/25 1546)   naloxone 0.4 mg/mL injection 0.02 mg (has no administration in time range)   potassium bicarbonate disintegrating tablet 50 mEq (has no administration in time range)   potassium bicarbonate disintegrating tablet 35 mEq (has no administration in time range)   potassium bicarbonate disintegrating tablet 60 mEq (has no administration in time range)   magnesium oxide tablet 800 mg (has no administration in time range)   magnesium oxide tablet 800 mg (has no administration in time range)   potassium, sodium phosphates 280-160-250 mg packet 2 packet (has no administration in time range)   potassium, sodium phosphates 280-160-250 mg packet 2 packet (has no administration in time range)   potassium, sodium phosphates 280-160-250 mg packet 2 packet (has no administration in time range)   glucose chewable tablet 16 g (has no administration in time range)   glucose chewable tablet 24 g (has no administration in time range)   glucagon (human recombinant) injection 1 mg (has no administration in time range)   enoxaparin injection 40 mg (40 mg Subcutaneous Given 7/25/25 1643)   cefTRIAXone injection 2 g (2 g Intravenous Given 7/25/25 0864)   metroNIDAZOLE tablet 500 mg (500 mg Oral Given 7/25/25 1456)   morphine injection 4 mg (4 mg Intravenous Given  7/25/25 1949)   HYDROcodone-acetaminophen 5-325 mg per tablet 1 tablet (1 tablet Oral Given 7/24/25 1928)   HYDROcodone-acetaminophen 7.5-325 mg per tablet 1 tablet (1 tablet Oral Given 7/25/25 1643)   0.9% NaCl infusion ( Intravenous New Bag 7/24/25 0501)   amLODIPine tablet 5 mg (5 mg Oral Given 7/25/25 0851)   hydrALAZINE injection 5 mg (has no administration in time range)   glucose chewable tablet 16 g (has no administration in time range)   glucose chewable tablet 24 g (has no administration in time range)   dextrose 50% injection 12.5 g (has no administration in time range)   dextrose 50% injection 25 g (has no administration in time range)   glucagon (human recombinant) injection 1 mg (has no administration in time range)   insulin aspart U-100 pen 0-5 Units (has no administration in time range)   sodium chloride 0.9% bolus 1,000 mL 1,000 mL (0 mLs Intravenous Stopped 7/24/25 0131)   potassium bicarbonate disintegrating tablet 50 mEq (50 mEq Oral Given 7/24/25 0033)   iohexoL (OMNIPAQUE 350) injection 75 mL (75 mLs Intravenous Given 7/24/25 0023)   morphine injection 6 mg (6 mg Intravenous Given 7/24/25 0045)   amLODIPine tablet 5 mg (5 mg Oral Given 7/24/25 0045)   piperacillin-tazobactam (ZOSYN) 4.5 g in D5W 100 mL IVPB (MB+) (0 g Intravenous Stopped 7/24/25 0236)   ondansetron injection 4 mg (4 mg Intravenous Given 7/24/25 0149)   diphenhydrAMINE capsule 25 mg (25 mg Oral Given 7/24/25 0837)   famotidine tablet 20 mg (20 mg Oral Given 7/24/25 0837)   predniSONE tablet 50 mg (50 mg Oral Given 7/24/25 0837)   potassium chloride SA CR tablet 30 mEq (30 mEq Oral Given 7/24/25 0921)       Medications at home:     No new subjective & objective note has been filed under this hospital service since the last note was generated.      Assessment - Diagnosis - Goals:     Diagnosis/Impression: Patient is a 49 year old woman with a past psychiatric history of Depression currently in observation. Psychiatry was  consulted to assess the need to continue PEC/CEC for suicidal ideation. The patient reports that she does not have any plans of self harm and states that she would feel safe following up outpatient. Collateral from the patient's son supports the patient being discharged from the hospital once she is medically cleared. While the patient could benefit from inpatient hospitalization there aren't grounds to keep her in the hospital against her will based on interview and collateral reports.    Rec:   -Patient does not meet criteria for PEC as the patient is not a danger to self, others, or gravely disabled. Patient is clear from a psychiatric standpoint and can be discharged once medically stable.   -Please provide the patient outpatient behavioral health resources        Santos Padgett, DO   Psychiatry  [facility]    Consults  [facility] [place] TELEMETRY

## 2025-07-26 NOTE — ASSESSMENT & PLAN NOTE
Patient's most recent potassium results are listed below.   Recent Labs     07/24/25  0747 07/25/25  0513 07/26/25  0545   K 3.1* 3.5 3.4*     Plan  - Replete potassium per protocol  - Monitor potassium Daily  - Patient's hypokalemia is resolved

## 2025-07-26 NOTE — ASSESSMENT & PLAN NOTE
ORALIA is likely due to pre-renal azotemia due to intravascular volume depletion. Baseline creatinine is 1.3. Most recent creatinine and eGFR are listed below.  Recent Labs     07/24/25  0747 07/25/25  0513 07/26/25  0545   CREATININE 1.5* 1.2 1.2   EGFRNORACEVR 43* 56* 56*      Plan  - ORALIA is stable  - Avoid nephrotoxins and renally dose meds for GFR listed above  - Monitor urine output, serial BMP, and adjust therapy as needed  - Resolved with IVF. Continue regular diet

## 2025-07-26 NOTE — PLAN OF CARE
Problem: Adult Inpatient Plan of Care  Goal: Plan of Care Review  Outcome: Progressing  Goal: Patient-Specific Goal (Individualized)  Outcome: Progressing  Goal: Absence of Hospital-Acquired Illness or Injury  Outcome: Progressing  Goal: Optimal Comfort and Wellbeing  Outcome: Progressing     Problem: Infection  Goal: Absence of Infection Signs and Symptoms  Outcome: Progressing     Problem: Acute Kidney Injury/Impairment  Goal: Fluid and Electrolyte Balance  Outcome: Progressing  Goal: Improved Oral Intake  Outcome: Progressing

## 2025-07-26 NOTE — DISCHARGE SUMMARY
[facility]  Discharge Summary      Patient Name: Cipriano Gamez  MRN: [id number]  [facility]: [id number]  Patient Class: OP- Observation  Admission Date: 7/23/2025  Hospital Length of Stay: 0 days  Discharge Date and Time: 07/26/2025 11:19 AM  Attending Physician: Anisha Harden DO   Discharging Provider: Bharat Briscoe PA-C  Primary Care Provider: Brooks Bergeron MD    Primary Care Team: BHARAT BRISCOE    HPI:   This is a 49-year-old female with a past medical history of hypertension, hyperlipidemia, obesity, diverticulosis who presents with abdominal pain.      Patient presents for evaluation of abdominal pain that started 2 days prior to presentation.  She reports left lower quadrant abdominal pain with radiation to her back, associated with nausea, and vomiting.  Additionally, she reports nonexertional intermittent sharp chest pain that occurs randomly.  She denied diarrhea, fevers or chills.  Furthermore, patient reports passive suicidal ideations and feels overall with home/family stressors.  She has not been taking her prescription medications due to difficulty getting them refilled.    In the ED, the patient was hypertensive (up to 200/104), tachycardic (110s > 80s).  Labs were remarkable for hypokalemia (2.8), an elevated creatinine (1.8-baseline around 1.3). UDS was positive marijuana. CT abdomen and pelvis showed acute proximal sigmoid diverticulitis, no evidence of abscess or perforation, inflammatory changes extend to involve adjacent coursing small bowel loops in the region.  She was PEC'd.  Patient was given 1 L of NS, Zosyn, potassium bicarbonate 50 mEq, Zofran 4 mg IV, morphine 6 mg IV, amlodipine 5 mg p.o..  She was admitted for further management.    * No surgery found *      Hospital Course:   Cipriano Gamez 49 y.o. female placed in observation for sigmoid diverticulitis. She presented to the ED with abdominal pain. In the ED her CT scan had findings of sigmoid  diverticulitis, her K was 2.8 and Cr 1.8. She also had complaints of passive suicidal ideation and psychiatry was consulted recommending PEC. She then developed unilateral right neck swelling. Neck swelling resolved and was attributed to sialadenitis. She tolerated a bland diet, and ORALIA/hypoglycemia resolved.  She was re-evaluated by Psychiatry who recommended retention of PC an outpatient follow up.  She will discharge home with Augmentin to complete 10 day course for diverticulitis with outpatient Gastroenterology and General surgery referral was placed for recurrent diverticulitis.  At discharge he is ambulatory tolerating a diet without evidence of neck swelling and denies suicidal ideation.     Goals of Care Treatment Preferences:  Code Status: Full Code         Consults:   Consults (From admission, onward)          Status Ordering Provider     Inpatient consult to Telemedicine - Psych  Once        Provider:  Heri Durham MD    Acknowledged BHARAT DOMINGUEZ     Inpatient consult to ENT  Once        Provider:  Sydni Blair MD    Acknowledged BHARAT DOMINGUEZ     Inpatient consult to Telemedicine - Psychiatry  Once        Provider:  (Not yet assigned)    Acknowledged RADHA TYLER            Assessment & Plan  Sigmoid diverticulitis  Presents with abdominal pain, vomiting  CT abdomen and pelvis showed acute proximal sigmoid diverticulitis, no evidence of abscess or perforation, inflammatory changes extend to involve adjacent coursing small bowel loops in the region.     Plan:   Continue ceftriaxone/Flagyl   Symptomatic control-symptoms improved. Now tolerating bland diet  Outpatient surgery f/u to consider colectomy given recurrent diverticulitis  Essential hypertension  Patient's blood pressure range in the last 24 hours was: BP  Min: 149/94  Max: 170/104.The patient's inpatient anti-hypertensive regimen is listed below:  Current Antihypertensives  amLODIPine tablet 5 mg, Daily, Oral  hydrALAZINE  injection 5 mg, Every 6 hours PRN, Intravenous    Plan  - BP is controlled, no changes needed to their regimen  - monitor BP  Hypokalemia  Patient's most recent potassium results are listed below.   Recent Labs     07/24/25  0747 07/25/25  0513 07/26/25  0545   K 3.1* 3.5 3.4*     Plan  - Replete potassium per protocol  - Monitor potassium Daily  - Patient's hypokalemia is resolved  ORALIA (acute kidney injury)  ORALIA is likely due to pre-renal azotemia due to intravascular volume depletion. Baseline creatinine is 1.3. Most recent creatinine and eGFR are listed below.  Recent Labs     07/24/25  0747 07/25/25  0513 07/26/25  0545   CREATININE 1.5* 1.2 1.2   EGFRNORACEVR 43* 56* 56*      Plan  - ORALIA is stable  - Avoid nephrotoxins and renally dose meds for GFR listed above  - Monitor urine output, serial BMP, and adjust therapy as needed  - Resolved with IVF. Continue regular diet  Type 2 diabetes mellitus with hyperglycemia  Patient's FSGs are controlled on current medication regimen.  Last A1c reviewed-   Lab Results   Component Value Date    HGBA1C 8.9 (H) 06/05/2025     Most recent fingerstick glucose reviewed-   Recent Labs   Lab 07/25/25  1112 07/25/25  1628 07/25/25 2032 07/26/25  0823   POCTGLUCOSE 161* 175* 162* 126*     Current correctional scale  Low  Maintain anti-hyperglycemic dose as follows-   Antihyperglycemics (From admission, onward)      Start     Stop Route Frequency Ordered    07/24/25 0517  insulin aspart U-100 pen 0-5 Units         -- SubQ Before meals & nightly PRN 07/24/25 0417          Hold Oral hypoglycemics while patient is in the hospital.  Suicidal ideation  Currently PEC'd.  Psychiatry consulted recommending PEC, and continued stabilization of medical problems and re-evaluation in 24hrs to determine if PEC still indicated  Reconsult psych-pending psych consult to determine if inpt psych treatment required  Currently CEC with     Neck swelling  She complained of neck swelling under  right mandible without associated oropharyngeal swelling. Tolerating PO no stridor or difficulty phonating. No medications were given with history of allergy to those medications  Trial of prednisone, famotidine, and benadryl-without improvement suggesting against allergic reaction  Check US neck-without abnormality  ENT consulted-symptoms suspected related to sialadenitis    Final Active Diagnoses:    Diagnosis Date Noted POA    PRINCIPAL PROBLEM:  Sigmoid diverticulitis [K57.32] 07/24/2021 Yes    Suicidal ideation [R45.851] 07/24/2025 Not Applicable    Neck swelling [R22.1] 07/24/2025 Unknown    Type 2 diabetes mellitus with hyperglycemia [E11.65] 08/25/2024 Yes    ORALIA (acute kidney injury) [N17.9] 01/07/2020 Yes    Hypokalemia [E87.6] 05/04/2015 Yes    Essential hypertension [I10]  Yes      Problems Resolved During this Admission:       Discharged Condition: stable    Disposition: Home or Self Care    Follow Up:    Patient Instructions:      Ambulatory referral/consult to General Surgery   Standing Status: Future   Referral Priority: Routine Referral Type: Consultation   Referral Reason: Specialty Services Required   Requested Specialty: General Surgery   Number of Visits Requested: 1     Ambulatory referral/consult to Gastroenterology   Standing Status: Future   Referral Priority: Routine Referral Type: Consultation   Referral Reason: Specialty Services Required   Requested Specialty: Gastroenterology   Number of Visits Requested: 1     Diet Adult Regular     Notify your health care provider if you experience any of the following:  temperature >100.4     Notify your health care provider if you experience any of the following:  persistent nausea and vomiting or diarrhea     Notify your health care provider if you experience any of the following:  increased confusion or weakness     Notify your health care provider if you experience any of the following:  persistent dizziness, light-headedness, or visual  disturbances     Activity as tolerated       Significant Diagnostic Studies: Labs: CMP   Recent Labs   Lab 07/25/25  0513 07/26/25  0545   * 139   K 3.5 3.4*    101   CO2 26 26   * 169*   BUN 9 8   CREATININE 1.2 1.2   CALCIUM 8.6* 9.1   PROT 6.5 7.3   ALBUMIN 3.3* 3.6   BILITOT 0.2 0.1   ALKPHOS 65 74   AST 12 13   ALT <8* 8*   ANIONGAP 7* 12    and CBC   Recent Labs   Lab 07/26/25  0544   WBC 10.09   HGB 12.9   HCT 39.4          Pending Diagnostic Studies:       None           Medications:  Reconciled Home Medications:      Medication List        START taking these medications      amoxicillin-clavulanate 500-125mg 500-125 mg Tab  Commonly known as: AUGMENTIN  Take 1 tablet (500 mg total) by mouth 2 (two) times daily. for 10 days            CONTINUE taking these medications      amLODIPine 5 MG tablet  Commonly known as: NORVASC  Take 1 tablet by mouth once daily.     metFORMIN 500 MG ER 24hr tablet  Commonly known as: GLUCOPHAGE-XR  Take 500 mg by mouth once daily.     metoprolol succinate 25 MG 24 hr tablet  Commonly known as: TOPROL-XL  Take 1 tablet by mouth once daily.              Indwelling Lines/Drains at time of discharge:   Lines/Drains/Airways       None                       Time spent on the discharge of patient: 37 minutes         Jani Briscoe PA-C  Department of Hospital Medicine  [facility] [place] [facility]

## 2025-07-26 NOTE — PLAN OF CARE
07/26/25 1029   Post-Acute Status   Post-Acute Authorization Other  (Home; Follow-ups)   Other Status No Post-Acute Service Needs   Hospital Resources/Appts/Education Provided Provided patient/caregiver with written discharge plan information;Appointments scheduled and added to AVS;Provided education on problems/symptoms using teachback;Post-Acute resouces added to AVS   Discharge Delays None known at this time   Discharge Plan   Discharge Plan A Home with family  (Home with family; follow-ups)   Discharge Plan B Home with family  (Follow-ups)

## 2025-07-26 NOTE — PLAN OF CARE
07/26/25 1033   Final Note   Assessment Type Final Discharge Note   Anticipated Discharge Disposition Home  (Follow-ups)   What phone number can be called within the next 1-3 days to see how you are doing after discharge? [phone]   Hospital Resources/Appts/Education Provided Provided patient/caregiver with written discharge plan information;Appointments scheduled and added to AVS;Provided education on problems/symptoms using teachback;Post-Acute resouces added to AVS   Post-Acute Status   Post-Acute Authorization Other  (Home; follow-ups)   Other Status No Post-Acute Service Needs   Discharge Delays None known at this time

## 2025-07-26 NOTE — PROGRESS NOTES
PEC/CEC has been lifted by provider. Personal belongings returned to patient from security department, reported to charge nurse, and house supervisor notified to return valuable ( phone, credit cards etc.).

## 2025-07-26 NOTE — PLAN OF CARE
Case Management Final Discharge Note  Discharge Disposition: Home; follow-ups  New DME ordered/Company name: None  Relevant SDOH/Transition of Care Barriers: None  Person available to provide assistance at home when needed and their contact information: Cipriano [phone]  Scheduled followup appointment: Into AVS; messages sent   Referrals placed: GI; Surgery  Transportation: Patient/Family    Patient and family educated on discharge services and updated on DC plan. Bedside RN Parviz notified, patient clear to discharge from Case Management Perspective.      07/26/25 1034   Final Note   Assessment Type Final Discharge Note   Anticipated Discharge Disposition Home  (Follow-ups)   What phone number can be called within the next 1-3 days to see how you are doing after discharge? [phone]   Hospital Resources/Appts/Education Provided Provided patient/caregiver with written discharge plan information;Appointments scheduled and added to AVS;Provided education on problems/symptoms using teachback;Post-Acute resouces added to AVS   Post-Acute Status   Post-Acute Authorization Other  (Home; follow-ups)   Other Status No Post-Acute Service Needs   Discharge Delays None known at this time

## 2025-07-27 LAB — POCT GLUCOSE: 162 MG/DL (ref 70–110)

## 2025-07-28 ENCOUNTER — TELEPHONE (OUTPATIENT)
Dept: GASTROENTEROLOGY | Facility: CLINIC | Age: 50
End: 2025-07-28
Payer: MEDICAID

## 2025-07-28 ENCOUNTER — TELEPHONE (OUTPATIENT)
Dept: SURGERY | Facility: CLINIC | Age: 50
End: 2025-07-28
Payer: MEDICAID

## 2025-07-28 NOTE — TELEPHONE ENCOUNTER
----- Message from  Joseph sent at 7/26/2025 10:27 AM CDT -----  Regarding: Out-Patient Gastroenterology Hospital Follow-up needed in 1 week  Out-Patient Gastroenterology Hospital Follow-up needed in 1 week; patient discharged on 7/26/2025.    MICHELLE GONZALEZ [[id number]]

## 2025-07-28 NOTE — TELEPHONE ENCOUNTER
KENRICKM for pt, to schedule her 1 week hospital f/u.     ----- Message from  Joseph sent at 7/26/2025 10:26 AM CDT -----  Regarding: Out-Patient Surgery Hospital Follow-up needed in 1 week  Out-Patient Surgery Hospital Follow-up needed in 1 week; patient discharged on 7/26/2025.    MICHELLE GONZALEZ [[id number]]   [General Appearance - Alert] : alert [General Appearance - In No Acute Distress] : in no acute distress [General Appearance - Well Nourished] : well nourished [General Appearance - Well Developed] : well developed [Sclera] : the sclera and conjunctiva were normal [Neck Appearance] : the appearance of the neck was normal [Neck Cervical Mass (___cm)] : no neck mass was observed [Jugular Venous Distention Increased] : there was no jugular-venous distention [Exaggerated Use Of Accessory Muscles For Inspiration] : no accessory muscle use [Heart Sounds] : normal S1 and S2 [Edema] : there was no peripheral edema [Bowel Sounds] : normal bowel sounds [Abdomen Tenderness] : non-tender [Cervical Lymph Nodes Enlarged Posterior Bilaterally] : posterior cervical [Cervical Lymph Nodes Enlarged Anterior Bilaterally] : anterior cervical [No CVA Tenderness] : no ~M costovertebral angle tenderness [No Spinal Tenderness] : no spinal tenderness [Nail Clubbing] : no clubbing  or cyanosis of the fingernails [] : no rash [No Focal Deficits] : no focal deficits [Oriented To Time, Place, And Person] : oriented to person, place, and time [Impaired Insight] : insight and judgment were intact [Affect] : the affect was normal

## 2025-07-30 ENCOUNTER — PATIENT OUTREACH (OUTPATIENT)
Dept: ADMINISTRATIVE | Facility: CLINIC | Age: 50
End: 2025-07-30
Payer: MEDICAID

## 2025-07-30 NOTE — PROGRESS NOTES
C3 nurse attempted to contact Cipriano Gamez for a TCC post hospital discharge follow up call. No answer. LVM requesting a callback at [phone].    The patient does not have a scheduled HOSFU appointment. Message sent to PCP's staff to assist with HOSFU appointment scheduling.

## 2025-07-30 NOTE — PROGRESS NOTES
2nd attempt-C3 nurse attempted to contact Cipriano Gamez for a TCC post hospital discharge follow up call. No answer. LVM requesting a callback at [phone].    The patient does not have a scheduled HOSFU appointment. Message previously sent to PCP's staff to assist with HOSFU appointment scheduling.

## 2025-07-30 NOTE — TELEPHONE ENCOUNTER
Left message on answering machine to return call to clinic. Patient need to schedule hospital follow up visit. Patient has not been on portal since 6/02/2025.

## 2025-07-31 NOTE — PROGRESS NOTES
3rd attempt-C3 nurse attempted to contact Cipriano Gamez for a TCC post hospital discharge follow up call. No answer. LVM requesting a callback at [phone].    The patient does not have a scheduled HOSFU appointment. Message previously sent to PCP's staff to assist with HOSFU appointment scheduling.

## (undated) DEVICE — SUT RAPIDE 4-0

## (undated) DEVICE — TROCAR ENDOPATH XCEL 8MM 10CM

## (undated) DEVICE — TUBING INSUFFLATION 10

## (undated) DEVICE — CATH SELF-CATH FEMALE 14FR 6IN

## (undated) DEVICE — SEE L#120831

## (undated) DEVICE — DRESSING ADH ISLAND 2.5 X 3

## (undated) DEVICE — PACK LAPAROSCOPY/PELVISCOPY II

## (undated) DEVICE — APPLICATOR CHLORAPREP ORN 26ML

## (undated) DEVICE — Device

## (undated) DEVICE — SUPPORT ULNA NERVE PROTECTOR

## (undated) DEVICE — PAD PREP 50/CA

## (undated) DEVICE — SEE MEDLINE ITEM 146292

## (undated) DEVICE — SEE L#152161

## (undated) DEVICE — KIT ANTIFOG

## (undated) DEVICE — TROCAR ENDOPATH XCEL 5X100MM

## (undated) DEVICE — NDL INSUF ULTRA VERESS 120MM

## (undated) DEVICE — SEE MEDLINE ITEM 154981

## (undated) DEVICE — BLADE SURG CARBON STEEL SZ11

## (undated) DEVICE — GLOVE SURG BIOGEL LATEX SZ 7.5

## (undated) DEVICE — GOWN B1 X-LG X-LONG

## (undated) DEVICE — ELECTRODE REM PLYHSV RETURN 9

## (undated) DEVICE — SYR 10CC LUER LOCK

## (undated) DEVICE — SEE MEDLINE ITEM 157110

## (undated) DEVICE — PAD SANITARY OB STERILE